# Patient Record
Sex: MALE | Race: WHITE | NOT HISPANIC OR LATINO | Employment: OTHER | URBAN - NONMETROPOLITAN AREA
[De-identification: names, ages, dates, MRNs, and addresses within clinical notes are randomized per-mention and may not be internally consistent; named-entity substitution may affect disease eponyms.]

---

## 2023-03-06 DIAGNOSIS — I26.99 PE (PULMONARY THROMBOEMBOLISM) (MULTI): Primary | ICD-10-CM

## 2023-03-06 PROBLEM — G25.0 TREMOR, ESSENTIAL: Status: ACTIVE | Noted: 2023-03-06

## 2023-03-06 PROBLEM — R35.1 NOCTURIA: Status: ACTIVE | Noted: 2023-03-06

## 2023-03-06 PROBLEM — R97.20 ELEVATED PSA: Status: ACTIVE | Noted: 2023-03-06

## 2023-03-06 PROBLEM — E11.22 DM TYPE 2 CAUSING CKD STAGE 3 (MULTI): Status: ACTIVE | Noted: 2023-03-06

## 2023-03-06 PROBLEM — Z95.5 S/P CORONARY ARTERY STENT PLACEMENT: Status: ACTIVE | Noted: 2023-03-06

## 2023-03-06 PROBLEM — R20.2 PARESTHESIA OF FINGER: Status: ACTIVE | Noted: 2023-03-06

## 2023-03-06 PROBLEM — N18.30 STAGE 3 CHRONIC KIDNEY DISEASE (MULTI): Status: ACTIVE | Noted: 2023-03-06

## 2023-03-06 PROBLEM — Z85.828 HISTORY OF SKIN CANCER: Status: ACTIVE | Noted: 2023-03-06

## 2023-03-06 PROBLEM — E78.2 MIXED HYPERLIPIDEMIA: Status: ACTIVE | Noted: 2023-03-06

## 2023-03-06 PROBLEM — I10 HYPERTENSION: Status: ACTIVE | Noted: 2023-03-06

## 2023-03-06 PROBLEM — Z86.0100 PERSONAL HISTORY OF COLONIC POLYPS: Status: ACTIVE | Noted: 2023-03-06

## 2023-03-06 PROBLEM — I25.10 CAD (CORONARY ARTERY DISEASE): Status: ACTIVE | Noted: 2023-03-06

## 2023-03-06 PROBLEM — R91.1 PULMONARY NODULE: Status: ACTIVE | Noted: 2023-03-06

## 2023-03-06 PROBLEM — K63.5 COLON POLYP: Status: ACTIVE | Noted: 2023-03-06

## 2023-03-06 PROBLEM — D70.9 NEUTROPENIA (CMS-HCC): Status: ACTIVE | Noted: 2023-03-06

## 2023-03-06 PROBLEM — G47.33 OSA ON CPAP: Status: ACTIVE | Noted: 2023-03-06

## 2023-03-06 PROBLEM — N52.9 ED (ERECTILE DYSFUNCTION) OF ORGANIC ORIGIN: Status: ACTIVE | Noted: 2023-03-06

## 2023-03-06 PROBLEM — R09.02 HYPOXIA: Status: ACTIVE | Noted: 2023-03-06

## 2023-03-06 PROBLEM — K21.9 ACID REFLUX: Status: ACTIVE | Noted: 2023-03-06

## 2023-03-06 PROBLEM — J30.2 ALLERGIC RHINITIS, SEASONAL: Status: ACTIVE | Noted: 2023-03-06

## 2023-03-06 PROBLEM — D69.6 THROMBOCYTOPENIA (CMS-HCC): Status: ACTIVE | Noted: 2023-03-06

## 2023-03-06 PROBLEM — I83.90 RETICULAR VENOUS VARICES: Status: ACTIVE | Noted: 2023-03-06

## 2023-03-06 PROBLEM — M19.90 DEGENERATIVE JOINT DISEASE: Status: ACTIVE | Noted: 2023-03-06

## 2023-03-06 PROBLEM — Z86.010 PERSONAL HISTORY OF COLONIC POLYPS: Status: ACTIVE | Noted: 2023-03-06

## 2023-03-06 PROBLEM — N18.30 DM TYPE 2 CAUSING CKD STAGE 3 (MULTI): Status: ACTIVE | Noted: 2023-03-06

## 2023-03-06 PROBLEM — R19.7 POSTCHOLECYSTECTOMY DIARRHEA: Status: ACTIVE | Noted: 2023-03-06

## 2023-03-06 PROBLEM — N13.8 BENIGN PROSTATIC HYPERPLASIA WITH URINARY OBSTRUCTION AND OTHER LOWER URINARY TRACT SYMPTOMS: Status: ACTIVE | Noted: 2023-03-06

## 2023-03-06 PROBLEM — D64.9 ANEMIA: Status: ACTIVE | Noted: 2023-03-06

## 2023-03-06 PROBLEM — M19.90 ARTHRITIS: Status: ACTIVE | Noted: 2023-03-06

## 2023-03-06 PROBLEM — M48.02 DEGENERATIVE CERVICAL SPINAL STENOSIS: Status: ACTIVE | Noted: 2023-03-06

## 2023-03-06 PROBLEM — K91.89 POSTCHOLECYSTECTOMY DIARRHEA: Status: ACTIVE | Noted: 2023-03-06

## 2023-03-06 PROBLEM — L98.9 SKIN LESION: Status: ACTIVE | Noted: 2023-03-06

## 2023-03-06 PROBLEM — N40.1 BENIGN PROSTATIC HYPERPLASIA WITH URINARY OBSTRUCTION AND OTHER LOWER URINARY TRACT SYMPTOMS: Status: ACTIVE | Noted: 2023-03-06

## 2023-03-06 RX ORDER — ALLOPURINOL 100 MG/1
1 TABLET ORAL DAILY
COMMUNITY
Start: 2022-09-01 | End: 2023-08-17 | Stop reason: SDUPTHER

## 2023-03-06 RX ORDER — ATORVASTATIN CALCIUM 20 MG/1
1 TABLET, FILM COATED ORAL DAILY
COMMUNITY
Start: 2018-04-21 | End: 2023-08-17 | Stop reason: SDUPTHER

## 2023-03-06 RX ORDER — ACETAMINOPHEN AND DIPHENHYDRAMINE HYDROCHLORIDE 500; 25 MG/1; MG/1
2 TABLET, FILM COATED ORAL NIGHTLY
COMMUNITY
End: 2023-04-12 | Stop reason: ALTCHOICE

## 2023-03-06 RX ORDER — CHOLECALCIFEROL (VITAMIN D3) 25 MCG
25 TABLET ORAL DAILY
COMMUNITY
End: 2023-04-12 | Stop reason: ALTCHOICE

## 2023-03-06 RX ORDER — LANOLIN ALCOHOL/MO/W.PET/CERES
1 CREAM (GRAM) TOPICAL DAILY
COMMUNITY
Start: 2021-01-12 | End: 2023-04-12 | Stop reason: SDUPTHER

## 2023-03-06 RX ORDER — AMLODIPINE BESYLATE 10 MG/1
1 TABLET ORAL
COMMUNITY
Start: 2021-01-12 | End: 2023-04-20

## 2023-03-06 RX ORDER — CHOLESTYRAMINE 4 G/9G
1 POWDER, FOR SUSPENSION ORAL 2 TIMES DAILY
COMMUNITY
End: 2023-04-12 | Stop reason: ALTCHOICE

## 2023-03-06 RX ORDER — ASPIRIN 81 MG/1
81 TABLET ORAL DAILY
COMMUNITY
End: 2023-06-23 | Stop reason: ALTCHOICE

## 2023-03-06 RX ORDER — AMMONIUM LACTATE 12 G/100G
1 CREAM TOPICAL 2 TIMES DAILY
COMMUNITY
Start: 2021-11-12 | End: 2023-06-23 | Stop reason: ALTCHOICE

## 2023-03-31 LAB
ALANINE AMINOTRANSFERASE (SGPT) (U/L) IN SER/PLAS: 28 U/L (ref 10–52)
ALBUMIN (G/DL) IN SER/PLAS: 3.8 G/DL (ref 3.4–5)
ALKALINE PHOSPHATASE (U/L) IN SER/PLAS: 83 U/L (ref 33–136)
ANION GAP IN SER/PLAS: 13 MMOL/L (ref 10–20)
ASPARTATE AMINOTRANSFERASE (SGOT) (U/L) IN SER/PLAS: 33 U/L (ref 9–39)
BASOPHILS (10*3/UL) IN BLOOD BY AUTOMATED COUNT: 0.01 X10E9/L (ref 0–0.1)
BASOPHILS/100 LEUKOCYTES IN BLOOD BY AUTOMATED COUNT: 0.4 % (ref 0–2)
BILIRUBIN TOTAL (MG/DL) IN SER/PLAS: 0.5 MG/DL (ref 0–1.2)
CALCIDIOL (25 OH VITAMIN D3) (NG/ML) IN SER/PLAS: 102 NG/ML
CALCIUM (MG/DL) IN SER/PLAS: 8.9 MG/DL (ref 8.6–10.3)
CARBON DIOXIDE, TOTAL (MMOL/L) IN SER/PLAS: 27 MMOL/L (ref 21–32)
CHLORIDE (MMOL/L) IN SER/PLAS: 107 MMOL/L (ref 98–107)
CHOLESTEROL (MG/DL) IN SER/PLAS: 113 MG/DL (ref 0–199)
CHOLESTEROL IN HDL (MG/DL) IN SER/PLAS: 31 MG/DL
CHOLESTEROL/HDL RATIO: 3.6
COBALAMIN (VITAMIN B12) (PG/ML) IN SER/PLAS: 494 PG/ML (ref 211–911)
CREATININE (MG/DL) IN SER/PLAS: 2.08 MG/DL (ref 0.5–1.3)
EOSINOPHILS (10*3/UL) IN BLOOD BY AUTOMATED COUNT: 0.06 X10E9/L (ref 0–0.4)
EOSINOPHILS/100 LEUKOCYTES IN BLOOD BY AUTOMATED COUNT: 2.3 % (ref 0–6)
ERYTHROCYTE DISTRIBUTION WIDTH (RATIO) BY AUTOMATED COUNT: 15 % (ref 11.5–14.5)
ERYTHROCYTE MEAN CORPUSCULAR HEMOGLOBIN CONCENTRATION (G/DL) BY AUTOMATED: 32.1 G/DL (ref 32–36)
ERYTHROCYTE MEAN CORPUSCULAR VOLUME (FL) BY AUTOMATED COUNT: 115 FL (ref 80–100)
ERYTHROCYTES (10*6/UL) IN BLOOD BY AUTOMATED COUNT: 2.35 X10E12/L (ref 4.5–5.9)
ESTIMATED AVERAGE GLUCOSE FOR HBA1C: 169 MG/DL
GFR MALE: 30 ML/MIN/1.73M2
GLUCOSE (MG/DL) IN SER/PLAS: 161 MG/DL (ref 74–99)
HEMATOCRIT (%) IN BLOOD BY AUTOMATED COUNT: 27.1 % (ref 41–52)
HEMOGLOBIN (G/DL) IN BLOOD: 8.7 G/DL (ref 13.5–17.5)
HEMOGLOBIN A1C/HEMOGLOBIN TOTAL IN BLOOD: 7.5 %
IMMATURE GRANULOCYTES/100 LEUKOCYTES IN BLOOD BY AUTOMATED COUNT: 0.4 % (ref 0–0.9)
LDL: 36 MG/DL (ref 0–99)
LEUKOCYTES (10*3/UL) IN BLOOD BY AUTOMATED COUNT: 2.6 X10E9/L (ref 4.4–11.3)
LYMPHOCYTES (10*3/UL) IN BLOOD BY AUTOMATED COUNT: 0.96 X10E9/L (ref 0.8–3)
LYMPHOCYTES/100 LEUKOCYTES IN BLOOD BY AUTOMATED COUNT: 37.2 % (ref 13–44)
MAGNESIUM (MG/DL) IN SER/PLAS: 1.87 MG/DL (ref 1.6–2.4)
MONOCYTES (10*3/UL) IN BLOOD BY AUTOMATED COUNT: 0.18 X10E9/L (ref 0.05–0.8)
MONOCYTES/100 LEUKOCYTES IN BLOOD BY AUTOMATED COUNT: 7 % (ref 2–10)
NEUTROPHILS (10*3/UL) IN BLOOD BY AUTOMATED COUNT: 1.36 X10E9/L (ref 1.6–5.5)
NEUTROPHILS/100 LEUKOCYTES IN BLOOD BY AUTOMATED COUNT: 52.7 % (ref 40–80)
NON HDL CHOLESTEROL: 82 MG/DL
PLATELETS (10*3/UL) IN BLOOD AUTOMATED COUNT: 97 X10E9/L (ref 150–450)
POTASSIUM (MMOL/L) IN SER/PLAS: 4.4 MMOL/L (ref 3.5–5.3)
PROTEIN TOTAL: 6.5 G/DL (ref 6.4–8.2)
SODIUM (MMOL/L) IN SER/PLAS: 143 MMOL/L (ref 136–145)
THYROTROPIN (MIU/L) IN SER/PLAS BY DETECTION LIMIT <= 0.05 MIU/L: 2.32 MIU/L (ref 0.44–3.98)
TRIGLYCERIDE (MG/DL) IN SER/PLAS: 231 MG/DL (ref 0–149)
UREA NITROGEN (MG/DL) IN SER/PLAS: 43 MG/DL (ref 6–23)
VLDL: 46 MG/DL (ref 0–40)

## 2023-04-11 RX ORDER — ACETAMINOPHEN, DEXTROMETHORPHAN HBR, DOXYLAMINE SUCCINATE, PHENYLEPHRINE HCL 650; 20; 12.5; 1 MG/30ML; MG/30ML; MG/30ML; MG/30ML
1 SOLUTION ORAL
COMMUNITY
Start: 2019-10-09

## 2023-04-11 RX ORDER — METOPROLOL SUCCINATE 100 MG/1
100 TABLET, EXTENDED RELEASE ORAL DAILY
COMMUNITY
Start: 2019-03-09 | End: 2023-06-23 | Stop reason: ALTCHOICE

## 2023-04-11 RX ORDER — GABAPENTIN 300 MG/1
300 CAPSULE ORAL EVERY 12 HOURS
COMMUNITY
Start: 2021-09-16 | End: 2023-04-12 | Stop reason: ALTCHOICE

## 2023-04-11 RX ORDER — FUROSEMIDE 40 MG/1
40 TABLET ORAL DAILY
COMMUNITY
Start: 2018-11-08 | End: 2023-05-24 | Stop reason: SDUPTHER

## 2023-04-11 RX ORDER — FAMOTIDINE 20 MG/1
20 TABLET, FILM COATED ORAL 2 TIMES DAILY
COMMUNITY
Start: 2021-02-18 | End: 2023-04-12 | Stop reason: SDUPTHER

## 2023-04-11 RX ORDER — GLIMEPIRIDE 2 MG/1
2 TABLET ORAL 2 TIMES DAILY
COMMUNITY
Start: 2022-07-12 | End: 2023-05-24 | Stop reason: SDUPTHER

## 2023-04-11 RX ORDER — NIZATIDINE 150 MG/1
150 CAPSULE ORAL 2 TIMES DAILY
COMMUNITY
End: 2023-04-12

## 2023-04-11 RX ORDER — NITROGLYCERIN 0.4 MG/1
0.4 TABLET SUBLINGUAL EVERY 5 MIN PRN
COMMUNITY
Start: 2020-01-08 | End: 2023-04-12 | Stop reason: SDUPTHER

## 2023-04-11 RX ORDER — PRIMIDONE 50 MG/1
3 TABLET ORAL 2 TIMES DAILY
COMMUNITY
Start: 2018-08-09 | End: 2023-06-23 | Stop reason: ALTCHOICE

## 2023-04-11 RX ORDER — COLESEVELAM 180 1/1
1 TABLET ORAL DAILY
COMMUNITY
Start: 2021-12-03 | End: 2023-04-12 | Stop reason: ALTCHOICE

## 2023-04-12 ENCOUNTER — OFFICE VISIT (OUTPATIENT)
Dept: PRIMARY CARE | Facility: CLINIC | Age: 88
End: 2023-04-12
Payer: MEDICARE

## 2023-04-12 VITALS
WEIGHT: 239 LBS | SYSTOLIC BLOOD PRESSURE: 122 MMHG | DIASTOLIC BLOOD PRESSURE: 62 MMHG | HEART RATE: 70 BPM | BODY MASS INDEX: 36.22 KG/M2 | OXYGEN SATURATION: 96 % | HEIGHT: 68 IN

## 2023-04-12 DIAGNOSIS — Z00.00 ROUTINE GENERAL MEDICAL EXAMINATION AT HEALTH CARE FACILITY: Primary | ICD-10-CM

## 2023-04-12 DIAGNOSIS — G25.0 TREMOR, ESSENTIAL: ICD-10-CM

## 2023-04-12 DIAGNOSIS — N18.32 STAGE 3B CHRONIC KIDNEY DISEASE (MULTI): ICD-10-CM

## 2023-04-12 DIAGNOSIS — D69.6 THROMBOCYTOPENIA (CMS-HCC): ICD-10-CM

## 2023-04-12 DIAGNOSIS — K21.9 GASTROESOPHAGEAL REFLUX DISEASE WITHOUT ESOPHAGITIS: ICD-10-CM

## 2023-04-12 DIAGNOSIS — I10 PRIMARY HYPERTENSION: ICD-10-CM

## 2023-04-12 DIAGNOSIS — I25.10 CORONARY ARTERY DISEASE INVOLVING NATIVE HEART, UNSPECIFIED VESSEL OR LESION TYPE, UNSPECIFIED WHETHER ANGINA PRESENT: ICD-10-CM

## 2023-04-12 DIAGNOSIS — G47.33 OSA ON CPAP: ICD-10-CM

## 2023-04-12 DIAGNOSIS — E11.22 TYPE 2 DIABETES MELLITUS WITH STAGE 3B CHRONIC KIDNEY DISEASE, WITHOUT LONG-TERM CURRENT USE OF INSULIN (MULTI): ICD-10-CM

## 2023-04-12 DIAGNOSIS — E78.2 MIXED HYPERLIPIDEMIA: ICD-10-CM

## 2023-04-12 DIAGNOSIS — D70.9 NEUTROPENIA, UNSPECIFIED TYPE (CMS-HCC): ICD-10-CM

## 2023-04-12 DIAGNOSIS — Z95.5 S/P CORONARY ARTERY STENT PLACEMENT: ICD-10-CM

## 2023-04-12 DIAGNOSIS — N18.32 TYPE 2 DIABETES MELLITUS WITH STAGE 3B CHRONIC KIDNEY DISEASE, WITHOUT LONG-TERM CURRENT USE OF INSULIN (MULTI): ICD-10-CM

## 2023-04-12 DIAGNOSIS — M19.90 ARTHRITIS: ICD-10-CM

## 2023-04-12 PROBLEM — E66.01 CLASS 2 SEVERE OBESITY WITH SERIOUS COMORBIDITY AND BODY MASS INDEX (BMI) OF 36.0 TO 36.9 IN ADULT (MULTI): Status: ACTIVE | Noted: 2023-04-12

## 2023-04-12 PROBLEM — L98.9 SKIN LESION: Status: RESOLVED | Noted: 2023-03-06 | Resolved: 2023-04-12

## 2023-04-12 PROBLEM — E66.812 CLASS 2 SEVERE OBESITY WITH SERIOUS COMORBIDITY AND BODY MASS INDEX (BMI) OF 36.0 TO 36.9 IN ADULT: Status: ACTIVE | Noted: 2023-04-12

## 2023-04-12 PROBLEM — I26.99 PULMONARY EMBOLISM (MULTI): Status: RESOLVED | Noted: 2023-03-06 | Resolved: 2023-04-12

## 2023-04-12 PROCEDURE — 3078F DIAST BP <80 MM HG: CPT | Performed by: FAMILY MEDICINE

## 2023-04-12 PROCEDURE — 1123F ACP DISCUSS/DSCN MKR DOCD: CPT | Performed by: FAMILY MEDICINE

## 2023-04-12 PROCEDURE — 1036F TOBACCO NON-USER: CPT | Performed by: FAMILY MEDICINE

## 2023-04-12 PROCEDURE — 3074F SYST BP LT 130 MM HG: CPT | Performed by: FAMILY MEDICINE

## 2023-04-12 PROCEDURE — 1159F MED LIST DOCD IN RCRD: CPT | Performed by: FAMILY MEDICINE

## 2023-04-12 PROCEDURE — 99214 OFFICE O/P EST MOD 30 MIN: CPT | Performed by: FAMILY MEDICINE

## 2023-04-12 PROCEDURE — 1157F ADVNC CARE PLAN IN RCRD: CPT | Performed by: FAMILY MEDICINE

## 2023-04-12 PROCEDURE — 1160F RVW MEDS BY RX/DR IN RCRD: CPT | Performed by: FAMILY MEDICINE

## 2023-04-12 PROCEDURE — G0439 PPPS, SUBSEQ VISIT: HCPCS | Performed by: FAMILY MEDICINE

## 2023-04-12 PROCEDURE — 1170F FXNL STATUS ASSESSED: CPT | Performed by: FAMILY MEDICINE

## 2023-04-12 RX ORDER — INSULIN GLARGINE 100 [IU]/ML
30 INJECTION, SOLUTION SUBCUTANEOUS NIGHTLY
COMMUNITY
End: 2023-06-23 | Stop reason: DRUGHIGH

## 2023-04-12 RX ORDER — NITROGLYCERIN 0.4 MG/1
0.4 TABLET SUBLINGUAL EVERY 5 MIN PRN
Qty: 90 TABLET | Refills: 1 | Status: SHIPPED | OUTPATIENT
Start: 2023-04-12 | End: 2023-11-16 | Stop reason: SDUPTHER

## 2023-04-12 RX ORDER — FAMOTIDINE 20 MG/1
20 TABLET, FILM COATED ORAL 2 TIMES DAILY
Qty: 180 TABLET | Refills: 1 | Status: SHIPPED | OUTPATIENT
Start: 2023-04-12 | End: 2023-06-23 | Stop reason: ALTCHOICE

## 2023-04-12 ASSESSMENT — ACTIVITIES OF DAILY LIVING (ADL)
DRESSING: INDEPENDENT
MANAGING_FINANCES: INDEPENDENT
BATHING: INDEPENDENT
DOING_HOUSEWORK: INDEPENDENT
TAKING_MEDICATION: INDEPENDENT
GROCERY_SHOPPING: INDEPENDENT

## 2023-04-12 ASSESSMENT — PATIENT HEALTH QUESTIONNAIRE - PHQ9
2. FEELING DOWN, DEPRESSED OR HOPELESS: NOT AT ALL
SUM OF ALL RESPONSES TO PHQ9 QUESTIONS 1 AND 2: 0
1. LITTLE INTEREST OR PLEASURE IN DOING THINGS: NOT AT ALL

## 2023-04-12 NOTE — PATIENT INSTRUCTIONS
Continue current medications.  Follow up with specialists as scheduled.  Follow up in 6 months, sooner if needed.      Ways to Help Prevent Falls at Home    Quick Tips   ? Ask for help if you need it. Most people want to help!   ? Get up slowly after sitting or laying down   ? Wear a medical alert device or keep cell phone in your pocket   ? Use night lights, especially areas near a bathroom   ? Keep the items you use often within reach on a small stool or end table   ? Use an assistive device such as walker or cane, as directed by provider/physical therapy   ? Use a non-slip mat and grab bars in your bathroom. Look for home health sections for best options     Other Areas to Focus On   ? Exercise and nutrition: Regular exercise or taking a falls prevention class are great ways improve strength and balance. Don’t forget to stay hydrated and bring a snack!   ? Medicine side effects: Some medicines can make you sleepy or dizzy, which could cause a fall. Ask your healthcare provider about the side effects your medicines could cause. Be sure to let them know if you take any vitamins or supplements as well.   ? Tripping hazards: Remove items you could trip on, such as loose mats, rugs, cords, and clutter. Wear closed toe shoes with rubber soles.   ? Health and wellness: Get regular checkups with your healthcare provider, plus routine vision and hearing screenings. Talk with your healthcare provider about:   o Your medicines and the possible side effects - bring them in a bag if that is easier!   o Problems with balance or feeling dizzy   o Ways to promote bone health, such as Vitamin D and calcium supplements   o Questions or concerns about falling     *Ask your healthcare team if you have questions     ©[facility], 2022

## 2023-04-12 NOTE — PROGRESS NOTES
"Subjective   Reason for Visit: Zack Medrano is an 87 y.o. male here for a Medicare Wellness visit.     Past Medical, Surgical, and Family History reviewed and updated in chart.    Reviewed all medications by prescribing practitioner or clinical pharmacist (such as prescriptions, OTCs, herbal therapies and supplements) and documented in the medical record.    Here for routine follow up HTN, DM, CKD (Dr Nguyen), CAD ([facility]), high chol, thrombocytopenia/anemia (Dr Dash), tremor (Dr Haque), LINDA, h/o PE. He continues to struggle with his tremor - is seeing Dr Haque and will be contacting their office regarding that.  He is now on lantus insulin through Dr Nguyen and his sugars are better.      Advance directives:. Patient has living will. Patient has healthcare POA.     Last Pneumovax was in 2020 - consider prevnar 20 in 2025.           Patient Care Team:  Fabiola Dietz MD as PCP - General  Mickey Nguyen DO as PCP - [employer] PCP  Patrick Haque MD as Referring Physician (Neurology)         Objective   Vitals:  /62 (BP Location: Left arm, Patient Position: Sitting)   Pulse 70   Ht 1.727 m (5' 8\")   Wt 108 kg (239 lb)   SpO2 96%   BMI 36.34 kg/m²       Physical Exam  Vitals reviewed.   Constitutional:       General: He is not in acute distress.  Cardiovascular:      Rate and Rhythm: Normal rate and regular rhythm.      Heart sounds: No murmur heard.  Pulmonary:      Effort: Pulmonary effort is normal. No respiratory distress.      Breath sounds: Normal breath sounds.   Skin:     General: Skin is warm and dry.   Neurological:      General: No focal deficit present.      Mental Status: He is alert. Mental status is at baseline.        Latest Reference Range & Units 03/31/23 08:30   GLUCOSE 74 - 99 mg/dL 161 (H)   SODIUM 136 - 145 mmol/L 143   POTASSIUM 3.5 - 5.3 mmol/L 4.4   CHLORIDE 98 - 107 mmol/L 107   Bicarbonate 21 - 32 mmol/L 27   Anion Gap 10 - 20 mmol/L 13   Blood Urea Nitrogen 6 - 23 mg/dL 43 " (H)   Creatinine 0.50 - 1.30 mg/dL 2.08 (H)   Calcium 8.6 - 10.3 mg/dL 8.9   Albumin 3.4 - 5.0 g/dL 3.8   Alkaline Phosphatase 33 - 136 U/L 83   ALT 10 - 52 U/L 28   AST 9 - 39 U/L 33   Bilirubin Total 0.0 - 1.2 mg/dL 0.5   HDL CHOLESTEROL mg/dL 31.0 !   Cholesterol/HDL Ratio  3.6   VLDL 0 - 40 mg/dL 46 (H)   TRIGLYCERIDES 0 - 149 mg/dL 231 (H)   Non HDL Cholesterol mg/dL 82   Total Protein 6.4 - 8.2 g/dL 6.5   MAGNESIUM 1.60 - 2.40 mg/dL 1.87   CHOLESTEROL 0 - 199 mg/dL 113   LDL 0 - 99 mg/dL 36   GFR MALE >90 mL/min/1.73m2 30 !   Vitamin B12 211 - 911 pg/mL 494   Hemoglobin A1C % 7.5 !   Thyroid Stimulating Hormone 0.44 - 3.98 mIU/L 2.32   Vitamin D, 25-Hydroxy, Total ng/mL 102 !   Estimated Average Glucose MG/   WBC 4.4 - 11.3 x10E9/L 2.6 (L)   RBC 4.50 - 5.90 x10E12/L 2.35 (L)   HEMOGLOBIN 13.5 - 17.5 g/dL 8.7 (L)   HEMATOCRIT 41.0 - 52.0 % 27.1 (L)   MCV 80 - 100 fL 115 (H)   MCHC 32.0 - 36.0 g/dL 32.1   Platelets 150 - 450 x10E9/L 97 (L)   Neutrophils % 40.0 - 80.0 % 52.7   Immature Granulocytes %, Automated 0.0 - 0.9 % 0.4   Lymphocytes % 13.0 - 44.0 % 37.2   Monocytes % 2.0 - 10.0 % 7.0   Eosinophils % 0.0 - 6.0 % 2.3   Basophils % 0.0 - 2.0 % 0.4   Neutrophils Absolute 1.60 - 5.50 x10E9/L 1.36 (L)   Lymphocytes Absolute 0.80 - 3.00 x10E9/L 0.96   Monocytes Absolute 0.05 - 0.80 x10E9/L 0.18   Eosinophils Absolute 0.00 - 0.40 x10E9/L 0.06   Basophils Absolute 0.00 - 0.10 x10E9/L 0.01   RED CELL DISTRIBUTION WIDTH 11.5 - 14.5 % 15.0 (H)   (H): Data is abnormally high  !: Data is abnormal  (L): Data is abnormally low    Assessment/Plan   Problem List Items Addressed This Visit          Nervous    LINDA on CPAP    Relevant Orders    Follow Up In Primary Care    CBC and Auto Differential    Comprehensive Metabolic Panel    Hemoglobin A1C    Lipid Panel    TSH with reflex to Free T4 if abnormal    Vitamin B12    Tremor, essential    Relevant Orders    Follow Up In Primary Care    CBC and Auto Differential     Comprehensive Metabolic Panel    Hemoglobin A1C    Lipid Panel    TSH with reflex to Free T4 if abnormal    Vitamin B12       Circulatory    CAD (coronary artery disease)    Relevant Medications    nitroglycerin (Nitrostat) 0.4 mg SL tablet    Other Relevant Orders    Follow Up In Primary Care    CBC and Auto Differential    Comprehensive Metabolic Panel    Hemoglobin A1C    Lipid Panel    TSH with reflex to Free T4 if abnormal    Vitamin B12    Hypertension    Relevant Orders    Follow Up In Primary Care    CBC and Auto Differential    Comprehensive Metabolic Panel    Hemoglobin A1C    Lipid Panel    TSH with reflex to Free T4 if abnormal    Vitamin B12    S/P coronary artery stent placement    Relevant Medications    nitroglycerin (Nitrostat) 0.4 mg SL tablet    Other Relevant Orders    Follow Up In Primary Care    CBC and Auto Differential    Comprehensive Metabolic Panel    Hemoglobin A1C    Lipid Panel    TSH with reflex to Free T4 if abnormal    Vitamin B12       Digestive    Acid reflux    Relevant Medications    famotidine (Pepcid) 20 mg tablet    Other Relevant Orders    Follow Up In Primary Care    CBC and Auto Differential    Comprehensive Metabolic Panel    Hemoglobin A1C    Lipid Panel    TSH with reflex to Free T4 if abnormal    Vitamin B12       Genitourinary    Stage 3 chronic kidney disease (CMS/HCC)    Relevant Orders    Follow Up In Primary Care    CBC and Auto Differential    Comprehensive Metabolic Panel    Hemoglobin A1C    Lipid Panel    TSH with reflex to Free T4 if abnormal    Vitamin B12       Musculoskeletal    Arthritis    Relevant Orders    Follow Up In Primary Care    CBC and Auto Differential    Comprehensive Metabolic Panel    Hemoglobin A1C    Lipid Panel    TSH with reflex to Free T4 if abnormal    Vitamin B12       Endocrine/Metabolic    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)    Relevant Orders    Follow Up In Primary Care    CBC and Auto Differential    Comprehensive Metabolic  Panel    Hemoglobin A1C    Lipid Panel    TSH with reflex to Free T4 if abnormal    Vitamin B12       Hematologic    Thrombocytopenia (CMS/HCC)    Relevant Orders    Follow Up In Primary Care    CBC and Auto Differential    Comprehensive Metabolic Panel    Hemoglobin A1C    Lipid Panel    TSH with reflex to Free T4 if abnormal    Vitamin B12       Other    Mixed hyperlipidemia    Relevant Orders    Follow Up In Primary Care    CBC and Auto Differential    Comprehensive Metabolic Panel    Hemoglobin A1C    Lipid Panel    TSH with reflex to Free T4 if abnormal    Vitamin B12    Neutropenia (CMS/HCC)    Relevant Orders    Follow Up In Primary Care    CBC and Auto Differential    Comprehensive Metabolic Panel    Hemoglobin A1C    Lipid Panel    TSH with reflex to Free T4 if abnormal    Vitamin B12     Other Visit Diagnoses       Routine general medical examination at health care facility    -  Primary    Relevant Orders    Follow Up In Primary Care    CBC and Auto Differential    Comprehensive Metabolic Panel    Hemoglobin A1C    Lipid Panel    TSH with reflex to Free T4 if abnormal    Vitamin B12             Patient was identified as a fall risk. Risk prevention instructions provided.

## 2023-04-14 DIAGNOSIS — I10 ESSENTIAL (PRIMARY) HYPERTENSION: ICD-10-CM

## 2023-04-20 RX ORDER — AMLODIPINE BESYLATE 10 MG/1
TABLET ORAL
Qty: 90 TABLET | Refills: 1 | Status: SHIPPED | OUTPATIENT
Start: 2023-04-20 | End: 2023-05-24 | Stop reason: SDUPTHER

## 2023-05-23 ENCOUNTER — PATIENT OUTREACH (OUTPATIENT)
Dept: CARE COORDINATION | Facility: CLINIC | Age: 88
End: 2023-05-23
Payer: MEDICARE

## 2023-05-23 DIAGNOSIS — D64.9 ANEMIA, UNSPECIFIED TYPE: ICD-10-CM

## 2023-05-23 NOTE — PROGRESS NOTES
Discharge Facility:[facility]  Discharge Diagnosis:  Admission Reason: weakness   Final Discharge Diagnoses: anemia, generalized weakness     Admission Date:5.15.23  Discharge Date: 5.19.23    PCP Appointment Date:  Specialist Appointment Date:   Physician/Dept/Service:   follow-up with Dr. Weinberg          Reason for Referral:   hospitalization          Call to Schedule in:   2 weeks          Location:   [address] [place] [address]          Phone Number:   [phone]  Hospital Encounter and Summary: Linked   See discharge assessment below for further details     Engagement  Call Start Time: 1145 (5/23/2023 11:45 AM)    Medications  Medications reviewed with patient/caregiver?: -- (reviewed changes) (5/23/2023 11:45 AM)  Is the patient having any side effects they believe may be caused by any medication additions or changes?: (!) Yes (experiencing diarrhea from carafate prescribed at hospital. Patient called hospital and was advised to discontinue.) (5/23/2023 11:45 AM)  Does the patient have all medications ordered at discharge?: Yes (5/23/2023 11:45 AM)  Care Management Interventions: No intervention needed (5/23/2023 11:45 AM)  Is the patient taking all medications as directed (includes completed medication regime)?: Yes (5/23/2023 11:45 AM)  Care Management Interventions: Provided patient education (5/23/2023 11:45 AM)    Appointments  Does the patient have a primary care provider?: Yes (5/23/2023 11:45 AM)  Care Management Interventions: Verified appointment date/time/provider (5/23/2023 11:45 AM)  Care Management Interventions: Advised patient to keep appointment (5/23/2023 11:45 AM)    Self Management  What is the home health agency?: n/a (5/23/2023 11:45 AM)    Patient Teaching  Does the patient have access to their discharge instructions?: Yes (5/23/2023 11:45 AM)  Care Management Interventions: Reviewed instructions with patient (5/23/2023 11:45 AM)  What is the patient's perception of their health status since  discharge?: Improving (per patient-only slightly improved) (5/23/2023 11:45 AM)    Wrap Up  Wrap Up Additional Comments: patient states still feeling weak. Does feel a little better than prior to hospital stay. Plans to discuss use of Carafate with PCP. (5/23/2023 11:45 AM)

## 2023-05-24 ENCOUNTER — OFFICE VISIT (OUTPATIENT)
Dept: PRIMARY CARE | Facility: CLINIC | Age: 88
End: 2023-05-24
Payer: MEDICARE

## 2023-05-24 ENCOUNTER — LAB (OUTPATIENT)
Dept: LAB | Facility: LAB | Age: 88
End: 2023-05-24
Payer: MEDICARE

## 2023-05-24 VITALS
DIASTOLIC BLOOD PRESSURE: 70 MMHG | OXYGEN SATURATION: 98 % | HEART RATE: 67 BPM | WEIGHT: 233 LBS | HEIGHT: 68 IN | BODY MASS INDEX: 35.31 KG/M2 | SYSTOLIC BLOOD PRESSURE: 124 MMHG

## 2023-05-24 DIAGNOSIS — I25.10 CORONARY ARTERY DISEASE INVOLVING NATIVE HEART, UNSPECIFIED VESSEL OR LESION TYPE, UNSPECIFIED WHETHER ANGINA PRESENT: ICD-10-CM

## 2023-05-24 DIAGNOSIS — N18.30 TYPE 2 DIABETES MELLITUS WITH STAGE 3 CHRONIC KIDNEY DISEASE, WITHOUT LONG-TERM CURRENT USE OF INSULIN, UNSPECIFIED WHETHER STAGE 3A OR 3B CKD (MULTI): ICD-10-CM

## 2023-05-24 DIAGNOSIS — E66.01 CLASS 2 SEVERE OBESITY WITH SERIOUS COMORBIDITY AND BODY MASS INDEX (BMI) OF 35.0 TO 35.9 IN ADULT, UNSPECIFIED OBESITY TYPE (MULTI): ICD-10-CM

## 2023-05-24 DIAGNOSIS — K25.9 GASTRIC ULCER, UNSPECIFIED CHRONICITY, UNSPECIFIED WHETHER GASTRIC ULCER HEMORRHAGE OR PERFORATION PRESENT: ICD-10-CM

## 2023-05-24 DIAGNOSIS — E11.22 TYPE 2 DIABETES MELLITUS WITH STAGE 3 CHRONIC KIDNEY DISEASE, WITHOUT LONG-TERM CURRENT USE OF INSULIN, UNSPECIFIED WHETHER STAGE 3A OR 3B CKD (MULTI): ICD-10-CM

## 2023-05-24 DIAGNOSIS — Z86.711 HISTORY OF PULMONARY EMBOLUS (PE): ICD-10-CM

## 2023-05-24 DIAGNOSIS — D64.9 ANEMIA, UNSPECIFIED TYPE: Primary | ICD-10-CM

## 2023-05-24 DIAGNOSIS — I10 ESSENTIAL (PRIMARY) HYPERTENSION: ICD-10-CM

## 2023-05-24 LAB
ALANINE AMINOTRANSFERASE (SGPT) (U/L) IN SER/PLAS: 34 U/L (ref 10–52)
ALBUMIN (G/DL) IN SER/PLAS: 4.1 G/DL (ref 3.4–5)
ALKALINE PHOSPHATASE (U/L) IN SER/PLAS: 103 U/L (ref 33–136)
ANION GAP IN SER/PLAS: 13 MMOL/L (ref 10–20)
ASPARTATE AMINOTRANSFERASE (SGOT) (U/L) IN SER/PLAS: 37 U/L (ref 9–39)
BASOPHILS (10*3/UL) IN BLOOD BY AUTOMATED COUNT: 0.01 X10E9/L (ref 0–0.1)
BASOPHILS/100 LEUKOCYTES IN BLOOD BY AUTOMATED COUNT: 0.5 % (ref 0–2)
BILIRUBIN TOTAL (MG/DL) IN SER/PLAS: 0.8 MG/DL (ref 0–1.2)
CALCIUM (MG/DL) IN SER/PLAS: 9.1 MG/DL (ref 8.6–10.3)
CARBON DIOXIDE, TOTAL (MMOL/L) IN SER/PLAS: 25 MMOL/L (ref 21–32)
CHLORIDE (MMOL/L) IN SER/PLAS: 107 MMOL/L (ref 98–107)
CREATININE (MG/DL) IN SER/PLAS: 2.2 MG/DL (ref 0.5–1.3)
EOSINOPHILS (10*3/UL) IN BLOOD BY AUTOMATED COUNT: 0.06 X10E9/L (ref 0–0.4)
EOSINOPHILS/100 LEUKOCYTES IN BLOOD BY AUTOMATED COUNT: 3.1 % (ref 0–6)
ERYTHROCYTE DISTRIBUTION WIDTH (RATIO) BY AUTOMATED COUNT: 21.2 % (ref 11.5–14.5)
ERYTHROCYTE MEAN CORPUSCULAR HEMOGLOBIN CONCENTRATION (G/DL) BY AUTOMATED: 30.4 G/DL (ref 32–36)
ERYTHROCYTE MEAN CORPUSCULAR VOLUME (FL) BY AUTOMATED COUNT: 112 FL (ref 80–100)
ERYTHROCYTES (10*6/UL) IN BLOOD BY AUTOMATED COUNT: 2.53 X10E12/L (ref 4.5–5.9)
GFR MALE: 28 ML/MIN/1.73M2
GLUCOSE (MG/DL) IN SER/PLAS: 184 MG/DL (ref 74–99)
HEMATOCRIT (%) IN BLOOD BY AUTOMATED COUNT: 28.3 % (ref 41–52)
HEMOGLOBIN (G/DL) IN BLOOD: 8.6 G/DL (ref 13.5–17.5)
IMMATURE GRANULOCYTES/100 LEUKOCYTES IN BLOOD BY AUTOMATED COUNT: 0.5 % (ref 0–0.9)
LEUKOCYTES (10*3/UL) IN BLOOD BY AUTOMATED COUNT: 1.9 X10E9/L (ref 4.4–11.3)
LYMPHOCYTES (10*3/UL) IN BLOOD BY AUTOMATED COUNT: 0.65 X10E9/L (ref 0.8–3)
LYMPHOCYTES/100 LEUKOCYTES IN BLOOD BY AUTOMATED COUNT: 33.9 % (ref 13–44)
MONOCYTES (10*3/UL) IN BLOOD BY AUTOMATED COUNT: 0.13 X10E9/L (ref 0.05–0.8)
MONOCYTES/100 LEUKOCYTES IN BLOOD BY AUTOMATED COUNT: 6.8 % (ref 2–10)
NEUTROPHILS (10*3/UL) IN BLOOD BY AUTOMATED COUNT: 1.06 X10E9/L (ref 1.6–5.5)
NEUTROPHILS/100 LEUKOCYTES IN BLOOD BY AUTOMATED COUNT: 55.2 % (ref 40–80)
OVALOCYTES PRESENCE IN BLOOD BY LIGHT MICROSCOPY: NORMAL
PLATELETS (10*3/UL) IN BLOOD AUTOMATED COUNT: 104 X10E9/L (ref 150–450)
POTASSIUM (MMOL/L) IN SER/PLAS: 4.4 MMOL/L (ref 3.5–5.3)
PROTEIN TOTAL: 6.6 G/DL (ref 6.4–8.2)
RBC MORPHOLOGY IN BLOOD: NORMAL
SODIUM (MMOL/L) IN SER/PLAS: 141 MMOL/L (ref 136–145)
UREA NITROGEN (MG/DL) IN SER/PLAS: 38 MG/DL (ref 6–23)

## 2023-05-24 PROCEDURE — 1036F TOBACCO NON-USER: CPT | Performed by: FAMILY MEDICINE

## 2023-05-24 PROCEDURE — 1157F ADVNC CARE PLAN IN RCRD: CPT | Performed by: FAMILY MEDICINE

## 2023-05-24 PROCEDURE — 36415 COLL VENOUS BLD VENIPUNCTURE: CPT

## 2023-05-24 PROCEDURE — 80053 COMPREHEN METABOLIC PANEL: CPT

## 2023-05-24 PROCEDURE — 1159F MED LIST DOCD IN RCRD: CPT | Performed by: FAMILY MEDICINE

## 2023-05-24 PROCEDURE — 85025 COMPLETE CBC W/AUTO DIFF WBC: CPT

## 2023-05-24 PROCEDURE — 99496 TRANSJ CARE MGMT HIGH F2F 7D: CPT | Performed by: FAMILY MEDICINE

## 2023-05-24 PROCEDURE — 3074F SYST BP LT 130 MM HG: CPT | Performed by: FAMILY MEDICINE

## 2023-05-24 PROCEDURE — 1160F RVW MEDS BY RX/DR IN RCRD: CPT | Performed by: FAMILY MEDICINE

## 2023-05-24 PROCEDURE — 3078F DIAST BP <80 MM HG: CPT | Performed by: FAMILY MEDICINE

## 2023-05-24 RX ORDER — FUROSEMIDE 40 MG/1
40 TABLET ORAL DAILY
Qty: 90 TABLET | Refills: 1 | Status: SHIPPED | OUTPATIENT
Start: 2023-05-24 | End: 2023-10-31

## 2023-05-24 RX ORDER — SUCRALFATE 1 G/1
1 TABLET ORAL
COMMUNITY
Start: 2023-05-19 | End: 2023-05-24 | Stop reason: SINTOL

## 2023-05-24 RX ORDER — OMEPRAZOLE 40 MG/1
40 CAPSULE, DELAYED RELEASE ORAL
Qty: 90 CAPSULE | Refills: 1 | Status: SHIPPED | OUTPATIENT
Start: 2023-05-24 | End: 2023-06-23 | Stop reason: ALTCHOICE

## 2023-05-24 RX ORDER — GLIMEPIRIDE 2 MG/1
2 TABLET ORAL 2 TIMES DAILY
Qty: 180 TABLET | Refills: 1 | Status: SHIPPED | OUTPATIENT
Start: 2023-05-24 | End: 2023-06-23 | Stop reason: ALTCHOICE

## 2023-05-24 RX ORDER — AMLODIPINE BESYLATE 10 MG/1
10 TABLET ORAL
Qty: 90 TABLET | Refills: 1 | Status: SHIPPED | OUTPATIENT
Start: 2023-05-24 | End: 2023-11-27 | Stop reason: SDUPTHER

## 2023-05-24 ASSESSMENT — PATIENT HEALTH QUESTIONNAIRE - PHQ9
1. LITTLE INTEREST OR PLEASURE IN DOING THINGS: NOT AT ALL
SUM OF ALL RESPONSES TO PHQ9 QUESTIONS 1 AND 2: 0
2. FEELING DOWN, DEPRESSED OR HOPELESS: NOT AT ALL

## 2023-05-24 NOTE — PATIENT INSTRUCTIONS
Start omeprazole 40 mg daily, take iron twice a day, follow up with specialists as scheduled.  We will recheck some labs today.   Follow up here in 2-4 weeks, sooner if needed.

## 2023-05-24 NOTE — PROGRESS NOTES
"Patient: Zack Medrano  : 1935  PCP: Fabiola Dietz MD  MRN: [id number]  Program: No linked episodes     Zack Medrano is a 87 y.o. male presenting today for follow-up after being discharged from the hospital 5 days ago. The main problem requiring admission was anemia, GI bleed. The discharge summary and/or Transitional Care Management documentation was reviewed. Medication reconciliation was performed as indicated via the \"Juilán as Reviewed\" timestamp.     Zack Medrano was contacted by Transitional Care Management services two days after his discharge. This encounter and supporting documentation was reviewed.    The complexity of medical decision making for this patient's transitional care is high.      Here for follow up recent hospitalization for weakness - he was found to have significant anemia, EGD showed three ulcers in his stomach and he did have 2 units of blood transfused.  He was discharged home on carafate.  He did not tolerate that (diarrhea) so he stopped it.  He just got some iron OTC - we will have him take it twice a day. He is not on a PPI, we will start one of those.       Physical Exam  Vitals reviewed.   Constitutional:       General: He is not in acute distress.  Cardiovascular:      Rate and Rhythm: Normal rate and regular rhythm.      Heart sounds: No murmur heard.  Pulmonary:      Effort: Pulmonary effort is normal. No respiratory distress.      Breath sounds: Normal breath sounds.   Skin:     General: Skin is warm and dry.   Neurological:      General: No focal deficit present.      Mental Status: He is alert. Mental status is at baseline.         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)    Relevant Medications    glimepiride (Amaryl) 2 mg " tablet       Hematologic    Anemia - Primary     Other Visit Diagnoses       History of pulmonary embolus (PE)        Relevant Medications    apixaban (Eliquis) 5 mg tablet    Essential (primary) hypertension        Relevant Medications    amLODIPine (Norvasc) 10 mg tablet    Gastric ulcer, unspecified chronicity, unspecified whether gastric ulcer hemorrhage or perforation present        Relevant Medications    omeprazole (PriLOSEC) 40 mg DR capsule                Family History   Problem Relation Name Age of Onset    Colon cancer Other         Engagement  Call Start Time: 1145 (5/23/2023 11:45 AM)    Medications  Medications reviewed with patient/caregiver?: -- (reviewed changes) (5/23/2023 11:45 AM)  Is the patient having any side effects they believe may be caused by any medication additions or changes?: (!) Yes (experiencing diarrhea from carafate prescribed at hospital. Patient called hospital and was advised to discontinue.) (5/23/2023 11:45 AM)  Does the patient have all medications ordered at discharge?: Yes (5/23/2023 11:45 AM)  Care Management Interventions: No intervention needed (5/23/2023 11:45 AM)  Is the patient taking all medications as directed (includes completed medication regime)?: Yes (5/23/2023 11:45 AM)  Care Management Interventions: Provided patient education (5/23/2023 11:45 AM)    Appointments  Does the patient have a primary care provider?: Yes (5/23/2023 11:45 AM)  Care Management Interventions: Verified appointment date/time/provider (5/23/2023 11:45 AM)  Care Management Interventions: Advised patient to keep appointment (5/23/2023 11:45 AM)    Self Management  What is the home health agency?: n/a (5/23/2023 11:45 AM)    Patient Teaching  Does the patient have access to their discharge instructions?: Yes (5/23/2023 11:45 AM)  Care Management Interventions: Reviewed instructions with patient (5/23/2023 11:45 AM)  What is the patient's perception of their health status since discharge?:  Improving (per patient-only slightly improved) (5/23/2023 11:45 AM)    Wrap Up  Wrap Up Additional Comments: patient states still feeling weak. Does feel a little better than prior to hospital stay. Plans to discuss use of Carafate with PCP. (5/23/2023 11:45 AM)        No follow-ups on file.

## 2023-05-25 ENCOUNTER — TELEPHONE (OUTPATIENT)
Dept: PRIMARY CARE | Facility: CLINIC | Age: 88
End: 2023-05-25
Payer: MEDICARE

## 2023-05-25 NOTE — TELEPHONE ENCOUNTER
----- Message from Fabiola Dietz MD sent at 5/25/2023  8:06 AM EDT -----  Please let patient know that his anemia was a little better, kidney function was a little worse though - make sure he is drinking enough water.   Also, given his age and kidney function I would suggest we decrease his eliquis to 2.5 mg bid.  Thanks.

## 2023-06-05 ENCOUNTER — HOSPITAL ENCOUNTER (INPATIENT)
Dept: HOSPITAL 100 - ED | Age: 88
LOS: 3 days | Discharge: HOME | DRG: 378 | End: 2023-06-08
Payer: MEDICARE

## 2023-06-05 VITALS
SYSTOLIC BLOOD PRESSURE: 113 MMHG | DIASTOLIC BLOOD PRESSURE: 58 MMHG | RESPIRATION RATE: 16 BRPM | OXYGEN SATURATION: 98 % | HEART RATE: 77 BPM

## 2023-06-05 VITALS — HEART RATE: 75 BPM | SYSTOLIC BLOOD PRESSURE: 134 MMHG | DIASTOLIC BLOOD PRESSURE: 56 MMHG

## 2023-06-05 VITALS
SYSTOLIC BLOOD PRESSURE: 122 MMHG | HEART RATE: 70 BPM | TEMPERATURE: 98 F | DIASTOLIC BLOOD PRESSURE: 61 MMHG | RESPIRATION RATE: 23 BRPM | OXYGEN SATURATION: 94 %

## 2023-06-05 VITALS — OXYGEN SATURATION: 96 % | RESPIRATION RATE: 16 BRPM

## 2023-06-05 VITALS
TEMPERATURE: 98.42 F | DIASTOLIC BLOOD PRESSURE: 54 MMHG | RESPIRATION RATE: 16 BRPM | OXYGEN SATURATION: 93 % | SYSTOLIC BLOOD PRESSURE: 122 MMHG | HEART RATE: 72 BPM

## 2023-06-05 VITALS
SYSTOLIC BLOOD PRESSURE: 131 MMHG | OXYGEN SATURATION: 97 % | HEART RATE: 72 BPM | DIASTOLIC BLOOD PRESSURE: 52 MMHG | TEMPERATURE: 98.3 F | RESPIRATION RATE: 16 BRPM

## 2023-06-05 VITALS
HEART RATE: 73 BPM | SYSTOLIC BLOOD PRESSURE: 132 MMHG | DIASTOLIC BLOOD PRESSURE: 57 MMHG | RESPIRATION RATE: 20 BRPM | TEMPERATURE: 96.44 F | OXYGEN SATURATION: 98 %

## 2023-06-05 VITALS
RESPIRATION RATE: 18 BRPM | DIASTOLIC BLOOD PRESSURE: 51 MMHG | TEMPERATURE: 98.5 F | HEART RATE: 76 BPM | OXYGEN SATURATION: 96 % | SYSTOLIC BLOOD PRESSURE: 125 MMHG

## 2023-06-05 VITALS
HEART RATE: 71 BPM | DIASTOLIC BLOOD PRESSURE: 56 MMHG | OXYGEN SATURATION: 96 % | RESPIRATION RATE: 22 BRPM | SYSTOLIC BLOOD PRESSURE: 131 MMHG

## 2023-06-05 VITALS
TEMPERATURE: 98.4 F | SYSTOLIC BLOOD PRESSURE: 112 MMHG | OXYGEN SATURATION: 94 % | HEART RATE: 64 BPM | DIASTOLIC BLOOD PRESSURE: 51 MMHG | RESPIRATION RATE: 16 BRPM

## 2023-06-05 VITALS
RESPIRATION RATE: 18 BRPM | OXYGEN SATURATION: 99 % | HEART RATE: 71 BPM | SYSTOLIC BLOOD PRESSURE: 118 MMHG | TEMPERATURE: 97.88 F | DIASTOLIC BLOOD PRESSURE: 59 MMHG

## 2023-06-05 VITALS
RESPIRATION RATE: 20 BRPM | HEART RATE: 72 BPM | DIASTOLIC BLOOD PRESSURE: 45 MMHG | OXYGEN SATURATION: 89 % | SYSTOLIC BLOOD PRESSURE: 140 MMHG

## 2023-06-05 VITALS
HEART RATE: 69 BPM | RESPIRATION RATE: 16 BRPM | OXYGEN SATURATION: 98 % | SYSTOLIC BLOOD PRESSURE: 140 MMHG | DIASTOLIC BLOOD PRESSURE: 62 MMHG

## 2023-06-05 VITALS
RESPIRATION RATE: 18 BRPM | TEMPERATURE: 97.8 F | DIASTOLIC BLOOD PRESSURE: 52 MMHG | OXYGEN SATURATION: 96 % | HEART RATE: 70 BPM | SYSTOLIC BLOOD PRESSURE: 123 MMHG

## 2023-06-05 VITALS — BODY MASS INDEX: 35.4 KG/M2 | BODY MASS INDEX: 35.2 KG/M2 | BODY MASS INDEX: 35 KG/M2

## 2023-06-05 VITALS
OXYGEN SATURATION: 95 % | SYSTOLIC BLOOD PRESSURE: 123 MMHG | HEART RATE: 75 BPM | RESPIRATION RATE: 16 BRPM | DIASTOLIC BLOOD PRESSURE: 52 MMHG

## 2023-06-05 DIAGNOSIS — K74.60: ICD-10-CM

## 2023-06-05 DIAGNOSIS — I25.10: ICD-10-CM

## 2023-06-05 DIAGNOSIS — N18.32: ICD-10-CM

## 2023-06-05 DIAGNOSIS — E78.00: ICD-10-CM

## 2023-06-05 DIAGNOSIS — C91.10: ICD-10-CM

## 2023-06-05 DIAGNOSIS — D61.818: ICD-10-CM

## 2023-06-05 DIAGNOSIS — Z95.5: ICD-10-CM

## 2023-06-05 DIAGNOSIS — Z79.899: ICD-10-CM

## 2023-06-05 DIAGNOSIS — I12.9: ICD-10-CM

## 2023-06-05 DIAGNOSIS — D62: ICD-10-CM

## 2023-06-05 DIAGNOSIS — N17.9: ICD-10-CM

## 2023-06-05 DIAGNOSIS — K31.811: Primary | ICD-10-CM

## 2023-06-05 DIAGNOSIS — I48.91: ICD-10-CM

## 2023-06-05 DIAGNOSIS — K25.4: ICD-10-CM

## 2023-06-05 DIAGNOSIS — E11.22: ICD-10-CM

## 2023-06-05 DIAGNOSIS — K76.0: ICD-10-CM

## 2023-06-05 DIAGNOSIS — Z79.01: ICD-10-CM

## 2023-06-05 DIAGNOSIS — J44.9: ICD-10-CM

## 2023-06-05 LAB
ANION GAP: 6 (ref 5–15)
ANISOCYTOSIS BLD QL SMEAR: (no result)
ANISOCYTOSIS: (no result)
BUN SERPL-MCNC: 43 MG/DL (ref 7–18)
BUN/CREAT RATIO: 19.1 RATIO (ref 10–20)
CALCIUM SERPL-MCNC: 8.9 MG/DL (ref 8.5–10.1)
CARBON DIOXIDE: 25 MMOL/L (ref 21–32)
CHLORIDE: 112 MMOL/L (ref 98–107)
DEPRECATED RDW RBC: 87.9 FL (ref 35.1–43.9)
DIFFERENTIAL INDICATED: (no result)
ERYTHROCYTE [DISTWIDTH] IN BLOOD: 21.4 % (ref 11.6–14.6)
EST GLOM FILT RATE - AFR AMER: 36 ML/MIN (ref 60–?)
ESTIMATED CREATININE CLEARANCE: 22.38 ML/MIN
GLUCOSE: 163 MG/DL (ref 74–106)
HCT VFR BLD AUTO: 23.5 % (ref 40–54)
HEMOGLOBIN: 7.3 G/DL (ref 13–16.5)
HEMOGLOBIN: 7.3 G/DL (ref 13–16.5)
HEMOGLOBIN: 7.5 G/DL (ref 13–16.5)
HGB BLD-MCNC: 7.3 G/DL (ref 13–16.5)
HGB BLD-MCNC: 7.3 G/DL (ref 13–16.5)
HGB BLD-MCNC: 7.5 G/DL (ref 13–16.5)
IMMATURE GRANULOCYTES COUNT: 0.03 X10^3/UL (ref 0–0)
MCV RBC: 114.1 FL (ref 80–94)
MEAN CORP HGB CONC: 31.1 G/DL (ref 32–36)
MEAN PLATELET VOL.: 11.5 FL (ref 6.2–12)
NRBC FLAGGED BY ANALYZER: 0 % (ref 0–5)
PLATELET # BLD: 102 K/MM3 (ref 150–450)
PLATELET COUNT: 102 K/MM3 (ref 150–450)
POSITIVE MORPHOLOGY: YES
POTASSIUM: 4.2 MMOL/L (ref 3.5–5.1)
RBC # BLD AUTO: 2.06 M/MM3 (ref 4.6–6.2)
RBC DISTRIBUTION WIDTH CV: 21.4 % (ref 11.6–14.6)
RBC DISTRIBUTION WIDTH SD: 87.9 FL (ref 35.1–43.9)
SCAN SMEAR PER REVIEW CRITERIA: (no result)
WBC # BLD AUTO: 2.6 K/MM3 (ref 4.4–11)
WHITE BLOOD COUNT: 2.6 K/MM3 (ref 4.4–11)

## 2023-06-05 PROCEDURE — 93005 ELECTROCARDIOGRAM TRACING: CPT

## 2023-06-05 PROCEDURE — 82962 GLUCOSE BLOOD TEST: CPT

## 2023-06-05 PROCEDURE — 80053 COMPREHEN METABOLIC PANEL: CPT

## 2023-06-05 PROCEDURE — 82140 ASSAY OF AMMONIA: CPT

## 2023-06-05 PROCEDURE — 80048 BASIC METABOLIC PNL TOTAL CA: CPT

## 2023-06-05 PROCEDURE — 86922 COMPATIBILITY TEST ANTIGLOB: CPT

## 2023-06-05 PROCEDURE — 85018 HEMOGLOBIN: CPT

## 2023-06-05 PROCEDURE — 86850 RBC ANTIBODY SCREEN: CPT

## 2023-06-05 PROCEDURE — 86900 BLOOD TYPING SEROLOGIC ABO: CPT

## 2023-06-05 PROCEDURE — 97162 PT EVAL MOD COMPLEX 30 MIN: CPT

## 2023-06-05 PROCEDURE — P9016 RBC LEUKOCYTES REDUCED: HCPCS

## 2023-06-05 PROCEDURE — A4216 STERILE WATER/SALINE, 10 ML: HCPCS

## 2023-06-05 PROCEDURE — 99252 IP/OBS CONSLTJ NEW/EST SF 35: CPT

## 2023-06-05 PROCEDURE — 82274 ASSAY TEST FOR BLOOD FECAL: CPT

## 2023-06-05 PROCEDURE — 76705 ECHO EXAM OF ABDOMEN: CPT

## 2023-06-05 PROCEDURE — 86901 BLOOD TYPING SEROLOGIC RH(D): CPT

## 2023-06-05 PROCEDURE — 94668 MNPJ CHEST WALL SBSQ: CPT

## 2023-06-05 PROCEDURE — 97530 THERAPEUTIC ACTIVITIES: CPT

## 2023-06-05 PROCEDURE — 97802 MEDICAL NUTRITION INDIV IN: CPT

## 2023-06-05 PROCEDURE — 85610 PROTHROMBIN TIME: CPT

## 2023-06-05 PROCEDURE — 82248 BILIRUBIN DIRECT: CPT

## 2023-06-05 PROCEDURE — 99285 EMERGENCY DEPT VISIT HI MDM: CPT

## 2023-06-05 PROCEDURE — G0463 HOSPITAL OUTPT CLINIC VISIT: HCPCS

## 2023-06-05 PROCEDURE — 36415 COLL VENOUS BLD VENIPUNCTURE: CPT

## 2023-06-05 PROCEDURE — 82105 ALPHA-FETOPROTEIN SERUM: CPT

## 2023-06-05 PROCEDURE — 86920 COMPATIBILITY TEST SPIN: CPT

## 2023-06-05 PROCEDURE — 0DJ08ZZ INSPECTION OF UPPER INTESTINAL TRACT, VIA NATURAL OR ARTIFICIAL OPENING ENDOSCOPIC: ICD-10-PCS | Performed by: INTERNAL MEDICINE

## 2023-06-05 PROCEDURE — 85025 COMPLETE CBC W/AUTO DIFF WBC: CPT

## 2023-06-05 RX ADMIN — SODIUM CHLORIDE 10 ML: 9 INJECTION, SOLUTION INTRAMUSCULAR; INTRAVENOUS; SUBCUTANEOUS at 18:42

## 2023-06-05 RX ADMIN — SODIUM CHLORIDE, PRESERVATIVE FREE 10 ML: 5 INJECTION INTRAVENOUS at 18:42

## 2023-06-05 RX ADMIN — EPINEPHRINE 1 MG: 1 INJECTION INTRAMUSCULAR; INTRAVENOUS; SUBCUTANEOUS at 18:43

## 2023-06-06 VITALS
HEART RATE: 80 BPM | DIASTOLIC BLOOD PRESSURE: 63 MMHG | RESPIRATION RATE: 16 BRPM | OXYGEN SATURATION: 94 % | TEMPERATURE: 97.88 F | SYSTOLIC BLOOD PRESSURE: 142 MMHG

## 2023-06-06 VITALS
DIASTOLIC BLOOD PRESSURE: 62 MMHG | HEART RATE: 89 BPM | OXYGEN SATURATION: 94 % | RESPIRATION RATE: 15 BRPM | TEMPERATURE: 98.06 F | SYSTOLIC BLOOD PRESSURE: 155 MMHG

## 2023-06-06 VITALS
SYSTOLIC BLOOD PRESSURE: 123 MMHG | TEMPERATURE: 97.7 F | OXYGEN SATURATION: 93 % | HEART RATE: 77 BPM | RESPIRATION RATE: 18 BRPM | DIASTOLIC BLOOD PRESSURE: 61 MMHG

## 2023-06-06 VITALS
TEMPERATURE: 98.24 F | RESPIRATION RATE: 15 BRPM | SYSTOLIC BLOOD PRESSURE: 156 MMHG | DIASTOLIC BLOOD PRESSURE: 73 MMHG | OXYGEN SATURATION: 94 % | HEART RATE: 80 BPM

## 2023-06-06 VITALS
SYSTOLIC BLOOD PRESSURE: 105 MMHG | RESPIRATION RATE: 16 BRPM | DIASTOLIC BLOOD PRESSURE: 59 MMHG | HEART RATE: 79 BPM | OXYGEN SATURATION: 98 % | TEMPERATURE: 98 F

## 2023-06-06 VITALS
HEART RATE: 86 BPM | SYSTOLIC BLOOD PRESSURE: 125 MMHG | OXYGEN SATURATION: 97 % | RESPIRATION RATE: 16 BRPM | TEMPERATURE: 98.78 F | DIASTOLIC BLOOD PRESSURE: 65 MMHG

## 2023-06-06 VITALS
TEMPERATURE: 98.06 F | RESPIRATION RATE: 16 BRPM | SYSTOLIC BLOOD PRESSURE: 137 MMHG | OXYGEN SATURATION: 96 % | HEART RATE: 89 BPM | DIASTOLIC BLOOD PRESSURE: 58 MMHG

## 2023-06-06 VITALS
TEMPERATURE: 98.78 F | HEART RATE: 85 BPM | RESPIRATION RATE: 18 BRPM | DIASTOLIC BLOOD PRESSURE: 71 MMHG | SYSTOLIC BLOOD PRESSURE: 136 MMHG | OXYGEN SATURATION: 98 %

## 2023-06-06 VITALS
DIASTOLIC BLOOD PRESSURE: 62 MMHG | RESPIRATION RATE: 22 BRPM | TEMPERATURE: 98 F | HEART RATE: 85 BPM | OXYGEN SATURATION: 95 % | SYSTOLIC BLOOD PRESSURE: 147 MMHG

## 2023-06-06 VITALS
DIASTOLIC BLOOD PRESSURE: 62 MMHG | TEMPERATURE: 98.7 F | RESPIRATION RATE: 20 BRPM | SYSTOLIC BLOOD PRESSURE: 105 MMHG | OXYGEN SATURATION: 97 % | HEART RATE: 82 BPM

## 2023-06-06 VITALS
RESPIRATION RATE: 18 BRPM | OXYGEN SATURATION: 93 % | HEART RATE: 81 BPM | TEMPERATURE: 97.88 F | SYSTOLIC BLOOD PRESSURE: 136 MMHG | DIASTOLIC BLOOD PRESSURE: 61 MMHG

## 2023-06-06 VITALS
HEART RATE: 84 BPM | RESPIRATION RATE: 22 BRPM | SYSTOLIC BLOOD PRESSURE: 154 MMHG | OXYGEN SATURATION: 94 % | DIASTOLIC BLOOD PRESSURE: 84 MMHG | TEMPERATURE: 98.24 F

## 2023-06-06 VITALS
HEART RATE: 89 BPM | RESPIRATION RATE: 20 BRPM | OXYGEN SATURATION: 94 % | SYSTOLIC BLOOD PRESSURE: 146 MMHG | TEMPERATURE: 97.88 F | DIASTOLIC BLOOD PRESSURE: 61 MMHG

## 2023-06-06 VITALS
OXYGEN SATURATION: 91 % | TEMPERATURE: 98.1 F | RESPIRATION RATE: 20 BRPM | DIASTOLIC BLOOD PRESSURE: 54 MMHG | SYSTOLIC BLOOD PRESSURE: 131 MMHG | HEART RATE: 82 BPM

## 2023-06-06 VITALS — HEART RATE: 72 BPM

## 2023-06-06 VITALS — OXYGEN SATURATION: 98 %

## 2023-06-06 VITALS — OXYGEN SATURATION: 87 %

## 2023-06-06 LAB
ALANINE AMINOTRANSFER ALT/SGPT: 44 U/L (ref 16–61)
ALBUMIN SERPL-MCNC: 3 G/DL (ref 3.2–5)
ALKALINE PHOSPHATASE: 91 U/L (ref 45–117)
ANION GAP: 5 (ref 5–15)
ANISOCYTOSIS BLD QL SMEAR: (no result)
ANISOCYTOSIS: (no result)
AST(SGOT): 56 U/L (ref 15–37)
BASO STIPL BLD QL SMEAR: (no result)
BASOPHILIC STIPPLING: (no result)
BUN SERPL-MCNC: 38 MG/DL (ref 7–18)
BUN/CREAT RATIO: 19.7 RATIO (ref 10–20)
CALCIUM SERPL-MCNC: 8.6 MG/DL (ref 8.5–10.1)
CARBON DIOXIDE: 25 MMOL/L (ref 21–32)
CHLORIDE: 111 MMOL/L (ref 98–107)
DEPRECATED RDW RBC: 95.8 FL (ref 35.1–43.9)
DIFFERENTIAL COMMENT: (no result)
DIFFERENTIAL INDICATED: (no result)
ERYTHROCYTE [DISTWIDTH] IN BLOOD: 24.5 % (ref 11.6–14.6)
EST GLOM FILT RATE - AFR AMER: 43 ML/MIN (ref 60–?)
ESTIMATED CREATININE CLEARANCE: 26.09 ML/MIN
GLOBULIN: 2.9 G/DL (ref 2.2–4.2)
GLUCOSE: 160 MG/DL (ref 74–106)
HCT VFR BLD AUTO: 21.6 % (ref 40–54)
HEMOGLOBIN: 10.2 G/DL (ref 13–16.5)
HEMOGLOBIN: 6.8 G/DL (ref 13–16.5)
HEMOGLOBIN: 8.6 G/DL (ref 13–16.5)
HGB BLD-MCNC: 10.2 G/DL (ref 13–16.5)
HGB BLD-MCNC: 6.8 G/DL (ref 13–16.5)
HGB BLD-MCNC: 8.6 G/DL (ref 13–16.5)
IMMATURE GRANULOCYTES COUNT: 0.01 X10^3/UL (ref 0–0)
MANUAL DIF COMMENT BLD-IMP: (no result)
MCV RBC: 109.6 FL (ref 80–94)
MEAN CORP HGB CONC: 31.5 G/DL (ref 32–36)
MEAN PLATELET VOL.: 11.4 FL (ref 6.2–12)
NRBC FLAGGED BY ANALYZER: 0 % (ref 0–5)
PLATELET # BLD: 82 K/MM3 (ref 150–450)
PLATELET COUNT: 82 K/MM3 (ref 150–450)
POSITIVE COUNT: YES
POSITIVE DIFFERENTIAL: YES
POSITIVE MORPHOLOGY: YES
POTASSIUM: 4.3 MMOL/L (ref 3.5–5.1)
PROTHROMBIN TIME (PROTIME)PT.: 15 SECONDS (ref 11.7–14.9)
RBC # BLD AUTO: 1.97 M/MM3 (ref 4.6–6.2)
RBC DISTRIBUTION WIDTH CV: 24.5 % (ref 11.6–14.6)
RBC DISTRIBUTION WIDTH SD: 95.8 FL (ref 35.1–43.9)
SCAN SMEAR PER REVIEW CRITERIA: (no result)
WBC # BLD AUTO: 1.9 K/MM3 (ref 4.4–11)
WHITE BLOOD COUNT: 1.9 K/MM3 (ref 4.4–11)

## 2023-06-06 RX ADMIN — METOPROLOL SUCCINATE 50 MG: 50 TABLET, EXTENDED RELEASE ORAL at 09:39

## 2023-06-06 RX ADMIN — INSULIN GLARGINE-YFGN 10 UNIT: 100 INJECTION, SOLUTION SUBCUTANEOUS at 22:24

## 2023-06-07 VITALS
TEMPERATURE: 98.3 F | HEART RATE: 64 BPM | SYSTOLIC BLOOD PRESSURE: 123 MMHG | OXYGEN SATURATION: 95 % | DIASTOLIC BLOOD PRESSURE: 50 MMHG | RESPIRATION RATE: 16 BRPM

## 2023-06-07 VITALS
RESPIRATION RATE: 18 BRPM | TEMPERATURE: 98.1 F | SYSTOLIC BLOOD PRESSURE: 116 MMHG | OXYGEN SATURATION: 95 % | DIASTOLIC BLOOD PRESSURE: 59 MMHG | HEART RATE: 70 BPM

## 2023-06-07 VITALS
DIASTOLIC BLOOD PRESSURE: 66 MMHG | SYSTOLIC BLOOD PRESSURE: 121 MMHG | RESPIRATION RATE: 19 BRPM | OXYGEN SATURATION: 98 % | TEMPERATURE: 97.16 F | HEART RATE: 78 BPM

## 2023-06-07 VITALS — DIASTOLIC BLOOD PRESSURE: 56 MMHG | SYSTOLIC BLOOD PRESSURE: 129 MMHG | HEART RATE: 66 BPM

## 2023-06-07 VITALS
TEMPERATURE: 98.5 F | RESPIRATION RATE: 16 BRPM | DIASTOLIC BLOOD PRESSURE: 56 MMHG | OXYGEN SATURATION: 93 % | SYSTOLIC BLOOD PRESSURE: 129 MMHG | HEART RATE: 66 BPM

## 2023-06-07 VITALS — OXYGEN SATURATION: 98 %

## 2023-06-07 LAB
ALANINE AMINOTRANSFER ALT/SGPT: 38 U/L (ref 16–61)
ALBUMIN SERPL-MCNC: 2.9 G/DL (ref 3.2–5)
ALKALINE PHOSPHATASE: 86 U/L (ref 45–117)
ANION GAP: 5 (ref 5–15)
ANISOCYTOSIS BLD QL SMEAR: (no result)
ANISOCYTOSIS: (no result)
AST(SGOT): 44 U/L (ref 15–37)
BUN SERPL-MCNC: 28 MG/DL (ref 7–18)
BUN/CREAT RATIO: 15.4 RATIO (ref 10–20)
CALCIUM SERPL-MCNC: 8.2 MG/DL (ref 8.5–10.1)
CARBON DIOXIDE: 25 MMOL/L (ref 21–32)
CHLORIDE: 110 MMOL/L (ref 98–107)
DEPRECATED RDW RBC: 87.1 FL (ref 35.1–43.9)
DIFFERENTIAL COMMENT: (no result)
DIFFERENTIAL INDICATED: (no result)
ERYTHROCYTE [DISTWIDTH] IN BLOOD: 24.6 % (ref 11.6–14.6)
EST GLOM FILT RATE - AFR AMER: 46 ML/MIN (ref 60–?)
ESTIMATED CREATININE CLEARANCE: 27.66 ML/MIN
GLOBULIN: 3 G/DL (ref 2.2–4.2)
GLUCOSE: 175 MG/DL (ref 74–106)
HCT VFR BLD AUTO: 25.5 % (ref 40–54)
HEMOGLOBIN: 8.2 G/DL (ref 13–16.5)
HEMOGLOBIN: 8.3 G/DL (ref 13–16.5)
HGB BLD-MCNC: 8.2 G/DL (ref 13–16.5)
HGB BLD-MCNC: 8.3 G/DL (ref 13–16.5)
IMMATURE GRANULOCYTES COUNT: 0.06 X10^3/UL (ref 0–0)
MACROCYTES BLD QL: (no result)
MACROCYTOSIS: (no result)
MANUAL DIF COMMENT BLD-IMP: (no result)
MCV RBC: 102.4 FL (ref 80–94)
MEAN CORP HGB CONC: 32.2 G/DL (ref 32–36)
MEAN PLATELET VOL.: 11.5 FL (ref 6.2–12)
MICROCYTOSIS: (no result)
NRBC FLAGGED BY ANALYZER: 1 % (ref 0–5)
PLATELET # BLD: 71 K/MM3 (ref 150–450)
PLATELET COUNT: 71 K/MM3 (ref 150–450)
POSITIVE COUNT: YES
POSITIVE MORPHOLOGY: YES
POTASSIUM: 4.2 MMOL/L (ref 3.5–5.1)
RBC # BLD AUTO: 2.49 M/MM3 (ref 4.6–6.2)
RBC DISTRIBUTION WIDTH CV: 24.6 % (ref 11.6–14.6)
RBC DISTRIBUTION WIDTH SD: 87.1 FL (ref 35.1–43.9)
SCAN SMEAR PER REVIEW CRITERIA: (no result)
WBC # BLD AUTO: 3.2 K/MM3 (ref 4.4–11)
WHITE BLOOD COUNT: 3.2 K/MM3 (ref 4.4–11)

## 2023-06-07 RX ADMIN — INSULIN GLARGINE-YFGN 10 UNIT: 100 INJECTION, SOLUTION SUBCUTANEOUS at 21:04

## 2023-06-07 RX ADMIN — METOPROLOL SUCCINATE 50 MG: 50 TABLET, EXTENDED RELEASE ORAL at 10:01

## 2023-06-08 VITALS
TEMPERATURE: 97.7 F | RESPIRATION RATE: 16 BRPM | SYSTOLIC BLOOD PRESSURE: 132 MMHG | OXYGEN SATURATION: 93 % | HEART RATE: 73 BPM | DIASTOLIC BLOOD PRESSURE: 77 MMHG

## 2023-06-08 VITALS
DIASTOLIC BLOOD PRESSURE: 59 MMHG | RESPIRATION RATE: 16 BRPM | SYSTOLIC BLOOD PRESSURE: 120 MMHG | OXYGEN SATURATION: 98 % | TEMPERATURE: 98.06 F | HEART RATE: 70 BPM

## 2023-06-08 VITALS — DIASTOLIC BLOOD PRESSURE: 62 MMHG | HEART RATE: 78 BPM | SYSTOLIC BLOOD PRESSURE: 138 MMHG

## 2023-06-08 VITALS
SYSTOLIC BLOOD PRESSURE: 105 MMHG | HEART RATE: 68 BPM | DIASTOLIC BLOOD PRESSURE: 43 MMHG | TEMPERATURE: 97.7 F | RESPIRATION RATE: 16 BRPM | OXYGEN SATURATION: 100 %

## 2023-06-08 VITALS — OXYGEN SATURATION: 96 %

## 2023-06-08 LAB
ALANINE AMINOTRANSFER ALT/SGPT: 37 U/L (ref 16–61)
ALBUMIN SERPL-MCNC: 2.9 G/DL (ref 3.2–5)
ALKALINE PHOSPHATASE: 113 U/L (ref 45–117)
AMMONIA: 41 UMOL/L (ref 11–32)
ANION GAP: 6 (ref 5–15)
ANISOCYTOSIS BLD QL SMEAR: (no result)
ANISOCYTOSIS: (no result)
AST(SGOT): 36 U/L (ref 15–37)
BILIRUB DIRECT SERPL-MCNC: 0.69 MG/DL (ref 0–0.3)
BUN SERPL-MCNC: 30 MG/DL (ref 7–18)
BUN/CREAT RATIO: 14.7 RATIO (ref 10–20)
CALCIUM SERPL-MCNC: 8.4 MG/DL (ref 8.5–10.1)
CARBON DIOXIDE: 21 MMOL/L (ref 21–32)
CHLORIDE: 113 MMOL/L (ref 98–107)
DACRYOCYTES BLD QL SMEAR: (no result)
DEPRECATED RDW RBC: 83.7 FL (ref 35.1–43.9)
DIFFERENTIAL COMMENT: (no result)
DIFFERENTIAL INDICATED: (no result)
ERYTHROCYTE [DISTWIDTH] IN BLOOD: 23.5 % (ref 11.6–14.6)
EST GLOM FILT RATE - AFR AMER: 40 ML/MIN (ref 60–?)
ESTIMATED CREATININE CLEARANCE: 24.68 ML/MIN
GLOBULIN: 3.5 G/DL (ref 2.2–4.2)
GLUCOSE: 160 MG/DL (ref 74–106)
HCT VFR BLD AUTO: 25.9 % (ref 40–54)
HEMOGLOBIN: 8.7 G/DL (ref 13–16.5)
HGB BLD-MCNC: 8.7 G/DL (ref 13–16.5)
HYPOCHROMASIA: (no result)
HYPOCHROMIA BLD QL: (no result)
IMMATURE GRANULOCYTES COUNT: 0.03 X10^3/UL (ref 0–0)
MACROCYTES BLD QL: (no result)
MACROCYTOSIS: (no result)
MANUAL DIF COMMENT BLD-IMP: (no result)
MCV RBC: 100.4 FL (ref 80–94)
MEAN CORP HGB CONC: 33.6 G/DL (ref 32–36)
MEAN PLATELET VOL.: 10.5 FL (ref 6.2–12)
NRBC FLAGGED BY ANALYZER: 0 % (ref 0–5)
PLATELET # BLD: 77 K/MM3 (ref 150–450)
PLATELET COUNT: 77 K/MM3 (ref 150–450)
POSITIVE COUNT: YES
POSITIVE MORPHOLOGY: YES
POTASSIUM: 4.2 MMOL/L (ref 3.5–5.1)
RBC # BLD AUTO: 2.58 M/MM3 (ref 4.6–6.2)
RBC DISTRIBUTION WIDTH CV: 23.5 % (ref 11.6–14.6)
RBC DISTRIBUTION WIDTH SD: 83.7 FL (ref 35.1–43.9)
SCAN SMEAR PER REVIEW CRITERIA: (no result)
TEAR DROP CELL: (no result)
WBC # BLD AUTO: 2.6 K/MM3 (ref 4.4–11)
WHITE BLOOD COUNT: 2.6 K/MM3 (ref 4.4–11)

## 2023-06-08 RX ADMIN — METOPROLOL SUCCINATE 50 MG: 50 TABLET, EXTENDED RELEASE ORAL at 09:37

## 2023-06-09 ENCOUNTER — PATIENT OUTREACH (OUTPATIENT)
Dept: CARE COORDINATION | Facility: CLINIC | Age: 88
End: 2023-06-09
Payer: MEDICARE

## 2023-06-09 ENCOUNTER — TELEPHONE (OUTPATIENT)
Dept: PRIMARY CARE | Facility: CLINIC | Age: 88
End: 2023-06-09
Payer: MEDICARE

## 2023-06-09 DIAGNOSIS — D64.9 ANEMIA, UNSPECIFIED TYPE: Primary | ICD-10-CM

## 2023-06-09 DIAGNOSIS — N18.30 STAGE 3 CHRONIC KIDNEY DISEASE, UNSPECIFIED WHETHER STAGE 3A OR 3B CKD (MULTI): ICD-10-CM

## 2023-06-09 NOTE — PROGRESS NOTES
Discharge Facility:[facility]  Discharge Diagnosis:Acute GI bleeding  Admission Date:6.5.23  Discharge Date: 6.8.23    PCP Appointment Date:6.20.23  Specialist Appointment Date: unknown  Hospital Encounter and Summary: Linked   See discharge assessment below for further details     TCM Call completed 6.9.23, unable to reach patient    Readmit in 30 days  Patient is scheduled within 7-14 days of discharge on ( ) for hospital follow up.     If TCM has not been billed within the 30 days and patient meets criteria for moderately complex medical decision-making, and has follow-up within 7, or 8-14 days-can bill [id number] for hospital follow up.   If TCM has not been billed within 30 days and patient meets criteria for highly complex medical    decision-making, and has follow-up visit within 7 days-can bill [id number] for hospital follow up.

## 2023-06-09 NOTE — TELEPHONE ENCOUNTER
Patient called and was updating you that he is out of the hospital. He said that he would need orders to check his HgB, liver and kidney function labs done in 2-3 days. He would like to know if you could order those?

## 2023-06-13 ENCOUNTER — LAB (OUTPATIENT)
Dept: LAB | Facility: LAB | Age: 88
End: 2023-06-13
Payer: MEDICARE

## 2023-06-13 DIAGNOSIS — N18.30 STAGE 3 CHRONIC KIDNEY DISEASE, UNSPECIFIED WHETHER STAGE 3A OR 3B CKD (MULTI): ICD-10-CM

## 2023-06-13 DIAGNOSIS — D64.9 ANEMIA, UNSPECIFIED TYPE: ICD-10-CM

## 2023-06-13 LAB
ALANINE AMINOTRANSFERASE (SGPT) (U/L) IN SER/PLAS: 40 U/L (ref 10–52)
ALBUMIN (G/DL) IN SER/PLAS: 3.8 G/DL (ref 3.4–5)
ALKALINE PHOSPHATASE (U/L) IN SER/PLAS: 127 U/L (ref 33–136)
ANION GAP IN SER/PLAS: 11 MMOL/L (ref 10–20)
ASPARTATE AMINOTRANSFERASE (SGOT) (U/L) IN SER/PLAS: 38 U/L (ref 9–39)
BAND NEUTROPHILS (10*3/UL) BLOOD MANUAL COUNT - WAM: 0.04 X10E9/L (ref 0–0.5)
BASOPHILS (10*3/UL) IN BLOOD BY MANUAL COUNT - WAM: 0 X10E9/L (ref 0–0.1)
BASOPHILS/100 LEUKOCYTES IN BLOOD BY MANUAL COUNT - WAM: 0 % (ref 0–2)
BILIRUBIN TOTAL (MG/DL) IN SER/PLAS: 0.8 MG/DL (ref 0–1.2)
CALCIUM (MG/DL) IN SER/PLAS: 8.4 MG/DL (ref 8.6–10.3)
CARBON DIOXIDE, TOTAL (MMOL/L) IN SER/PLAS: 25 MMOL/L (ref 21–32)
CHLORIDE (MMOL/L) IN SER/PLAS: 109 MMOL/L (ref 98–107)
CREATININE (MG/DL) IN SER/PLAS: 1.79 MG/DL (ref 0.5–1.3)
EOSINOPHILS (10*3/UL) IN BLOOD BY MANUAL COUNT - WAM: 0.04 X10E9/L (ref 0–0.4)
EOSINOPHILS/100 LEUKOCYTES IN BLOOD BY MANUAL COUNT - WAM: 2 % (ref 0–6)
ERYTHROCYTE DISTRIBUTION WIDTH (RATIO) BY AUTOMATED COUNT: 22.7 % (ref 11.5–14.5)
ERYTHROCYTE MEAN CORPUSCULAR HEMOGLOBIN CONCENTRATION (G/DL) BY AUTOMATED: 31 G/DL (ref 32–36)
ERYTHROCYTE MEAN CORPUSCULAR VOLUME (FL) BY AUTOMATED COUNT: 106 FL (ref 80–100)
ERYTHROCYTES (10*6/UL) IN BLOOD BY AUTOMATED COUNT: 2.64 X10E12/L (ref 4.5–5.9)
GFR MALE: 36 ML/MIN/1.73M2
GLUCOSE (MG/DL) IN SER/PLAS: 197 MG/DL (ref 74–99)
HEMATOCRIT (%) IN BLOOD BY AUTOMATED COUNT: 28.1 % (ref 41–52)
HEMOGLOBIN (G/DL) IN BLOOD: 8.7 G/DL (ref 13.5–17.5)
IMMATURE GRANULOCYTES/100 LEUKOCYTES IN BLOOD BY AUTOMATED COUNT: 1 % (ref 0–0.9)
LEUKOCYTES (10*3/UL) IN BLOOD BY AUTOMATED COUNT: 1.9 X10E9/L (ref 4.4–11.3)
LYMPHOCYTES (10*3/UL) IN BLOOD BY MANUAL COUNT - WAM: 0.53 X10E9/L (ref 0.8–3)
LYMPHOCYTES/100 LEUKOCYTES IN BLOOD BY MANUAL COUNT - WAM: 28 % (ref 13–44)
MANUAL DIFFERENTIAL Y/N: ABNORMAL
METAMYELOCYTES (10*3/UL) IN BLOOD BY MANUAL COUNT - WAM: 0.04 X10E9/L (ref 0–0)
METAMYELOCYTES/100 LEUKOCYTES IN BLOOD BY MANUAL COUNT - WAM: 2 % (ref 0–0)
MONOCYTES (10*3/UL) IN BLOOD BY MANUAL COUNT - WAM: 0.06 X10E9/L (ref 0.05–0.8)
MONOCYTES/100 LEUKOCYTES IN BLOOD BY MANUAL COUNT - WAM: 3 % (ref 2–10)
NEUTROPHILS (SEGS+BANDS) (10*3/UL) MANUAL COUNT - WAM: 1.24 X10E9/L (ref 1.6–5.5)
NEUTROPHILS BAND FORM/100 LEUKOCYTES IN BLOOD BY MANUAL COUNT - WAM: 2 % (ref 0–5)
OVALOCYTES PRESENCE IN BLOOD BY LIGHT MICROSCOPY: NORMAL
PLATELETS (10*3/UL) IN BLOOD AUTOMATED COUNT: 89 X10E9/L (ref 150–450)
POTASSIUM (MMOL/L) IN SER/PLAS: 4.2 MMOL/L (ref 3.5–5.3)
PROTEIN TOTAL: 6 G/DL (ref 6.4–8.2)
RBC MORPHOLOGY IN BLOOD: NORMAL
SEGMENTED NEUTROPHILS (10*3/UL) BLOOD MANUAL - WAM: 1.2 X10E9/L (ref 1.6–5)
SEGMENTED NEUTROPHILS/100 LEUKOCYTES BY MANUAL COUNT -: 63 % (ref 40–80)
SODIUM (MMOL/L) IN SER/PLAS: 141 MMOL/L (ref 136–145)
UREA NITROGEN (MG/DL) IN SER/PLAS: 31 MG/DL (ref 6–23)

## 2023-06-13 PROCEDURE — 36415 COLL VENOUS BLD VENIPUNCTURE: CPT

## 2023-06-13 PROCEDURE — 85025 COMPLETE CBC W/AUTO DIFF WBC: CPT

## 2023-06-13 PROCEDURE — 80053 COMPREHEN METABOLIC PANEL: CPT

## 2023-06-19 ENCOUNTER — HOSPITAL ENCOUNTER (EMERGENCY)
Age: 88
Discharge: HOME | End: 2023-06-19
Payer: MEDICARE

## 2023-06-19 VITALS
RESPIRATION RATE: 16 BRPM | SYSTOLIC BLOOD PRESSURE: 136 MMHG | OXYGEN SATURATION: 92 % | DIASTOLIC BLOOD PRESSURE: 76 MMHG | HEART RATE: 82 BPM

## 2023-06-19 VITALS — BODY MASS INDEX: 34.7 KG/M2

## 2023-06-19 VITALS
RESPIRATION RATE: 18 BRPM | OXYGEN SATURATION: 94 % | DIASTOLIC BLOOD PRESSURE: 69 MMHG | SYSTOLIC BLOOD PRESSURE: 151 MMHG | HEART RATE: 94 BPM | TEMPERATURE: 97.7 F

## 2023-06-19 DIAGNOSIS — I25.10: ICD-10-CM

## 2023-06-19 DIAGNOSIS — E78.00: ICD-10-CM

## 2023-06-19 DIAGNOSIS — D61.818: ICD-10-CM

## 2023-06-19 DIAGNOSIS — Z79.01: ICD-10-CM

## 2023-06-19 DIAGNOSIS — N18.30: ICD-10-CM

## 2023-06-19 DIAGNOSIS — E11.22: ICD-10-CM

## 2023-06-19 DIAGNOSIS — Z95.5: ICD-10-CM

## 2023-06-19 DIAGNOSIS — Z79.899: ICD-10-CM

## 2023-06-19 DIAGNOSIS — Z79.4: ICD-10-CM

## 2023-06-19 DIAGNOSIS — K62.5: Primary | ICD-10-CM

## 2023-06-19 DIAGNOSIS — Z90.49: ICD-10-CM

## 2023-06-19 DIAGNOSIS — I12.9: ICD-10-CM

## 2023-06-19 DIAGNOSIS — E11.65: ICD-10-CM

## 2023-06-19 LAB
ANION GAP: 9 (ref 5–15)
ANISOCYTOSIS BLD QL SMEAR: (no result)
ANISOCYTOSIS: (no result)
BUN SERPL-MCNC: 23 MG/DL (ref 7–18)
BUN/CREAT RATIO: 10.6 RATIO (ref 10–20)
CALCIUM SERPL-MCNC: 9.6 MG/DL (ref 8.5–10.1)
CARBON DIOXIDE: 26 MMOL/L (ref 21–32)
CHLORIDE: 105 MMOL/L (ref 98–107)
DEPRECATED RDW RBC: 81.1 FL (ref 35.1–43.9)
DIFFERENTIAL COMMENT: (no result)
DIFFERENTIAL INDICATED: (no result)
ERYTHROCYTE [DISTWIDTH] IN BLOOD: 22 % (ref 11.6–14.6)
EST GLOM FILT RATE - AFR AMER: 37 ML/MIN (ref 60–?)
ESTIMATED CREATININE CLEARANCE: 23.1 ML/MIN
GLUCOSE: 321 MG/DL (ref 74–106)
HCT VFR BLD AUTO: 27.2 % (ref 40–54)
HEMOGLOBIN: 8.9 G/DL (ref 13–16.5)
HGB BLD-MCNC: 8.9 G/DL (ref 13–16.5)
IMMATURE GRANULOCYTES COUNT: 0.02 X10^3/UL (ref 0–0)
MANUAL DIF COMMENT BLD-IMP: (no result)
MCV RBC: 103 FL (ref 80–94)
MEAN CORP HGB CONC: 32.7 G/DL (ref 32–36)
MEAN PLATELET VOL.: 11.3 FL (ref 6.2–12)
NRBC FLAGGED BY ANALYZER: 0 % (ref 0–5)
PLATELET # BLD: 91 K/MM3 (ref 150–450)
PLATELET COUNT: 91 K/MM3 (ref 150–450)
POSITIVE COUNT: YES
POSITIVE DIFFERENTIAL: YES
POSITIVE MORPHOLOGY: YES
POTASSIUM: 3.9 MMOL/L (ref 3.5–5.1)
RBC # BLD AUTO: 2.64 M/MM3 (ref 4.6–6.2)
RBC DISTRIBUTION WIDTH CV: 22 % (ref 11.6–14.6)
RBC DISTRIBUTION WIDTH SD: 81.1 FL (ref 35.1–43.9)
SCAN SMEAR PER REVIEW CRITERIA: (no result)
WBC # BLD AUTO: 2 K/MM3 (ref 4.4–11)
WHITE BLOOD COUNT: 2 K/MM3 (ref 4.4–11)

## 2023-06-19 PROCEDURE — 86901 BLOOD TYPING SEROLOGIC RH(D): CPT

## 2023-06-19 PROCEDURE — 80048 BASIC METABOLIC PNL TOTAL CA: CPT

## 2023-06-19 PROCEDURE — 99283 EMERGENCY DEPT VISIT LOW MDM: CPT

## 2023-06-19 PROCEDURE — A4216 STERILE WATER/SALINE, 10 ML: HCPCS

## 2023-06-19 PROCEDURE — 86900 BLOOD TYPING SEROLOGIC ABO: CPT

## 2023-06-19 PROCEDURE — 86850 RBC ANTIBODY SCREEN: CPT

## 2023-06-19 PROCEDURE — 85025 COMPLETE CBC W/AUTO DIFF WBC: CPT

## 2023-06-20 ENCOUNTER — APPOINTMENT (OUTPATIENT)
Dept: PRIMARY CARE | Facility: CLINIC | Age: 88
End: 2023-06-20
Payer: MEDICARE

## 2023-06-22 ENCOUNTER — HOSPITAL ENCOUNTER (OUTPATIENT)
Dept: HOSPITAL 100 - LAB | Age: 88
Discharge: HOME | End: 2023-06-22
Payer: MEDICARE

## 2023-06-22 DIAGNOSIS — K62.5: Primary | ICD-10-CM

## 2023-06-22 LAB
DEPRECATED RDW RBC: 83.2 FL (ref 35.1–43.9)
ERYTHROCYTE [DISTWIDTH] IN BLOOD: 22.1 % (ref 11.6–14.6)
HCT VFR BLD AUTO: 28.5 % (ref 40–54)
HEMOGLOBIN: 9.2 G/DL (ref 13–16.5)
HGB BLD-MCNC: 9.2 G/DL (ref 13–16.5)
MCV RBC: 104.8 FL (ref 80–94)
MEAN CORP HGB CONC: 32.3 G/DL (ref 32–36)
MEAN PLATELET VOL.: 10.9 FL (ref 6.2–12)
PLATELET # BLD: 90 K/MM3 (ref 150–450)
PLATELET COUNT: 90 K/MM3 (ref 150–450)
POSITIVE COUNT: YES
POSITIVE MORPHOLOGY: YES
RBC # BLD AUTO: 2.72 M/MM3 (ref 4.6–6.2)
RBC DISTRIBUTION WIDTH CV: 22.1 % (ref 11.6–14.6)
RBC DISTRIBUTION WIDTH SD: 83.2 FL (ref 35.1–43.9)
SCAN INDICATED ON CBC? Y/N: (no result)
WBC # BLD AUTO: 2.3 K/MM3 (ref 4.4–11)
WHITE BLOOD COUNT: 2.3 K/MM3 (ref 4.4–11)

## 2023-06-22 PROCEDURE — 85027 COMPLETE CBC AUTOMATED: CPT

## 2023-06-22 PROCEDURE — 36415 COLL VENOUS BLD VENIPUNCTURE: CPT

## 2023-06-23 ENCOUNTER — OFFICE VISIT (OUTPATIENT)
Dept: PRIMARY CARE | Facility: CLINIC | Age: 88
End: 2023-06-23
Payer: MEDICARE

## 2023-06-23 VITALS
HEIGHT: 68 IN | DIASTOLIC BLOOD PRESSURE: 52 MMHG | SYSTOLIC BLOOD PRESSURE: 122 MMHG | HEART RATE: 74 BPM | WEIGHT: 226.8 LBS | BODY MASS INDEX: 34.37 KG/M2

## 2023-06-23 DIAGNOSIS — Z79.4 TYPE 2 DIABETES MELLITUS WITH STAGE 3 CHRONIC KIDNEY DISEASE, WITH LONG-TERM CURRENT USE OF INSULIN, UNSPECIFIED WHETHER STAGE 3A OR 3B CKD (MULTI): ICD-10-CM

## 2023-06-23 DIAGNOSIS — K25.9 GASTRIC ULCER, UNSPECIFIED CHRONICITY, UNSPECIFIED WHETHER GASTRIC ULCER HEMORRHAGE OR PERFORATION PRESENT: ICD-10-CM

## 2023-06-23 DIAGNOSIS — D64.9 ANEMIA, UNSPECIFIED TYPE: ICD-10-CM

## 2023-06-23 DIAGNOSIS — I25.10 CORONARY ARTERY DISEASE INVOLVING NATIVE HEART, UNSPECIFIED VESSEL OR LESION TYPE, UNSPECIFIED WHETHER ANGINA PRESENT: Primary | ICD-10-CM

## 2023-06-23 DIAGNOSIS — N18.30 TYPE 2 DIABETES MELLITUS WITH STAGE 3 CHRONIC KIDNEY DISEASE, WITH LONG-TERM CURRENT USE OF INSULIN, UNSPECIFIED WHETHER STAGE 3A OR 3B CKD (MULTI): ICD-10-CM

## 2023-06-23 DIAGNOSIS — I10 PRIMARY HYPERTENSION: ICD-10-CM

## 2023-06-23 DIAGNOSIS — E11.22 TYPE 2 DIABETES MELLITUS WITH STAGE 3 CHRONIC KIDNEY DISEASE, WITH LONG-TERM CURRENT USE OF INSULIN, UNSPECIFIED WHETHER STAGE 3A OR 3B CKD (MULTI): ICD-10-CM

## 2023-06-23 PROCEDURE — 1157F ADVNC CARE PLAN IN RCRD: CPT | Performed by: FAMILY MEDICINE

## 2023-06-23 PROCEDURE — 3074F SYST BP LT 130 MM HG: CPT | Performed by: FAMILY MEDICINE

## 2023-06-23 PROCEDURE — 99214 OFFICE O/P EST MOD 30 MIN: CPT | Performed by: FAMILY MEDICINE

## 2023-06-23 PROCEDURE — 1159F MED LIST DOCD IN RCRD: CPT | Performed by: FAMILY MEDICINE

## 2023-06-23 PROCEDURE — 1160F RVW MEDS BY RX/DR IN RCRD: CPT | Performed by: FAMILY MEDICINE

## 2023-06-23 PROCEDURE — 3078F DIAST BP <80 MM HG: CPT | Performed by: FAMILY MEDICINE

## 2023-06-23 PROCEDURE — 1036F TOBACCO NON-USER: CPT | Performed by: FAMILY MEDICINE

## 2023-06-23 RX ORDER — PANTOPRAZOLE SODIUM 40 MG/1
40 TABLET, DELAYED RELEASE ORAL 2 TIMES DAILY
COMMUNITY
Start: 2023-06-08 | End: 2023-07-24 | Stop reason: SDUPTHER

## 2023-06-23 RX ORDER — METOPROLOL TARTRATE 50 MG/1
50 TABLET ORAL 2 TIMES DAILY
COMMUNITY
End: 2023-07-19 | Stop reason: SDUPTHER

## 2023-06-23 RX ORDER — SUCRALFATE 1 G/1
1 TABLET ORAL
COMMUNITY
Start: 2023-06-08 | End: 2023-07-24 | Stop reason: SDUPTHER

## 2023-06-23 RX ORDER — INSULIN GLARGINE 100 [IU]/ML
22 INJECTION, SOLUTION SUBCUTANEOUS NIGHTLY
Qty: 10 ML | Refills: 1 | Status: SHIPPED | OUTPATIENT
Start: 2023-06-23 | End: 2023-07-24 | Stop reason: DRUGHIGH

## 2023-06-23 RX ORDER — ASCORBIC ACID 250 MG
250 TABLET,CHEWABLE ORAL
COMMUNITY

## 2023-06-23 NOTE — PATIENT INSTRUCTIONS
Will have him hold the sucralfate for now and see if that helps him feel better - he will continue off eliquis and aspirin, will continue his other medications as updated on his med list today.  Follow up here in 1 month, sooner if needed.    
chest pains

## 2023-06-23 NOTE — PROGRESS NOTES
"Subjective   Zack Medrano is a 87 y.o. male who presents for No chief complaint on file..  Here for follow up anemia/GI bleed.  Since he was here last he was hospitalized again in [place] for GI bleed.  At that time his eliquis was stopped and he was switched to protonix, he is also off baby aspirin.  He states that he is still not feeling great.  He states that he feels ok until he takes the sucrafate.  States that it makes his stomach growl and \"cleans me out\".  He is taking it twice a day.  He had another CBC yesterday and his Hgb is up a little bit to 9.2.  He did not take his sucralfate today.        He was given a Rx for gabapentin for 7 days when he was discharged, he has not noticed any difference with or without it so we will keep him off that.      He is seeing Dr Haque next week for his tremors.              Objective   Visit Vitals  /52 (BP Location: Left arm, Patient Position: Sitting, BP Cuff Size: Adult)   Pulse 74      Physical Exam  Vitals reviewed.   Constitutional:       General: He is not in acute distress.  Cardiovascular:      Rate and Rhythm: Normal rate and regular rhythm.      Heart sounds: No murmur heard.  Pulmonary:      Effort: Pulmonary effort is normal. No respiratory distress.      Breath sounds: Normal breath sounds.   Abdominal:      Palpations: Abdomen is soft.   Skin:     General: Skin is warm and dry.   Neurological:      General: No focal deficit present.      Mental Status: He is alert. Mental status is at baseline.         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

## 2023-06-23 NOTE — PROGRESS NOTES
Subjective   Patient ID: Zack Medrano is a 87 y.o. male who presents for 2/4 week check up lab work complete.  Complains of weakness. Was seen in ED due to GI Bleed 6/19/23... Accompany by Shasta JIMENEZ     Review of Systems    Objective   There were no vitals taken for this visit.    Physical Exam    Assessment/Plan

## 2023-07-19 DIAGNOSIS — I25.10 CORONARY ARTERY DISEASE INVOLVING NATIVE HEART, UNSPECIFIED VESSEL OR LESION TYPE, UNSPECIFIED WHETHER ANGINA PRESENT: Primary | ICD-10-CM

## 2023-07-20 RX ORDER — METOPROLOL TARTRATE 50 MG/1
50 TABLET ORAL 2 TIMES DAILY
Qty: 180 TABLET | Refills: 1 | Status: SHIPPED | OUTPATIENT
Start: 2023-07-20 | End: 2024-01-15

## 2023-07-24 ENCOUNTER — OFFICE VISIT (OUTPATIENT)
Dept: PRIMARY CARE | Facility: CLINIC | Age: 88
End: 2023-07-24
Payer: MEDICARE

## 2023-07-24 VITALS
BODY MASS INDEX: 34.22 KG/M2 | WEIGHT: 225.8 LBS | OXYGEN SATURATION: 95 % | SYSTOLIC BLOOD PRESSURE: 122 MMHG | DIASTOLIC BLOOD PRESSURE: 52 MMHG | HEART RATE: 75 BPM | HEIGHT: 68 IN

## 2023-07-24 DIAGNOSIS — N18.30 TYPE 2 DIABETES MELLITUS WITH STAGE 3 CHRONIC KIDNEY DISEASE, WITH LONG-TERM CURRENT USE OF INSULIN, UNSPECIFIED WHETHER STAGE 3A OR 3B CKD (MULTI): ICD-10-CM

## 2023-07-24 DIAGNOSIS — E78.2 MIXED HYPERLIPIDEMIA: ICD-10-CM

## 2023-07-24 DIAGNOSIS — Z79.4 TYPE 2 DIABETES MELLITUS WITH STAGE 3 CHRONIC KIDNEY DISEASE, WITH LONG-TERM CURRENT USE OF INSULIN, UNSPECIFIED WHETHER STAGE 3A OR 3B CKD (MULTI): ICD-10-CM

## 2023-07-24 DIAGNOSIS — K25.9 GASTRIC ULCER, UNSPECIFIED CHRONICITY, UNSPECIFIED WHETHER GASTRIC ULCER HEMORRHAGE OR PERFORATION PRESENT: ICD-10-CM

## 2023-07-24 DIAGNOSIS — D64.9 ANEMIA, UNSPECIFIED TYPE: ICD-10-CM

## 2023-07-24 DIAGNOSIS — I25.10 CORONARY ARTERY DISEASE INVOLVING NATIVE HEART, UNSPECIFIED VESSEL OR LESION TYPE, UNSPECIFIED WHETHER ANGINA PRESENT: ICD-10-CM

## 2023-07-24 DIAGNOSIS — E11.22 TYPE 2 DIABETES MELLITUS WITH STAGE 3 CHRONIC KIDNEY DISEASE, WITH LONG-TERM CURRENT USE OF INSULIN, UNSPECIFIED WHETHER STAGE 3A OR 3B CKD (MULTI): ICD-10-CM

## 2023-07-24 DIAGNOSIS — I10 PRIMARY HYPERTENSION: Primary | ICD-10-CM

## 2023-07-24 PROCEDURE — 1036F TOBACCO NON-USER: CPT | Performed by: FAMILY MEDICINE

## 2023-07-24 PROCEDURE — 3078F DIAST BP <80 MM HG: CPT | Performed by: FAMILY MEDICINE

## 2023-07-24 PROCEDURE — 1159F MED LIST DOCD IN RCRD: CPT | Performed by: FAMILY MEDICINE

## 2023-07-24 PROCEDURE — 3074F SYST BP LT 130 MM HG: CPT | Performed by: FAMILY MEDICINE

## 2023-07-24 PROCEDURE — 1160F RVW MEDS BY RX/DR IN RCRD: CPT | Performed by: FAMILY MEDICINE

## 2023-07-24 PROCEDURE — 1157F ADVNC CARE PLAN IN RCRD: CPT | Performed by: FAMILY MEDICINE

## 2023-07-24 PROCEDURE — 99214 OFFICE O/P EST MOD 30 MIN: CPT | Performed by: FAMILY MEDICINE

## 2023-07-24 RX ORDER — SUCRALFATE 1 G/1
1 TABLET ORAL
Qty: 90 TABLET | Refills: 1 | Status: SHIPPED | OUTPATIENT
Start: 2023-07-24 | End: 2023-09-26

## 2023-07-24 RX ORDER — INSULIN GLARGINE 100 [IU]/ML
28 INJECTION, SOLUTION SUBCUTANEOUS NIGHTLY
Qty: 10 ML | Refills: 1 | Status: SHIPPED | OUTPATIENT
Start: 2023-07-24 | End: 2023-09-26 | Stop reason: SDUPTHER

## 2023-07-24 RX ORDER — PANTOPRAZOLE SODIUM 40 MG/1
40 TABLET, DELAYED RELEASE ORAL 2 TIMES DAILY
Qty: 180 TABLET | Refills: 1 | Status: SHIPPED | OUTPATIENT
Start: 2023-07-24 | End: 2023-09-26 | Stop reason: SDUPTHER

## 2023-07-24 NOTE — PATIENT INSTRUCTIONS
Continue current medications.  Follow up with specialists as scheduled.  Follow up in 2 months, sooner if needed.

## 2023-07-24 NOTE — PROGRESS NOTES
Subjective   Zack Medrano is a 87 y.o. male who presents for No chief complaint on file..  Here for routine follow up HTN, DM, CKD (Dr Nguyen), CAD ([facility]), high chol, thrombocytopenia/anemia (Dr Dash), tremor (Dr Haque), LINDA, h/o PE, GI bleed.  He continues to have some high sugars, he increased the lanuts to 24 units - we will increase the lantus to 28 units daily.  We will continue his protonix and sucralfate for now - discussed that we will likely decrease those at his next visit.  He has some issues with occasional itching.  Typically only if he is sitting still for a little bit,  it is all over, no rashes.  We discussed dehydration as a possible etiology.  He will continue to monitor.             Objective   Visit Vitals  /52 (BP Location: Left arm, Patient Position: Sitting, BP Cuff Size: Adult)   Pulse 75      Physical Exam  Vitals reviewed.   Constitutional:       General: He is not in acute distress.  Cardiovascular:      Rate and Rhythm: Normal rate and regular rhythm.      Heart sounds: No murmur heard.  Pulmonary:      Effort: Pulmonary effort is normal. No respiratory distress.      Breath sounds: Normal breath sounds.   Skin:     General: Skin is warm and dry.   Neurological:      General: No focal deficit present.      Mental Status: He is alert. Mental status is at baseline.         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)    Relevant Orders    CBC and Auto Differential    Comprehensive Metabolic Panel    Hemoglobin A1C    Lipid Panel    TSH  with reflex to Free T4 if abnormal    Vitamin B12    Follow Up In Primary Care - Established    Hypertension - Primary    Relevant Orders    CBC and Auto Differential    Comprehensive Metabolic Panel    Hemoglobin A1C    Lipid Panel    TSH with reflex to Free T4 if abnormal    Vitamin B12    Follow Up In Primary Care - Established    Mixed hyperlipidemia    Relevant Orders    CBC and Auto Differential    Comprehensive Metabolic Panel    Hemoglobin A1C    Lipid Panel    TSH with reflex to Free T4 if abnormal    Vitamin B12    Follow Up In Primary Care - Established     Other Visit Diagnoses       Gastric ulcer, unspecified chronicity, unspecified whether gastric ulcer hemorrhage or perforation present        Relevant Medications    pantoprazole (ProtoNix) 40 mg EC tablet    sucralfate (Carafate) 1 gram tablet    Other Relevant Orders    Follow Up In Primary Care - Established               Fabiola Dietz MD

## 2023-07-24 NOTE — PROGRESS NOTES
Subjective   Patient ID: Zack Medrano is a 87 y.o. male who presents for 1 month recheck Patient states his sugar has been up. He is on his original dosage and been running 200-300 states same days he feels ok and some days he feels real tired. He also has complaints of itching. Not sure if it's one of his medications.     HPI     Review of Systems    Objective   There were no vitals taken for this visit.    Physical Exam    Assessment/Plan

## 2023-08-09 ENCOUNTER — PATIENT OUTREACH (OUTPATIENT)
Dept: CARE COORDINATION | Facility: CLINIC | Age: 88
End: 2023-08-09
Payer: MEDICARE

## 2023-08-09 NOTE — PROGRESS NOTES
Unable to reach patient for one month post discharge follow up call.               LVM with call back number for patient to call if needed    If no voicemail available call attempts x 2 were made to contact the patient to   assist with  any questions or concerns patient may have.

## 2023-08-10 LAB — PROSTATE SPECIFIC AG (NG/ML) IN SER/PLAS: 7.45 NG/ML (ref 0–4)

## 2023-08-17 DIAGNOSIS — E78.2 MIXED HYPERLIPIDEMIA: ICD-10-CM

## 2023-08-17 DIAGNOSIS — R09.02 HYPOXIA: ICD-10-CM

## 2023-08-17 DIAGNOSIS — I10 PRIMARY HYPERTENSION: ICD-10-CM

## 2023-08-17 RX ORDER — ATORVASTATIN CALCIUM 20 MG/1
20 TABLET, FILM COATED ORAL DAILY
Qty: 90 TABLET | Refills: 1 | Status: SHIPPED | OUTPATIENT
Start: 2023-08-17 | End: 2023-10-17 | Stop reason: DRUGHIGH

## 2023-08-17 RX ORDER — ALLOPURINOL 100 MG/1
100 TABLET ORAL DAILY
Qty: 90 TABLET | Refills: 1 | Status: SHIPPED | OUTPATIENT
Start: 2023-08-17 | End: 2024-02-21 | Stop reason: SDUPTHER

## 2023-08-17 RX ORDER — ATORVASTATIN CALCIUM 20 MG/1
20 TABLET, FILM COATED ORAL DAILY
Qty: 90 TABLET | Refills: 1 | Status: SHIPPED | OUTPATIENT
Start: 2023-08-17 | End: 2023-08-17 | Stop reason: SDUPTHER

## 2023-08-22 RX ORDER — FAMOTIDINE 20 MG/1
1 TABLET, FILM COATED ORAL 2 TIMES DAILY
COMMUNITY
End: 2023-09-26 | Stop reason: ENTERED-IN-ERROR

## 2023-08-22 RX ORDER — NIZATIDINE 150 MG/1
1 CAPSULE ORAL 2 TIMES DAILY
COMMUNITY
End: 2023-09-26 | Stop reason: ALTCHOICE

## 2023-08-22 RX ORDER — IBUPROFEN 200 MG
TABLET ORAL AS NEEDED
COMMUNITY
End: 2023-10-09 | Stop reason: ALTCHOICE

## 2023-08-22 RX ORDER — LANCETS 33 GAUGE
EACH MISCELLANEOUS 2 TIMES DAILY
COMMUNITY

## 2023-08-22 RX ORDER — GABAPENTIN 300 MG/1
1 CAPSULE ORAL EVERY 12 HOURS
COMMUNITY
End: 2023-10-02 | Stop reason: ALTCHOICE

## 2023-08-22 RX ORDER — OMEPRAZOLE 20 MG/1
20 CAPSULE, DELAYED RELEASE ORAL
COMMUNITY
End: 2023-09-26 | Stop reason: ENTERED-IN-ERROR

## 2023-08-22 RX ORDER — GLIMEPIRIDE 2 MG/1
1 TABLET ORAL 2 TIMES DAILY
COMMUNITY
End: 2023-10-02 | Stop reason: ALTCHOICE

## 2023-08-22 RX ORDER — PRIMIDONE 50 MG/1
3 TABLET ORAL 3 TIMES DAILY
COMMUNITY
End: 2023-09-26 | Stop reason: ALTCHOICE

## 2023-08-22 RX ORDER — COLESEVELAM 180 1/1
1 TABLET ORAL DAILY
COMMUNITY
End: 2023-09-26 | Stop reason: ENTERED-IN-ERROR

## 2023-09-16 ENCOUNTER — LAB (OUTPATIENT)
Dept: LAB | Facility: LAB | Age: 88
End: 2023-09-16
Payer: MEDICARE

## 2023-09-16 DIAGNOSIS — E11.22 TYPE 2 DIABETES MELLITUS WITH STAGE 3 CHRONIC KIDNEY DISEASE, WITH LONG-TERM CURRENT USE OF INSULIN, UNSPECIFIED WHETHER STAGE 3A OR 3B CKD (MULTI): ICD-10-CM

## 2023-09-16 DIAGNOSIS — E78.2 MIXED HYPERLIPIDEMIA: ICD-10-CM

## 2023-09-16 DIAGNOSIS — I25.10 CORONARY ARTERY DISEASE INVOLVING NATIVE HEART, UNSPECIFIED VESSEL OR LESION TYPE, UNSPECIFIED WHETHER ANGINA PRESENT: ICD-10-CM

## 2023-09-16 DIAGNOSIS — Z79.4 TYPE 2 DIABETES MELLITUS WITH STAGE 3 CHRONIC KIDNEY DISEASE, WITH LONG-TERM CURRENT USE OF INSULIN, UNSPECIFIED WHETHER STAGE 3A OR 3B CKD (MULTI): ICD-10-CM

## 2023-09-16 DIAGNOSIS — N18.30 TYPE 2 DIABETES MELLITUS WITH STAGE 3 CHRONIC KIDNEY DISEASE, WITH LONG-TERM CURRENT USE OF INSULIN, UNSPECIFIED WHETHER STAGE 3A OR 3B CKD (MULTI): ICD-10-CM

## 2023-09-16 DIAGNOSIS — I10 PRIMARY HYPERTENSION: ICD-10-CM

## 2023-09-16 LAB
ALANINE AMINOTRANSFERASE (SGPT) (U/L) IN SER/PLAS: 32 U/L (ref 10–52)
ALBUMIN (G/DL) IN SER/PLAS: 3.8 G/DL (ref 3.4–5)
ALKALINE PHOSPHATASE (U/L) IN SER/PLAS: 106 U/L (ref 33–136)
ANION GAP IN SER/PLAS: 13 MMOL/L (ref 10–20)
ASPARTATE AMINOTRANSFERASE (SGOT) (U/L) IN SER/PLAS: 39 U/L (ref 9–39)
BASOPHILS (10*3/UL) IN BLOOD BY AUTOMATED COUNT: 0.01 X10E9/L (ref 0–0.1)
BASOPHILS/100 LEUKOCYTES IN BLOOD BY AUTOMATED COUNT: 0.6 % (ref 0–2)
BILIRUBIN TOTAL (MG/DL) IN SER/PLAS: 1.5 MG/DL (ref 0–1.2)
CALCIUM (MG/DL) IN SER/PLAS: 8.9 MG/DL (ref 8.6–10.3)
CARBON DIOXIDE, TOTAL (MMOL/L) IN SER/PLAS: 27 MMOL/L (ref 21–32)
CHLORIDE (MMOL/L) IN SER/PLAS: 103 MMOL/L (ref 98–107)
CHOLESTEROL (MG/DL) IN SER/PLAS: 100 MG/DL (ref 0–199)
CHOLESTEROL IN HDL (MG/DL) IN SER/PLAS: 32 MG/DL
CHOLESTEROL/HDL RATIO: 3.1
COBALAMIN (VITAMIN B12) (PG/ML) IN SER/PLAS: 585 PG/ML (ref 211–911)
CREATININE (MG/DL) IN SER/PLAS: 1.88 MG/DL (ref 0.5–1.3)
EOSINOPHILS (10*3/UL) IN BLOOD BY AUTOMATED COUNT: 0.04 X10E9/L (ref 0–0.4)
EOSINOPHILS/100 LEUKOCYTES IN BLOOD BY AUTOMATED COUNT: 2.5 % (ref 0–6)
ERYTHROCYTE DISTRIBUTION WIDTH (RATIO) BY AUTOMATED COUNT: 17.6 % (ref 11.5–14.5)
ERYTHROCYTE MEAN CORPUSCULAR HEMOGLOBIN CONCENTRATION (G/DL) BY AUTOMATED: 32.9 G/DL (ref 32–36)
ERYTHROCYTE MEAN CORPUSCULAR VOLUME (FL) BY AUTOMATED COUNT: 109 FL (ref 80–100)
ERYTHROCYTES (10*6/UL) IN BLOOD BY AUTOMATED COUNT: 2.59 X10E12/L (ref 4.5–5.9)
ESTIMATED AVERAGE GLUCOSE FOR HBA1C: 295 MG/DL
GFR MALE: 34 ML/MIN/1.73M2
GLUCOSE (MG/DL) IN SER/PLAS: 242 MG/DL (ref 74–99)
HEMATOCRIT (%) IN BLOOD BY AUTOMATED COUNT: 28.3 % (ref 41–52)
HEMOGLOBIN (G/DL) IN BLOOD: 9.3 G/DL (ref 13.5–17.5)
HEMOGLOBIN A1C/HEMOGLOBIN TOTAL IN BLOOD: 11.9 %
IMMATURE GRANULOCYTES/100 LEUKOCYTES IN BLOOD BY AUTOMATED COUNT: 0.6 % (ref 0–0.9)
LDL: 34 MG/DL (ref 0–99)
LEUKOCYTES (10*3/UL) IN BLOOD BY AUTOMATED COUNT: 1.6 X10E9/L (ref 4.4–11.3)
LYMPHOCYTES (10*3/UL) IN BLOOD BY AUTOMATED COUNT: 0.72 X10E9/L (ref 0.8–3)
LYMPHOCYTES/100 LEUKOCYTES IN BLOOD BY AUTOMATED COUNT: 45.6 % (ref 13–44)
MONOCYTES (10*3/UL) IN BLOOD BY AUTOMATED COUNT: 0.07 X10E9/L (ref 0.05–0.8)
MONOCYTES/100 LEUKOCYTES IN BLOOD BY AUTOMATED COUNT: 4.4 % (ref 2–10)
NEUTROPHILS (10*3/UL) IN BLOOD BY AUTOMATED COUNT: 0.73 X10E9/L (ref 1.6–5.5)
NEUTROPHILS/100 LEUKOCYTES IN BLOOD BY AUTOMATED COUNT: 46.3 % (ref 40–80)
PLATELETS (10*3/UL) IN BLOOD AUTOMATED COUNT: 78 X10E9/L (ref 150–450)
POTASSIUM (MMOL/L) IN SER/PLAS: 4 MMOL/L (ref 3.5–5.3)
PROTEIN TOTAL: 6.1 G/DL (ref 6.4–8.2)
SODIUM (MMOL/L) IN SER/PLAS: 139 MMOL/L (ref 136–145)
THYROTROPIN (MIU/L) IN SER/PLAS BY DETECTION LIMIT <= 0.05 MIU/L: 3.82 MIU/L (ref 0.44–3.98)
TRIGLYCERIDE (MG/DL) IN SER/PLAS: 171 MG/DL (ref 0–149)
UREA NITROGEN (MG/DL) IN SER/PLAS: 28 MG/DL (ref 6–23)
VLDL: 34 MG/DL (ref 0–40)

## 2023-09-16 PROCEDURE — 83036 HEMOGLOBIN GLYCOSYLATED A1C: CPT

## 2023-09-16 PROCEDURE — 84443 ASSAY THYROID STIM HORMONE: CPT

## 2023-09-16 PROCEDURE — 36415 COLL VENOUS BLD VENIPUNCTURE: CPT

## 2023-09-16 PROCEDURE — 85025 COMPLETE CBC W/AUTO DIFF WBC: CPT

## 2023-09-16 PROCEDURE — 82607 VITAMIN B-12: CPT

## 2023-09-16 PROCEDURE — 80053 COMPREHEN METABOLIC PANEL: CPT

## 2023-09-16 PROCEDURE — 80061 LIPID PANEL: CPT

## 2023-09-25 DIAGNOSIS — K25.9 GASTRIC ULCER, UNSPECIFIED CHRONICITY, UNSPECIFIED WHETHER GASTRIC ULCER HEMORRHAGE OR PERFORATION PRESENT: ICD-10-CM

## 2023-09-26 ENCOUNTER — PATIENT OUTREACH (OUTPATIENT)
Dept: CARE COORDINATION | Facility: CLINIC | Age: 88
End: 2023-09-26

## 2023-09-26 ENCOUNTER — OFFICE VISIT (OUTPATIENT)
Dept: PRIMARY CARE | Facility: CLINIC | Age: 88
End: 2023-09-26
Payer: MEDICARE

## 2023-09-26 VITALS
HEIGHT: 68 IN | BODY MASS INDEX: 33.65 KG/M2 | SYSTOLIC BLOOD PRESSURE: 108 MMHG | OXYGEN SATURATION: 94 % | HEART RATE: 82 BPM | WEIGHT: 222 LBS | DIASTOLIC BLOOD PRESSURE: 46 MMHG

## 2023-09-26 DIAGNOSIS — E78.2 MIXED HYPERLIPIDEMIA: ICD-10-CM

## 2023-09-26 DIAGNOSIS — Z79.4 TYPE 2 DIABETES MELLITUS WITH STAGE 3 CHRONIC KIDNEY DISEASE, WITH LONG-TERM CURRENT USE OF INSULIN, UNSPECIFIED WHETHER STAGE 3A OR 3B CKD (MULTI): ICD-10-CM

## 2023-09-26 DIAGNOSIS — E11.22 TYPE 2 DIABETES MELLITUS WITH STAGE 3 CHRONIC KIDNEY DISEASE, WITH LONG-TERM CURRENT USE OF INSULIN, UNSPECIFIED WHETHER STAGE 3A OR 3B CKD (MULTI): ICD-10-CM

## 2023-09-26 DIAGNOSIS — K25.9 GASTRIC ULCER, UNSPECIFIED CHRONICITY, UNSPECIFIED WHETHER GASTRIC ULCER HEMORRHAGE OR PERFORATION PRESENT: ICD-10-CM

## 2023-09-26 DIAGNOSIS — D64.9 ANEMIA, UNSPECIFIED TYPE: ICD-10-CM

## 2023-09-26 DIAGNOSIS — I10 PRIMARY HYPERTENSION: ICD-10-CM

## 2023-09-26 DIAGNOSIS — I25.10 CORONARY ARTERY DISEASE INVOLVING NATIVE HEART, UNSPECIFIED VESSEL OR LESION TYPE, UNSPECIFIED WHETHER ANGINA PRESENT: ICD-10-CM

## 2023-09-26 DIAGNOSIS — N18.30 TYPE 2 DIABETES MELLITUS WITH STAGE 3 CHRONIC KIDNEY DISEASE, WITH LONG-TERM CURRENT USE OF INSULIN, UNSPECIFIED WHETHER STAGE 3A OR 3B CKD (MULTI): ICD-10-CM

## 2023-09-26 PROCEDURE — 99214 OFFICE O/P EST MOD 30 MIN: CPT | Performed by: FAMILY MEDICINE

## 2023-09-26 PROCEDURE — 3078F DIAST BP <80 MM HG: CPT | Performed by: FAMILY MEDICINE

## 2023-09-26 PROCEDURE — 1036F TOBACCO NON-USER: CPT | Performed by: FAMILY MEDICINE

## 2023-09-26 PROCEDURE — 3074F SYST BP LT 130 MM HG: CPT | Performed by: FAMILY MEDICINE

## 2023-09-26 PROCEDURE — 1159F MED LIST DOCD IN RCRD: CPT | Performed by: FAMILY MEDICINE

## 2023-09-26 PROCEDURE — 1160F RVW MEDS BY RX/DR IN RCRD: CPT | Performed by: FAMILY MEDICINE

## 2023-09-26 RX ORDER — PANTOPRAZOLE SODIUM 40 MG/1
40 TABLET, DELAYED RELEASE ORAL 2 TIMES DAILY
Qty: 90 TABLET | Refills: 1 | Status: SHIPPED | OUTPATIENT
Start: 2023-09-26

## 2023-09-26 RX ORDER — INSULIN GLARGINE 100 [IU]/ML
40 INJECTION, SOLUTION SUBCUTANEOUS NIGHTLY
Qty: 40 ML | Refills: 1 | Status: SHIPPED | OUTPATIENT
Start: 2023-09-26 | End: 2023-11-16 | Stop reason: SDUPTHER

## 2023-09-26 RX ORDER — SUCRALFATE 1 G/1
1 TABLET ORAL
Qty: 90 TABLET | Refills: 1 | Status: SHIPPED | OUTPATIENT
Start: 2023-09-26 | End: 2023-11-27 | Stop reason: SDUPTHER

## 2023-09-26 NOTE — PROGRESS NOTES
Subjective   Patient ID: Zack Medrano is a 87 y.o. male who presents for 2 month follow up. Patient states that he has been feeling weak. They has been having shots to help increase his RBC. Also has states that once awake they are unable to go back to sleep. Labs were completed. Medication refill on Sucraleate. Arteritis in both wrist.     HPI     Review of Systems    Objective   There were no vitals taken for this visit.    Physical Exam    Assessment/Plan

## 2023-09-26 NOTE — PROGRESS NOTES
Subjective   Zack Medrano is a 87 y.o. male who presents for No chief complaint on file..  Here for routine follow up HTN, DM, CKD (Dr Nguyen), CAD ([facility]), high chol, thrombocytopenia/anemia (Dr Dash), tremor (Dr Haque), LINDA, h/o PE, GI bleed. He states that he is not feeling well.  He developed a sore throat /cough on Sunday and that is getting a little better.  His A1c is much higher and his sugars have been running higher so we will increase his insulin - he had increased to 30  - we will bump it up to 40 units daily.              Objective   Visit Vitals  BP (!) 108/46 (BP Location: Left arm, Patient Position: Sitting, BP Cuff Size: Adult)   Pulse 82      Physical Exam  Vitals reviewed.   Constitutional:       General: He is not in acute distress.  Cardiovascular:      Rate and Rhythm: Normal rate and regular rhythm.      Heart sounds: No murmur heard.  Pulmonary:      Effort: Pulmonary effort is normal. No respiratory distress.      Breath sounds: Normal breath sounds.   Skin:     General: Skin is warm and dry.   Neurological:      General: No focal deficit present.      Mental Status: He is alert. Mental status is at baseline.        Latest Reference Range & Units 09/16/23 08:45   GLUCOSE 74 - 99 mg/dL 242 (H)   SODIUM 136 - 145 mmol/L 139   POTASSIUM 3.5 - 5.3 mmol/L 4.0   CHLORIDE 98 - 107 mmol/L 103   Bicarbonate 21 - 32 mmol/L 27   Anion Gap 10 - 20 mmol/L 13   Blood Urea Nitrogen 6 - 23 mg/dL 28 (H)   Creatinine 0.50 - 1.30 mg/dL 1.88 (H)   Calcium 8.6 - 10.3 mg/dL 8.9   Albumin 3.4 - 5.0 g/dL 3.8   Alkaline Phosphatase 33 - 136 U/L 106   ALT 10 - 52 U/L 32   AST 9 - 39 U/L 39   Bilirubin Total 0.0 - 1.2 mg/dL 1.5 (H)   HDL CHOLESTEROL mg/dL 32.0 !   Cholesterol/HDL Ratio  3.1   VLDL 0 - 40 mg/dL 34   TRIGLYCERIDES 0 - 149 mg/dL 171 (H)   Total Protein 6.4 - 8.2 g/dL 6.1 (L)   CHOLESTEROL 0 - 199 mg/dL 100   LDL 0 - 99 mg/dL 34   GFR MALE >90 mL/min/1.73m2 34 !   Vitamin B12 211 - 911 pg/mL 585    Hemoglobin A1C % 11.9 !   Thyroid Stimulating Hormone 0.44 - 3.98 mIU/L 3.82   Estimated Average Glucose MG/   WBC 4.4 - 11.3 x10E9/L 1.6 (L)   RBC 4.50 - 5.90 x10E12/L 2.59 (L)   HEMOGLOBIN 13.5 - 17.5 g/dL 9.3 (L)   HEMATOCRIT 41.0 - 52.0 % 28.3 (L)   MCV 80 - 100 fL 109 (H)   MCHC 32.0 - 36.0 g/dL 32.9   Platelets 150 - 450 x10E9/L 78 (L)   Neutrophils % 40.0 - 80.0 % 46.3   Immature Granulocytes %, Automated 0.0 - 0.9 % 0.6   Lymphocytes % 13.0 - 44.0 % 45.6   Monocytes % 2.0 - 10.0 % 4.4   Eosinophils % 0.0 - 6.0 % 2.5   Basophils % 0.0 - 2.0 % 0.6   Neutrophils Absolute 1.60 - 5.50 x10E9/L 0.73 (L)   Lymphocytes Absolute 0.80 - 3.00 x10E9/L 0.72 (L)   Monocytes Absolute 0.05 - 0.80 x10E9/L 0.07   Eosinophils Absolute 0.00 - 0.40 x10E9/L 0.04   Basophils Absolute 0.00 - 0.10 x10E9/L 0.01   RED CELL DISTRIBUTION WIDTH 11.5 - 14.5 % 17.6 (H)   (H): Data is abnormally high  !: Data is abnormal  (L): Data is abnormally low    Assessment/Plan   Problem List Items Addressed This Visit       Anemia    CAD (coronary artery disease)    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

## 2023-09-27 ENCOUNTER — TELEPHONE (OUTPATIENT)
Dept: PRIMARY CARE | Facility: CLINIC | Age: 88
End: 2023-09-27
Payer: MEDICARE

## 2023-09-27 NOTE — TELEPHONE ENCOUNTER
Received a voicemail from the Pharmacy and they want to know if your taking over his Rx for Lantus? It was previously ordered from Dr Nguyen. Also if you are he notices that there is a Increase dosage. Please advise. Thank you

## 2023-09-28 LAB
ANION GAP IN SER/PLAS: 14 MMOL/L (ref 10–20)
CALCIUM (MG/DL) IN SER/PLAS: 9.6 MG/DL (ref 8.6–10.3)
CARBON DIOXIDE, TOTAL (MMOL/L) IN SER/PLAS: 28 MMOL/L (ref 21–32)
CHLORIDE (MMOL/L) IN SER/PLAS: 98 MMOL/L (ref 98–107)
CREATININE (MG/DL) IN SER/PLAS: 2.23 MG/DL (ref 0.5–1.3)
GFR MALE: 28 ML/MIN/1.73M2
GLUCOSE (MG/DL) IN SER/PLAS: 402 MG/DL (ref 74–99)
POTASSIUM (MMOL/L) IN SER/PLAS: 4.1 MMOL/L (ref 3.5–5.3)
SODIUM (MMOL/L) IN SER/PLAS: 136 MMOL/L (ref 136–145)
UREA NITROGEN (MG/DL) IN SER/PLAS: 32 MG/DL (ref 6–23)

## 2023-09-29 LAB
ALBUMIN (MG/L) IN URINE: 50.3 MG/L
ALBUMIN/CREATININE (UG/MG) IN URINE: ABNORMAL UG/MG CRT (ref 0–30)
CREATININE (MG/DL) IN URINE: <1 MG/DL (ref 20–370)

## 2023-10-02 ENCOUNTER — OFFICE VISIT (OUTPATIENT)
Dept: NEPHROLOGY | Facility: CLINIC | Age: 88
End: 2023-10-02
Payer: MEDICARE

## 2023-10-02 VITALS
HEART RATE: 71 BPM | BODY MASS INDEX: 33.57 KG/M2 | WEIGHT: 220.8 LBS | SYSTOLIC BLOOD PRESSURE: 126 MMHG | DIASTOLIC BLOOD PRESSURE: 60 MMHG

## 2023-10-02 DIAGNOSIS — E11.22 TYPE 2 DIABETES MELLITUS WITH STAGE 3B CHRONIC KIDNEY DISEASE, WITH LONG-TERM CURRENT USE OF INSULIN (MULTI): ICD-10-CM

## 2023-10-02 DIAGNOSIS — Z79.4 TYPE 2 DIABETES MELLITUS WITH STAGE 3B CHRONIC KIDNEY DISEASE, WITH LONG-TERM CURRENT USE OF INSULIN (MULTI): ICD-10-CM

## 2023-10-02 DIAGNOSIS — D63.1 ANEMIA DUE TO STAGE 4 CHRONIC KIDNEY DISEASE (MULTI): ICD-10-CM

## 2023-10-02 DIAGNOSIS — N18.32 TYPE 2 DIABETES MELLITUS WITH STAGE 3B CHRONIC KIDNEY DISEASE, WITH LONG-TERM CURRENT USE OF INSULIN (MULTI): ICD-10-CM

## 2023-10-02 DIAGNOSIS — E78.2 MIXED HYPERLIPIDEMIA: ICD-10-CM

## 2023-10-02 DIAGNOSIS — N18.4 ANEMIA DUE TO STAGE 4 CHRONIC KIDNEY DISEASE (MULTI): ICD-10-CM

## 2023-10-02 DIAGNOSIS — N18.4 CHRONIC KIDNEY DISEASE, STAGE 4 (SEVERE) (MULTI): Primary | ICD-10-CM

## 2023-10-02 DIAGNOSIS — I10 PRIMARY HYPERTENSION: ICD-10-CM

## 2023-10-02 DIAGNOSIS — N18.32 STAGE 3B CHRONIC KIDNEY DISEASE (MULTI): ICD-10-CM

## 2023-10-02 PROCEDURE — 3074F SYST BP LT 130 MM HG: CPT | Performed by: CLINICAL NURSE SPECIALIST

## 2023-10-02 PROCEDURE — 1160F RVW MEDS BY RX/DR IN RCRD: CPT | Performed by: CLINICAL NURSE SPECIALIST

## 2023-10-02 PROCEDURE — 3078F DIAST BP <80 MM HG: CPT | Performed by: CLINICAL NURSE SPECIALIST

## 2023-10-02 PROCEDURE — 1036F TOBACCO NON-USER: CPT | Performed by: CLINICAL NURSE SPECIALIST

## 2023-10-02 PROCEDURE — 1159F MED LIST DOCD IN RCRD: CPT | Performed by: CLINICAL NURSE SPECIALIST

## 2023-10-02 PROCEDURE — 99213 OFFICE O/P EST LOW 20 MIN: CPT | Performed by: CLINICAL NURSE SPECIALIST

## 2023-10-02 ASSESSMENT — ENCOUNTER SYMPTOMS: WEAKNESS: 1

## 2023-10-02 NOTE — PROGRESS NOTES
Subjective   Patient ID: Zack Medrano is a 87 y.o. male who presents for Follow-up (6 MONTH FUV- LAB REVIEW 9/28/2023).  Patient being seen in follow-up for chronic kidney disease  Main complaint today is that he is having some fatigue  He denies any complaints of shortness of breath  He has a mild swelling of his left lower extremity  Blood pressure has been well controlled  He is receiving injections at the infusion center secondary to his anemia of chronic kidney disease  Blood sugars continue to be elevated  He is increasing his Lantus  He is on Jardiance            Review of Systems   Cardiovascular:  Positive for leg swelling.   Neurological:  Positive for weakness.   All other systems reviewed and are negative.      Objective   Physical Exam  Vitals and nursing note reviewed.   Constitutional:       Appearance: Normal appearance. He is obese.   HENT:      Head: Normocephalic and atraumatic.      Mouth/Throat:      Mouth: Mucous membranes are moist.      Pharynx: Oropharynx is clear.   Eyes:      Extraocular Movements: Extraocular movements intact.   Cardiovascular:      Rate and Rhythm: Normal rate and regular rhythm.      Pulses: Normal pulses.      Heart sounds: Normal heart sounds.   Pulmonary:      Effort: Pulmonary effort is normal.      Breath sounds: Normal breath sounds.   Abdominal:      Palpations: Abdomen is soft.   Musculoskeletal:         General: Swelling present.   Skin:     General: Skin is warm and dry.   Neurological:      General: No focal deficit present.      Mental Status: He is alert and oriented to person, place, and time.   Psychiatric:         Mood and Affect: Mood normal.         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            Genitourinary and Reproductive    Stage 3 chronic kidney disease (CMS/[facility]) N18.30     Creatinine is currently 2.23, he does range from 1.8-2.2 and is on the high end of his range, is working on better blood sugar control and is also getting injections for his anemia of chronic kidney disease            Hematology and Neoplasia    Anemia D64.9     Hemoglobin down to 8.7, he is getting an injection again in a couple of weeks and again next month, will continue to monitor, iron and ferritin levels are good          Other Visit Diagnoses         Codes    Chronic kidney disease, stage 4 (severe) (CMS/[facility])    -  Primary N18.4    Relevant Orders    Basic metabolic panel    Albumin , Urine Random    CBC        Return to the office in 6 months with lab work

## 2023-10-02 NOTE — ASSESSMENT & PLAN NOTE
Blood sugars continue to run high, he was 396 this morning, on his labs he was in the 400s, Lantus has been increased from 30 units daily to 40 units daily, he is also having microalbuminuria, we did discuss the importance of good blood sugar control to try to help with kidney health.

## 2023-10-02 NOTE — ASSESSMENT & PLAN NOTE
Hemoglobin down to 8.7, he is getting an injection again in a couple of weeks and again next month, will continue to monitor, iron and ferritin levels are good

## 2023-10-02 NOTE — ASSESSMENT & PLAN NOTE
Blood pressure is well controlled on amlodipine 10 mg and metoprolol 50 mg daily he did have 1 low blood pressure 106 systolically at his physician's office however he does check it at home and it is usually in the 120s, will make no changes in his medications at this time

## 2023-10-02 NOTE — ASSESSMENT & PLAN NOTE
Creatinine is currently 2.23, he does range from 1.8-2.2 and is on the high end of his range, is working on better blood sugar control and is also getting injections for his anemia of chronic kidney disease

## 2023-10-06 ENCOUNTER — LAB (OUTPATIENT)
Dept: LAB | Facility: LAB | Age: 88
End: 2023-10-06
Payer: MEDICARE

## 2023-10-06 DIAGNOSIS — D50.9 IRON DEFICIENCY ANEMIA, UNSPECIFIED IRON DEFICIENCY ANEMIA TYPE: ICD-10-CM

## 2023-10-06 LAB
ALBUMIN SERPL BCP-MCNC: 3.9 G/DL (ref 3.4–5)
ALP SERPL-CCNC: 106 U/L (ref 33–136)
ALT SERPL W P-5'-P-CCNC: 24 U/L (ref 10–52)
ANION GAP SERPL CALC-SCNC: 15 MMOL/L (ref 10–20)
AST SERPL W P-5'-P-CCNC: 36 U/L (ref 9–39)
BASOPHILS # BLD AUTO: 0.01 X10*3/UL (ref 0–0.1)
BASOPHILS NFR BLD AUTO: 0.5 %
BILIRUB SERPL-MCNC: 1.6 MG/DL (ref 0–1.2)
BUN SERPL-MCNC: 30 MG/DL (ref 6–23)
CALCIUM SERPL-MCNC: 9.1 MG/DL (ref 8.6–10.3)
CHLORIDE SERPL-SCNC: 101 MMOL/L (ref 98–107)
CO2 SERPL-SCNC: 27 MMOL/L (ref 21–32)
CREAT SERPL-MCNC: 2.32 MG/DL (ref 0.5–1.3)
EOSINOPHIL # BLD AUTO: 0.05 X10*3/UL (ref 0–0.4)
EOSINOPHIL NFR BLD AUTO: 2.7 %
ERYTHROCYTE [DISTWIDTH] IN BLOOD BY AUTOMATED COUNT: 17.2 % (ref 11.5–14.5)
FERRITIN SERPL-MCNC: 540 NG/ML (ref 20–300)
GFR SERPL CREATININE-BSD FRML MDRD: 27 ML/MIN/1.73M*2
GLUCOSE SERPL-MCNC: 309 MG/DL (ref 74–99)
HCT VFR BLD AUTO: 28.6 % (ref 41–52)
HGB BLD-MCNC: 9.2 G/DL (ref 13.5–17.5)
IMM GRANULOCYTES # BLD AUTO: 0.01 X10*3/UL (ref 0–0.5)
IMM GRANULOCYTES NFR BLD AUTO: 0.5 % (ref 0–0.9)
IRON SATN MFR SERPL: 42 % (ref 25–45)
IRON SERPL-MCNC: 129 UG/DL (ref 35–150)
LYMPHOCYTES # BLD AUTO: 0.75 X10*3/UL (ref 0.8–3)
LYMPHOCYTES NFR BLD AUTO: 39.9 %
MCH RBC QN AUTO: 36.7 PG (ref 26–34)
MCHC RBC AUTO-ENTMCNC: 32.2 G/DL (ref 32–36)
MCV RBC AUTO: 114 FL (ref 80–100)
MONOCYTES # BLD AUTO: 0.09 X10*3/UL (ref 0.05–0.8)
MONOCYTES NFR BLD AUTO: 4.8 %
NEUTROPHILS # BLD AUTO: 0.97 X10*3/UL (ref 1.6–5.5)
NEUTROPHILS NFR BLD AUTO: 51.6 %
NRBC BLD-RTO: 0 /100 WBCS (ref 0–0)
PLATELET # BLD AUTO: 99 X10*3/UL (ref 150–450)
PMV BLD AUTO: 11.8 FL (ref 7.5–11.5)
POTASSIUM SERPL-SCNC: 4.9 MMOL/L (ref 3.5–5.3)
PROT SERPL-MCNC: 6.3 G/DL (ref 6.4–8.2)
RBC # BLD AUTO: 2.51 X10*6/UL (ref 4.5–5.9)
SODIUM SERPL-SCNC: 138 MMOL/L (ref 136–145)
TIBC SERPL-MCNC: 310 UG/DL (ref 240–445)
UIBC SERPL-MCNC: 181 UG/DL (ref 110–370)
VIT B12 SERPL-MCNC: 803 PG/ML (ref 211–911)
WBC # BLD AUTO: 1.9 X10*3/UL (ref 4.4–11.3)

## 2023-10-06 PROCEDURE — 36415 COLL VENOUS BLD VENIPUNCTURE: CPT

## 2023-10-06 PROCEDURE — 83550 IRON BINDING TEST: CPT

## 2023-10-06 PROCEDURE — 85025 COMPLETE CBC W/AUTO DIFF WBC: CPT

## 2023-10-06 PROCEDURE — 83540 ASSAY OF IRON: CPT

## 2023-10-06 PROCEDURE — 82607 VITAMIN B-12: CPT

## 2023-10-06 PROCEDURE — 82728 ASSAY OF FERRITIN: CPT

## 2023-10-06 PROCEDURE — 80053 COMPREHEN METABOLIC PANEL: CPT

## 2023-10-07 DIAGNOSIS — N18.4 ANEMIA DUE TO STAGE 4 CHRONIC KIDNEY DISEASE (MULTI): Primary | ICD-10-CM

## 2023-10-07 DIAGNOSIS — N18.32 STAGE 3B CHRONIC KIDNEY DISEASE (MULTI): ICD-10-CM

## 2023-10-07 DIAGNOSIS — D63.1 ANEMIA DUE TO STAGE 4 CHRONIC KIDNEY DISEASE (MULTI): Primary | ICD-10-CM

## 2023-10-07 RX ORDER — ALBUTEROL SULFATE 0.83 MG/ML
3 SOLUTION RESPIRATORY (INHALATION) AS NEEDED
Status: CANCELLED | OUTPATIENT
Start: 2023-10-09

## 2023-10-07 RX ORDER — EPINEPHRINE 0.3 MG/.3ML
0.3 INJECTION SUBCUTANEOUS EVERY 5 MIN PRN
Status: CANCELLED | OUTPATIENT
Start: 2023-10-09

## 2023-10-07 RX ORDER — METHYLPREDNISOLONE SODIUM SUCCINATE 40 MG/ML
40 INJECTION INTRAMUSCULAR; INTRAVENOUS AS NEEDED
Status: CANCELLED | OUTPATIENT
Start: 2023-10-09

## 2023-10-07 RX ORDER — FAMOTIDINE 10 MG/ML
20 INJECTION INTRAVENOUS ONCE AS NEEDED
Status: CANCELLED | OUTPATIENT
Start: 2023-10-09

## 2023-10-07 RX ORDER — DIPHENHYDRAMINE HYDROCHLORIDE 50 MG/ML
50 INJECTION INTRAMUSCULAR; INTRAVENOUS AS NEEDED
Status: CANCELLED | OUTPATIENT
Start: 2023-10-09

## 2023-10-09 ENCOUNTER — OFFICE VISIT (OUTPATIENT)
Dept: HEMATOLOGY/ONCOLOGY | Facility: CLINIC | Age: 88
End: 2023-10-09
Payer: MEDICARE

## 2023-10-09 ENCOUNTER — APPOINTMENT (OUTPATIENT)
Dept: HEMATOLOGY/ONCOLOGY | Facility: CLINIC | Age: 88
End: 2023-10-09
Payer: MEDICARE

## 2023-10-09 ENCOUNTER — INFUSION (OUTPATIENT)
Dept: HEMATOLOGY/ONCOLOGY | Facility: CLINIC | Age: 88
End: 2023-10-09
Payer: MEDICARE

## 2023-10-09 VITALS
HEIGHT: 68 IN | RESPIRATION RATE: 16 BRPM | OXYGEN SATURATION: 95 % | SYSTOLIC BLOOD PRESSURE: 143 MMHG | DIASTOLIC BLOOD PRESSURE: 69 MMHG | TEMPERATURE: 96.8 F | HEART RATE: 73 BPM | BODY MASS INDEX: 33.65 KG/M2 | WEIGHT: 222 LBS

## 2023-10-09 DIAGNOSIS — D63.1 ANEMIA DUE TO STAGE 4 CHRONIC KIDNEY DISEASE (MULTI): ICD-10-CM

## 2023-10-09 DIAGNOSIS — N18.4 ANEMIA DUE TO STAGE 4 CHRONIC KIDNEY DISEASE (MULTI): ICD-10-CM

## 2023-10-09 DIAGNOSIS — N18.32 STAGE 3B CHRONIC KIDNEY DISEASE (MULTI): Primary | ICD-10-CM

## 2023-10-09 DIAGNOSIS — N18.32 STAGE 3B CHRONIC KIDNEY DISEASE (MULTI): ICD-10-CM

## 2023-10-09 PROCEDURE — 1159F MED LIST DOCD IN RCRD: CPT

## 2023-10-09 PROCEDURE — 99214 OFFICE O/P EST MOD 30 MIN: CPT

## 2023-10-09 PROCEDURE — 1126F AMNT PAIN NOTED NONE PRSNT: CPT

## 2023-10-09 PROCEDURE — 96372 THER/PROPH/DIAG INJ SC/IM: CPT | Mod: JZ

## 2023-10-09 PROCEDURE — 3077F SYST BP >= 140 MM HG: CPT

## 2023-10-09 PROCEDURE — 2500000004 HC RX 250 GENERAL PHARMACY W/ HCPCS (ALT 636 FOR OP/ED): Mod: EC

## 2023-10-09 PROCEDURE — 96372 THER/PROPH/DIAG INJ SC/IM: CPT | Mod: 59

## 2023-10-09 PROCEDURE — 1160F RVW MEDS BY RX/DR IN RCRD: CPT

## 2023-10-09 PROCEDURE — 1036F TOBACCO NON-USER: CPT

## 2023-10-09 PROCEDURE — 3078F DIAST BP <80 MM HG: CPT

## 2023-10-09 PROCEDURE — 99214 OFFICE O/P EST MOD 30 MIN: CPT | Mod: 25

## 2023-10-09 RX ORDER — EPINEPHRINE 0.3 MG/.3ML
0.3 INJECTION SUBCUTANEOUS EVERY 5 MIN PRN
Status: CANCELLED | OUTPATIENT
Start: 2023-10-30

## 2023-10-09 RX ORDER — FAMOTIDINE 10 MG/ML
20 INJECTION INTRAVENOUS ONCE AS NEEDED
Status: CANCELLED | OUTPATIENT
Start: 2023-10-30

## 2023-10-09 RX ORDER — ALBUTEROL SULFATE 0.83 MG/ML
3 SOLUTION RESPIRATORY (INHALATION) AS NEEDED
Status: DISCONTINUED | OUTPATIENT
Start: 2023-10-09 | End: 2023-10-09 | Stop reason: HOSPADM

## 2023-10-09 RX ORDER — DIPHENHYDRAMINE HYDROCHLORIDE 50 MG/ML
50 INJECTION INTRAMUSCULAR; INTRAVENOUS AS NEEDED
Status: DISCONTINUED | OUTPATIENT
Start: 2023-10-09 | End: 2023-10-09 | Stop reason: HOSPADM

## 2023-10-09 RX ORDER — EPINEPHRINE 0.3 MG/.3ML
0.3 INJECTION SUBCUTANEOUS EVERY 5 MIN PRN
Status: DISCONTINUED | OUTPATIENT
Start: 2023-10-09 | End: 2023-10-09 | Stop reason: HOSPADM

## 2023-10-09 RX ORDER — GABAPENTIN 300 MG/1
300 CAPSULE ORAL 2 TIMES DAILY
COMMUNITY
End: 2023-10-17 | Stop reason: SDUPTHER

## 2023-10-09 RX ORDER — DIPHENHYDRAMINE HYDROCHLORIDE 50 MG/ML
50 INJECTION INTRAMUSCULAR; INTRAVENOUS AS NEEDED
Status: CANCELLED | OUTPATIENT
Start: 2023-10-30

## 2023-10-09 RX ORDER — FAMOTIDINE 10 MG/ML
20 INJECTION INTRAVENOUS ONCE AS NEEDED
Status: DISCONTINUED | OUTPATIENT
Start: 2023-10-09 | End: 2023-10-09 | Stop reason: HOSPADM

## 2023-10-09 RX ORDER — ALBUTEROL SULFATE 0.83 MG/ML
3 SOLUTION RESPIRATORY (INHALATION) AS NEEDED
Status: CANCELLED | OUTPATIENT
Start: 2023-10-30

## 2023-10-09 RX ADMIN — DARBEPOETIN ALFA 300 MCG: 300 INJECTION, SOLUTION INTRAVENOUS; SUBCUTANEOUS at 11:20

## 2023-10-09 ASSESSMENT — ENCOUNTER SYMPTOMS
OCCASIONAL FEELINGS OF UNSTEADINESS: 1
LOSS OF SENSATION IN FEET: 1
DEPRESSION: 0

## 2023-10-09 ASSESSMENT — PAIN SCALES - GENERAL: PAINLEVEL: 0-NO PAIN

## 2023-10-09 NOTE — PROGRESS NOTES
Patient ID: Zack Sainz is a 87 y.o. male.    Subjective    HPI    History of Present Illness:     ID Statement:    ZACK SAINZ is a 87 year old Male      Chief Complaint: Evaluation for pancytopenia   Interval History:    Referred by NANY Gray     Reason for referral: pancytopenia       HPI  87 yo man with hx of reflux gastritis, coronary artery disease/ MI on aspirin, DM, hypertension, obesity, sleep apnea, history of PE on Eliquis, chronic kidney disease stage III, DJD, admission Jan 2021 for COVID19 pneumonia, referred for pancytopenia. He has chronic anemia with hg of around 12 g/dl, MCV of 107, his recent cbc showed decreased wbc of 3.6K , ANC of 2.0, plts of 117 , labs in April 2022 showed a ferritin of 154, Tsat of 42%, folic acid of 22.7, b12 of 601, his Cr is around 2.1     he feels well, he complains of fatigue   he is on Eliquis, he has been on anticoagulation for a long time per patient   he is able to bowen put ADLs  no SOB , no chest   no bleeding, no hematochezia, no hematuria, no melena, no other bleeding   he has LINDA and is on CPAP, he is generally compliant but some nights get frustrated and does not use it   eating well, no weight loss   has easy bruising on bumping or hitting himself   no new fever or infections   he has morning hip pain and also his wrists     interval history- 10/9/23    No recent hospital admissions   No follow-up GI     Energy slightly improving  remains to feel generalized weakness, maybe slightly improved   Eating and drinking well     Recently at the [facility] di decently well     No falls    remains off eliquis, baby aspirin, and Juvano from [facility]    Ambulating is improving over the past couple of days   Breathing has improved, ROMAN occasionally   Productive cough, white/yellow phlegm since fair week   No fever, chills, drenching night sweats  No chest pain or pressure     remains on oxygen and CPAP, is waking up in the middle of night around 3-4  in the am     Bowels are regular, denies any hematochezia or melena   taking 1 iron daily, tolerating well     Blood sugars have been higher recently in 300s, remains on insulin  Taking 40 units insulin     No diarrhea or constipation, no hematochezia or melena   No urinary issues, no hematuria  continues to follow nephrology     No infections, fever, chills or night sweats    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   Constitutional:       General: He is not in acute distress.     Appearance: Normal appearance.   HENT:      Head: Normocephalic and atraumatic.      Nose: Nose normal.      Mouth/Throat:      Mouth: Mucous membranes are moist.      Pharynx: Oropharynx is clear.   Eyes:      Extraocular Movements: Extraocular movements intact.      Pupils: Pupils are equal, round, and reactive to light.   Cardiovascular:      Rate and Rhythm: Normal rate and regular rhythm.      Pulses: Normal pulses.      Heart sounds: Normal heart sounds. No murmur heard.  Pulmonary:      Effort: Pulmonary effort is normal. No respiratory distress.      Breath sounds: Normal breath sounds. No wheezing.   Abdominal:      General: Bowel sounds are normal. There is no distension.      Palpations: Abdomen is soft. There is no mass.      Tenderness: There is no abdominal tenderness.   Musculoskeletal:         General: No swelling or deformity. Normal range of motion.      Cervical back: Normal range of motion and neck supple.   Skin:     General: Skin is warm and dry.      Findings: No bruising or erythema.   Neurological:      General: No focal deficit present.      Mental Status: He is alert and oriented to person, place, and time.      Motor: Weakness present.   Psychiatric:         Mood and Affect: Mood normal.         Behavior: Behavior normal.          Performance Status:  Symptomatic; fully ambulatory      Assessment/Plan      1. 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    chronic anemia with largely a component of anemia of CKD     plan to check remaining work up for anemia:  -hemolysis labs: LDH, retic, haptoglobin    -monoclonal gammopathy: SPEP, UPEP      mild leukopenia and thrombocytopenia:   -check US abd for hepatosplenomegaly   -hepatitis and HIV   -b12 and folic acid were normal   -CHRISTINA     send for smear review and flow for an abnormal cell population detection     we discussed that if work up is negative an evolving myeloid neoplasm is possible nevertheless the counts are mildly low and would observe rather pursuing a bone marrow biopsy     7/27/22- his work up showed an small abnormal B cell population 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 but also there was a phenotypic abnormality of granulocyte and possibility of MDS can not be ruled out   we discussed above however due to the counts being mildly low we discussed deferring marrow exam at this time  US showed fatty liver but no splenomegaly   other work up negative   will plan to check NGS for myeloid mutations with next labs and check an EPO level   at this time will continue surveillance of cbc and check a repeat in 3 months     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   his RIPSS score is low or very low risk depending on the karyotype which is pending   for his anemia we discussed initiating NING with Aranesp but he prefers to defer now   will monitor the hg and if it drops < 10 g/dl will initiate     1/13/23   his RIPSS score is very low  at this time he is clinically stable, and the counts have remained stable   his fatigue is also improved   we will continue count monitor , consider NING if hg drops     4/17/23  patient remains clinically stable, but his Hgb has dropped to 9 g/dl  discussed initiating NING, patient would like to defer at  "this time and reconsider if Hg remains below 10 d/gl  fatigue remains unchanged, denies any abnormal bleeding or bruising, no overt bleeding  will continue to monitor labs and plan to initiate NING if Hgb remains below 10 g/dl      10/9/23  Pantcytopenia has slightly improved. Hg (9.2), WBC (1.9), and Platelets (99k). Patients is not iron deficient at this time. Patient continues report fatigue and generalized weakness. Denies any constitutional \"B\" symptoms. No GI symptoms. No major bleeding or bruising event.  Breathing is stable. Appetite is good. No longer taking oral iron.     Patient is currently receiving Darbepoetin 300mcg every 3 weeks, and tolerating well. Discussed with patient if symptoms continue and counts don't respond will consider increasing frequency to every 2 weeks. Will remain with same dose and frequency at this time.     Patient to return in 3 weeks for labs and Darbepoetin 300mcg     RTC 6 m with labs (CBC w/diff, CMP, Ferritin, Iron Studies B12)      Cancer Staging   No matching staging information was found for the patient.    Oncology History    No history exists.                 Malorie Shaffer, APRN-CNP           "

## 2023-10-09 NOTE — PATIENT INSTRUCTIONS
Mon 10/30 9am lab draw and Aranesp injection at our office  Lab at the hosp Monday 11/20   follow up visit with Malorie Shaffer CNP on Tuesday 11/21 9am and Aranesp at 930

## 2023-10-17 ENCOUNTER — OFFICE VISIT (OUTPATIENT)
Dept: PRIMARY CARE | Facility: CLINIC | Age: 88
End: 2023-10-17
Payer: MEDICARE

## 2023-10-17 VITALS
SYSTOLIC BLOOD PRESSURE: 102 MMHG | DIASTOLIC BLOOD PRESSURE: 52 MMHG | OXYGEN SATURATION: 94 % | BODY MASS INDEX: 33.28 KG/M2 | WEIGHT: 219.6 LBS | HEART RATE: 84 BPM | HEIGHT: 68 IN

## 2023-10-17 DIAGNOSIS — E11.22 TYPE 2 DIABETES MELLITUS WITH STAGE 3B CHRONIC KIDNEY DISEASE, WITHOUT LONG-TERM CURRENT USE OF INSULIN (MULTI): ICD-10-CM

## 2023-10-17 DIAGNOSIS — M19.90 ARTHRITIS: ICD-10-CM

## 2023-10-17 DIAGNOSIS — D70.9 NEUTROPENIA, UNSPECIFIED TYPE (CMS-HCC): ICD-10-CM

## 2023-10-17 DIAGNOSIS — Z95.5 S/P CORONARY ARTERY STENT PLACEMENT: ICD-10-CM

## 2023-10-17 DIAGNOSIS — I25.10 CORONARY ARTERY DISEASE INVOLVING NATIVE HEART, UNSPECIFIED VESSEL OR LESION TYPE, UNSPECIFIED WHETHER ANGINA PRESENT: ICD-10-CM

## 2023-10-17 DIAGNOSIS — E78.2 MIXED HYPERLIPIDEMIA: ICD-10-CM

## 2023-10-17 DIAGNOSIS — N18.32 STAGE 3B CHRONIC KIDNEY DISEASE (MULTI): ICD-10-CM

## 2023-10-17 DIAGNOSIS — I10 PRIMARY HYPERTENSION: ICD-10-CM

## 2023-10-17 DIAGNOSIS — D69.6 THROMBOCYTOPENIA (CMS-HCC): ICD-10-CM

## 2023-10-17 DIAGNOSIS — N18.32 TYPE 2 DIABETES MELLITUS WITH STAGE 3B CHRONIC KIDNEY DISEASE, WITHOUT LONG-TERM CURRENT USE OF INSULIN (MULTI): ICD-10-CM

## 2023-10-17 DIAGNOSIS — G25.0 TREMOR, ESSENTIAL: ICD-10-CM

## 2023-10-17 DIAGNOSIS — K21.9 GASTROESOPHAGEAL REFLUX DISEASE WITHOUT ESOPHAGITIS: ICD-10-CM

## 2023-10-17 DIAGNOSIS — G47.33 OSA ON CPAP: ICD-10-CM

## 2023-10-17 PROBLEM — Z86.0100 PERSONAL HISTORY OF COLONIC POLYPS: Status: RESOLVED | Noted: 2023-03-06 | Resolved: 2023-10-17

## 2023-10-17 PROBLEM — R35.1 NOCTURIA: Status: RESOLVED | Noted: 2023-03-06 | Resolved: 2023-10-17

## 2023-10-17 PROBLEM — E66.01 CLASS 2 SEVERE OBESITY WITH SERIOUS COMORBIDITY AND BODY MASS INDEX (BMI) OF 35.0 TO 35.9 IN ADULT (MULTI): Status: RESOLVED | Noted: 2023-04-12 | Resolved: 2023-10-17

## 2023-10-17 PROBLEM — R20.2 PARESTHESIA OF FINGER: Status: RESOLVED | Noted: 2023-03-06 | Resolved: 2023-10-17

## 2023-10-17 PROBLEM — R09.02 HYPOXIA: Status: RESOLVED | Noted: 2023-03-06 | Resolved: 2023-10-17

## 2023-10-17 PROBLEM — R19.7 POSTCHOLECYSTECTOMY DIARRHEA: Status: RESOLVED | Noted: 2023-03-06 | Resolved: 2023-10-17

## 2023-10-17 PROBLEM — Z86.010 PERSONAL HISTORY OF COLONIC POLYPS: Status: RESOLVED | Noted: 2023-03-06 | Resolved: 2023-10-17

## 2023-10-17 PROBLEM — K63.5 COLON POLYP: Status: RESOLVED | Noted: 2023-03-06 | Resolved: 2023-10-17

## 2023-10-17 PROBLEM — I83.90 RETICULAR VENOUS VARICES: Status: RESOLVED | Noted: 2023-03-06 | Resolved: 2023-10-17

## 2023-10-17 PROBLEM — E66.812 CLASS 2 SEVERE OBESITY WITH SERIOUS COMORBIDITY AND BODY MASS INDEX (BMI) OF 35.0 TO 35.9 IN ADULT: Status: RESOLVED | Noted: 2023-04-12 | Resolved: 2023-10-17

## 2023-10-17 PROBLEM — K91.89 POSTCHOLECYSTECTOMY DIARRHEA: Status: RESOLVED | Noted: 2023-03-06 | Resolved: 2023-10-17

## 2023-10-17 PROCEDURE — 1126F AMNT PAIN NOTED NONE PRSNT: CPT | Performed by: FAMILY MEDICINE

## 2023-10-17 PROCEDURE — 3078F DIAST BP <80 MM HG: CPT | Performed by: FAMILY MEDICINE

## 2023-10-17 PROCEDURE — 1036F TOBACCO NON-USER: CPT | Performed by: FAMILY MEDICINE

## 2023-10-17 PROCEDURE — 3074F SYST BP LT 130 MM HG: CPT | Performed by: FAMILY MEDICINE

## 2023-10-17 PROCEDURE — 1160F RVW MEDS BY RX/DR IN RCRD: CPT | Performed by: FAMILY MEDICINE

## 2023-10-17 PROCEDURE — 1159F MED LIST DOCD IN RCRD: CPT | Performed by: FAMILY MEDICINE

## 2023-10-17 PROCEDURE — 99214 OFFICE O/P EST MOD 30 MIN: CPT | Performed by: FAMILY MEDICINE

## 2023-10-17 RX ORDER — ATORVASTATIN CALCIUM 40 MG/1
40 TABLET, FILM COATED ORAL DAILY
Qty: 90 TABLET | Refills: 1 | Status: SHIPPED | OUTPATIENT
Start: 2023-10-17 | End: 2023-10-30 | Stop reason: SDUPTHER

## 2023-10-17 RX ORDER — PRIMIDONE 50 MG/1
50 TABLET ORAL NIGHTLY
Qty: 30 TABLET | Refills: 2 | Status: SHIPPED | OUTPATIENT
Start: 2023-10-17 | End: 2024-01-15 | Stop reason: SDUPTHER

## 2023-10-17 RX ORDER — BLOOD-GLUCOSE SENSOR
EACH MISCELLANEOUS
Qty: 10 EACH | Refills: 3 | Status: SHIPPED | OUTPATIENT
Start: 2023-10-17

## 2023-10-17 RX ORDER — GABAPENTIN 300 MG/1
300 CAPSULE ORAL 2 TIMES DAILY
Qty: 60 CAPSULE | Refills: 2 | Status: SHIPPED | OUTPATIENT
Start: 2023-10-17 | End: 2024-01-15

## 2023-10-17 NOTE — PROGRESS NOTES
Subjective   Zack Medrano is a 88 y.o. male who presents for No chief complaint on file..  Here for routine follow up HTN, DM, CKD (Dr Nguyen), CAD ([facility]), high chol, thrombocytopenia/anemia/pancytopenia (Dr Dash), tremor (Dr Haque), LINDA, h/o PE, GI bleed.  He states that his tremors are getting worse.  We will restart primidone at 50 mg daily, also encourage him to follow up with Dr Haque.  It also appears that he has been off gabapentin for a while and we will restart that.              Objective   Visit Vitals  /52 (BP Location: Left arm, Patient Position: Sitting, BP Cuff Size: Adult)   Pulse 84      Physical Exam  Vitals reviewed.   Constitutional:       General: He is not in acute distress.  Cardiovascular:      Rate and Rhythm: Normal rate and regular rhythm.      Heart sounds: No murmur heard.  Pulmonary:      Effort: Pulmonary effort is normal. No respiratory distress.      Breath sounds: Normal breath sounds.   Skin:     General: Skin is warm and dry.   Neurological:      General: No focal deficit present.      Mental Status: He is alert. Mental status is at baseline.         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

## 2023-10-17 NOTE — PATIENT INSTRUCTIONS
Will restart primidone and gabapentin.  Continue other medications, continue follow up with other specialists.  Follow up here in 1 month, sooner if needed.

## 2023-10-17 NOTE — PROGRESS NOTES
"Subjective   Patient ID: Zack Medrano is a 88 y.o. male who presents for Patient is here for a 6 month follow up. Patient is complaining that they are having the shakes real bad in their hands. Shaking has been a while but now they are getting worse. Does not complain of no pain. Also stating they are having \"issues\" with their feet and legs at night. Doesn't complain of pain but states that it doesn't feel right. Unable to go back to sleep after they wake him up. Patient states it's like a restless leg Syndrome symptoms. Patient will need refills on his medication. Patient has inquired about a RX for a Sindi system.     HPI     Review of Systems    Objective   There were no vitals taken for this visit.    Physical Exam    Assessment/Plan          "

## 2023-10-30 ENCOUNTER — INFUSION (OUTPATIENT)
Dept: HEMATOLOGY/ONCOLOGY | Facility: CLINIC | Age: 88
End: 2023-10-30
Payer: MEDICARE

## 2023-10-30 ENCOUNTER — APPOINTMENT (OUTPATIENT)
Dept: HEMATOLOGY/ONCOLOGY | Facility: CLINIC | Age: 88
End: 2023-10-30
Payer: MEDICARE

## 2023-10-30 VITALS
WEIGHT: 229.72 LBS | SYSTOLIC BLOOD PRESSURE: 124 MMHG | HEART RATE: 96 BPM | OXYGEN SATURATION: 97 % | RESPIRATION RATE: 16 BRPM | TEMPERATURE: 96.6 F | BODY MASS INDEX: 34.93 KG/M2 | DIASTOLIC BLOOD PRESSURE: 60 MMHG

## 2023-10-30 DIAGNOSIS — I25.10 CORONARY ARTERY DISEASE INVOLVING NATIVE HEART, UNSPECIFIED VESSEL OR LESION TYPE, UNSPECIFIED WHETHER ANGINA PRESENT: ICD-10-CM

## 2023-10-30 DIAGNOSIS — N18.4 ANEMIA DUE TO STAGE 4 CHRONIC KIDNEY DISEASE (MULTI): ICD-10-CM

## 2023-10-30 DIAGNOSIS — D63.1 ANEMIA DUE TO STAGE 4 CHRONIC KIDNEY DISEASE (MULTI): ICD-10-CM

## 2023-10-30 DIAGNOSIS — E78.2 MIXED HYPERLIPIDEMIA: ICD-10-CM

## 2023-10-30 DIAGNOSIS — N18.32 STAGE 3B CHRONIC KIDNEY DISEASE (MULTI): ICD-10-CM

## 2023-10-30 LAB
ALBUMIN SERPL BCP-MCNC: 3.8 G/DL (ref 3.4–5)
ANION GAP SERPL CALC-SCNC: 15 MMOL/L (ref 10–20)
BUN SERPL-MCNC: 39 MG/DL (ref 6–23)
CALCIUM SERPL-MCNC: 8.5 MG/DL (ref 8.6–10.3)
CHLORIDE SERPL-SCNC: 102 MMOL/L (ref 98–107)
CO2 SERPL-SCNC: 24 MMOL/L (ref 21–32)
CREAT SERPL-MCNC: 2.04 MG/DL (ref 0.5–1.3)
ERYTHROCYTE [DISTWIDTH] IN BLOOD BY AUTOMATED COUNT: 17.5 % (ref 11.5–14.5)
FERRITIN SERPL-MCNC: 426 NG/ML (ref 20–300)
GFR SERPL CREATININE-BSD FRML MDRD: 31 ML/MIN/1.73M*2
GLUCOSE SERPL-MCNC: 303 MG/DL (ref 74–99)
HCT VFR BLD AUTO: 25.8 % (ref 41–52)
HGB BLD-MCNC: 8.5 G/DL (ref 13.5–17.5)
IRON SATN MFR SERPL: 57 % (ref 25–45)
IRON SERPL-MCNC: 175 UG/DL (ref 35–150)
MCH RBC QN AUTO: 36.2 PG (ref 26–34)
MCHC RBC AUTO-ENTMCNC: 32.9 G/DL (ref 32–36)
MCV RBC AUTO: 110 FL (ref 80–100)
NRBC BLD-RTO: 0 /100 WBCS (ref 0–0)
PHOSPHATE SERPL-MCNC: 3 MG/DL (ref 2.5–4.9)
PLATELET # BLD AUTO: 83 X10*3/UL (ref 150–450)
PMV BLD AUTO: 11.2 FL (ref 7.5–11.5)
POTASSIUM SERPL-SCNC: 3.9 MMOL/L (ref 3.5–5.3)
RBC # BLD AUTO: 2.35 X10*6/UL (ref 4.5–5.9)
SODIUM SERPL-SCNC: 137 MMOL/L (ref 136–145)
TIBC SERPL-MCNC: 305 UG/DL (ref 240–445)
UIBC SERPL-MCNC: 130 UG/DL (ref 110–370)
WBC # BLD AUTO: 1.5 X10*3/UL (ref 4.4–11.3)

## 2023-10-30 PROCEDURE — 2500000004 HC RX 250 GENERAL PHARMACY W/ HCPCS (ALT 636 FOR OP/ED): Mod: JZ

## 2023-10-30 PROCEDURE — 82668 ASSAY OF ERYTHROPOIETIN: CPT

## 2023-10-30 PROCEDURE — 80069 RENAL FUNCTION PANEL: CPT

## 2023-10-30 PROCEDURE — 83540 ASSAY OF IRON: CPT

## 2023-10-30 PROCEDURE — 82728 ASSAY OF FERRITIN: CPT

## 2023-10-30 PROCEDURE — 85027 COMPLETE CBC AUTOMATED: CPT

## 2023-10-30 PROCEDURE — 36415 COLL VENOUS BLD VENIPUNCTURE: CPT

## 2023-10-30 PROCEDURE — 96372 THER/PROPH/DIAG INJ SC/IM: CPT

## 2023-10-30 RX ORDER — ALBUTEROL SULFATE 0.83 MG/ML
3 SOLUTION RESPIRATORY (INHALATION) AS NEEDED
Status: CANCELLED | OUTPATIENT
Start: 2023-11-20

## 2023-10-30 RX ORDER — DIPHENHYDRAMINE HYDROCHLORIDE 50 MG/ML
50 INJECTION INTRAMUSCULAR; INTRAVENOUS AS NEEDED
Status: DISCONTINUED | OUTPATIENT
Start: 2023-10-30 | End: 2023-10-30 | Stop reason: HOSPADM

## 2023-10-30 RX ORDER — FAMOTIDINE 10 MG/ML
20 INJECTION INTRAVENOUS ONCE AS NEEDED
Status: CANCELLED | OUTPATIENT
Start: 2023-11-20

## 2023-10-30 RX ORDER — ATORVASTATIN CALCIUM 40 MG/1
40 TABLET, FILM COATED ORAL DAILY
Qty: 90 TABLET | Refills: 1 | Status: SHIPPED | OUTPATIENT
Start: 2023-10-30 | End: 2024-02-21 | Stop reason: SDUPTHER

## 2023-10-30 RX ORDER — EPINEPHRINE 0.3 MG/.3ML
0.3 INJECTION SUBCUTANEOUS EVERY 5 MIN PRN
Status: CANCELLED | OUTPATIENT
Start: 2023-11-20

## 2023-10-30 RX ORDER — EPINEPHRINE 0.3 MG/.3ML
0.3 INJECTION SUBCUTANEOUS EVERY 5 MIN PRN
Status: DISCONTINUED | OUTPATIENT
Start: 2023-10-30 | End: 2023-10-30 | Stop reason: HOSPADM

## 2023-10-30 RX ORDER — DIPHENHYDRAMINE HYDROCHLORIDE 50 MG/ML
50 INJECTION INTRAMUSCULAR; INTRAVENOUS AS NEEDED
Status: CANCELLED | OUTPATIENT
Start: 2023-11-20

## 2023-10-30 RX ORDER — FAMOTIDINE 10 MG/ML
20 INJECTION INTRAVENOUS ONCE AS NEEDED
Status: DISCONTINUED | OUTPATIENT
Start: 2023-10-30 | End: 2023-10-30 | Stop reason: HOSPADM

## 2023-10-30 RX ORDER — ALBUTEROL SULFATE 0.83 MG/ML
3 SOLUTION RESPIRATORY (INHALATION) AS NEEDED
Status: DISCONTINUED | OUTPATIENT
Start: 2023-10-30 | End: 2023-10-30 | Stop reason: HOSPADM

## 2023-10-30 RX ADMIN — DARBEPOETIN ALFA 300 MCG: 300 INJECTION, SOLUTION INTRAVENOUS; SUBCUTANEOUS at 11:28

## 2023-10-30 ASSESSMENT — PAIN SCALES - GENERAL: PAINLEVEL: 0-NO PAIN

## 2023-10-31 RX ORDER — FUROSEMIDE 40 MG/1
40 TABLET ORAL DAILY
Qty: 90 TABLET | Refills: 1 | Status: SHIPPED | OUTPATIENT
Start: 2023-10-31 | End: 2024-05-14 | Stop reason: SDUPTHER

## 2023-11-01 LAB — EPO SERPL-ACNC: 56 MU/ML (ref 4–27)

## 2023-11-09 ENCOUNTER — TELEPHONE (OUTPATIENT)
Dept: PRIMARY CARE | Facility: CLINIC | Age: 88
End: 2023-11-09
Payer: MEDICARE

## 2023-11-09 NOTE — TELEPHONE ENCOUNTER
PATIENT CALLED TO SEE IF HE SHOULD HAVE LABS DONE BEFORE HIS APPOINTMENT NEXT WEEK. I DID NOT SEE ANY. PLEASE LET ME KNOW IF HE SHOULD HAVE THEM DONE. THANK YOU.

## 2023-11-10 NOTE — TELEPHONE ENCOUNTER
It looks like he just had labs done with his specialist so I don't think we need to do any this time.  Thanks.

## 2023-11-16 ENCOUNTER — OFFICE VISIT (OUTPATIENT)
Dept: PRIMARY CARE | Facility: CLINIC | Age: 88
End: 2023-11-16
Payer: MEDICARE

## 2023-11-16 VITALS
WEIGHT: 231.5 LBS | SYSTOLIC BLOOD PRESSURE: 104 MMHG | HEIGHT: 68 IN | HEART RATE: 69 BPM | OXYGEN SATURATION: 93 % | DIASTOLIC BLOOD PRESSURE: 50 MMHG | BODY MASS INDEX: 35.09 KG/M2

## 2023-11-16 DIAGNOSIS — E11.22 TYPE 2 DIABETES MELLITUS WITH STAGE 3B CHRONIC KIDNEY DISEASE, WITHOUT LONG-TERM CURRENT USE OF INSULIN (MULTI): ICD-10-CM

## 2023-11-16 DIAGNOSIS — Z95.5 S/P CORONARY ARTERY STENT PLACEMENT: ICD-10-CM

## 2023-11-16 DIAGNOSIS — I10 PRIMARY HYPERTENSION: ICD-10-CM

## 2023-11-16 DIAGNOSIS — D70.9 NEUTROPENIA, UNSPECIFIED TYPE (CMS-HCC): ICD-10-CM

## 2023-11-16 DIAGNOSIS — E78.2 MIXED HYPERLIPIDEMIA: ICD-10-CM

## 2023-11-16 DIAGNOSIS — G47.33 OSA ON CPAP: ICD-10-CM

## 2023-11-16 DIAGNOSIS — K21.9 GASTROESOPHAGEAL REFLUX DISEASE WITHOUT ESOPHAGITIS: ICD-10-CM

## 2023-11-16 DIAGNOSIS — N18.32 STAGE 3B CHRONIC KIDNEY DISEASE (MULTI): ICD-10-CM

## 2023-11-16 DIAGNOSIS — M19.90 ARTHRITIS: ICD-10-CM

## 2023-11-16 DIAGNOSIS — G25.0 TREMOR, ESSENTIAL: ICD-10-CM

## 2023-11-16 DIAGNOSIS — I25.10 CORONARY ARTERY DISEASE INVOLVING NATIVE HEART, UNSPECIFIED VESSEL OR LESION TYPE, UNSPECIFIED WHETHER ANGINA PRESENT: ICD-10-CM

## 2023-11-16 DIAGNOSIS — N18.32 TYPE 2 DIABETES MELLITUS WITH STAGE 3B CHRONIC KIDNEY DISEASE, WITHOUT LONG-TERM CURRENT USE OF INSULIN (MULTI): ICD-10-CM

## 2023-11-16 DIAGNOSIS — D69.6 THROMBOCYTOPENIA (CMS-HCC): ICD-10-CM

## 2023-11-16 PROCEDURE — 3074F SYST BP LT 130 MM HG: CPT | Performed by: FAMILY MEDICINE

## 2023-11-16 PROCEDURE — 1159F MED LIST DOCD IN RCRD: CPT | Performed by: FAMILY MEDICINE

## 2023-11-16 PROCEDURE — 3078F DIAST BP <80 MM HG: CPT | Performed by: FAMILY MEDICINE

## 2023-11-16 PROCEDURE — 1036F TOBACCO NON-USER: CPT | Performed by: FAMILY MEDICINE

## 2023-11-16 PROCEDURE — 99214 OFFICE O/P EST MOD 30 MIN: CPT | Performed by: FAMILY MEDICINE

## 2023-11-16 PROCEDURE — 1160F RVW MEDS BY RX/DR IN RCRD: CPT | Performed by: FAMILY MEDICINE

## 2023-11-16 PROCEDURE — 1126F AMNT PAIN NOTED NONE PRSNT: CPT | Performed by: FAMILY MEDICINE

## 2023-11-16 RX ORDER — NITROGLYCERIN 0.4 MG/1
0.4 TABLET SUBLINGUAL EVERY 5 MIN PRN
Qty: 30 TABLET | Refills: 1 | Status: SHIPPED | OUTPATIENT
Start: 2023-11-16

## 2023-11-16 NOTE — PROGRESS NOTES
Subjective   Patient ID: Zack Medrano is a 88 y.o. male who presents for 1 month follow up for medication recheck. Was advised by the cardiologist to get back on the 81mg of aspirin    HPI     Review of Systems    Objective   There were no vitals taken for this visit.    Physical Exam    Assessment/Plan

## 2023-11-16 NOTE — PATIENT INSTRUCTIONS
Continue current medications - may restart baby aspirin daily.  Follow up with specialists as scheduled.  Follow up in 3 months, sooner if needed.

## 2023-11-16 NOTE — PROGRESS NOTES
Subjective   Zack Medrano is a 88 y.o. male who presents for No chief complaint on file..  Here for routine follow up HTN, DM, CKD (Dr Nguyen), CAD ([facility]), high chol, thrombocytopenia/anemia/pancytopenia (Dr Dash), tremor (Dr Haque), LINDA, h/o PE, GI bleed, LINDA on CPAP ([facility]).  We recently restarted primidone and gabapentin.  He states that he is feeling much better now.  His cardiologist has recommended he restart his aspirin and since he is not having any further GI issues I think that is reasonable - he gets labs done quite frequently with heme/onc and we will monitor him closely.      He is currently doing 40 units nightly of lantus pens and his sugars have been reasonably well controlled.     Patient is using his CPAP and benefiting from it.             Objective   Visit Vitals  /50 (BP Location: Left arm, Patient Position: Sitting, BP Cuff Size: Adult)   Pulse 69      Physical Exam  Vitals reviewed.   Constitutional:       General: He is not in acute distress.  Cardiovascular:      Rate and Rhythm: Normal rate and regular rhythm.      Heart sounds: No murmur heard.  Pulmonary:      Effort: Pulmonary effort is normal. No respiratory distress.      Breath sounds: Normal breath sounds.   Skin:     General: Skin is warm and dry.   Neurological:      General: No focal deficit present.      Mental Status: He is alert. Mental status is at baseline.         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)    Relevant Orders    Comprehensive Metabolic Panel    CBC and Auto Differential    Hemoglobin A1C    Lipid Panel    TSH with reflex to Free T4  if abnormal    Vitamin B12    Hypertension    Relevant Orders    Comprehensive Metabolic Panel    CBC and Auto Differential    Hemoglobin A1C    Lipid Panel    TSH with reflex to Free T4 if abnormal    Vitamin B12    Mixed hyperlipidemia    Relevant Orders    Comprehensive Metabolic Panel    CBC and Auto Differential    Hemoglobin A1C    Lipid Panel    TSH with reflex to Free T4 if abnormal    Vitamin B12    Neutropenia (CMS/HCC)    LINDA on CPAP    S/P coronary artery stent placement    Relevant Medications    nitroglycerin (Nitrostat) 0.4 mg SL tablet    Stage 3 chronic kidney disease (CMS/HCC)    Thrombocytopenia (CMS/HCC)    Relevant Orders    Comprehensive Metabolic Panel    CBC and Auto Differential    Hemoglobin A1C    Lipid Panel    TSH with reflex to Free T4 if abnormal    Vitamin B12    Tremor, essential    Relevant Orders    Comprehensive Metabolic Panel    CBC and Auto Differential    Hemoglobin A1C    Lipid Panel    TSH with reflex to Free T4 if abnormal    Vitamin B12          Fabiola Dietz MD

## 2023-11-21 ENCOUNTER — OFFICE VISIT (OUTPATIENT)
Dept: HEMATOLOGY/ONCOLOGY | Facility: CLINIC | Age: 88
End: 2023-11-21
Payer: MEDICARE

## 2023-11-21 ENCOUNTER — INFUSION (OUTPATIENT)
Dept: HEMATOLOGY/ONCOLOGY | Facility: CLINIC | Age: 88
End: 2023-11-21
Payer: MEDICARE

## 2023-11-21 VITALS
BODY MASS INDEX: 35.31 KG/M2 | OXYGEN SATURATION: 92 % | DIASTOLIC BLOOD PRESSURE: 64 MMHG | SYSTOLIC BLOOD PRESSURE: 127 MMHG | RESPIRATION RATE: 20 BRPM | HEIGHT: 68 IN | HEART RATE: 77 BPM | WEIGHT: 233 LBS

## 2023-11-21 DIAGNOSIS — N18.32 STAGE 3B CHRONIC KIDNEY DISEASE (MULTI): ICD-10-CM

## 2023-11-21 DIAGNOSIS — N18.4 ANEMIA DUE TO STAGE 4 CHRONIC KIDNEY DISEASE (MULTI): ICD-10-CM

## 2023-11-21 DIAGNOSIS — N18.4 ANEMIA DUE TO STAGE 4 CHRONIC KIDNEY DISEASE (MULTI): Primary | ICD-10-CM

## 2023-11-21 DIAGNOSIS — D63.1 ANEMIA DUE TO STAGE 4 CHRONIC KIDNEY DISEASE (MULTI): ICD-10-CM

## 2023-11-21 DIAGNOSIS — D63.1 ANEMIA DUE TO STAGE 4 CHRONIC KIDNEY DISEASE (MULTI): Primary | ICD-10-CM

## 2023-11-21 LAB
ERYTHROCYTE [DISTWIDTH] IN BLOOD BY AUTOMATED COUNT: 17.9 % (ref 11.5–14.5)
HCT VFR BLD AUTO: 26.5 % (ref 41–52)
HGB BLD-MCNC: 8.9 G/DL (ref 13.5–17.5)
MCH RBC QN AUTO: 37.1 PG (ref 26–34)
MCHC RBC AUTO-ENTMCNC: 33.6 G/DL (ref 32–36)
MCV RBC AUTO: 110 FL (ref 80–100)
NRBC BLD-RTO: 0 /100 WBCS (ref 0–0)
PLATELET # BLD AUTO: 93 X10*3/UL (ref 150–450)
RBC # BLD AUTO: 2.4 X10*6/UL (ref 4.5–5.9)
WBC # BLD AUTO: 2.1 X10*3/UL (ref 4.4–11.3)

## 2023-11-21 PROCEDURE — 36415 COLL VENOUS BLD VENIPUNCTURE: CPT

## 2023-11-21 PROCEDURE — 3074F SYST BP LT 130 MM HG: CPT

## 2023-11-21 PROCEDURE — 99213 OFFICE O/P EST LOW 20 MIN: CPT

## 2023-11-21 PROCEDURE — 96372 THER/PROPH/DIAG INJ SC/IM: CPT

## 2023-11-21 PROCEDURE — 3078F DIAST BP <80 MM HG: CPT

## 2023-11-21 PROCEDURE — 1159F MED LIST DOCD IN RCRD: CPT

## 2023-11-21 PROCEDURE — 1126F AMNT PAIN NOTED NONE PRSNT: CPT

## 2023-11-21 PROCEDURE — 1036F TOBACCO NON-USER: CPT

## 2023-11-21 PROCEDURE — 2500000004 HC RX 250 GENERAL PHARMACY W/ HCPCS (ALT 636 FOR OP/ED): Mod: JZ

## 2023-11-21 PROCEDURE — 1160F RVW MEDS BY RX/DR IN RCRD: CPT

## 2023-11-21 PROCEDURE — 85027 COMPLETE CBC AUTOMATED: CPT

## 2023-11-21 RX ORDER — PREDNISONE 50 MG/1
50 TABLET ORAL DAILY
COMMUNITY
End: 2024-02-21 | Stop reason: ENTERED-IN-ERROR

## 2023-11-21 RX ORDER — FAMOTIDINE 10 MG/ML
20 INJECTION INTRAVENOUS ONCE AS NEEDED
Status: CANCELLED | OUTPATIENT
Start: 2023-12-11

## 2023-11-21 RX ORDER — ALBUTEROL SULFATE 0.83 MG/ML
3 SOLUTION RESPIRATORY (INHALATION) AS NEEDED
Status: CANCELLED | OUTPATIENT
Start: 2023-12-11

## 2023-11-21 RX ORDER — DIPHENHYDRAMINE HYDROCHLORIDE 50 MG/ML
50 INJECTION INTRAMUSCULAR; INTRAVENOUS AS NEEDED
Status: CANCELLED | OUTPATIENT
Start: 2023-12-11

## 2023-11-21 RX ORDER — EPINEPHRINE 0.3 MG/.3ML
0.3 INJECTION SUBCUTANEOUS EVERY 5 MIN PRN
Status: CANCELLED | OUTPATIENT
Start: 2023-12-11

## 2023-11-21 RX ORDER — ASPIRIN 81 MG/1
81 TABLET ORAL DAILY
COMMUNITY

## 2023-11-21 RX ADMIN — DARBEPOETIN ALFA 300 MCG: 300 INJECTION, SOLUTION INTRAVENOUS; SUBCUTANEOUS at 15:13

## 2023-11-21 ASSESSMENT — PAIN SCALES - GENERAL: PAINLEVEL: 0-NO PAIN

## 2023-11-21 NOTE — PROGRESS NOTES
Patient ID: Zack Medrano is a 88 y.o. male.    Subjective    HPI    HPI  87 yo man with hx of reflux gastritis, coronary artery disease/ MI on aspirin, DM, hypertension, obesity, sleep apnea, history of PE on Eliquis, chronic kidney disease stage III, DJD, admission Jan 2021 for COVID19 pneumonia, referred for pancytopenia. He has chronic anemia with hg of around 12 g/dl, MCV of 107, his recent cbc showed decreased wbc of 3.6K , ANC of 2.0, plts of 117 , labs in April 2022 showed a ferritin of 154, Tsat of 42%, folic acid of 22.7, b12 of 601, his Cr is around 2.1      he feels well, he complains of fatigue   he is on Eliquis, he has been on anticoagulation for a long time per patient   he is able to bowen put ADLs  no SOB , no chest   no bleeding, no hematochezia, no hematuria, no melena, no other bleeding   he has LINDA and is on CPAP, he is generally compliant but some nights get frustrated and does not use it   eating well, no weight loss   has easy bruising on bumping or hitting himself   no new fever or infections   he has morning hip pain and also his wrists      interval history- 11/21/23     Energy is better, patient feels well today  Change in medications, pred 50mg   Placed back on baby aspirin     Strength is improving   Eating and drinking well     remains off eliquis, Juvano from [place]     Ambulating has improved since change of medication  No SOB  Productive am cough, white/yellow phlegm  No fever, chills, drenching night sweats  No chest pain or pressure      remains on oxygen and CPAP, sleeping well all night long      Bowels are regular, denies any hematochezia or melena   taking 1 iron daily, tolerating well      Blood sugars have been higher recently in 300s, remains on insulin  Taking 40 units insulin      No diarrhea or constipation, no hematochezia or melena   No urinary issues, no dysuria or hematuria   continues to follow nephrology in [place]      No infections, fever, chills or night  sweats     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  There were no vitals taken for this visit.     Physical Exam  Vitals and nursing note reviewed.   Constitutional:       Appearance: Normal appearance.   HENT:      Head: Normocephalic and atraumatic.      Nose: Nose normal.      Mouth/Throat:      Mouth: Mucous membranes are moist.   Eyes:      General: No scleral icterus.     Extraocular Movements: Extraocular movements intact.      Conjunctiva/sclera: Conjunctivae normal.   Cardiovascular:      Rate and Rhythm: Normal rate and regular rhythm.      Heart sounds: Normal heart sounds. No murmur heard.     No gallop.   Pulmonary:      Effort: No respiratory distress.      Breath sounds: Normal breath sounds. No wheezing.   Abdominal:      General: There is no distension.      Palpations: Abdomen is soft. There is no mass.      Tenderness: There is no abdominal tenderness.   Musculoskeletal:         General: No swelling, tenderness or deformity. Normal range of motion.      Cervical back: Normal range of motion and neck supple.   Skin:     General: Skin is warm and dry.   Neurological:      General: No focal deficit present.      Mental Status: He is alert and oriented to person, place, and time.   Psychiatric:         Mood and Affect: Mood normal.         Behavior: Behavior normal.         Thought Content: Thought content normal.         Judgment: Judgment normal.         Performance Status:  Asymptomatic      Assessment/Plan      1. 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     chronic anemia with largely a component of anemia of CKD      plan to check remaining work up for anemia:  -hemolysis labs: LDH, retic, haptoglobin    -monoclonal gammopathy: SPEP, UPEP       mild leukopenia and thrombocytopenia:   -check US abd for hepatosplenomegaly   -hepatitis and HIV    -b12 and folic acid were normal   -CHRISTINA      send for smear review and flow for an abnormal cell population detection      we discussed that if work up is negative an evolving myeloid neoplasm is possible nevertheless the counts are mildly low and would observe rather pursuing a bone marrow biopsy      7/27/22- his work up showed an small abnormal B cell population 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 but also there was a phenotypic abnormality of granulocyte and possibility of MDS can not be ruled out   we discussed above however due to the counts being mildly low we discussed deferring marrow exam at this time  US showed fatty liver but no splenomegaly   other work up negative   will plan to check NGS for myeloid mutations with next labs and check an EPO level   at this time will continue surveillance of cbc and check a repeat in 3 months      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   his RIPSS score is low or very low risk depending on the karyotype which is pending   for his anemia we discussed initiating NING with Aranesp but he prefers to defer now   will monitor the hg and if it drops < 10 g/dl will initiate      1/13/23   his RIPSS score is very low  at this time he is clinically stable, and the counts have remained stable   his fatigue is also improved   we will continue count monitor , consider NING if hg drops      4/17/23  patient remains clinically stable, but his Hgb has dropped to 9 g/dl  discussed initiating NING, patient would like to defer at this time and reconsider if Hg remains below 10 d/gl  fatigue remains unchanged, denies any abnormal bleeding or bruising, no overt bleeding  will continue to monitor labs and plan to initiate NING if Hgb remains below 10 g/dl        11/21/23  Hg has trended down to 8.9, WBC has improved to 2.1 Platelets remains around baseline at 93k. Patient reports feeling the best he has felt in the past several  "months. Energy and strength have improved. Denies any SOB. No abnormal bleeding or bruising. Denies any constitutional \"B\" symptoms. Patient was placed on 40mg of prednisone daily.       Patient is currently receiving Darbepoetin 300mcg every 3 weeks, and tolerating well. Discussed with patient if symptoms continue and counts don't respond will consider increasing frequency to every 2 weeks. Will remain with same dose and frequency at this time.      Patient to return in 3 weeks for labs and Darbepoetin 300mcg      RTC 6 weeks with labs (CBC w/diff, CMP, Ferritin, Iron Studies B12)             Malorie Shaffer, APRPA-CNP           "

## 2023-11-27 DIAGNOSIS — K25.9 GASTRIC ULCER, UNSPECIFIED CHRONICITY, UNSPECIFIED WHETHER GASTRIC ULCER HEMORRHAGE OR PERFORATION PRESENT: ICD-10-CM

## 2023-11-27 DIAGNOSIS — I10 ESSENTIAL (PRIMARY) HYPERTENSION: ICD-10-CM

## 2023-11-27 RX ORDER — SUCRALFATE 1 G/1
1 TABLET ORAL
Qty: 90 TABLET | Refills: 1 | Status: SHIPPED | OUTPATIENT
Start: 2023-11-27 | End: 2024-01-29

## 2023-11-27 RX ORDER — AMLODIPINE BESYLATE 10 MG/1
10 TABLET ORAL
Qty: 90 TABLET | Refills: 1 | Status: SHIPPED | OUTPATIENT
Start: 2023-11-27 | End: 2024-02-21 | Stop reason: SDUPTHER

## 2023-11-27 NOTE — PROGRESS NOTES
Pt had labs at visit and Aranesp to follow.  Pt set up for 12/12 stat lab and aranesp  1/2 stat lab, OV with Malorie Shaffer CNP and Aranesp to follow  Reviewed AVS with patient- patient verbalizes understanding

## 2023-12-11 ENCOUNTER — TELEPHONE (OUTPATIENT)
Dept: PRIMARY CARE | Facility: CLINIC | Age: 88
End: 2023-12-11
Payer: MEDICARE

## 2023-12-11 ENCOUNTER — APPOINTMENT (OUTPATIENT)
Dept: HEMATOLOGY/ONCOLOGY | Facility: CLINIC | Age: 88
End: 2023-12-11
Payer: MEDICARE

## 2023-12-11 NOTE — TELEPHONE ENCOUNTER
Received a call from [employer]. They state in for Medicare to cover a new machine the office notes need to state that he uses the machine and benefits from using the machine. She is requesting a addendum and I can fax it back over.

## 2023-12-12 ENCOUNTER — INFUSION (OUTPATIENT)
Dept: HEMATOLOGY/ONCOLOGY | Facility: CLINIC | Age: 88
End: 2023-12-12
Payer: MEDICARE

## 2023-12-12 VITALS — OXYGEN SATURATION: 92 % | DIASTOLIC BLOOD PRESSURE: 63 MMHG | HEART RATE: 79 BPM | SYSTOLIC BLOOD PRESSURE: 118 MMHG

## 2023-12-12 DIAGNOSIS — D63.1 ANEMIA DUE TO STAGE 4 CHRONIC KIDNEY DISEASE (MULTI): ICD-10-CM

## 2023-12-12 DIAGNOSIS — D63.1 ANEMIA DUE TO CHRONIC KIDNEY DISEASE, UNSPECIFIED CKD STAGE: Primary | ICD-10-CM

## 2023-12-12 DIAGNOSIS — N18.4 ANEMIA DUE TO STAGE 4 CHRONIC KIDNEY DISEASE (MULTI): ICD-10-CM

## 2023-12-12 DIAGNOSIS — N18.9 ANEMIA DUE TO CHRONIC KIDNEY DISEASE, UNSPECIFIED CKD STAGE: Primary | ICD-10-CM

## 2023-12-12 DIAGNOSIS — N18.32 STAGE 3B CHRONIC KIDNEY DISEASE (MULTI): ICD-10-CM

## 2023-12-12 LAB
BASOPHILS # BLD MANUAL: 0.02 X10*3/UL (ref 0–0.1)
BASOPHILS NFR BLD MANUAL: 1 %
BURR CELLS BLD QL SMEAR: ABNORMAL
DACRYOCYTES BLD QL SMEAR: ABNORMAL
EOSINOPHIL # BLD MANUAL: 0.05 X10*3/UL (ref 0–0.4)
EOSINOPHIL NFR BLD MANUAL: 3 %
ERYTHROCYTE [DISTWIDTH] IN BLOOD BY AUTOMATED COUNT: 18 % (ref 11.5–14.5)
HCT VFR BLD AUTO: 25.8 % (ref 41–52)
HGB BLD-MCNC: 8.7 G/DL (ref 13.5–17.5)
IMM GRANULOCYTES # BLD AUTO: 0 X10*3/UL (ref 0–0.5)
IMM GRANULOCYTES NFR BLD AUTO: 0 % (ref 0–0.9)
LYMPHOCYTES # BLD MANUAL: 0.48 X10*3/UL (ref 0.8–3)
LYMPHOCYTES NFR BLD MANUAL: 30 %
MCH RBC QN AUTO: 36.6 PG (ref 26–34)
MCHC RBC AUTO-ENTMCNC: 33.7 G/DL (ref 32–36)
MCV RBC AUTO: 108 FL (ref 80–100)
MONOCYTES # BLD MANUAL: 0.03 X10*3/UL (ref 0.05–0.8)
MONOCYTES NFR BLD MANUAL: 2 %
NEUTROPHILS # BLD MANUAL: 0.72 X10*3/UL (ref 1.6–5.5)
NEUTS BAND # BLD MANUAL: 0.02 X10*3/UL (ref 0–0.5)
NEUTS BAND NFR BLD MANUAL: 1 %
NEUTS SEG # BLD MANUAL: 0.7 X10*3/UL (ref 1.6–5)
NEUTS SEG NFR BLD MANUAL: 44 %
NRBC BLD-RTO: 0 /100 WBCS (ref 0–0)
OVALOCYTES BLD QL SMEAR: ABNORMAL
PLATELET # BLD AUTO: 86 X10*3/UL (ref 150–450)
RBC # BLD AUTO: 2.38 X10*6/UL (ref 4.5–5.9)
RBC MORPH BLD: ABNORMAL
SCHISTOCYTES BLD QL SMEAR: ABNORMAL
TOTAL CELLS COUNTED BLD: 100
VARIANT LYMPHS # BLD MANUAL: 0.3 X10*3/UL (ref 0–0.3)
VARIANT LYMPHS NFR BLD: 19 %
WBC # BLD AUTO: 1.6 X10*3/UL (ref 4.4–11.3)

## 2023-12-12 PROCEDURE — 85007 BL SMEAR W/DIFF WBC COUNT: CPT

## 2023-12-12 PROCEDURE — 96372 THER/PROPH/DIAG INJ SC/IM: CPT

## 2023-12-12 PROCEDURE — 85027 COMPLETE CBC AUTOMATED: CPT

## 2023-12-12 PROCEDURE — 2500000004 HC RX 250 GENERAL PHARMACY W/ HCPCS (ALT 636 FOR OP/ED): Mod: EA

## 2023-12-12 PROCEDURE — 36415 COLL VENOUS BLD VENIPUNCTURE: CPT

## 2023-12-12 RX ORDER — FAMOTIDINE 10 MG/ML
20 INJECTION INTRAVENOUS ONCE AS NEEDED
Status: CANCELLED | OUTPATIENT
Start: 2024-01-02

## 2023-12-12 RX ORDER — DIPHENHYDRAMINE HYDROCHLORIDE 50 MG/ML
50 INJECTION INTRAMUSCULAR; INTRAVENOUS AS NEEDED
Status: DISCONTINUED | OUTPATIENT
Start: 2023-12-12 | End: 2023-12-12 | Stop reason: HOSPADM

## 2023-12-12 RX ORDER — ALBUTEROL SULFATE 0.83 MG/ML
3 SOLUTION RESPIRATORY (INHALATION) AS NEEDED
Status: CANCELLED | OUTPATIENT
Start: 2024-01-02

## 2023-12-12 RX ORDER — FAMOTIDINE 10 MG/ML
20 INJECTION INTRAVENOUS ONCE AS NEEDED
Status: DISCONTINUED | OUTPATIENT
Start: 2023-12-12 | End: 2023-12-12 | Stop reason: HOSPADM

## 2023-12-12 RX ORDER — ALBUTEROL SULFATE 0.83 MG/ML
3 SOLUTION RESPIRATORY (INHALATION) AS NEEDED
Status: DISCONTINUED | OUTPATIENT
Start: 2023-12-12 | End: 2023-12-12 | Stop reason: HOSPADM

## 2023-12-12 RX ORDER — DIPHENHYDRAMINE HYDROCHLORIDE 50 MG/ML
50 INJECTION INTRAMUSCULAR; INTRAVENOUS AS NEEDED
Status: CANCELLED | OUTPATIENT
Start: 2024-01-02

## 2023-12-12 RX ORDER — EPINEPHRINE 0.3 MG/.3ML
0.3 INJECTION SUBCUTANEOUS EVERY 5 MIN PRN
Status: CANCELLED | OUTPATIENT
Start: 2024-01-02

## 2023-12-12 RX ORDER — EPINEPHRINE 0.3 MG/.3ML
0.3 INJECTION SUBCUTANEOUS EVERY 5 MIN PRN
Status: DISCONTINUED | OUTPATIENT
Start: 2023-12-12 | End: 2023-12-12 | Stop reason: HOSPADM

## 2023-12-12 RX ADMIN — DARBEPOETIN ALFA 300 MCG: 300 INJECTION, SOLUTION INTRAVENOUS; SUBCUTANEOUS at 14:27

## 2023-12-15 ENCOUNTER — HOSPITAL ENCOUNTER (OUTPATIENT)
Dept: HOSPITAL 100 - US | Age: 88
Discharge: HOME | End: 2023-12-15
Payer: MEDICARE

## 2023-12-15 DIAGNOSIS — K74.60: Primary | ICD-10-CM

## 2023-12-15 DIAGNOSIS — E11.22: ICD-10-CM

## 2023-12-15 DIAGNOSIS — R18.8: ICD-10-CM

## 2023-12-15 DIAGNOSIS — N18.30: ICD-10-CM

## 2023-12-15 PROCEDURE — 76705 ECHO EXAM OF ABDOMEN: CPT

## 2023-12-15 PROCEDURE — 76981 USE PARENCHYMA: CPT

## 2023-12-20 DIAGNOSIS — E11.22 TYPE 2 DIABETES MELLITUS WITH STAGE 3B CHRONIC KIDNEY DISEASE, WITH LONG-TERM CURRENT USE OF INSULIN (MULTI): Primary | ICD-10-CM

## 2023-12-20 DIAGNOSIS — Z79.4 TYPE 2 DIABETES MELLITUS WITH STAGE 3B CHRONIC KIDNEY DISEASE, WITH LONG-TERM CURRENT USE OF INSULIN (MULTI): Primary | ICD-10-CM

## 2023-12-20 DIAGNOSIS — N18.32 TYPE 2 DIABETES MELLITUS WITH STAGE 3B CHRONIC KIDNEY DISEASE, WITH LONG-TERM CURRENT USE OF INSULIN (MULTI): Primary | ICD-10-CM

## 2023-12-20 RX ORDER — PEN NEEDLE, DIABETIC 30 GX3/16"
1 NEEDLE, DISPOSABLE MISCELLANEOUS DAILY
Qty: 90 EACH | Refills: 3 | Status: SHIPPED | OUTPATIENT
Start: 2023-12-20 | End: 2024-12-19

## 2024-01-02 ENCOUNTER — APPOINTMENT (OUTPATIENT)
Dept: HEMATOLOGY/ONCOLOGY | Facility: CLINIC | Age: 89
End: 2024-01-02
Payer: MEDICARE

## 2024-01-02 ENCOUNTER — INFUSION (OUTPATIENT)
Dept: HEMATOLOGY/ONCOLOGY | Facility: CLINIC | Age: 89
End: 2024-01-02
Payer: MEDICARE

## 2024-01-02 ENCOUNTER — OFFICE VISIT (OUTPATIENT)
Dept: HEMATOLOGY/ONCOLOGY | Facility: CLINIC | Age: 89
End: 2024-01-02
Payer: MEDICARE

## 2024-01-02 VITALS
OXYGEN SATURATION: 95 % | HEART RATE: 75 BPM | WEIGHT: 229.2 LBS | SYSTOLIC BLOOD PRESSURE: 121 MMHG | DIASTOLIC BLOOD PRESSURE: 64 MMHG | HEIGHT: 68 IN | RESPIRATION RATE: 16 BRPM | TEMPERATURE: 96.4 F | BODY MASS INDEX: 34.74 KG/M2

## 2024-01-02 DIAGNOSIS — D63.1 ANEMIA DUE TO CHRONIC KIDNEY DISEASE, UNSPECIFIED CKD STAGE: ICD-10-CM

## 2024-01-02 DIAGNOSIS — N18.9 ANEMIA DUE TO CHRONIC KIDNEY DISEASE, UNSPECIFIED CKD STAGE: ICD-10-CM

## 2024-01-02 DIAGNOSIS — D63.1 ANEMIA DUE TO STAGE 4 CHRONIC KIDNEY DISEASE (MULTI): ICD-10-CM

## 2024-01-02 DIAGNOSIS — N18.4 ANEMIA DUE TO STAGE 4 CHRONIC KIDNEY DISEASE (MULTI): ICD-10-CM

## 2024-01-02 DIAGNOSIS — N18.32 STAGE 3B CHRONIC KIDNEY DISEASE (MULTI): ICD-10-CM

## 2024-01-02 LAB
ALBUMIN SERPL BCP-MCNC: 4.1 G/DL (ref 3.4–5)
ALP SERPL-CCNC: 115 U/L (ref 33–136)
ALT SERPL W P-5'-P-CCNC: 24 U/L (ref 10–52)
ANION GAP SERPL CALC-SCNC: 11 MMOL/L (ref 10–20)
AST SERPL W P-5'-P-CCNC: 30 U/L (ref 9–39)
BASOPHILS # BLD AUTO: 0.01 X10*3/UL (ref 0–0.1)
BASOPHILS NFR BLD AUTO: 0.5 %
BILIRUB SERPL-MCNC: 1.1 MG/DL (ref 0–1.2)
BUN SERPL-MCNC: 53 MG/DL (ref 6–23)
CALCIUM SERPL-MCNC: 9.4 MG/DL (ref 8.6–10.3)
CHLORIDE SERPL-SCNC: 103 MMOL/L (ref 98–107)
CO2 SERPL-SCNC: 26 MMOL/L (ref 21–32)
CREAT SERPL-MCNC: 2.25 MG/DL (ref 0.5–1.3)
EOSINOPHIL # BLD AUTO: 0.05 X10*3/UL (ref 0–0.4)
EOSINOPHIL NFR BLD AUTO: 2.4 %
ERYTHROCYTE [DISTWIDTH] IN BLOOD BY AUTOMATED COUNT: 18 % (ref 11.5–14.5)
FERRITIN SERPL-MCNC: 484 NG/ML (ref 20–300)
GFR SERPL CREATININE-BSD FRML MDRD: 27 ML/MIN/1.73M*2
GLUCOSE SERPL-MCNC: 294 MG/DL (ref 74–99)
HCT VFR BLD AUTO: 26.4 % (ref 41–52)
HGB BLD-MCNC: 8.8 G/DL (ref 13.5–17.5)
HYPOCHROMIA BLD QL SMEAR: NORMAL
IMM GRANULOCYTES # BLD AUTO: 0.04 X10*3/UL (ref 0–0.5)
IMM GRANULOCYTES NFR BLD AUTO: 1.9 % (ref 0–0.9)
IRON SATN MFR SERPL: 34 % (ref 25–45)
IRON SERPL-MCNC: 103 UG/DL (ref 35–150)
LYMPHOCYTES # BLD AUTO: 1 X10*3/UL (ref 0.8–3)
LYMPHOCYTES NFR BLD AUTO: 48.5 %
MCH RBC QN AUTO: 35.6 PG (ref 26–34)
MCHC RBC AUTO-ENTMCNC: 33.3 G/DL (ref 32–36)
MCV RBC AUTO: 107 FL (ref 80–100)
MONOCYTES # BLD AUTO: 0.08 X10*3/UL (ref 0.05–0.8)
MONOCYTES NFR BLD AUTO: 3.9 %
NEUTROPHILS # BLD AUTO: 0.88 X10*3/UL (ref 1.6–5.5)
NEUTROPHILS NFR BLD AUTO: 42.8 %
NRBC BLD-RTO: 0 /100 WBCS (ref 0–0)
OVALOCYTES BLD QL SMEAR: NORMAL
PLATELET # BLD AUTO: 87 X10*3/UL (ref 150–450)
POTASSIUM SERPL-SCNC: 4.3 MMOL/L (ref 3.5–5.3)
PROT SERPL-MCNC: 6.8 G/DL (ref 6.4–8.2)
RBC # BLD AUTO: 2.47 X10*6/UL (ref 4.5–5.9)
RBC MORPH BLD: NORMAL
SODIUM SERPL-SCNC: 136 MMOL/L (ref 136–145)
TIBC SERPL-MCNC: 299 UG/DL (ref 240–445)
UIBC SERPL-MCNC: 196 UG/DL (ref 110–370)
VIT B12 SERPL-MCNC: 816 PG/ML (ref 211–911)
WBC # BLD AUTO: 2.1 X10*3/UL (ref 4.4–11.3)

## 2024-01-02 PROCEDURE — 1036F TOBACCO NON-USER: CPT

## 2024-01-02 PROCEDURE — 83540 ASSAY OF IRON: CPT

## 2024-01-02 PROCEDURE — 36415 COLL VENOUS BLD VENIPUNCTURE: CPT

## 2024-01-02 PROCEDURE — 85025 COMPLETE CBC W/AUTO DIFF WBC: CPT

## 2024-01-02 PROCEDURE — 82607 VITAMIN B-12: CPT

## 2024-01-02 PROCEDURE — 3074F SYST BP LT 130 MM HG: CPT

## 2024-01-02 PROCEDURE — 82728 ASSAY OF FERRITIN: CPT

## 2024-01-02 PROCEDURE — 96372 THER/PROPH/DIAG INJ SC/IM: CPT

## 2024-01-02 PROCEDURE — 99213 OFFICE O/P EST LOW 20 MIN: CPT

## 2024-01-02 PROCEDURE — 1159F MED LIST DOCD IN RCRD: CPT

## 2024-01-02 PROCEDURE — 1125F AMNT PAIN NOTED PAIN PRSNT: CPT

## 2024-01-02 PROCEDURE — 3078F DIAST BP <80 MM HG: CPT

## 2024-01-02 PROCEDURE — 84075 ASSAY ALKALINE PHOSPHATASE: CPT

## 2024-01-02 PROCEDURE — 2500000004 HC RX 250 GENERAL PHARMACY W/ HCPCS (ALT 636 FOR OP/ED): Mod: EC

## 2024-01-02 RX ORDER — DIPHENHYDRAMINE HYDROCHLORIDE 50 MG/ML
50 INJECTION INTRAMUSCULAR; INTRAVENOUS AS NEEDED
Status: CANCELLED | OUTPATIENT
Start: 2024-01-23

## 2024-01-02 RX ORDER — EPINEPHRINE 0.3 MG/.3ML
0.3 INJECTION SUBCUTANEOUS EVERY 5 MIN PRN
Status: CANCELLED | OUTPATIENT
Start: 2024-01-23

## 2024-01-02 RX ORDER — FAMOTIDINE 10 MG/ML
20 INJECTION INTRAVENOUS ONCE AS NEEDED
Status: CANCELLED | OUTPATIENT
Start: 2024-01-23

## 2024-01-02 RX ORDER — ALBUTEROL SULFATE 0.83 MG/ML
3 SOLUTION RESPIRATORY (INHALATION) AS NEEDED
Status: CANCELLED | OUTPATIENT
Start: 2024-01-23

## 2024-01-02 RX ADMIN — DARBEPOETIN ALFA 300 MCG: 300 INJECTION, SOLUTION INTRAVENOUS; SUBCUTANEOUS at 14:51

## 2024-01-02 ASSESSMENT — PAIN SCALES - GENERAL: PAINLEVEL: 3

## 2024-01-02 NOTE — PROGRESS NOTES
Patient ID: Zack Medrano is a 88 y.o. male.    Subjective    HPI    87 yo man with hx of reflux gastritis, coronary artery disease/ MI on aspirin, DM, hypertension, obesity, sleep apnea, history of PE on Eliquis, chronic kidney disease stage III, DJD, admission Jan 2021 for COVID19 pneumonia, referred for pancytopenia. He has chronic anemia with hg of around 12 g/dl, MCV of 107, his recent cbc showed decreased wbc of 3.6K , ANC of 2.0, plts of 117 , labs in April 2022 showed a ferritin of 154, Tsat of 42%, folic acid of 22.7, b12 of 601, his Cr is around 2.1      he feels well, he complains of fatigue   he is on Eliquis, he has been on anticoagulation for a long time per patient   he is able to bowen put ADLs  no SOB , no chest   no bleeding, no hematochezia, no hematuria, no melena, no other bleeding   he has LINDA and is on CPAP, he is generally compliant but some nights get frustrated and does not use it   eating well, no weight loss   has easy bruising on bumping or hitting himself   no new fever or infections   he has morning hip pain and also his wrists      interval history- 1/2/24     Recovering from an illness- symptoms coughing- yellow phelgm, no body aches, no fevers, no fatigue, or sore throat    Energy remains decent   Feels unsteady at times ambulating   Change in medications, pred 50mg   Placed back on baby aspirin      Strength is improving   Eating and drinking well      remains off eliquisTianna from [place]     Ambulating has improved since change of medication  No SOB  Productive am cough, white/yellow phlegm  No fever, chills, drenching night sweats  No chest pain or pressure      remains on oxygen and CPAP, sleeping well all night long      Bowels are regular, denies any hematochezia or melena   taking 1 iron daily, tolerating well      Blood sugars have been higher recently in 300s, remains on insulin  Taking 40 units insulin      No diarrhea or constipation, no hematochezia or melena   No  urinary issues, no dysuria or hematuria   continues to follow nephrology in [place]      No infections, fever, chills or night sweats     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  There were no vitals taken for this visit.     Physical Exam  Vitals and nursing note reviewed.   Constitutional:       Appearance: Normal appearance.   HENT:      Head: Normocephalic and atraumatic.      Mouth/Throat:      Mouth: Mucous membranes are moist.      Pharynx: Oropharynx is clear.   Eyes:      General: No scleral icterus.     Extraocular Movements: Extraocular movements intact.      Conjunctiva/sclera: Conjunctivae normal.   Cardiovascular:      Rate and Rhythm: Normal rate and regular rhythm.      Heart sounds: Normal heart sounds. No murmur heard.  Pulmonary:      Effort: Pulmonary effort is normal. No respiratory distress.      Breath sounds: Normal breath sounds. No wheezing.   Abdominal:      General: Bowel sounds are normal. There is no distension.      Palpations: There is no mass.      Tenderness: There is no abdominal tenderness.   Musculoskeletal:         General: Swelling present.      Cervical back: Normal range of motion.   Skin:     General: Skin is warm and dry.   Neurological:      General: No focal deficit present.      Mental Status: He is alert and oriented to person, place, and time.   Psychiatric:         Mood and Affect: Mood normal.         Behavior: Behavior normal.         Thought Content: Thought content normal.         Judgment: Judgment normal.         Performance Status:  Asymptomatic      Assessment/Plan      1. 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     chronic anemia with largely a component of anemia of CKD      plan to check remaining work up for anemia:  -hemolysis labs: LDH, retic, haptoglobin    -monoclonal gammopathy: SPEP, UPEP       mild  leukopenia and thrombocytopenia:   -check US abd for hepatosplenomegaly   -hepatitis and HIV   -b12 and folic acid were normal   -CHRISTINA      send for smear review and flow for an abnormal cell population detection      we discussed that if work up is negative an evolving myeloid neoplasm is possible nevertheless the counts are mildly low and would observe rather pursuing a bone marrow biopsy      7/27/22- his work up showed an small abnormal B cell population 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 but also there was a phenotypic abnormality of granulocyte and possibility of MDS can not be ruled out   we discussed above however due to the counts being mildly low we discussed deferring marrow exam at this time  US showed fatty liver but no splenomegaly   other work up negative   will plan to check NGS for myeloid mutations with next labs and check an EPO level   at this time will continue surveillance of cbc and check a repeat in 3 months      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   his RIPSS score is low or very low risk depending on the karyotype which is pending   for his anemia we discussed initiating NING with Aranesp but he prefers to defer now   will monitor the hg and if it drops < 10 g/dl will initiate      1/13/23   his RIPSS score is very low  at this time he is clinically stable, and the counts have remained stable   his fatigue is also improved   we will continue count monitor , consider NING if hg drops      4/17/23  patient remains clinically stable, but his Hgb has dropped to 9 g/dl  discussed initiating NING, patient would like to defer at this time and reconsider if Hg remains below 10 d/gl  fatigue remains unchanged, denies any abnormal bleeding or bruising, no overt bleeding  will continue to monitor labs and plan to initiate NING if Hgb remains below 10 g/dl        1/2/24  Hg has trended down to 8.8, WBC has improved to 2.1 Platelets  "remains around baseline at 87k. Patient reports feeling the best he has felt in the past several months. Energy and strength have improved. Denies any SOB. No abnormal bleeding or bruising. Denies any constitutional \"B\" symptoms.      Patient is currently receiving Darbepoetin 300mcg every 3 weeks, and tolerating well. Discussed with patient if symptoms continue and counts don't respond will consider increasing frequency to every 2 weeks. Will remain with same dose and frequency at this time.      Patient to return in 3 weeks for labs and Darbepoetin 300mcg      RTC 6 weeks with labs (CBC w/diff, CMP, Ferritin, Iron Studies B12)          Malorie Shaffer, APRN-CNP           "

## 2024-01-02 NOTE — PROGRESS NOTES
Pt to get labs at the hosp 1/22  Due for Aranesp on 1/23 2/13 100 stat lab           130 CNP            2pm Sven  Reviewed AVS with patient- patient verbalizes understanding

## 2024-01-15 DIAGNOSIS — G25.0 TREMOR, ESSENTIAL: ICD-10-CM

## 2024-01-15 DIAGNOSIS — I25.10 CORONARY ARTERY DISEASE INVOLVING NATIVE HEART, UNSPECIFIED VESSEL OR LESION TYPE, UNSPECIFIED WHETHER ANGINA PRESENT: ICD-10-CM

## 2024-01-15 RX ORDER — METOPROLOL TARTRATE 50 MG/1
50 TABLET ORAL 2 TIMES DAILY
Qty: 180 TABLET | Refills: 1 | Status: SHIPPED | OUTPATIENT
Start: 2024-01-15

## 2024-01-15 RX ORDER — GABAPENTIN 300 MG/1
300 CAPSULE ORAL 2 TIMES DAILY
Qty: 60 CAPSULE | Refills: 1 | Status: SHIPPED | OUTPATIENT
Start: 2024-01-15 | End: 2024-01-15 | Stop reason: SDUPTHER

## 2024-01-15 RX ORDER — PRIMIDONE 50 MG/1
50 TABLET ORAL NIGHTLY
Qty: 30 TABLET | Refills: 2 | Status: SHIPPED | OUTPATIENT
Start: 2024-01-15 | End: 2024-04-11 | Stop reason: SDUPTHER

## 2024-01-15 RX ORDER — METOPROLOL TARTRATE 50 MG/1
50 TABLET ORAL 2 TIMES DAILY
Qty: 180 TABLET | Refills: 1 | Status: SHIPPED | OUTPATIENT
Start: 2024-01-15 | End: 2024-01-15 | Stop reason: SDUPTHER

## 2024-01-15 RX ORDER — GABAPENTIN 300 MG/1
300 CAPSULE ORAL 2 TIMES DAILY
Qty: 60 CAPSULE | Refills: 1 | Status: SHIPPED | OUTPATIENT
Start: 2024-01-15 | End: 2024-04-11 | Stop reason: SDUPTHER

## 2024-01-22 ENCOUNTER — LAB (OUTPATIENT)
Dept: LAB | Facility: LAB | Age: 89
End: 2024-01-22
Payer: MEDICARE

## 2024-01-22 DIAGNOSIS — D63.1 ANEMIA DUE TO STAGE 4 CHRONIC KIDNEY DISEASE (MULTI): ICD-10-CM

## 2024-01-22 DIAGNOSIS — N18.4 ANEMIA DUE TO STAGE 4 CHRONIC KIDNEY DISEASE (MULTI): ICD-10-CM

## 2024-01-22 DIAGNOSIS — N18.32 STAGE 3B CHRONIC KIDNEY DISEASE (MULTI): ICD-10-CM

## 2024-01-22 LAB
ERYTHROCYTE [DISTWIDTH] IN BLOOD BY AUTOMATED COUNT: 17.6 % (ref 11.5–14.5)
HCT VFR BLD AUTO: 24.9 % (ref 41–52)
HGB BLD-MCNC: 8.3 G/DL (ref 13.5–17.5)
MCH RBC QN AUTO: 35.8 PG (ref 26–34)
MCHC RBC AUTO-ENTMCNC: 33.3 G/DL (ref 32–36)
MCV RBC AUTO: 107 FL (ref 80–100)
NRBC BLD-RTO: 0 /100 WBCS (ref 0–0)
PLATELET # BLD AUTO: 92 X10*3/UL (ref 150–450)
RBC # BLD AUTO: 2.32 X10*6/UL (ref 4.5–5.9)
WBC # BLD AUTO: 1.3 X10*3/UL (ref 4.4–11.3)

## 2024-01-22 PROCEDURE — 85027 COMPLETE CBC AUTOMATED: CPT

## 2024-01-22 PROCEDURE — 36415 COLL VENOUS BLD VENIPUNCTURE: CPT

## 2024-01-23 ENCOUNTER — INFUSION (OUTPATIENT)
Dept: HEMATOLOGY/ONCOLOGY | Facility: CLINIC | Age: 89
End: 2024-01-23
Payer: MEDICARE

## 2024-01-23 VITALS
TEMPERATURE: 97 F | BODY MASS INDEX: 34.37 KG/M2 | HEART RATE: 73 BPM | RESPIRATION RATE: 16 BRPM | WEIGHT: 225.97 LBS | SYSTOLIC BLOOD PRESSURE: 138 MMHG | DIASTOLIC BLOOD PRESSURE: 76 MMHG | OXYGEN SATURATION: 92 %

## 2024-01-23 DIAGNOSIS — N18.4 ANEMIA DUE TO STAGE 4 CHRONIC KIDNEY DISEASE (MULTI): ICD-10-CM

## 2024-01-23 DIAGNOSIS — D63.1 ANEMIA DUE TO STAGE 4 CHRONIC KIDNEY DISEASE (MULTI): ICD-10-CM

## 2024-01-23 DIAGNOSIS — N18.32 STAGE 3B CHRONIC KIDNEY DISEASE (MULTI): ICD-10-CM

## 2024-01-23 PROCEDURE — 2500000004 HC RX 250 GENERAL PHARMACY W/ HCPCS (ALT 636 FOR OP/ED): Mod: JZ

## 2024-01-23 PROCEDURE — 96372 THER/PROPH/DIAG INJ SC/IM: CPT

## 2024-01-23 RX ORDER — EPINEPHRINE 0.3 MG/.3ML
0.3 INJECTION SUBCUTANEOUS EVERY 5 MIN PRN
Status: CANCELLED | OUTPATIENT
Start: 2024-02-13

## 2024-01-23 RX ORDER — DIPHENHYDRAMINE HYDROCHLORIDE 50 MG/ML
50 INJECTION INTRAMUSCULAR; INTRAVENOUS AS NEEDED
Status: CANCELLED | OUTPATIENT
Start: 2024-02-13

## 2024-01-23 RX ORDER — ALBUTEROL SULFATE 0.83 MG/ML
3 SOLUTION RESPIRATORY (INHALATION) AS NEEDED
Status: CANCELLED | OUTPATIENT
Start: 2024-02-13

## 2024-01-23 RX ORDER — FAMOTIDINE 10 MG/ML
20 INJECTION INTRAVENOUS ONCE AS NEEDED
Status: CANCELLED | OUTPATIENT
Start: 2024-02-13

## 2024-01-23 RX ADMIN — DARBEPOETIN ALFA 300 MCG: 300 INJECTION, SOLUTION INTRAVENOUS; SUBCUTANEOUS at 14:41

## 2024-01-23 ASSESSMENT — PAIN SCALES - GENERAL: PAINLEVEL: 0-NO PAIN

## 2024-01-29 ENCOUNTER — HOSPITAL ENCOUNTER (OUTPATIENT)
Dept: HOSPITAL 100 - LAB | Age: 89
Discharge: HOME | End: 2024-01-29
Payer: MEDICARE

## 2024-01-29 DIAGNOSIS — K25.9 GASTRIC ULCER, UNSPECIFIED CHRONICITY, UNSPECIFIED WHETHER GASTRIC ULCER HEMORRHAGE OR PERFORATION PRESENT: ICD-10-CM

## 2024-01-29 DIAGNOSIS — K74.60: Primary | ICD-10-CM

## 2024-01-29 DIAGNOSIS — C91.10: ICD-10-CM

## 2024-01-29 DIAGNOSIS — N18.30: ICD-10-CM

## 2024-01-29 DIAGNOSIS — E11.22: ICD-10-CM

## 2024-01-29 LAB
ALANINE AMINOTRANSFER ALT/SGPT: 32 U/L (ref 16–61)
ALBUMIN SERPL-MCNC: 3.2 G/DL (ref 3.2–5)
ALKALINE PHOSPHATASE: 141 U/L (ref 45–117)
AMMONIA: 37 UMOL/L (ref 11–32)
ANION GAP: 4 (ref 5–15)
ANISOCYTOSIS BLD QL SMEAR: (no result)
AST(SGOT): 31 U/L (ref 15–37)
BUN SERPL-MCNC: 46 MG/DL (ref 7–18)
BUN/CREAT RATIO: 17.2 RATIO (ref 10–20)
CALCIUM SERPL-MCNC: 8.9 MG/DL (ref 8.5–10.1)
CARBON DIOXIDE: 27 MMOL/L (ref 21–32)
CHLORIDE: 111 MMOL/L (ref 98–107)
CHOLEST SERPL-MCNC: 85 MG/DL
CRP SERPL-MCNC: < 2.9 MG/L (ref 0–3)
DEPRECATED RDW RBC: 71.1 FL (ref 35.1–43.9)
DIFFERENTIAL INDICATED: (no result)
ERYTHROCYTE [DISTWIDTH] IN BLOOD: 18.6 % (ref 11.6–14.6)
EST GLOM FILT RATE - AFR AMER: 29 ML/MIN (ref 60–?)
FERRITIN SERPL-MCNC: 348 NG/ML (ref 26–388)
GLOBULIN: 3.6 G/DL (ref 2.2–4.2)
GLUCOSE: 248 MG/DL (ref 74–106)
HCT VFR BLD AUTO: 24.1 % (ref 40–54)
HGB BLD-MCNC: 8 G/DL (ref 13–16.5)
IMMATURE GRANULOCYTES COUNT: 0.01 X10^3/UL (ref 0–0)
IRON BINDING CAPACITY,TOTAL: 283 UG/DL (ref 250–450)
LDH: 234 U/L (ref 87–241)
MAGNESIUM: 1.5 MG/DL (ref 1.6–2.6)
MCV RBC: 106.6 FL (ref 80–94)
MEAN CORP HGB CONC: 33.2 G/DL (ref 32–36)
MEAN PLATELET VOL.: 12.3 FL (ref 6.2–12)
NRBC FLAGGED BY ANALYZER: 2.1 % (ref 0–5)
PLATELET # BLD: 79 K/MM3 (ref 150–450)
POSITIVE COUNT: YES
POSITIVE DIFFERENTIAL: YES
POSITIVE MORPHOLOGY: YES
POTASSIUM: 4.1 MMOL/L (ref 3.5–5.1)
PROTHROMBIN TIME (PROTIME)PT.: 14 SECONDS (ref 11.7–14.9)
RBC # BLD AUTO: 2.26 M/MM3 (ref 4.6–6.2)
SCAN SMEAR PER REVIEW CRITERIA: (no result)
TRIGLYCERIDES: 108 MG/DL
VITAMIN D,25 HYDROXY: 52.2 NG/ML
VLDLC SERPL-MCNC: 22 MG/DL (ref 5–40)
WBC # BLD AUTO: 1.4 K/MM3 (ref 4.4–11)

## 2024-01-29 PROCEDURE — 82105 ALPHA-FETOPROTEIN SERUM: CPT

## 2024-01-29 PROCEDURE — 86225 DNA ANTIBODY NATIVE: CPT

## 2024-01-29 PROCEDURE — 83550 IRON BINDING TEST: CPT

## 2024-01-29 PROCEDURE — 82728 ASSAY OF FERRITIN: CPT

## 2024-01-29 PROCEDURE — 86256 FLUORESCENT ANTIBODY TITER: CPT

## 2024-01-29 PROCEDURE — 86140 C-REACTIVE PROTEIN: CPT

## 2024-01-29 PROCEDURE — 83010 ASSAY OF HAPTOGLOBIN QUANT: CPT

## 2024-01-29 PROCEDURE — 85025 COMPLETE CBC W/AUTO DIFF WBC: CPT

## 2024-01-29 PROCEDURE — 83615 LACTATE (LD) (LDH) ENZYME: CPT

## 2024-01-29 PROCEDURE — 83735 ASSAY OF MAGNESIUM: CPT

## 2024-01-29 PROCEDURE — 85610 PROTHROMBIN TIME: CPT

## 2024-01-29 PROCEDURE — 80053 COMPREHEN METABOLIC PANEL: CPT

## 2024-01-29 PROCEDURE — 82164 ANGIOTENSIN I ENZYME TEST: CPT

## 2024-01-29 PROCEDURE — 36415 COLL VENOUS BLD VENIPUNCTURE: CPT

## 2024-01-29 PROCEDURE — 86703 HIV-1/HIV-2 1 RESULT ANTBDY: CPT

## 2024-01-29 PROCEDURE — 86235 NUCLEAR ANTIGEN ANTIBODY: CPT

## 2024-01-29 PROCEDURE — 80074 ACUTE HEPATITIS PANEL: CPT

## 2024-01-29 PROCEDURE — 83516 IMMUNOASSAY NONANTIBODY: CPT

## 2024-01-29 PROCEDURE — 82306 VITAMIN D 25 HYDROXY: CPT

## 2024-01-29 PROCEDURE — 84466 ASSAY OF TRANSFERRIN: CPT

## 2024-01-29 PROCEDURE — 86038 ANTINUCLEAR ANTIBODIES: CPT

## 2024-01-29 PROCEDURE — 82525 ASSAY OF COPPER: CPT

## 2024-01-29 PROCEDURE — 80061 LIPID PANEL: CPT

## 2024-01-29 PROCEDURE — 84100 ASSAY OF PHOSPHORUS: CPT

## 2024-01-29 PROCEDURE — 82140 ASSAY OF AMMONIA: CPT

## 2024-01-29 PROCEDURE — 83036 HEMOGLOBIN GLYCOSYLATED A1C: CPT

## 2024-01-29 PROCEDURE — 82390 ASSAY OF CERULOPLASMIN: CPT

## 2024-01-29 PROCEDURE — 85652 RBC SED RATE AUTOMATED: CPT

## 2024-01-29 RX ORDER — SUCRALFATE 1 G/1
1 TABLET ORAL
Qty: 90 TABLET | Refills: 1 | Status: SHIPPED | OUTPATIENT
Start: 2024-01-29 | End: 2024-03-21 | Stop reason: SDUPTHER

## 2024-02-03 LAB
ACE SERPL-CCNC: 46 U/L (ref 14–82)
ACTIN IGG SERPL-ACNC: 16 UNITS (ref 0–19)
C-ANCA SER-ACNC: (no result) TITER
COPPER SERPL-SCNC: 131 UG/DL (ref 69–132)
P-ANCA TITR SER IF: (no result) TITER
TRANSFERRIN SERPL-MCNC: 241 MG/DL (ref 149–313)

## 2024-02-13 ENCOUNTER — APPOINTMENT (OUTPATIENT)
Dept: HEMATOLOGY/ONCOLOGY | Facility: CLINIC | Age: 89
End: 2024-02-13
Payer: MEDICARE

## 2024-02-13 ENCOUNTER — INFUSION (OUTPATIENT)
Dept: HEMATOLOGY/ONCOLOGY | Facility: CLINIC | Age: 89
End: 2024-02-13
Payer: MEDICARE

## 2024-02-13 ENCOUNTER — OFFICE VISIT (OUTPATIENT)
Dept: HEMATOLOGY/ONCOLOGY | Facility: CLINIC | Age: 89
End: 2024-02-13
Payer: MEDICARE

## 2024-02-13 VITALS
DIASTOLIC BLOOD PRESSURE: 57 MMHG | OXYGEN SATURATION: 94 % | TEMPERATURE: 97.2 F | HEIGHT: 68 IN | HEART RATE: 75 BPM | BODY MASS INDEX: 34.4 KG/M2 | SYSTOLIC BLOOD PRESSURE: 124 MMHG | WEIGHT: 227 LBS | RESPIRATION RATE: 20 BRPM

## 2024-02-13 DIAGNOSIS — N18.32 STAGE 3B CHRONIC KIDNEY DISEASE (MULTI): ICD-10-CM

## 2024-02-13 DIAGNOSIS — D63.1 ANEMIA DUE TO STAGE 4 CHRONIC KIDNEY DISEASE (MULTI): ICD-10-CM

## 2024-02-13 DIAGNOSIS — N18.4 ANEMIA DUE TO STAGE 4 CHRONIC KIDNEY DISEASE (MULTI): ICD-10-CM

## 2024-02-13 DIAGNOSIS — D72.819 LEUKOPENIA, UNSPECIFIED TYPE: Primary | ICD-10-CM

## 2024-02-13 LAB
ALBUMIN SERPL BCP-MCNC: 4.1 G/DL (ref 3.4–5)
ALP SERPL-CCNC: 98 U/L (ref 33–136)
ALT SERPL W P-5'-P-CCNC: 19 U/L (ref 10–52)
ANION GAP SERPL CALC-SCNC: 15 MMOL/L (ref 10–20)
AST SERPL W P-5'-P-CCNC: 27 U/L (ref 9–39)
BASOPHILS # BLD MANUAL: 0 X10*3/UL (ref 0–0.1)
BASOPHILS NFR BLD MANUAL: 0 %
BILIRUB SERPL-MCNC: 1.4 MG/DL (ref 0–1.2)
BUN SERPL-MCNC: 51 MG/DL (ref 6–23)
CALCIUM SERPL-MCNC: 8.9 MG/DL (ref 8.6–10.3)
CHLORIDE SERPL-SCNC: 102 MMOL/L (ref 98–107)
CO2 SERPL-SCNC: 25 MMOL/L (ref 21–32)
CREAT SERPL-MCNC: 2.43 MG/DL (ref 0.5–1.3)
EGFRCR SERPLBLD CKD-EPI 2021: 25 ML/MIN/1.73M*2
EOSINOPHIL # BLD MANUAL: 0.06 X10*3/UL (ref 0–0.4)
EOSINOPHIL NFR BLD MANUAL: 4 %
ERYTHROCYTE [DISTWIDTH] IN BLOOD BY AUTOMATED COUNT: 18.5 % (ref 11.5–14.5)
GLUCOSE SERPL-MCNC: 258 MG/DL (ref 74–99)
HCT VFR BLD AUTO: 25.7 % (ref 41–52)
HGB BLD-MCNC: 8.5 G/DL (ref 13.5–17.5)
HYPOCHROMIA BLD QL SMEAR: ABNORMAL
IMM GRANULOCYTES # BLD AUTO: 0.01 X10*3/UL (ref 0–0.5)
IMM GRANULOCYTES NFR BLD AUTO: 0.7 % (ref 0–0.9)
LYMPHOCYTES # BLD MANUAL: 0.75 X10*3/UL (ref 0.8–3)
LYMPHOCYTES NFR BLD MANUAL: 50 %
MCH RBC QN AUTO: 35.1 PG (ref 26–34)
MCHC RBC AUTO-ENTMCNC: 33.1 G/DL (ref 32–36)
MCV RBC AUTO: 106 FL (ref 80–100)
MONOCYTES # BLD MANUAL: 0.02 X10*3/UL (ref 0.05–0.8)
MONOCYTES NFR BLD MANUAL: 1 %
NEUTROPHILS # BLD MANUAL: 0.68 X10*3/UL (ref 1.6–5.5)
NEUTS BAND # BLD MANUAL: 0.02 X10*3/UL (ref 0–0.5)
NEUTS BAND NFR BLD MANUAL: 1 %
NEUTS SEG # BLD MANUAL: 0.66 X10*3/UL (ref 1.6–5)
NEUTS SEG NFR BLD MANUAL: 44 %
NRBC BLD-RTO: 0 /100 WBCS (ref 0–0)
OVALOCYTES BLD QL SMEAR: ABNORMAL
PLATELET # BLD AUTO: 93 X10*3/UL (ref 150–450)
POTASSIUM SERPL-SCNC: 4.5 MMOL/L (ref 3.5–5.3)
PROT SERPL-MCNC: 6.5 G/DL (ref 6.4–8.2)
RBC # BLD AUTO: 2.42 X10*6/UL (ref 4.5–5.9)
RBC MORPH BLD: ABNORMAL
SCHISTOCYTES BLD QL SMEAR: ABNORMAL
SODIUM SERPL-SCNC: 137 MMOL/L (ref 136–145)
TOTAL CELLS COUNTED BLD: 100
WBC # BLD AUTO: 1.5 X10*3/UL (ref 4.4–11.3)

## 2024-02-13 PROCEDURE — 88185 FLOWCYTOMETRY/TC ADD-ON: CPT | Mod: TC,SAMLAB

## 2024-02-13 PROCEDURE — 85007 BL SMEAR W/DIFF WBC COUNT: CPT

## 2024-02-13 PROCEDURE — 1157F ADVNC CARE PLAN IN RCRD: CPT

## 2024-02-13 PROCEDURE — 96372 THER/PROPH/DIAG INJ SC/IM: CPT

## 2024-02-13 PROCEDURE — 88189 FLOWCYTOMETRY/READ 16 & >: CPT

## 2024-02-13 PROCEDURE — 2500000004 HC RX 250 GENERAL PHARMACY W/ HCPCS (ALT 636 FOR OP/ED): Mod: JZ,EC

## 2024-02-13 PROCEDURE — 3074F SYST BP LT 130 MM HG: CPT

## 2024-02-13 PROCEDURE — 1126F AMNT PAIN NOTED NONE PRSNT: CPT

## 2024-02-13 PROCEDURE — 36415 COLL VENOUS BLD VENIPUNCTURE: CPT | Mod: TC,SAMLAB | Performed by: FAMILY MEDICINE

## 2024-02-13 PROCEDURE — 3078F DIAST BP <80 MM HG: CPT

## 2024-02-13 PROCEDURE — 80053 COMPREHEN METABOLIC PANEL: CPT

## 2024-02-13 PROCEDURE — 99213 OFFICE O/P EST LOW 20 MIN: CPT

## 2024-02-13 PROCEDURE — 1036F TOBACCO NON-USER: CPT

## 2024-02-13 PROCEDURE — 85027 COMPLETE CBC AUTOMATED: CPT

## 2024-02-13 PROCEDURE — 1159F MED LIST DOCD IN RCRD: CPT

## 2024-02-13 RX ORDER — FAMOTIDINE 10 MG/ML
20 INJECTION INTRAVENOUS ONCE AS NEEDED
Status: CANCELLED | OUTPATIENT
Start: 2024-03-05

## 2024-02-13 RX ORDER — EPINEPHRINE 0.3 MG/.3ML
0.3 INJECTION SUBCUTANEOUS EVERY 5 MIN PRN
Status: CANCELLED | OUTPATIENT
Start: 2024-03-05

## 2024-02-13 RX ORDER — ALBUTEROL SULFATE 0.83 MG/ML
3 SOLUTION RESPIRATORY (INHALATION) AS NEEDED
Status: CANCELLED | OUTPATIENT
Start: 2024-03-05

## 2024-02-13 RX ORDER — DIPHENHYDRAMINE HYDROCHLORIDE 50 MG/ML
50 INJECTION INTRAMUSCULAR; INTRAVENOUS AS NEEDED
Status: CANCELLED | OUTPATIENT
Start: 2024-03-05

## 2024-02-13 RX ADMIN — DARBEPOETIN ALFA 300 MCG: 300 INJECTION, SOLUTION INTRAVENOUS; SUBCUTANEOUS at 14:46

## 2024-02-13 ASSESSMENT — PAIN SCALES - GENERAL: PAINLEVEL: 0-NO PAIN

## 2024-02-13 NOTE — PROGRESS NOTES
Flow cytometry added to todays labs  After Aranesp dose today  - CNP will be changing to every 2 weeks  2/27 2pm stat lab and Darbepoetin  3/12 130 stat lab            2pm CNP           230 Darpoetin  Ortho Bps done today- were negative  Reviewed AVS with patient- patient verbalizes understanding

## 2024-02-13 NOTE — PROGRESS NOTES
Patient ID: Zack Medrano is a 88 y.o. male.    Subjective    HPI  85 yo man with hx of reflux gastritis, coronary artery disease/ MI on aspirin, DM, hypertension, obesity, sleep apnea, history of PE on Eliquis, chronic kidney disease stage III, DJD, admission Jan 2021 for COVID19 pneumonia, referred for pancytopenia. He has chronic anemia with hg of around 12 g/dl, MCV of 107, his recent cbc showed decreased wbc of 3.6K , ANC of 2.0, plts of 117 , labs in April 2022 showed a ferritin of 154, Tsat of 42%, folic acid of 22.7, b12 of 601, his Cr is around 2.1      he feels well, he complains of fatigue   he is on Eliquis, he has been on anticoagulation for a long time per patient   he is able to bowen put ADLs  no SOB , no chest   no bleeding, no hematochezia, no hematuria, no melena, no other bleeding   he has LINDA and is on CPAP, he is generally compliant but some nights get frustrated and does not use it   eating well, no weight loss   has easy bruising on bumping or hitting himself   no new fever or infections   he has morning hip pain and also his wrists      interval history- 2/13/24    No recent falls   No recent illness   No fevers, chills, night sweats     Energy remains decent  Occasional dizziness  Feels unsteady at times ambulating   Placed back on baby aspirin      Eating and drinking well      remains off eliquis, Juvano from [place]  Blood sugars remains on insulin 40 units of Lantus in PM   BS ranging from 129-300    No SOB  Productive am cough, white/clear phlegm  No chest pain or pressure      remains on oxygen and CPAP, sleeping well all night long      Bowels are regular, denies any hematochezia or melena   taking 1 iron daily, tolerating well      No urinary issues, no dysuria or hematuria   continues to follow nephrology in [place]       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  There were no vitals taken for this visit.     Physical Exam  Vitals and nursing note reviewed.   Constitutional:       Appearance: Normal appearance.   HENT:      Head: Normocephalic and atraumatic.      Mouth/Throat:      Mouth: Mucous membranes are moist.      Pharynx: Oropharynx is clear.   Eyes:      General: No scleral icterus.     Extraocular Movements: Extraocular movements intact.      Conjunctiva/sclera: Conjunctivae normal.   Cardiovascular:      Rate and Rhythm: Normal rate and regular rhythm.      Pulses: Normal pulses.      Heart sounds: Normal heart sounds. No murmur heard.  Pulmonary:      Effort: Pulmonary effort is normal. No respiratory distress.      Breath sounds: Normal breath sounds.   Abdominal:      General: There is no distension.      Palpations: Abdomen is soft. There is no mass.      Tenderness: There is no abdominal tenderness.   Musculoskeletal:         General: No tenderness. Normal range of motion.      Cervical back: Normal range of motion.      Right lower leg: Edema present.      Left lower leg: Edema present.   Skin:     General: Skin is warm and dry.   Neurological:      General: No focal deficit present.      Mental Status: He is alert and oriented to person, place, and time.      Motor: Weakness present.   Psychiatric:         Mood and Affect: Mood normal.         Behavior: Behavior normal. Behavior is cooperative.         Thought Content: Thought content normal.         Judgment: Judgment normal.         Performance Status:  Asymptomatic      Assessment/Plan      1. 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     chronic anemia with largely a component of anemia of CKD      plan to check remaining work up for anemia:  -hemolysis labs: LDH, retic, haptoglobin    -monoclonal gammopathy: SPEP, UPEP       mild leukopenia and thrombocytopenia:   -check US abd for hepatosplenomegaly   -hepatitis and HIV   -b12 and folic acid were normal   -CHRISTINA      send  for smear review and flow for an abnormal cell population detection      we discussed that if work up is negative an evolving myeloid neoplasm is possible nevertheless the counts are mildly low and would observe rather pursuing a bone marrow biopsy      7/27/22- his work up showed an small abnormal B cell population 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 but also there was a phenotypic abnormality of granulocyte and possibility of MDS can not be ruled out   we discussed above however due to the counts being mildly low we discussed deferring marrow exam at this time  US showed fatty liver but no splenomegaly   other work up negative   will plan to check NGS for myeloid mutations with next labs and check an EPO level   at this time will continue surveillance of cbc and check a repeat in 3 months      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   his RIPSS score is low or very low risk depending on the karyotype which is pending   for his anemia we discussed initiating NING with Aranesp but he prefers to defer now   will monitor the hg and if it drops < 10 g/dl will initiate      1/13/23   his RIPSS score is very low  at this time he is clinically stable, and the counts have remained stable   his fatigue is also improved   we will continue count monitor , consider NING if hg drops      4/17/23  patient remains clinically stable, but his Hgb has dropped to 9 g/dl  discussed initiating NING, patient would like to defer at this time and reconsider if Hg remains below 10 d/gl  fatigue remains unchanged, denies any abnormal bleeding or bruising, no overt bleeding  will continue to monitor labs and plan to initiate NING if Hgb remains below 10 g/dl        2/13/24  Hg has trended down to 8.5, WBC has trended down to 1.5. Platelets remains around baseline at 93k. Patient feeling well.  Energy is decent. Appetite is good. Blood Sugars remain to be uncontrolled ranging from  "129-300, follows with PCP. Denies any SOB. No abnormal bleeding or bruising. Denies any constitutional \"B\" symptoms.      Patient is currently receiving Darbepoetin 300mcg every 3 weeks, and tolerating well. Patients counts remain low, will increase patient's frequency to every 2 weeks in hopes it will increase his counts. Will remain with same dose and frequency at this time.      Patient to return in 2 weeks for labs and Darbepoetin 300mcg     Discussed consideration for bone marrow biopsy if counts remain low. Will hold off at this time and repeat labs in 4 weeks.      RTC 4 weeks with labs (CBC w/diff, CMP, Ferritin, Iron Studies B12)             Malorie Shaffer, APRN-CNP           "

## 2024-02-13 NOTE — PATIENT INSTRUCTIONS
Due for a Darbopoetin today  Hg is 8.5 increasing Darbopoetin to q 2weeks   Waiting for WBC result, discussed possible consideration for repeat bone marrow Bx     RTC in 4 weeks with labs prior

## 2024-02-19 ENCOUNTER — LAB (OUTPATIENT)
Dept: LAB | Facility: LAB | Age: 89
End: 2024-02-19
Payer: MEDICARE

## 2024-02-19 DIAGNOSIS — G47.33 OSA ON CPAP: ICD-10-CM

## 2024-02-19 DIAGNOSIS — N18.32 TYPE 2 DIABETES MELLITUS WITH STAGE 3B CHRONIC KIDNEY DISEASE, WITHOUT LONG-TERM CURRENT USE OF INSULIN (MULTI): ICD-10-CM

## 2024-02-19 DIAGNOSIS — G25.0 TREMOR, ESSENTIAL: ICD-10-CM

## 2024-02-19 DIAGNOSIS — K21.9 GASTROESOPHAGEAL REFLUX DISEASE WITHOUT ESOPHAGITIS: ICD-10-CM

## 2024-02-19 DIAGNOSIS — Z95.5 S/P CORONARY ARTERY STENT PLACEMENT: ICD-10-CM

## 2024-02-19 DIAGNOSIS — Z00.00 ROUTINE GENERAL MEDICAL EXAMINATION AT HEALTH CARE FACILITY: ICD-10-CM

## 2024-02-19 DIAGNOSIS — E78.2 MIXED HYPERLIPIDEMIA: ICD-10-CM

## 2024-02-19 DIAGNOSIS — I10 PRIMARY HYPERTENSION: ICD-10-CM

## 2024-02-19 DIAGNOSIS — M19.90 ARTHRITIS: ICD-10-CM

## 2024-02-19 DIAGNOSIS — D70.9 NEUTROPENIA, UNSPECIFIED TYPE (CMS-HCC): ICD-10-CM

## 2024-02-19 DIAGNOSIS — I25.10 CORONARY ARTERY DISEASE INVOLVING NATIVE HEART, UNSPECIFIED VESSEL OR LESION TYPE, UNSPECIFIED WHETHER ANGINA PRESENT: ICD-10-CM

## 2024-02-19 DIAGNOSIS — E11.22 TYPE 2 DIABETES MELLITUS WITH STAGE 3B CHRONIC KIDNEY DISEASE, WITHOUT LONG-TERM CURRENT USE OF INSULIN (MULTI): ICD-10-CM

## 2024-02-19 DIAGNOSIS — D69.6 THROMBOCYTOPENIA (CMS-HCC): ICD-10-CM

## 2024-02-19 DIAGNOSIS — N18.32 STAGE 3B CHRONIC KIDNEY DISEASE (MULTI): ICD-10-CM

## 2024-02-19 LAB
ALBUMIN SERPL BCP-MCNC: 4 G/DL (ref 3.4–5)
ALP SERPL-CCNC: 94 U/L (ref 33–136)
ALT SERPL W P-5'-P-CCNC: 17 U/L (ref 10–52)
ANION GAP SERPL CALC-SCNC: 14 MMOL/L (ref 10–20)
AST SERPL W P-5'-P-CCNC: 26 U/L (ref 9–39)
BASO STIPL BLD QL SMEAR: PRESENT
BASOPHILS # BLD AUTO: 0 X10*3/UL (ref 0–0.1)
BASOPHILS NFR BLD AUTO: 0 %
BILIRUB SERPL-MCNC: 1 MG/DL (ref 0–1.2)
BUN SERPL-MCNC: 43 MG/DL (ref 6–23)
BURR CELLS BLD QL SMEAR: NORMAL
CALCIUM SERPL-MCNC: 9.6 MG/DL (ref 8.6–10.3)
CHLORIDE SERPL-SCNC: 101 MMOL/L (ref 98–107)
CHOLEST SERPL-MCNC: 90 MG/DL (ref 0–199)
CHOLESTEROL/HDL RATIO: 3.1
CO2 SERPL-SCNC: 26 MMOL/L (ref 21–32)
CREAT SERPL-MCNC: 2.41 MG/DL (ref 0.5–1.3)
DACRYOCYTES BLD QL SMEAR: NORMAL
EGFRCR SERPLBLD CKD-EPI 2021: 25 ML/MIN/1.73M*2
EOSINOPHIL # BLD AUTO: 0.04 X10*3/UL (ref 0–0.4)
EOSINOPHIL NFR BLD AUTO: 2.4 %
ERYTHROCYTE [DISTWIDTH] IN BLOOD BY AUTOMATED COUNT: 19.3 % (ref 11.5–14.5)
EST. AVERAGE GLUCOSE BLD GHB EST-MCNC: 252 MG/DL
GLUCOSE SERPL-MCNC: 219 MG/DL (ref 74–99)
HBA1C MFR BLD: 10.4 %
HCT VFR BLD AUTO: 25.2 % (ref 41–52)
HDLC SERPL-MCNC: 29 MG/DL
HGB BLD-MCNC: 8.4 G/DL (ref 13.5–17.5)
HYPOCHROMIA BLD QL SMEAR: NORMAL
IMM GRANULOCYTES # BLD AUTO: 0.01 X10*3/UL (ref 0–0.5)
IMM GRANULOCYTES NFR BLD AUTO: 0.6 % (ref 0–0.9)
LDLC SERPL CALC-MCNC: 31 MG/DL
LYMPHOCYTES # BLD AUTO: 0.85 X10*3/UL (ref 0.8–3)
LYMPHOCYTES NFR BLD AUTO: 51.2 %
MCH RBC QN AUTO: 35.7 PG (ref 26–34)
MCHC RBC AUTO-ENTMCNC: 33.3 G/DL (ref 32–36)
MCV RBC AUTO: 107 FL (ref 80–100)
MONOCYTES # BLD AUTO: 0.1 X10*3/UL (ref 0.05–0.8)
MONOCYTES NFR BLD AUTO: 6 %
NEUTROPHILS # BLD AUTO: 0.66 X10*3/UL (ref 1.6–5.5)
NEUTROPHILS NFR BLD AUTO: 39.8 %
NON HDL CHOLESTEROL: 61 MG/DL (ref 0–149)
NRBC BLD-RTO: 1.8 /100 WBCS (ref 0–0)
OVALOCYTES BLD QL SMEAR: NORMAL
PLATELET # BLD AUTO: 101 X10*3/UL (ref 150–450)
POLYCHROMASIA BLD QL SMEAR: NORMAL
POTASSIUM SERPL-SCNC: 4.3 MMOL/L (ref 3.5–5.3)
PROT SERPL-MCNC: 6.9 G/DL (ref 6.4–8.2)
RBC # BLD AUTO: 2.35 X10*6/UL (ref 4.5–5.9)
RBC MORPH BLD: NORMAL
SCHISTOCYTES BLD QL SMEAR: NORMAL
SODIUM SERPL-SCNC: 137 MMOL/L (ref 136–145)
TARGETS BLD QL SMEAR: NORMAL
TRIGL SERPL-MCNC: 150 MG/DL (ref 0–149)
TSH SERPL-ACNC: 3.97 MIU/L (ref 0.44–3.98)
VIT B12 SERPL-MCNC: 511 PG/ML (ref 211–911)
VLDL: 30 MG/DL (ref 0–40)
WBC # BLD AUTO: 1.7 X10*3/UL (ref 4.4–11.3)

## 2024-02-19 PROCEDURE — 36415 COLL VENOUS BLD VENIPUNCTURE: CPT

## 2024-02-19 PROCEDURE — 85025 COMPLETE CBC W/AUTO DIFF WBC: CPT

## 2024-02-19 PROCEDURE — 80061 LIPID PANEL: CPT

## 2024-02-19 PROCEDURE — 82607 VITAMIN B-12: CPT

## 2024-02-19 PROCEDURE — 80053 COMPREHEN METABOLIC PANEL: CPT

## 2024-02-19 PROCEDURE — 83036 HEMOGLOBIN GLYCOSYLATED A1C: CPT

## 2024-02-19 PROCEDURE — 84443 ASSAY THYROID STIM HORMONE: CPT

## 2024-02-21 ENCOUNTER — OFFICE VISIT (OUTPATIENT)
Dept: PRIMARY CARE | Facility: CLINIC | Age: 89
End: 2024-02-21
Payer: MEDICARE

## 2024-02-21 VITALS
OXYGEN SATURATION: 95 % | DIASTOLIC BLOOD PRESSURE: 48 MMHG | WEIGHT: 225.1 LBS | HEART RATE: 68 BPM | BODY MASS INDEX: 35.33 KG/M2 | SYSTOLIC BLOOD PRESSURE: 120 MMHG | HEIGHT: 67 IN

## 2024-02-21 DIAGNOSIS — D46.9 MYELODYSPLASTIC SYNDROME, UNSPECIFIED (MULTI): ICD-10-CM

## 2024-02-21 DIAGNOSIS — N18.4 ANEMIA DUE TO STAGE 4 CHRONIC KIDNEY DISEASE (MULTI): ICD-10-CM

## 2024-02-21 DIAGNOSIS — I73.9 PERIPHERAL VASCULAR DISEASE, UNSPECIFIED (CMS-HCC): ICD-10-CM

## 2024-02-21 DIAGNOSIS — R60.9 EDEMA, UNSPECIFIED TYPE: ICD-10-CM

## 2024-02-21 DIAGNOSIS — N18.4 TYPE 2 DIABETES MELLITUS WITH STAGE 4 CHRONIC KIDNEY DISEASE, WITH LONG-TERM CURRENT USE OF INSULIN (MULTI): ICD-10-CM

## 2024-02-21 DIAGNOSIS — I25.10 CORONARY ARTERY DISEASE INVOLVING NATIVE HEART, UNSPECIFIED VESSEL OR LESION TYPE, UNSPECIFIED WHETHER ANGINA PRESENT: Primary | ICD-10-CM

## 2024-02-21 DIAGNOSIS — D69.6 THROMBOCYTOPENIA (CMS-HCC): ICD-10-CM

## 2024-02-21 DIAGNOSIS — I10 PRIMARY HYPERTENSION: ICD-10-CM

## 2024-02-21 DIAGNOSIS — D63.1 ANEMIA DUE TO STAGE 4 CHRONIC KIDNEY DISEASE (MULTI): ICD-10-CM

## 2024-02-21 DIAGNOSIS — E78.2 MIXED HYPERLIPIDEMIA: ICD-10-CM

## 2024-02-21 DIAGNOSIS — E11.22 TYPE 2 DIABETES MELLITUS WITH STAGE 4 CHRONIC KIDNEY DISEASE, WITH LONG-TERM CURRENT USE OF INSULIN (MULTI): ICD-10-CM

## 2024-02-21 DIAGNOSIS — N18.30 CHRONIC KIDNEY DISEASE, STAGE 3 UNSPECIFIED (MULTI): ICD-10-CM

## 2024-02-21 DIAGNOSIS — D61.818 OTHER PANCYTOPENIA (MULTI): ICD-10-CM

## 2024-02-21 DIAGNOSIS — D70.9 NEUTROPENIA, UNSPECIFIED TYPE (CMS-HCC): ICD-10-CM

## 2024-02-21 DIAGNOSIS — G47.33 OSA ON CPAP: ICD-10-CM

## 2024-02-21 DIAGNOSIS — D72.819 LEUKOPENIA, UNSPECIFIED TYPE: ICD-10-CM

## 2024-02-21 DIAGNOSIS — I10 ESSENTIAL (PRIMARY) HYPERTENSION: ICD-10-CM

## 2024-02-21 DIAGNOSIS — G25.0 TREMOR, ESSENTIAL: ICD-10-CM

## 2024-02-21 DIAGNOSIS — E11.22 TYPE 2 DIABETES MELLITUS WITH DIABETIC CHRONIC KIDNEY DISEASE (MULTI): ICD-10-CM

## 2024-02-21 DIAGNOSIS — Z79.4 TYPE 2 DIABETES MELLITUS WITH STAGE 4 CHRONIC KIDNEY DISEASE, WITH LONG-TERM CURRENT USE OF INSULIN (MULTI): ICD-10-CM

## 2024-02-21 PROCEDURE — 1036F TOBACCO NON-USER: CPT | Performed by: FAMILY MEDICINE

## 2024-02-21 PROCEDURE — 3074F SYST BP LT 130 MM HG: CPT | Performed by: FAMILY MEDICINE

## 2024-02-21 PROCEDURE — 99214 OFFICE O/P EST MOD 30 MIN: CPT | Performed by: FAMILY MEDICINE

## 2024-02-21 PROCEDURE — 1160F RVW MEDS BY RX/DR IN RCRD: CPT | Performed by: FAMILY MEDICINE

## 2024-02-21 PROCEDURE — 3078F DIAST BP <80 MM HG: CPT | Performed by: FAMILY MEDICINE

## 2024-02-21 PROCEDURE — 1157F ADVNC CARE PLAN IN RCRD: CPT | Performed by: FAMILY MEDICINE

## 2024-02-21 PROCEDURE — 1126F AMNT PAIN NOTED NONE PRSNT: CPT | Performed by: FAMILY MEDICINE

## 2024-02-21 PROCEDURE — 1159F MED LIST DOCD IN RCRD: CPT | Performed by: FAMILY MEDICINE

## 2024-02-21 RX ORDER — ATORVASTATIN CALCIUM 40 MG/1
40 TABLET, FILM COATED ORAL DAILY
Qty: 90 TABLET | Refills: 1 | Status: SHIPPED | OUTPATIENT
Start: 2024-02-21 | End: 2024-04-11 | Stop reason: SDUPTHER

## 2024-02-21 RX ORDER — AMLODIPINE BESYLATE 5 MG/1
5 TABLET ORAL
Qty: 90 TABLET | Refills: 1 | Status: SHIPPED | OUTPATIENT
Start: 2024-02-21 | End: 2024-08-19

## 2024-02-21 RX ORDER — ALLOPURINOL 100 MG/1
100 TABLET ORAL DAILY
Qty: 90 TABLET | Refills: 1 | Status: SHIPPED | OUTPATIENT
Start: 2024-02-21

## 2024-02-21 RX ORDER — SPIRONOLACTONE 25 MG/1
25 TABLET ORAL DAILY
Qty: 90 TABLET | Refills: 1 | Status: SHIPPED | OUTPATIENT
Start: 2024-02-21 | End: 2024-08-19

## 2024-02-21 RX ORDER — INSULIN GLARGINE 100 [IU]/ML
50 INJECTION, SOLUTION SUBCUTANEOUS NIGHTLY
Qty: 15 ML | Refills: 11 | Status: SHIPPED | OUTPATIENT
Start: 2024-02-21 | End: 2025-02-20

## 2024-02-21 NOTE — PROGRESS NOTES
Subjective   Zack Medrano is a 88 y.o. male who presents for No chief complaint on file..  Here for routine follow up HTN, DM, CKD (Dr Nguyen), CAD ([facility]), high chol, thrombocytopenia/anemia/pancytopenia (Dr Dash), tremor (Dr Haque), LINDA, h/o PE, GI bleed, LINDA on CPAP ([place]).  He does have some issues with dizziness when standing up quickly.  We will decrease his amlodipine as his BP is on the lower side today.      He would like to get a refill of spironolactone - it looks like it came from the doctor in [place] (Dr Garcia) - he has been taking it and is finding it helpful for his swelling.      His sugars are slightly better but still elevated, we will increase his lantus to 50 units daily.              Objective   Visit Vitals  BP (!) 120/48 (BP Location: Left arm, Patient Position: Sitting, BP Cuff Size: Adult)   Pulse 68      Physical Exam  Vitals reviewed.   Constitutional:       General: He is not in acute distress.  Cardiovascular:      Rate and Rhythm: Normal rate and regular rhythm.      Heart sounds: No murmur heard.  Pulmonary:      Effort: Pulmonary effort is normal. No respiratory distress.      Breath sounds: Normal breath sounds.   Skin:     General: Skin is warm and dry.   Neurological:      General: No focal deficit present.      Mental Status: He is alert. Mental status is at baseline.        Latest Reference Range & Units 02/19/24 08:02   GLUCOSE 74 - 99 mg/dL 219 (H)   SODIUM 136 - 145 mmol/L 137   POTASSIUM 3.5 - 5.3 mmol/L 4.3   CHLORIDE 98 - 107 mmol/L 101   Bicarbonate 21 - 32 mmol/L 26   Anion Gap 10 - 20 mmol/L 14   Blood Urea Nitrogen 6 - 23 mg/dL 43 (H)   Creatinine 0.50 - 1.30 mg/dL 2.41 (H)   EGFR >60 mL/min/1.73m*2 25 (L)   Calcium 8.6 - 10.3 mg/dL 9.6   Albumin 3.4 - 5.0 g/dL 4.0   Alkaline Phosphatase 33 - 136 U/L 94   ALT 10 - 52 U/L 17   AST 9 - 39 U/L 26   Bilirubin Total 0.0 - 1.2 mg/dL 1.0   HDL CHOLESTEROL mg/dL 29.0   Cholesterol/HDL Ratio  3.1   LDL Calculated <=99  mg/dL 31   VLDL 0 - 40 mg/dL 30   TRIGLYCERIDES 0 - 149 mg/dL 150 (H)   Non HDL Cholesterol 0 - 149 mg/dL 61   Total Protein 6.4 - 8.2 g/dL 6.9   CHOLESTEROL 0 - 199 mg/dL 90   Vitamin B12 211 - 911 pg/mL 511   Hemoglobin A1C see below % 10.4 (H)   Thyroid Stimulating Hormone 0.44 - 3.98 mIU/L 3.97   Estimated Average Glucose Not Established mg/dL 252   WBC 4.4 - 11.3 x10*3/uL 1.7 (L)   nRBC 0.0 - 0.0 /100 WBCs 1.8 (H)   RBC 4.50 - 5.90 x10*6/uL 2.35 (L)   HEMOGLOBIN 13.5 - 17.5 g/dL 8.4 (L)   HEMATOCRIT 41.0 - 52.0 % 25.2 (L)   MCV 80 - 100 fL 107 (H)   MCH 26.0 - 34.0 pg 35.7 (H)   MCHC 32.0 - 36.0 g/dL 33.3   RED CELL DISTRIBUTION WIDTH 11.5 - 14.5 % 19.3 (H)   Platelets 150 - 450 x10*3/uL 101 (L)   Neutrophils % 40.0 - 80.0 % 39.8   Immature Granulocytes %, Automated 0.0 - 0.9 % 0.6   Lymphocytes % 13.0 - 44.0 % 51.2   Monocytes % 2.0 - 10.0 % 6.0   Eosinophils % 0.0 - 6.0 % 2.4   Basophils % 0.0 - 2.0 % 0.0   Neutrophils Absolute 1.60 - 5.50 x10*3/uL 0.66 (L)   Immature Granulocytes Absolute, Automated 0.00 - 0.50 x10*3/uL 0.01   Lymphocytes Absolute 0.80 - 3.00 x10*3/uL 0.85   Monocytes Absolute 0.05 - 0.80 x10*3/uL 0.10   Eosinophils Absolute 0.00 - 0.40 x10*3/uL 0.04   Basophils Absolute 0.00 - 0.10 x10*3/uL 0.00   RBC Morphology  See Below   Polychromasia  Mild   Hypochromia  Mild   RBC Fragments  Few   Target Cells  Few   Ovalocytes  Few   Teardrop Cells  Few   Abraham Cells  Few   Basophilic Stippling  Present   (H): Data is abnormally high  (L): Data is abnormally low    Assessment/Plan   Problem List Items Addressed This Visit       Other pancytopenia (CMS/[facility])    CAD (coronary artery disease) - Primary    Relevant Medications    amLODIPine (Norvasc) 5 mg tablet    Other Relevant Orders    Follow Up In Primary Care - Medicare Annual    Type 2 diabetes mellitus with stage 4 chronic kidney disease, with long-term current use of insulin (CMS/[facility])    Relevant Orders    Follow Up In Primary Care - Medicare  Annual    Hypertension    Relevant Medications    allopurinol (Zyloprim) 100 mg tablet    Other Relevant Orders    Follow Up In Primary Care - Medicare Annual    Mixed hyperlipidemia    Relevant Medications    atorvastatin (Lipitor) 40 mg tablet    Other Relevant Orders    Follow Up In Primary Care - Medicare Annual    Neutropenia (CMS/[facility])    Relevant Orders    Follow Up In Primary Care - Medicare Annual    LINDA on CPAP    Relevant Orders    Follow Up In Primary Care - Medicare Annual    Thrombocytopenia (CMS/[facility])    Relevant Orders    Follow Up In Primary Care - Medicare Annual    Tremor, essential    Relevant Orders    Follow Up In Primary Care - Medicare Annual    Myelodysplastic syndrome, unspecified (CMS/[facility])    Peripheral vascular disease, unspecified (CMS/[facility])     Other Visit Diagnoses       Leukopenia, unspecified type        Relevant Orders    Follow Up In Primary Care - Medicare Annual    Essential (primary) hypertension        Relevant Medications    amLODIPine (Norvasc) 5 mg tablet    Other Relevant Orders    Follow Up In Primary Care - Medicare Annual    Type 2 diabetes mellitus with diabetic chronic kidney disease (CMS/[facility])        Relevant Medications    insulin glargine (Lantus U-100 Insulin) 100 unit/mL injection    Other Relevant Orders    Comprehensive Metabolic Panel    Hemoglobin A1C    CBC and Auto Differential    Follow Up In Primary Care - Medicare Annual    Follow Up In Primary Care - Medicare Annual    Chronic kidney disease, stage 3 unspecified (CMS/[facility])        Relevant Medications    insulin glargine (Lantus U-100 Insulin) 100 unit/mL injection    Other Relevant Orders    Comprehensive Metabolic Panel    Hemoglobin A1C    CBC and Auto Differential    Follow Up In Primary Care - Medicare Annual    Edema, unspecified type        Relevant Medications    spironolactone (Aldactone) 25 mg tablet               Fabiola Dietz MD

## 2024-02-21 NOTE — PATIENT INSTRUCTIONS
Will decrease the amlodipine to 5 mg daily.  Will increase Lantus to 50 units daily.  Continue other current medications.  Follow up with specialists as scheduled.  Follow up in 3 months.

## 2024-02-23 LAB
CELL COUNT (BLOOD): 1.5 X10*3/UL
CELL POPULATIONS: NORMAL
DIAGNOSIS: NORMAL
FLOW DIFFERENTIAL: NORMAL
FLOW TEST ORDERED: NORMAL
LAB TEST METHOD: NORMAL
NUMBER OF CELLS COLLECTED: NORMAL
PATH REPORT.TOTAL CANCER: NORMAL
SIGNATURE COMMENT: NORMAL
SPECIMEN VIABILITY: NORMAL

## 2024-02-27 ENCOUNTER — INFUSION (OUTPATIENT)
Dept: HEMATOLOGY/ONCOLOGY | Facility: CLINIC | Age: 89
End: 2024-02-27
Payer: MEDICARE

## 2024-02-27 VITALS
BODY MASS INDEX: 34.98 KG/M2 | WEIGHT: 226 LBS | HEART RATE: 72 BPM | DIASTOLIC BLOOD PRESSURE: 60 MMHG | RESPIRATION RATE: 16 BRPM | OXYGEN SATURATION: 94 % | SYSTOLIC BLOOD PRESSURE: 119 MMHG | TEMPERATURE: 97.8 F

## 2024-02-27 DIAGNOSIS — D72.819 LEUKOPENIA, UNSPECIFIED TYPE: ICD-10-CM

## 2024-02-27 DIAGNOSIS — D46.9 MYELODYSPLASTIC SYNDROME, UNSPECIFIED (MULTI): ICD-10-CM

## 2024-02-27 DIAGNOSIS — D61.818 OTHER PANCYTOPENIA (MULTI): Primary | ICD-10-CM

## 2024-02-27 DIAGNOSIS — N18.4 STAGE 4 CHRONIC KIDNEY DISEASE (MULTI): ICD-10-CM

## 2024-02-27 DIAGNOSIS — D63.1 ANEMIA DUE TO STAGE 4 CHRONIC KIDNEY DISEASE (MULTI): ICD-10-CM

## 2024-02-27 DIAGNOSIS — N18.4 ANEMIA DUE TO STAGE 4 CHRONIC KIDNEY DISEASE (MULTI): ICD-10-CM

## 2024-02-27 DIAGNOSIS — D61.818 OTHER PANCYTOPENIA (MULTI): ICD-10-CM

## 2024-02-27 LAB
ALBUMIN SERPL BCP-MCNC: 4 G/DL (ref 3.4–5)
ALP SERPL-CCNC: 90 U/L (ref 33–136)
ALT SERPL W P-5'-P-CCNC: 19 U/L (ref 10–52)
ANION GAP SERPL CALC-SCNC: 15 MMOL/L (ref 10–20)
AST SERPL W P-5'-P-CCNC: 33 U/L (ref 9–39)
BASO STIPL BLD QL SMEAR: PRESENT
BASOPHILS # BLD AUTO: 0 X10*3/UL (ref 0–0.1)
BASOPHILS NFR BLD AUTO: 0 %
BILIRUB SERPL-MCNC: 1.4 MG/DL (ref 0–1.2)
BUN SERPL-MCNC: 45 MG/DL (ref 6–23)
BURR CELLS BLD QL SMEAR: NORMAL
CALCIUM SERPL-MCNC: 9.1 MG/DL (ref 8.6–10.3)
CHLORIDE SERPL-SCNC: 101 MMOL/L (ref 98–107)
CO2 SERPL-SCNC: 23 MMOL/L (ref 21–32)
CREAT SERPL-MCNC: 2.39 MG/DL (ref 0.5–1.3)
EGFRCR SERPLBLD CKD-EPI 2021: 25 ML/MIN/1.73M*2
EOSINOPHIL # BLD AUTO: 0.03 X10*3/UL (ref 0–0.4)
EOSINOPHIL NFR BLD AUTO: 2.2 %
ERYTHROCYTE [DISTWIDTH] IN BLOOD BY AUTOMATED COUNT: 19 % (ref 11.5–14.5)
GLUCOSE SERPL-MCNC: 233 MG/DL (ref 74–99)
HCT VFR BLD AUTO: 25.1 % (ref 41–52)
HGB BLD-MCNC: 8.3 G/DL (ref 13.5–17.5)
HYPOCHROMIA BLD QL SMEAR: NORMAL
IMM GRANULOCYTES # BLD AUTO: 0.01 X10*3/UL (ref 0–0.5)
IMM GRANULOCYTES NFR BLD AUTO: 0.7 % (ref 0–0.9)
LYMPHOCYTES # BLD AUTO: 0.61 X10*3/UL (ref 0.8–3)
LYMPHOCYTES NFR BLD AUTO: 44.2 %
MCH RBC QN AUTO: 35 PG (ref 26–34)
MCHC RBC AUTO-ENTMCNC: 33.1 G/DL (ref 32–36)
MCV RBC AUTO: 106 FL (ref 80–100)
MONOCYTES # BLD AUTO: 0.07 X10*3/UL (ref 0.05–0.8)
MONOCYTES NFR BLD AUTO: 5.1 %
NEUTROPHILS # BLD AUTO: 0.66 X10*3/UL (ref 1.6–5.5)
NEUTROPHILS NFR BLD AUTO: 47.8 %
NRBC BLD-RTO: 0 /100 WBCS (ref 0–0)
OVALOCYTES BLD QL SMEAR: NORMAL
PLATELET # BLD AUTO: 73 X10*3/UL (ref 150–450)
POTASSIUM SERPL-SCNC: 4.6 MMOL/L (ref 3.5–5.3)
PROT SERPL-MCNC: 6.9 G/DL (ref 6.4–8.2)
RBC # BLD AUTO: 2.37 X10*6/UL (ref 4.5–5.9)
RBC MORPH BLD: NORMAL
SCHISTOCYTES BLD QL SMEAR: NORMAL
SODIUM SERPL-SCNC: 134 MMOL/L (ref 136–145)
WBC # BLD AUTO: 1.4 X10*3/UL (ref 4.4–11.3)

## 2024-02-27 PROCEDURE — 2500000004 HC RX 250 GENERAL PHARMACY W/ HCPCS (ALT 636 FOR OP/ED): Mod: JZ

## 2024-02-27 PROCEDURE — 80053 COMPREHEN METABOLIC PANEL: CPT

## 2024-02-27 PROCEDURE — 85025 COMPLETE CBC W/AUTO DIFF WBC: CPT

## 2024-02-27 PROCEDURE — 96372 THER/PROPH/DIAG INJ SC/IM: CPT

## 2024-02-27 RX ORDER — DIPHENHYDRAMINE HYDROCHLORIDE 50 MG/ML
50 INJECTION INTRAMUSCULAR; INTRAVENOUS AS NEEDED
Status: CANCELLED | OUTPATIENT
Start: 2024-03-05

## 2024-02-27 RX ORDER — ALBUTEROL SULFATE 0.83 MG/ML
3 SOLUTION RESPIRATORY (INHALATION) AS NEEDED
Status: CANCELLED | OUTPATIENT
Start: 2024-02-27

## 2024-02-27 RX ORDER — EPINEPHRINE 0.3 MG/.3ML
0.3 INJECTION SUBCUTANEOUS EVERY 5 MIN PRN
Status: CANCELLED | OUTPATIENT
Start: 2024-03-05

## 2024-02-27 RX ORDER — DIPHENHYDRAMINE HYDROCHLORIDE 50 MG/ML
50 INJECTION INTRAMUSCULAR; INTRAVENOUS AS NEEDED
Status: CANCELLED | OUTPATIENT
Start: 2024-03-12

## 2024-02-27 RX ORDER — FAMOTIDINE 10 MG/ML
20 INJECTION INTRAVENOUS ONCE AS NEEDED
Status: CANCELLED | OUTPATIENT
Start: 2024-02-27

## 2024-02-27 RX ORDER — FAMOTIDINE 10 MG/ML
20 INJECTION INTRAVENOUS ONCE AS NEEDED
Status: CANCELLED | OUTPATIENT
Start: 2024-03-05

## 2024-02-27 RX ORDER — DIPHENHYDRAMINE HYDROCHLORIDE 50 MG/ML
50 INJECTION INTRAMUSCULAR; INTRAVENOUS AS NEEDED
Status: CANCELLED | OUTPATIENT
Start: 2024-02-27

## 2024-02-27 RX ORDER — EPINEPHRINE 0.3 MG/.3ML
0.3 INJECTION SUBCUTANEOUS EVERY 5 MIN PRN
Status: CANCELLED | OUTPATIENT
Start: 2024-02-27

## 2024-02-27 RX ORDER — EPINEPHRINE 0.3 MG/.3ML
0.3 INJECTION SUBCUTANEOUS EVERY 5 MIN PRN
Status: CANCELLED | OUTPATIENT
Start: 2024-03-12

## 2024-02-27 RX ORDER — ALBUTEROL SULFATE 0.83 MG/ML
3 SOLUTION RESPIRATORY (INHALATION) AS NEEDED
Status: CANCELLED | OUTPATIENT
Start: 2024-03-05

## 2024-02-27 RX ORDER — FAMOTIDINE 10 MG/ML
20 INJECTION INTRAVENOUS ONCE AS NEEDED
Status: CANCELLED | OUTPATIENT
Start: 2024-03-12

## 2024-02-27 RX ORDER — ALBUTEROL SULFATE 0.83 MG/ML
3 SOLUTION RESPIRATORY (INHALATION) AS NEEDED
Status: CANCELLED | OUTPATIENT
Start: 2024-03-12

## 2024-02-27 RX ADMIN — DARBEPOETIN ALFA 300 MCG: 300 INJECTION, SOLUTION INTRAVENOUS; SUBCUTANEOUS at 14:26

## 2024-02-28 DIAGNOSIS — I10 PRIMARY HYPERTENSION: ICD-10-CM

## 2024-02-29 RX ORDER — ALLOPURINOL 100 MG/1
100 TABLET ORAL DAILY
Qty: 90 TABLET | Refills: 1 | OUTPATIENT
Start: 2024-02-29

## 2024-03-05 ENCOUNTER — APPOINTMENT (OUTPATIENT)
Dept: HEMATOLOGY/ONCOLOGY | Facility: CLINIC | Age: 89
End: 2024-03-05
Payer: MEDICARE

## 2024-03-12 ENCOUNTER — INFUSION (OUTPATIENT)
Dept: HEMATOLOGY/ONCOLOGY | Facility: CLINIC | Age: 89
End: 2024-03-12
Payer: MEDICARE

## 2024-03-12 ENCOUNTER — OFFICE VISIT (OUTPATIENT)
Dept: HEMATOLOGY/ONCOLOGY | Facility: CLINIC | Age: 89
End: 2024-03-12
Payer: MEDICARE

## 2024-03-12 VITALS
OXYGEN SATURATION: 96 % | WEIGHT: 236.55 LBS | TEMPERATURE: 97.5 F | SYSTOLIC BLOOD PRESSURE: 143 MMHG | HEART RATE: 87 BPM | BODY MASS INDEX: 37.13 KG/M2 | DIASTOLIC BLOOD PRESSURE: 56 MMHG | RESPIRATION RATE: 16 BRPM | HEIGHT: 67 IN

## 2024-03-12 VITALS
HEART RATE: 87 BPM | TEMPERATURE: 97.5 F | OXYGEN SATURATION: 96 % | DIASTOLIC BLOOD PRESSURE: 56 MMHG | WEIGHT: 236.55 LBS | SYSTOLIC BLOOD PRESSURE: 143 MMHG | RESPIRATION RATE: 18 BRPM | BODY MASS INDEX: 36.61 KG/M2

## 2024-03-12 DIAGNOSIS — D61.818 OTHER PANCYTOPENIA (MULTI): ICD-10-CM

## 2024-03-12 DIAGNOSIS — D46.9 MYELODYSPLASTIC SYNDROME, UNSPECIFIED (MULTI): ICD-10-CM

## 2024-03-12 DIAGNOSIS — N18.4 ANEMIA DUE TO STAGE 4 CHRONIC KIDNEY DISEASE (MULTI): ICD-10-CM

## 2024-03-12 DIAGNOSIS — D72.819 LEUKOPENIA, UNSPECIFIED TYPE: ICD-10-CM

## 2024-03-12 DIAGNOSIS — D63.1 ANEMIA DUE TO STAGE 4 CHRONIC KIDNEY DISEASE (MULTI): ICD-10-CM

## 2024-03-12 DIAGNOSIS — N18.4 STAGE 4 CHRONIC KIDNEY DISEASE (MULTI): ICD-10-CM

## 2024-03-12 DIAGNOSIS — D46.9 MYELODYSPLASTIC SYNDROME, UNSPECIFIED (MULTI): Primary | ICD-10-CM

## 2024-03-12 LAB
ALBUMIN SERPL BCP-MCNC: 3.8 G/DL (ref 3.4–5)
ALBUMIN SERPL BCP-MCNC: 3.8 G/DL (ref 3.4–5)
ALP SERPL-CCNC: 97 U/L (ref 33–136)
ALT SERPL W P-5'-P-CCNC: 16 U/L (ref 10–52)
ANION GAP SERPL CALC-SCNC: 13 MMOL/L (ref 10–20)
ANION GAP SERPL CALC-SCNC: 13 MMOL/L (ref 10–20)
AST SERPL W P-5'-P-CCNC: 23 U/L (ref 9–39)
BASOPHILS # BLD AUTO: 0.01 X10*3/UL (ref 0–0.1)
BASOPHILS NFR BLD AUTO: 0.5 %
BILIRUB SERPL-MCNC: 1.1 MG/DL (ref 0–1.2)
BUN SERPL-MCNC: 38 MG/DL (ref 6–23)
BUN SERPL-MCNC: 38 MG/DL (ref 6–23)
CALCIUM SERPL-MCNC: 8.9 MG/DL (ref 8.6–10.3)
CALCIUM SERPL-MCNC: 8.9 MG/DL (ref 8.6–10.3)
CHLORIDE SERPL-SCNC: 106 MMOL/L (ref 98–107)
CHLORIDE SERPL-SCNC: 106 MMOL/L (ref 98–107)
CO2 SERPL-SCNC: 22 MMOL/L (ref 21–32)
CO2 SERPL-SCNC: 22 MMOL/L (ref 21–32)
CREAT SERPL-MCNC: 2.17 MG/DL (ref 0.5–1.3)
CREAT SERPL-MCNC: 2.17 MG/DL (ref 0.5–1.3)
DACRYOCYTES BLD QL SMEAR: NORMAL
EGFRCR SERPLBLD CKD-EPI 2021: 29 ML/MIN/1.73M*2
EGFRCR SERPLBLD CKD-EPI 2021: 29 ML/MIN/1.73M*2
EOSINOPHIL # BLD AUTO: 0.03 X10*3/UL (ref 0–0.4)
EOSINOPHIL NFR BLD AUTO: 1.6 %
ERYTHROCYTE [DISTWIDTH] IN BLOOD BY AUTOMATED COUNT: 20.1 % (ref 11.5–14.5)
FERRITIN SERPL-MCNC: 352 NG/ML (ref 20–300)
GLUCOSE SERPL-MCNC: 308 MG/DL (ref 74–99)
GLUCOSE SERPL-MCNC: 308 MG/DL (ref 74–99)
HCT VFR BLD AUTO: 24.4 % (ref 41–52)
HGB BLD-MCNC: 7.9 G/DL (ref 13.5–17.5)
IMM GRANULOCYTES # BLD AUTO: 0.02 X10*3/UL (ref 0–0.5)
IMM GRANULOCYTES NFR BLD AUTO: 1.1 % (ref 0–0.9)
IRON SATN MFR SERPL: 31 % (ref 25–45)
IRON SERPL-MCNC: 91 UG/DL (ref 35–150)
LYMPHOCYTES # BLD AUTO: 0.83 X10*3/UL (ref 0.8–3)
LYMPHOCYTES NFR BLD AUTO: 45.4 %
MCH RBC QN AUTO: 35 PG (ref 26–34)
MCHC RBC AUTO-ENTMCNC: 32.4 G/DL (ref 32–36)
MCV RBC AUTO: 108 FL (ref 80–100)
MONOCYTES # BLD AUTO: 0.07 X10*3/UL (ref 0.05–0.8)
MONOCYTES NFR BLD AUTO: 3.8 %
NEUTROPHILS # BLD AUTO: 0.87 X10*3/UL (ref 1.6–5.5)
NEUTROPHILS NFR BLD AUTO: 47.6 %
NRBC BLD-RTO: 1.1 /100 WBCS (ref 0–0)
OVALOCYTES BLD QL SMEAR: NORMAL
PHOSPHATE SERPL-MCNC: 3.2 MG/DL (ref 2.5–4.9)
PLATELET # BLD AUTO: 67 X10*3/UL (ref 150–450)
POTASSIUM SERPL-SCNC: 4.7 MMOL/L (ref 3.5–5.3)
POTASSIUM SERPL-SCNC: 4.7 MMOL/L (ref 3.5–5.3)
PROT SERPL-MCNC: 6.2 G/DL (ref 6.4–8.2)
RBC # BLD AUTO: 2.26 X10*6/UL (ref 4.5–5.9)
RBC MORPH BLD: NORMAL
SODIUM SERPL-SCNC: 136 MMOL/L (ref 136–145)
SODIUM SERPL-SCNC: 136 MMOL/L (ref 136–145)
TIBC SERPL-MCNC: 296 UG/DL (ref 240–445)
UIBC SERPL-MCNC: 205 UG/DL (ref 110–370)
WBC # BLD AUTO: 1.8 X10*3/UL (ref 4.4–11.3)

## 2024-03-12 PROCEDURE — 82668 ASSAY OF ERYTHROPOIETIN: CPT

## 2024-03-12 PROCEDURE — 3078F DIAST BP <80 MM HG: CPT

## 2024-03-12 PROCEDURE — 85025 COMPLETE CBC W/AUTO DIFF WBC: CPT

## 2024-03-12 PROCEDURE — 83540 ASSAY OF IRON: CPT

## 2024-03-12 PROCEDURE — 1036F TOBACCO NON-USER: CPT

## 2024-03-12 PROCEDURE — 96372 THER/PROPH/DIAG INJ SC/IM: CPT

## 2024-03-12 PROCEDURE — 2500000004 HC RX 250 GENERAL PHARMACY W/ HCPCS (ALT 636 FOR OP/ED): Mod: JZ,EC

## 2024-03-12 PROCEDURE — 82435 ASSAY OF BLOOD CHLORIDE: CPT

## 2024-03-12 PROCEDURE — 1126F AMNT PAIN NOTED NONE PRSNT: CPT

## 2024-03-12 PROCEDURE — 1159F MED LIST DOCD IN RCRD: CPT

## 2024-03-12 PROCEDURE — 99214 OFFICE O/P EST MOD 30 MIN: CPT

## 2024-03-12 PROCEDURE — 1160F RVW MEDS BY RX/DR IN RCRD: CPT

## 2024-03-12 PROCEDURE — 82728 ASSAY OF FERRITIN: CPT

## 2024-03-12 PROCEDURE — 3077F SYST BP >= 140 MM HG: CPT

## 2024-03-12 PROCEDURE — 1157F ADVNC CARE PLAN IN RCRD: CPT

## 2024-03-12 PROCEDURE — 80053 COMPREHEN METABOLIC PANEL: CPT

## 2024-03-12 RX ORDER — LACTULOSE 10 G/15ML
SOLUTION ORAL
COMMUNITY
Start: 2024-03-01

## 2024-03-12 RX ORDER — EPINEPHRINE 0.3 MG/.3ML
0.3 INJECTION SUBCUTANEOUS EVERY 5 MIN PRN
Status: CANCELLED | OUTPATIENT
Start: 2024-03-26

## 2024-03-12 RX ORDER — MAGNESIUM 64 MG (MAGNESIUM CHLORIDE) TABLET,DELAYED RELEASE
COMMUNITY
Start: 2024-03-01 | End: 2024-05-30 | Stop reason: SDUPTHER

## 2024-03-12 RX ORDER — FAMOTIDINE 10 MG/ML
20 INJECTION INTRAVENOUS ONCE AS NEEDED
Status: CANCELLED | OUTPATIENT
Start: 2024-03-26

## 2024-03-12 RX ORDER — ALBUTEROL SULFATE 0.83 MG/ML
3 SOLUTION RESPIRATORY (INHALATION) AS NEEDED
Status: CANCELLED | OUTPATIENT
Start: 2024-03-26

## 2024-03-12 RX ORDER — DIPHENHYDRAMINE HYDROCHLORIDE 50 MG/ML
50 INJECTION INTRAMUSCULAR; INTRAVENOUS AS NEEDED
Status: CANCELLED | OUTPATIENT
Start: 2024-03-26

## 2024-03-12 RX ADMIN — DARBEPOETIN ALFA 300 MCG: 300 INJECTION, SOLUTION INTRAVENOUS; SUBCUTANEOUS at 14:58

## 2024-03-12 ASSESSMENT — PAIN SCALES - GENERAL
PAINLEVEL: 0-NO PAIN
PAINLEVEL: 0-NO PAIN

## 2024-03-12 ASSESSMENT — COLUMBIA-SUICIDE SEVERITY RATING SCALE - C-SSRS
6. HAVE YOU EVER DONE ANYTHING, STARTED TO DO ANYTHING, OR PREPARED TO DO ANYTHING TO END YOUR LIFE?: NO
2. HAVE YOU ACTUALLY HAD ANY THOUGHTS OF KILLING YOURSELF?: NO
1. IN THE PAST MONTH, HAVE YOU WISHED YOU WERE DEAD OR WISHED YOU COULD GO TO SLEEP AND NOT WAKE UP?: NO

## 2024-03-12 NOTE — PROGRESS NOTES
Darbepoetin today  Ct scans 3/22 noon arrival- no contrast  Rtc 3/26 2pm stat labs                 230pm CNP                 3pm Darboetin injection  Referral placed for Bone Marrow BX at [facility]- they will call pt to schedule  Reviewed AVS with patient- patient verbalizes understanding

## 2024-03-12 NOTE — PROGRESS NOTES
Patient ID: Zack Medrano is a 88 y.o. male.    Subjective    HPI    Patient ID: Zack Medrano is a 88 y.o. male.        Subjective   HPI  85 yo man with hx of reflux gastritis, coronary artery disease/ MI on aspirin, DM, hypertension, obesity, sleep apnea, history of PE on Eliquis, chronic kidney disease stage III, DJD, admission Jan 2021 for COVID19 pneumonia, referred for pancytopenia. He has chronic anemia with hg of around 12 g/dl, MCV of 107, his recent cbc showed decreased wbc of 3.6K , ANC of 2.0, plts of 117 , labs in April 2022 showed a ferritin of 154, Tsat of 42%, folic acid of 22.7, b12 of 601, his Cr is around 2.1      he feels well, he complains of fatigue   he is on Eliquis, he has been on anticoagulation for a long time per patient   he is able to bowen put ADLs  no SOB , no chest   no bleeding, no hematochezia, no hematuria, no melena, no other bleeding   he has LINDA and is on CPAP, he is generally compliant but some nights get frustrated and does not use it   eating well, no weight loss   has easy bruising on bumping or hitting himself   no new fever or infections   he has morning hip pain and also his wrists      interval history- 3/12/24     No recent falls   No recent illness   No fevers, chills     Energy remains decent  Eating and drinking well   10lbs weight gain in 3 weeks   No longer having episodes of dizziness  Feels steady on feet   Placed back on baby aspirin      remains off eliquis, [facility] from [place]  Blood sugars remains on insulin 50 units of Lantus in PM   BS ranging from 197-250    Increased SOB,   Occasional productive am cough, white/clear phlegm  No chest pain or pressure      remains on oxygen and CPAP, sleeping well all night long   Occasional sweating around face and legs    Bowels are regular, denies any hematochezia or melena   No longer taking iron supplement      No urinary issues, no dysuria or hematuria   continues to follow nephrology in [place]       Unchanged  neuropathy in bilateral hands   remains to have involuntary tremors in hands      No major bleeding or bruising event         Objective    BSA: There is no height or weight on file to calculate BSA.  There were no vitals taken for this visit.     Physical Exam  Vitals and nursing note reviewed.   Constitutional:       Appearance: Normal appearance.   HENT:      Head: Normocephalic and atraumatic.      Mouth/Throat:      Pharynx: Oropharynx is clear.   Eyes:      General: No scleral icterus.     Extraocular Movements: Extraocular movements intact.      Conjunctiva/sclera: Conjunctivae normal.   Cardiovascular:      Rate and Rhythm: Normal rate and regular rhythm.      Pulses: Normal pulses.      Heart sounds: Normal heart sounds. No murmur heard.  Pulmonary:      Effort: Pulmonary effort is normal. No respiratory distress.      Breath sounds: Normal breath sounds.   Abdominal:      General: Bowel sounds are normal. There is no distension.      Tenderness: There is no abdominal tenderness.      Hernia: A hernia is present. Hernia is present in the umbilical area.      Comments: Abdominal firm, non-tender   Musculoskeletal:         General: Normal range of motion.      Cervical back: Normal range of motion.   Skin:     General: Skin is warm and dry.   Neurological:      General: No focal deficit present.      Mental Status: He is alert and oriented to person, place, and time.      Motor: Weakness present.   Psychiatric:         Mood and Affect: Mood normal.         Behavior: Behavior normal.         Thought Content: Thought content normal.         Judgment: Judgment normal.         Performance Status:  Symptomatic; fully ambulatory      Assessment/Plan      1. 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     chronic anemia with largely a component of anemia of CKD      plan to check remaining work up for anemia:  -hemolysis labs: LDH, retic, haptoglobin    -monoclonal gammopathy: SPEP, UPEP       mild leukopenia and thrombocytopenia:    -check US abd for hepatosplenomegaly   -hepatitis and HIV   -b12 and folic acid were normal   -CHRISTINA      send for smear review and flow for an abnormal cell population detection      we discussed that if work up is negative an evolving myeloid neoplasm is possible nevertheless the counts are mildly low and would observe rather pursuing a bone marrow biopsy      7/27/22- his work up showed an small abnormal B cell population 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 but also there was a phenotypic abnormality of granulocyte and possibility of MDS can not be ruled out   we discussed above however due to the counts being mildly low we discussed deferring marrow exam at this time  US showed fatty liver but no splenomegaly   other work up negative   will plan to check NGS for myeloid mutations with next labs and check an EPO level   at this time will continue surveillance of cbc and check a repeat in 3 months      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   his RIPSS score is low or very low risk depending on the karyotype which is pending   for his anemia we discussed initiating NING with Aranesp but he prefers to defer now   will monitor the hg and if it drops < 10 g/dl will initiate      1/13/23   his RIPSS score is very low  at this time he is clinically stable, and the counts have remained stable   his fatigue is also improved   we will continue count monitor , consider NING if hg drops      4/17/23  patient remains clinically stable, but his Hgb has dropped to 9 g/dl  discussed initiating NING, patient would like to defer at this time and reconsider if Hg remains below 10 d/gl  fatigue remains unchanged, denies any abnormal bleeding or bruising, no overt bleeding  will continue to monitor labs and plan to initiate NING if Hgb remains below 10 g/dl        3/12/24  Hg has trended down to 7.9, WBC remains low at 1.8.  Platelets have trended down to 67k.  Patient  "reports decreased energy. Appetite is good, eating and drinking well, reports 10lb weight gain over 3 weeks. Blood Sugars remain to be uncontrolled ranging from 197-250 follows with PCP. Increased SOB. No abnormal bleeding or bruising. Denies any constitutional \"B\" symptoms.     Patient reports that he feels as though his stomach is tight and distended. Will order CAP CT w/o contrast due to pts kidney function.      Patient is currently receiving Darbepoetin 300mcg every 2 weeks, and tolerating well. Patients counts remain low, despite the change of frequency with the Darbepoetin. Proceed with treatment today.     Discussed bone marrow biopsy with patient based on cytopenias. Patient would like to proceed with testing at this time. Order placed for CT Guided Bone Marrow Aspiration and Biopsy.     Plan:  Scheduled pt for CT Guided Bone Marrow Biopsy  CAP CT w/o contrast   Repeat lab CBC next week   Continue treatment with Darbepoetin q 2 weeks with labs prior   RTC 4 weeks , to discuss pathology results from bone marrow              NATALY Villa-CNP           "

## 2024-03-14 LAB — EPO SERPL-ACNC: 80 MU/ML (ref 4–27)

## 2024-03-18 DIAGNOSIS — D69.6 THROMBOCYTOPENIA (CMS-HCC): ICD-10-CM

## 2024-03-18 NOTE — PATIENT INSTRUCTIONS
Scheduled pt for CT Guided Bone Marrow Biopsy  CAP CT w/o contrast   Repeat lab CBC next week   Continue treatment with Darbepoetin q 2 weeks with labs prior   RTC 4 weeks , to discuss pathology results from bone marrow

## 2024-03-21 DIAGNOSIS — K25.9 GASTRIC ULCER, UNSPECIFIED CHRONICITY, UNSPECIFIED WHETHER GASTRIC ULCER HEMORRHAGE OR PERFORATION PRESENT: ICD-10-CM

## 2024-03-21 RX ORDER — SUCRALFATE 1 G/1
1 TABLET ORAL
Qty: 90 TABLET | Refills: 1 | Status: SHIPPED | OUTPATIENT
Start: 2024-03-21 | End: 2024-05-30 | Stop reason: SDUPTHER

## 2024-03-22 ENCOUNTER — HOSPITAL ENCOUNTER (OUTPATIENT)
Dept: RADIOLOGY | Facility: HOSPITAL | Age: 89
Discharge: HOME | End: 2024-03-22
Payer: MEDICARE

## 2024-03-22 ENCOUNTER — LAB (OUTPATIENT)
Dept: LAB | Facility: LAB | Age: 89
End: 2024-03-22
Payer: MEDICARE

## 2024-03-22 DIAGNOSIS — N18.4 STAGE 4 CHRONIC KIDNEY DISEASE (MULTI): ICD-10-CM

## 2024-03-22 DIAGNOSIS — D72.819 LEUKOPENIA, UNSPECIFIED TYPE: ICD-10-CM

## 2024-03-22 DIAGNOSIS — N18.4 CHRONIC KIDNEY DISEASE, STAGE 4 (SEVERE) (MULTI): ICD-10-CM

## 2024-03-22 DIAGNOSIS — D61.818 OTHER PANCYTOPENIA (MULTI): ICD-10-CM

## 2024-03-22 DIAGNOSIS — D46.9 MYELODYSPLASTIC SYNDROME, UNSPECIFIED (MULTI): ICD-10-CM

## 2024-03-22 DIAGNOSIS — N18.4 ANEMIA DUE TO STAGE 4 CHRONIC KIDNEY DISEASE (MULTI): ICD-10-CM

## 2024-03-22 DIAGNOSIS — D63.1 ANEMIA DUE TO STAGE 4 CHRONIC KIDNEY DISEASE (MULTI): ICD-10-CM

## 2024-03-22 LAB
ANION GAP SERPL CALC-SCNC: 14 MMOL/L (ref 10–20)
BUN SERPL-MCNC: 33 MG/DL (ref 6–23)
CALCIUM SERPL-MCNC: 9.3 MG/DL (ref 8.6–10.3)
CHLORIDE SERPL-SCNC: 101 MMOL/L (ref 98–107)
CO2 SERPL-SCNC: 25 MMOL/L (ref 21–32)
CREAT SERPL-MCNC: 2.07 MG/DL (ref 0.5–1.3)
EGFRCR SERPLBLD CKD-EPI 2021: 30 ML/MIN/1.73M*2
ERYTHROCYTE [DISTWIDTH] IN BLOOD BY AUTOMATED COUNT: 20.4 % (ref 11.5–14.5)
GLUCOSE SERPL-MCNC: 225 MG/DL (ref 74–99)
HCT VFR BLD AUTO: 25.5 % (ref 41–52)
HGB BLD-MCNC: 8.2 G/DL (ref 13.5–17.5)
MCH RBC QN AUTO: 34.6 PG (ref 26–34)
MCHC RBC AUTO-ENTMCNC: 32.2 G/DL (ref 32–36)
MCV RBC AUTO: 108 FL (ref 80–100)
NRBC BLD-RTO: 1.4 /100 WBCS (ref 0–0)
PLATELET # BLD AUTO: 112 X10*3/UL (ref 150–450)
POTASSIUM SERPL-SCNC: 4.6 MMOL/L (ref 3.5–5.3)
RBC # BLD AUTO: 2.37 X10*6/UL (ref 4.5–5.9)
SODIUM SERPL-SCNC: 135 MMOL/L (ref 136–145)
WBC # BLD AUTO: 1.5 X10*3/UL (ref 4.4–11.3)

## 2024-03-22 PROCEDURE — 71250 CT THORAX DX C-: CPT | Performed by: RADIOLOGY

## 2024-03-22 PROCEDURE — 82570 ASSAY OF URINE CREATININE: CPT

## 2024-03-22 PROCEDURE — 80048 BASIC METABOLIC PNL TOTAL CA: CPT

## 2024-03-22 PROCEDURE — 74176 CT ABD & PELVIS W/O CONTRAST: CPT | Performed by: RADIOLOGY

## 2024-03-22 PROCEDURE — 36415 COLL VENOUS BLD VENIPUNCTURE: CPT

## 2024-03-22 PROCEDURE — 71250 CT THORAX DX C-: CPT

## 2024-03-22 PROCEDURE — 85027 COMPLETE CBC AUTOMATED: CPT

## 2024-03-22 PROCEDURE — 82043 UR ALBUMIN QUANTITATIVE: CPT

## 2024-03-23 LAB
CREAT UR-MCNC: 20.3 MG/DL (ref 20–370)
MICROALBUMIN UR-MCNC: 33.5 MG/L
MICROALBUMIN/CREAT UR: 165 UG/MG CREAT

## 2024-03-25 ENCOUNTER — TELEPHONE (OUTPATIENT)
Dept: HEMATOLOGY/ONCOLOGY | Facility: CLINIC | Age: 89
End: 2024-03-25
Payer: MEDICARE

## 2024-03-26 ENCOUNTER — INFUSION (OUTPATIENT)
Dept: HEMATOLOGY/ONCOLOGY | Facility: CLINIC | Age: 89
End: 2024-03-26
Payer: MEDICARE

## 2024-03-26 ENCOUNTER — APPOINTMENT (OUTPATIENT)
Dept: HEMATOLOGY/ONCOLOGY | Facility: CLINIC | Age: 89
End: 2024-03-26
Payer: MEDICARE

## 2024-03-26 VITALS
TEMPERATURE: 95.7 F | BODY MASS INDEX: 35.59 KG/M2 | DIASTOLIC BLOOD PRESSURE: 59 MMHG | WEIGHT: 229.94 LBS | HEART RATE: 67 BPM | OXYGEN SATURATION: 90 % | RESPIRATION RATE: 18 BRPM | SYSTOLIC BLOOD PRESSURE: 117 MMHG

## 2024-03-26 DIAGNOSIS — D46.9 MYELODYSPLASTIC SYNDROME, UNSPECIFIED (MULTI): ICD-10-CM

## 2024-03-26 DIAGNOSIS — N18.4 STAGE 4 CHRONIC KIDNEY DISEASE (MULTI): ICD-10-CM

## 2024-03-26 DIAGNOSIS — D61.818 OTHER PANCYTOPENIA (MULTI): ICD-10-CM

## 2024-03-26 LAB
BASOPHILS # BLD AUTO: 0 X10*3/UL (ref 0–0.1)
BASOPHILS NFR BLD AUTO: 0 %
DACRYOCYTES BLD QL SMEAR: NORMAL
EOSINOPHIL # BLD AUTO: 0.07 X10*3/UL (ref 0–0.4)
EOSINOPHIL NFR BLD AUTO: 4.4 %
ERYTHROCYTE [DISTWIDTH] IN BLOOD BY AUTOMATED COUNT: 19.9 % (ref 11.5–14.5)
HCT VFR BLD AUTO: 25.7 % (ref 41–52)
HGB BLD-MCNC: 8.4 G/DL (ref 13.5–17.5)
IMM GRANULOCYTES # BLD AUTO: 0.01 X10*3/UL (ref 0–0.5)
IMM GRANULOCYTES NFR BLD AUTO: 0.6 % (ref 0–0.9)
LYMPHOCYTES # BLD AUTO: 0.66 X10*3/UL (ref 0.8–3)
LYMPHOCYTES NFR BLD AUTO: 41.8 %
MCH RBC QN AUTO: 35.3 PG (ref 26–34)
MCHC RBC AUTO-ENTMCNC: 32.7 G/DL (ref 32–36)
MCV RBC AUTO: 108 FL (ref 80–100)
MONOCYTES # BLD AUTO: 0.08 X10*3/UL (ref 0.05–0.8)
MONOCYTES NFR BLD AUTO: 5.1 %
NEUTROPHILS # BLD AUTO: 0.76 X10*3/UL (ref 1.6–5.5)
NEUTROPHILS NFR BLD AUTO: 48.1 %
NRBC BLD-RTO: 0 /100 WBCS (ref 0–0)
OVALOCYTES BLD QL SMEAR: NORMAL
PLATELET # BLD AUTO: 90 X10*3/UL (ref 150–450)
RBC # BLD AUTO: 2.38 X10*6/UL (ref 4.5–5.9)
RBC MORPH BLD: NORMAL
SCHISTOCYTES BLD QL SMEAR: NORMAL
WBC # BLD AUTO: 1.6 X10*3/UL (ref 4.4–11.3)

## 2024-03-26 PROCEDURE — 96372 THER/PROPH/DIAG INJ SC/IM: CPT

## 2024-03-26 PROCEDURE — 85060 BLOOD SMEAR INTERPRETATION: CPT

## 2024-03-26 PROCEDURE — 2500000004 HC RX 250 GENERAL PHARMACY W/ HCPCS (ALT 636 FOR OP/ED): Mod: JZ,EC

## 2024-03-26 PROCEDURE — 85025 COMPLETE CBC W/AUTO DIFF WBC: CPT

## 2024-03-26 RX ORDER — EPINEPHRINE 0.3 MG/.3ML
0.3 INJECTION SUBCUTANEOUS EVERY 5 MIN PRN
Status: CANCELLED | OUTPATIENT
Start: 2024-04-09

## 2024-03-26 RX ORDER — FAMOTIDINE 10 MG/ML
20 INJECTION INTRAVENOUS ONCE AS NEEDED
Status: CANCELLED | OUTPATIENT
Start: 2024-04-09

## 2024-03-26 RX ORDER — DIPHENHYDRAMINE HYDROCHLORIDE 50 MG/ML
50 INJECTION INTRAMUSCULAR; INTRAVENOUS AS NEEDED
Status: CANCELLED | OUTPATIENT
Start: 2024-04-09

## 2024-03-26 RX ORDER — ALBUTEROL SULFATE 0.83 MG/ML
3 SOLUTION RESPIRATORY (INHALATION) AS NEEDED
Status: CANCELLED | OUTPATIENT
Start: 2024-04-09

## 2024-03-26 RX ADMIN — DARBEPOETIN ALFA 300 MCG: 300 INJECTION, SOLUTION INTRAVENOUS; SUBCUTANEOUS at 13:52

## 2024-03-26 ASSESSMENT — PAIN SCALES - GENERAL: PAINLEVEL: 0-NO PAIN

## 2024-03-27 LAB — PATH REVIEW-CBC DIFFERENTIAL: NORMAL

## 2024-03-28 ENCOUNTER — OFFICE VISIT (OUTPATIENT)
Dept: PULMONOLOGY | Facility: CLINIC | Age: 89
End: 2024-03-28
Payer: MEDICARE

## 2024-03-28 VITALS
OXYGEN SATURATION: 90 % | WEIGHT: 229 LBS | HEART RATE: 72 BPM | TEMPERATURE: 98.5 F | BODY MASS INDEX: 35.94 KG/M2 | RESPIRATION RATE: 18 BRPM | SYSTOLIC BLOOD PRESSURE: 117 MMHG | DIASTOLIC BLOOD PRESSURE: 64 MMHG | HEIGHT: 67 IN

## 2024-03-28 DIAGNOSIS — J84.9 INTERSTITIAL LUNG DISEASE (MULTI): ICD-10-CM

## 2024-03-28 DIAGNOSIS — R09.02 HYPOXEMIA: ICD-10-CM

## 2024-03-28 DIAGNOSIS — J84.10 PULMONARY FIBROSIS (MULTI): Primary | ICD-10-CM

## 2024-03-28 DIAGNOSIS — R06.09 DYSPNEA ON EXERTION: ICD-10-CM

## 2024-03-28 PROCEDURE — 1036F TOBACCO NON-USER: CPT | Performed by: INTERNAL MEDICINE

## 2024-03-28 PROCEDURE — 3074F SYST BP LT 130 MM HG: CPT | Performed by: INTERNAL MEDICINE

## 2024-03-28 PROCEDURE — 99205 OFFICE O/P NEW HI 60 MIN: CPT | Performed by: INTERNAL MEDICINE

## 2024-03-28 PROCEDURE — 3078F DIAST BP <80 MM HG: CPT | Performed by: INTERNAL MEDICINE

## 2024-03-28 PROCEDURE — 1157F ADVNC CARE PLAN IN RCRD: CPT | Performed by: INTERNAL MEDICINE

## 2024-03-28 PROCEDURE — 1160F RVW MEDS BY RX/DR IN RCRD: CPT | Performed by: INTERNAL MEDICINE

## 2024-03-28 PROCEDURE — 1159F MED LIST DOCD IN RCRD: CPT | Performed by: INTERNAL MEDICINE

## 2024-03-28 NOTE — PROGRESS NOTES
Subjective   Patient ID: Zack Medrano is a 88 y.o. male who presents for No chief complaint on file..  HPI  This is an 88-year-old  male who I was asked to consult time with a history of an abnormal CT scan of the chest done on 3/22/2024.  Showed predominantly groundglass infiltrates in the lung bases and some infiltrates in the left upper lobe.  I reviewed CT scan of his chest from 5/15/2023 which shows that the lung bases were clear.  Previous scan from 2021 showed again bibasilar groundglass infiltrates.  Further back CT angiogram of the chest from 10/31/2018 shows the lung fields to be clear.  Patient reports that 2 weeks ago he had phlegm production with some bloody secretions and this eventually improved without any specific therapy being given.  He denies any past treatment for pneumonia nor history of injury to the chest.  Patient is CPAP therapy with supplemental oxygen and this began about several years ago.  He believes that he is on 4 L/min of oxygen currently at nighttime.  His oxygen saturation today in the office on room air was 92%.  He denies having any fever with the above symptoms.  Patient had cholecystectomy.  Family history is positive for his mother dying from cancer in his father from alcoholism.  He also has a brother that  from cancer and 2 other brothers which are not having any specific medical problems.  Patient denies any smoking history or use of alcohol.  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  He has been retired now for about 12 years.  Review of Systems  Patient reports that he was treated for significant gastrointestinal pain by physicians at [place] and that he currently takes 3 pills/day, which I assume is Carafate based on his list of medications.  This also shows he is on Protonix at 40 mg twice a day.  He is also being treated for insulin-dependent diabetes mellitus.  He  does tend to bruise easily.  He also had 4 cardiac stents placed in the past.  He does complain of dyspnea on exertion.  He denies any treatment for depression or anxiety.  He was only a smoker for short period of time as a child.  He is also been treated by nephrology service at [place].  Objective   Physical Exam  HEENT the patient has complete upper and lower dentures.  Pulmonary, there is inspiratory crackles noted in the bases and right upper lobe otherwise no abnormal breath sounds were noted.  Cardio, heart sounds were regular rate and rhythm.  GI, bowel sounds were heard in all quadrants no tenderness on palpation of the abdomen.  Extremities, the patient does have bilateral lower extremity pitting edema.  Left leg is greater than right and he has had this edema for 10 to 12 years.  Psych, the patient is alert and oriented x 3.  Assessment/Plan        Impressions:  1.  Possible chronic interstitial lung disease versus periodic aspiration causing the radiology changes.  2.  Hypoxemia.  3.  Dyspnea on exertion.  Recommendations:  1.  High-resolution CT scan of the chest.  2.  Complete pulmonary function testing.  3.  Simple pulmonary stress test.  4.  Follow-up with the patient in 3 weeks to review the results.  5.  Will have further recommendations pending the results of the above studies and also further review of the information provided to me by the patient.      This note was transcribed using the Dragon Dictation system.  There may be grammatical, punctuation, or verbiage errors that occur with voice recognition programs.    Rui Graham,  03/28/24 12:50 PM

## 2024-03-29 ENCOUNTER — APPOINTMENT (OUTPATIENT)
Dept: RADIOLOGY | Facility: HOSPITAL | Age: 89
End: 2024-03-29
Payer: MEDICARE

## 2024-04-01 NOTE — PATIENT INSTRUCTIONS
Will increase insulin to 40 units daily - he may bump it up to 35 units daily for a couple of days first.  Continue other current medications, continue follow up with specialists.  Follow up here next month as scheduled.    
acetaminophen 325 mg oral tablet: 3 tab(s) orally every 6 hours as needed for  moderate pain  ibuprofen 600 mg oral tablet: 1 tab(s) orally every 6 hours  NIFEdipine 30 mg oral tablet, extended release: 1 tab(s) orally once a day Hold BP&lt;110/60

## 2024-04-03 ENCOUNTER — APPOINTMENT (OUTPATIENT)
Dept: RESPIRATORY THERAPY | Facility: HOSPITAL | Age: 89
End: 2024-04-03
Payer: MEDICARE

## 2024-04-03 ENCOUNTER — APPOINTMENT (OUTPATIENT)
Dept: RADIOLOGY | Facility: HOSPITAL | Age: 89
End: 2024-04-03
Payer: MEDICARE

## 2024-04-03 ENCOUNTER — APPOINTMENT (OUTPATIENT)
Dept: CARDIOLOGY | Facility: HOSPITAL | Age: 89
End: 2024-04-03
Payer: MEDICARE

## 2024-04-03 ENCOUNTER — OFFICE VISIT (OUTPATIENT)
Dept: NEPHROLOGY | Facility: CLINIC | Age: 89
End: 2024-04-03
Payer: MEDICARE

## 2024-04-03 ENCOUNTER — HOSPITAL ENCOUNTER (OUTPATIENT)
Age: 89
Discharge: HOME | End: 2024-04-03
Payer: MEDICARE

## 2024-04-03 VITALS
SYSTOLIC BLOOD PRESSURE: 128 MMHG | HEART RATE: 68 BPM | BODY MASS INDEX: 36.26 KG/M2 | WEIGHT: 231 LBS | HEIGHT: 67 IN | DIASTOLIC BLOOD PRESSURE: 64 MMHG

## 2024-04-03 DIAGNOSIS — I25.10: Primary | ICD-10-CM

## 2024-04-03 DIAGNOSIS — E11.22 TYPE 2 DIABETES MELLITUS WITH STAGE 4 CHRONIC KIDNEY DISEASE, WITH LONG-TERM CURRENT USE OF INSULIN (MULTI): ICD-10-CM

## 2024-04-03 DIAGNOSIS — N18.4 TYPE 2 DIABETES MELLITUS WITH STAGE 4 CHRONIC KIDNEY DISEASE, WITH LONG-TERM CURRENT USE OF INSULIN (MULTI): ICD-10-CM

## 2024-04-03 DIAGNOSIS — N18.32 ANEMIA DUE TO STAGE 3B CHRONIC KIDNEY DISEASE (MULTI): ICD-10-CM

## 2024-04-03 DIAGNOSIS — D63.1 ANEMIA DUE TO STAGE 3B CHRONIC KIDNEY DISEASE (MULTI): ICD-10-CM

## 2024-04-03 DIAGNOSIS — E78.2 MIXED HYPERLIPIDEMIA: ICD-10-CM

## 2024-04-03 DIAGNOSIS — N18.4 STAGE 4 CHRONIC KIDNEY DISEASE (MULTI): Primary | ICD-10-CM

## 2024-04-03 DIAGNOSIS — Z79.4 TYPE 2 DIABETES MELLITUS WITH STAGE 4 CHRONIC KIDNEY DISEASE, WITH LONG-TERM CURRENT USE OF INSULIN (MULTI): ICD-10-CM

## 2024-04-03 DIAGNOSIS — I10 PRIMARY HYPERTENSION: ICD-10-CM

## 2024-04-03 PROCEDURE — 99214 OFFICE O/P EST MOD 30 MIN: CPT | Performed by: CLINICAL NURSE SPECIALIST

## 2024-04-03 PROCEDURE — 3078F DIAST BP <80 MM HG: CPT | Performed by: CLINICAL NURSE SPECIALIST

## 2024-04-03 PROCEDURE — 1157F ADVNC CARE PLAN IN RCRD: CPT | Performed by: CLINICAL NURSE SPECIALIST

## 2024-04-03 PROCEDURE — 1160F RVW MEDS BY RX/DR IN RCRD: CPT | Performed by: CLINICAL NURSE SPECIALIST

## 2024-04-03 PROCEDURE — 1036F TOBACCO NON-USER: CPT | Performed by: CLINICAL NURSE SPECIALIST

## 2024-04-03 PROCEDURE — 93306 TTE W/DOPPLER COMPLETE: CPT

## 2024-04-03 PROCEDURE — 1159F MED LIST DOCD IN RCRD: CPT | Performed by: CLINICAL NURSE SPECIALIST

## 2024-04-03 PROCEDURE — 3074F SYST BP LT 130 MM HG: CPT | Performed by: CLINICAL NURSE SPECIALIST

## 2024-04-03 ASSESSMENT — ENCOUNTER SYMPTOMS
ENDOCRINE NEGATIVE: 1
MUSCULOSKELETAL NEGATIVE: 1
CONSTITUTIONAL NEGATIVE: 1
CARDIOVASCULAR NEGATIVE: 1
NEUROLOGICAL NEGATIVE: 1
PSYCHIATRIC NEGATIVE: 1
GASTROINTESTINAL NEGATIVE: 1
RESPIRATORY NEGATIVE: 1

## 2024-04-03 NOTE — ASSESSMENT & PLAN NOTE
Receiving darbepoetin every 2 weeks, currently with hemoglobin of 8.4, to have bone marrow biopsy in the next week and a half, being followed by hematology

## 2024-04-03 NOTE — ASSESSMENT & PLAN NOTE
Continues to have some high blood sugars, currently is on insulin only, being followed by endocrinology

## 2024-04-03 NOTE — PROGRESS NOTES
Subjective   Patient ID: Zack Medrano is a 88 y.o. male who presents for Follow-up (6 month ck/Review labs 3/22).  Patient being seen in follow-up for chronic kidney disease stage III with history of hypertension and diabetes mellitus    Labs reviewed  CBC with H&H 8.4 and 25.7, platelets 90, is receiving darbepoetin every 2 weeks  Total albumin creatinine ratio 165.0  Glucose 225  Sodium 135, potassium 4.6, chloride 101, bicarb 25  Renal function with a BUN of 33 and creatinine of 2.07    He is doing fairly well  He continues to take a bus trips  He does have follow-up with a gastroenterologist and has been started on new medication with him  He also has a bone marrow biopsy coming up with hematology  He continues to be anemic  He is voiding without difficulties  He does notice some bubbling in his urine  His blood sugars in the morning are usually around 180, he does get as high as        Review of Systems   Constitutional: Negative.    Respiratory: Negative.     Cardiovascular: Negative.    Gastrointestinal: Negative.    Endocrine: Negative.    Genitourinary: Negative.    Musculoskeletal: Negative.    Skin: Negative.    Neurological: Negative.    Psychiatric/Behavioral: Negative.         Objective   Physical Exam  Vitals reviewed.   Constitutional:       Appearance: He is obese.   HENT:      Head: Normocephalic.   Cardiovascular:      Rate and Rhythm: Normal rate and regular rhythm.   Pulmonary:      Effort: Pulmonary effort is normal.      Breath sounds: Normal breath sounds.   Abdominal:      Palpations: Abdomen is soft.   Musculoskeletal:         General: Normal range of motion.   Skin:     General: Skin is warm and dry.      Coloration: Skin is pale.   Neurological:      Mental Status: He is alert and oriented to person, place, and time.   Psychiatric:         Mood and Affect: Mood normal.         Behavior: Behavior normal.         Assessment/Plan   Problem List Items Addressed This Visit              ICD-10-CM    Type 2 diabetes mellitus with stage 4 chronic kidney disease, with long-term current use of insulin (CMS/[facility]) E11.22, N18.4, Z79.4     Continues to have some high blood sugars, currently is on insulin only, being followed by endocrinology         Hypertension I10     Blood pressure is currently well-controlled on amlodipine, metoprolol, furosemide, spironolactone         Mixed hyperlipidemia E78.2    Stage 4 chronic kidney disease (CMS/[facility]) - Primary N18.4     Creatinine is stable at 2.07 and is actually slightly improved from his last check,         Relevant Orders    Basic metabolic panel    Albumin , Urine Random    Urinalysis with Reflex Microscopic    Follow Up In Nephrology    Anemia due to stage 3b chronic kidney disease (CMS/[facility]) N18.32, D63.1     Receiving darbepoetin every 2 weeks, currently with hemoglobin of 8.4, to have bone marrow biopsy in the next week and a half, being followed by hematology          - CKD III/IV with baseline creatinine 2-2.2  - Hypertension on amlodipine, Metorplolol, Furosemide  - Diabetic Nephropathy: UPC is 0.15  - Diabetes mellitus type 2 on insulin  - Bilateral lower extremity edema: Stable.   - Pulmonary embolism on Eliquis  - Obesity  - Obstructive sleep apnea on CPAP machine and compliant  - Hyperlipidemia on a statin  - History of coronary artery disease on Plavix and aspirin.   - B/L Renal Cyst  - Hyperuricemia        NATALY Estrada-CNP, DNP 04/03/24 9:38 AM

## 2024-04-05 ENCOUNTER — HOSPITAL ENCOUNTER (OUTPATIENT)
Dept: RESPIRATORY THERAPY | Facility: HOSPITAL | Age: 89
Discharge: HOME | End: 2024-04-05
Payer: MEDICARE

## 2024-04-05 ENCOUNTER — HOSPITAL ENCOUNTER (OUTPATIENT)
Dept: CARDIOLOGY | Facility: HOSPITAL | Age: 89
Discharge: HOME | End: 2024-04-05
Payer: MEDICARE

## 2024-04-05 ENCOUNTER — HOSPITAL ENCOUNTER (OUTPATIENT)
Dept: RADIOLOGY | Facility: HOSPITAL | Age: 89
Discharge: HOME | End: 2024-04-05
Payer: MEDICARE

## 2024-04-05 DIAGNOSIS — J84.10 PULMONARY FIBROSIS (MULTI): ICD-10-CM

## 2024-04-05 PROCEDURE — 71250 CT THORAX DX C-: CPT | Performed by: RADIOLOGY

## 2024-04-05 PROCEDURE — 71250 CT THORAX DX C-: CPT

## 2024-04-05 PROCEDURE — 94726 PLETHYSMOGRAPHY LUNG VOLUMES: CPT

## 2024-04-08 DIAGNOSIS — D69.6 THROMBOCYTOPENIA (CMS-HCC): Primary | ICD-10-CM

## 2024-04-08 DIAGNOSIS — D46.9 MYELODYSPLASTIC SYNDROME, UNSPECIFIED (MULTI): ICD-10-CM

## 2024-04-09 ENCOUNTER — INFUSION (OUTPATIENT)
Dept: HEMATOLOGY/ONCOLOGY | Facility: CLINIC | Age: 89
End: 2024-04-09
Payer: MEDICARE

## 2024-04-09 ENCOUNTER — OFFICE VISIT (OUTPATIENT)
Dept: HEMATOLOGY/ONCOLOGY | Facility: CLINIC | Age: 89
End: 2024-04-09
Payer: MEDICARE

## 2024-04-09 VITALS
OXYGEN SATURATION: 90 % | BODY MASS INDEX: 36.57 KG/M2 | TEMPERATURE: 97.3 F | SYSTOLIC BLOOD PRESSURE: 133 MMHG | DIASTOLIC BLOOD PRESSURE: 62 MMHG | WEIGHT: 233.47 LBS | HEART RATE: 72 BPM | RESPIRATION RATE: 18 BRPM

## 2024-04-09 DIAGNOSIS — D63.1 ANEMIA DUE TO STAGE 4 CHRONIC KIDNEY DISEASE (MULTI): ICD-10-CM

## 2024-04-09 DIAGNOSIS — D46.9 MYELODYSPLASTIC SYNDROME, UNSPECIFIED (MULTI): ICD-10-CM

## 2024-04-09 DIAGNOSIS — N18.4 STAGE 4 CHRONIC KIDNEY DISEASE (MULTI): ICD-10-CM

## 2024-04-09 DIAGNOSIS — D61.818 OTHER PANCYTOPENIA (MULTI): ICD-10-CM

## 2024-04-09 DIAGNOSIS — D69.6 THROMBOCYTOPENIA (CMS-HCC): ICD-10-CM

## 2024-04-09 DIAGNOSIS — N18.4 ANEMIA DUE TO STAGE 4 CHRONIC KIDNEY DISEASE (MULTI): ICD-10-CM

## 2024-04-09 DIAGNOSIS — D72.819 LEUKOPENIA, UNSPECIFIED TYPE: ICD-10-CM

## 2024-04-09 LAB
ALBUMIN SERPL BCP-MCNC: 3.9 G/DL (ref 3.4–5)
ALP SERPL-CCNC: 84 U/L (ref 33–136)
ALT SERPL W P-5'-P-CCNC: 17 U/L (ref 10–52)
ANION GAP SERPL CALC-SCNC: 13 MMOL/L (ref 10–20)
AST SERPL W P-5'-P-CCNC: 30 U/L (ref 9–39)
BASOPHILS # BLD MANUAL: 0 X10*3/UL (ref 0–0.1)
BASOPHILS NFR BLD MANUAL: 0 %
BILIRUB SERPL-MCNC: 1.1 MG/DL (ref 0–1.2)
BUN SERPL-MCNC: 47 MG/DL (ref 6–23)
CALCIUM SERPL-MCNC: 9 MG/DL (ref 8.6–10.3)
CHLORIDE SERPL-SCNC: 104 MMOL/L (ref 98–107)
CO2 SERPL-SCNC: 24 MMOL/L (ref 21–32)
CREAT SERPL-MCNC: 2.07 MG/DL (ref 0.5–1.3)
EGFRCR SERPLBLD CKD-EPI 2021: 30 ML/MIN/1.73M*2
EOSINOPHIL # BLD MANUAL: 0 X10*3/UL (ref 0–0.4)
EOSINOPHIL NFR BLD MANUAL: 0 %
ERYTHROCYTE [DISTWIDTH] IN BLOOD BY AUTOMATED COUNT: 19.9 % (ref 11.5–14.5)
GLUCOSE SERPL-MCNC: 212 MG/DL (ref 74–99)
HCT VFR BLD AUTO: 24.8 % (ref 41–52)
HGB BLD-MCNC: 8 G/DL (ref 13.5–17.5)
IMM GRANULOCYTES # BLD AUTO: 0 X10*3/UL (ref 0–0.5)
IMM GRANULOCYTES NFR BLD AUTO: 0 % (ref 0–0.9)
LYMPHOCYTES # BLD MANUAL: 0.52 X10*3/UL (ref 0.8–3)
LYMPHOCYTES NFR BLD MANUAL: 40 %
MCH RBC QN AUTO: 34.6 PG (ref 26–34)
MCHC RBC AUTO-ENTMCNC: 32.3 G/DL (ref 32–36)
MCV RBC AUTO: 107 FL (ref 80–100)
MONOCYTES # BLD MANUAL: 0 X10*3/UL (ref 0.05–0.8)
MONOCYTES NFR BLD MANUAL: 0 %
NEUTROPHILS # BLD MANUAL: 0.78 X10*3/UL (ref 1.6–5.5)
NEUTS BAND # BLD MANUAL: 0.03 X10*3/UL (ref 0–0.5)
NEUTS BAND NFR BLD MANUAL: 2 %
NEUTS SEG # BLD MANUAL: 0.75 X10*3/UL (ref 1.6–5)
NEUTS SEG NFR BLD MANUAL: 58 %
NRBC BLD-RTO: 0 /100 WBCS (ref 0–0)
OVALOCYTES BLD QL SMEAR: ABNORMAL
PLATELET # BLD AUTO: 69 X10*3/UL (ref 150–450)
POTASSIUM SERPL-SCNC: 4.5 MMOL/L (ref 3.5–5.3)
PROT SERPL-MCNC: 6.6 G/DL (ref 6.4–8.2)
RBC # BLD AUTO: 2.31 X10*6/UL (ref 4.5–5.9)
RBC MORPH BLD: ABNORMAL
SCHISTOCYTES BLD QL SMEAR: ABNORMAL
SODIUM SERPL-SCNC: 136 MMOL/L (ref 136–145)
TOTAL CELLS COUNTED BLD: 100
WBC # BLD AUTO: 1.3 X10*3/UL (ref 4.4–11.3)

## 2024-04-09 PROCEDURE — 2500000004 HC RX 250 GENERAL PHARMACY W/ HCPCS (ALT 636 FOR OP/ED): Mod: JZ,EC

## 2024-04-09 PROCEDURE — 85027 COMPLETE CBC AUTOMATED: CPT | Mod: 59

## 2024-04-09 PROCEDURE — 99213 OFFICE O/P EST LOW 20 MIN: CPT

## 2024-04-09 PROCEDURE — 1157F ADVNC CARE PLAN IN RCRD: CPT

## 2024-04-09 PROCEDURE — 85007 BL SMEAR W/DIFF WBC COUNT: CPT | Mod: 59

## 2024-04-09 PROCEDURE — 1159F MED LIST DOCD IN RCRD: CPT

## 2024-04-09 PROCEDURE — 96372 THER/PROPH/DIAG INJ SC/IM: CPT

## 2024-04-09 PROCEDURE — 36415 COLL VENOUS BLD VENIPUNCTURE: CPT

## 2024-04-09 PROCEDURE — 80053 COMPREHEN METABOLIC PANEL: CPT

## 2024-04-09 PROCEDURE — 1160F RVW MEDS BY RX/DR IN RCRD: CPT

## 2024-04-09 RX ORDER — FAMOTIDINE 10 MG/ML
20 INJECTION INTRAVENOUS ONCE AS NEEDED
Status: CANCELLED | OUTPATIENT
Start: 2024-04-23

## 2024-04-09 RX ORDER — DIPHENHYDRAMINE HYDROCHLORIDE 50 MG/ML
50 INJECTION INTRAMUSCULAR; INTRAVENOUS AS NEEDED
Status: CANCELLED | OUTPATIENT
Start: 2024-04-23

## 2024-04-09 RX ORDER — EPINEPHRINE 0.3 MG/.3ML
0.3 INJECTION SUBCUTANEOUS EVERY 5 MIN PRN
Status: CANCELLED | OUTPATIENT
Start: 2024-04-23

## 2024-04-09 RX ORDER — ALBUTEROL SULFATE 0.83 MG/ML
3 SOLUTION RESPIRATORY (INHALATION) AS NEEDED
Status: CANCELLED | OUTPATIENT
Start: 2024-04-23

## 2024-04-09 RX ADMIN — DARBEPOETIN ALFA 300 MCG: 300 INJECTION, SOLUTION INTRAVENOUS; SUBCUTANEOUS at 14:29

## 2024-04-09 ASSESSMENT — PAIN SCALES - GENERAL: PAINLEVEL: 0-NO PAIN

## 2024-04-09 NOTE — PROGRESS NOTES
CBC redrawn- due to platelets clumping on slide  Pt had injection today- hgb 8  Lab and Darbepoetin 4/23 5/7- 1230 stat lab          1pm CNP visit- go over Bone marrow results          130 pm darbepoetin  Reviewed AVS with patient- patient verbalizes understanding

## 2024-04-09 NOTE — PROGRESS NOTES
Patient ID: Zack Medrano is a 88 y.o. male.    Subjective    HPI    HPI     Patient ID: Zack Medrano is a 88 y.o. male.        Subjective   HPI  87 yo man with hx of reflux gastritis, coronary artery disease/ MI on aspirin, DM, hypertension, obesity, sleep apnea, history of PE on Eliquis, chronic kidney disease stage III, DJD, admission Jan 2021 for COVID19 pneumonia, referred for pancytopenia. He has chronic anemia with hg of around 12 g/dl, MCV of 107, his recent cbc showed decreased wbc of 3.6K , ANC of 2.0, plts of 117 , labs in April 2022 showed a ferritin of 154, Tsat of 42%, folic acid of 22.7, b12 of 601, his Cr is around 2.1      he feels well, he complains of fatigue   he is on Eliquis, he has been on anticoagulation for a long time per patient   he is able to bowen put ADLs  no SOB , no chest   no bleeding, no hematochezia, no hematuria, no melena, no other bleeding   he has LINDA and is on CPAP, he is generally compliant but some nights get frustrated and does not use it   eating well, no weight loss   has easy bruising on bumping or hitting himself   no new fever or infections   he has morning hip pain and also his wrists      interval history- 4/9/24     No recent falls   No recent infections   No fevers, chills     Energy slightly improved   Right hand papule on the his knuckles  Eating and drinking well   Weight is stable  No episodes of dizziness  Ambulating with no difficulty, Feels steady on feet   Remains taking baby aspirin      remains off eliquis, [place] from [place]  Blood sugars remains on insulin 50 units of Lantus in PM   BS ranging from 200-251     Breathing is stable   No chest pain or pressure      remains on oxygen and CPAP, sleeping well all night long   Occasional night sweating legs and around face, 4 times weekly      Bowels are regular, denies any hematochezia or melena   No longer taking iron supplement      No urinary issues, no dysuria or hematuria       Unchanged neuropathy  in bilateral hands   remains to have involuntary tremors in hands      No major bleeding or bruising event      Objective    BSA: There is no height or weight on file to calculate BSA.  There were no vitals taken for this visit.     Physical Exam  Vitals and nursing note reviewed.   Constitutional:       Appearance: Normal appearance.   HENT:      Head: Normocephalic and atraumatic.      Mouth/Throat:      Pharynx: Oropharynx is clear.   Eyes:      General: No scleral icterus.     Extraocular Movements: Extraocular movements intact.      Conjunctiva/sclera: Conjunctivae normal.   Cardiovascular:      Rate and Rhythm: Normal rate and regular rhythm.      Pulses: Normal pulses.      Heart sounds: Normal heart sounds.   Pulmonary:      Effort: Pulmonary effort is normal.      Breath sounds: Normal breath sounds.   Abdominal:      General: There is distension.      Palpations: Abdomen is soft.      Tenderness: There is no abdominal tenderness.   Musculoskeletal:         General: Normal range of motion.      Cervical back: Normal range of motion.      Right lower leg: Edema present.      Left lower leg: Edema present.   Skin:     General: Skin is warm and dry.      Coloration: Skin is pale.      Findings: Bruising present.   Neurological:      General: No focal deficit present.      Mental Status: He is alert and oriented to person, place, and time.      Motor: Weakness present.   Psychiatric:         Mood and Affect: Mood normal.         Behavior: Behavior normal.         Thought Content: Thought content normal.         Judgment: Judgment normal.         Performance Status:  Symptomatic; fully ambulatory      Assessment/Plan      1. 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     chronic anemia with largely a component of anemia of CKD      plan to check remaining work up for anemia:  -hemolysis labs: LDH, retic, haptoglobin    -monoclonal gammopathy: SPEP, UPEP       mild leukopenia and thrombocytopenia:   -check US abd for  hepatosplenomegaly   -hepatitis and HIV   -b12 and folic acid were normal   -CHRISTINA      send for smear review and flow for an abnormal cell population detection      we discussed that if work up is negative an evolving myeloid neoplasm is possible nevertheless the counts are mildly low and would observe rather pursuing a bone marrow biopsy      7/27/22- his work up showed an small abnormal B cell population 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 but also there was a phenotypic abnormality of granulocyte and possibility of MDS can not be ruled out   we discussed above however due to the counts being mildly low we discussed deferring marrow exam at this time  US showed fatty liver but no splenomegaly   other work up negative   will plan to check NGS for myeloid mutations with next labs and check an EPO level   at this time will continue surveillance of cbc and check a repeat in 3 months      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   his RIPSS score is low or very low risk depending on the karyotype which is pending   for his anemia we discussed initiating NING with Aranesp but he prefers to defer now   will monitor the hg and if it drops < 10 g/dl will initiate      1/13/23   his RIPSS score is very low  at this time he is clinically stable, and the counts have remained stable   his fatigue is also improved   we will continue count monitor , consider NING if hg drops      4/17/23  patient remains clinically stable, but his Hgb has dropped to 9 g/dl  discussed initiating NING, patient would like to defer at this time and reconsider if Hg remains below 10 d/gl  fatigue remains unchanged, denies any abnormal bleeding or bruising, no overt bleeding  will continue to monitor labs and plan to initiate NING if Hgb remains below 10 g/dl      4/9/24    Patient reports his energy has slightly improved. Appetite is decent, weight is stable. Breathing is stable, ROMAN with  "stairs and activity. Denies any abnormal bleeding, bruises extremely easy.  Papule on right knuckle, scheduled to see dermatology in a week. Denies any constitutional \"B\" symptoms.     Patient remains anemic with a Hg of 8.0, , MCHC 33.2. WBC are slightly lower at 1.2, ANC 0.75, Lymphocytes 0.65. Plts have trended down to 63k.      Patient is currently receiving Darbepoetin 300mcg every 2 weeks, and tolerating well. Patients counts remain low, despite the change of frequency with the Darbepoetin. Proceed with treatment today.      Discussed bone marrow biopsy with patient based on cytopenias. Patient would like to proceed with testing at this time. Order placed for CT Guided Bone Marrow Aspiration and Biopsy.  Patient was unable to keep his original appt due to transportation, he is scheduled for next week.      Plan:  Scheduled pt for CT Guided Bone Marrow Biopsy  Repeat lab CBC next week   Continue treatment with Darbepoetin q 2 weeks with labs prior   RTC 4 weeks , to discuss pathology results from bone marrow         NATALY Villa-MULUGETA           "

## 2024-04-11 DIAGNOSIS — E78.2 MIXED HYPERLIPIDEMIA: ICD-10-CM

## 2024-04-11 DIAGNOSIS — G25.0 TREMOR, ESSENTIAL: ICD-10-CM

## 2024-04-11 RX ORDER — PRIMIDONE 50 MG/1
50 TABLET ORAL NIGHTLY
Qty: 30 TABLET | Refills: 2 | Status: SHIPPED | OUTPATIENT
Start: 2024-04-11 | End: 2024-07-10

## 2024-04-11 RX ORDER — GABAPENTIN 300 MG/1
300 CAPSULE ORAL 2 TIMES DAILY
Qty: 60 CAPSULE | Refills: 1 | Status: SHIPPED | OUTPATIENT
Start: 2024-04-11

## 2024-04-11 RX ORDER — ATORVASTATIN CALCIUM 40 MG/1
40 TABLET, FILM COATED ORAL DAILY
Qty: 90 TABLET | Refills: 1 | Status: SHIPPED | OUTPATIENT
Start: 2024-04-11

## 2024-04-12 LAB
BASOPHILS # BLD AUTO: 0 X10*3/UL (ref 0–0.1)
BASOPHILS NFR BLD AUTO: 0 %
EOSINOPHIL # BLD AUTO: 0.04 X10*3/UL (ref 0–0.4)
EOSINOPHIL NFR BLD AUTO: 3.5 %
ERYTHROCYTE [DISTWIDTH] IN BLOOD BY AUTOMATED COUNT: 19.9 % (ref 11.5–14.5)
HCT VFR BLD AUTO: 24.2 % (ref 41–52)
HGB BLD-MCNC: 8 G/DL (ref 13.5–17.5)
IMM GRANULOCYTES # BLD AUTO: 0 X10*3/UL (ref 0–0.5)
IMM GRANULOCYTES NFR BLD AUTO: 0 % (ref 0–0.9)
LYMPHOCYTES # BLD AUTO: 0.65 X10*3/UL (ref 0.8–3)
LYMPHOCYTES NFR BLD AUTO: 52.2 %
MCH RBC QN AUTO: 34.9 PG (ref 26–34)
MCHC RBC AUTO-ENTMCNC: 33.2 G/DL (ref 32–36)
MCV RBC AUTO: 105 FL (ref 80–100)
MONOCYTES # BLD AUTO: 0.06 X10*3/UL (ref 0.05–0.8)
MONOCYTES NFR BLD AUTO: 4.4 %
NEUTROPHILS # BLD AUTO: 0.49 X10*3/UL (ref 1.6–5.5)
NEUTROPHILS NFR BLD AUTO: 39.9 %
PLATELET # BLD AUTO: 63 X10*3/UL (ref 150–450)
RBC # BLD AUTO: 2.3 X10*6/UL (ref 4.5–5.9)
WBC # BLD AUTO: 1.2 X10*3/UL (ref 4.4–11.3)

## 2024-04-16 ENCOUNTER — HOSPITAL ENCOUNTER (OUTPATIENT)
Dept: RADIOLOGY | Facility: HOSPITAL | Age: 89
Discharge: HOME | End: 2024-04-16
Payer: MEDICARE

## 2024-04-16 VITALS
TEMPERATURE: 97.5 F | SYSTOLIC BLOOD PRESSURE: 163 MMHG | RESPIRATION RATE: 16 BRPM | DIASTOLIC BLOOD PRESSURE: 77 MMHG | OXYGEN SATURATION: 96 % | HEART RATE: 87 BPM

## 2024-04-16 DIAGNOSIS — D46.9 MYELODYSPLASTIC SYNDROME, UNSPECIFIED (MULTI): ICD-10-CM

## 2024-04-16 DIAGNOSIS — D63.1 ANEMIA DUE TO STAGE 4 CHRONIC KIDNEY DISEASE (MULTI): ICD-10-CM

## 2024-04-16 DIAGNOSIS — N18.4 ANEMIA DUE TO STAGE 4 CHRONIC KIDNEY DISEASE (MULTI): ICD-10-CM

## 2024-04-16 DIAGNOSIS — D72.819 LEUKOPENIA, UNSPECIFIED TYPE: ICD-10-CM

## 2024-04-16 LAB
ERYTHROCYTE [DISTWIDTH] IN BLOOD BY AUTOMATED COUNT: 20.4 % (ref 11.5–14.5)
HCT VFR BLD AUTO: 24.7 % (ref 41–52)
HGB BLD-MCNC: 7.9 G/DL (ref 13.5–17.5)
MCH RBC QN AUTO: 34.8 PG (ref 26–34)
MCHC RBC AUTO-ENTMCNC: 32 G/DL (ref 32–36)
MCV RBC AUTO: 109 FL (ref 80–100)
NRBC BLD-RTO: 1.6 /100 WBCS (ref 0–0)
PLATELET # BLD AUTO: 89 X10*3/UL (ref 150–450)
RBC # BLD AUTO: 2.27 X10*6/UL (ref 4.5–5.9)
WBC # BLD AUTO: 1.3 X10*3/UL (ref 4.4–11.3)

## 2024-04-16 PROCEDURE — G0452 MOLECULAR PATHOLOGY INTERPR: HCPCS

## 2024-04-16 PROCEDURE — 2500000004 HC RX 250 GENERAL PHARMACY W/ HCPCS (ALT 636 FOR OP/ED): Performed by: RADIOLOGY

## 2024-04-16 PROCEDURE — 85097 BONE MARROW INTERPRETATION: CPT | Mod: TC,PARLAB

## 2024-04-16 PROCEDURE — 81450 HL NEO GSAP 5-50DNA/DNA&RNA: CPT

## 2024-04-16 PROCEDURE — 2500000005 HC RX 250 GENERAL PHARMACY W/O HCPCS: Performed by: RADIOLOGY

## 2024-04-16 PROCEDURE — 85027 COMPLETE CBC AUTOMATED: CPT | Performed by: RADIOLOGY

## 2024-04-16 PROCEDURE — 36415 COLL VENOUS BLD VENIPUNCTURE: CPT | Performed by: RADIOLOGY

## 2024-04-16 PROCEDURE — 2720000007 HC OR 272 NO HCPCS

## 2024-04-16 PROCEDURE — 81229 CYTOG ALYS CHRML ABNR SNPCGH: CPT

## 2024-04-16 PROCEDURE — 88237 TISSUE CULTURE BONE MARROW: CPT | Mod: 91

## 2024-04-16 RX ORDER — MIDAZOLAM HYDROCHLORIDE 1 MG/ML
INJECTION INTRAMUSCULAR; INTRAVENOUS
Status: COMPLETED | OUTPATIENT
Start: 2024-04-16 | End: 2024-04-16

## 2024-04-16 RX ORDER — FENTANYL CITRATE 50 UG/ML
INJECTION, SOLUTION INTRAMUSCULAR; INTRAVENOUS
Status: COMPLETED | OUTPATIENT
Start: 2024-04-16 | End: 2024-04-16

## 2024-04-16 RX ORDER — SODIUM CHLORIDE 9 MG/ML
INJECTION, SOLUTION INTRAVENOUS CONTINUOUS PRN
Status: DISCONTINUED | OUTPATIENT
Start: 2024-04-16 | End: 2024-04-17 | Stop reason: HOSPADM

## 2024-04-16 RX ORDER — LIDOCAINE HYDROCHLORIDE 10 MG/ML
INJECTION, SOLUTION EPIDURAL; INFILTRATION; INTRACAUDAL; PERINEURAL
Status: COMPLETED | OUTPATIENT
Start: 2024-04-16 | End: 2024-04-16

## 2024-04-16 RX ADMIN — MIDAZOLAM HYDROCHLORIDE 1 MG: 1 INJECTION, SOLUTION INTRAMUSCULAR; INTRAVENOUS at 12:00

## 2024-04-16 RX ADMIN — FENTANYL CITRATE 50 MCG: 50 INJECTION, SOLUTION INTRAMUSCULAR; INTRAVENOUS at 12:00

## 2024-04-16 RX ADMIN — LIDOCAINE HYDROCHLORIDE 5 ML: 10 INJECTION, SOLUTION EPIDURAL; INFILTRATION; INTRACAUDAL; PERINEURAL at 12:00

## 2024-04-16 RX ADMIN — Medication 3 L/MIN: at 12:15

## 2024-04-16 RX ADMIN — SODIUM CHLORIDE 50 ML/HR: 9 INJECTION, SOLUTION INTRAVENOUS at 11:50

## 2024-04-16 ASSESSMENT — PAIN - FUNCTIONAL ASSESSMENT
PAIN_FUNCTIONAL_ASSESSMENT: 0-10

## 2024-04-16 ASSESSMENT — PAIN SCALES - GENERAL
PAINLEVEL_OUTOF10: 0 - NO PAIN

## 2024-04-16 NOTE — PATIENT INSTRUCTIONS
Proceed with Darbepoetin 300 mcg q 2 weeks  Proceed with Bone Marrow Biopsy  Repeat CBC in 2 weeks for consideration of Darbepoetin  Scheduled with MD to discuss Bone Marrow results

## 2024-04-16 NOTE — PROCEDURES
Interventional Radiology Brief Postprocedure Note    Attending: Antonio Roblero MD    Assistant:   Staff Role   Lesly De La Rosa MT Radiology Technologist   Nimo Roth, MT Radiology Technologist   Meghan Giang, RN Radiology Nurse   Colton Colon, RN Radiology Nurse   Antonio Roblero MD Radiologist       Diagnosis:   1. Anemia due to stage 4 chronic kidney disease (Multi)  IR biopsy bone marrow    IR biopsy bone marrow      2. Leukopenia, unspecified type  IR biopsy bone marrow    IR biopsy bone marrow      3. Myelodysplastic syndrome, unspecified (Multi)  IR biopsy bone marrow    IR biopsy bone marrow          Description of procedure: Bone Marrow Evaluation  IR biopsy bone marrow left ilium    Timeout:  Yes    Procedure Area: Procedure Area     Anesthesia:   Conscious Sedation    Complications: None    Estimated Blood Loss: minimal    Medications (Filter: Administrations occurring from 1129 to 1209 on 04/16/24) As of 04/16/24 1209      oxygen (O2) therapy Total dose:  0 *   *Administration not included in total     Date/Time Rate/Dose/Volume Action       04/16/24  Canceled Entry               sodium chloride 0.9% infusion (mL/hr) Total volume:  Not documented*   *Total volume has not been documented. View each administration to see the amount administered.     Date/Time Rate/Dose/Volume Action       04/16/24  1150 50 mL/hr New Bag               fentaNYL PF (Sublimaze) injection (mcg) Total dose:  50 mcg      Date/Time Rate/Dose/Volume Action       04/16/24  1200 50 mcg Given               lidocaine PF (Xylocaine) 10 mg/mL (1 %) injection (mL) Total volume:  5 mL      Date/Time Rate/Dose/Volume Action       04/16/24  1200 5 mL Given               midazolam (Versed) injection (mg) Total dose:  1 mg      Date/Time Rate/Dose/Volume Action       04/16/24  1200 1 mg Given                       See detailed result report with images in PACS.    The patient tolerated the procedure well without incident  or complication and is in stable condition.

## 2024-04-16 NOTE — PRE-PROCEDURE NOTE
Interventional Radiology Preprocedure Note    Indication for procedure: Diagnoses of Anemia due to stage 4 chronic kidney disease (Multi), Leukopenia, unspecified type, and Myelodysplastic syndrome, unspecified (Multi) were pertinent to this visit.    Relevant review of systems: NA    Relevant Labs:   Lab Results   Component Value Date    CREATININE 2.07 (H) 04/09/2024    EGFR 30 (L) 04/09/2024    INR 1.3 (H) 05/15/2023    PROTIME 14.7 (H) 05/15/2023       Planned Sedation/Anesthesia: Minimal    Airway assessment: normal    Directed physical examination:    Aox3  No increased work of breathing.   No acute distress      Mallampati: II (hard and soft palate, upper portion of tonsils anduvula visible)    ASA Score: ASA 2 - Patient with mild systemic disease with no functional limitations    Benefits, risks and alternatives of procedure and planned sedation have been discussed with the patient and/or their representative. All questions answered and they agree to proceed.

## 2024-04-19 LAB
CELL COUNT (BLOOD): 3.52 X10*3/UL
CELL POPULATIONS: NORMAL
DIAGNOSIS: NORMAL
FLOW DIFFERENTIAL: NORMAL
FLOW TEST ORDERED: NORMAL
LAB TEST METHOD: NORMAL
NUMBER OF CELLS COLLECTED: NORMAL PER TUBE
PATH REPORT.COMMENTS IMP SPEC: NORMAL
PATH REPORT.FINAL DX SPEC: NORMAL
PATH REPORT.GROSS SPEC: NORMAL
PATH REPORT.MICROSCOPIC SPEC OTHER STN: NORMAL
PATH REPORT.RELEVANT HX SPEC: NORMAL
PATH REPORT.RELEVANT HX SPEC: NORMAL
PATH REPORT.TOTAL CANCER: NORMAL
PATH REPORT.TOTAL CANCER: NORMAL
SIGNATURE COMMENT: NORMAL
SPECIMEN VIABILITY: NORMAL

## 2024-04-19 PROCEDURE — 88313 SPECIAL STAINS GROUP 2: CPT | Performed by: PATHOLOGY

## 2024-04-19 PROCEDURE — 88185 FLOWCYTOMETRY/TC ADD-ON: CPT | Mod: TC,PARLAB,59

## 2024-04-19 PROCEDURE — 88311 DECALCIFY TISSUE: CPT | Performed by: PATHOLOGY

## 2024-04-19 PROCEDURE — 88189 FLOWCYTOMETRY/READ 16 & >: CPT | Performed by: PATHOLOGY

## 2024-04-19 PROCEDURE — 88342 IMHCHEM/IMCYTCHM 1ST ANTB: CPT | Performed by: PATHOLOGY

## 2024-04-19 PROCEDURE — 88305 TISSUE EXAM BY PATHOLOGIST: CPT | Mod: TC,59,SUR,PARLAB

## 2024-04-19 PROCEDURE — 88341 IMHCHEM/IMCYTCHM EA ADD ANTB: CPT | Performed by: PATHOLOGY

## 2024-04-19 PROCEDURE — 88305 TISSUE EXAM BY PATHOLOGIST: CPT | Performed by: PATHOLOGY

## 2024-04-23 ENCOUNTER — TELEPHONE (OUTPATIENT)
Dept: HEMATOLOGY/ONCOLOGY | Facility: CLINIC | Age: 89
End: 2024-04-23
Payer: MEDICARE

## 2024-04-23 ENCOUNTER — INFUSION (OUTPATIENT)
Dept: HEMATOLOGY/ONCOLOGY | Facility: CLINIC | Age: 89
End: 2024-04-23
Payer: MEDICARE

## 2024-04-23 VITALS
SYSTOLIC BLOOD PRESSURE: 145 MMHG | RESPIRATION RATE: 20 BRPM | BODY MASS INDEX: 35.88 KG/M2 | TEMPERATURE: 97.2 F | HEART RATE: 70 BPM | DIASTOLIC BLOOD PRESSURE: 72 MMHG | WEIGHT: 229.06 LBS | OXYGEN SATURATION: 92 %

## 2024-04-23 DIAGNOSIS — D46.9 MYELODYSPLASTIC SYNDROME, UNSPECIFIED (MULTI): ICD-10-CM

## 2024-04-23 DIAGNOSIS — N18.4 ANEMIA DUE TO STAGE 4 CHRONIC KIDNEY DISEASE (MULTI): ICD-10-CM

## 2024-04-23 DIAGNOSIS — D69.6 THROMBOCYTOPENIA (CMS-HCC): Primary | ICD-10-CM

## 2024-04-23 DIAGNOSIS — D61.818 OTHER PANCYTOPENIA (MULTI): ICD-10-CM

## 2024-04-23 DIAGNOSIS — N18.4 STAGE 4 CHRONIC KIDNEY DISEASE (MULTI): ICD-10-CM

## 2024-04-23 DIAGNOSIS — D63.1 ANEMIA DUE TO STAGE 4 CHRONIC KIDNEY DISEASE (MULTI): ICD-10-CM

## 2024-04-23 DIAGNOSIS — D69.6 THROMBOCYTOPENIA (CMS-HCC): ICD-10-CM

## 2024-04-23 LAB
BASOPHILS # BLD AUTO: 0 X10*3/UL (ref 0–0.1)
BASOPHILS NFR BLD AUTO: 0 %
EOSINOPHIL # BLD AUTO: 0.06 X10*3/UL (ref 0–0.4)
EOSINOPHIL NFR BLD AUTO: 4 %
ERYTHROCYTE [DISTWIDTH] IN BLOOD BY AUTOMATED COUNT: 19.8 % (ref 11.5–14.5)
HCT VFR BLD AUTO: 25.9 % (ref 41–52)
HGB BLD-MCNC: 8.3 G/DL (ref 13.5–17.5)
IMM GRANULOCYTES # BLD AUTO: 0.01 X10*3/UL (ref 0–0.5)
IMM GRANULOCYTES NFR BLD AUTO: 0.7 % (ref 0–0.9)
LYMPHOCYTES # BLD AUTO: 0.8 X10*3/UL (ref 0.8–3)
LYMPHOCYTES NFR BLD AUTO: 53 %
MCH RBC QN AUTO: 34.9 PG (ref 26–34)
MCHC RBC AUTO-ENTMCNC: 32 G/DL (ref 32–36)
MCV RBC AUTO: 109 FL (ref 80–100)
MONOCYTES # BLD AUTO: 0.07 X10*3/UL (ref 0.05–0.8)
MONOCYTES NFR BLD AUTO: 4.6 %
NEUTROPHILS # BLD AUTO: 0.57 X10*3/UL (ref 1.6–5.5)
NEUTROPHILS NFR BLD AUTO: 37.7 %
NRBC BLD-RTO: 0 /100 WBCS (ref 0–0)
PLATELET # BLD AUTO: 71 X10*3/UL (ref 150–450)
RBC # BLD AUTO: 2.38 X10*6/UL (ref 4.5–5.9)
WBC # BLD AUTO: 1.5 X10*3/UL (ref 4.4–11.3)

## 2024-04-23 PROCEDURE — 96372 THER/PROPH/DIAG INJ SC/IM: CPT

## 2024-04-23 PROCEDURE — 85025 COMPLETE CBC W/AUTO DIFF WBC: CPT

## 2024-04-23 PROCEDURE — 2500000004 HC RX 250 GENERAL PHARMACY W/ HCPCS (ALT 636 FOR OP/ED): Mod: JZ,EC

## 2024-04-23 RX ORDER — ALBUTEROL SULFATE 0.83 MG/ML
3 SOLUTION RESPIRATORY (INHALATION) AS NEEDED
Status: CANCELLED | OUTPATIENT
Start: 2024-05-07

## 2024-04-23 RX ORDER — DIPHENHYDRAMINE HYDROCHLORIDE 50 MG/ML
50 INJECTION INTRAMUSCULAR; INTRAVENOUS AS NEEDED
Status: CANCELLED | OUTPATIENT
Start: 2024-05-07

## 2024-04-23 RX ORDER — FAMOTIDINE 10 MG/ML
20 INJECTION INTRAVENOUS ONCE AS NEEDED
Status: CANCELLED | OUTPATIENT
Start: 2024-05-07

## 2024-04-23 RX ORDER — EPINEPHRINE 0.3 MG/.3ML
0.3 INJECTION SUBCUTANEOUS EVERY 5 MIN PRN
Status: CANCELLED | OUTPATIENT
Start: 2024-05-07

## 2024-04-23 RX ADMIN — DARBEPOETIN ALFA 300 MCG: 300 INJECTION, SOLUTION INTRAVENOUS; SUBCUTANEOUS at 13:37

## 2024-04-23 ASSESSMENT — PAIN SCALES - GENERAL: PAINLEVEL: 0-NO PAIN

## 2024-04-24 ENCOUNTER — OFFICE VISIT (OUTPATIENT)
Dept: PULMONOLOGY | Facility: CLINIC | Age: 89
End: 2024-04-24
Payer: MEDICARE

## 2024-04-24 VITALS
DIASTOLIC BLOOD PRESSURE: 62 MMHG | OXYGEN SATURATION: 91 % | WEIGHT: 229 LBS | TEMPERATURE: 98 F | BODY MASS INDEX: 35.94 KG/M2 | HEIGHT: 67 IN | HEART RATE: 68 BPM | SYSTOLIC BLOOD PRESSURE: 111 MMHG

## 2024-04-24 DIAGNOSIS — R09.02 HYPOXEMIA: ICD-10-CM

## 2024-04-24 DIAGNOSIS — J84.9 INTERSTITIAL LUNG DISEASE (MULTI): Primary | ICD-10-CM

## 2024-04-24 LAB
ELECTRONICALLY SIGNED BY: NORMAL
MYELOID NGS RESULTS: NORMAL

## 2024-04-24 PROCEDURE — 3074F SYST BP LT 130 MM HG: CPT | Performed by: INTERNAL MEDICINE

## 2024-04-24 PROCEDURE — 1160F RVW MEDS BY RX/DR IN RCRD: CPT | Performed by: INTERNAL MEDICINE

## 2024-04-24 PROCEDURE — 99214 OFFICE O/P EST MOD 30 MIN: CPT | Performed by: INTERNAL MEDICINE

## 2024-04-24 PROCEDURE — 1157F ADVNC CARE PLAN IN RCRD: CPT | Performed by: INTERNAL MEDICINE

## 2024-04-24 PROCEDURE — 1036F TOBACCO NON-USER: CPT | Performed by: INTERNAL MEDICINE

## 2024-04-24 PROCEDURE — 1159F MED LIST DOCD IN RCRD: CPT | Performed by: INTERNAL MEDICINE

## 2024-04-24 PROCEDURE — 3078F DIAST BP <80 MM HG: CPT | Performed by: INTERNAL MEDICINE

## 2024-04-24 NOTE — PROGRESS NOTES
Subjective   Patient ID: Zack Medrano is a 88 y.o. male who presents for No chief complaint on file..  HPI  Patient was seen today in patient had a high-resolution CT scan of his chest done on 4/5/2024 and the report indicates from radiology that there has been no significant change in the interstitial lung disease versus 3/22/2024.  The study show primarily bibasilar interstitial lung disease with some superimposed groundglass opacities and no evidence of honeycombing, most consistent with NSIP.  Patient reports that his breathing has remained good with his usual activities during the day.  His pulmonary function studies showed spirometry with no significant response to bronchodilator and there was some mild restrictive disease based on lung volume.  There is also suggestion of some airway inflammation with a FeNO of 45.  Patient had some difficulty doing the study because of persistent coughing.  Patient currently uses oxygen on his CPAP device at 3 L/min but he does not have any portable oxygen such as a POC.  He has only been given some small tanks that will last him for approximately an hour to 2.  Patient had a 6-minute walk study done and showed that he was able to maintain saturations at 93% with 2 L/min of continuous oxygen at 450 feet before the test was terminated because of dyspnea.  Review of Systems  No changes other than what was noted above.  Objective   Physical Exam  Pulmonary, inspiratory crackles in the lung bases in the upper lung fields were generally clear.  Cardio, heart sounds were regular rate and rhythm.  Extremities, lower extremity pretibial edema left greater than right.  Psych, the patient was alert and oriented x 3.  The patient did not have dyspnea in the office today with walking on a level surface.  Assessment/Plan        Impressions:  1.  Interstitial lung disease that would be consistent with NSIP.  2.  Hypoxemia with activity on room air.  Recommendations:  1.  Will contact  his DME provider Kiara in [place] about arranging for a POC device that would maintain his oxygen saturations greater than or equal to 92%..  2.  Continue with his oxygen on his CPAP device at 3 L at nighttime.  3.  The patient at this time is not interested in any additional therapy for his breathing.  4.  Follow-up with the patient in 1 month.      This note was transcribed using the Dragon Dictation system.  There may be grammatical, punctuation, or verbiage errors that occur with voice recognition programs.    Rui Graham DO 04/24/24 10:38 AM

## 2024-05-03 ENCOUNTER — APPOINTMENT (OUTPATIENT)
Dept: PULMONOLOGY | Facility: CLINIC | Age: 89
End: 2024-05-03
Payer: MEDICARE

## 2024-05-06 DIAGNOSIS — D46.9 MYELODYSPLASTIC SYNDROME, UNSPECIFIED (MULTI): ICD-10-CM

## 2024-05-06 DIAGNOSIS — D70.9 NEUTROPENIA, UNSPECIFIED TYPE (CMS-HCC): ICD-10-CM

## 2024-05-06 DIAGNOSIS — N18.32 ANEMIA DUE TO STAGE 3B CHRONIC KIDNEY DISEASE (MULTI): ICD-10-CM

## 2024-05-06 DIAGNOSIS — D63.1 ANEMIA DUE TO STAGE 3B CHRONIC KIDNEY DISEASE (MULTI): ICD-10-CM

## 2024-05-06 DIAGNOSIS — D69.6 THROMBOCYTOPENIA (CMS-HCC): Primary | ICD-10-CM

## 2024-05-07 ENCOUNTER — APPOINTMENT (OUTPATIENT)
Dept: HEMATOLOGY/ONCOLOGY | Facility: CLINIC | Age: 89
End: 2024-05-07
Payer: MEDICARE

## 2024-05-10 ENCOUNTER — INFUSION (OUTPATIENT)
Dept: HEMATOLOGY/ONCOLOGY | Facility: CLINIC | Age: 89
End: 2024-05-10
Payer: MEDICARE

## 2024-05-10 ENCOUNTER — OFFICE VISIT (OUTPATIENT)
Dept: HEMATOLOGY/ONCOLOGY | Facility: CLINIC | Age: 89
End: 2024-05-10
Payer: MEDICARE

## 2024-05-10 VITALS
DIASTOLIC BLOOD PRESSURE: 70 MMHG | OXYGEN SATURATION: 98 % | HEART RATE: 68 BPM | TEMPERATURE: 97.7 F | SYSTOLIC BLOOD PRESSURE: 122 MMHG | WEIGHT: 232 LBS | RESPIRATION RATE: 20 BRPM | BODY MASS INDEX: 36.34 KG/M2

## 2024-05-10 DIAGNOSIS — N18.4 ANEMIA DUE TO STAGE 4 CHRONIC KIDNEY DISEASE (MULTI): ICD-10-CM

## 2024-05-10 DIAGNOSIS — N18.4 STAGE 4 CHRONIC KIDNEY DISEASE (MULTI): ICD-10-CM

## 2024-05-10 DIAGNOSIS — D69.6 THROMBOCYTOPENIA (CMS-HCC): ICD-10-CM

## 2024-05-10 DIAGNOSIS — D63.1 ANEMIA DUE TO STAGE 4 CHRONIC KIDNEY DISEASE (MULTI): ICD-10-CM

## 2024-05-10 DIAGNOSIS — D72.819 LEUKOPENIA, UNSPECIFIED TYPE: ICD-10-CM

## 2024-05-10 DIAGNOSIS — D63.1 ANEMIA DUE TO STAGE 3B CHRONIC KIDNEY DISEASE (MULTI): ICD-10-CM

## 2024-05-10 DIAGNOSIS — N18.32 ANEMIA DUE TO STAGE 3B CHRONIC KIDNEY DISEASE (MULTI): ICD-10-CM

## 2024-05-10 DIAGNOSIS — D46.9 MYELODYSPLASTIC SYNDROME, UNSPECIFIED (MULTI): ICD-10-CM

## 2024-05-10 DIAGNOSIS — D70.9 NEUTROPENIA, UNSPECIFIED TYPE (CMS-HCC): ICD-10-CM

## 2024-05-10 DIAGNOSIS — D61.818 OTHER PANCYTOPENIA (MULTI): ICD-10-CM

## 2024-05-10 LAB
ALBUMIN SERPL BCP-MCNC: 4 G/DL (ref 3.4–5)
ALP SERPL-CCNC: 88 U/L (ref 33–136)
ALT SERPL W P-5'-P-CCNC: 20 U/L (ref 10–52)
ANION GAP SERPL CALC-SCNC: 13 MMOL/L (ref 10–20)
AST SERPL W P-5'-P-CCNC: 28 U/L (ref 9–39)
BASOPHILS # BLD AUTO: 0 X10*3/UL (ref 0–0.1)
BASOPHILS NFR BLD AUTO: 0 %
BILIRUB SERPL-MCNC: 1.1 MG/DL (ref 0–1.2)
BUN SERPL-MCNC: 47 MG/DL (ref 6–23)
BURR CELLS BLD QL SMEAR: NORMAL
CALCIUM SERPL-MCNC: 8.8 MG/DL (ref 8.6–10.3)
CHLORIDE SERPL-SCNC: 102 MMOL/L (ref 98–107)
CO2 SERPL-SCNC: 25 MMOL/L (ref 21–32)
CREAT SERPL-MCNC: 2.28 MG/DL (ref 0.5–1.3)
DACRYOCYTES BLD QL SMEAR: NORMAL
EGFRCR SERPLBLD CKD-EPI 2021: 27 ML/MIN/1.73M*2
EOSINOPHIL # BLD AUTO: 0.06 X10*3/UL (ref 0–0.4)
EOSINOPHIL NFR BLD AUTO: 4.5 %
ERYTHROCYTE [DISTWIDTH] IN BLOOD BY AUTOMATED COUNT: 19.5 % (ref 11.5–14.5)
GLUCOSE SERPL-MCNC: 294 MG/DL (ref 74–99)
HCT VFR BLD AUTO: 25.2 % (ref 41–52)
HGB BLD-MCNC: 8.1 G/DL (ref 13.5–17.5)
HYPOCHROMIA BLD QL SMEAR: NORMAL
IMM GRANULOCYTES # BLD AUTO: 0.01 X10*3/UL (ref 0–0.5)
IMM GRANULOCYTES NFR BLD AUTO: 0.7 % (ref 0–0.9)
LYMPHOCYTES # BLD AUTO: 0.64 X10*3/UL (ref 0.8–3)
LYMPHOCYTES NFR BLD AUTO: 47.8 %
MCH RBC QN AUTO: 34.6 PG (ref 26–34)
MCHC RBC AUTO-ENTMCNC: 32.1 G/DL (ref 32–36)
MCV RBC AUTO: 108 FL (ref 80–100)
MONOCYTES # BLD AUTO: 0.06 X10*3/UL (ref 0.05–0.8)
MONOCYTES NFR BLD AUTO: 4.5 %
NEUTROPHILS # BLD AUTO: 0.57 X10*3/UL (ref 1.6–5.5)
NEUTROPHILS NFR BLD AUTO: 42.5 %
NRBC BLD-RTO: 0 /100 WBCS (ref 0–0)
OVALOCYTES BLD QL SMEAR: NORMAL
PLATELET # BLD AUTO: 69 X10*3/UL (ref 150–450)
POTASSIUM SERPL-SCNC: 4.8 MMOL/L (ref 3.5–5.3)
PROT SERPL-MCNC: 6.2 G/DL (ref 6.4–8.2)
RBC # BLD AUTO: 2.34 X10*6/UL (ref 4.5–5.9)
RBC MORPH BLD: NORMAL
SCHISTOCYTES BLD QL SMEAR: NORMAL
SODIUM SERPL-SCNC: 135 MMOL/L (ref 136–145)
WBC # BLD AUTO: 1.3 X10*3/UL (ref 4.4–11.3)

## 2024-05-10 PROCEDURE — 1159F MED LIST DOCD IN RCRD: CPT | Performed by: INTERNAL MEDICINE

## 2024-05-10 PROCEDURE — 99215 OFFICE O/P EST HI 40 MIN: CPT | Performed by: INTERNAL MEDICINE

## 2024-05-10 PROCEDURE — 3078F DIAST BP <80 MM HG: CPT | Performed by: INTERNAL MEDICINE

## 2024-05-10 PROCEDURE — 1126F AMNT PAIN NOTED NONE PRSNT: CPT | Performed by: INTERNAL MEDICINE

## 2024-05-10 PROCEDURE — 1157F ADVNC CARE PLAN IN RCRD: CPT | Performed by: INTERNAL MEDICINE

## 2024-05-10 PROCEDURE — 80053 COMPREHEN METABOLIC PANEL: CPT

## 2024-05-10 PROCEDURE — 36415 COLL VENOUS BLD VENIPUNCTURE: CPT

## 2024-05-10 PROCEDURE — 1160F RVW MEDS BY RX/DR IN RCRD: CPT | Performed by: INTERNAL MEDICINE

## 2024-05-10 PROCEDURE — 3074F SYST BP LT 130 MM HG: CPT | Performed by: INTERNAL MEDICINE

## 2024-05-10 PROCEDURE — 85025 COMPLETE CBC W/AUTO DIFF WBC: CPT

## 2024-05-10 PROCEDURE — 96372 THER/PROPH/DIAG INJ SC/IM: CPT

## 2024-05-10 PROCEDURE — 2500000004 HC RX 250 GENERAL PHARMACY W/ HCPCS (ALT 636 FOR OP/ED): Mod: JZ,EC

## 2024-05-10 RX ORDER — ALBUTEROL SULFATE 0.83 MG/ML
3 SOLUTION RESPIRATORY (INHALATION) AS NEEDED
Status: CANCELLED | OUTPATIENT
Start: 2024-05-21

## 2024-05-10 RX ORDER — DIPHENHYDRAMINE HYDROCHLORIDE 50 MG/ML
50 INJECTION INTRAMUSCULAR; INTRAVENOUS AS NEEDED
Status: CANCELLED | OUTPATIENT
Start: 2024-05-21

## 2024-05-10 RX ORDER — FAMOTIDINE 10 MG/ML
20 INJECTION INTRAVENOUS ONCE AS NEEDED
Status: CANCELLED | OUTPATIENT
Start: 2024-05-21

## 2024-05-10 RX ORDER — EPINEPHRINE 0.3 MG/.3ML
0.3 INJECTION SUBCUTANEOUS EVERY 5 MIN PRN
Status: CANCELLED | OUTPATIENT
Start: 2024-05-21

## 2024-05-10 RX ADMIN — DARBEPOETIN ALFA 300 MCG: 300 INJECTION, SOLUTION INTRAVENOUS; SUBCUTANEOUS at 10:06

## 2024-05-10 ASSESSMENT — PAIN SCALES - GENERAL: PAINLEVEL: 0-NO PAIN

## 2024-05-10 NOTE — PATIENT INSTRUCTIONS
Reviewed labs and recent medical history.  Discussed the results of his recent bone marrow biopsy. Noted that we are still waiting for cytogenetics results.  Reviewed options for treatment including erythropoietin. He will consider this option.  Encouraged him to stay away from folks who have colds or infection.   Return to see MD in 2 weeks with labs. Orders added.

## 2024-05-10 NOTE — PROGRESS NOTES
Labs drawn at appt  Will get injection after appt today  Rt 5/24 930 MD- will draw labs at clinic on arrival                1000 injection  Reviewed AVS with patient- patient verbalizes understanding

## 2024-05-10 NOTE — PROGRESS NOTES
Patient ID:Zack Medrano is a 88 y.o. year old male patient with MDS        Referring Physician: NATALY Villa-CNP  [address]  [place] [id number]  Primary Care Provider: Fabiola Dietz MD    Chief Complaint  Chief Complaint   Patient presents with    Anemia   He is here today with his wife and stepdaughter to discuss recent bone marrow biopsy results.      History of the Present Illness    10/9/2019.  Visit with Dr. Mickey Nguyen for chronic kidney disease and hypertension.  Chief complaint was fatigue and need to lose weight    11/12/2019.  The patient has had long-term history of microcytic anemia going back to at least 2019 at which time his white count was 5.2 hemoglobin was 13.8 hematocrit 40.5 and a platelet count of 157,000.  His MCV was 105 MCHC was 34.  White blood cell differential count showed 66% polys, 17 lymphs, 11 monos and 4 eosinophils.    12/11/2019.  Seen by primary care for probable shingles treated with Valtrex with chief complaint of waxing and waning left flank pain radiating up under his ribs, somewhat responsive to exercise..  He had a history of pulmonary embolus and was on Eliquis long-term.    2/24/2020.  Seen by Dr. Howell for elevated PSA, BPH and erectile dysfunction.  PSA in 2017 was 2.94, in 2018 it was 2.74 and in 2020 it was 4.03.  We discussed biopsy and the patient wanted to have this followed and not biopsy.    3/9/2020.  Follow-up with NATALY Pulido primary care.  Referred to Dr. Bahena for colonoscopy and CT scan of the chest was ordered for follow-up of questionable lingular nodule.    3/11/2020.  CT scan of the chest showed a stable 6 mm nodular lesion near the fissure in the lingula.    3/13/2020.  Seen by Dr. Bahena who noted that the patient had had diarrhea off-and-on since cholecystectomy and had intermittent left-sided pain.  He had a history of colon polyps.    4/22/2015.  Colonoscopy showed that he had tubular  adenoma    5/13/2020.  Seen by Dr. Migdalia New in follow-up.  She noted that he had an abnormal CBC with macrocytic indices with a normal B12 level.  The patient denies alcohol use.  Hemoglobin A1c was 6.2.  BUN was 33 creatinine was 1.74.    8/31/2020.  Follow-up with Dr. Howell.  PSA had come down from 4.03-3.77    1/4/2021 through 1/12/2021.  Admitted to the hospital having been brought to the ED by squad after he fell forward on his toilet and was unable to get up.  He denied hitting his head or losing consciousness.  He blamed it on having back pain and shoulder pain for several weeks.  He was febrile with a temperature of 100.9 and hypoxic with pulse ox of 86% on room air.  Respirations were 24.  He was positive for COVID and was treated with intravenous antibiotics and remdesivir, increased supplemental oxygen.  He was able to be discharged to rehabilitation.    Repeat CBC showed white count 4.3 with a hemoglobin 12.9 hematocrit 38.8 with an MCV of 102 and a platelet count of 173,000.  White blood cell differential count showed minimal absolute lymphocytopenia at 0.6 with the lower limits of normal being 0.8.    3/1/2021.  Follow-up with Dr. Howell.  PSA was 9.9 in February.  Decided to have prostate biopsy done on 3/22/2021.  Results showed BPH.    10/6/2021.  Follow-up with Dr. Howell.  PSA in September was 2.77    4/6/2022.  CBC showed white count 4.3 with hemoglobin 11.7 hematocrit 34.4 with an MCV of 107.  Platelet count was 153,000.  White blood cell differential count was essentially normal.    5/31/2022.  CBC showed white count 3.6 with hemoglobin 11.6 hematocrit 34.4 and platelet count 117,000 with a normal white blood cell differential.    6/27/2022.  Referred to Dr. Dash because of pancytopenia by NATALY Gray.  CBC showed a white count of 3.9 with hemoglobin 12.3 hematocrit 36.1 and platelet count 129,000 with a normal differential.  PSA was 3.26.    The patient said that he felt well except  for having increased fatigue.  He continued on long-term anticoagulation.  He was able to carry out his ADLs.  He did complain of easy bruising.  He said that he had worked at [employer] for 12 years and also worked in making balloons.  Dr. Dash felt that there was a large component secondary to his chronic kidney disease.  There was no evidence of nutritional deficiency.  He checked him for hemolysis and monoclonal gammopathy and ordered an ultrasound for hepatosplenomegaly and testing for hepatitis, HIV and CHRISTINA.  He said he would rather observe the patient rather than pursuing a bone marrow biopsy.    7/27/2022.  Repeat lab data showed white count 3.9 with hemoglobin 12.3 hematocrit 36.1 and platelet count 129,000.  BUN was 36 creatinine was 1.78.  He had mild elevation of AST.  Retake count was 2.3%.  Flow cytometry showed a small clonal B-cell population.  Hepatitis B was nonreactive.  Hep C testing was negative.  Serum protein electrophoresis was normal.  Haptoglobin was normal.  LDH was normal.  He said that the possibility of MDS could not be ruled out.  Ultrasound showed fatty liver but no splenomegaly.  He plan for EPO level and NGS for myeloid mutation.    9/1/2022.  Started on allopurinol for hyperuricemia.    10/12/2022.  Seen by Dr. Dr. Dietz.  Patient was referred to dermatology for several skin lesions.  He was referred to neurology because of his tremor that was not improving.  She increased his glimepiride.    10/17/2022.  CBC showed white count 3.0 with hemoglobin 10.5 hematocrit 31.3 and a platelet count of 106,000 MCV was 111.    10/26/2022.  Follow-up with Dr. 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  He was suspicious for MDS.  He referred him for a bone marrow biopsy.    12/1/2022.  Follow-up with Dr. Dash.  Bone marrow biopsy showed low-grade MDS.  R IPSS score was low or very low depending on a karyotype which was still pending.  We  discussed initiating erythropoietin with Aranesp but the patient deferred and the decision was made to monitor him.    1/11/2023.  CBC showed white count 3.4 with hemoglobin 10.3 hematocrit 32.2 and a platelet count of 137,000.  MCV was 115.  White blood cell differential count was normal.    1/13/2023.  Follow-up with Dr. Dash.  The patient did say that he had following getting out of the shower but did not sustain any injuries.  He had been started by Dr. Nguyen on insulin for his diabetes which was helpful.  Patient continued to be active inside his house.  He remains on Eliquis.  BR-IPSS score is very low.  Counts remain stable.    3/31/2023.  CBC showed a white count of 2.6 with hemoglobin 8.7 hematocrit 27.1 and a platelet count 97,000.  MCV was 115.  White blood cell differential count showed that he had an absolute neutrophil count of 1.36.    4/10/2023.  CBC showed a white count of 2.5 with a hemoglobin 9.0 hematocrit of 27.7 with a platelet count of 92,000.  MCV was 114.    4/17/2023.  Follow-up with NATALY Gray.  Even though his hemoglobin had dropped to 9 the patient did not want to start his erythropoietin injections.    5/11/2023.  CBC showed white count of 2.5 with a hemoglobin of 7.6 hematocrit 23.8 and a platelet count of 101,000.  Absolute neutrophil count was 1.17.    5/15/2023 through 5/19/2023.  Admitted to the hospital with anemia and generalized weakness black stools dizziness with a hemoglobin of 6.7.  His BUN was 51 creatinine was 2.08.  CT scan of the abdomen showed no acute process.  There were findings consistent with atypical healing of the previous rib fracture with question Paget's disease although it was nonspecific.  He was transfused 2 units packed red blood cells.  EGD found 3 erosions in the cardia.  He was prescribed Carafate.    5/24/2023.  Follow-up with Dr. Dr. Dietz after discharge.  CBC showed white count 1.9 with hemoglobin 8.6 hematocrit 28.3 and a platelet count  "of 140,000.  Absolute neutrophil count was 1.06.  Patient did not tolerate Carafate and stopped it.  He was prescribed oral iron twice a day.  He had not been prescribed a PPI which was then started.    6/1/2023.  CBC showed white count of 1.9 with hemoglobin 7.7 hematocrit 24.8 and a platelet count of 87,000.    6/13/2023.  CBC showed a white count of 1.9 with hemoglobin of 8.7 hematocrit 28.1 and platelet count of 89,000.    6/23/2023.  Followed up with Dr. Dr. Dietz who reported that he was hospitalized again in [place] for GI bleed.  At that time his Eliquis was discontinued and he was changed to Protonix and taken off his baby aspirin.  Once again he was started on Carafate but did not tolerate it.    7/15/2023.  CBC showed white count of 2.4 with hemoglobin 9.2 hematocrit 27.3 and a platelet count of 110,000.  Absolute neutrophil count was 1.36.    7/17/2023.  Follow-up with Malorie Shaffer.  The patient had recently had a colonoscopy and had \"cauterizations\" he noted that he had dyspnea with exertion but no resting shortness of breath.  He was on prednisone at that time.  His sugars were in the 300s.  He was on insulin.  His neuropathy has not changed and he continues to have tremors.  Current level was found to be saturated.  Once again the patient was reluctant to start NING.    8/14/2023.  Follow-up with Malorie Shaffer.  CBC showed white count 1.8 with a hemoglobin 8.9 hematocrit 26.9 and a platelet count of 100,000.  Absolute neutrophil count was 0.96.    9/6/2023.  CBC showed a white count of 1.8 with hemoglobin 9.5 hematocrit 28.8 and a platelet count of 85,000 with an absolute neutrophil count of 0.99.  9/13/2023.  Follow-up with Dr. Howell.  PSA has gone down to 1.87.    10/9/2023.  Follow-up with Malorie Shaffer who noted the patient remained on oxygen and CPAP.  He says that he is awakening in the middle of the night around 3 to 4:00 in the morning.  Sugars have been in the 300s.  Neuropathy was unchanged.  " He had been started on erythropoietin every 3 weeks    11/21/2023.  Follow-up with Malorie Shaffer.  No improvement in his hemoglobin.  White count had improved to 2.1 platelets were baseline.  He had been placed on 40 mg of prednisone and continued on erythropoietin 300 mcg every 3 weeks.    1/2/2024.  Follow-up with Malorie Shaffer.  No improvement in hemoglobin.  Symptomatically he was doing well.  Plan was to increase frequency of his darbepoetin to every 2 weeks.    2/13/2024.  Follow-up with Malorie Shaffer.  Now on erythropoietin injections every 2 weeks.  There was a discussion concerning repeat bone marrow biopsy with counts stayed low.    3/12/2024.  Follow-up with Malorie Shaffer.  No improvement in his hemoglobin.  Patient was agreeable to repeat bone marrow biopsy.  She also ordered a CAT scan of the chest abdomen and pelvis without contrast.    3/22/2024.  CAT scan showed severe coronary artery calcifications.  There is evidence of bronchiectasis in the lower lobes increased compared to previous CT scan from 2020.    Predominantly groundglass opacities with bibasilar prominence.  There is some subpleural sparing of the left upper lobe.  There is a 3 mm nodule in the right upper lobe which was unchanged for several years.  Multilevel anterior bridging osteophytes were noted consistent with DISH.  There is a small fat-containing periumbilical hernia and bilateral inguinal hernias.  Spleen size is at the upper limits of normal being 12.7 cm in length slightly increased when compared to study from 2019.  Cysts were seen in the kidneys with diffuse cortical atrophy and a 4 mm hyperdense focus in the interpolar region of the right kidney possibly a stone.  Prostatism was noted for gland measuring 6.2 cm.    3/28/2024.  Seen by Dr. Graham for shortness of breath.  He felt the patient had possible chronic interstitial lung disease versus periodic aspiration, hypoxia dyspnea on exertion.  Further tests were ordered including  high-resolution CT scan, complete pulmonary function testing, simple pulmonary stress test and follow-up.    4/9/2024.  Follow-up with Malorie Shaffer.  Erythropoietin injections continued every 2 weeks which the patient was tolerating well.    4/16/2024.  Bone marrow biopsy was performed showing a hypercellular bone marrow at 50% with dyserythropoiesis.  There are 1% blasts consistent with persistent myeloid neoplasm.  Flow cytometry showed a small clonal CD5 negative, CD10 negative B-cell population and a polytypic background.  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    There was a very small abnormal T-cell population that could be incidental.  Next generation sequencing showed 2 U2AF 1 variants, 1 of which was negative.  The other had been previously seen.    The marrow also showed dyserythropoiesis with bilobate forms and forms with irregular nuclear contours as well as blebbing.    4/23/2024.  CBC showed white count 1.5 with a hemoglobin of 8.3 hematocrit 25.9 and platelet count of 71,000 with 37% neutrophils, 53 lymphocytes, 4 monos, 4 eosinophils with an absolute neutrophil count of 0.57.    4/24/2024.  Return visit with Dr. Graham.  He noted that the high-resolution CT scan showed no significant change from the previous CT scan the month before.  Pulmonary function testing showed no response to bronchodilator and there was mild restrictive change with some suggestion of airway inflammation.  The patient had difficulty doing the test because of persistent coughing.  6-minute walk test showed that the patient was able to maintain saturations at 93% with 2 L of oxygen per minute continuously.  The test was terminated because of dyspnea.  With his hypoxia on room air it was recommended that he be on oxygen continuously to keep his saturation greater than or equal to  92%.  He continued on oxygen with his CPAP at 3 L at nighttime.    Interval Note    5/10/2024.  This is my first visit with Mr. Medrano and his family.  We went over some of the results of the bone marrow biopsy.  I told him that I would have to wait until the chromosomal analysis returned.  I also note the possibility of low-grade lymphoma.  He may need to have an opinion with malignant hematology at [facility].  Stepdaughter who is here with him and his wife today is his advocate for maintaining his quality of life.  She is an employee at a local nursing facility and is concerned about many clients she has who seem to be getting treated aggressively.  We talked about myelodysplasia and the fact that he is not responding to erythropoietin.  He will continue on this treatment for now.  He is low counts were discussed and he knows that he is at higher risk for infection and bleeding.  He continues on oxygen supplementation continuously.  They will return in 2 weeks at which time laboratory tests will be performed.    He just had a lesion removed from the dorsum of his right hand at the base of his first and second fingers.  Stitches are still in place.  It seems to be healing well without any significant bleeding or infection.  He was told that it looked benign.    Comorbidities  GERD  Arthritis, DJD  BPH  Coronary artery disease with history of myocardial, status post stent infarction  History of dumping syndrome  Hyperlipidemia  History of skin cancer  Obesity  Obstructive sleep apnea on CPAP  Peripheral arterial disease  CKD 3  Diabetes with nephropathy  History of cholecystectomy and hernia repair  Essential tremor  Hypertension   Numbness in his fingers felt to be related to cervical disc disease.    Monitoring Parameters  Histories, physical examinations and CBCs with occasional bone marrow biopsies.    Review of Systems - Oncology   Review of Systems   Constitutional:  Positive for fatigue. Negative for appetite  change and unexpected weight change.   HENT:  Negative for dental problem, mouth sores and trouble swallowing.    Eyes:  Negative for visual disturbance.   Respiratory:  Positive for shortness of breath. Negative for cough.    Cardiovascular:  Positive for leg swelling.   Gastrointestinal:  Negative for abdominal pain, constipation, diarrhea, nausea and vomiting.   Endocrine: Negative for polyuria.   Genitourinary:  Negative for dysuria, frequency and urgency.   Musculoskeletal:  Positive for arthralgias (generalized) and myalgias.   Skin:  Positive for wound (healing stitches on his right hand). Negative for pallor and rash.   Neurological:  Positive for weakness. Negative for light-headedness, numbness and headaches.   Hematological:  Bruises/bleeds easily.   Psychiatric/Behavioral:  Negative for self-injury, sleep disturbance and suicidal ideas. The patient is not nervous/anxious.         Past Medical History  Past Medical History:   Diagnosis Date    Abnormal levels of other serum enzymes     Abnormal liver enzymes    Dependence on other enabling machines and devices     CPAP (continuous positive airway pressure) dependence    Encounter for examination of eyes and vision without abnormal findings     Diabetic eye exam    Localized edema     Bilateral leg edema    Old myocardial infarction     History of myocardial infarction    Other conditions influencing health status 01/08/2020    Uncontrolled diabetes mellitus type 2 without complications    Pain in unspecified ankle and joints of unspecified foot     Arthralgia of ankle    Personal history of other diseases of the digestive system     History of gastroesophageal reflux (GERD)    Personal history of other diseases of the digestive system     History of dumping syndrome    Personal history of other endocrine, nutritional and metabolic disease 11/12/2020    History of type 2 diabetes mellitus    Presence of coronary angioplasty implant and graft     History of  coronary artery stent placement    Rash and other nonspecific skin eruption 12/11/2019    Rash    Unspecified abdominal hernia without obstruction or gangrene     Hernia    Unspecified abdominal pain 05/19/2021    Abdominal pain, left lateral        Surgical History  Past Surgical History:   Procedure Laterality Date    OTHER SURGICAL HISTORY  10/09/2019    Arterial stent placement    OTHER SURGICAL HISTORY  10/09/2019    Cholecystectomy    OTHER SURGICAL HISTORY  10/09/2019    Hernia repair       Social History  Social History     Tobacco Use    Smoking status: Never    Smokeless tobacco: Never   Vaping Use    Vaping status: Never Used   Substance Use Topics    Alcohol use: Never    Drug use: Never        Family History  family history includes Colon cancer in his mother and another family member.       Current Medications  Current Outpatient Medications   Medication Instructions    allopurinol (ZYLOPRIM) 100 mg, oral, Daily    amLODIPine (NORVASC) 5 mg, oral, Daily before breakfast    ascorbic acid (VITAMIN C) 250 mg, oral    aspirin 81 mg, oral, Daily    atorvastatin (LIPITOR) 40 mg, oral, Daily    blood-glucose sensor (FreeStyle Sindi 3 Sensor) device Use as directed    Constulose 10 gram/15 mL oral solution TAKE 45mL (30g) BY MOUTH TWICE DAILY FOR 30 DAYS    cyanocobalamin, vitamin B-12, (Vitamin B-12) 1,000 mcg tablet extended release 1 tablet, oral    ferrous sulfate 27 mg, oral, Daily    furosemide (LASIX) 40 mg, oral, Daily    gabapentin (NEURONTIN) 300 mg, oral, 2 times daily    lancets 33 gauge misc miscellaneous, 2 times daily    Lantus U-100 Insulin 50 Units, subcutaneous, Nightly    Mag 64 64 mg EC tablet TAKE 1 TABLET BY MOUTH TWICE DAILY for 1 month    metoprolol tartrate (LOPRESSOR) 50 mg, oral, 2 times daily    multivit-minerals/folic acid (CENTRUM ADULTS ORAL) oral    nitroglycerin (NITROSTAT) 0.4 mg, sublingual, Every 5 min PRN    NON FORMULARY Truetrack test In vitro strip; Use as directed to  "check blood sugar  2 times daily     pantoprazole (PROTONIX) 40 mg, oral, 2 times daily    pen needle, diabetic (Pen Needle) 32 gauge x 5/32\" needle 1 each, miscellaneous, Daily    primidone (MYSOLINE) 50 mg, oral, Nightly    spironolactone (ALDACTONE) 25 mg, oral, Daily    sucralfate (CARAFATE) 1 g, oral, 3 times daily with meals           OARRS Review  I have personally reviewed the OARRS report for Zack Medrano. I have considered the risks of abuse, dependence, addiction and diversion.  Evaluation of this document shows that he is on gabapentin under the care of his primary care physician.    Vital Signs  /70   Pulse 68   Temp 36.5 °C (97.7 °F)   Resp 20   Wt 105 kg (232 lb)   SpO2 98%   BMI 36.34 kg/m²      Physical Exam  Vitals and nursing note reviewed. Exam conducted with a chaperone present.   Constitutional:       General: He is not in acute distress.     Appearance: He is ill-appearing. He is not toxic-appearing.      Comments: He is a well-developed well-nourished overweight elderly male who is accompanied by his wife and his stepdaughter.  He is on oxygen continuously.  He appears somewhat chronically ill and pale.   HENT:      Head: Normocephalic and atraumatic.      Nose: Nose normal.      Mouth/Throat:      Mouth: Mucous membranes are moist.      Pharynx: Oropharynx is clear. No oropharyngeal exudate or posterior oropharyngeal erythema.   Eyes:      General: No scleral icterus.     Extraocular Movements: Extraocular movements intact.      Conjunctiva/sclera: Conjunctivae normal.      Pupils: Pupils are equal, round, and reactive to light.   Neck:      Comments: There is no gross adenopathy about the head neck region, supraclavicular or axillary areas bilaterally.  Cardiovascular:      Rate and Rhythm: Normal rate.      Comments: Fairly regular rhythm.  Pulmonary:      Breath sounds: Rhonchi present. No wheezing or rales.      Comments: Diminished in bases with occasional rhonchi.  He is " on chronic oxygen supplementation via nasal cannula.  Abdominal:      Palpations: There is no mass.      Tenderness: There is no abdominal tenderness. There is no guarding.      Comments: Large soft nontender abdomen with no gross organomegaly   Musculoskeletal:         General: Normal range of motion.      Cervical back: Normal range of motion. No tenderness.      Right lower leg: Edema present.      Left lower leg: Edema present.      Comments: Healing wound on the dorsum of his right hand with stitches in place.  Recent dermatologic surgery.   Lymphadenopathy:      Cervical: No cervical adenopathy.   Skin:     General: Skin is warm and dry.      Coloration: Skin is pale. Skin is not jaundiced.      Findings: Bruising present.   Neurological:      General: No focal deficit present.      Mental Status: He is alert and oriented to person, place, and time. Mental status is at baseline.      Motor: No weakness.      Gait: Gait normal.      Comments: Hard of hearing.  Slow stable gait with assistance.   Psychiatric:         Mood and Affect: Mood normal.         Behavior: Behavior normal.         Thought Content: Thought content normal.         Judgment: Judgment normal.      Comments: He is a man of few words.          Current Laboratory Data  Recent Results (from the past 168 hour(s))   Comprehensive Metabolic Panel    Collection Time: 05/10/24  8:52 AM   Result Value Ref Range    Glucose 294 (H) 74 - 99 mg/dL    Sodium 135 (L) 136 - 145 mmol/L    Potassium 4.8 3.5 - 5.3 mmol/L    Chloride 102 98 - 107 mmol/L    Bicarbonate 25 21 - 32 mmol/L    Anion Gap 13 10 - 20 mmol/L    Urea Nitrogen 47 (H) 6 - 23 mg/dL    Creatinine 2.28 (H) 0.50 - 1.30 mg/dL    eGFR 27 (L) >60 mL/min/1.73m*2    Calcium 8.8 8.6 - 10.3 mg/dL    Albumin 4.0 3.4 - 5.0 g/dL    Alkaline Phosphatase 88 33 - 136 U/L    Total Protein 6.2 (L) 6.4 - 8.2 g/dL    AST 28 9 - 39 U/L    Bilirubin, Total 1.1 0.0 - 1.2 mg/dL    ALT 20 10 - 52 U/L          Recent  "Imaging  IR biopsy bone marrow    Result Date: 4/17/2024  Interpreted By:  Salvador Roblero, STUDY: IR BIOPSY BONE MARROW;  4/16/2024 12:13 pm   INDICATION: Signs/Symptoms:Evaluation of MDS.   COMPARISON: None.   ACCESSION NUMBER(S): [id number]   ORDERING CLINICIAN: DEREK PARK   TECHNIQUE: INTERVENTIONALIST(S): Salvador Roblero MD   The history and physical exam pertinent to the procedure were reviewed and no updates were made.\"   CONSENT: The patient/patient's POA/next of kin was informed of the nature of the proposed procedure. The purposes, alternatives, risks, and benefits were explained and discussed. All questions were answered and consent was obtained.   RADIATION EXPOSURE: Fluoroscopy time: 0.2 min. Dose: 4.9 mGy.     SEDATION: Moderate conscious IV sedation services (supervision of administration, induction, and maintenance) were provided by the physician performing the procedure with intravenous fentanyl 50 mcg and versed 1 mg for 30 minutes. The physician was assisted by an independent trained observer, an interventional radiology nurse, in the continuous monitoring of patient level of consciousness and physiologic status.   MEDICATION/CONTRAST: No additional.   TIME OUT: A time out was performed immediately prior to procedure start with the interventional team, correctly identifying the patient name, date of birth, MRN, procedure, anatomy (including marking of site and side), patient position, procedure consent form, relevant laboratory and imaging test results, antibiotic administration, safety precautions, and procedure-specific equipment needs.   COMPLICATIONS: No immediate adverse events identified.   FINDINGS: Patient was placed prone on the fluoroscopy procedure table. The left posterior iliac crest was marked. Site prepped and draped in usual sterile fashion. 1% lidocaine for anesthesia to the level of the periosteum. Small skin nick made. Then, a 11 gauge bone needle cannula was advanced into the " posterior iliac crest using intermittent fluoroscopic guidance. Stylet removed. A 1 cc aspiration using a 30 cc syringe was performed. Then, 2 marrow aspirations performed, 4 cc each, into syringes containing 500 units heparin and 0.5 cc volume. Then, an additional aspiration, 4 cc, was performed using a empty syringe. These were handed directly to the marrow technologist. Then, the cannula was driven 2 cm forward. Position was confirmed under fluoroscopic visualization. The pincer retriever stylet was advanced into the cannula. Then, the cannula was removed in its entirety. The sample was retrieved. This was handed directly to the marrow technologist on a Telfa gauze. The access site was then covered with sterile dressing. Patient tolerated procedure well without complication.       1. Technically successful left iliac marrow biopsy and aspiration.   I was present for and/or performed the critical portions of the procedure and immediately available throughout the entire procedure.   I personally reviewed the image(s) / study and resident interpretation. I agree with the findings as stated.   Performed and dictated at [facility].   MACRO: None   Signed by: Salvador Roblero 4/17/2024 2:43 PM Dictation workstation:   [id number]        Pathology    FINAL DIAGNOSIS   A-C. 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  -- FEW SMALL INTERSTITIAL LYMPHOID AGGREGATES, SEE NOTE.     NOTE: The core biopsy is limited due to marked hemorrhagic and fragmentation artifacts. There is no increase in blasts. By flow cytometry, a small clonal CD5-negative, [id number] B-cell population in a polytypic background  was identified. 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 Clinical correlation and correlation with pending molecular studies is recommended.   Electronically signed by Dheeraj Scott MD on 4/19/2024 at 1902   Comment    Bone Marrow Differential    Cell Type Value Reference Range   Promyelocytes 0.5 1-5 %   Myelocytes 5.0 5-10 %   Metamyelocytes 9.5 10-25 %   Bands 3.0 10-20 %   Segmented Neutrophils 8.0 5-30 %   Eosinophils 2.5 2-4 %   Basophils 0.5 0-1 %           Lymphocytes 4.0 5-25 %   Monocytes 0.5 0-2 %   Plasma Cells 3.0 0-2 %           Blasts 2.0 0-1 %           Total Erythroid 61.5 17-35 %           Total Cells Counted 200     Myeloid/Erythroid Ratio 0.5 1.5-4.1       Microscopic Description    PERIPHERAL SMEAR: Submitted              Red cells: Macrocytic anemia with anisopoikilocytosis, polychromasia and few red blood cells with coarse basophilic stippling.        White cells: Leukopenia with neutropenia.              Platelets: Decreased.     ASPIRATE SMEAR: Submitted                      Specimen: Spicular.       Erythropoiesis: Dyserythropoiesis with bilobate forms, forms with irregular nuclear contours, as well as blebbing.       Granulopoiesis: Decreased.       Megakaryocytes: Present. Morphology: Predominantly normal morphology.     TOUCH PREP: Submitted       Specimen: Cellular.        Comments: Similar to aspirate smear.     ASPIRATE CLOT: Submitted                                     Specimen: Spicular.       Cellularity: 50%                 Estimated M:E ratio: Consistent with aspirate smear.       Megakaryocytes: Adequate in number. Morphology: Some megakaryocytes are small and hypolobate.       Granulomas: Absent.       Lymphoid aggregates: Absent.        Comments: Similar to core biopsy.     CORE BIOPSY: Submitted                            Specimen: Limited due to marked hemorrhagic and fragmentation artifacts.       Cellularity: Not evaluable.       Estimated M:E ratio: Not " "evaluable.       Bony trabeculae: Normal.       Megakaryocytes: Present. Morphology: Appears normal.         Granulomas: Absent.       Lymphoid aggregates: Minute aggregate of small lymphoid cells.       Comments:      SPECIAL STAINS:         Iron: Storage iron adequate; ring sideroblasts present.     IMMUNOHISTOCHEMISTRY:   -- Block A1:               CD34: highlights scattered blasts, approximately 1%               : highlights scattered mast cells and early erythroid progenitors.               E-cadherin: highlights and increased number of early erythroid in erythroid islands.               CD61:Highlights megakaryocytes, some megakaryocytes are small and hypolobate               CD3: Highlights subset of lymphocytes in few small interstitial lymphoid aggregates.               CD20: Highlights subset of lymphocytes in few small interstitial lymphoid aggregates.     -- Block B1:               CD3: Highlights subset of lymphocytes in few small interstitial lymphoid aggregates.               CD20: Highlights minor subset of lymphocytes in few small interstitial lymphoid aggregates.               Cyclin D1: Negative     FLOW CYTOMETRY: Performed, see separate report.     Immunostains were performed in addition to flow cytometry to fully characterize the phenotype, architecture, and extent of the marrow population(s).  This was medically necessary for the best possible diagnosis.       BONE MARROW, FLOW CYTOMETRY:  -- Small clonal B cell population in a polytypic background, see note 1.  -- Small clonal T cell population in a polytypic background, see note 2.  -- Mild immunophenotypic atypia of red cells.  -- No increased or abnormal blast population.      Notes:   1) The clonal B cells are negative for CD5 and CD10 with a nonspecific phenotype. The population detected is small and may be incidental representing a monoclonal B cell lymphocytosis (\"MBL\") of non-CLL/SLL type. Low level blood involvement by a " systemic lymphoma cannot be excluded.     2) The abnormal T cell population identified is very small and may be incidental. Small T-cell clonal expansions have been reported in a number of nonmalignant conditions including autoimmune disorders, viral infections, non-T lymphoid malignancies, immunodeficiency, immune reconstitution following hematopoietic stem cell transplantation, and immunosenescence.     Correlate with separate bone marrow pathology report.   Electronically signed by Dheeraj Scott MD on 4/19/2024 at 1857        By the signature on this report, the individual or group listed as making the Final Interpretation/Diagnosis certifies that they have reviewed this case and the staining reactivity of the antibodies and reagents in the analysis were determined to be acceptable. Diagnostic interpretation performed at [facility]   Cell Populations    Abnormal Cell Population: Lymphocytes     Percentage:  1.0 %         Phenotype   Marker Interpretation      CD1c Negative   CD2 Negative   CD3 Negative   CD4 Negative   CD5 Negative   CD7 Negative   CD8 Negative   CD9 Negative   CD10 Negative   CD11c Negative   CD13 Negative   CD14 Negative   CD15 Negative   CD16 Negative   CD19 Positive moderate   CD20 Positive moderate   CD22 Positive moderate-bright   CD23 Negative   CD25 Negative   CD33 Negative   CD34 Negative   CD38 Negative   CD43 Negative   CD45 Positive moderate   CD56 Negative   CD71 Negative   CD79b Negative    Negative    Negative    Negative    Positive moderate    Negative   HLA-DR Positive moderate      Kappa Negative   Lambda Positive dim               Second Abnormal Cell Population: Lymphocytes     Percentage: 1.6 %        Phenotype   Marker Interpretation      CD3 Positive moderate   CD4 Negative   CD7 Positive moderate   CD8 Positive dim-moderate   TCR Gamma/Delta Negative   TRBC1 Negative                Flow Differential    Lymphocyte: 19 %                   CD3+CD4+: 34 % ;     Polyclonal (TRBC1)                               CD3+CD8+: 19 % ;     Monoclonal population in a polytypic background; see description above.                 Natural Killer Cells: 28 %                               CD19+: 17 %                                          B Cell Light Chain Expression: Monoclonal population in a polytypic background; see description above.     Monocyte: 2 %     No definitive immunophenotypic abnormality was detected.     Granulocyte: 20 %     No definitive immunophenotypic abnormality was detected.     Blast: 1.2 %     No definitive immunophenotypic abnormality was detected.     Red Blood Cells: 32.4 %     There is heterogeneous expression of CD36.   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 U2AF1 mutations are associated with poor prognosis in MDS (NCCN 1.2021).     Of note, the U2AF1 p.S34F mutation was seen previously in this patient (see [id number]; 10/17/22). The U2AF1 p.Q157R was not identified previously. Correlation with treatment history and morphologic evaluation is recommended.     Performance Status:  Symptomatic; fully ambulatory    Assessment/Plan    1.  Pancytopenia.  The patient has unusual bone marrow findings.  His cytogenetics is still pending.  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  He also has components of myelodysplasia.  He is not responding to erythropoietin.  He is certainly at high risk for bleeding and infection with a low ANC and low platelet count.  He has had previous GI bleeds.  He is currently back on his Eliquis and aspirin as well.  I think he  did benefit from another opinion.  We will discuss this with patient and family.    2.  Multiple comorbidities.  These include but are not limited to:  GERD  Arthritis, DJD  BPH  Coronary artery disease with history of myocardial, status post stent infarction  History of dumping syndrome  Hyperlipidemia  History of skin cancer  Obesity  Obstructive sleep apnea on CPAP  Peripheral arterial disease  CKD 3  Diabetes with nephropathy  History of cholecystectomy and hernia repair  Essential tremor  Hypertension   Numbness in his fingers felt to be related to cervical disc disease.      We will have him return to the office in 2 weeks at which time we hope that his cytogenetics will be performed.  We will discuss these issues at that time.  He knows that he should call in the interim should he have any change in his status, questions or concerns.    Ayah Carmichael MD

## 2024-05-11 ASSESSMENT — ENCOUNTER SYMPTOMS
LIGHT-HEADEDNESS: 0
SLEEP DISTURBANCE: 0
SHORTNESS OF BREATH: 1
DYSURIA: 0
VOMITING: 0
BRUISES/BLEEDS EASILY: 1
COUGH: 0
FATIGUE: 1
DIARRHEA: 0
ABDOMINAL PAIN: 0
NAUSEA: 0
WOUND: 1
TROUBLE SWALLOWING: 0
NERVOUS/ANXIOUS: 0
NUMBNESS: 0
MYALGIAS: 1
FREQUENCY: 0
UNEXPECTED WEIGHT CHANGE: 0
WEAKNESS: 1
ARTHRALGIAS: 1
HEADACHES: 0
CONSTIPATION: 0
APPETITE CHANGE: 0

## 2024-05-14 DIAGNOSIS — I25.10 CORONARY ARTERY DISEASE INVOLVING NATIVE HEART, UNSPECIFIED VESSEL OR LESION TYPE, UNSPECIFIED WHETHER ANGINA PRESENT: ICD-10-CM

## 2024-05-14 RX ORDER — FUROSEMIDE 40 MG/1
40 TABLET ORAL DAILY
Qty: 90 TABLET | Refills: 1 | Status: SHIPPED | OUTPATIENT
Start: 2024-05-14 | End: 2024-11-10

## 2024-05-16 LAB
BAND RESOLUTION: 0 BANDS
CHROM ANALY OVERALL INTERP-IMP: NORMAL
CHROMOSOME ANALYSIS CELLS ANALYZED: 0 CELLS
CHROMOSOME ANALYSIS CELLS IMAGED: 0 CELLS
CHROMOSOME ANALYSIS HYPERMODAL CELL COUNT: 0 CELLS
CHROMOSOME ANALYSIS HYPOMODAL CELL COUNT: 0 CELLS
CHROMOSOME ANALYSIS MODAL CHROMOSOME NO: 0 CHROMOSOMES
CHROMOSOME ANALYSIS STAINING METHOD: NORMAL
ELECTRONICALLY SIGNED BY CYTOGENETICS: NORMAL
KARYOTYP MAR: 0 CELLS
TOTAL CELLS COUNTED MAR: 0 CELLS

## 2024-05-20 ENCOUNTER — LAB (OUTPATIENT)
Dept: LAB | Facility: LAB | Age: 89
End: 2024-05-20
Payer: MEDICARE

## 2024-05-20 DIAGNOSIS — E11.22 TYPE 2 DIABETES MELLITUS WITH DIABETIC CHRONIC KIDNEY DISEASE (MULTI): ICD-10-CM

## 2024-05-20 DIAGNOSIS — N18.32 ANEMIA DUE TO STAGE 3B CHRONIC KIDNEY DISEASE (MULTI): ICD-10-CM

## 2024-05-20 DIAGNOSIS — D70.9 NEUTROPENIA, UNSPECIFIED TYPE (CMS-HCC): ICD-10-CM

## 2024-05-20 DIAGNOSIS — D69.6 THROMBOCYTOPENIA (CMS-HCC): ICD-10-CM

## 2024-05-20 DIAGNOSIS — N18.30 CHRONIC KIDNEY DISEASE, STAGE 3 UNSPECIFIED (MULTI): ICD-10-CM

## 2024-05-20 DIAGNOSIS — D63.1 ANEMIA DUE TO STAGE 3B CHRONIC KIDNEY DISEASE (MULTI): ICD-10-CM

## 2024-05-20 DIAGNOSIS — D46.9 MYELODYSPLASTIC SYNDROME, UNSPECIFIED (MULTI): ICD-10-CM

## 2024-05-20 LAB
ALBUMIN SERPL BCP-MCNC: 4 G/DL (ref 3.4–5)
ALP SERPL-CCNC: 85 U/L (ref 33–136)
ALT SERPL W P-5'-P-CCNC: 17 U/L (ref 10–52)
ANION GAP SERPL CALC-SCNC: 13 MMOL/L (ref 10–20)
AST SERPL W P-5'-P-CCNC: 25 U/L (ref 9–39)
BASO STIPL BLD QL SMEAR: PRESENT
BASOPHILS # BLD AUTO: 0.01 X10*3/UL (ref 0–0.1)
BASOPHILS NFR BLD AUTO: 0.7 %
BILIRUB SERPL-MCNC: 1.1 MG/DL (ref 0–1.2)
BUN SERPL-MCNC: 51 MG/DL (ref 6–23)
BURR CELLS BLD QL SMEAR: NORMAL
CALCIUM SERPL-MCNC: 9.1 MG/DL (ref 8.6–10.3)
CHLORIDE SERPL-SCNC: 104 MMOL/L (ref 98–107)
CO2 SERPL-SCNC: 27 MMOL/L (ref 21–32)
CREAT SERPL-MCNC: 2.76 MG/DL (ref 0.5–1.3)
EGFRCR SERPLBLD CKD-EPI 2021: 21 ML/MIN/1.73M*2
EOSINOPHIL # BLD AUTO: 0.05 X10*3/UL (ref 0–0.4)
EOSINOPHIL NFR BLD AUTO: 3.5 %
ERYTHROCYTE [DISTWIDTH] IN BLOOD BY AUTOMATED COUNT: 20.3 % (ref 11.5–14.5)
EST. AVERAGE GLUCOSE BLD GHB EST-MCNC: 192 MG/DL
GLUCOSE SERPL-MCNC: 178 MG/DL (ref 74–99)
HBA1C MFR BLD: 8.3 %
HCT VFR BLD AUTO: 25.4 % (ref 41–52)
HGB BLD-MCNC: 8 G/DL (ref 13.5–17.5)
HYPOCHROMIA BLD QL SMEAR: NORMAL
IMM GRANULOCYTES # BLD AUTO: 0.01 X10*3/UL (ref 0–0.5)
IMM GRANULOCYTES NFR BLD AUTO: 0.7 % (ref 0–0.9)
LYMPHOCYTES # BLD AUTO: 0.78 X10*3/UL (ref 0.8–3)
LYMPHOCYTES NFR BLD AUTO: 54.2 %
MCH RBC QN AUTO: 34 PG (ref 26–34)
MCHC RBC AUTO-ENTMCNC: 31.5 G/DL (ref 32–36)
MCV RBC AUTO: 108 FL (ref 80–100)
MONOCYTES # BLD AUTO: 0.08 X10*3/UL (ref 0.05–0.8)
MONOCYTES NFR BLD AUTO: 5.6 %
NEUTROPHILS # BLD AUTO: 0.51 X10*3/UL (ref 1.6–5.5)
NEUTROPHILS NFR BLD AUTO: 35.3 %
NRBC BLD-RTO: 2.1 /100 WBCS (ref 0–0)
OVALOCYTES BLD QL SMEAR: NORMAL
PLATELET # BLD AUTO: 78 X10*3/UL (ref 150–450)
POLYCHROMASIA BLD QL SMEAR: NORMAL
POTASSIUM SERPL-SCNC: 4.5 MMOL/L (ref 3.5–5.3)
PROT SERPL-MCNC: 6.3 G/DL (ref 6.4–8.2)
RBC # BLD AUTO: 2.35 X10*6/UL (ref 4.5–5.9)
RBC MORPH BLD: NORMAL
SCHISTOCYTES BLD QL SMEAR: NORMAL
SODIUM SERPL-SCNC: 139 MMOL/L (ref 136–145)
WBC # BLD AUTO: 1.4 X10*3/UL (ref 4.4–11.3)

## 2024-05-20 PROCEDURE — 80053 COMPREHEN METABOLIC PANEL: CPT

## 2024-05-20 PROCEDURE — 36415 COLL VENOUS BLD VENIPUNCTURE: CPT

## 2024-05-20 PROCEDURE — 83036 HEMOGLOBIN GLYCOSYLATED A1C: CPT

## 2024-05-20 PROCEDURE — 85025 COMPLETE CBC W/AUTO DIFF WBC: CPT

## 2024-05-21 ENCOUNTER — APPOINTMENT (OUTPATIENT)
Dept: HEMATOLOGY/ONCOLOGY | Facility: CLINIC | Age: 89
End: 2024-05-21
Payer: MEDICARE

## 2024-05-22 ENCOUNTER — APPOINTMENT (OUTPATIENT)
Dept: PRIMARY CARE | Facility: CLINIC | Age: 89
End: 2024-05-22
Payer: MEDICARE

## 2024-05-24 ENCOUNTER — OFFICE VISIT (OUTPATIENT)
Dept: HEMATOLOGY/ONCOLOGY | Facility: CLINIC | Age: 89
End: 2024-05-24
Payer: MEDICARE

## 2024-05-24 ENCOUNTER — INFUSION (OUTPATIENT)
Dept: HEMATOLOGY/ONCOLOGY | Facility: CLINIC | Age: 89
End: 2024-05-24
Payer: MEDICARE

## 2024-05-24 ENCOUNTER — APPOINTMENT (OUTPATIENT)
Dept: PULMONOLOGY | Facility: CLINIC | Age: 89
End: 2024-05-24
Payer: MEDICARE

## 2024-05-24 ENCOUNTER — OFFICE VISIT (OUTPATIENT)
Dept: PULMONOLOGY | Facility: CLINIC | Age: 89
End: 2024-05-24
Payer: MEDICARE

## 2024-05-24 VITALS
HEART RATE: 69 BPM | WEIGHT: 229.06 LBS | BODY MASS INDEX: 35.88 KG/M2 | RESPIRATION RATE: 18 BRPM | SYSTOLIC BLOOD PRESSURE: 129 MMHG | TEMPERATURE: 97.5 F | OXYGEN SATURATION: 93 % | DIASTOLIC BLOOD PRESSURE: 59 MMHG

## 2024-05-24 VITALS
HEIGHT: 67 IN | TEMPERATURE: 97.7 F | WEIGHT: 229 LBS | DIASTOLIC BLOOD PRESSURE: 67 MMHG | BODY MASS INDEX: 35.94 KG/M2 | HEART RATE: 71 BPM | OXYGEN SATURATION: 94 % | SYSTOLIC BLOOD PRESSURE: 136 MMHG

## 2024-05-24 DIAGNOSIS — D72.819 LEUKOPENIA, UNSPECIFIED TYPE: ICD-10-CM

## 2024-05-24 DIAGNOSIS — D63.1 ANEMIA DUE TO STAGE 4 CHRONIC KIDNEY DISEASE (MULTI): ICD-10-CM

## 2024-05-24 DIAGNOSIS — N18.4 STAGE 4 CHRONIC KIDNEY DISEASE (MULTI): ICD-10-CM

## 2024-05-24 DIAGNOSIS — D61.818 OTHER PANCYTOPENIA (MULTI): ICD-10-CM

## 2024-05-24 DIAGNOSIS — D46.9 MYELODYSPLASTIC SYNDROME, UNSPECIFIED (MULTI): ICD-10-CM

## 2024-05-24 DIAGNOSIS — D63.1 ANEMIA DUE TO STAGE 3B CHRONIC KIDNEY DISEASE (MULTI): ICD-10-CM

## 2024-05-24 DIAGNOSIS — D69.6 THROMBOCYTOPENIA (CMS-HCC): ICD-10-CM

## 2024-05-24 DIAGNOSIS — D70.9 NEUTROPENIA, UNSPECIFIED TYPE (CMS-HCC): ICD-10-CM

## 2024-05-24 DIAGNOSIS — R09.02 HYPOXEMIA: ICD-10-CM

## 2024-05-24 DIAGNOSIS — N18.4 ANEMIA DUE TO STAGE 4 CHRONIC KIDNEY DISEASE (MULTI): ICD-10-CM

## 2024-05-24 DIAGNOSIS — J84.9 INTERSTITIAL LUNG DISEASE (MULTI): Primary | ICD-10-CM

## 2024-05-24 DIAGNOSIS — N18.32 ANEMIA DUE TO STAGE 3B CHRONIC KIDNEY DISEASE (MULTI): ICD-10-CM

## 2024-05-24 PROCEDURE — 2500000004 HC RX 250 GENERAL PHARMACY W/ HCPCS (ALT 636 FOR OP/ED): Mod: JZ,EC

## 2024-05-24 PROCEDURE — 1157F ADVNC CARE PLAN IN RCRD: CPT | Performed by: INTERNAL MEDICINE

## 2024-05-24 PROCEDURE — 99214 OFFICE O/P EST MOD 30 MIN: CPT | Performed by: INTERNAL MEDICINE

## 2024-05-24 PROCEDURE — 1160F RVW MEDS BY RX/DR IN RCRD: CPT | Performed by: INTERNAL MEDICINE

## 2024-05-24 PROCEDURE — 1159F MED LIST DOCD IN RCRD: CPT | Performed by: INTERNAL MEDICINE

## 2024-05-24 PROCEDURE — 1036F TOBACCO NON-USER: CPT | Performed by: INTERNAL MEDICINE

## 2024-05-24 PROCEDURE — 96372 THER/PROPH/DIAG INJ SC/IM: CPT

## 2024-05-24 PROCEDURE — 3078F DIAST BP <80 MM HG: CPT | Performed by: INTERNAL MEDICINE

## 2024-05-24 PROCEDURE — 3075F SYST BP GE 130 - 139MM HG: CPT | Performed by: INTERNAL MEDICINE

## 2024-05-24 RX ORDER — FAMOTIDINE 10 MG/ML
20 INJECTION INTRAVENOUS ONCE AS NEEDED
Status: CANCELLED | OUTPATIENT
Start: 2024-06-07

## 2024-05-24 RX ORDER — DIPHENHYDRAMINE HYDROCHLORIDE 50 MG/ML
50 INJECTION INTRAMUSCULAR; INTRAVENOUS AS NEEDED
Status: CANCELLED | OUTPATIENT
Start: 2024-06-07

## 2024-05-24 RX ORDER — EPINEPHRINE 0.3 MG/.3ML
0.3 INJECTION SUBCUTANEOUS EVERY 5 MIN PRN
Status: CANCELLED | OUTPATIENT
Start: 2024-06-07

## 2024-05-24 RX ORDER — ALBUTEROL SULFATE 0.83 MG/ML
3 SOLUTION RESPIRATORY (INHALATION) AS NEEDED
Status: CANCELLED | OUTPATIENT
Start: 2024-06-07

## 2024-05-24 RX ADMIN — DARBEPOETIN ALFA 300 MCG: 300 INJECTION, SOLUTION INTRAVENOUS; SUBCUTANEOUS at 11:28

## 2024-05-24 ASSESSMENT — PAIN SCALES - GENERAL: PAINLEVEL: 0-NO PAIN

## 2024-05-24 NOTE — PROGRESS NOTES
Subjective   Patient ID: Zack Medrano is a 88 y.o. male who presents for No chief complaint on file..  HPI  Patient was seen today in the office on a follow-up visit versus 1 month ago.  Reviewing the patient's x-ray reports shows no change in scans of the chest from 3/22/2024 versus 4/5/2024 which was ordered by radiology.  Patient reports that he now has a POC device and that seems to work well for him.  It is on the heavy side but I explained to him that it is because of the battery pack.  He however relates to me that he went to an event the other day that required him to walk distance and he did well with that.  Review of Systems  There is been no change in his review of systems.  Objective   Physical Exam  On physical exam of the lungs there is some decreased breath sounds noted in the bases but no inspiratory crackles that were appreciated with his last visit.  Cardio, heart sounds are of regular rate and rhythm.  Extremities, no pretibial edema cyanosis or clubbing.  Patient is able to ambulate without assistance in the office and did not appear dyspneic with that activity.  Extremities, no pretibial edema, cyanosis or clubbing.  Assessment/Plan        Impressions:  1.  Interstitial lung disease that appears stable by x-ray.  2.  Symptoms are improved on physical exam and by patient history  3.  Hypoxemia corrected with his POC device.  Recommendations:  1.  Will plan on doing a CT scan of the chest without contrast in early September and follow-up with the patient after that has been done and interpreted.  2.  Instructed the patient to contact our office if there is a change in his breathing between now and his visit.      This note was transcribed using the Dragon Dictation system.  There may be grammatical, punctuation, or verbiage errors that occur with voice recognition programs.    Rui Graham DO 05/24/24 10:54 AM

## 2024-05-24 NOTE — PROGRESS NOTES
Patient will return in 2 weeks for labs/OV/injection.  Dr Carmichael is going to possibly increase his Aranesp dose for next injection.  Pt will return 6/7 @ 1230 for stat labs, OV, injection.  RN discussed plan of care with patient and he verbalized understanding.  He will call with any needs.

## 2024-05-24 NOTE — PROGRESS NOTES
Patient ID:Zack Medrano is a 88 y.o. year old male patient with myelodysplasia       Referring Physician: Ayah Carmichael MD  [address] [place] [address] [address]  [place] [address]  Primary Care Provider: Fabiola Dietz MD    Chief Complaint  Chief Complaint   Patient presents with    Anemia          History of the Present Illness  10/9/2019.  Visit with Dr. Mickey Nguyen for chronic kidney disease and hypertension.  Chief complaint was fatigue and need to lose weight     11/12/2019.  The patient has had long-term history of microcytic anemia going back to at least 2019 at which time his white count was 5.2 hemoglobin was 13.8 hematocrit 40.5 and a platelet count of 157,000.  His MCV was 105 MCHC was 34.  White blood cell differential count showed 66% polys, 17 lymphs, 11 monos and 4 eosinophils.     12/11/2019.  Seen by primary care for probable shingles treated with Valtrex with chief complaint of waxing and waning left flank pain radiating up under his ribs, somewhat responsive to exercise..  He had a history of pulmonary embolus and was on Eliquis long-term.     2/24/2020.  Seen by Dr. Howell for elevated PSA, BPH and erectile dysfunction.  PSA in 2017 was 2.94, in 2018 it was 2.74 and in 2020 it was 4.03.  We discussed biopsy and the patient wanted to have this followed and not biopsy.     3/9/2020.  Follow-up with NATALY Pulido primary care.  Referred to Dr. Bahena for colonoscopy and CT scan of the chest was ordered for follow-up of questionable lingular nodule.     3/11/2020.  CT scan of the chest showed a stable 6 mm nodular lesion near the fissure in the lingula.     3/13/2020.  Seen by Dr. Bahena who noted that the patient had had diarrhea off-and-on since cholecystectomy and had intermittent left-sided pain.  He had a history of colon polyps.     4/22/2015.  Colonoscopy showed that he had tubular adenoma     5/13/2020.  Seen by Dr. Migdalia New in follow-up.  She noted that he had an abnormal  CBC with macrocytic indices with a normal B12 level.  The patient denies alcohol use.  Hemoglobin A1c was 6.2.  BUN was 33 creatinine was 1.74.     8/31/2020.  Follow-up with Dr. Howell.  PSA had come down from 4.03-3.77     1/4/2021 through 1/12/2021.  Admitted to the hospital having been brought to the ED by squad after he fell forward on his toilet and was unable to get up.  He denied hitting his head or losing consciousness.  He blamed it on having back pain and shoulder pain for several weeks.  He was febrile with a temperature of 100.9 and hypoxic with pulse ox of 86% on room air.  Respirations were 24.  He was positive for COVID and was treated with intravenous antibiotics and remdesivir, increased supplemental oxygen.  He was able to be discharged to rehabilitation.     Repeat CBC showed white count 4.3 with a hemoglobin 12.9 hematocrit 38.8 with an MCV of 102 and a platelet count of 173,000.  White blood cell differential count showed minimal absolute lymphocytopenia at 0.6 with the lower limits of normal being 0.8.     3/1/2021.  Follow-up with Dr. Howell.  PSA was 9.9 in February.  Decided to have prostate biopsy done on 3/22/2021.  Results showed BPH.     10/6/2021.  Follow-up with Dr. Howell.  PSA in September was 2.77     4/6/2022.  CBC showed white count 4.3 with hemoglobin 11.7 hematocrit 34.4 with an MCV of 107.  Platelet count was 153,000.  White blood cell differential count was essentially normal.     5/31/2022.  CBC showed white count 3.6 with hemoglobin 11.6 hematocrit 34.4 and platelet count 117,000 with a normal white blood cell differential.     6/27/2022.  Referred to Dr. Dash because of pancytopenia by NATALY Gray.  CBC showed a white count of 3.9 with hemoglobin 12.3 hematocrit 36.1 and platelet count 129,000 with a normal differential.  PSA was 3.26.     The patient said that he felt well except for having increased fatigue.  He continued on long-term anticoagulation.  He was able to  carry out his ADLs.  He did complain of easy bruising.  He said that he had worked at [employer] for 12 years and also worked in making [employer].  Dr. Dash felt that there was a large component secondary to his chronic kidney disease.  There was no evidence of nutritional deficiency.  He checked him for hemolysis and monoclonal gammopathy and ordered an ultrasound for hepatosplenomegaly and testing for hepatitis, HIV and CHRISTINA.  He said he would rather observe the patient rather than pursuing a bone marrow biopsy.     7/27/2022.  Repeat lab data showed white count 3.9 with hemoglobin 12.3 hematocrit 36.1 and platelet count 129,000.  BUN was 36 creatinine was 1.78.  He had mild elevation of AST.  Retake count was 2.3%.  Flow cytometry showed a small clonal B-cell population.  Hepatitis B was nonreactive.  Hep C testing was negative.  Serum protein electrophoresis was normal.  Haptoglobin was normal.  LDH was normal.  He said that the possibility of MDS could not be ruled out.  Ultrasound showed fatty liver but no splenomegaly.  He plan for EPO level and NGS for myeloid mutation.     9/1/2022.  Started on allopurinol for hyperuricemia.     10/12/2022.  Seen by Dr. Dr. Dietz.  Patient was referred to dermatology for several skin lesions.  He was referred to neurology because of his tremor that was not improving.  She increased his glimepiride.     10/17/2022.  CBC showed white count 3.0 with hemoglobin 10.5 hematocrit 31.3 and a platelet count of 106,000 MCV was 111.     10/26/2022.  Follow-up with Dr. 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  He was suspicious for MDS.  He referred him for a bone marrow biopsy.     12/1/2022.  Follow-up with Dr. Dash.  Bone marrow biopsy showed low-grade MDS.  R IPSS score was low or very low depending on a karyotype which was still pending.  We discussed initiating erythropoietin with Aranesp but the patient deferred and the decision  was made to monitor him.     1/11/2023.  CBC showed white count 3.4 with hemoglobin 10.3 hematocrit 32.2 and a platelet count of 137,000.  MCV was 115.  White blood cell differential count was normal.     1/13/2023.  Follow-up with Dr. Dash.  The patient did say that he had following getting out of the shower but did not sustain any injuries.  He had been started by Dr. Nguyen on insulin for his diabetes which was helpful.  Patient continued to be active inside his house.  He remains on Eliquis.  BR-IPSS score is very low.  Counts remain stable.     3/31/2023.  CBC showed a white count of 2.6 with hemoglobin 8.7 hematocrit 27.1 and a platelet count 97,000.  MCV was 115.  White blood cell differential count showed that he had an absolute neutrophil count of 1.36.     4/10/2023.  CBC showed a white count of 2.5 with a hemoglobin 9.0 hematocrit of 27.7 with a platelet count of 92,000.  MCV was 114.     4/17/2023.  Follow-up with NATALY Gray.  Even though his hemoglobin had dropped to 9 the patient did not want to start his erythropoietin injections.     5/11/2023.  CBC showed white count of 2.5 with a hemoglobin of 7.6 hematocrit 23.8 and a platelet count of 101,000.  Absolute neutrophil count was 1.17.     5/15/2023 through 5/19/2023.  Admitted to the hospital with anemia and generalized weakness black stools dizziness with a hemoglobin of 6.7.  His BUN was 51 creatinine was 2.08.  CT scan of the abdomen showed no acute process.  There were findings consistent with atypical healing of the previous rib fracture with question Paget's disease although it was nonspecific.  He was transfused 2 units packed red blood cells.  EGD found 3 erosions in the cardia.  He was prescribed Carafate.     5/24/2023.  Follow-up with Dr. Dr. Dietz after discharge.  CBC showed white count 1.9 with hemoglobin 8.6 hematocrit 28.3 and a platelet count of 140,000.  Absolute neutrophil count was 1.06.  Patient did not tolerate  "Carafate and stopped it.  He was prescribed oral iron twice a day.  He had not been prescribed a PPI which was then started.     6/1/2023.  CBC showed white count of 1.9 with hemoglobin 7.7 hematocrit 24.8 and a platelet count of 87,000.     6/13/2023.  CBC showed a white count of 1.9 with hemoglobin of 8.7 hematocrit 28.1 and platelet count of 89,000.     6/23/2023.  Followed up with Dr. Dr. Dietz who reported that he was hospitalized again in [place] for GI bleed.  At that time his Eliquis was discontinued and he was changed to Protonix and taken off his baby aspirin.  Once again he was started on Carafate but did not tolerate it.     7/15/2023.  CBC showed white count of 2.4 with hemoglobin 9.2 hematocrit 27.3 and a platelet count of 110,000.  Absolute neutrophil count was 1.36.     7/17/2023.  Follow-up with Malorie Shaffer.  The patient had recently had a colonoscopy and had \"cauterizations\" he noted that he had dyspnea with exertion but no resting shortness of breath.  He was on prednisone at that time.  His sugars were in the 300s.  He was on insulin.  His neuropathy has not changed and he continues to have tremors.  Current level was found to be saturated.  Once again the patient was reluctant to start NING.     8/14/2023.  Follow-up with Malorie Shaffer.  CBC showed white count 1.8 with a hemoglobin 8.9 hematocrit 26.9 and a platelet count of 100,000.  Absolute neutrophil count was 0.96.     9/6/2023.  CBC showed a white count of 1.8 with hemoglobin 9.5 hematocrit 28.8 and a platelet count of 85,000 with an absolute neutrophil count of 0.99.  9/13/2023.  Follow-up with Dr. Howell.  PSA has gone down to 1.87.     10/9/2023.  Follow-up with Malorie Shaffer who noted the patient remained on oxygen and CPAP.  He says that he is awakening in the middle of the night around 3 to 4:00 in the morning.  Sugars have been in the 300s.  Neuropathy was unchanged.  He had been started on erythropoietin every 3 weeks     11/21/2023.  " Follow-up with Malorie Shaffer.  No improvement in his hemoglobin.  White count had improved to 2.1 platelets were baseline.  He had been placed on 40 mg of prednisone and continued on erythropoietin 300 mcg every 3 weeks.     1/2/2024.  Follow-up with Malorie Shaffer.  No improvement in hemoglobin.  Symptomatically he was doing well.  Plan was to increase frequency of his darbepoetin to every 2 weeks.     2/13/2024.  Follow-up with Malorie Shaffer.  Now on erythropoietin injections every 2 weeks.  There was a discussion concerning repeat bone marrow biopsy with counts stayed low.     3/12/2024.  Follow-up with Malorie Shaffer.  No improvement in his hemoglobin.  Patient was agreeable to repeat bone marrow biopsy.  She also ordered a CAT scan of the chest abdomen and pelvis without contrast.     3/22/2024.  CAT scan showed severe coronary artery calcifications.  There is evidence of bronchiectasis in the lower lobes increased compared to previous CT scan from 2020.     Predominantly groundglass opacities with bibasilar prominence.  There is some subpleural sparing of the left upper lobe.  There is a 3 mm nodule in the right upper lobe which was unchanged for several years.  Multilevel anterior bridging osteophytes were noted consistent with DISH.  There is a small fat-containing periumbilical hernia and bilateral inguinal hernias.  Spleen size is at the upper limits of normal being 12.7 cm in length slightly increased when compared to study from 2019.  Cysts were seen in the kidneys with diffuse cortical atrophy and a 4 mm hyperdense focus in the interpolar region of the right kidney possibly a stone.  Prostatism was noted for gland measuring 6.2 cm.     3/28/2024.  Seen by Dr. Graham for shortness of breath.  He felt the patient had possible chronic interstitial lung disease versus periodic aspiration, hypoxia dyspnea on exertion.  Further tests were ordered including high-resolution CT scan, complete pulmonary function testing,  simple pulmonary stress test and follow-up.     4/9/2024.  Follow-up with Malorie Shaffer.  Erythropoietin injections continued every 2 weeks which the patient was tolerating well.     4/16/2024.  Bone marrow biopsy was performed showing a hypercellular bone marrow at 50% with dyserythropoiesis.  There are 1% blasts consistent with persistent myeloid neoplasm.  Flow cytometry showed a small clonal CD5 negative, CD10 negative B-cell population and a polytypic background.  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     There was a very small abnormal T-cell population that could be incidental.  Next generation sequencing showed 2 U2AF 1 variants, 1 of which was negative.  The other had been previously seen.     The marrow also showed dyserythropoiesis with bilobate forms and forms with irregular nuclear contours as well as blebbing.     4/23/2024.  CBC showed white count 1.5 with a hemoglobin of 8.3 hematocrit 25.9 and platelet count of 71,000 with 37% neutrophils, 53 lymphocytes, 4 monos, 4 eosinophils with an absolute neutrophil count of 0.57.     4/24/2024.  Return visit with Dr. Graham.  He noted that the high-resolution CT scan showed no significant change from the previous CT scan the month before.  Pulmonary function testing showed no response to bronchodilator and there was mild restrictive change with some suggestion of airway inflammation.  The patient had difficulty doing the test because of persistent coughing.  6-minute walk test showed that the patient was able to maintain saturations at 93% with 2 L of oxygen per minute continuously.  The test was terminated because of dyspnea.  With his hypoxia on room air it was recommended that he be on oxygen continuously to keep his saturation greater than or equal to 92%.  He continued on oxygen with his CPAP at 3 L at  nighttime.     5/10/2024.  This is my first visit with Mr. Medrano and his family.  We went over some of the results of the bone marrow biopsy.  I told him that I would have to wait until the chromosomal analysis returned.  I also note the possibility of low-grade lymphoma.  He may need to have an opinion with malignant hematology at [facility].  Stepdaughter who is here with him and his wife today is his advocate for maintaining his quality of life.  She is an employee at a local nursing facility and is concerned about many clients she has who seem to be getting treated aggressively.  We talked about myelodysplasia and the fact that he is not responding to erythropoietin.  He will continue on this treatment for now.  He is low counts were discussed and he knows that he is at higher risk for infection and bleeding.  He continues on oxygen supplementation continuously.  They will return in 2 weeks at which time laboratory tests will be performed.     He just had a lesion removed from the dorsum of his right hand at the base of his first and second fingers.  Stitches are still in place.  It seems to be healing well without any significant bleeding or infection.  He was told that it looked benign.    Interval Note  5/13/2024.  Follow-up with Dr. Aggie Danielle of cardiology who saw him in follow-up of his coronary artery disease with multiple stents having been placed, hypertension, dyslipidemia and previous pulmonary embolus related to COVID-19.  She had stopped his Eliquis.  She noted his myelodysplasia.  She noted that at some point his aspirin had been stopped but given his history of stents this was restarted and he tolerated it well.  She said that his blood pressure was well-controlled.  She noted blood sugars were between 150-2 50.  She noted the patient was supposed to be using oxygen with exertion but the patient was not sure what rate it was supposed to be.  She says that she would not stop his aspirin unless his  platelets were less than 50,000 with complications of bleeding.  She said it was very important to continue aspirin given a 15% chance of in-stent thrombosis with aspirin cessation.  She increased her furosemide to 80 mg twice a day because of bilateral lower extremity edema and spironolactone to 25 mg twice daily until symptoms improve and encouraged him to cut back on his salt intake.  She was see him again in about 9 months.    5/14/2024.  Follow-up with Dr. Santamaria of ophthalmology who started him on Naphcon-A drops 1 drop 3 times a day in both eyes for excessive tearing    5/15/2024.  Followed up with Dr. Dimas Renteria of podiatry who noted decreased pedal pulses 2 out of 4.  He had footcare.    5/24/2024.  Follow-up with Dr. Graham.  Review of his x-rays showed no change.  Physical examination showed decreased breath sounds but no inspiratory crackles that were previously heard.  He felt that the interstitial lung disease was stable and that his hypoxemia was improved on oxygen.  He plan to repeat CT scan in September.    5/24/2024.  He is here to follow-up on his bone marrow results.  I told him that the chromosomal analysis would not be forthcoming before because the specimen failed to grow in culture.  He continues to display a low-grade MDS with blast count of only 2%.  We reviewed the fact that he is recommended to have at least 6 months of his erythropoietin before we decide whether or not it is a failure.  He is agreeable to this.  We will increase his dose of darbepoetin.  He knows to be on the look out for any signs of infection or bleeding and to report this if it happens.  We reviewed his most recent CBC.  We will see him again in 2 weeks with his next injection.    Comorbidities  GERD  Arthritis, DJD  BPH  Coronary artery disease with history of myocardial, status post stent infarction  History of dumping syndrome  Hyperlipidemia  History of skin cancer  Obesity  Obstructive sleep apnea on  CPAP  Peripheral arterial disease  CKD 3  Diabetes with nephropathy  History of cholecystectomy and hernia repair  Essential tremor  Hypertension   Numbness in his fingers felt to be related to cervical disc disease.    Monitoring Parameters  Histories, physical examinations and CBCs with occasional bone marrow biopsies.     Review of Systems - Oncology   Review of Systems   Constitutional:  Positive for fatigue. Negative for appetite change and unexpected weight change.   HENT:  Negative for dental problem and trouble swallowing.    Eyes:  Negative for visual disturbance.   Respiratory:  Positive for cough (occasionally) and shortness of breath (breathing is better. Not wearing O2 today).    Cardiovascular:  Negative for palpitations.   Gastrointestinal:  Negative for abdominal pain, diarrhea, nausea and vomiting.   Genitourinary:  Negative for dysuria, frequency, hematuria and urgency.   Musculoskeletal:  Positive for arthralgias (generalized) and myalgias.   Skin:  Positive for pallor. Negative for rash.   Neurological:  Negative for weakness, light-headedness, numbness and headaches.   Hematological:  Negative for adenopathy. Bruises/bleeds easily.   Psychiatric/Behavioral:  Negative for self-injury, sleep disturbance and suicidal ideas. The patient is not nervous/anxious.         Past Medical History  Past Medical History:   Diagnosis Date    Abnormal levels of other serum enzymes     Abnormal liver enzymes    Dependence on other enabling machines and devices     CPAP (continuous positive airway pressure) dependence    Encounter for examination of eyes and vision without abnormal findings     Diabetic eye exam    Localized edema     Bilateral leg edema    Old myocardial infarction     History of myocardial infarction    Other conditions influencing health status 01/08/2020    Uncontrolled diabetes mellitus type 2 without complications    Pain in unspecified ankle and joints of unspecified foot     Arthralgia of  ankle    Personal history of other diseases of the digestive system     History of gastroesophageal reflux (GERD)    Personal history of other diseases of the digestive system     History of dumping syndrome    Personal history of other endocrine, nutritional and metabolic disease 11/12/2020    History of type 2 diabetes mellitus    Presence of coronary angioplasty implant and graft     History of coronary artery stent placement    Rash and other nonspecific skin eruption 12/11/2019    Rash    Unspecified abdominal hernia without obstruction or gangrene     Hernia    Unspecified abdominal pain 05/19/2021    Abdominal pain, left lateral        Surgical History  Past Surgical History:   Procedure Laterality Date    OTHER SURGICAL HISTORY  10/09/2019    Arterial stent placement    OTHER SURGICAL HISTORY  10/09/2019    Cholecystectomy    OTHER SURGICAL HISTORY  10/09/2019    Hernia repair       Social History  Social History     Tobacco Use    Smoking status: Never    Smokeless tobacco: Never   Vaping Use    Vaping status: Never Used   Substance Use Topics    Alcohol use: Never    Drug use: Never        Family History  family history includes Colon cancer in his mother and another family member.     Current Medications  Current Outpatient Medications   Medication Instructions    allopurinol (ZYLOPRIM) 100 mg, oral, Daily    amLODIPine (NORVASC) 5 mg, oral, Daily before breakfast    ascorbic acid (VITAMIN C) 250 mg, oral    aspirin 81 mg, oral, Daily    atorvastatin (LIPITOR) 40 mg, oral, Daily    blood-glucose sensor (FreeStyle Sindi 3 Sensor) device Use as directed    Constulose 10 gram/15 mL oral solution TAKE 45mL (30g) BY MOUTH TWICE DAILY FOR 30 DAYS    cyanocobalamin, vitamin B-12, (Vitamin B-12) 1,000 mcg tablet extended release 1 tablet, oral    ferrous sulfate 27 mg, oral, Daily    furosemide (LASIX) 40 mg, oral, Daily    gabapentin (NEURONTIN) 300 mg, oral, 2 times daily    lancets 33 gauge misc miscellaneous,  "2 times daily    Lantus U-100 Insulin 50 Units, subcutaneous, Nightly    Mag 64 64 mg EC tablet TAKE 1 TABLET BY MOUTH TWICE DAILY for 1 month    metoprolol tartrate (LOPRESSOR) 50 mg, oral, 2 times daily    multivit-minerals/folic acid (CENTRUM ADULTS ORAL) oral    nitroglycerin (NITROSTAT) 0.4 mg, sublingual, Every 5 min PRN    NON FORMULARY Truetrack test In vitro strip; Use as directed to check blood sugar  2 times daily     pantoprazole (PROTONIX) 40 mg, oral, 2 times daily    pen needle, diabetic (Pen Needle) 32 gauge x 5/32\" needle 1 each, miscellaneous, Daily    primidone (MYSOLINE) 50 mg, oral, Nightly    spironolactone (ALDACTONE) 25 mg, oral, Daily    sucralfate (CARAFATE) 1 g, oral, 3 times daily (morning, midday, late afternoon)           OARRS Review  I have personally reviewed the OARRS report for Zack Medrano. I have considered the risks of abuse, dependence, addiction and diversion.  Evaluation of this document shows that he continues on gabapentin from his primary care provider.    Vital Signs    Blood pressure is 129/59.  Pulse is 69.  Temperature is 97.5.  Respirations are 18 and unlabored.  Weight is 229 pounds.  Pulse ox on room air is 93%.    Physical Exam  Constitutional:       General: He is not in acute distress.     Appearance: He is obese. He is ill-appearing (mildly chronically). He is not toxic-appearing.      Comments: He is a well-developed well-nourished overweight elderly white male who is unaccompanied today he does not have his oxygen on.  He appears to be somewhat chronically ill and pale.   HENT:      Head: Normocephalic and atraumatic.      Mouth/Throat:      Mouth: Mucous membranes are moist.      Pharynx: Posterior oropharyngeal erythema present. No oropharyngeal exudate.      Comments: Small area in right buccal cavity that looks bruised.  Eyes:      General: No scleral icterus.     Extraocular Movements: Extraocular movements intact.      Conjunctiva/sclera: Conjunctivae " normal.      Pupils: Pupils are equal, round, and reactive to light.   Neck:      Comments: No significant lymphadenopathy is palpated in the head neck region, supraclavicular or axillary areas bilaterally.  Cardiovascular:      Rate and Rhythm: Normal rate and regular rhythm.      Heart sounds: No murmur heard.  Pulmonary:      Effort: No respiratory distress.      Breath sounds: No wheezing or rales.      Comments: Diminished sounds in the bases.  Occasional rhonchi.  Abdominal:      General: There is no distension.      Palpations: Abdomen is soft. There is no mass.      Tenderness: There is no abdominal tenderness. There is no guarding or rebound.   Musculoskeletal:      Right lower leg: No edema.      Left lower leg: No edema.   Skin:     Coloration: Skin is pale. Skin is not jaundiced.      Findings: Bruising present.      Comments: Recent excision on his right dorsum of hand has healed well.  He tells me it was not malignant.   Neurological:      Mental Status: He is alert and oriented to person, place, and time.      Comments: He is hard of hearing   Psychiatric:         Mood and Affect: Mood normal.         Behavior: Behavior normal.         Thought Content: Thought content normal.         Judgment: Judgment normal.      Comments: He seems to be in good spirits.  He said that he was able to walk into a recent event from the parking lot when it was not too difficult for him.  He was more talkative today.          Current Laboratory Data  Recent Results (from the past 168 hour(s))   Comprehensive Metabolic Panel    Collection Time: 05/20/24  7:33 AM   Result Value Ref Range    Glucose 178 (H) 74 - 99 mg/dL    Sodium 139 136 - 145 mmol/L    Potassium 4.5 3.5 - 5.3 mmol/L    Chloride 104 98 - 107 mmol/L    Bicarbonate 27 21 - 32 mmol/L    Anion Gap 13 10 - 20 mmol/L    Urea Nitrogen 51 (H) 6 - 23 mg/dL    Creatinine 2.76 (H) 0.50 - 1.30 mg/dL    eGFR 21 (L) >60 mL/min/1.73m*2    Calcium 9.1 8.6 - 10.3 mg/dL     Albumin 4.0 3.4 - 5.0 g/dL    Alkaline Phosphatase 85 33 - 136 U/L    Total Protein 6.3 (L) 6.4 - 8.2 g/dL    AST 25 9 - 39 U/L    Bilirubin, Total 1.1 0.0 - 1.2 mg/dL    ALT 17 10 - 52 U/L   Hemoglobin A1C    Collection Time: 05/20/24  7:33 AM   Result Value Ref Range    Hemoglobin A1C 8.3 (H) see below %    Estimated Average Glucose 192 Not Established mg/dL   CBC and Auto Differential    Collection Time: 05/20/24  7:33 AM   Result Value Ref Range    WBC 1.4 (L) 4.4 - 11.3 x10*3/uL    nRBC 2.1 (H) 0.0 - 0.0 /100 WBCs    RBC 2.35 (L) 4.50 - 5.90 x10*6/uL    Hemoglobin 8.0 (L) 13.5 - 17.5 g/dL    Hematocrit 25.4 (L) 41.0 - 52.0 %     (H) 80 - 100 fL    MCH 34.0 26.0 - 34.0 pg    MCHC 31.5 (L) 32.0 - 36.0 g/dL    RDW 20.3 (H) 11.5 - 14.5 %    Platelets 78 (L) 150 - 450 x10*3/uL    Neutrophils % 35.3 40.0 - 80.0 %    Immature Granulocytes %, Automated 0.7 0.0 - 0.9 %    Lymphocytes % 54.2 13.0 - 44.0 %    Monocytes % 5.6 2.0 - 10.0 %    Eosinophils % 3.5 0.0 - 6.0 %    Basophils % 0.7 0.0 - 2.0 %    Neutrophils Absolute 0.51 (L) 1.60 - 5.50 x10*3/uL    Immature Granulocytes Absolute, Automated 0.01 0.00 - 0.50 x10*3/uL    Lymphocytes Absolute 0.78 (L) 0.80 - 3.00 x10*3/uL    Monocytes Absolute 0.08 0.05 - 0.80 x10*3/uL    Eosinophils Absolute 0.05 0.00 - 0.40 x10*3/uL    Basophils Absolute 0.01 0.00 - 0.10 x10*3/uL   Morphology    Collection Time: 05/20/24  7:33 AM   Result Value Ref Range    RBC Morphology See Below     Polychromasia Mild     Hypochromia Mild     RBC Fragments Few     Ovalocytes Many     [place] Cells Few     Basophilic Stippling Present           Recent Imaging  OCT MACULA CIRRUS OU (BOTH EYES)    Result Date: 5/14/2024  Date of Procedure 5/14/2024. Technician Information Imaging Technician: [email or web address]. Interpretation Right Eye Normal without fluid. Left Eye Abnormal foveal contour. Findings include Lamellar hole. Interval Change Right Eye Stable. Left Eye Stable.         Pathology  Low-grade  myelodysplasia      Performance Status:  Symptomatic; fully ambulatory    Assessment/Plan    1.  Pancytopenia.  The patient has unusual bone marrow findings.  Chromosomal analysis will not be done due to failure of the specimen to grow.  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  He also has components of myelodysplasia.  He is not responding to erythropoietin.  We will increase his dose to 500 mcg every other week.  He continues to be at high risk for bleeding and infection with a low ANC and low platelet count.  He has had previous GI bleeds.  He is currently back on his  aspirin per follow-up with Dr. Danielle for fear of clotting off his stents.  He is off Eliquis.  .     2.  Multiple comorbidities.  These include but are not limited to:  GERD  Arthritis, DJD  BPH  Coronary artery disease with history of myocardial, status post stent infarction  History of dumping syndrome  Hyperlipidemia  History of skin cancer  Obesity  Obstructive sleep apnea on CPAP  Peripheral arterial disease  CKD 3  Diabetes with nephropathy  History of cholecystectomy and hernia repair  Essential tremor  Hypertension   Numbness in his fingers felt to be related to cervical disc disease.        He knows that he should call in the interim should he have any change in his status, questions or concerns.     Ayah Carmichael MD

## 2024-05-24 NOTE — PATIENT INSTRUCTIONS
Reviewed labs and recent medical history.  Discussed his low red blood cell counts but reviewed with him that they have been stable. He will let us know if he has symptoms or changes.  Discussed the results of his cytogenetics from his biopsy and noted that it did not give any more information related to his Myelodysplastic Syndrome.  Reviewed the plan to continue on his Aranesp every 2 weeks until end of August.  Return to see MD in 2 weeks with his next injection.

## 2024-05-27 ASSESSMENT — ENCOUNTER SYMPTOMS
MYALGIAS: 1
FATIGUE: 1
PALPITATIONS: 0
HEMATURIA: 0
BRUISES/BLEEDS EASILY: 1
NERVOUS/ANXIOUS: 0
DIARRHEA: 0
VOMITING: 0
NAUSEA: 0
COUGH: 1
ADENOPATHY: 0
HEADACHES: 0
ABDOMINAL PAIN: 0
SLEEP DISTURBANCE: 0
DYSURIA: 0
TROUBLE SWALLOWING: 0
ARTHRALGIAS: 1
UNEXPECTED WEIGHT CHANGE: 0
APPETITE CHANGE: 0
SHORTNESS OF BREATH: 1
LIGHT-HEADEDNESS: 0
FREQUENCY: 0
NUMBNESS: 0
WEAKNESS: 0

## 2024-05-30 ENCOUNTER — OFFICE VISIT (OUTPATIENT)
Dept: PRIMARY CARE | Facility: CLINIC | Age: 89
End: 2024-05-30
Payer: MEDICARE

## 2024-05-30 VITALS
OXYGEN SATURATION: 91 % | HEIGHT: 67 IN | WEIGHT: 233.2 LBS | SYSTOLIC BLOOD PRESSURE: 116 MMHG | HEART RATE: 84 BPM | DIASTOLIC BLOOD PRESSURE: 54 MMHG | BODY MASS INDEX: 36.6 KG/M2

## 2024-05-30 DIAGNOSIS — E11.22 TYPE 2 DIABETES MELLITUS WITH STAGE 4 CHRONIC KIDNEY DISEASE, WITH LONG-TERM CURRENT USE OF INSULIN (MULTI): ICD-10-CM

## 2024-05-30 DIAGNOSIS — I10 PRIMARY HYPERTENSION: ICD-10-CM

## 2024-05-30 DIAGNOSIS — N18.30 CHRONIC KIDNEY DISEASE, STAGE 3 UNSPECIFIED (MULTI): ICD-10-CM

## 2024-05-30 DIAGNOSIS — I25.10 CORONARY ARTERY DISEASE INVOLVING NATIVE HEART, UNSPECIFIED VESSEL OR LESION TYPE, UNSPECIFIED WHETHER ANGINA PRESENT: ICD-10-CM

## 2024-05-30 DIAGNOSIS — I10 ESSENTIAL (PRIMARY) HYPERTENSION: ICD-10-CM

## 2024-05-30 DIAGNOSIS — E78.2 MIXED HYPERLIPIDEMIA: ICD-10-CM

## 2024-05-30 DIAGNOSIS — D69.6 THROMBOCYTOPENIA (CMS-HCC): ICD-10-CM

## 2024-05-30 DIAGNOSIS — G25.0 TREMOR, ESSENTIAL: ICD-10-CM

## 2024-05-30 DIAGNOSIS — R79.0 LOW MAGNESIUM LEVEL: ICD-10-CM

## 2024-05-30 DIAGNOSIS — D72.819 LEUKOPENIA, UNSPECIFIED TYPE: ICD-10-CM

## 2024-05-30 DIAGNOSIS — G47.33 OSA ON CPAP: ICD-10-CM

## 2024-05-30 DIAGNOSIS — Z79.4 TYPE 2 DIABETES MELLITUS WITH STAGE 4 CHRONIC KIDNEY DISEASE, WITH LONG-TERM CURRENT USE OF INSULIN (MULTI): ICD-10-CM

## 2024-05-30 DIAGNOSIS — D63.1 ANEMIA DUE TO STAGE 4 CHRONIC KIDNEY DISEASE (MULTI): ICD-10-CM

## 2024-05-30 DIAGNOSIS — N18.4 TYPE 2 DIABETES MELLITUS WITH STAGE 4 CHRONIC KIDNEY DISEASE, WITH LONG-TERM CURRENT USE OF INSULIN (MULTI): ICD-10-CM

## 2024-05-30 DIAGNOSIS — D70.9 NEUTROPENIA, UNSPECIFIED TYPE (CMS-HCC): ICD-10-CM

## 2024-05-30 DIAGNOSIS — K25.9 GASTRIC ULCER, UNSPECIFIED CHRONICITY, UNSPECIFIED WHETHER GASTRIC ULCER HEMORRHAGE OR PERFORATION PRESENT: ICD-10-CM

## 2024-05-30 DIAGNOSIS — N18.4 ANEMIA DUE TO STAGE 4 CHRONIC KIDNEY DISEASE (MULTI): ICD-10-CM

## 2024-05-30 DIAGNOSIS — Z00.00 ROUTINE GENERAL MEDICAL EXAMINATION AT HEALTH CARE FACILITY: Primary | ICD-10-CM

## 2024-05-30 DIAGNOSIS — E11.22 TYPE 2 DIABETES MELLITUS WITH DIABETIC CHRONIC KIDNEY DISEASE (MULTI): ICD-10-CM

## 2024-05-30 PROCEDURE — 1157F ADVNC CARE PLAN IN RCRD: CPT | Performed by: FAMILY MEDICINE

## 2024-05-30 PROCEDURE — 1170F FXNL STATUS ASSESSED: CPT | Performed by: FAMILY MEDICINE

## 2024-05-30 PROCEDURE — 3078F DIAST BP <80 MM HG: CPT | Performed by: FAMILY MEDICINE

## 2024-05-30 PROCEDURE — G0439 PPPS, SUBSEQ VISIT: HCPCS | Performed by: FAMILY MEDICINE

## 2024-05-30 PROCEDURE — 1123F ACP DISCUSS/DSCN MKR DOCD: CPT | Performed by: FAMILY MEDICINE

## 2024-05-30 PROCEDURE — 3074F SYST BP LT 130 MM HG: CPT | Performed by: FAMILY MEDICINE

## 2024-05-30 PROCEDURE — 99214 OFFICE O/P EST MOD 30 MIN: CPT | Performed by: FAMILY MEDICINE

## 2024-05-30 PROCEDURE — 1160F RVW MEDS BY RX/DR IN RCRD: CPT | Performed by: FAMILY MEDICINE

## 2024-05-30 PROCEDURE — 1036F TOBACCO NON-USER: CPT | Performed by: FAMILY MEDICINE

## 2024-05-30 PROCEDURE — 1159F MED LIST DOCD IN RCRD: CPT | Performed by: FAMILY MEDICINE

## 2024-05-30 RX ORDER — SUCRALFATE 1 G/1
1 TABLET ORAL
Qty: 270 TABLET | Refills: 1 | Status: SHIPPED | OUTPATIENT
Start: 2024-05-30

## 2024-05-30 RX ORDER — MAGNESIUM CHLORIDE 64 MG
64 TABLET, DELAYED RELEASE (ENTERIC COATED) ORAL 2 TIMES DAILY
Qty: 180 TABLET | Refills: 1 | Status: SHIPPED | OUTPATIENT
Start: 2024-05-30

## 2024-05-30 ASSESSMENT — PATIENT HEALTH QUESTIONNAIRE - PHQ9
SUM OF ALL RESPONSES TO PHQ9 QUESTIONS 1 AND 2: 0
1. LITTLE INTEREST OR PLEASURE IN DOING THINGS: NOT AT ALL
2. FEELING DOWN, DEPRESSED OR HOPELESS: NOT AT ALL

## 2024-05-30 ASSESSMENT — ACTIVITIES OF DAILY LIVING (ADL)
MANAGING_FINANCES: INDEPENDENT
DOING_HOUSEWORK: INDEPENDENT
DRESSING: INDEPENDENT
BATHING: INDEPENDENT
GROCERY_SHOPPING: INDEPENDENT
TAKING_MEDICATION: INDEPENDENT

## 2024-05-30 NOTE — PROGRESS NOTES
"Subjective   Reason for Visit: Zack Medrano is an 88 y.o. male here for a Medicare Wellness visit.     Past Medical, Surgical, and Family History reviewed and updated in chart.    Reviewed all medications by prescribing practitioner or clinical pharmacist (such as prescriptions, OTCs, herbal therapies and supplements) and documented in the medical record.    Here for routine follow up HTN, DM, CKD (Dr Nguyen), CAD ([facility]), high chol, thrombocytopenia/anemia/pancytopenia (Dr Carmichael), tremor (Dr Haque), LINDA, h/o PE, GI bleed, LINDA on CPAP - tolerating well ([facility]).  He states that he is doing pretty well.  He does have oxygen to wear now during the day.   He is on injections for his anemia.       Advance directives:. Advanced Care Planning discussed and documented advance care plan or surrogate decision maker documented in the medical record. Patient has living will. Patient has healthcare POA.  He would like to get a DNR form - states that if his heart stops he does not want CPR/etc.          Patient Care Team:  Fabiola Dietz MD as PCP - General  [facility] Dietz [facility]  Patrick Haque MD as Referring Physician (Neurology)  Ayah Carmichael MD as Consulting Physician (Hematology and Oncology)         Objective   Vitals:  /54 (BP Location: Left arm, Patient Position: Sitting, BP Cuff Size: Adult)   Pulse 84   Ht 1.702 m (5' 7\")   Wt 106 kg (233 lb 3.2 oz)   SpO2 91%   BMI 36.52 kg/m²       Physical Exam  Vitals reviewed.   Constitutional:       General: He is not in acute distress.  Cardiovascular:      Rate and Rhythm: Normal rate and regular rhythm.      Heart sounds: No murmur heard.  Pulmonary:      Effort: Pulmonary effort is normal. No respiratory distress.      Breath sounds: Normal breath sounds.   Skin:     General: Skin is warm and dry.   Neurological:      General: No focal deficit present.      Mental Status: He is alert. Mental status is at " baseline.         Assessment/Plan   Problem List Items Addressed This Visit       CAD (coronary artery disease)    Type 2 diabetes mellitus with stage 4 chronic kidney disease, with long-term current use of insulin (Multi)    Hypertension    Mixed hyperlipidemia    Neutropenia (CMS-HCC)    LINDA on CPAP    Thrombocytopenia (CMS-[facility])    Tremor, essential     Other Visit Diagnoses       Routine general medical examination at health care facility    -  Primary    Anemia due to stage 4 chronic kidney disease (Multi)        Leukopenia, unspecified type        Essential (primary) hypertension        Type 2 diabetes mellitus with diabetic chronic kidney disease (Multi)        Chronic kidney disease, stage 3 unspecified (Multi)        Gastric ulcer, unspecified chronicity, unspecified whether gastric ulcer hemorrhage or perforation present        Relevant Medications    sucralfate (Carafate) 1 gram tablet    Low magnesium level        Relevant Medications    magnesium chloride (Mag 64) 64 mg EC tablet

## 2024-05-30 NOTE — PROGRESS NOTES
Subjective   Patient ID: Zack Medrano is a 88 y.o. male who presents for 3 month follow up. Labs has been completed.  Needs refill on the Mag 64 & Sucralfate.     HPI     Review of Systems    Objective   There were no vitals taken for this visit.    Physical Exam    Assessment/Plan

## 2024-06-04 ENCOUNTER — APPOINTMENT (OUTPATIENT)
Dept: HEMATOLOGY/ONCOLOGY | Facility: CLINIC | Age: 89
End: 2024-06-04
Payer: MEDICARE

## 2024-06-05 ENCOUNTER — HOSPITAL ENCOUNTER (OUTPATIENT)
Age: 89
Discharge: HOME | End: 2024-06-05
Payer: MEDICARE

## 2024-06-05 DIAGNOSIS — N18.4: ICD-10-CM

## 2024-06-05 DIAGNOSIS — C91.10: Primary | ICD-10-CM

## 2024-06-05 DIAGNOSIS — K74.60: ICD-10-CM

## 2024-06-05 DIAGNOSIS — E11.22: ICD-10-CM

## 2024-06-05 LAB
ALANINE AMINOTRANSFER ALT/SGPT: 29 U/L (ref 16–61)
ALBUMIN SERPL-MCNC: 3.5 G/DL (ref 3.2–5)
ALKALINE PHOSPHATASE: 111 U/L (ref 45–117)
ANION GAP: 9 (ref 5–15)
ANISOCYTOSIS BLD QL SMEAR: (no result)
AST(SGOT): 29 U/L (ref 15–37)
BUN SERPL-MCNC: 49 MG/DL (ref 7–18)
BUN/CREAT RATIO: 18.9 RATIO (ref 10–20)
BURR CELLS: (no result)
CALCIUM SERPL-MCNC: 9.3 MG/DL (ref 8.5–10.1)
CARBON DIOXIDE: 23 MMOL/L (ref 21–32)
CHLORIDE: 106 MMOL/L (ref 98–107)
CRENATED RBC: (no result)
CRP SERPL-MCNC: < 2.9 MG/L (ref 0–3)
DEPRECATED RDW RBC: 78.3 FL (ref 35.1–43.9)
DIFFERENTIAL INDICATED: (no result)
ERYTHROCYTE [DISTWIDTH] IN BLOOD: 20.1 % (ref 11.6–14.6)
EST GLOM FILT RATE - AFR AMER: 30 ML/MIN (ref 60–?)
GLOBULIN: 3.5 G/DL (ref 2.2–4.2)
GLUCOSE: 152 MG/DL (ref 74–106)
HCT VFR BLD AUTO: 24.7 % (ref 40–54)
HGB BLD-MCNC: 7.8 G/DL (ref 13–16.5)
HYPOCHROMASIA: (no result)
IMMATURE GRANULOCYTES COUNT: 0.03 X10^3/UL (ref 0–0)
MANUAL DIF COMMENT BLD-IMP: (no result)
MCV RBC: 106.9 FL (ref 80–94)
MEAN CORP HGB CONC: 31.6 G/DL (ref 32–36)
MEAN PLATELET VOL.: 11.4 FL (ref 6.2–12)
NRBC FLAGGED BY ANALYZER: 1.3 % (ref 0–5)
PLATELET # BLD: 75 K/MM3 (ref 150–450)
POLYCHROMASIA BLD QL SMEAR: (no result)
POSITIVE COUNT: YES
POSITIVE DIFFERENTIAL: YES
POSITIVE MORPHOLOGY: YES
POTASSIUM: 4.7 MMOL/L (ref 3.5–5.1)
PROTHROMBIN TIME (PROTIME)PT.: 14 SECONDS (ref 11.7–14.9)
RBC # BLD AUTO: 2.31 M/MM3 (ref 4.6–6.2)
SCAN SMEAR PER REVIEW CRITERIA: (no result)
WBC # BLD AUTO: 1.6 K/MM3 (ref 4.4–11)

## 2024-06-05 PROCEDURE — 85025 COMPLETE CBC W/AUTO DIFF WBC: CPT

## 2024-06-05 PROCEDURE — 80053 COMPREHEN METABOLIC PANEL: CPT

## 2024-06-05 PROCEDURE — 85610 PROTHROMBIN TIME: CPT

## 2024-06-05 PROCEDURE — 36415 COLL VENOUS BLD VENIPUNCTURE: CPT

## 2024-06-05 PROCEDURE — 86140 C-REACTIVE PROTEIN: CPT

## 2024-06-06 LAB
ELECTRONICALLY SIGNED BY CYTOGENETICS: NORMAL
MICROARRAY PLATFORM: NORMAL

## 2024-06-07 ENCOUNTER — INFUSION (OUTPATIENT)
Dept: HEMATOLOGY/ONCOLOGY | Facility: CLINIC | Age: 89
End: 2024-06-07
Payer: MEDICARE

## 2024-06-07 ENCOUNTER — OFFICE VISIT (OUTPATIENT)
Dept: HEMATOLOGY/ONCOLOGY | Facility: CLINIC | Age: 89
End: 2024-06-07
Payer: MEDICARE

## 2024-06-07 VITALS
SYSTOLIC BLOOD PRESSURE: 137 MMHG | OXYGEN SATURATION: 95 % | DIASTOLIC BLOOD PRESSURE: 69 MMHG | RESPIRATION RATE: 16 BRPM | WEIGHT: 232.2 LBS | BODY MASS INDEX: 36.37 KG/M2 | HEART RATE: 61 BPM | TEMPERATURE: 97.2 F

## 2024-06-07 DIAGNOSIS — D61.818 OTHER PANCYTOPENIA (MULTI): ICD-10-CM

## 2024-06-07 DIAGNOSIS — D72.819 LEUKOPENIA, UNSPECIFIED TYPE: ICD-10-CM

## 2024-06-07 DIAGNOSIS — D63.1 ANEMIA DUE TO STAGE 3B CHRONIC KIDNEY DISEASE (MULTI): ICD-10-CM

## 2024-06-07 DIAGNOSIS — D69.6 THROMBOCYTOPENIA (CMS-HCC): ICD-10-CM

## 2024-06-07 DIAGNOSIS — D63.1 ANEMIA DUE TO STAGE 4 CHRONIC KIDNEY DISEASE (MULTI): ICD-10-CM

## 2024-06-07 DIAGNOSIS — N18.32 ANEMIA DUE TO STAGE 3B CHRONIC KIDNEY DISEASE (MULTI): ICD-10-CM

## 2024-06-07 DIAGNOSIS — N18.4 STAGE 4 CHRONIC KIDNEY DISEASE (MULTI): ICD-10-CM

## 2024-06-07 DIAGNOSIS — N18.4 ANEMIA DUE TO STAGE 4 CHRONIC KIDNEY DISEASE (MULTI): ICD-10-CM

## 2024-06-07 DIAGNOSIS — D70.9 NEUTROPENIA, UNSPECIFIED TYPE (CMS-HCC): ICD-10-CM

## 2024-06-07 DIAGNOSIS — D46.9 MYELODYSPLASTIC SYNDROME, UNSPECIFIED (MULTI): ICD-10-CM

## 2024-06-07 LAB
ALBUMIN SERPL BCP-MCNC: 4.1 G/DL (ref 3.4–5)
ALP SERPL-CCNC: 93 U/L (ref 33–136)
ALT SERPL W P-5'-P-CCNC: 18 U/L (ref 10–52)
ANION GAP SERPL CALC-SCNC: 12 MMOL/L (ref 10–20)
AST SERPL W P-5'-P-CCNC: 24 U/L (ref 9–39)
BASO STIPL BLD QL SMEAR: PRESENT
BASOPHILS # BLD AUTO: 0.01 X10*3/UL (ref 0–0.1)
BASOPHILS NFR BLD AUTO: 0.7 %
BILIRUB SERPL-MCNC: 1 MG/DL (ref 0–1.2)
BUN SERPL-MCNC: 46 MG/DL (ref 6–23)
CALCIUM SERPL-MCNC: 9 MG/DL (ref 8.6–10.3)
CHLORIDE SERPL-SCNC: 102 MMOL/L (ref 98–107)
CO2 SERPL-SCNC: 25 MMOL/L (ref 21–32)
CREAT SERPL-MCNC: 2.31 MG/DL (ref 0.5–1.3)
DACRYOCYTES BLD QL SMEAR: NORMAL
EGFRCR SERPLBLD CKD-EPI 2021: 27 ML/MIN/1.73M*2
EOSINOPHIL # BLD AUTO: 0.05 X10*3/UL (ref 0–0.4)
EOSINOPHIL NFR BLD AUTO: 3.3 %
ERYTHROCYTE [DISTWIDTH] IN BLOOD BY AUTOMATED COUNT: 19.9 % (ref 11.5–14.5)
GLUCOSE SERPL-MCNC: 290 MG/DL (ref 74–99)
HCT VFR BLD AUTO: 24.7 % (ref 41–52)
HGB BLD-MCNC: 8.1 G/DL (ref 13.5–17.5)
IMM GRANULOCYTES # BLD AUTO: 0.02 X10*3/UL (ref 0–0.5)
IMM GRANULOCYTES NFR BLD AUTO: 1.3 % (ref 0–0.9)
LYMPHOCYTES # BLD AUTO: 0.79 X10*3/UL (ref 0.8–3)
LYMPHOCYTES NFR BLD AUTO: 52.7 %
MCH RBC QN AUTO: 34.9 PG (ref 26–34)
MCHC RBC AUTO-ENTMCNC: 32.8 G/DL (ref 32–36)
MCV RBC AUTO: 107 FL (ref 80–100)
MONOCYTES # BLD AUTO: 0.06 X10*3/UL (ref 0.05–0.8)
MONOCYTES NFR BLD AUTO: 4 %
NEUTROPHILS # BLD AUTO: 0.57 X10*3/UL (ref 1.6–5.5)
NEUTROPHILS NFR BLD AUTO: 38 %
NRBC BLD-RTO: 0 /100 WBCS (ref 0–0)
OVALOCYTES BLD QL SMEAR: NORMAL
PLATELET # BLD AUTO: 71 X10*3/UL (ref 150–450)
POLYCHROMASIA BLD QL SMEAR: NORMAL
POTASSIUM SERPL-SCNC: 5 MMOL/L (ref 3.5–5.3)
PROT SERPL-MCNC: 6.2 G/DL (ref 6.4–8.2)
RBC # BLD AUTO: 2.32 X10*6/UL (ref 4.5–5.9)
RBC MORPH BLD: NORMAL
SCHISTOCYTES BLD QL SMEAR: NORMAL
SODIUM SERPL-SCNC: 134 MMOL/L (ref 136–145)
WBC # BLD AUTO: 1.5 X10*3/UL (ref 4.4–11.3)

## 2024-06-07 PROCEDURE — 99213 OFFICE O/P EST LOW 20 MIN: CPT | Mod: 25 | Performed by: INTERNAL MEDICINE

## 2024-06-07 PROCEDURE — 1126F AMNT PAIN NOTED NONE PRSNT: CPT | Performed by: INTERNAL MEDICINE

## 2024-06-07 PROCEDURE — 2500000004 HC RX 250 GENERAL PHARMACY W/ HCPCS (ALT 636 FOR OP/ED): Mod: JZ,EC | Performed by: INTERNAL MEDICINE

## 2024-06-07 PROCEDURE — 96372 THER/PROPH/DIAG INJ SC/IM: CPT

## 2024-06-07 PROCEDURE — 1157F ADVNC CARE PLAN IN RCRD: CPT | Performed by: INTERNAL MEDICINE

## 2024-06-07 PROCEDURE — 99213 OFFICE O/P EST LOW 20 MIN: CPT | Performed by: INTERNAL MEDICINE

## 2024-06-07 PROCEDURE — 80053 COMPREHEN METABOLIC PANEL: CPT

## 2024-06-07 PROCEDURE — 85025 COMPLETE CBC W/AUTO DIFF WBC: CPT

## 2024-06-07 PROCEDURE — 3078F DIAST BP <80 MM HG: CPT | Performed by: INTERNAL MEDICINE

## 2024-06-07 PROCEDURE — 36415 COLL VENOUS BLD VENIPUNCTURE: CPT

## 2024-06-07 PROCEDURE — 1159F MED LIST DOCD IN RCRD: CPT | Performed by: INTERNAL MEDICINE

## 2024-06-07 PROCEDURE — 3075F SYST BP GE 130 - 139MM HG: CPT | Performed by: INTERNAL MEDICINE

## 2024-06-07 RX ORDER — EPINEPHRINE 0.3 MG/.3ML
0.3 INJECTION SUBCUTANEOUS EVERY 5 MIN PRN
OUTPATIENT
Start: 2024-06-21

## 2024-06-07 RX ORDER — DIPHENHYDRAMINE HYDROCHLORIDE 50 MG/ML
50 INJECTION INTRAMUSCULAR; INTRAVENOUS AS NEEDED
OUTPATIENT
Start: 2024-06-21

## 2024-06-07 RX ORDER — FAMOTIDINE 10 MG/ML
20 INJECTION INTRAVENOUS ONCE AS NEEDED
OUTPATIENT
Start: 2024-06-21

## 2024-06-07 RX ORDER — ALBUTEROL SULFATE 0.83 MG/ML
3 SOLUTION RESPIRATORY (INHALATION) AS NEEDED
OUTPATIENT
Start: 2024-06-21

## 2024-06-07 RX ADMIN — DARBEPOETIN ALFA 500 MCG: 500 INJECTION, SOLUTION INTRAVENOUS; SUBCUTANEOUS at 13:27

## 2024-06-07 ASSESSMENT — ENCOUNTER SYMPTOMS
NUMBNESS: 0
HEADACHES: 0
ARTHRALGIAS: 1
DYSURIA: 0
VOMITING: 0
LIGHT-HEADEDNESS: 0
APPETITE CHANGE: 0
COUGH: 0
SHORTNESS OF BREATH: 1
TROUBLE SWALLOWING: 0
WEAKNESS: 0
MYALGIAS: 1
DIARRHEA: 0
BRUISES/BLEEDS EASILY: 1
NAUSEA: 0
UNEXPECTED WEIGHT CHANGE: 0
RECTAL PAIN: 0
FATIGUE: 1
SLEEP DISTURBANCE: 0
BLOOD IN STOOL: 0
ABDOMINAL PAIN: 0
NERVOUS/ANXIOUS: 0

## 2024-06-07 ASSESSMENT — COLUMBIA-SUICIDE SEVERITY RATING SCALE - C-SSRS
6. HAVE YOU EVER DONE ANYTHING, STARTED TO DO ANYTHING, OR PREPARED TO DO ANYTHING TO END YOUR LIFE?: NO
1. IN THE PAST MONTH, HAVE YOU WISHED YOU WERE DEAD OR WISHED YOU COULD GO TO SLEEP AND NOT WAKE UP?: NO
2. HAVE YOU ACTUALLY HAD ANY THOUGHTS OF KILLING YOURSELF?: NO

## 2024-06-07 ASSESSMENT — PAIN SCALES - GENERAL: PAINLEVEL: 0-NO PAIN

## 2024-06-07 NOTE — PATIENT INSTRUCTIONS
Reviewed labs and recent medical history.  Discussed with patient that we have increased his Aranesp to 500mcg every 2 weeks.  Okay to get his injection today. 6/7/24  He will return in 2 weeks to get his next injection.  He will return to see MD in 1 month with that injection. Encouraged him to call with questions or concerns.

## 2024-06-07 NOTE — PROGRESS NOTES
Labs drawn on arrival  Aranesp increased to 500mg today  6/21 lab and aranesp  7/5 lab and aranesp  7/19 lab on arrival to clinic        1pm MD and aranesp to follow  Reviewed AVS with patient- patient verbalizes understanding

## 2024-06-07 NOTE — PROGRESS NOTES
Patient ID:Zack Medrano is a 88 y.o. year old male patient with myelodysplasia         Referring Physician: Ayah Carmichael MD  [address] [place] [address] [address]  [place] [address]  Primary Care Provider: Fabiola Dietz MD    Chief Complaint  Chief Complaint   Patient presents with    Myelodyplasia          History of the Present Illness  10/9/2019.  Visit with Dr. Mickey Nguyen for chronic kidney disease and hypertension.  Chief complaint was fatigue and need to lose weight     11/12/2019.  The patient has had long-term history of microcytic anemia going back to at least 2019 at which time his white count was 5.2 hemoglobin was 13.8 hematocrit 40.5 and a platelet count of 157,000.  His MCV was 105 MCHC was 34.  White blood cell differential count showed 66% polys, 17 lymphs, 11 monos and 4 eosinophils.     12/11/2019.  Seen by primary care for probable shingles treated with Valtrex with chief complaint of waxing and waning left flank pain radiating up under his ribs, somewhat responsive to exercise..  He had a history of pulmonary embolus and was on Eliquis long-term.     2/24/2020.  Seen by Dr. Howell for elevated PSA, BPH and erectile dysfunction.  PSA in 2017 was 2.94, in 2018 it was 2.74 and in 2020 it was 4.03.  We discussed biopsy and the patient wanted to have this followed and not biopsy.     3/9/2020.  Follow-up with NATALY Pulido primary care.  Referred to Dr. Bahena for colonoscopy and CT scan of the chest was ordered for follow-up of questionable lingular nodule.     3/11/2020.  CT scan of the chest showed a stable 6 mm nodular lesion near the fissure in the lingula.     3/13/2020.  Seen by Dr. Bahena who noted that the patient had had diarrhea off-and-on since cholecystectomy and had intermittent left-sided pain.  He had a history of colon polyps.     4/22/2015.  Colonoscopy showed that he had tubular adenoma     5/13/2020.  Seen by Dr. Migdalia New in follow-up.  She noted that he had an  abnormal CBC with macrocytic indices with a normal B12 level.  The patient denies alcohol use.  Hemoglobin A1c was 6.2.  BUN was 33 creatinine was 1.74.     8/31/2020.  Follow-up with Dr. Howell.  PSA had come down from 4.03-3.77     1/4/2021 through 1/12/2021.  Admitted to the hospital having been brought to the ED by squad after he fell forward on his toilet and was unable to get up.  He denied hitting his head or losing consciousness.  He blamed it on having back pain and shoulder pain for several weeks.  He was febrile with a temperature of 100.9 and hypoxic with pulse ox of 86% on room air.  Respirations were 24.  He was positive for COVID and was treated with intravenous antibiotics and remdesivir, increased supplemental oxygen.  He was able to be discharged to rehabilitation.     Repeat CBC showed white count 4.3 with a hemoglobin 12.9 hematocrit 38.8 with an MCV of 102 and a platelet count of 173,000.  White blood cell differential count showed minimal absolute lymphocytopenia at 0.6 with the lower limits of normal being 0.8.     3/1/2021.  Follow-up with Dr. Howell.  PSA was 9.9 in February.  Decided to have prostate biopsy done on 3/22/2021.  Results showed BPH.     10/6/2021.  Follow-up with Dr. Howell.  PSA in September was 2.77     4/6/2022.  CBC showed white count 4.3 with hemoglobin 11.7 hematocrit 34.4 with an MCV of 107.  Platelet count was 153,000.  White blood cell differential count was essentially normal.     5/31/2022.  CBC showed white count 3.6 with hemoglobin 11.6 hematocrit 34.4 and platelet count 117,000 with a normal white blood cell differential.     6/27/2022.  Referred to Dr. Dash because of pancytopenia by NATALY Gray.  CBC showed a white count of 3.9 with hemoglobin 12.3 hematocrit 36.1 and platelet count 129,000 with a normal differential.  PSA was 3.26.     The patient said that he felt well except for having increased fatigue.  He continued on long-term anticoagulation.  He was  able to carry out his ADLs.  He did complain of easy bruising.  He said that he had worked at [employer] for 12 years and also worked in making [employer].  Dr. Dash felt that there was a large component secondary to his chronic kidney disease.  There was no evidence of nutritional deficiency.  He checked him for hemolysis and monoclonal gammopathy and ordered an ultrasound for hepatosplenomegaly and testing for hepatitis, HIV and CHRISTINA.  He said he would rather observe the patient rather than pursuing a bone marrow biopsy.     7/27/2022.  Repeat lab data showed white count 3.9 with hemoglobin 12.3 hematocrit 36.1 and platelet count 129,000.  BUN was 36 creatinine was 1.78.  He had mild elevation of AST.  Retake count was 2.3%.  Flow cytometry showed a small clonal B-cell population.  Hepatitis B was nonreactive.  Hep C testing was negative.  Serum protein electrophoresis was normal.  Haptoglobin was normal.  LDH was normal.  He said that the possibility of MDS could not be ruled out.  Ultrasound showed fatty liver but no splenomegaly.  He plan for EPO level and NGS for myeloid mutation.     9/1/2022.  Started on allopurinol for hyperuricemia.     10/12/2022.  Seen by Dr. Dr. Dietz.  Patient was referred to dermatology for several skin lesions.  He was referred to neurology because of his tremor that was not improving.  She increased his glimepiride.     10/17/2022.  CBC showed white count 3.0 with hemoglobin 10.5 hematocrit 31.3 and a platelet count of 106,000 MCV was 111.     10/26/2022.  Follow-up with Dr. 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  He was suspicious for MDS.  He referred him for a bone marrow biopsy.     12/1/2022.  Follow-up with Dr. Dash.  Bone marrow biopsy showed low-grade MDS.  R IPSS score was low or very low depending on a karyotype which was still pending.  We discussed initiating erythropoietin with Aranesp but the patient deferred and the  decision was made to monitor him.     1/11/2023.  CBC showed white count 3.4 with hemoglobin 10.3 hematocrit 32.2 and a platelet count of 137,000.  MCV was 115.  White blood cell differential count was normal.     1/13/2023.  Follow-up with Dr. Dash.  The patient did say that he had following getting out of the shower but did not sustain any injuries.  He had been started by Dr. Nguyen on insulin for his diabetes which was helpful.  Patient continued to be active inside his house.  He remains on Eliquis.  BR-IPSS score is very low.  Counts remain stable.     3/31/2023.  CBC showed a white count of 2.6 with hemoglobin 8.7 hematocrit 27.1 and a platelet count 97,000.  MCV was 115.  White blood cell differential count showed that he had an absolute neutrophil count of 1.36.     4/10/2023.  CBC showed a white count of 2.5 with a hemoglobin 9.0 hematocrit of 27.7 with a platelet count of 92,000.  MCV was 114.     4/17/2023.  Follow-up with NATALY Gray.  Even though his hemoglobin had dropped to 9 the patient did not want to start his erythropoietin injections.     5/11/2023.  CBC showed white count of 2.5 with a hemoglobin of 7.6 hematocrit 23.8 and a platelet count of 101,000.  Absolute neutrophil count was 1.17.     5/15/2023 through 5/19/2023.  Admitted to the hospital with anemia and generalized weakness black stools dizziness with a hemoglobin of 6.7.  His BUN was 51 creatinine was 2.08.  CT scan of the abdomen showed no acute process.  There were findings consistent with atypical healing of the previous rib fracture with question Paget's disease although it was nonspecific.  He was transfused 2 units packed red blood cells.  EGD found 3 erosions in the cardia.  He was prescribed Carafate.     5/24/2023.  Follow-up with Dr. Dr. Dietz after discharge.  CBC showed white count 1.9 with hemoglobin 8.6 hematocrit 28.3 and a platelet count of 140,000.  Absolute neutrophil count was 1.06.  Patient did not  "tolerate Carafate and stopped it.  He was prescribed oral iron twice a day.  He had not been prescribed a PPI which was then started.     6/1/2023.  CBC showed white count of 1.9 with hemoglobin 7.7 hematocrit 24.8 and a platelet count of 87,000.     6/13/2023.  CBC showed a white count of 1.9 with hemoglobin of 8.7 hematocrit 28.1 and platelet count of 89,000.     6/23/2023.  Followed up with Dr. Dr. Dietz who reported that he was hospitalized again in [place] for GI bleed.  At that time his Eliquis was discontinued and he was changed to Protonix and taken off his baby aspirin.  Once again he was started on Carafate but did not tolerate it.     7/15/2023.  CBC showed white count of 2.4 with hemoglobin 9.2 hematocrit 27.3 and a platelet count of 110,000.  Absolute neutrophil count was 1.36.     7/17/2023.  Follow-up with Malorie Shaffer.  The patient had recently had a colonoscopy and had \"cauterizations\" he noted that he had dyspnea with exertion but no resting shortness of breath.  He was on prednisone at that time.  His sugars were in the 300s.  He was on insulin.  His neuropathy has not changed and he continues to have tremors.  Current level was found to be saturated.  Once again the patient was reluctant to start NING.     8/14/2023.  Follow-up with Malorie Shaffer.  CBC showed white count 1.8 with a hemoglobin 8.9 hematocrit 26.9 and a platelet count of 100,000.  Absolute neutrophil count was 0.96.     9/6/2023.  CBC showed a white count of 1.8 with hemoglobin 9.5 hematocrit 28.8 and a platelet count of 85,000 with an absolute neutrophil count of 0.99.  9/13/2023.  Follow-up with Dr. Howell.  PSA has gone down to 1.87.     10/9/2023.  Follow-up with Malorie Shaffer who noted the patient remained on oxygen and CPAP.  He says that he is awakening in the middle of the night around 3 to 4:00 in the morning.  Sugars have been in the 300s.  Neuropathy was unchanged.  He had been started on erythropoietin every 3 weeks   "   11/21/2023.  Follow-up with Malorie Shaffer.  No improvement in his hemoglobin.  White count had improved to 2.1 platelets were baseline.  He had been placed on 40 mg of prednisone and continued on erythropoietin 300 mcg every 3 weeks.     1/2/2024.  Follow-up with Malorie Shaffer.  No improvement in hemoglobin.  Symptomatically he was doing well.  Plan was to increase frequency of his darbepoetin to every 2 weeks.     2/13/2024.  Follow-up with Malorie Shaffer.  Now on erythropoietin injections every 2 weeks.  There was a discussion concerning repeat bone marrow biopsy with counts stayed low.     3/12/2024.  Follow-up with Malorie Shaffer.  No improvement in his hemoglobin.  Patient was agreeable to repeat bone marrow biopsy.  She also ordered a CAT scan of the chest abdomen and pelvis without contrast.     3/22/2024.  CAT scan showed severe coronary artery calcifications.  There is evidence of bronchiectasis in the lower lobes increased compared to previous CT scan from 2020.     Predominantly groundglass opacities with bibasilar prominence.  There is some subpleural sparing of the left upper lobe.  There is a 3 mm nodule in the right upper lobe which was unchanged for several years.  Multilevel anterior bridging osteophytes were noted consistent with DISH.  There is a small fat-containing periumbilical hernia and bilateral inguinal hernias.  Spleen size is at the upper limits of normal being 12.7 cm in length slightly increased when compared to study from 2019.  Cysts were seen in the kidneys with diffuse cortical atrophy and a 4 mm hyperdense focus in the interpolar region of the right kidney possibly a stone.  Prostatism was noted for gland measuring 6.2 cm.     3/28/2024.  Seen by Dr. Graham for shortness of breath.  He felt the patient had possible chronic interstitial lung disease versus periodic aspiration, hypoxia dyspnea on exertion.  Further tests were ordered including high-resolution CT scan, complete pulmonary  function testing, simple pulmonary stress test and follow-up.     4/9/2024.  Follow-up with Malorie Shaffer.  Erythropoietin injections continued every 2 weeks which the patient was tolerating well.     4/16/2024.  Bone marrow biopsy was performed showing a hypercellular bone marrow at 50% with dyserythropoiesis.  There are 1% blasts consistent with persistent myeloid neoplasm.  Flow cytometry showed a small clonal CD5 negative, CD10 negative B-cell population and a polytypic background.  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     There was a very small abnormal T-cell population that could be incidental.  Next generation sequencing showed 2 U2AF 1 variants, 1 of which was negative.  The other had been previously seen.     The marrow also showed dyserythropoiesis with bilobate forms and forms with irregular nuclear contours as well as blebbing.     4/23/2024.  CBC showed white count 1.5 with a hemoglobin of 8.3 hematocrit 25.9 and platelet count of 71,000 with 37% neutrophils, 53 lymphocytes, 4 monos, 4 eosinophils with an absolute neutrophil count of 0.57.     4/24/2024.  Return visit with Dr. Graham.  He noted that the high-resolution CT scan showed no significant change from the previous CT scan the month before.  Pulmonary function testing showed no response to bronchodilator and there was mild restrictive change with some suggestion of airway inflammation.  The patient had difficulty doing the test because of persistent coughing.  6-minute walk test showed that the patient was able to maintain saturations at 93% with 2 L of oxygen per minute continuously.  The test was terminated because of dyspnea.  With his hypoxia on room air it was recommended that he be on oxygen continuously to keep his saturation greater than or equal to 92%.  He continued on oxygen with his  CPAP at 3 L at nighttime.     5/10/2024.  This is my first visit with Mr. Medrano and his family.  We went over some of the results of the bone marrow biopsy.  I told him that I would have to wait until the chromosomal analysis returned.  I also note the possibility of low-grade lymphoma.  He may need to have an opinion with malignant hematology at [facility].  Stepdaughter who is here with him and his wife today is his advocate for maintaining his quality of life.  She is an employee at a local nursing facility and is concerned about many clients she has who seem to be getting treated aggressively.  We talked about myelodysplasia and the fact that he is not responding to erythropoietin.  He will continue on this treatment for now.  He is low counts were discussed and he knows that he is at higher risk for infection and bleeding.  He continues on oxygen supplementation continuously.  They will return in 2 weeks at which time laboratory tests will be performed.     He just had a lesion removed from the dorsum of his right hand at the base of his first and second fingers.  Stitches are still in place.  It seems to be healing well without any significant bleeding or infection.  He was told that it looked benign.    5/13/2024.  Follow-up with Dr. Aggie Danielle of cardiology who saw him in follow-up of his coronary artery disease with multiple stents having been placed, hypertension, dyslipidemia and previous pulmonary embolus related to COVID-19.  She had stopped his Eliquis.  She noted his myelodysplasia.  She noted that at some point his aspirin had been stopped but given his history of stents this was restarted and he tolerated it well.  She said that his blood pressure was well-controlled.  She noted blood sugars were between 150-2 50.  She noted the patient was supposed to be using oxygen with exertion but the patient was not sure what rate it was supposed to be.  She says that she would not stop his aspirin unless his  platelets were less than 50,000 with complications of bleeding.  She said it was very important to continue aspirin given a 15% chance of in-stent thrombosis with aspirin cessation.  She increased her furosemide to 80 mg twice a day because of bilateral lower extremity edema and spironolactone to 25 mg twice daily until symptoms improve and encouraged him to cut back on his salt intake.  She was see him again in about 9 months.     5/14/2024.  Follow-up with Dr. Santamaria of ophthalmology who started him on Naphcon-A drops 1 drop 3 times a day in both eyes for excessive tearing     5/15/2024.  Followed up with Dr. Dimas Renteria of podiatry who noted decreased pedal pulses 2 out of 4.  He had footcare.     5/24/2024.  Follow-up with Dr. Graham.  Review of his x-rays showed no change.  Physical examination showed decreased breath sounds but no inspiratory crackles that were previously heard.  He felt that the interstitial lung disease was stable and that his hypoxemia was improved on oxygen.  He plan to repeat CT scan in September.     5/24/2024.  He is here to follow-up on his bone marrow results.  I told him that the chromosomal analysis would not be forthcoming before because the specimen failed to grow in culture.  He continues to display a low-grade MDS with blast count of only 2%.  We reviewed the fact that he is recommended to have at least 6 months of his erythropoietin before we decide whether or not it is a failure.  He is agreeable to this.  We will increase his dose of darbepoetin.  He knows to be on the look out for any signs of infection or bleeding and to report this if it happens.  We reviewed his most recent CBC.  We will see him again in 2 weeks with his next injection.    Interval Note  5/30/2024.  Follow-up with Dr. Dr. Dietz for his Medicare wellness visit.  His magnesium dose was changed to twice daily indefinitely.    6/7/2024.  He is here today for his darbepoetin injection.  We have  increased his dose to 500 mcg every 2 weeks.  Will see if this has any effect.  Laboratory dated today shows that his sugar is 290 and his sodium is 134.  BUN is 46 and creatinine is 2.31.  CBC shows white count 1.5 with a hemoglobin 8.1 hematocrit 24.7 and a platelet count of 71,000 which is very much like his most recent CBC.  His absolute neutrophil count is 0.57.    I talked to him about his current status which he feels is fairly stable.  He is off oxygen.  He has had no bleeding and no signs of infection.  He understands that we have increased his dose of darbepoetin and we will watch his counts.  He will report any side effects.    Comorbidities  GERD  Arthritis, DJD  BPH  Coronary artery disease with history of myocardial, status post stent infarction  History of dumping syndrome  Hyperlipidemia  History of skin cancer  Obesity  Obstructive sleep apnea on CPAP  Peripheral arterial disease  CKD 3  Diabetes with nephropathy  History of cholecystectomy and hernia repair  Essential tremor  Hypertension   Numbness in his fingers felt to be related to cervical disc disease.    Monitoring Parameters  Histories, physical examinations and CBCs with occasional bone marrow biopsies.     Review of Systems - Oncology   Review of Systems   Constitutional:  Positive for fatigue. Negative for appetite change and unexpected weight change.   HENT:  Negative for dental problem, mouth sores and trouble swallowing.    Eyes:  Negative for visual disturbance.   Respiratory:  Positive for shortness of breath. Negative for cough.    Cardiovascular:  Positive for leg swelling.   Gastrointestinal:  Negative for abdominal pain, blood in stool, diarrhea, nausea, rectal pain and vomiting.   Endocrine: Negative for polyuria.   Genitourinary:  Negative for dysuria and urgency.   Musculoskeletal:  Positive for arthralgias and myalgias.   Skin:  Negative for pallor.   Neurological:  Negative for weakness, light-headedness, numbness and  headaches.   Hematological:  Bruises/bleeds easily.   Psychiatric/Behavioral:  Negative for self-injury, sleep disturbance and suicidal ideas. The patient is not nervous/anxious.         Past Medical History  Past Medical History:   Diagnosis Date    Abnormal levels of other serum enzymes     Abnormal liver enzymes    Dependence on other enabling machines and devices     CPAP (continuous positive airway pressure) dependence    Encounter for examination of eyes and vision without abnormal findings     Diabetic eye exam    Localized edema     Bilateral leg edema    Old myocardial infarction     History of myocardial infarction    Other conditions influencing health status 01/08/2020    Uncontrolled diabetes mellitus type 2 without complications    Pain in unspecified ankle and joints of unspecified foot     Arthralgia of ankle    Personal history of other diseases of the digestive system     History of gastroesophageal reflux (GERD)    Personal history of other diseases of the digestive system     History of dumping syndrome    Personal history of other endocrine, nutritional and metabolic disease 11/12/2020    History of type 2 diabetes mellitus    Presence of coronary angioplasty implant and graft     History of coronary artery stent placement    Rash and other nonspecific skin eruption 12/11/2019    Rash    Unspecified abdominal hernia without obstruction or gangrene     Hernia    Unspecified abdominal pain 05/19/2021    Abdominal pain, left lateral        Surgical History  Past Surgical History:   Procedure Laterality Date    OTHER SURGICAL HISTORY  10/09/2019    Arterial stent placement    OTHER SURGICAL HISTORY  10/09/2019    Cholecystectomy    OTHER SURGICAL HISTORY  10/09/2019    Hernia repair       Social History  Social History     Tobacco Use    Smoking status: Never    Smokeless tobacco: Never   Vaping Use    Vaping status: Never Used   Substance Use Topics    Alcohol use: Never    Drug use: Never     "    Family History  family history includes Colon cancer in his mother and another family member.       Current Medications  Current Outpatient Medications   Medication Instructions    allopurinol (ZYLOPRIM) 100 mg, oral, Daily    amLODIPine (NORVASC) 5 mg, oral, Daily before breakfast    ascorbic acid (VITAMIN C) 250 mg, oral    aspirin 81 mg, oral, Daily    atorvastatin (LIPITOR) 40 mg, oral, Daily    blood-glucose sensor (FreeStyle Sindi 3 Sensor) device Use as directed    Constulose 10 gram/15 mL oral solution TAKE 45mL (30g) BY MOUTH TWICE DAILY FOR 30 DAYS    cyanocobalamin, vitamin B-12, (Vitamin B-12) 1,000 mcg tablet extended release 1 tablet, oral    ferrous sulfate 27 mg, oral, Daily    furosemide (LASIX) 40 mg, oral, Daily    gabapentin (NEURONTIN) 300 mg, oral, 2 times daily    lancets 33 gauge misc miscellaneous, 2 times daily    Lantus U-100 Insulin 50 Units, subcutaneous, Nightly    magnesium chloride (MAG 64) 64 mg, oral, 2 times daily    metoprolol tartrate (LOPRESSOR) 50 mg, oral, 2 times daily    multivit-minerals/folic acid (CENTRUM ADULTS ORAL) oral    nitroglycerin (NITROSTAT) 0.4 mg, sublingual, Every 5 min PRN    NON FORMULARY Truetrack test In vitro strip; Use as directed to check blood sugar  2 times daily     pantoprazole (PROTONIX) 40 mg, oral, 2 times daily    pen needle, diabetic (Pen Needle) 32 gauge x 5/32\" needle 1 each, miscellaneous, Daily    primidone (MYSOLINE) 50 mg, oral, Nightly    spironolactone (ALDACTONE) 25 mg, oral, Daily    sucralfate (CARAFATE) 1 g, oral, 3 times daily (morning, midday, late afternoon)    vitamins A,C,E-zinc-copper 2,148 mcg-113 mg-45 mg-17.4mg tablet oral, SUPER VIEW            OARRS Review  I have personally reviewed the OARRS report for Zack Medrano. I have considered the risks of abuse, dependence, addiction and diversion.  Evaluation as document shows that he is on gabapentin.    Vital Signs  /69 (BP Location: Right arm, Patient Position: " Sitting, BP Cuff Size: Adult)   Pulse 61   Temp 36.2 °C (97.2 °F) (Skin)   Resp 16   Wt 105 kg (232 lb 3.2 oz)   SpO2 95%   BMI 36.37 kg/m²      Physical Exam  Vitals and nursing note reviewed. Exam conducted with a chaperone present.   Constitutional:       General: He is not in acute distress.     Appearance: He is ill-appearing. He is not toxic-appearing.      Comments: He is a well-developed well-nourished overweight elderly white male who is unaccompanied again today. He does not have his oxygen on.  He appears to be somewhat chronically ill and pale.  He is in good spirits.   HENT:      Head: Normocephalic and atraumatic.      Nose: Nose normal.      Mouth/Throat:      Mouth: Mucous membranes are moist.      Pharynx: Oropharynx is clear. No oropharyngeal exudate or posterior oropharyngeal erythema.   Eyes:      General: No scleral icterus.     Extraocular Movements: Extraocular movements intact.      Conjunctiva/sclera: Conjunctivae normal.      Pupils: Pupils are equal, round, and reactive to light.   Neck:      Comments: No significant lymphadenopathy is palpated in the head neck region, supraclavicular or axillary areas bilaterally.  Cardiovascular:      Heart sounds: Murmur heard.   Pulmonary:      Effort: Pulmonary effort is normal. No respiratory distress.      Breath sounds: No rales.      Comments: He has occasional rhonchi in the bases.  Abdominal:      General: There is no distension.   Musculoskeletal:         General: Swelling present. Normal range of motion.      Cervical back: Normal range of motion.      Right lower leg: Edema present.      Left lower leg: Edema present.   Skin:     General: Skin is warm and dry.      Coloration: Skin is pale. Skin is not jaundiced.      Findings: Bruising present.   Neurological:      General: No focal deficit present.      Mental Status: He is alert and oriented to person, place, and time. Mental status is at baseline.      Motor: No weakness.      Gait:  Gait normal.      Comments: He is hard of hearing   Psychiatric:         Mood and Affect: Mood normal.         Behavior: Behavior normal.         Thought Content: Thought content normal.         Judgment: Judgment normal.      Comments: He seems to be in good spirits.          Current Laboratory Data  Recent Results (from the past 168 hour(s))   CBC and Auto Differential    Collection Time: 06/07/24 11:47 AM   Result Value Ref Range    WBC 1.5 (L) 4.4 - 11.3 x10*3/uL    nRBC 0.0 0.0 - 0.0 /100 WBCs    RBC 2.32 (L) 4.50 - 5.90 x10*6/uL    Hemoglobin 8.1 (L) 13.5 - 17.5 g/dL    Hematocrit 24.7 (L) 41.0 - 52.0 %     (H) 80 - 100 fL    MCH 34.9 (H) 26.0 - 34.0 pg    MCHC 32.8 32.0 - 36.0 g/dL    RDW 19.9 (H) 11.5 - 14.5 %    Platelets 71 (L) 150 - 450 x10*3/uL    Neutrophils % 38.0 40.0 - 80.0 %    Immature Granulocytes %, Automated 1.3 (H) 0.0 - 0.9 %    Lymphocytes % 52.7 13.0 - 44.0 %    Monocytes % 4.0 2.0 - 10.0 %    Eosinophils % 3.3 0.0 - 6.0 %    Basophils % 0.7 0.0 - 2.0 %    Neutrophils Absolute 0.57 (L) 1.60 - 5.50 x10*3/uL    Immature Granulocytes Absolute, Automated 0.02 0.00 - 0.50 x10*3/uL    Lymphocytes Absolute 0.79 (L) 0.80 - 3.00 x10*3/uL    Monocytes Absolute 0.06 0.05 - 0.80 x10*3/uL    Eosinophils Absolute 0.05 0.00 - 0.40 x10*3/uL    Basophils Absolute 0.01 0.00 - 0.10 x10*3/uL   Comprehensive Metabolic Panel    Collection Time: 06/07/24 11:47 AM   Result Value Ref Range    Glucose 290 (H) 74 - 99 mg/dL    Sodium 134 (L) 136 - 145 mmol/L    Potassium 5.0 3.5 - 5.3 mmol/L    Chloride 102 98 - 107 mmol/L    Bicarbonate 25 21 - 32 mmol/L    Anion Gap 12 10 - 20 mmol/L    Urea Nitrogen 46 (H) 6 - 23 mg/dL    Creatinine 2.31 (H) 0.50 - 1.30 mg/dL    eGFR 27 (L) >60 mL/min/1.73m*2    Calcium 9.0 8.6 - 10.3 mg/dL    Albumin 4.1 3.4 - 5.0 g/dL    Alkaline Phosphatase 93 33 - 136 U/L    Total Protein 6.2 (L) 6.4 - 8.2 g/dL    AST 24 9 - 39 U/L    Bilirubin, Total 1.0 0.0 - 1.2 mg/dL    ALT 18 10 -  52 U/L   Morphology    Collection Time: 06/07/24 11:47 AM   Result Value Ref Range    RBC Morphology See Below     Polychromasia Mild     RBC Fragments Few     Ovalocytes Few     Teardrop Cells Few     Basophilic Stippling Present           Recent Imaging  OCT MACULA CIRRUS OU (BOTH EYES)    Result Date: 5/14/2024  Date of Procedure 5/14/2024. Technician Information Imaging Technician: [email or web address]. Interpretation Right Eye Normal without fluid. Left Eye Abnormal foveal contour. Findings include Lamellar hole. Interval Change Right Eye Stable. Left Eye Stable.         Pathology  Low-grade myelodysplasia        Performance Status:  Symptomatic; fully ambulatory    Assessment/Plan    1.  Pancytopenia.  The patient has unusual bone marrow findings.  Chromosomal analysis will not be done due to failure of the specimen to grow.  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  He also has components of myelodysplasia.  He is not responding to erythropoietin.  We will increase his dose to 500 mcg every other week.  He continues to be at high risk for bleeding and infection with a low ANC and low platelet count.  He has had previous GI bleeds.  He is currently back on his  aspirin per follow-up with Dr. Danielle for fear of clotting off his stents.  He is off Eliquis.  .     2.  Multiple comorbidities.  These include but are not limited to:  GERD  Arthritis, DJD  BPH  Coronary artery disease with history of myocardial, status post stent infarction  History of dumping syndrome  Hyperlipidemia  History of skin cancer  Obesity  Obstructive sleep apnea on CPAP  Peripheral arterial disease  CKD 3  Diabetes with nephropathy  History of cholecystectomy and hernia repair  Essential tremor  Hypertension   Numbness in his fingers felt to be related to cervical disc disease.        He knows that he should call in the interim should he have any change in his status, questions or concerns.       (This note was generated with  voice recognition software and may contain errors including spelling, grammar, syntax and misrecognition of what was dictated, that are not fully corrected)       Ayah Carmichael MD

## 2024-06-11 DIAGNOSIS — D61.818 OTHER PANCYTOPENIA (MULTI): ICD-10-CM

## 2024-06-11 DIAGNOSIS — D46.9 MYELODYSPLASTIC SYNDROME, UNSPECIFIED (MULTI): ICD-10-CM

## 2024-06-11 DIAGNOSIS — N18.4 STAGE 4 CHRONIC KIDNEY DISEASE (MULTI): ICD-10-CM

## 2024-06-18 ENCOUNTER — APPOINTMENT (OUTPATIENT)
Dept: HEMATOLOGY/ONCOLOGY | Facility: CLINIC | Age: 89
End: 2024-06-18
Payer: MEDICARE

## 2024-06-21 ENCOUNTER — INFUSION (OUTPATIENT)
Dept: HEMATOLOGY/ONCOLOGY | Facility: CLINIC | Age: 89
End: 2024-06-21
Payer: MEDICARE

## 2024-06-21 ENCOUNTER — TELEPHONE (OUTPATIENT)
Dept: PRIMARY CARE | Facility: CLINIC | Age: 89
End: 2024-06-21

## 2024-06-21 VITALS
BODY MASS INDEX: 35.53 KG/M2 | HEART RATE: 76 BPM | RESPIRATION RATE: 18 BRPM | WEIGHT: 226.85 LBS | OXYGEN SATURATION: 92 % | SYSTOLIC BLOOD PRESSURE: 137 MMHG | TEMPERATURE: 98.1 F | DIASTOLIC BLOOD PRESSURE: 63 MMHG

## 2024-06-21 DIAGNOSIS — G25.0 TREMOR, ESSENTIAL: ICD-10-CM

## 2024-06-21 DIAGNOSIS — D61.818 OTHER PANCYTOPENIA (MULTI): ICD-10-CM

## 2024-06-21 DIAGNOSIS — D69.6 THROMBOCYTOPENIA (CMS-HCC): ICD-10-CM

## 2024-06-21 DIAGNOSIS — N18.32 ANEMIA DUE TO STAGE 3B CHRONIC KIDNEY DISEASE (MULTI): ICD-10-CM

## 2024-06-21 DIAGNOSIS — N18.4 STAGE 4 CHRONIC KIDNEY DISEASE (MULTI): ICD-10-CM

## 2024-06-21 DIAGNOSIS — D46.9 MYELODYSPLASTIC SYNDROME, UNSPECIFIED (MULTI): ICD-10-CM

## 2024-06-21 DIAGNOSIS — D70.9 NEUTROPENIA, UNSPECIFIED TYPE (CMS-HCC): ICD-10-CM

## 2024-06-21 DIAGNOSIS — D63.1 ANEMIA DUE TO STAGE 3B CHRONIC KIDNEY DISEASE (MULTI): ICD-10-CM

## 2024-06-21 LAB
ALBUMIN SERPL BCP-MCNC: 4.2 G/DL (ref 3.4–5)
ALP SERPL-CCNC: 88 U/L (ref 33–136)
ALT SERPL W P-5'-P-CCNC: 19 U/L (ref 10–52)
ANION GAP SERPL CALC-SCNC: 14 MMOL/L (ref 10–20)
AST SERPL W P-5'-P-CCNC: 29 U/L (ref 9–39)
BASO STIPL BLD QL SMEAR: PRESENT
BASOPHILS # BLD AUTO: 0 X10*3/UL (ref 0–0.1)
BASOPHILS NFR BLD AUTO: 0 %
BILIRUB SERPL-MCNC: 1.3 MG/DL (ref 0–1.2)
BUN SERPL-MCNC: 45 MG/DL (ref 6–23)
CALCIUM SERPL-MCNC: 9 MG/DL (ref 8.6–10.3)
CHLORIDE SERPL-SCNC: 100 MMOL/L (ref 98–107)
CO2 SERPL-SCNC: 24 MMOL/L (ref 21–32)
CREAT SERPL-MCNC: 2.29 MG/DL (ref 0.5–1.3)
DACRYOCYTES BLD QL SMEAR: NORMAL
EGFRCR SERPLBLD CKD-EPI 2021: 27 ML/MIN/1.73M*2
EOSINOPHIL # BLD AUTO: 0.05 X10*3/UL (ref 0–0.4)
EOSINOPHIL NFR BLD AUTO: 3.6 %
ERYTHROCYTE [DISTWIDTH] IN BLOOD BY AUTOMATED COUNT: 20.3 % (ref 11.5–14.5)
GLUCOSE SERPL-MCNC: 303 MG/DL (ref 74–99)
HCT VFR BLD AUTO: 26.6 % (ref 41–52)
HGB BLD-MCNC: 8.7 G/DL (ref 13.5–17.5)
IMM GRANULOCYTES # BLD AUTO: 0.01 X10*3/UL (ref 0–0.5)
IMM GRANULOCYTES NFR BLD AUTO: 0.7 % (ref 0–0.9)
LYMPHOCYTES # BLD AUTO: 0.65 X10*3/UL (ref 0.8–3)
LYMPHOCYTES NFR BLD AUTO: 47.1 %
MCH RBC QN AUTO: 34.4 PG (ref 26–34)
MCHC RBC AUTO-ENTMCNC: 32.7 G/DL (ref 32–36)
MCV RBC AUTO: 105 FL (ref 80–100)
MONOCYTES # BLD AUTO: 0.08 X10*3/UL (ref 0.05–0.8)
MONOCYTES NFR BLD AUTO: 5.8 %
NEUTROPHILS # BLD AUTO: 0.59 X10*3/UL (ref 1.6–5.5)
NEUTROPHILS NFR BLD AUTO: 42.8 %
NRBC BLD-RTO: 0 /100 WBCS (ref 0–0)
OVALOCYTES BLD QL SMEAR: NORMAL
PLATELET # BLD AUTO: 57 X10*3/UL (ref 150–450)
POLYCHROMASIA BLD QL SMEAR: NORMAL
POTASSIUM SERPL-SCNC: 4.8 MMOL/L (ref 3.5–5.3)
PROT SERPL-MCNC: 6.7 G/DL (ref 6.4–8.2)
RBC # BLD AUTO: 2.53 X10*6/UL (ref 4.5–5.9)
RBC MORPH BLD: NORMAL
SCHISTOCYTES BLD QL SMEAR: NORMAL
SODIUM SERPL-SCNC: 133 MMOL/L (ref 136–145)
WBC # BLD AUTO: 1.4 X10*3/UL (ref 4.4–11.3)

## 2024-06-21 PROCEDURE — 2500000004 HC RX 250 GENERAL PHARMACY W/ HCPCS (ALT 636 FOR OP/ED): Mod: JZ,EC

## 2024-06-21 PROCEDURE — 85025 COMPLETE CBC W/AUTO DIFF WBC: CPT

## 2024-06-21 PROCEDURE — 96372 THER/PROPH/DIAG INJ SC/IM: CPT

## 2024-06-21 PROCEDURE — 80053 COMPREHEN METABOLIC PANEL: CPT

## 2024-06-21 RX ORDER — GABAPENTIN 300 MG/1
300 CAPSULE ORAL 2 TIMES DAILY
Qty: 60 CAPSULE | Refills: 5 | Status: SHIPPED | OUTPATIENT
Start: 2024-06-21

## 2024-06-21 RX ORDER — FAMOTIDINE 10 MG/ML
20 INJECTION INTRAVENOUS ONCE AS NEEDED
OUTPATIENT
Start: 2024-07-05

## 2024-06-21 RX ORDER — EPINEPHRINE 0.3 MG/.3ML
0.3 INJECTION SUBCUTANEOUS EVERY 5 MIN PRN
OUTPATIENT
Start: 2024-07-05

## 2024-06-21 RX ORDER — DIPHENHYDRAMINE HYDROCHLORIDE 50 MG/ML
50 INJECTION INTRAMUSCULAR; INTRAVENOUS AS NEEDED
OUTPATIENT
Start: 2024-07-05

## 2024-06-21 RX ORDER — ALBUTEROL SULFATE 0.83 MG/ML
3 SOLUTION RESPIRATORY (INHALATION) AS NEEDED
OUTPATIENT
Start: 2024-07-05

## 2024-06-21 ASSESSMENT — PAIN SCALES - GENERAL: PAINLEVEL: 0-NO PAIN

## 2024-06-21 NOTE — TELEPHONE ENCOUNTER
Received a voicemail from [facility]. They seen the patient today and he was complaining of pain and tingling in his hand. He states he was taking gabapentin before for this issues and would like to restart this again

## 2024-07-02 ENCOUNTER — APPOINTMENT (OUTPATIENT)
Dept: HEMATOLOGY/ONCOLOGY | Facility: CLINIC | Age: 89
End: 2024-07-02
Payer: MEDICARE

## 2024-07-02 ENCOUNTER — TELEPHONE (OUTPATIENT)
Dept: PRIMARY CARE | Facility: CLINIC | Age: 89
End: 2024-07-02
Payer: MEDICARE

## 2024-07-03 ENCOUNTER — TELEPHONE (OUTPATIENT)
Dept: PULMONOLOGY | Facility: CLINIC | Age: 89
End: 2024-07-03
Payer: MEDICARE

## 2024-07-03 NOTE — TELEPHONE ENCOUNTER
Pt left a message he needs to have his oxygen cylinders and carrier he is not using picked up.  Returned call and left a message for pt to call the DME company who provided them to come pick them up.  Return call if has any questions.

## 2024-07-05 ENCOUNTER — APPOINTMENT (OUTPATIENT)
Dept: HEMATOLOGY/ONCOLOGY | Facility: CLINIC | Age: 89
End: 2024-07-05
Payer: MEDICARE

## 2024-07-08 ENCOUNTER — INFUSION (OUTPATIENT)
Dept: HEMATOLOGY/ONCOLOGY | Facility: CLINIC | Age: 89
End: 2024-07-08
Payer: MEDICARE

## 2024-07-08 VITALS
HEART RATE: 76 BPM | RESPIRATION RATE: 18 BRPM | OXYGEN SATURATION: 93 % | SYSTOLIC BLOOD PRESSURE: 150 MMHG | WEIGHT: 229.28 LBS | TEMPERATURE: 97.3 F | BODY MASS INDEX: 35.91 KG/M2 | DIASTOLIC BLOOD PRESSURE: 69 MMHG

## 2024-07-08 DIAGNOSIS — N18.4 STAGE 4 CHRONIC KIDNEY DISEASE (MULTI): ICD-10-CM

## 2024-07-08 DIAGNOSIS — D63.1 ANEMIA DUE TO STAGE 3B CHRONIC KIDNEY DISEASE (MULTI): ICD-10-CM

## 2024-07-08 DIAGNOSIS — N18.32 ANEMIA DUE TO STAGE 3B CHRONIC KIDNEY DISEASE (MULTI): ICD-10-CM

## 2024-07-08 DIAGNOSIS — D69.6 THROMBOCYTOPENIA (CMS-HCC): ICD-10-CM

## 2024-07-08 DIAGNOSIS — D70.9 NEUTROPENIA, UNSPECIFIED TYPE (CMS-HCC): ICD-10-CM

## 2024-07-08 DIAGNOSIS — D61.818 OTHER PANCYTOPENIA (MULTI): ICD-10-CM

## 2024-07-08 DIAGNOSIS — D46.9 MYELODYSPLASTIC SYNDROME, UNSPECIFIED (MULTI): ICD-10-CM

## 2024-07-08 LAB
ALBUMIN SERPL BCP-MCNC: 4.2 G/DL (ref 3.4–5)
ALP SERPL-CCNC: 99 U/L (ref 33–136)
ALT SERPL W P-5'-P-CCNC: 20 U/L (ref 10–52)
ANION GAP SERPL CALC-SCNC: 16 MMOL/L (ref 10–20)
AST SERPL W P-5'-P-CCNC: 26 U/L (ref 9–39)
BASOPHILS # BLD MANUAL: 0 X10*3/UL (ref 0–0.1)
BASOPHILS NFR BLD MANUAL: 0 %
BILIRUB SERPL-MCNC: 1 MG/DL (ref 0–1.2)
BUN SERPL-MCNC: 50 MG/DL (ref 6–23)
CALCIUM SERPL-MCNC: 9 MG/DL (ref 8.6–10.3)
CHLORIDE SERPL-SCNC: 102 MMOL/L (ref 98–107)
CO2 SERPL-SCNC: 22 MMOL/L (ref 21–32)
CREAT SERPL-MCNC: 2.6 MG/DL (ref 0.5–1.3)
DACRYOCYTES BLD QL SMEAR: ABNORMAL
EGFRCR SERPLBLD CKD-EPI 2021: 23 ML/MIN/1.73M*2
EOSINOPHIL # BLD MANUAL: 0.12 X10*3/UL (ref 0–0.4)
EOSINOPHIL NFR BLD MANUAL: 8 %
ERYTHROCYTE [DISTWIDTH] IN BLOOD BY AUTOMATED COUNT: 20.2 % (ref 11.5–14.5)
GLUCOSE SERPL-MCNC: 304 MG/DL (ref 74–99)
HCT VFR BLD AUTO: 27.3 % (ref 41–52)
HGB BLD-MCNC: 8.7 G/DL (ref 13.5–17.5)
HYPOCHROMIA BLD QL SMEAR: ABNORMAL
IMM GRANULOCYTES # BLD AUTO: 0.01 X10*3/UL (ref 0–0.5)
IMM GRANULOCYTES NFR BLD AUTO: 0.7 % (ref 0–0.9)
LYMPHOCYTES # BLD MANUAL: 0.48 X10*3/UL (ref 0.8–3)
LYMPHOCYTES NFR BLD MANUAL: 32 %
MCH RBC QN AUTO: 34.3 PG (ref 26–34)
MCHC RBC AUTO-ENTMCNC: 31.9 G/DL (ref 32–36)
MCV RBC AUTO: 108 FL (ref 80–100)
MONOCYTES # BLD MANUAL: 0.14 X10*3/UL (ref 0.05–0.8)
MONOCYTES NFR BLD MANUAL: 9 %
NEUTROPHILS # BLD MANUAL: 0.56 X10*3/UL (ref 1.6–5.5)
NEUTS BAND # BLD MANUAL: 0.02 X10*3/UL (ref 0–0.5)
NEUTS BAND NFR BLD MANUAL: 1 %
NEUTS SEG # BLD MANUAL: 0.54 X10*3/UL (ref 1.6–5)
NEUTS SEG NFR BLD MANUAL: 36 %
NRBC BLD MANUAL-RTO: 1 % (ref 0–0)
NRBC BLD-RTO: 0 /100 WBCS (ref 0–0)
OVALOCYTES BLD QL SMEAR: ABNORMAL
PLATELET # BLD AUTO: 72 X10*3/UL (ref 150–450)
POTASSIUM SERPL-SCNC: 4.6 MMOL/L (ref 3.5–5.3)
PROT SERPL-MCNC: 6.4 G/DL (ref 6.4–8.2)
RBC # BLD AUTO: 2.54 X10*6/UL (ref 4.5–5.9)
RBC MORPH BLD: ABNORMAL
SCHISTOCYTES BLD QL SMEAR: ABNORMAL
SODIUM SERPL-SCNC: 135 MMOL/L (ref 136–145)
TOTAL CELLS COUNTED BLD: 100
VARIANT LYMPHS # BLD MANUAL: 0.2 X10*3/UL (ref 0–0.3)
VARIANT LYMPHS NFR BLD: 13 %
WBC # BLD AUTO: 1.5 X10*3/UL (ref 4.4–11.3)

## 2024-07-08 PROCEDURE — 85027 COMPLETE CBC AUTOMATED: CPT

## 2024-07-08 PROCEDURE — 96372 THER/PROPH/DIAG INJ SC/IM: CPT

## 2024-07-08 PROCEDURE — 85007 BL SMEAR W/DIFF WBC COUNT: CPT

## 2024-07-08 PROCEDURE — 84075 ASSAY ALKALINE PHOSPHATASE: CPT

## 2024-07-08 PROCEDURE — 2500000004 HC RX 250 GENERAL PHARMACY W/ HCPCS (ALT 636 FOR OP/ED): Mod: JZ,EC

## 2024-07-08 RX ORDER — FAMOTIDINE 10 MG/ML
20 INJECTION INTRAVENOUS ONCE AS NEEDED
OUTPATIENT
Start: 2024-07-19

## 2024-07-08 RX ORDER — ALBUTEROL SULFATE 0.83 MG/ML
3 SOLUTION RESPIRATORY (INHALATION) AS NEEDED
OUTPATIENT
Start: 2024-07-19

## 2024-07-08 RX ORDER — DIPHENHYDRAMINE HYDROCHLORIDE 50 MG/ML
50 INJECTION INTRAMUSCULAR; INTRAVENOUS AS NEEDED
OUTPATIENT
Start: 2024-07-19

## 2024-07-08 RX ORDER — EPINEPHRINE 0.3 MG/.3ML
0.3 INJECTION SUBCUTANEOUS EVERY 5 MIN PRN
OUTPATIENT
Start: 2024-07-19

## 2024-07-08 ASSESSMENT — PAIN SCALES - GENERAL: PAINLEVEL: 0-NO PAIN

## 2024-07-15 DIAGNOSIS — G25.0 TREMOR, ESSENTIAL: ICD-10-CM

## 2024-07-15 DIAGNOSIS — I25.10 CORONARY ARTERY DISEASE INVOLVING NATIVE HEART, UNSPECIFIED VESSEL OR LESION TYPE, UNSPECIFIED WHETHER ANGINA PRESENT: ICD-10-CM

## 2024-07-15 RX ORDER — PRIMIDONE 50 MG/1
50 TABLET ORAL NIGHTLY
Qty: 30 TABLET | Refills: 2 | OUTPATIENT
Start: 2024-07-15 | End: 2024-10-13

## 2024-07-15 RX ORDER — PRIMIDONE 50 MG/1
50 TABLET ORAL NIGHTLY
Qty: 90 TABLET | Refills: 1 | Status: SHIPPED | OUTPATIENT
Start: 2024-07-15

## 2024-07-15 RX ORDER — METOPROLOL TARTRATE 50 MG/1
50 TABLET ORAL 2 TIMES DAILY
Qty: 180 TABLET | Refills: 2 | Status: SHIPPED | OUTPATIENT
Start: 2024-07-15

## 2024-07-15 RX ORDER — FUROSEMIDE 40 MG/1
60 TABLET ORAL DAILY
Qty: 135 TABLET | Refills: 1 | Status: SHIPPED | OUTPATIENT
Start: 2024-07-15

## 2024-07-19 ENCOUNTER — INFUSION (OUTPATIENT)
Dept: HEMATOLOGY/ONCOLOGY | Facility: CLINIC | Age: 89
End: 2024-07-19
Payer: MEDICARE

## 2024-07-19 ENCOUNTER — OFFICE VISIT (OUTPATIENT)
Dept: HEMATOLOGY/ONCOLOGY | Facility: CLINIC | Age: 89
End: 2024-07-19
Payer: MEDICARE

## 2024-07-19 ENCOUNTER — OFFICE VISIT (OUTPATIENT)
Dept: PRIMARY CARE | Facility: CLINIC | Age: 89
End: 2024-07-19
Payer: MEDICARE

## 2024-07-19 VITALS
OXYGEN SATURATION: 93 % | DIASTOLIC BLOOD PRESSURE: 52 MMHG | HEIGHT: 67 IN | WEIGHT: 230.6 LBS | HEART RATE: 69 BPM | SYSTOLIC BLOOD PRESSURE: 116 MMHG | BODY MASS INDEX: 36.19 KG/M2

## 2024-07-19 VITALS
HEART RATE: 73 BPM | TEMPERATURE: 96.8 F | BODY MASS INDEX: 36.43 KG/M2 | SYSTOLIC BLOOD PRESSURE: 153 MMHG | RESPIRATION RATE: 16 BRPM | WEIGHT: 232.6 LBS | DIASTOLIC BLOOD PRESSURE: 64 MMHG | OXYGEN SATURATION: 92 %

## 2024-07-19 DIAGNOSIS — D63.1 ANEMIA DUE TO STAGE 3B CHRONIC KIDNEY DISEASE (MULTI): ICD-10-CM

## 2024-07-19 DIAGNOSIS — N18.4 STAGE 4 CHRONIC KIDNEY DISEASE (MULTI): ICD-10-CM

## 2024-07-19 DIAGNOSIS — D72.819 LEUKOPENIA, UNSPECIFIED TYPE: ICD-10-CM

## 2024-07-19 DIAGNOSIS — B02.7 DISSEMINATED HERPES ZOSTER: Primary | ICD-10-CM

## 2024-07-19 DIAGNOSIS — D46.9 MYELODYSPLASTIC SYNDROME, UNSPECIFIED (MULTI): ICD-10-CM

## 2024-07-19 DIAGNOSIS — D69.6 THROMBOCYTOPENIA (CMS-HCC): ICD-10-CM

## 2024-07-19 DIAGNOSIS — D63.1 ANEMIA DUE TO STAGE 4 CHRONIC KIDNEY DISEASE (MULTI): ICD-10-CM

## 2024-07-19 DIAGNOSIS — N18.32 ANEMIA DUE TO STAGE 3B CHRONIC KIDNEY DISEASE (MULTI): ICD-10-CM

## 2024-07-19 DIAGNOSIS — E66.01 CLASS 2 SEVERE OBESITY WITH SERIOUS COMORBIDITY AND BODY MASS INDEX (BMI) OF 36.0 TO 36.9 IN ADULT, UNSPECIFIED OBESITY TYPE (MULTI): ICD-10-CM

## 2024-07-19 DIAGNOSIS — D70.9 NEUTROPENIA, UNSPECIFIED TYPE (CMS-HCC): ICD-10-CM

## 2024-07-19 DIAGNOSIS — D61.818 OTHER PANCYTOPENIA (MULTI): ICD-10-CM

## 2024-07-19 DIAGNOSIS — Z95.5 S/P CORONARY ARTERY STENT PLACEMENT: ICD-10-CM

## 2024-07-19 DIAGNOSIS — N18.4 ANEMIA DUE TO STAGE 4 CHRONIC KIDNEY DISEASE (MULTI): ICD-10-CM

## 2024-07-19 LAB
ALBUMIN SERPL BCP-MCNC: 4.1 G/DL (ref 3.4–5)
ALP SERPL-CCNC: 100 U/L (ref 33–136)
ALT SERPL W P-5'-P-CCNC: 18 U/L (ref 10–52)
ANION GAP SERPL CALC-SCNC: 15 MMOL/L (ref 10–20)
AST SERPL W P-5'-P-CCNC: 27 U/L (ref 9–39)
BASOPHILS # BLD AUTO: 0 X10*3/UL (ref 0–0.1)
BASOPHILS NFR BLD AUTO: 0 %
BILIRUB SERPL-MCNC: 1.1 MG/DL (ref 0–1.2)
BUN SERPL-MCNC: 49 MG/DL (ref 6–23)
CALCIUM SERPL-MCNC: 9 MG/DL (ref 8.6–10.3)
CHLORIDE SERPL-SCNC: 106 MMOL/L (ref 98–107)
CO2 SERPL-SCNC: 22 MMOL/L (ref 21–32)
CREAT SERPL-MCNC: 2.67 MG/DL (ref 0.5–1.3)
EGFRCR SERPLBLD CKD-EPI 2021: 22 ML/MIN/1.73M*2
EOSINOPHIL # BLD AUTO: 0.04 X10*3/UL (ref 0–0.4)
EOSINOPHIL NFR BLD AUTO: 2.8 %
ERYTHROCYTE [DISTWIDTH] IN BLOOD BY AUTOMATED COUNT: 20.9 % (ref 11.5–14.5)
GLUCOSE SERPL-MCNC: 285 MG/DL (ref 74–99)
HCT VFR BLD AUTO: 25.4 % (ref 41–52)
HGB BLD-MCNC: 8 G/DL (ref 13.5–17.5)
IMM GRANULOCYTES # BLD AUTO: 0.02 X10*3/UL (ref 0–0.5)
IMM GRANULOCYTES NFR BLD AUTO: 1.4 % (ref 0–0.9)
LYMPHOCYTES # BLD AUTO: 0.75 X10*3/UL (ref 0.8–3)
LYMPHOCYTES NFR BLD AUTO: 53.2 %
MCH RBC QN AUTO: 34.5 PG (ref 26–34)
MCHC RBC AUTO-ENTMCNC: 31.5 G/DL (ref 32–36)
MCV RBC AUTO: 110 FL (ref 80–100)
MONOCYTES # BLD AUTO: 0.07 X10*3/UL (ref 0.05–0.8)
MONOCYTES NFR BLD AUTO: 5 %
NEUTROPHILS # BLD AUTO: 0.53 X10*3/UL (ref 1.6–5.5)
NEUTROPHILS NFR BLD AUTO: 37.6 %
NRBC BLD-RTO: 1.4 /100 WBCS (ref 0–0)
PLATELET # BLD AUTO: 67 X10*3/UL (ref 150–450)
POTASSIUM SERPL-SCNC: 4.6 MMOL/L (ref 3.5–5.3)
PROT SERPL-MCNC: 6.2 G/DL (ref 6.4–8.2)
RBC # BLD AUTO: 2.32 X10*6/UL (ref 4.5–5.9)
SODIUM SERPL-SCNC: 138 MMOL/L (ref 136–145)
WBC # BLD AUTO: 1.4 X10*3/UL (ref 4.4–11.3)

## 2024-07-19 PROCEDURE — 1159F MED LIST DOCD IN RCRD: CPT | Performed by: FAMILY MEDICINE

## 2024-07-19 PROCEDURE — 96372 THER/PROPH/DIAG INJ SC/IM: CPT

## 2024-07-19 PROCEDURE — 84075 ASSAY ALKALINE PHOSPHATASE: CPT

## 2024-07-19 PROCEDURE — 99213 OFFICE O/P EST LOW 20 MIN: CPT | Mod: 25 | Performed by: INTERNAL MEDICINE

## 2024-07-19 PROCEDURE — 99213 OFFICE O/P EST LOW 20 MIN: CPT | Performed by: FAMILY MEDICINE

## 2024-07-19 PROCEDURE — 3078F DIAST BP <80 MM HG: CPT | Performed by: FAMILY MEDICINE

## 2024-07-19 PROCEDURE — 1036F TOBACCO NON-USER: CPT | Performed by: FAMILY MEDICINE

## 2024-07-19 PROCEDURE — 3077F SYST BP >= 140 MM HG: CPT | Performed by: INTERNAL MEDICINE

## 2024-07-19 PROCEDURE — 1157F ADVNC CARE PLAN IN RCRD: CPT | Performed by: INTERNAL MEDICINE

## 2024-07-19 PROCEDURE — 1159F MED LIST DOCD IN RCRD: CPT | Performed by: INTERNAL MEDICINE

## 2024-07-19 PROCEDURE — 99213 OFFICE O/P EST LOW 20 MIN: CPT | Performed by: INTERNAL MEDICINE

## 2024-07-19 PROCEDURE — 85025 COMPLETE CBC W/AUTO DIFF WBC: CPT

## 2024-07-19 PROCEDURE — 1160F RVW MEDS BY RX/DR IN RCRD: CPT | Performed by: FAMILY MEDICINE

## 2024-07-19 PROCEDURE — 1157F ADVNC CARE PLAN IN RCRD: CPT | Performed by: FAMILY MEDICINE

## 2024-07-19 PROCEDURE — 2500000004 HC RX 250 GENERAL PHARMACY W/ HCPCS (ALT 636 FOR OP/ED): Mod: JZ,EC

## 2024-07-19 PROCEDURE — 3078F DIAST BP <80 MM HG: CPT | Performed by: INTERNAL MEDICINE

## 2024-07-19 PROCEDURE — 1126F AMNT PAIN NOTED NONE PRSNT: CPT | Performed by: INTERNAL MEDICINE

## 2024-07-19 PROCEDURE — 3074F SYST BP LT 130 MM HG: CPT | Performed by: FAMILY MEDICINE

## 2024-07-19 RX ORDER — ALBUTEROL SULFATE 0.83 MG/ML
3 SOLUTION RESPIRATORY (INHALATION) AS NEEDED
OUTPATIENT
Start: 2024-07-22

## 2024-07-19 RX ORDER — DIPHENHYDRAMINE HYDROCHLORIDE 50 MG/ML
50 INJECTION INTRAMUSCULAR; INTRAVENOUS AS NEEDED
OUTPATIENT
Start: 2024-07-22

## 2024-07-19 RX ORDER — EPINEPHRINE 0.3 MG/.3ML
0.3 INJECTION SUBCUTANEOUS EVERY 5 MIN PRN
OUTPATIENT
Start: 2024-07-22

## 2024-07-19 RX ORDER — VALACYCLOVIR HYDROCHLORIDE 1 G/1
1000 TABLET, FILM COATED ORAL 2 TIMES DAILY
COMMUNITY

## 2024-07-19 RX ORDER — NITROGLYCERIN 0.4 MG/1
0.4 TABLET SUBLINGUAL EVERY 5 MIN PRN
Qty: 30 TABLET | Refills: 1 | Status: SHIPPED | OUTPATIENT
Start: 2024-07-19

## 2024-07-19 RX ORDER — FAMOTIDINE 10 MG/ML
20 INJECTION INTRAVENOUS ONCE AS NEEDED
OUTPATIENT
Start: 2024-07-22

## 2024-07-19 ASSESSMENT — PATIENT HEALTH QUESTIONNAIRE - PHQ9
SUM OF ALL RESPONSES TO PHQ9 QUESTIONS 1 AND 2: 0
2. FEELING DOWN, DEPRESSED OR HOPELESS: NOT AT ALL
1. LITTLE INTEREST OR PLEASURE IN DOING THINGS: NOT AT ALL

## 2024-07-19 ASSESSMENT — PAIN SCALES - GENERAL: PAINLEVEL: 0-NO PAIN

## 2024-07-19 NOTE — PROGRESS NOTES
Subjective   Patient ID: Zack Medrano is a 88 y.o. male who presents for follow up to ED  visit for shingles on 7/15/2024. Patient was prescribed medication. Patient states it is not itchy at all. Needs refill on the Nitrostat.     HPI     Review of Systems    Objective   There were no vitals taken for this visit.    Physical Exam    Assessment/Plan

## 2024-07-19 NOTE — PROGRESS NOTES
Subjective   Zack Medrano is a 88 y.o. male who presents for No chief complaint on file..  Here for follow up recent ER visit for rash - diagnosed with shingles on 7/15 and treated with valtrex. The rash has not gotten any worse, may be getting better.  He denies any pain, itching, etc.              Objective   Visit Vitals  /52 (BP Location: Left arm, Patient Position: Sitting, BP Cuff Size: Adult)   Pulse 69      Physical Exam  Vitals reviewed.   Constitutional:       General: He is not in acute distress.  Cardiovascular:      Rate and Rhythm: Normal rate.   Pulmonary:      Effort: Pulmonary effort is normal. No respiratory distress.   Skin:     General: Skin is warm and dry.      Comments: Scattered patches of erythema over both lower extremities.  No vesicles, no open areas/evidence of secondary infection.     Neurological:      General: No focal deficit present.      Mental Status: He is alert. Mental status is at baseline.         Assessment/Plan   Problem List Items Addressed This Visit       S/P coronary artery stent placement    Relevant Medications    nitroglycerin (Nitrostat) 0.4 mg SL tablet    Class 2 severe obesity with serious comorbidity and body mass index (BMI) of 36.0 to 36.9 in adult (Multi)     Other Visit Diagnoses       Disseminated herpes zoster    -  Primary               Fabiola Deitz MD

## 2024-07-19 NOTE — PROGRESS NOTES
Patient ID:Zack Medrano is a 88 y.o. year old male patient with  myelodysplasia         Referring Physician: Ayah Carmichael MD  [address] [place] [address] [address]  [place] [address]  Primary Care Provider: Fabiola Dietz MD    Chief Complaint  Chief Complaint   Patient presents with    Anemia          History of the Present Illness  10/9/2019.  Visit with Dr. Mickey Nguyen for chronic kidney disease and hypertension.  Chief complaint was fatigue and need to lose weight     11/12/2019.  The patient has had long-term history of microcytic anemia going back to at least 2019 at which time his white count was 5.2 hemoglobin was 13.8 hematocrit 40.5 and a platelet count of 157,000.  His MCV was 105 MCHC was 34.  White blood cell differential count showed 66% polys, 17 lymphs, 11 monos and 4 eosinophils.     12/11/2019.  Seen by primary care for probable shingles treated with Valtrex with chief complaint of waxing and waning left flank pain radiating up under his ribs, somewhat responsive to exercise..  He had a history of pulmonary embolus and was on Eliquis long-term.     2/24/2020.  Seen by Dr. Howell for elevated PSA, BPH and erectile dysfunction.  PSA in 2017 was 2.94, in 2018 it was 2.74 and in 2020 it was 4.03.  We discussed biopsy and the patient wanted to have this followed and not biopsy.     3/9/2020.  Follow-up with NATALY Pulido primary care.  Referred to Dr. Bahena for colonoscopy and CT scan of the chest was ordered for follow-up of questionable lingular nodule.     3/11/2020.  CT scan of the chest showed a stable 6 mm nodular lesion near the fissure in the lingula.     3/13/2020.  Seen by Dr. Bahena who noted that the patient had had diarrhea off-and-on since cholecystectomy and had intermittent left-sided pain.  He had a history of colon polyps.     4/22/2015.  Colonoscopy showed that he had tubular adenoma     5/13/2020.  Seen by Dr. Migdalia New in follow-up.  She noted that he had an  abnormal CBC with macrocytic indices with a normal B12 level.  The patient denies alcohol use.  Hemoglobin A1c was 6.2.  BUN was 33 creatinine was 1.74.     8/31/2020.  Follow-up with Dr. Howell.  PSA had come down from 4.03-3.77     1/4/2021 through 1/12/2021.  Admitted to the hospital having been brought to the ED by squad after he fell forward on his toilet and was unable to get up.  He denied hitting his head or losing consciousness.  He blamed it on having back pain and shoulder pain for several weeks.  He was febrile with a temperature of 100.9 and hypoxic with pulse ox of 86% on room air.  Respirations were 24.  He was positive for COVID and was treated with intravenous antibiotics and remdesivir, increased supplemental oxygen.  He was able to be discharged to rehabilitation.     Repeat CBC showed white count 4.3 with a hemoglobin 12.9 hematocrit 38.8 with an MCV of 102 and a platelet count of 173,000.  White blood cell differential count showed minimal absolute lymphocytopenia at 0.6 with the lower limits of normal being 0.8.     3/1/2021.  Follow-up with Dr. Howell.  PSA was 9.9 in February.  Decided to have prostate biopsy done on 3/22/2021.  Results showed BPH.     10/6/2021.  Follow-up with Dr. Howell.  PSA in September was 2.77     4/6/2022.  CBC showed white count 4.3 with hemoglobin 11.7 hematocrit 34.4 with an MCV of 107.  Platelet count was 153,000.  White blood cell differential count was essentially normal.     5/31/2022.  CBC showed white count 3.6 with hemoglobin 11.6 hematocrit 34.4 and platelet count 117,000 with a normal white blood cell differential.     6/27/2022.  Referred to Dr. Dash because of pancytopenia by NATALY Gray.  CBC showed a white count of 3.9 with hemoglobin 12.3 hematocrit 36.1 and platelet count 129,000 with a normal differential.  PSA was 3.26.     The patient said that he felt well except for having increased fatigue.  He continued on long-term anticoagulation.  He was  able to carry out his ADLs.  He did complain of easy bruising.  He said that he had worked at [employer] for 12 years and also worked in making [employer].  Dr. Dash felt that there was a large component secondary to his chronic kidney disease.  There was no evidence of nutritional deficiency.  He checked him for hemolysis and monoclonal gammopathy and ordered an ultrasound for hepatosplenomegaly and testing for hepatitis, HIV and CHRISTINA.  He said he would rather observe the patient rather than pursuing a bone marrow biopsy.     7/27/2022.  Repeat lab data showed white count 3.9 with hemoglobin 12.3 hematocrit 36.1 and platelet count 129,000.  BUN was 36 creatinine was 1.78.  He had mild elevation of AST.  Retake count was 2.3%.  Flow cytometry showed a small clonal B-cell population.  Hepatitis B was nonreactive.  Hep C testing was negative.  Serum protein electrophoresis was normal.  Haptoglobin was normal.  LDH was normal.  He said that the possibility of MDS could not be ruled out.  Ultrasound showed fatty liver but no splenomegaly.  He plan for EPO level and NGS for myeloid mutation.     9/1/2022.  Started on allopurinol for hyperuricemia.     10/12/2022.  Seen by Dr. Dr. Dietz.  Patient was referred to dermatology for several skin lesions.  He was referred to neurology because of his tremor that was not improving.  She increased his glimepiride.     10/17/2022.  CBC showed white count 3.0 with hemoglobin 10.5 hematocrit 31.3 and a platelet count of 106,000 MCV was 111.     10/26/2022.  Follow-up with Dr. 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  He was suspicious for MDS.  He referred him for a bone marrow biopsy.     12/1/2022.  Follow-up with Dr. Dash.  Bone marrow biopsy showed low-grade MDS.  R IPSS score was low or very low depending on a karyotype which was still pending.  We discussed initiating erythropoietin with Aranesp but the patient deferred and the  decision was made to monitor him.     1/11/2023.  CBC showed white count 3.4 with hemoglobin 10.3 hematocrit 32.2 and a platelet count of 137,000.  MCV was 115.  White blood cell differential count was normal.     1/13/2023.  Follow-up with Dr. Dash.  The patient did say that he had following getting out of the shower but did not sustain any injuries.  He had been started by Dr. Nguyen on insulin for his diabetes which was helpful.  Patient continued to be active inside his house.  He remains on Eliquis.  BR-IPSS score is very low.  Counts remain stable.     3/31/2023.  CBC showed a white count of 2.6 with hemoglobin 8.7 hematocrit 27.1 and a platelet count 97,000.  MCV was 115.  White blood cell differential count showed that he had an absolute neutrophil count of 1.36.     4/10/2023.  CBC showed a white count of 2.5 with a hemoglobin 9.0 hematocrit of 27.7 with a platelet count of 92,000.  MCV was 114.     4/17/2023.  Follow-up with NATALY Gray.  Even though his hemoglobin had dropped to 9 the patient did not want to start his erythropoietin injections.     5/11/2023.  CBC showed white count of 2.5 with a hemoglobin of 7.6 hematocrit 23.8 and a platelet count of 101,000.  Absolute neutrophil count was 1.17.     5/15/2023 through 5/19/2023.  Admitted to the hospital with anemia and generalized weakness black stools dizziness with a hemoglobin of 6.7.  His BUN was 51 creatinine was 2.08.  CT scan of the abdomen showed no acute process.  There were findings consistent with atypical healing of the previous rib fracture with question Paget's disease although it was nonspecific.  He was transfused 2 units packed red blood cells.  EGD found 3 erosions in the cardia.  He was prescribed Carafate.     5/24/2023.  Follow-up with Dr. Dr. Dietz after discharge.  CBC showed white count 1.9 with hemoglobin 8.6 hematocrit 28.3 and a platelet count of 140,000.  Absolute neutrophil count was 1.06.  Patient did not  "tolerate Carafate and stopped it.  He was prescribed oral iron twice a day.  He had not been prescribed a PPI which was then started.     6/1/2023.  CBC showed white count of 1.9 with hemoglobin 7.7 hematocrit 24.8 and a platelet count of 87,000.     6/13/2023.  CBC showed a white count of 1.9 with hemoglobin of 8.7 hematocrit 28.1 and platelet count of 89,000.     6/23/2023.  Followed up with Dr. Dr. Dietz who reported that he was hospitalized again in [place] for GI bleed.  At that time his Eliquis was discontinued and he was changed to Protonix and taken off his baby aspirin.  Once again he was started on Carafate but did not tolerate it.     7/15/2023.  CBC showed white count of 2.4 with hemoglobin 9.2 hematocrit 27.3 and a platelet count of 110,000.  Absolute neutrophil count was 1.36.     7/17/2023.  Follow-up with Malorie Shaffer.  The patient had recently had a colonoscopy and had \"cauterizations\" he noted that he had dyspnea with exertion but no resting shortness of breath.  He was on prednisone at that time.  His sugars were in the 300s.  He was on insulin.  His neuropathy has not changed and he continues to have tremors.  Current level was found to be saturated.  Once again the patient was reluctant to start NING.     8/14/2023.  Follow-up with Malorie Shaffer.  CBC showed white count 1.8 with a hemoglobin 8.9 hematocrit 26.9 and a platelet count of 100,000.  Absolute neutrophil count was 0.96.     9/6/2023.  CBC showed a white count of 1.8 with hemoglobin 9.5 hematocrit 28.8 and a platelet count of 85,000 with an absolute neutrophil count of 0.99.  9/13/2023.  Follow-up with Dr. Howell.  PSA has gone down to 1.87.     10/9/2023.  Follow-up with Malorie Shaffer who noted the patient remained on oxygen and CPAP.  He says that he is awakening in the middle of the night around 3 to 4:00 in the morning.  Sugars have been in the 300s.  Neuropathy was unchanged.  He had been started on erythropoietin every 3 weeks   "   11/21/2023.  Follow-up with Malorie Shaffer.  No improvement in his hemoglobin.  White count had improved to 2.1 platelets were baseline.  He had been placed on 40 mg of prednisone and continued on erythropoietin 300 mcg every 3 weeks.     1/2/2024.  Follow-up with Malorie Shaffer.  No improvement in hemoglobin.  Symptomatically he was doing well.  Plan was to increase frequency of his darbepoetin to every 2 weeks.     2/13/2024.  Follow-up with Malorie Shaffer.  Now on erythropoietin injections every 2 weeks.  There was a discussion concerning repeat bone marrow biopsy with counts stayed low.     3/12/2024.  Follow-up with Malorie Shaffer.  No improvement in his hemoglobin.  Patient was agreeable to repeat bone marrow biopsy.  She also ordered a CAT scan of the chest abdomen and pelvis without contrast.     3/22/2024.  CAT scan showed severe coronary artery calcifications.  There is evidence of bronchiectasis in the lower lobes increased compared to previous CT scan from 2020.     Predominantly groundglass opacities with bibasilar prominence.  There is some subpleural sparing of the left upper lobe.  There is a 3 mm nodule in the right upper lobe which was unchanged for several years.  Multilevel anterior bridging osteophytes were noted consistent with DISH.  There is a small fat-containing periumbilical hernia and bilateral inguinal hernias.  Spleen size is at the upper limits of normal being 12.7 cm in length slightly increased when compared to study from 2019.  Cysts were seen in the kidneys with diffuse cortical atrophy and a 4 mm hyperdense focus in the interpolar region of the right kidney possibly a stone.  Prostatism was noted for gland measuring 6.2 cm.     3/28/2024.  Seen by Dr. Graham for shortness of breath.  He felt the patient had possible chronic interstitial lung disease versus periodic aspiration, hypoxia dyspnea on exertion.  Further tests were ordered including high-resolution CT scan, complete pulmonary  function testing, simple pulmonary stress test and follow-up.     4/9/2024.  Follow-up with Malorie Shaffer.  Erythropoietin injections continued every 2 weeks which the patient was tolerating well.     4/16/2024.  Bone marrow biopsy was performed showing a hypercellular bone marrow at 50% with dyserythropoiesis.  There are 1% blasts consistent with persistent myeloid neoplasm.  Flow cytometry showed a small clonal CD5 negative, CD10 negative B-cell population and a polytypic background.  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     There was a very small abnormal T-cell population that could be incidental.  Next generation sequencing showed 2 U2AF 1 variants, 1 of which was negative.  The other had been previously seen.     The marrow also showed dyserythropoiesis with bilobate forms and forms with irregular nuclear contours as well as blebbing.     4/23/2024.  CBC showed white count 1.5 with a hemoglobin of 8.3 hematocrit 25.9 and platelet count of 71,000 with 37% neutrophils, 53 lymphocytes, 4 monos, 4 eosinophils with an absolute neutrophil count of 0.57.     4/24/2024.  Return visit with Dr. Graham.  He noted that the high-resolution CT scan showed no significant change from the previous CT scan the month before.  Pulmonary function testing showed no response to bronchodilator and there was mild restrictive change with some suggestion of airway inflammation.  The patient had difficulty doing the test because of persistent coughing.  6-minute walk test showed that the patient was able to maintain saturations at 93% with 2 L of oxygen per minute continuously.  The test was terminated because of dyspnea.  With his hypoxia on room air it was recommended that he be on oxygen continuously to keep his saturation greater than or equal to 92%.  He continued on oxygen with his  CPAP at 3 L at nighttime.     5/10/2024.  This is my first visit with Mr. Medrano and his family.  We went over some of the results of the bone marrow biopsy.  I told him that I would have to wait until the chromosomal analysis returned.  I also note the possibility of low-grade lymphoma.  He may need to have an opinion with malignant hematology at [facility].  Stepdaughter who is here with him and his wife today is his advocate for maintaining his quality of life.  She is an employee at a local nursing facility and is concerned about many clients she has who seem to be getting treated aggressively.  We talked about myelodysplasia and the fact that he is not responding to erythropoietin.  He will continue on this treatment for now.  He is low counts were discussed and he knows that he is at higher risk for infection and bleeding.  He continues on oxygen supplementation continuously.  They will return in 2 weeks at which time laboratory tests will be performed.     He just had a lesion removed from the dorsum of his right hand at the base of his first and second fingers.  Stitches are still in place.  It seems to be healing well without any significant bleeding or infection.  He was told that it looked benign.     5/13/2024.  Follow-up with Dr. Aggie Danielle of cardiology who saw him in follow-up of his coronary artery disease with multiple stents having been placed, hypertension, dyslipidemia and previous pulmonary embolus related to COVID-19.  She had stopped his Eliquis.  She noted his myelodysplasia.  She noted that at some point his aspirin had been stopped but given his history of stents this was restarted and he tolerated it well.  She said that his blood pressure was well-controlled.  She noted blood sugars were between 150-2 50.  She noted the patient was supposed to be using oxygen with exertion but the patient was not sure what rate it was supposed to be.  She says that she would not stop his aspirin unless his  platelets were less than 50,000 with complications of bleeding.  She said it was very important to continue aspirin given a 15% chance of in-stent thrombosis with aspirin cessation.  She increased her furosemide to 80 mg twice a day because of bilateral lower extremity edema and spironolactone to 25 mg twice daily until symptoms improve and encouraged him to cut back on his salt intake.  She was see him again in about 9 months.     5/14/2024.  Follow-up with Dr. Santamaria of ophthalmology who started him on Naphcon-A drops 1 drop 3 times a day in both eyes for excessive tearing     5/15/2024.  Followed up with Dr. Dimas Renteria of podiatry who noted decreased pedal pulses 2 out of 4.  He had footcare.     5/24/2024.  Follow-up with Dr. Graham.  Review of his x-rays showed no change.  Physical examination showed decreased breath sounds but no inspiratory crackles that were previously heard.  He felt that the interstitial lung disease was stable and that his hypoxemia was improved on oxygen.  He plan to repeat CT scan in September.     5/24/2024.  He is here to follow-up on his bone marrow results.  I told him that the chromosomal analysis would not be forthcoming before because the specimen failed to grow in culture.  He continues to display a low-grade MDS with blast count of only 2%.  We reviewed the fact that he is recommended to have at least 6 months of his erythropoietin before we decide whether or not it is a failure.  He is agreeable to this.  We will increase his dose of darbepoetin.  He knows to be on the look out for any signs of infection or bleeding and to report this if it happens.  We reviewed his most recent CBC.  We will see him again in 2 weeks with his next injection.     5/30/2024.  Follow-up with Dr. Dr. Dietz for his Medicare wellness visit.  His magnesium dose was changed to twice daily indefinitely.     6/7/2024.  He is here today for his darbepoetin injection.  We have increased his dose  to 500 mcg every 2 weeks.  Will see if this has any effect.  Laboratory dated today shows that his sugar is 290 and his sodium is 134.  BUN is 46 and creatinine is 2.31.  CBC shows white count 1.5 with a hemoglobin 8.1 hematocrit 24.7 and a platelet count of 71,000 which is very much like his most recent CBC.  His absolute neutrophil count is 0.57.     I talked to him about his current status which he feels is fairly stable.  He is off oxygen.  He has had no bleeding and no signs of infection.  He understands that we have increased his dose of darbepoetin and we will watch his counts.  He will report any side effects.    Interval Note    He has continued to receive his darbepoetin injections.    7/15/2024.  Emergency department visit [place].  He developed a rash on both lower extremities which started a week ago which is an erythematous papular rash.  He denied any pain or itching.  He did not have any vesicle formation.  It is bilateral.  He has had a shingles injection.  He was told that he had disseminated zoster and was started on valacyclovir a gram twice daily for 7 days.    7/19/2024.  Seen by Dr. Dr. Dietz who said that the rash has not gotten any worse and might be a little bit better.  She noted that there were no vesicles no open areas no areas of secondary infection.    7/19/2024.  He is here to receive his erythropoietin injection and to have a blood count.  I told him it is not likely that this is disseminated zoster because it is nowhere else on his body but only on his lower extremities.  This could be stasis dermatitis.  I do not see any signs of vesicle formation and there never were any.  It is mostly on the anterior shins.  It is all below the knee.    CBC today shows white count 1.4 with hemoglobin 8 hematocrit 25.4 platelet count 67,000 with 37 neutrophils 53 lymphocytes 5 monos 2 eosinophils.  He is serum chemistry showed a sugar of 285 BUN of 49 creatinine of 2.67.  We told  him he needed to keep his hydration status high.  He will continue with his injections today.  Blood pressure today is 153/64.    He has had no bleeding and no infection.  He has had no chest pain or pressure.  He is able to participate in his usual activities including mowing the yard.    Comorbidities  GERD  Arthritis, DJD  BPH  Coronary artery disease with history of myocardial, status post stent infarction  History of dumping syndrome  Hyperlipidemia  History of skin cancer  Obesity  Obstructive sleep apnea on CPAP  Peripheral arterial disease  CKD 3  Diabetes with nephropathy  History of cholecystectomy and hernia repair  Essential tremor  Hypertension   Numbness in his fingers felt to be related to cervical disc disease.    Monitoring Parameters  Histories, physical examinations and CBCs with occasional bone marrow biopsies.     Review of Systems - Oncology   Review of Systems   Constitutional:  Positive for fatigue. Negative for appetite change and unexpected weight change.   HENT:  Negative for dental problem, rhinorrhea and trouble swallowing.    Eyes:  Negative for visual disturbance.   Respiratory:  Positive for shortness of breath. Negative for cough.    Cardiovascular:  Positive for leg swelling.   Gastrointestinal:  Negative for abdominal pain, constipation, diarrhea, nausea and vomiting.   Endocrine: Negative for polyuria.   Genitourinary:  Negative for dysuria, frequency and urgency.   Musculoskeletal:  Positive for arthralgias and myalgias.   Skin:  Positive for rash (lower part of both legs).   Neurological:  Negative for weakness, light-headedness, numbness and headaches.   Hematological:  Bruises/bleeds easily.   Psychiatric/Behavioral:  Negative for self-injury, sleep disturbance and suicidal ideas. The patient is not nervous/anxious.         Past Medical History  Past Medical History:   Diagnosis Date    Abnormal levels of other serum enzymes     Abnormal liver enzymes    Dependence on other  enabling machines and devices     CPAP (continuous positive airway pressure) dependence    Encounter for examination of eyes and vision without abnormal findings     Diabetic eye exam    Localized edema     Bilateral leg edema    Old myocardial infarction     History of myocardial infarction    Other conditions influencing health status 01/08/2020    Uncontrolled diabetes mellitus type 2 without complications    Pain in unspecified ankle and joints of unspecified foot     Arthralgia of ankle    Personal history of other diseases of the digestive system     History of gastroesophageal reflux (GERD)    Personal history of other diseases of the digestive system     History of dumping syndrome    Personal history of other endocrine, nutritional and metabolic disease 11/12/2020    History of type 2 diabetes mellitus    Presence of coronary angioplasty implant and graft     History of coronary artery stent placement    Rash and other nonspecific skin eruption 12/11/2019    Rash    Unspecified abdominal hernia without obstruction or gangrene     Hernia    Unspecified abdominal pain 05/19/2021    Abdominal pain, left lateral        Surgical History  Past Surgical History:   Procedure Laterality Date    OTHER SURGICAL HISTORY  10/09/2019    Arterial stent placement    OTHER SURGICAL HISTORY  10/09/2019    Cholecystectomy    OTHER SURGICAL HISTORY  10/09/2019    Hernia repair       Social History  Social History     Tobacco Use    Smoking status: Never    Smokeless tobacco: Never   Vaping Use    Vaping status: Never Used   Substance Use Topics    Alcohol use: Never    Drug use: Never        Family History  family history includes Colon cancer in his mother and another family member.         Current Medications  Current Outpatient Medications   Medication Instructions    allopurinol (ZYLOPRIM) 100 mg, oral, Daily    amLODIPine (NORVASC) 5 mg, oral, Daily before breakfast    ascorbic acid (VITAMIN C) 250 mg, oral    aspirin 81  "mg, oral, Daily    atorvastatin (LIPITOR) 40 mg, oral, Daily    blood-glucose sensor (FreeStyle Sindi 3 Sensor) device Use as directed    Constulose 10 gram/15 mL oral solution TAKE 45mL (30g) BY MOUTH TWICE DAILY FOR 30 DAYS    cyanocobalamin, vitamin B-12, (Vitamin B-12) 1,000 mcg tablet extended release 1 tablet, oral    furosemide (LASIX) 60 mg, oral, Daily    gabapentin (NEURONTIN) 300 mg, oral, 2 times daily    lancets 33 gauge misc miscellaneous, 2 times daily    Lantus U-100 Insulin 50 Units, subcutaneous, Nightly    metoprolol tartrate (LOPRESSOR) 50 mg, oral, 2 times daily    multivit-minerals/folic acid (CENTRUM ADULTS ORAL) oral    nitroglycerin (NITROSTAT) 0.4 mg, sublingual, Every 5 min PRN    NON FORMULARY [employer] test In vitro strip; Use as directed to check blood sugar  2 times daily     pantoprazole (PROTONIX) 40 mg, oral, 2 times daily    pen needle, diabetic (Pen Needle) 32 gauge x 5/32\" needle 1 each, miscellaneous, Daily    primidone (MYSOLINE) 50 mg, oral, Nightly    spironolactone (ALDACTONE) 25 mg, oral, Daily    sucralfate (CARAFATE) 1 g, oral, 3 times daily (morning, midday, late afternoon)    valACYclovir (VALTREX) 1,000 mg, oral, 2 times daily, Take for 7 days, starting 7/15/24    vitamins A,C,E-zinc-copper 2,148 mcg-113 mg-45 mg-17.4mg tablet oral, SUPER VIEW            OARRS Review  I have personally reviewed the OARRS report for Zack Medrano. I have considered the risks of abuse, dependence, addiction and diversion.  He continues on gabapentin.    Vital Signs  /64 (BP Location: Left arm, Patient Position: Sitting, BP Cuff Size: Adult)   Pulse 73   Temp 36 °C (96.8 °F) (Skin)   Resp 16   Wt 106 kg (232 lb 9.6 oz)   SpO2 92%   BMI 36.43 kg/m²      Physical Exam  Vitals and nursing note reviewed. Exam conducted with a chaperone present.   Constitutional:       General: He is not in acute distress.     Appearance: He is ill-appearing. He is not toxic-appearing.      " Comments: He is a well-developed well-nourished overweight elderly white male who is unaccompanied again today. He does not have his oxygen on.  He appears to be somewhat chronically ill and pale.  He is in good spirits.    HENT:      Head: Normocephalic and atraumatic.      Nose: Nose normal.      Mouth/Throat:      Mouth: Mucous membranes are moist.      Pharynx: Oropharynx is clear. No oropharyngeal exudate or posterior oropharyngeal erythema.   Eyes:      General: No scleral icterus.     Extraocular Movements: Extraocular movements intact.      Conjunctiva/sclera: Conjunctivae normal.      Pupils: Pupils are equal, round, and reactive to light.   Neck:      Comments: No significant lymphadenopathy is palpated in the head neck region, supraclavicular or axillary areas bilaterally.  Cardiovascular:      Rate and Rhythm: Normal rate and regular rhythm.      Heart sounds: Murmur (flow murmur) heard.   Pulmonary:      Effort: Pulmonary effort is normal. No respiratory distress.      Breath sounds: No wheezing, rhonchi or rales.   Abdominal:      General: There is no distension.      Palpations: Abdomen is soft. There is no mass.      Tenderness: There is no abdominal tenderness. There is no guarding or rebound.   Musculoskeletal:         General: Normal range of motion.      Cervical back: Normal range of motion. No rigidity or tenderness.      Right lower leg: Edema (2+ to his mid calf.) present.      Left lower leg: Edema (2+ to mid calf A little worse on the left.) present.   Lymphadenopathy:      Cervical: No cervical adenopathy.   Skin:     General: Skin is warm and dry.      Coloration: Skin is not jaundiced or pale.      Comments: He has lesions mostly on the anterior aspects of both shins.  They are not erythematous they are hypopigmented.  There are no vesicles.  He has no involvement by this rash anywhere else on his body.   Neurological:      General: No focal deficit present.      Mental Status: He is  alert and oriented to person, place, and time. Mental status is at baseline.      Motor: No weakness.      Gait: Gait normal.      Comments: He is hard of hearing    Psychiatric:         Mood and Affect: Mood normal.         Behavior: Behavior normal.         Thought Content: Thought content normal.         Judgment: Judgment normal.      Comments: He seems to be in good spirits.          Current Laboratory Data  Recent Results (from the past 168 hour(s))   Comprehensive Metabolic Panel    Collection Time: 07/19/24 12:59 PM   Result Value Ref Range    Glucose 285 (H) 74 - 99 mg/dL    Sodium 138 136 - 145 mmol/L    Potassium 4.6 3.5 - 5.3 mmol/L    Chloride 106 98 - 107 mmol/L    Bicarbonate 22 21 - 32 mmol/L    Anion Gap 15 10 - 20 mmol/L    Urea Nitrogen 49 (H) 6 - 23 mg/dL    Creatinine 2.67 (H) 0.50 - 1.30 mg/dL    eGFR 22 (L) >60 mL/min/1.73m*2    Calcium 9.0 8.6 - 10.3 mg/dL    Albumin 4.1 3.4 - 5.0 g/dL    Alkaline Phosphatase 100 33 - 136 U/L    Total Protein 6.2 (L) 6.4 - 8.2 g/dL    AST      Bilirubin, Total 1.1 0.0 - 1.2 mg/dL    ALT 18 10 - 52 U/L   CBC and Auto Differential    Collection Time: 07/19/24 12:59 PM   Result Value Ref Range    WBC 1.4 (L) 4.4 - 11.3 x10*3/uL    nRBC 1.4 (H) 0.0 - 0.0 /100 WBCs    RBC 2.32 (L) 4.50 - 5.90 x10*6/uL    Hemoglobin 8.0 (L) 13.5 - 17.5 g/dL    Hematocrit 25.4 (L) 41.0 - 52.0 %     (H) 80 - 100 fL    MCH 34.5 (H) 26.0 - 34.0 pg    MCHC 31.5 (L) 32.0 - 36.0 g/dL    RDW 20.9 (H) 11.5 - 14.5 %    Platelets 67 (L) 150 - 450 x10*3/uL    Neutrophils % 37.6 40.0 - 80.0 %    Immature Granulocytes %, Automated 1.4 (H) 0.0 - 0.9 %    Lymphocytes % 53.2 13.0 - 44.0 %    Monocytes % 5.0 2.0 - 10.0 %    Eosinophils % 2.8 0.0 - 6.0 %    Basophils % 0.0 0.0 - 2.0 %    Neutrophils Absolute 0.53 (L) 1.60 - 5.50 x10*3/uL    Immature Granulocytes Absolute, Automated 0.02 0.00 - 0.50 x10*3/uL    Lymphocytes Absolute 0.75 (L) 0.80 - 3.00 x10*3/uL    Monocytes Absolute 0.07 0.05 -  0.80 x10*3/uL    Eosinophils Absolute 0.04 0.00 - 0.40 x10*3/uL    Basophils Absolute 0.00 0.00 - 0.10 x10*3/uL          Recent Imaging  No results found.      Pathology  Low-grade myelodysplasia        Performance Status:  Symptomatic; fully ambulatory    Assessment/Plan    1.  Pancytopenia.  The patient has unusual bone marrow findings.  Chromosomal analysis will not be done due to failure of the specimen to grow.  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  He also has components of myelodysplasia.  He is not responding to erythropoietin.  We will increase his dose to 500 mcg every other week.  He continues to be at high risk for bleeding and infection with a low ANC and low platelet count.  He has had previous GI bleeds.  He is currently back on his  aspirin per follow-up with Dr. Danielle for fear of clotting off his stents.  He is off Eliquis.  He will get his treatment today with darbepoetin and continue every 14 days.  .     2.  Multiple comorbidities.  These include but are not limited to:  GERD  Arthritis, DJD  BPH  Coronary artery disease with history of myocardial, status post stent infarction  History of dumping syndrome  Hyperlipidemia  History of skin cancer  Obesity  Obstructive sleep apnea on CPAP  Peripheral arterial disease  CKD 3  Diabetes with nephropathy  History of cholecystectomy and hernia repair  Essential tremor  Hypertension   Numbness in his fingers felt to be related to cervical disc disease.    3.  Rash on his legs.  I think this looks more like stasis dermatitis than disseminated zoster.  If it were disseminated zoster he would have it on other areas of his body.        He knows that he should call in the interim should he have any change in his status, questions or concerns.         (This note was generated with voice recognition software and may contain errors including spelling, grammar, syntax and misrecognition of what was dictated, that are not fully  corrected)          Ayah Carmichael MD

## 2024-07-19 NOTE — PROGRESS NOTES
Labs drawn on arrival and sent to lab- had injection today after his visit  8/2 1pm stat lab and injection  8/16 1pm stat lab and injection  8/30 1230 stat lab at clinic            1pm MD anderson and 130 injection to follow at visit  No changes made  Reviewed AVS with patient- patient verbalizes understanding

## 2024-07-19 NOTE — PATIENT INSTRUCTIONS
Reviewed labs and recent medical history.  Discussed his rash on his legs. Not sure that it is shingles. It does not hurt nor does it itch. Will continue to monitor for changes.  Encouraged him to use his support stockings and keep feet elevated.  Okay for his Injection today, 7/19/24  Return to see MD in 6 weeks. Continue on his 2 week injection.

## 2024-07-21 ASSESSMENT — ENCOUNTER SYMPTOMS
COUGH: 0
CONSTIPATION: 0
HEADACHES: 0
MYALGIAS: 1
RHINORRHEA: 0
ARTHRALGIAS: 1
BRUISES/BLEEDS EASILY: 1
SLEEP DISTURBANCE: 0
NUMBNESS: 0
TROUBLE SWALLOWING: 0
WEAKNESS: 0
NERVOUS/ANXIOUS: 0
SHORTNESS OF BREATH: 1
DYSURIA: 0
UNEXPECTED WEIGHT CHANGE: 0
ABDOMINAL PAIN: 0
FREQUENCY: 0
DIARRHEA: 0
FATIGUE: 1
VOMITING: 0
APPETITE CHANGE: 0
LIGHT-HEADEDNESS: 0
NAUSEA: 0

## 2024-08-02 ENCOUNTER — APPOINTMENT (OUTPATIENT)
Dept: HEMATOLOGY/ONCOLOGY | Facility: CLINIC | Age: 89
End: 2024-08-02
Payer: MEDICARE

## 2024-08-05 ENCOUNTER — INFUSION (OUTPATIENT)
Dept: HEMATOLOGY/ONCOLOGY | Facility: CLINIC | Age: 89
End: 2024-08-05
Payer: MEDICARE

## 2024-08-05 VITALS
WEIGHT: 229.72 LBS | HEART RATE: 61 BPM | DIASTOLIC BLOOD PRESSURE: 58 MMHG | TEMPERATURE: 97.9 F | RESPIRATION RATE: 18 BRPM | BODY MASS INDEX: 35.98 KG/M2 | OXYGEN SATURATION: 92 % | SYSTOLIC BLOOD PRESSURE: 131 MMHG

## 2024-08-05 DIAGNOSIS — D46.9 MYELODYSPLASTIC SYNDROME, UNSPECIFIED (MULTI): ICD-10-CM

## 2024-08-05 DIAGNOSIS — D61.818 OTHER PANCYTOPENIA (MULTI): ICD-10-CM

## 2024-08-05 DIAGNOSIS — N18.32 ANEMIA DUE TO STAGE 3B CHRONIC KIDNEY DISEASE (MULTI): ICD-10-CM

## 2024-08-05 DIAGNOSIS — D70.9 NEUTROPENIA, UNSPECIFIED TYPE (CMS-HCC): ICD-10-CM

## 2024-08-05 DIAGNOSIS — D72.819 LEUKOPENIA, UNSPECIFIED TYPE: ICD-10-CM

## 2024-08-05 DIAGNOSIS — D69.6 THROMBOCYTOPENIA (CMS-HCC): ICD-10-CM

## 2024-08-05 DIAGNOSIS — D63.1 ANEMIA DUE TO STAGE 4 CHRONIC KIDNEY DISEASE (MULTI): ICD-10-CM

## 2024-08-05 DIAGNOSIS — D63.1 ANEMIA DUE TO STAGE 3B CHRONIC KIDNEY DISEASE (MULTI): ICD-10-CM

## 2024-08-05 DIAGNOSIS — N18.4 STAGE 4 CHRONIC KIDNEY DISEASE (MULTI): ICD-10-CM

## 2024-08-05 DIAGNOSIS — N18.4 ANEMIA DUE TO STAGE 4 CHRONIC KIDNEY DISEASE (MULTI): ICD-10-CM

## 2024-08-05 LAB
ALBUMIN SERPL BCP-MCNC: 4 G/DL (ref 3.4–5)
ALP SERPL-CCNC: 92 U/L (ref 33–136)
ALT SERPL W P-5'-P-CCNC: 20 U/L (ref 10–52)
ANION GAP SERPL CALC-SCNC: 13 MMOL/L (ref 10–20)
AST SERPL W P-5'-P-CCNC: 23 U/L (ref 9–39)
BASOPHILS # BLD AUTO: 0.01 X10*3/UL (ref 0–0.1)
BASOPHILS NFR BLD AUTO: 0.9 %
BILIRUB SERPL-MCNC: 1 MG/DL (ref 0–1.2)
BUN SERPL-MCNC: 43 MG/DL (ref 6–23)
CALCIUM SERPL-MCNC: 8.5 MG/DL (ref 8.6–10.3)
CHLORIDE SERPL-SCNC: 107 MMOL/L (ref 98–107)
CO2 SERPL-SCNC: 23 MMOL/L (ref 21–32)
CREAT SERPL-MCNC: 2.33 MG/DL (ref 0.5–1.3)
DACRYOCYTES BLD QL SMEAR: NORMAL
EGFRCR SERPLBLD CKD-EPI 2021: 26 ML/MIN/1.73M*2
EOSINOPHIL # BLD AUTO: 0.04 X10*3/UL (ref 0–0.4)
EOSINOPHIL NFR BLD AUTO: 3.4 %
ERYTHROCYTE [DISTWIDTH] IN BLOOD BY AUTOMATED COUNT: 20.1 % (ref 11.5–14.5)
GLUCOSE SERPL-MCNC: 331 MG/DL (ref 74–99)
HCT VFR BLD AUTO: 25.9 % (ref 41–52)
HGB BLD-MCNC: 8.3 G/DL (ref 13.5–17.5)
HYPOCHROMIA BLD QL SMEAR: NORMAL
IMM GRANULOCYTES # BLD AUTO: 0 X10*3/UL (ref 0–0.5)
IMM GRANULOCYTES NFR BLD AUTO: 0 % (ref 0–0.9)
LYMPHOCYTES # BLD AUTO: 0.62 X10*3/UL (ref 0.8–3)
LYMPHOCYTES NFR BLD AUTO: 53.4 %
MCH RBC QN AUTO: 34.9 PG (ref 26–34)
MCHC RBC AUTO-ENTMCNC: 32 G/DL (ref 32–36)
MCV RBC AUTO: 109 FL (ref 80–100)
MONOCYTES # BLD AUTO: 0.07 X10*3/UL (ref 0.05–0.8)
MONOCYTES NFR BLD AUTO: 6 %
NEUTROPHILS # BLD AUTO: 0.42 X10*3/UL (ref 1.6–5.5)
NEUTROPHILS NFR BLD AUTO: 36.3 %
NRBC BLD-RTO: 0 /100 WBCS (ref 0–0)
OVALOCYTES BLD QL SMEAR: NORMAL
PLATELET # BLD AUTO: 51 X10*3/UL (ref 150–450)
POTASSIUM SERPL-SCNC: 4.6 MMOL/L (ref 3.5–5.3)
PROT SERPL-MCNC: 6.1 G/DL (ref 6.4–8.2)
RBC # BLD AUTO: 2.38 X10*6/UL (ref 4.5–5.9)
RBC MORPH BLD: NORMAL
SCHISTOCYTES BLD QL SMEAR: NORMAL
SODIUM SERPL-SCNC: 138 MMOL/L (ref 136–145)
WBC # BLD AUTO: 1.2 X10*3/UL (ref 4.4–11.3)

## 2024-08-05 PROCEDURE — 85025 COMPLETE CBC W/AUTO DIFF WBC: CPT

## 2024-08-05 PROCEDURE — 80053 COMPREHEN METABOLIC PANEL: CPT

## 2024-08-05 PROCEDURE — 2500000004 HC RX 250 GENERAL PHARMACY W/ HCPCS (ALT 636 FOR OP/ED): Mod: JZ,EC

## 2024-08-05 PROCEDURE — 96372 THER/PROPH/DIAG INJ SC/IM: CPT

## 2024-08-05 RX ORDER — EPINEPHRINE 0.3 MG/.3ML
0.3 INJECTION SUBCUTANEOUS EVERY 5 MIN PRN
OUTPATIENT
Start: 2024-08-16

## 2024-08-05 RX ORDER — DIPHENHYDRAMINE HYDROCHLORIDE 50 MG/ML
50 INJECTION INTRAMUSCULAR; INTRAVENOUS AS NEEDED
OUTPATIENT
Start: 2024-08-16

## 2024-08-05 RX ORDER — ALBUTEROL SULFATE 0.83 MG/ML
3 SOLUTION RESPIRATORY (INHALATION) AS NEEDED
OUTPATIENT
Start: 2024-08-16

## 2024-08-05 RX ORDER — FAMOTIDINE 10 MG/ML
20 INJECTION INTRAVENOUS ONCE AS NEEDED
OUTPATIENT
Start: 2024-08-16

## 2024-08-05 ASSESSMENT — PAIN SCALES - GENERAL: PAINLEVEL: 0-NO PAIN

## 2024-08-06 DIAGNOSIS — D64.9 ANEMIA, UNSPECIFIED TYPE: ICD-10-CM

## 2024-08-06 DIAGNOSIS — K25.9 GASTRIC ULCER, UNSPECIFIED CHRONICITY, UNSPECIFIED WHETHER GASTRIC ULCER HEMORRHAGE OR PERFORATION PRESENT: ICD-10-CM

## 2024-08-06 RX ORDER — PANTOPRAZOLE SODIUM 40 MG/1
40 TABLET, DELAYED RELEASE ORAL 2 TIMES DAILY
Qty: 90 TABLET | Refills: 1 | Status: SHIPPED | OUTPATIENT
Start: 2024-08-06 | End: 2025-08-06

## 2024-08-16 ENCOUNTER — INFUSION (OUTPATIENT)
Dept: HEMATOLOGY/ONCOLOGY | Facility: CLINIC | Age: 89
End: 2024-08-16
Payer: MEDICARE

## 2024-08-16 VITALS
HEART RATE: 63 BPM | RESPIRATION RATE: 18 BRPM | TEMPERATURE: 97.5 F | BODY MASS INDEX: 36.19 KG/M2 | WEIGHT: 231.04 LBS | DIASTOLIC BLOOD PRESSURE: 67 MMHG | SYSTOLIC BLOOD PRESSURE: 137 MMHG | OXYGEN SATURATION: 90 %

## 2024-08-16 DIAGNOSIS — D72.819 LEUKOPENIA, UNSPECIFIED TYPE: ICD-10-CM

## 2024-08-16 DIAGNOSIS — D63.1 ANEMIA DUE TO STAGE 3B CHRONIC KIDNEY DISEASE (MULTI): ICD-10-CM

## 2024-08-16 DIAGNOSIS — N18.32 ANEMIA DUE TO STAGE 3B CHRONIC KIDNEY DISEASE (MULTI): ICD-10-CM

## 2024-08-16 DIAGNOSIS — D61.818 OTHER PANCYTOPENIA (MULTI): ICD-10-CM

## 2024-08-16 DIAGNOSIS — D63.1 ANEMIA DUE TO STAGE 4 CHRONIC KIDNEY DISEASE (MULTI): ICD-10-CM

## 2024-08-16 DIAGNOSIS — N18.4 STAGE 4 CHRONIC KIDNEY DISEASE (MULTI): ICD-10-CM

## 2024-08-16 DIAGNOSIS — D69.6 THROMBOCYTOPENIA (CMS-HCC): ICD-10-CM

## 2024-08-16 DIAGNOSIS — I10 PRIMARY HYPERTENSION: ICD-10-CM

## 2024-08-16 DIAGNOSIS — D46.9 MYELODYSPLASTIC SYNDROME, UNSPECIFIED (MULTI): ICD-10-CM

## 2024-08-16 DIAGNOSIS — D70.9 NEUTROPENIA, UNSPECIFIED TYPE (CMS-HCC): ICD-10-CM

## 2024-08-16 DIAGNOSIS — N18.4 ANEMIA DUE TO STAGE 4 CHRONIC KIDNEY DISEASE (MULTI): ICD-10-CM

## 2024-08-16 LAB
ALBUMIN SERPL BCP-MCNC: 4.2 G/DL (ref 3.4–5)
ALP SERPL-CCNC: 97 U/L (ref 33–136)
ALT SERPL W P-5'-P-CCNC: 20 U/L (ref 10–52)
ANION GAP SERPL CALC-SCNC: 14 MMOL/L (ref 10–20)
AST SERPL W P-5'-P-CCNC: 29 U/L (ref 9–39)
BASO STIPL BLD QL SMEAR: PRESENT
BASOPHILS # BLD MANUAL: 0 X10*3/UL (ref 0–0.1)
BASOPHILS NFR BLD MANUAL: 0 %
BILIRUB SERPL-MCNC: 1.1 MG/DL (ref 0–1.2)
BUN SERPL-MCNC: 43 MG/DL (ref 6–23)
CALCIUM SERPL-MCNC: 8.6 MG/DL (ref 8.6–10.3)
CHLORIDE SERPL-SCNC: 102 MMOL/L (ref 98–107)
CO2 SERPL-SCNC: 25 MMOL/L (ref 21–32)
CREAT SERPL-MCNC: 2.72 MG/DL (ref 0.5–1.3)
EGFRCR SERPLBLD CKD-EPI 2021: 22 ML/MIN/1.73M*2
EOSINOPHIL # BLD MANUAL: 0.05 X10*3/UL (ref 0–0.4)
EOSINOPHIL NFR BLD MANUAL: 3 %
ERYTHROCYTE [DISTWIDTH] IN BLOOD BY AUTOMATED COUNT: 20.4 % (ref 11.5–14.5)
GLUCOSE SERPL-MCNC: 367 MG/DL (ref 74–99)
HCT VFR BLD AUTO: 27.5 % (ref 41–52)
HGB BLD-MCNC: 8.8 G/DL (ref 13.5–17.5)
IMM GRANULOCYTES # BLD AUTO: 0.02 X10*3/UL (ref 0–0.5)
IMM GRANULOCYTES NFR BLD AUTO: 1.2 % (ref 0–0.9)
LYMPHOCYTES # BLD MANUAL: 0.78 X10*3/UL (ref 0.8–3)
LYMPHOCYTES NFR BLD MANUAL: 49 %
MCH RBC QN AUTO: 34.8 PG (ref 26–34)
MCHC RBC AUTO-ENTMCNC: 32 G/DL (ref 32–36)
MCV RBC AUTO: 109 FL (ref 80–100)
MONOCYTES # BLD MANUAL: 0.05 X10*3/UL (ref 0.05–0.8)
MONOCYTES NFR BLD MANUAL: 3 %
NEUTS SEG # BLD MANUAL: 0.72 X10*3/UL (ref 1.6–5)
NEUTS SEG NFR BLD MANUAL: 45 %
NRBC BLD-RTO: 0 /100 WBCS (ref 0–0)
OVALOCYTES BLD QL SMEAR: ABNORMAL
PLATELET # BLD AUTO: 77 X10*3/UL (ref 150–450)
POLYCHROMASIA BLD QL SMEAR: ABNORMAL
POTASSIUM SERPL-SCNC: 4.8 MMOL/L (ref 3.5–5.3)
PROT SERPL-MCNC: 6.7 G/DL (ref 6.4–8.2)
RBC # BLD AUTO: 2.53 X10*6/UL (ref 4.5–5.9)
RBC MORPH BLD: ABNORMAL
SCHISTOCYTES BLD QL SMEAR: ABNORMAL
SODIUM SERPL-SCNC: 136 MMOL/L (ref 136–145)
TOTAL CELLS COUNTED BLD: 100
WBC # BLD AUTO: 1.6 X10*3/UL (ref 4.4–11.3)

## 2024-08-16 PROCEDURE — 96372 THER/PROPH/DIAG INJ SC/IM: CPT

## 2024-08-16 PROCEDURE — 85027 COMPLETE CBC AUTOMATED: CPT

## 2024-08-16 PROCEDURE — 85007 BL SMEAR W/DIFF WBC COUNT: CPT

## 2024-08-16 PROCEDURE — 80053 COMPREHEN METABOLIC PANEL: CPT

## 2024-08-16 PROCEDURE — 2500000004 HC RX 250 GENERAL PHARMACY W/ HCPCS (ALT 636 FOR OP/ED): Mod: JZ,EC

## 2024-08-16 RX ORDER — DIPHENHYDRAMINE HYDROCHLORIDE 50 MG/ML
50 INJECTION INTRAMUSCULAR; INTRAVENOUS AS NEEDED
Status: DISCONTINUED | OUTPATIENT
Start: 2024-08-16 | End: 2024-08-16 | Stop reason: HOSPADM

## 2024-08-16 RX ORDER — ALBUTEROL SULFATE 0.83 MG/ML
3 SOLUTION RESPIRATORY (INHALATION) AS NEEDED
OUTPATIENT
Start: 2024-08-19

## 2024-08-16 RX ORDER — FAMOTIDINE 10 MG/ML
20 INJECTION INTRAVENOUS ONCE AS NEEDED
Status: DISCONTINUED | OUTPATIENT
Start: 2024-08-16 | End: 2024-08-16 | Stop reason: HOSPADM

## 2024-08-16 RX ORDER — EPINEPHRINE 0.3 MG/.3ML
0.3 INJECTION SUBCUTANEOUS EVERY 5 MIN PRN
OUTPATIENT
Start: 2024-08-19

## 2024-08-16 RX ORDER — ALBUTEROL SULFATE 0.83 MG/ML
3 SOLUTION RESPIRATORY (INHALATION) AS NEEDED
Status: DISCONTINUED | OUTPATIENT
Start: 2024-08-16 | End: 2024-08-16 | Stop reason: HOSPADM

## 2024-08-16 RX ORDER — FAMOTIDINE 10 MG/ML
20 INJECTION INTRAVENOUS ONCE AS NEEDED
OUTPATIENT
Start: 2024-08-19

## 2024-08-16 RX ORDER — DIPHENHYDRAMINE HYDROCHLORIDE 50 MG/ML
50 INJECTION INTRAMUSCULAR; INTRAVENOUS AS NEEDED
OUTPATIENT
Start: 2024-08-19

## 2024-08-16 RX ORDER — ALLOPURINOL 100 MG/1
100 TABLET ORAL DAILY
Qty: 90 TABLET | Refills: 1 | Status: SHIPPED | OUTPATIENT
Start: 2024-08-16

## 2024-08-16 RX ORDER — EPINEPHRINE 0.3 MG/.3ML
0.3 INJECTION SUBCUTANEOUS EVERY 5 MIN PRN
Status: DISCONTINUED | OUTPATIENT
Start: 2024-08-16 | End: 2024-08-16 | Stop reason: HOSPADM

## 2024-08-16 ASSESSMENT — PAIN SCALES - GENERAL: PAINLEVEL: 0-NO PAIN

## 2024-08-19 ENCOUNTER — APPOINTMENT (OUTPATIENT)
Dept: HEMATOLOGY/ONCOLOGY | Facility: CLINIC | Age: 89
End: 2024-08-19
Payer: MEDICARE

## 2024-08-30 ENCOUNTER — INFUSION (OUTPATIENT)
Dept: HEMATOLOGY/ONCOLOGY | Facility: CLINIC | Age: 89
End: 2024-08-30
Payer: MEDICARE

## 2024-08-30 ENCOUNTER — OFFICE VISIT (OUTPATIENT)
Dept: HEMATOLOGY/ONCOLOGY | Facility: CLINIC | Age: 89
End: 2024-08-30
Payer: MEDICARE

## 2024-08-30 VITALS
RESPIRATION RATE: 16 BRPM | WEIGHT: 230.8 LBS | OXYGEN SATURATION: 93 % | SYSTOLIC BLOOD PRESSURE: 149 MMHG | DIASTOLIC BLOOD PRESSURE: 63 MMHG | TEMPERATURE: 97 F | BODY MASS INDEX: 36.15 KG/M2 | HEART RATE: 72 BPM

## 2024-08-30 DIAGNOSIS — N18.4 ANEMIA DUE TO STAGE 4 CHRONIC KIDNEY DISEASE (MULTI): ICD-10-CM

## 2024-08-30 DIAGNOSIS — N18.32 ANEMIA DUE TO STAGE 3B CHRONIC KIDNEY DISEASE (MULTI): ICD-10-CM

## 2024-08-30 DIAGNOSIS — D63.1 ANEMIA DUE TO STAGE 4 CHRONIC KIDNEY DISEASE (MULTI): ICD-10-CM

## 2024-08-30 DIAGNOSIS — D61.818 OTHER PANCYTOPENIA (MULTI): ICD-10-CM

## 2024-08-30 DIAGNOSIS — D63.1 ANEMIA DUE TO STAGE 3B CHRONIC KIDNEY DISEASE (MULTI): ICD-10-CM

## 2024-08-30 DIAGNOSIS — D46.9 MYELODYSPLASTIC SYNDROME, UNSPECIFIED (MULTI): ICD-10-CM

## 2024-08-30 DIAGNOSIS — N18.4 STAGE 4 CHRONIC KIDNEY DISEASE (MULTI): ICD-10-CM

## 2024-08-30 DIAGNOSIS — D70.9 NEUTROPENIA, UNSPECIFIED TYPE (CMS-HCC): ICD-10-CM

## 2024-08-30 DIAGNOSIS — D69.6 THROMBOCYTOPENIA (CMS-HCC): ICD-10-CM

## 2024-08-30 DIAGNOSIS — D72.819 LEUKOPENIA, UNSPECIFIED TYPE: ICD-10-CM

## 2024-08-30 LAB
ALBUMIN SERPL BCP-MCNC: 3.9 G/DL (ref 3.4–5)
ALP SERPL-CCNC: 87 U/L (ref 33–136)
ALT SERPL W P-5'-P-CCNC: 19 U/L (ref 10–52)
ANION GAP SERPL CALC-SCNC: 13 MMOL/L (ref 10–20)
AST SERPL W P-5'-P-CCNC: 27 U/L (ref 9–39)
BASOPHILS # BLD AUTO: 0 X10*3/UL (ref 0–0.1)
BASOPHILS NFR BLD AUTO: 0 %
BILIRUB SERPL-MCNC: 1 MG/DL (ref 0–1.2)
BUN SERPL-MCNC: 57 MG/DL (ref 6–23)
CALCIUM SERPL-MCNC: 8.7 MG/DL (ref 8.6–10.3)
CHLORIDE SERPL-SCNC: 100 MMOL/L (ref 98–107)
CO2 SERPL-SCNC: 23 MMOL/L (ref 21–32)
CREAT SERPL-MCNC: 2.66 MG/DL (ref 0.5–1.3)
DACRYOCYTES BLD QL SMEAR: NORMAL
EGFRCR SERPLBLD CKD-EPI 2021: 22 ML/MIN/1.73M*2
EOSINOPHIL # BLD AUTO: 0.03 X10*3/UL (ref 0–0.4)
EOSINOPHIL NFR BLD AUTO: 2.4 %
ERYTHROCYTE [DISTWIDTH] IN BLOOD BY AUTOMATED COUNT: 20 % (ref 11.5–14.5)
GLUCOSE SERPL-MCNC: 396 MG/DL (ref 74–99)
HCT VFR BLD AUTO: 27.2 % (ref 41–52)
HGB BLD-MCNC: 8.6 G/DL (ref 13.5–17.5)
HYPOCHROMIA BLD QL SMEAR: NORMAL
IMM GRANULOCYTES # BLD AUTO: 0.04 X10*3/UL (ref 0–0.5)
IMM GRANULOCYTES NFR BLD AUTO: 3.2 % (ref 0–0.9)
LYMPHOCYTES # BLD AUTO: 0.63 X10*3/UL (ref 0.8–3)
LYMPHOCYTES NFR BLD AUTO: 50.4 %
MCH RBC QN AUTO: 33.7 PG (ref 26–34)
MCHC RBC AUTO-ENTMCNC: 31.6 G/DL (ref 32–36)
MCV RBC AUTO: 107 FL (ref 80–100)
MONOCYTES # BLD AUTO: 0.07 X10*3/UL (ref 0.05–0.8)
MONOCYTES NFR BLD AUTO: 5.6 %
NEUTROPHILS # BLD AUTO: 0.48 X10*3/UL (ref 1.6–5.5)
NEUTROPHILS NFR BLD AUTO: 38.4 %
NRBC BLD-RTO: 0 /100 WBCS (ref 0–0)
OVALOCYTES BLD QL SMEAR: NORMAL
PLATELET # BLD AUTO: 69 X10*3/UL (ref 150–450)
POTASSIUM SERPL-SCNC: 5.2 MMOL/L (ref 3.5–5.3)
PROT SERPL-MCNC: 6.3 G/DL (ref 6.4–8.2)
RBC # BLD AUTO: 2.55 X10*6/UL (ref 4.5–5.9)
RBC MORPH BLD: NORMAL
SCHISTOCYTES BLD QL SMEAR: NORMAL
SODIUM SERPL-SCNC: 131 MMOL/L (ref 136–145)
WBC # BLD AUTO: 1.3 X10*3/UL (ref 4.4–11.3)

## 2024-08-30 PROCEDURE — 99213 OFFICE O/P EST LOW 20 MIN: CPT | Mod: 25 | Performed by: INTERNAL MEDICINE

## 2024-08-30 PROCEDURE — 84075 ASSAY ALKALINE PHOSPHATASE: CPT

## 2024-08-30 PROCEDURE — 1126F AMNT PAIN NOTED NONE PRSNT: CPT | Performed by: INTERNAL MEDICINE

## 2024-08-30 PROCEDURE — 1157F ADVNC CARE PLAN IN RCRD: CPT | Performed by: INTERNAL MEDICINE

## 2024-08-30 PROCEDURE — 1159F MED LIST DOCD IN RCRD: CPT | Performed by: INTERNAL MEDICINE

## 2024-08-30 PROCEDURE — 99213 OFFICE O/P EST LOW 20 MIN: CPT | Performed by: INTERNAL MEDICINE

## 2024-08-30 PROCEDURE — 3078F DIAST BP <80 MM HG: CPT | Performed by: INTERNAL MEDICINE

## 2024-08-30 PROCEDURE — 96372 THER/PROPH/DIAG INJ SC/IM: CPT

## 2024-08-30 PROCEDURE — 3077F SYST BP >= 140 MM HG: CPT | Performed by: INTERNAL MEDICINE

## 2024-08-30 PROCEDURE — 2500000004 HC RX 250 GENERAL PHARMACY W/ HCPCS (ALT 636 FOR OP/ED): Mod: JZ,EC

## 2024-08-30 PROCEDURE — 85025 COMPLETE CBC W/AUTO DIFF WBC: CPT

## 2024-08-30 RX ORDER — ALBUTEROL SULFATE 0.83 MG/ML
3 SOLUTION RESPIRATORY (INHALATION) AS NEEDED
OUTPATIENT
Start: 2024-09-13

## 2024-08-30 RX ORDER — DIPHENHYDRAMINE HYDROCHLORIDE 50 MG/ML
50 INJECTION INTRAMUSCULAR; INTRAVENOUS AS NEEDED
OUTPATIENT
Start: 2024-09-13

## 2024-08-30 RX ORDER — FAMOTIDINE 10 MG/ML
20 INJECTION INTRAVENOUS ONCE AS NEEDED
OUTPATIENT
Start: 2024-09-13

## 2024-08-30 RX ORDER — EPINEPHRINE 0.3 MG/.3ML
0.3 INJECTION SUBCUTANEOUS EVERY 5 MIN PRN
OUTPATIENT
Start: 2024-09-13

## 2024-08-30 ASSESSMENT — PAIN SCALES - GENERAL: PAINLEVEL: 0-NO PAIN

## 2024-08-30 ASSESSMENT — ENCOUNTER SYMPTOMS
NUMBNESS: 0
APPETITE CHANGE: 0
ARTHRALGIAS: 1
FATIGUE: 1
CONSTIPATION: 0
SHORTNESS OF BREATH: 1
HEADACHES: 0
DYSURIA: 0
SLEEP DISTURBANCE: 0
MYALGIAS: 0
UNEXPECTED WEIGHT CHANGE: 0
BRUISES/BLEEDS EASILY: 1
WEAKNESS: 0
NERVOUS/ANXIOUS: 0
LIGHT-HEADEDNESS: 0
NAUSEA: 0
VOMITING: 0
ABDOMINAL PAIN: 0
COUGH: 0
TROUBLE SWALLOWING: 0
RHINORRHEA: 0
DIARRHEA: 0

## 2024-08-30 ASSESSMENT — PATIENT HEALTH QUESTIONNAIRE - PHQ9
1. LITTLE INTEREST OR PLEASURE IN DOING THINGS: NOT AT ALL
2. FEELING DOWN, DEPRESSED OR HOPELESS: NOT AT ALL
SUM OF ALL RESPONSES TO PHQ9 QUESTIONS 1 AND 2: 0

## 2024-08-30 ASSESSMENT — COLUMBIA-SUICIDE SEVERITY RATING SCALE - C-SSRS
2. HAVE YOU ACTUALLY HAD ANY THOUGHTS OF KILLING YOURSELF?: NO
6. HAVE YOU EVER DONE ANYTHING, STARTED TO DO ANYTHING, OR PREPARED TO DO ANYTHING TO END YOUR LIFE?: NO
1. IN THE PAST MONTH, HAVE YOU WISHED YOU WERE DEAD OR WISHED YOU COULD GO TO SLEEP AND NOT WAKE UP?: NO

## 2024-08-30 NOTE — PROGRESS NOTES
Patient ID:Zack Medrano is a 88 y.o. year old male patient with myelodysplasia         Referring Physician: Ayah Carmichael MD  [address] [place] [address] [address]  [place] [address]  Primary Care Provider: Fabiola Dietz MD    Chief Complaint  Chief Complaint   Patient presents with    Anemia          History of the Present Illness  10/9/2019.  Visit with Dr. Mickey Nguyen for chronic kidney disease and hypertension.  Chief complaint was fatigue and need to lose weight     11/12/2019.  The patient has had long-term history of microcytic anemia going back to at least 2019 at which time his white count was 5.2 hemoglobin was 13.8 hematocrit 40.5 and a platelet count of 157,000.  His MCV was 105 MCHC was 34.  White blood cell differential count showed 66% polys, 17 lymphs, 11 monos and 4 eosinophils.     12/11/2019.  Seen by primary care for probable shingles treated with Valtrex with chief complaint of waxing and waning left flank pain radiating up under his ribs, somewhat responsive to exercise..  He had a history of pulmonary embolus and was on Eliquis long-term.     2/24/2020.  Seen by Dr. Howell for elevated PSA, BPH and erectile dysfunction.  PSA in 2017 was 2.94, in 2018 it was 2.74 and in 2020 it was 4.03.  We discussed biopsy and the patient wanted to have this followed and not biopsy.     3/9/2020.  Follow-up with NATALY Pulido primary care.  Referred to Dr. Bahena for colonoscopy and CT scan of the chest was ordered for follow-up of questionable lingular nodule.     3/11/2020.  CT scan of the chest showed a stable 6 mm nodular lesion near the fissure in the lingula.     3/13/2020.  Seen by Dr. Bahena who noted that the patient had had diarrhea off-and-on since cholecystectomy and had intermittent left-sided pain.  He had a history of colon polyps.     4/22/2015.  Colonoscopy showed that he had tubular adenoma     5/13/2020.  Seen by Dr. Migdalia New in follow-up.  She noted that he had an abnormal  CBC with macrocytic indices with a normal B12 level.  The patient denies alcohol use.  Hemoglobin A1c was 6.2.  BUN was 33 creatinine was 1.74.     8/31/2020.  Follow-up with Dr. Howell.  PSA had come down from 4.03-3.77     1/4/2021 through 1/12/2021.  Admitted to the hospital having been brought to the ED by squad after he fell forward on his toilet and was unable to get up.  He denied hitting his head or losing consciousness.  He blamed it on having back pain and shoulder pain for several weeks.  He was febrile with a temperature of 100.9 and hypoxic with pulse ox of 86% on room air.  Respirations were 24.  He was positive for COVID and was treated with intravenous antibiotics and remdesivir, increased supplemental oxygen.  He was able to be discharged to rehabilitation.     Repeat CBC showed white count 4.3 with a hemoglobin 12.9 hematocrit 38.8 with an MCV of 102 and a platelet count of 173,000.  White blood cell differential count showed minimal absolute lymphocytopenia at 0.6 with the lower limits of normal being 0.8.     3/1/2021.  Follow-up with Dr. Howell.  PSA was 9.9 in February.  Decided to have prostate biopsy done on 3/22/2021.  Results showed BPH.     10/6/2021.  Follow-up with Dr. Howell.  PSA in September was 2.77     4/6/2022.  CBC showed white count 4.3 with hemoglobin 11.7 hematocrit 34.4 with an MCV of 107.  Platelet count was 153,000.  White blood cell differential count was essentially normal.     5/31/2022.  CBC showed white count 3.6 with hemoglobin 11.6 hematocrit 34.4 and platelet count 117,000 with a normal white blood cell differential.     6/27/2022.  Referred to Dr. Dash because of pancytopenia by NATALY Gray.  CBC showed a white count of 3.9 with hemoglobin 12.3 hematocrit 36.1 and platelet count 129,000 with a normal differential.  PSA was 3.26.     The patient said that he felt well except for having increased fatigue.  He continued on long-term anticoagulation.  He was able to  carry out his ADLs.  He did complain of easy bruising.  He said that he had worked at [employer] for 12 years and also worked in making [employer].  Dr. Dash felt that there was a large component secondary to his chronic kidney disease.  There was no evidence of nutritional deficiency.  He checked him for hemolysis and monoclonal gammopathy and ordered an ultrasound for hepatosplenomegaly and testing for hepatitis, HIV and CHRISTINA.  He said he would rather observe the patient rather than pursuing a bone marrow biopsy.     7/27/2022.  Repeat lab data showed white count 3.9 with hemoglobin 12.3 hematocrit 36.1 and platelet count 129,000.  BUN was 36 creatinine was 1.78.  He had mild elevation of AST.  Retake count was 2.3%.  Flow cytometry showed a small clonal B-cell population.  Hepatitis B was nonreactive.  Hep C testing was negative.  Serum protein electrophoresis was normal.  Haptoglobin was normal.  LDH was normal.  He said that the possibility of MDS could not be ruled out.  Ultrasound showed fatty liver but no splenomegaly.  He plan for EPO level and NGS for myeloid mutation.     9/1/2022.  Started on allopurinol for hyperuricemia.     10/12/2022.  Seen by Dr. Dr. Dietz.  Patient was referred to dermatology for several skin lesions.  He was referred to neurology because of his tremor that was not improving.  She increased his glimepiride.     10/17/2022.  CBC showed white count 3.0 with hemoglobin 10.5 hematocrit 31.3 and a platelet count of 106,000 MCV was 111.     10/26/2022.  Follow-up with Dr. 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  He was suspicious for MDS.  He referred him for a bone marrow biopsy.     12/1/2022.  Follow-up with Dr. Dash.  Bone marrow biopsy showed low-grade MDS.  R IPSS score was low or very low depending on a karyotype which was still pending.  We discussed initiating erythropoietin with Aranesp but the patient deferred and the decision  was made to monitor him.     1/11/2023.  CBC showed white count 3.4 with hemoglobin 10.3 hematocrit 32.2 and a platelet count of 137,000.  MCV was 115.  White blood cell differential count was normal.     1/13/2023.  Follow-up with Dr. Dash.  The patient did say that he had following getting out of the shower but did not sustain any injuries.  He had been started by Dr. Nguyen on insulin for his diabetes which was helpful.  Patient continued to be active inside his house.  He remains on Eliquis.  BR-IPSS score is very low.  Counts remain stable.     3/31/2023.  CBC showed a white count of 2.6 with hemoglobin 8.7 hematocrit 27.1 and a platelet count 97,000.  MCV was 115.  White blood cell differential count showed that he had an absolute neutrophil count of 1.36.     4/10/2023.  CBC showed a white count of 2.5 with a hemoglobin 9.0 hematocrit of 27.7 with a platelet count of 92,000.  MCV was 114.     4/17/2023.  Follow-up with NATALY Gray.  Even though his hemoglobin had dropped to 9 the patient did not want to start his erythropoietin injections.     5/11/2023.  CBC showed white count of 2.5 with a hemoglobin of 7.6 hematocrit 23.8 and a platelet count of 101,000.  Absolute neutrophil count was 1.17.     5/15/2023 through 5/19/2023.  Admitted to the hospital with anemia and generalized weakness black stools dizziness with a hemoglobin of 6.7.  His BUN was 51 creatinine was 2.08.  CT scan of the abdomen showed no acute process.  There were findings consistent with atypical healing of the previous rib fracture with question Paget's disease although it was nonspecific.  He was transfused 2 units packed red blood cells.  EGD found 3 erosions in the cardia.  He was prescribed Carafate.     5/24/2023.  Follow-up with Dr. Dr. Dietz after discharge.  CBC showed white count 1.9 with hemoglobin 8.6 hematocrit 28.3 and a platelet count of 140,000.  Absolute neutrophil count was 1.06.  Patient did not tolerate  "Carafate and stopped it.  He was prescribed oral iron twice a day.  He had not been prescribed a PPI which was then started.     6/1/2023.  CBC showed white count of 1.9 with hemoglobin 7.7 hematocrit 24.8 and a platelet count of 87,000.     6/13/2023.  CBC showed a white count of 1.9 with hemoglobin of 8.7 hematocrit 28.1 and platelet count of 89,000.     6/23/2023.  Followed up with Dr. Dr. Dietz who reported that he was hospitalized again in [place] for GI bleed.  At that time his Eliquis was discontinued and he was changed to Protonix and taken off his baby aspirin.  Once again he was started on Carafate but did not tolerate it.     7/15/2023.  CBC showed white count of 2.4 with hemoglobin 9.2 hematocrit 27.3 and a platelet count of 110,000.  Absolute neutrophil count was 1.36.     7/17/2023.  Follow-up with Malorie Shaffer.  The patient had recently had a colonoscopy and had \"cauterizations\" he noted that he had dyspnea with exertion but no resting shortness of breath.  He was on prednisone at that time.  His sugars were in the 300s.  He was on insulin.  His neuropathy has not changed and he continues to have tremors.  Current level was found to be saturated.  Once again the patient was reluctant to start NING.     8/14/2023.  Follow-up with Malorie Shaffer.  CBC showed white count 1.8 with a hemoglobin 8.9 hematocrit 26.9 and a platelet count of 100,000.  Absolute neutrophil count was 0.96.     9/6/2023.  CBC showed a white count of 1.8 with hemoglobin 9.5 hematocrit 28.8 and a platelet count of 85,000 with an absolute neutrophil count of 0.99.  9/13/2023.  Follow-up with Dr. Howell.  PSA has gone down to 1.87.     10/9/2023.  Follow-up with Malorie Shaffer who noted the patient remained on oxygen and CPAP.  He says that he is awakening in the middle of the night around 3 to 4:00 in the morning.  Sugars have been in the 300s.  Neuropathy was unchanged.  He had been started on erythropoietin every 3 weeks     11/21/2023.  " Follow-up with Malorie Shaffer.  No improvement in his hemoglobin.  White count had improved to 2.1 platelets were baseline.  He had been placed on 40 mg of prednisone and continued on erythropoietin 300 mcg every 3 weeks.     1/2/2024.  Follow-up with Malorie Shaffer.  No improvement in hemoglobin.  Symptomatically he was doing well.  Plan was to increase frequency of his darbepoetin to every 2 weeks.     2/13/2024.  Follow-up with Malorie Shaffer.  Now on erythropoietin injections every 2 weeks.  There was a discussion concerning repeat bone marrow biopsy with counts stayed low.     3/12/2024.  Follow-up with Malorie Shaffer.  No improvement in his hemoglobin.  Patient was agreeable to repeat bone marrow biopsy.  She also ordered a CAT scan of the chest abdomen and pelvis without contrast.     3/22/2024.  CAT scan showed severe coronary artery calcifications.  There is evidence of bronchiectasis in the lower lobes increased compared to previous CT scan from 2020.     Predominantly groundglass opacities with bibasilar prominence.  There is some subpleural sparing of the left upper lobe.  There is a 3 mm nodule in the right upper lobe which was unchanged for several years.  Multilevel anterior bridging osteophytes were noted consistent with DISH.  There is a small fat-containing periumbilical hernia and bilateral inguinal hernias.  Spleen size is at the upper limits of normal being 12.7 cm in length slightly increased when compared to study from 2019.  Cysts were seen in the kidneys with diffuse cortical atrophy and a 4 mm hyperdense focus in the interpolar region of the right kidney possibly a stone.  Prostatism was noted for gland measuring 6.2 cm.     3/28/2024.  Seen by Dr. Graham for shortness of breath.  He felt the patient had possible chronic interstitial lung disease versus periodic aspiration, hypoxia dyspnea on exertion.  Further tests were ordered including high-resolution CT scan, complete pulmonary function testing,  simple pulmonary stress test and follow-up.     4/9/2024.  Follow-up with Malorie Shaffer.  Erythropoietin injections continued every 2 weeks which the patient was tolerating well.     4/16/2024.  Bone marrow biopsy was performed showing a hypercellular bone marrow at 50% with dyserythropoiesis.  There are 1% blasts consistent with persistent myeloid neoplasm.  Flow cytometry showed a small clonal CD5 negative, CD10 negative B-cell population and a polytypic background.  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     There was a very small abnormal T-cell population that could be incidental.  Next generation sequencing showed 2 U2AF 1 variants, 1 of which was negative.  The other had been previously seen.     The marrow also showed dyserythropoiesis with bilobate forms and forms with irregular nuclear contours as well as blebbing.     4/23/2024.  CBC showed white count 1.5 with a hemoglobin of 8.3 hematocrit 25.9 and platelet count of 71,000 with 37% neutrophils, 53 lymphocytes, 4 monos, 4 eosinophils with an absolute neutrophil count of 0.57.     4/24/2024.  Return visit with Dr. Graham.  He noted that the high-resolution CT scan showed no significant change from the previous CT scan the month before.  Pulmonary function testing showed no response to bronchodilator and there was mild restrictive change with some suggestion of airway inflammation.  The patient had difficulty doing the test because of persistent coughing.  6-minute walk test showed that the patient was able to maintain saturations at 93% with 2 L of oxygen per minute continuously.  The test was terminated because of dyspnea.  With his hypoxia on room air it was recommended that he be on oxygen continuously to keep his saturation greater than or equal to 92%.  He continued on oxygen with his CPAP at 3 L at  nighttime.     5/10/2024.  This is my first visit with Mr. Medrano and his family.  We went over some of the results of the bone marrow biopsy.  I told him that I would have to wait until the chromosomal analysis returned.  I also note the possibility of low-grade lymphoma.  He may need to have an opinion with malignant hematology at [facility].  Stepdaughter who is here with him and his wife today is his advocate for maintaining his quality of life.  She is an employee at a local nursing facility and is concerned about many clients she has who seem to be getting treated aggressively.  We talked about myelodysplasia and the fact that he is not responding to erythropoietin.  He will continue on this treatment for now.  He is low counts were discussed and he knows that he is at higher risk for infection and bleeding.  He continues on oxygen supplementation continuously.  They will return in 2 weeks at which time laboratory tests will be performed.     He just had a lesion removed from the dorsum of his right hand at the base of his first and second fingers.  Stitches are still in place.  It seems to be healing well without any significant bleeding or infection.  He was told that it looked benign.     5/13/2024.  Follow-up with Dr. Aggie Danielle of cardiology who saw him in follow-up of his coronary artery disease with multiple stents having been placed, hypertension, dyslipidemia and previous pulmonary embolus related to COVID-19.  She had stopped his Eliquis.  She noted his myelodysplasia.  She noted that at some point his aspirin had been stopped but given his history of stents this was restarted and he tolerated it well.  She said that his blood pressure was well-controlled.  She noted blood sugars were between 150-2 50.  She noted the patient was supposed to be using oxygen with exertion but the patient was not sure what rate it was supposed to be.  She says that she would not stop his aspirin unless his platelets were  less than 50,000 with complications of bleeding.  She said it was very important to continue aspirin given a 15% chance of in-stent thrombosis with aspirin cessation.  She increased her furosemide to 80 mg twice a day because of bilateral lower extremity edema and spironolactone to 25 mg twice daily until symptoms improve and encouraged him to cut back on his salt intake.  She was see him again in about 9 months.     5/14/2024.  Follow-up with Dr. Santamaria of ophthalmology who started him on Naphcon-A drops 1 drop 3 times a day in both eyes for excessive tearing     5/15/2024.  Followed up with Dr. Dimas Renteria of podiatry who noted decreased pedal pulses 2 out of 4.  He had footcare.     5/24/2024.  Follow-up with Dr. Graham.  Review of his x-rays showed no change.  Physical examination showed decreased breath sounds but no inspiratory crackles that were previously heard.  He felt that the interstitial lung disease was stable and that his hypoxemia was improved on oxygen.  He plan to repeat CT scan in September.     5/24/2024.  He is here to follow-up on his bone marrow results.  I told him that the chromosomal analysis would not be forthcoming before because the specimen failed to grow in culture.  He continues to display a low-grade MDS with blast count of only 2%.  We reviewed the fact that he is recommended to have at least 6 months of his erythropoietin before we decide whether or not it is a failure.  He is agreeable to this.  We will increase his dose of darbepoetin.  He knows to be on the look out for any signs of infection or bleeding and to report this if it happens.  We reviewed his most recent CBC.  We will see him again in 2 weeks with his next injection.     5/30/2024.  Follow-up with Dr. Dr. Dietz for his Medicare wellness visit.  His magnesium dose was changed to twice daily indefinitely.     6/7/2024.  He is here today for his darbepoetin injection.  We have increased his dose to 500 mcg  every 2 weeks.  Will see if this has any effect.  Laboratory dated today shows that his sugar is 290 and his sodium is 134.  BUN is 46 and creatinine is 2.31.  CBC shows white count 1.5 with a hemoglobin 8.1 hematocrit 24.7 and a platelet count of 71,000 which is very much like his most recent CBC.  His absolute neutrophil count is 0.57.     I talked to him about his current status which he feels is fairly stable.  He is off oxygen.  He has had no bleeding and no signs of infection.  He understands that we have increased his dose of darbepoetin and we will watch his counts.  He will report any side effects.     He has continued to receive his darbepoetin injections.     7/15/2024.  Emergency department visit [facility].  He developed a rash on both lower extremities which started a week ago which is an erythematous papular rash.  He denied any pain or itching.  He did not have any vesicle formation.  It is bilateral.  He has had a shingles injection.  He was told that he had disseminated zoster and was started on valacyclovir a gram twice daily for 7 days.     7/19/2024.  Seen by Dr. Dr. Dietz who said that the rash has not gotten any worse and might be a little bit better.  She noted that there were no vesicles no open areas no areas of secondary infection.     7/19/2024.  He is here to receive his erythropoietin injection and to have a blood count.  I told him it is not likely that this is disseminated zoster because it is nowhere else on his body but only on his lower extremities.  This could be stasis dermatitis.  I do not see any signs of vesicle formation and there never were any.  It is mostly on the anterior shins.  It is all below the knee.     CBC today shows white count 1.4 with hemoglobin 8 hematocrit 25.4 platelet count 67,000 with 37 neutrophils 53 lymphocytes 5 monos 2 eosinophils.  He is serum chemistry showed a sugar of 285 BUN of 49 creatinine of 2.67.  We told him he needed to keep  his hydration status high.  He will continue with his injections today.  Blood pressure today is 153/64.     He has had no bleeding and no infection.  He has had no chest pain or pressure.  He is able to participate in his usual activities including mowing the yard.    Interval Note  He has continued to receive his darbepoetin and has followed up with podiatry for nail and callus care.    8/30/2024.  He is here in routine follow-up.  He says that he is doing well.  He has had no infections or bleeding.  He has had no mouth sores.  He has had continued swelling in his legs a little bit worse on the left than on the right.  Blood sugar was greater than 300 but he admits to dietary indiscretion.  Has had no chest pain.  His blood pressure was 149/63.  He says that his breathing is stable.  He is using his oxygen at home at night and when he travels that he does not have it on today.    His lab data has shown no sign of improvement so far with white count 1.3, hemoglobin 8.6 hematocrit 27.2 and a platelet count of 69,000.  White blood cell differential count shows 38% polys, 50 lymphs, 5 monos, 2 eosinophils.  We plan a 6-month trial of this dose of darbepoetin which will extend until November.  We will see him again in about a month.    Comorbidities  GERD  Arthritis, DJD  BPH  Coronary artery disease with history of myocardial, status post stent infarction  History of dumping syndrome  Hyperlipidemia  History of skin cancer  Obesity  Obstructive sleep apnea on CPAP  Peripheral arterial disease  CKD 3  Diabetes with nephropathy  History of cholecystectomy and hernia repair  Essential tremor  Hypertension   Numbness in his fingers felt to be related to cervical disc disease.    Monitoring Parameters  Histories, physical examinations and CBCs with occasional bone marrow biopsies.     Review of Systems - Oncology   Review of Systems   Constitutional:  Positive for fatigue. Negative for appetite change and unexpected  weight change.   HENT:  Negative for dental problem, mouth sores, rhinorrhea and trouble swallowing.    Eyes:  Negative for visual disturbance.   Respiratory:  Positive for shortness of breath. Negative for cough.    Cardiovascular:  Positive for leg swelling.   Gastrointestinal:  Negative for abdominal pain, constipation, diarrhea, nausea and vomiting.   Endocrine: Negative for polyuria.   Genitourinary:  Negative for dysuria.   Musculoskeletal:  Positive for arthralgias (generalized). Negative for myalgias.   Skin:  Positive for pallor.   Neurological:  Negative for weakness, light-headedness, numbness and headaches.   Hematological:  Bruises/bleeds easily.   Psychiatric/Behavioral:  Negative for self-injury, sleep disturbance and suicidal ideas. The patient is not nervous/anxious.         Past Medical History  Past Medical History:   Diagnosis Date    Abnormal levels of other serum enzymes     Abnormal liver enzymes    Dependence on other enabling machines and devices     CPAP (continuous positive airway pressure) dependence    Encounter for examination of eyes and vision without abnormal findings     Diabetic eye exam    Localized edema     Bilateral leg edema    Old myocardial infarction     History of myocardial infarction    Other conditions influencing health status 01/08/2020    Uncontrolled diabetes mellitus type 2 without complications    Pain in unspecified ankle and joints of unspecified foot     Arthralgia of ankle    Personal history of other diseases of the digestive system     History of gastroesophageal reflux (GERD)    Personal history of other diseases of the digestive system     History of dumping syndrome    Personal history of other endocrine, nutritional and metabolic disease 11/12/2020    History of type 2 diabetes mellitus    Presence of coronary angioplasty implant and graft     History of coronary artery stent placement    Rash and other nonspecific skin eruption 12/11/2019    Rash     "Unspecified abdominal hernia without obstruction or gangrene     Hernia    Unspecified abdominal pain 05/19/2021    Abdominal pain, left lateral        Surgical History  Past Surgical History:   Procedure Laterality Date    OTHER SURGICAL HISTORY  10/09/2019    Arterial stent placement    OTHER SURGICAL HISTORY  10/09/2019    Cholecystectomy    OTHER SURGICAL HISTORY  10/09/2019    Hernia repair       Social History  Social History     Tobacco Use    Smoking status: Never    Smokeless tobacco: Never   Vaping Use    Vaping status: Never Used   Substance Use Topics    Alcohol use: Never    Drug use: Never        Family History  family history includes Colon cancer in his mother and another family member.       Current Medications  Current Outpatient Medications   Medication Instructions    allopurinol (ZYLOPRIM) 100 mg, oral, Daily    amLODIPine (NORVASC) 5 mg, oral, Daily before breakfast    ascorbic acid (VITAMIN C) 250 mg, oral    aspirin 81 mg, oral, Daily    atorvastatin (LIPITOR) 40 mg, oral, Daily    blood-glucose sensor (FreeStyle Sindi 3 Sensor) device Use as directed    Constulose 10 gram/15 mL oral solution TAKE 45mL (30g) BY MOUTH TWICE DAILY FOR 30 DAYS    cyanocobalamin, vitamin B-12, (Vitamin B-12) 1,000 mcg tablet extended release 1 tablet, oral    furosemide (LASIX) 60 mg, oral, Daily    gabapentin (NEURONTIN) 300 mg, oral, 2 times daily    lancets 33 gauge misc miscellaneous, 2 times daily    Lantus U-100 Insulin 50 Units, subcutaneous, Nightly    metoprolol tartrate (LOPRESSOR) 50 mg, oral, 2 times daily    multivit-minerals/folic acid (CENTRUM ADULTS ORAL) oral    nitroglycerin (NITROSTAT) 0.4 mg, sublingual, Every 5 min PRN    NON FORMULARY [employer] test In vitro strip; Use as directed to check blood sugar  2 times daily     pantoprazole (PROTONIX) 40 mg, oral, 2 times daily    pen needle, diabetic (Pen Needle) 32 gauge x 5/32\" needle 1 each, miscellaneous, Daily    primidone (MYSOLINE) 50 mg, " oral, Nightly    spironolactone (ALDACTONE) 25 mg, oral, Daily    sucralfate (CARAFATE) 1 g, oral, 3 times daily (morning, midday, late afternoon)    valACYclovir (VALTREX) 1,000 mg, oral, 2 times daily, Take for 7 days, starting 7/15/24    vitamins A,C,E-zinc-copper 2,148 mcg-113 mg-45 mg-17.4mg tablet oral, SUPER VIEW            OARRS Review  I have personally reviewed the OARRS report for Zack Medrano. I have considered the risks of abuse, dependence, addiction and diversion    Vital Signs  /63 (BP Location: Right arm, Patient Position: Sitting, BP Cuff Size: Adult)   Pulse 72   Temp 36.1 °C (97 °F) (Skin)   Resp 16   Wt 105 kg (230 lb 12.8 oz)   SpO2 93%   BMI 36.15 kg/m²      Physical Exam  Vitals and nursing note reviewed. Exam conducted with a chaperone present.   Constitutional:       General: He is not in acute distress.     Appearance: He is ill-appearing. He is not toxic-appearing.      Comments: He is a well-developed well-nourished overweight elderly white male who is unaccompanied again today. He does not have his oxygen on.  He appears to be minimally chronically ill and pale.  He is in good spirits.     HENT:      Head: Normocephalic and atraumatic.      Nose: Nose normal.      Mouth/Throat:      Mouth: Mucous membranes are moist.      Pharynx: Oropharynx is clear. No oropharyngeal exudate or posterior oropharyngeal erythema.   Eyes:      General: No scleral icterus.     Extraocular Movements: Extraocular movements intact.      Conjunctiva/sclera: Conjunctivae normal.      Pupils: Pupils are equal, round, and reactive to light.   Neck:      Comments: No significant lymphadenopathy is palpated in the head neck region, supraclavicular or axillary areas bilaterally.  Cardiovascular:      Rate and Rhythm: Normal rate and regular rhythm.      Heart sounds: Murmur (flow murmur) heard.   Pulmonary:      Effort: Pulmonary effort is normal.      Breath sounds: Wheezing present. No rhonchi or  rales.   Abdominal:      Palpations: Abdomen is soft. There is no mass.      Tenderness: There is no abdominal tenderness. There is no guarding.      Comments: Large nontender abdomen.   Musculoskeletal:         General: Normal range of motion.      Cervical back: Normal range of motion. No rigidity or tenderness.      Right lower leg: Edema (up 1/2 way to the knee) present.      Left lower leg: Edema present.   Lymphadenopathy:      Cervical: No cervical adenopathy.   Skin:     General: Skin is warm and dry.      Coloration: Skin is pale. Skin is not jaundiced.      Findings: Bruising present.      Comments: Scattered bruises mostly on his arms   Neurological:      General: No focal deficit present.      Mental Status: He is alert and oriented to person, place, and time. Mental status is at baseline.      Cranial Nerves: Cranial nerve deficit present.      Motor: No weakness.      Gait: Gait normal.      Comments: Hard of hearing   Psychiatric:         Mood and Affect: Mood normal.         Behavior: Behavior normal.         Thought Content: Thought content normal.         Judgment: Judgment normal.      Comments: He is in good spirits          Current Laboratory Data  No results found for this or any previous visit (from the past 168 hour(s)).       Recent Imaging  No results found.      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  Pancytopenia.  The patient has unusual bone marrow findings.  Chromosomal analysis will not be done due to failure of the specimen to grow.  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  He also has components of myelodysplasia.  He is not responding to erythropoietin.  We will increase his dose to 500 mcg every other week.  He continues to be at high risk for bleeding and infection with a low ANC and low platelet count.  He has had previous GI bleeds.  He is  currently back on his  aspirin per follow-up with Dr. Danielle for fear of clotting off his stents.  He is off Eliquis.  He will get his treatment today with darbepoetin and continue every 14 days.  We plan for 6-month trial which will take us to November.  .     2.  Multiple comorbidities.  These include but are not limited to:  GERD  Arthritis, DJD  BPH  Coronary artery disease with history of myocardial, status post stent infarction  History of dumping syndrome  Hyperlipidemia  History of skin cancer  Obesity  Obstructive sleep apnea on CPAP  Peripheral arterial disease  CKD 3  Diabetes with nephropathy  History of cholecystectomy and hernia repair  Essential tremor  Hypertension   Numbness in his fingers felt to be related to cervical disc disease.     3.  Rash on his legs.  I think this looks more like stasis dermatitis than disseminated zoster.  If it were disseminated zoster he would have it on other areas of his body.     We will see him again in about a month.  He will continue his shots every 2 weeks.  He knows that he should call in the interim should he have any change in his status, questions or concerns.         Ayah Carmichael MD

## 2024-08-30 NOTE — PROGRESS NOTES
Labs drawn on clinic arrival- injection today  Pt already set for next lab and injection 9/13  Rtc 9/27 1230 stat lab work on arrival                 1pm MD visit                 130 pm Darbepoetin  Reviewed AVS with patient- patient verbalizes understanding

## 2024-08-30 NOTE — PATIENT INSTRUCTIONS
Reviewed labs and recent medical history.  Discussed the swelling in his legs. He will continue to use his support stockings.  Discussed his breathing. He continue to use O2 at home and when he travels.  He will return in 2 weeks for his next injection.  Return to see MD in 4 weeks with his injection.

## 2024-09-02 ENCOUNTER — APPOINTMENT (OUTPATIENT)
Dept: RADIOLOGY | Facility: HOSPITAL | Age: 89
End: 2024-09-02
Payer: MEDICARE

## 2024-09-03 ENCOUNTER — HOSPITAL ENCOUNTER (OUTPATIENT)
Dept: HOSPITAL 100 - LAB | Age: 89
Discharge: HOME | End: 2024-09-03
Payer: MEDICARE

## 2024-09-03 DIAGNOSIS — K74.60: Primary | ICD-10-CM

## 2024-09-03 DIAGNOSIS — N18.4: ICD-10-CM

## 2024-09-03 DIAGNOSIS — D63.8: ICD-10-CM

## 2024-09-03 DIAGNOSIS — E11.22: ICD-10-CM

## 2024-09-03 DIAGNOSIS — R18.8: ICD-10-CM

## 2024-09-03 LAB
ALANINE AMINOTRANSFER ALT/SGPT: 26 U/L (ref 16–61)
ALBUMIN SERPL-MCNC: 3.5 G/DL (ref 3.2–5)
ALKALINE PHOSPHATASE: 111 U/L (ref 45–117)
AMMONIA: 39 UMOL/L (ref 11–32)
ANION GAP: 7 (ref 5–15)
ANISOCYTOSIS BLD QL SMEAR: (no result)
ANISOCYTOSIS: (no result)
AST(SGOT): 31 U/L (ref 15–37)
BUN SERPL-MCNC: 59 MG/DL (ref 7–18)
BUN/CREAT RATIO: 20.1 RATIO (ref 10–20)
CALCIUM SERPL-MCNC: 9.3 MG/DL (ref 8.5–10.1)
CARBON DIOXIDE: 25 MMOL/L (ref 21–32)
CHLORIDE: 106 MMOL/L (ref 98–107)
CRP SERPL-MCNC: < 2.9 MG/L (ref 0–3)
DEPRECATED RDW RBC: 78 FL (ref 35.1–43.9)
DIFFERENTIAL INDICATED: (no result)
ERYTHROCYTE [DISTWIDTH] IN BLOOD: 20.2 % (ref 11.6–14.6)
EST GLOM FILT RATE - AFR AMER: 26 ML/MIN (ref 60–?)
GLOBULIN: 3.5 G/DL (ref 2.2–4.2)
GLUCOSE: 254 MG/DL (ref 74–106)
HCT VFR BLD AUTO: 25.9 % (ref 40–54)
HEMOGLOBIN: 8.4 G/DL (ref 13–16.5)
HGB BLD-MCNC: 8.4 G/DL (ref 13–16.5)
IMMATURE GRANULOCYTES COUNT: 0.01 X10^3/UL (ref 0–0)
MCV RBC: 105.7 FL (ref 80–94)
MEAN CORP HGB CONC: 32.4 G/DL (ref 32–36)
MEAN PLATELET VOL.: 11.1 FL (ref 6.2–12)
NRBC FLAGGED BY ANALYZER: 0 % (ref 0–5)
PLATELET # BLD: 70 K/MM3 (ref 150–450)
PLATELET COUNT: 70 K/MM3 (ref 150–450)
POSITIVE COUNT: YES
POSITIVE DIFFERENTIAL: YES
POSITIVE MORPHOLOGY: YES
POTASSIUM: 4.9 MMOL/L (ref 3.5–5.1)
PROTHROMBIN TIME (PROTIME)PT.: 13.5 SECONDS (ref 11.7–14.9)
RBC # BLD AUTO: 2.45 M/MM3 (ref 4.6–6.2)
RBC DISTRIBUTION WIDTH CV: 20.2 % (ref 11.6–14.6)
RBC DISTRIBUTION WIDTH SD: 78 FL (ref 35.1–43.9)
SCAN SMEAR PER REVIEW CRITERIA: (no result)
WBC # BLD AUTO: 1.6 K/MM3 (ref 4.4–11)
WHITE BLOOD COUNT: 1.6 K/MM3 (ref 4.4–11)

## 2024-09-03 PROCEDURE — 82140 ASSAY OF AMMONIA: CPT

## 2024-09-03 PROCEDURE — 85025 COMPLETE CBC W/AUTO DIFF WBC: CPT

## 2024-09-03 PROCEDURE — 36415 COLL VENOUS BLD VENIPUNCTURE: CPT

## 2024-09-03 PROCEDURE — 86140 C-REACTIVE PROTEIN: CPT

## 2024-09-03 PROCEDURE — 83036 HEMOGLOBIN GLYCOSYLATED A1C: CPT

## 2024-09-03 PROCEDURE — 85610 PROTHROMBIN TIME: CPT

## 2024-09-03 PROCEDURE — 82105 ALPHA-FETOPROTEIN SERUM: CPT

## 2024-09-03 PROCEDURE — 80053 COMPREHEN METABOLIC PANEL: CPT

## 2024-09-04 ENCOUNTER — HOSPITAL ENCOUNTER (OUTPATIENT)
Dept: RADIOLOGY | Facility: HOSPITAL | Age: 89
Discharge: HOME | End: 2024-09-04
Payer: MEDICARE

## 2024-09-04 DIAGNOSIS — J84.9 INTERSTITIAL LUNG DISEASE (MULTI): ICD-10-CM

## 2024-09-04 PROCEDURE — 71250 CT THORAX DX C-: CPT | Performed by: RADIOLOGY

## 2024-09-04 PROCEDURE — 71250 CT THORAX DX C-: CPT

## 2024-09-05 DIAGNOSIS — Z79.4 TYPE 2 DIABETES MELLITUS WITH STAGE 4 CHRONIC KIDNEY DISEASE, WITH LONG-TERM CURRENT USE OF INSULIN (MULTI): Primary | ICD-10-CM

## 2024-09-05 DIAGNOSIS — E11.22 TYPE 2 DIABETES MELLITUS WITH STAGE 4 CHRONIC KIDNEY DISEASE, WITH LONG-TERM CURRENT USE OF INSULIN (MULTI): Primary | ICD-10-CM

## 2024-09-05 DIAGNOSIS — E11.22 TYPE 2 DIABETES MELLITUS WITH STAGE 3B CHRONIC KIDNEY DISEASE, WITHOUT LONG-TERM CURRENT USE OF INSULIN (MULTI): ICD-10-CM

## 2024-09-05 DIAGNOSIS — N18.4 TYPE 2 DIABETES MELLITUS WITH STAGE 4 CHRONIC KIDNEY DISEASE, WITH LONG-TERM CURRENT USE OF INSULIN (MULTI): Primary | ICD-10-CM

## 2024-09-05 DIAGNOSIS — N18.32 TYPE 2 DIABETES MELLITUS WITH STAGE 3B CHRONIC KIDNEY DISEASE, WITHOUT LONG-TERM CURRENT USE OF INSULIN (MULTI): ICD-10-CM

## 2024-09-05 RX ORDER — FLASH GLUCOSE SENSOR
1 KIT MISCELLANEOUS AS NEEDED
COMMUNITY
End: 2024-09-05 | Stop reason: SDUPTHER

## 2024-09-05 RX ORDER — BLOOD-GLUCOSE SENSOR
EACH MISCELLANEOUS
Qty: 10 EACH | Refills: 3 | Status: SHIPPED | OUTPATIENT
Start: 2024-09-05

## 2024-09-05 RX ORDER — FLASH GLUCOSE SENSOR
1 KIT MISCELLANEOUS AS NEEDED
Qty: 1 EACH | Refills: 2 | Status: SHIPPED | OUTPATIENT
Start: 2024-09-05

## 2024-09-05 RX ORDER — FLASH GLUCOSE SCANNING READER
1 EACH MISCELLANEOUS AS NEEDED
COMMUNITY
End: 2024-09-05 | Stop reason: SDUPTHER

## 2024-09-05 RX ORDER — FLASH GLUCOSE SCANNING READER
EACH MISCELLANEOUS
Qty: 1 EACH | Refills: 1 | Status: SHIPPED | OUTPATIENT
Start: 2024-09-05

## 2024-09-10 ENCOUNTER — APPOINTMENT (OUTPATIENT)
Dept: PULMONOLOGY | Facility: CLINIC | Age: 89
End: 2024-09-10
Payer: MEDICARE

## 2024-09-10 VITALS
WEIGHT: 230 LBS | DIASTOLIC BLOOD PRESSURE: 66 MMHG | HEART RATE: 77 BPM | OXYGEN SATURATION: 95 % | BODY MASS INDEX: 36.1 KG/M2 | SYSTOLIC BLOOD PRESSURE: 131 MMHG | HEIGHT: 67 IN | TEMPERATURE: 97.5 F

## 2024-09-10 DIAGNOSIS — J84.9 INTERSTITIAL LUNG DISEASE (MULTI): Primary | ICD-10-CM

## 2024-09-10 PROCEDURE — 1159F MED LIST DOCD IN RCRD: CPT | Performed by: INTERNAL MEDICINE

## 2024-09-10 PROCEDURE — 3078F DIAST BP <80 MM HG: CPT | Performed by: INTERNAL MEDICINE

## 2024-09-10 PROCEDURE — 99214 OFFICE O/P EST MOD 30 MIN: CPT | Performed by: INTERNAL MEDICINE

## 2024-09-10 PROCEDURE — 1157F ADVNC CARE PLAN IN RCRD: CPT | Performed by: INTERNAL MEDICINE

## 2024-09-10 PROCEDURE — 3075F SYST BP GE 130 - 139MM HG: CPT | Performed by: INTERNAL MEDICINE

## 2024-09-10 PROCEDURE — 1036F TOBACCO NON-USER: CPT | Performed by: INTERNAL MEDICINE

## 2024-09-10 NOTE — PROGRESS NOTES
Subjective   Patient ID: Zack Medrano is a 88 y.o. male who presents for No chief complaint on file..  HPI  Patient was seen today in the office to review his x-ray findings and his breathing.  He is accompanied by his wife, Shasta.  Patient's oxygen saturation on OCD at #3 was 96% and a #2 was 95%.  He reports having a mild cough with some clear sputum production no hemoptysis or wheezing.  The patient does have some dyspnea with walking from his car to the office.  Review of Systems  Patient denies having any fever, chills, rhinitis or sore throat.  He is not a smoker.  Objective   Physical Exam  Pulmonary, decreased breath sounds with no adventitious sounds appreciated.  Cardio, heart sounds were regular rate and rhythm.  Extremities, no cyanosis, clubbing or pretibial edema.  Psych, the patient is alert and oriented x 3.  The patient does not appear dyspneic with activity in the office today.  Assessment/Plan        Impressions:  1.  Review of the patient's most recent CT of the chest without contrast on 9/4/2024 shows significant improvement in the groundglass infiltrates noted on his earlier study from 4/5/2024.  There is no evidence of lung masses or nodules.  The patient himself feels that his breathing is also improved.  Recommendations:  1.  Told the patient he can decrease his oxygen to #2 or 2 L/min.  2.  Repeat CT scan of the chest in 6 months and we will follow-up with him afterwards.      This note was transcribed using the Dragon Dictation system.  There may be grammatical, punctuation, or verbiage errors that occur with voice recognition programs.    Rui Graham DO 09/10/24 4:49 PM

## 2024-09-13 ENCOUNTER — INFUSION (OUTPATIENT)
Dept: HEMATOLOGY/ONCOLOGY | Facility: CLINIC | Age: 89
End: 2024-09-13
Payer: MEDICARE

## 2024-09-13 ENCOUNTER — HOSPITAL ENCOUNTER (OUTPATIENT)
Dept: HOSPITAL 100 - US | Age: 89
Discharge: HOME | End: 2024-09-13
Payer: MEDICARE

## 2024-09-13 VITALS
SYSTOLIC BLOOD PRESSURE: 125 MMHG | OXYGEN SATURATION: 92 % | RESPIRATION RATE: 18 BRPM | BODY MASS INDEX: 36.43 KG/M2 | WEIGHT: 232.59 LBS | DIASTOLIC BLOOD PRESSURE: 63 MMHG | HEART RATE: 76 BPM | TEMPERATURE: 97.2 F

## 2024-09-13 DIAGNOSIS — R18.8: ICD-10-CM

## 2024-09-13 DIAGNOSIS — D61.818 OTHER PANCYTOPENIA (MULTI): ICD-10-CM

## 2024-09-13 DIAGNOSIS — N18.4 STAGE 4 CHRONIC KIDNEY DISEASE (MULTI): ICD-10-CM

## 2024-09-13 DIAGNOSIS — K74.60: Primary | ICD-10-CM

## 2024-09-13 DIAGNOSIS — D46.9 MYELODYSPLASTIC SYNDROME, UNSPECIFIED (MULTI): ICD-10-CM

## 2024-09-13 DIAGNOSIS — N18.4: ICD-10-CM

## 2024-09-13 DIAGNOSIS — D69.6 THROMBOCYTOPENIA (CMS-HCC): ICD-10-CM

## 2024-09-13 DIAGNOSIS — D63.1 ANEMIA DUE TO STAGE 3B CHRONIC KIDNEY DISEASE (MULTI): ICD-10-CM

## 2024-09-13 DIAGNOSIS — D70.9 NEUTROPENIA, UNSPECIFIED TYPE (CMS-HCC): ICD-10-CM

## 2024-09-13 DIAGNOSIS — N18.32 ANEMIA DUE TO STAGE 3B CHRONIC KIDNEY DISEASE (MULTI): ICD-10-CM

## 2024-09-13 LAB
ALBUMIN SERPL BCP-MCNC: 4 G/DL (ref 3.4–5)
ALP SERPL-CCNC: 107 U/L (ref 33–136)
ALT SERPL W P-5'-P-CCNC: 19 U/L (ref 10–52)
ANION GAP SERPL CALC-SCNC: 13 MMOL/L (ref 10–20)
AST SERPL W P-5'-P-CCNC: 27 U/L (ref 9–39)
BASOPHILS # BLD MANUAL: 0 X10*3/UL (ref 0–0.1)
BASOPHILS NFR BLD MANUAL: 0 %
BILIRUB SERPL-MCNC: 1 MG/DL (ref 0–1.2)
BUN SERPL-MCNC: 53 MG/DL (ref 6–23)
CALCIUM SERPL-MCNC: 8.4 MG/DL (ref 8.6–10.3)
CHLORIDE SERPL-SCNC: 103 MMOL/L (ref 98–107)
CO2 SERPL-SCNC: 24 MMOL/L (ref 21–32)
CREAT SERPL-MCNC: 2.89 MG/DL (ref 0.5–1.3)
DACRYOCYTES BLD QL SMEAR: ABNORMAL
EGFRCR SERPLBLD CKD-EPI 2021: 20 ML/MIN/1.73M*2
EOSINOPHIL # BLD MANUAL: 0.08 X10*3/UL (ref 0–0.4)
EOSINOPHIL NFR BLD MANUAL: 5 %
ERYTHROCYTE [DISTWIDTH] IN BLOOD BY AUTOMATED COUNT: 20.1 % (ref 11.5–14.5)
GLUCOSE SERPL-MCNC: 335 MG/DL (ref 74–99)
HCT VFR BLD AUTO: 26.7 % (ref 41–52)
HGB BLD-MCNC: 8.6 G/DL (ref 13.5–17.5)
IMM GRANULOCYTES # BLD AUTO: 0.01 X10*3/UL (ref 0–0.5)
IMM GRANULOCYTES NFR BLD AUTO: 0.7 % (ref 0–0.9)
LYMPHOCYTES # BLD MANUAL: 0.89 X10*3/UL (ref 0.8–3)
LYMPHOCYTES NFR BLD MANUAL: 59 %
MCH RBC QN AUTO: 34.7 PG (ref 26–34)
MCHC RBC AUTO-ENTMCNC: 32.2 G/DL (ref 32–36)
MCV RBC AUTO: 108 FL (ref 80–100)
MONOCYTES # BLD MANUAL: 0.02 X10*3/UL (ref 0.05–0.8)
MONOCYTES NFR BLD MANUAL: 1 %
NEUTROPHILS # BLD MANUAL: 0.53 X10*3/UL (ref 1.6–5.5)
NEUTS BAND # BLD MANUAL: 0.02 X10*3/UL (ref 0–0.5)
NEUTS BAND NFR BLD MANUAL: 1 %
NEUTS SEG # BLD MANUAL: 0.51 X10*3/UL (ref 1.6–5)
NEUTS SEG NFR BLD MANUAL: 34 %
NRBC BLD-RTO: 0 /100 WBCS (ref 0–0)
OVALOCYTES BLD QL SMEAR: ABNORMAL
PLATELET # BLD AUTO: 58 X10*3/UL (ref 150–450)
POTASSIUM SERPL-SCNC: 4.6 MMOL/L (ref 3.5–5.3)
PROT SERPL-MCNC: 6 G/DL (ref 6.4–8.2)
RBC # BLD AUTO: 2.48 X10*6/UL (ref 4.5–5.9)
RBC MORPH BLD: ABNORMAL
SODIUM SERPL-SCNC: 135 MMOL/L (ref 136–145)
TOTAL CELLS COUNTED BLD: 100
WBC # BLD AUTO: 1.5 X10*3/UL (ref 4.4–11.3)

## 2024-09-13 PROCEDURE — 85027 COMPLETE CBC AUTOMATED: CPT

## 2024-09-13 PROCEDURE — 76705 ECHO EXAM OF ABDOMEN: CPT

## 2024-09-13 PROCEDURE — 2500000004 HC RX 250 GENERAL PHARMACY W/ HCPCS (ALT 636 FOR OP/ED): Mod: JZ,EC

## 2024-09-13 PROCEDURE — 84075 ASSAY ALKALINE PHOSPHATASE: CPT

## 2024-09-13 PROCEDURE — 85007 BL SMEAR W/DIFF WBC COUNT: CPT

## 2024-09-13 PROCEDURE — 96372 THER/PROPH/DIAG INJ SC/IM: CPT

## 2024-09-13 PROCEDURE — 76981 USE PARENCHYMA: CPT

## 2024-09-13 PROCEDURE — 36415 COLL VENOUS BLD VENIPUNCTURE: CPT

## 2024-09-13 RX ORDER — ALBUTEROL SULFATE 0.83 MG/ML
3 SOLUTION RESPIRATORY (INHALATION) AS NEEDED
OUTPATIENT
Start: 2024-09-27

## 2024-09-13 RX ORDER — EPINEPHRINE 0.3 MG/.3ML
0.3 INJECTION SUBCUTANEOUS EVERY 5 MIN PRN
OUTPATIENT
Start: 2024-09-27

## 2024-09-13 RX ORDER — DIPHENHYDRAMINE HYDROCHLORIDE 50 MG/ML
50 INJECTION INTRAMUSCULAR; INTRAVENOUS AS NEEDED
OUTPATIENT
Start: 2024-09-27

## 2024-09-13 RX ORDER — FAMOTIDINE 10 MG/ML
20 INJECTION INTRAVENOUS ONCE AS NEEDED
OUTPATIENT
Start: 2024-09-27

## 2024-09-13 ASSESSMENT — PAIN SCALES - GENERAL: PAINLEVEL: 0-NO PAIN

## 2024-09-16 ENCOUNTER — APPOINTMENT (OUTPATIENT)
Dept: HEMATOLOGY/ONCOLOGY | Facility: CLINIC | Age: 89
End: 2024-09-16
Payer: MEDICARE

## 2024-09-24 DIAGNOSIS — I25.10 CORONARY ARTERY DISEASE INVOLVING NATIVE HEART, UNSPECIFIED VESSEL OR LESION TYPE, UNSPECIFIED WHETHER ANGINA PRESENT: ICD-10-CM

## 2024-09-24 DIAGNOSIS — R60.9 EDEMA, UNSPECIFIED TYPE: ICD-10-CM

## 2024-09-24 RX ORDER — SPIRONOLACTONE 25 MG/1
25 TABLET ORAL DAILY
Qty: 90 TABLET | Refills: 1 | Status: SHIPPED | OUTPATIENT
Start: 2024-09-24 | End: 2025-03-23

## 2024-09-27 ENCOUNTER — OFFICE VISIT (OUTPATIENT)
Dept: HEMATOLOGY/ONCOLOGY | Facility: CLINIC | Age: 89
End: 2024-09-27
Payer: MEDICARE

## 2024-09-27 ENCOUNTER — INFUSION (OUTPATIENT)
Dept: HEMATOLOGY/ONCOLOGY | Facility: CLINIC | Age: 89
End: 2024-09-27
Payer: MEDICARE

## 2024-09-27 VITALS
RESPIRATION RATE: 20 BRPM | TEMPERATURE: 97.2 F | WEIGHT: 231 LBS | SYSTOLIC BLOOD PRESSURE: 125 MMHG | OXYGEN SATURATION: 90 % | DIASTOLIC BLOOD PRESSURE: 69 MMHG | HEART RATE: 71 BPM | BODY MASS INDEX: 36.18 KG/M2

## 2024-09-27 DIAGNOSIS — D72.819 LEUKOPENIA, UNSPECIFIED TYPE: ICD-10-CM

## 2024-09-27 DIAGNOSIS — N18.32 ANEMIA DUE TO STAGE 3B CHRONIC KIDNEY DISEASE (MULTI): ICD-10-CM

## 2024-09-27 DIAGNOSIS — D63.1 ANEMIA DUE TO STAGE 4 CHRONIC KIDNEY DISEASE (MULTI): ICD-10-CM

## 2024-09-27 DIAGNOSIS — D69.6 THROMBOCYTOPENIA (CMS-HCC): ICD-10-CM

## 2024-09-27 DIAGNOSIS — D70.9 NEUTROPENIA, UNSPECIFIED TYPE (CMS-HCC): ICD-10-CM

## 2024-09-27 DIAGNOSIS — D46.9 MYELODYSPLASTIC SYNDROME, UNSPECIFIED (MULTI): ICD-10-CM

## 2024-09-27 DIAGNOSIS — N18.4 ANEMIA DUE TO STAGE 4 CHRONIC KIDNEY DISEASE (MULTI): ICD-10-CM

## 2024-09-27 DIAGNOSIS — N18.4 STAGE 4 CHRONIC KIDNEY DISEASE (MULTI): ICD-10-CM

## 2024-09-27 DIAGNOSIS — D63.1 ANEMIA DUE TO STAGE 3B CHRONIC KIDNEY DISEASE (MULTI): ICD-10-CM

## 2024-09-27 DIAGNOSIS — D61.818 OTHER PANCYTOPENIA (MULTI): ICD-10-CM

## 2024-09-27 LAB
ALBUMIN SERPL BCP-MCNC: 3.9 G/DL (ref 3.4–5)
ALP SERPL-CCNC: 91 U/L (ref 33–136)
ALT SERPL W P-5'-P-CCNC: 19 U/L (ref 10–52)
ANION GAP SERPL CALC-SCNC: 12 MMOL/L (ref 10–20)
AST SERPL W P-5'-P-CCNC: 31 U/L (ref 9–39)
BASO STIPL BLD QL SMEAR: PRESENT
BASOPHILS # BLD MANUAL: 0 X10*3/UL (ref 0–0.1)
BASOPHILS NFR BLD MANUAL: 0 %
BILIRUB SERPL-MCNC: 1.2 MG/DL (ref 0–1.2)
BUN SERPL-MCNC: 47 MG/DL (ref 6–23)
CALCIUM SERPL-MCNC: 9 MG/DL (ref 8.6–10.3)
CHLORIDE SERPL-SCNC: 102 MMOL/L (ref 98–107)
CO2 SERPL-SCNC: 25 MMOL/L (ref 21–32)
CREAT SERPL-MCNC: 2.33 MG/DL (ref 0.5–1.3)
DACRYOCYTES BLD QL SMEAR: ABNORMAL
EGFRCR SERPLBLD CKD-EPI 2021: 26 ML/MIN/1.73M*2
EOSINOPHIL # BLD MANUAL: 0.07 X10*3/UL (ref 0–0.4)
EOSINOPHIL NFR BLD MANUAL: 5 %
ERYTHROCYTE [DISTWIDTH] IN BLOOD BY AUTOMATED COUNT: 20 % (ref 11.5–14.5)
GLUCOSE SERPL-MCNC: 315 MG/DL (ref 74–99)
HCT VFR BLD AUTO: 25.4 % (ref 41–52)
HGB BLD-MCNC: 8.4 G/DL (ref 13.5–17.5)
IMM GRANULOCYTES # BLD AUTO: 0.01 X10*3/UL (ref 0–0.5)
IMM GRANULOCYTES NFR BLD AUTO: 0.8 % (ref 0–0.9)
LYMPHOCYTES # BLD MANUAL: 0.49 X10*3/UL (ref 0.8–3)
LYMPHOCYTES NFR BLD MANUAL: 38 %
MCH RBC QN AUTO: 35 PG (ref 26–34)
MCHC RBC AUTO-ENTMCNC: 33.1 G/DL (ref 32–36)
MCV RBC AUTO: 106 FL (ref 80–100)
MONOCYTES # BLD MANUAL: 0.04 X10*3/UL (ref 0.05–0.8)
MONOCYTES NFR BLD MANUAL: 3 %
NEUTS SEG # BLD MANUAL: 0.56 X10*3/UL (ref 1.6–5)
NEUTS SEG NFR BLD MANUAL: 43 %
NRBC BLD-RTO: 0 /100 WBCS (ref 0–0)
OVALOCYTES BLD QL SMEAR: ABNORMAL
PLATELET # BLD AUTO: 51 X10*3/UL (ref 150–450)
POLYCHROMASIA BLD QL SMEAR: ABNORMAL
POTASSIUM SERPL-SCNC: 4.3 MMOL/L (ref 3.5–5.3)
PROT SERPL-MCNC: 6.1 G/DL (ref 6.4–8.2)
RBC # BLD AUTO: 2.4 X10*6/UL (ref 4.5–5.9)
RBC MORPH BLD: ABNORMAL
SCHISTOCYTES BLD QL SMEAR: ABNORMAL
SODIUM SERPL-SCNC: 135 MMOL/L (ref 136–145)
TOTAL CELLS COUNTED BLD: 100
VARIANT LYMPHS # BLD MANUAL: 0.14 X10*3/UL (ref 0–0.3)
VARIANT LYMPHS NFR BLD: 11 %
WBC # BLD AUTO: 1.3 X10*3/UL (ref 4.4–11.3)

## 2024-09-27 PROCEDURE — 3074F SYST BP LT 130 MM HG: CPT | Performed by: INTERNAL MEDICINE

## 2024-09-27 PROCEDURE — 36415 COLL VENOUS BLD VENIPUNCTURE: CPT

## 2024-09-27 PROCEDURE — 99213 OFFICE O/P EST LOW 20 MIN: CPT | Performed by: INTERNAL MEDICINE

## 2024-09-27 PROCEDURE — 85007 BL SMEAR W/DIFF WBC COUNT: CPT | Performed by: INTERNAL MEDICINE

## 2024-09-27 PROCEDURE — 96372 THER/PROPH/DIAG INJ SC/IM: CPT

## 2024-09-27 PROCEDURE — 1157F ADVNC CARE PLAN IN RCRD: CPT | Performed by: INTERNAL MEDICINE

## 2024-09-27 PROCEDURE — 1159F MED LIST DOCD IN RCRD: CPT | Performed by: INTERNAL MEDICINE

## 2024-09-27 PROCEDURE — 1126F AMNT PAIN NOTED NONE PRSNT: CPT | Performed by: INTERNAL MEDICINE

## 2024-09-27 PROCEDURE — 3078F DIAST BP <80 MM HG: CPT | Performed by: INTERNAL MEDICINE

## 2024-09-27 PROCEDURE — 80053 COMPREHEN METABOLIC PANEL: CPT | Performed by: INTERNAL MEDICINE

## 2024-09-27 PROCEDURE — 85027 COMPLETE CBC AUTOMATED: CPT | Performed by: INTERNAL MEDICINE

## 2024-09-27 PROCEDURE — 2500000004 HC RX 250 GENERAL PHARMACY W/ HCPCS (ALT 636 FOR OP/ED): Mod: JZ,EC

## 2024-09-27 RX ORDER — DIPHENHYDRAMINE HYDROCHLORIDE 50 MG/ML
50 INJECTION INTRAMUSCULAR; INTRAVENOUS AS NEEDED
OUTPATIENT
Start: 2024-10-11

## 2024-09-27 RX ORDER — ALBUTEROL SULFATE 0.83 MG/ML
3 SOLUTION RESPIRATORY (INHALATION) AS NEEDED
OUTPATIENT
Start: 2024-10-11

## 2024-09-27 RX ORDER — FAMOTIDINE 10 MG/ML
20 INJECTION INTRAVENOUS ONCE AS NEEDED
OUTPATIENT
Start: 2024-10-11

## 2024-09-27 RX ORDER — EPINEPHRINE 0.3 MG/.3ML
0.3 INJECTION SUBCUTANEOUS EVERY 5 MIN PRN
OUTPATIENT
Start: 2024-10-11

## 2024-09-27 ASSESSMENT — PAIN SCALES - GENERAL: PAINLEVEL: 0-NO PAIN

## 2024-09-27 NOTE — PROGRESS NOTES
Labs drawn on arrival  Pt going over for his Darbepoetin after his visit today    10/11 1030 lab and Darbepoetin at Infusion    10/25 1200 arrival- david will draw labs            1230 MD visit             1330 darbepoetin injection  No changes made  Reviewed AVS with patient- patient verbalizes understanding

## 2024-09-27 NOTE — PATIENT INSTRUCTIONS
reviewed labs and recent medical history.  He will continue his Darbopoetin alpha every 2 weeks .  He will call us if he has any questions or concerns.  See MD in 1 month when he is getting his injection.

## 2024-09-27 NOTE — PROGRESS NOTES
Patient ID:Zack Medrano is a 88 y.o. year old male patient with myelodysplasia         Referring Physician: Ayah Carmichael MD  [address] [place] [address] [address]  [place] [address]  Primary Care Provider: Fabiola Dietz MD    Chief Complaint  Chief Complaint   Patient presents with    Anemia          History of the Present Illness  10/9/2019.  Visit with Dr. Mickey Nguyen for chronic kidney disease and hypertension.  Chief complaint was fatigue and need to lose weight     11/12/2019.  The patient has had long-term history of microcytic anemia going back to at least 2019 at which time his white count was 5.2 hemoglobin was 13.8 hematocrit 40.5 and a platelet count of 157,000.  His MCV was 105 MCHC was 34.  White blood cell differential count showed 66% polys, 17 lymphs, 11 monos and 4 eosinophils.     12/11/2019.  Seen by primary care for probable shingles treated with Valtrex with chief complaint of waxing and waning left flank pain radiating up under his ribs, somewhat responsive to exercise..  He had a history of pulmonary embolus and was on Eliquis long-term.     2/24/2020.  Seen by Dr. Howell for elevated PSA, BPH and erectile dysfunction.  PSA in 2017 was 2.94, in 2018 it was 2.74 and in 2020 it was 4.03.  We discussed biopsy and the patient wanted to have this followed and not biopsy.     3/9/2020.  Follow-up with NATALY Pulido primary care.  Referred to Dr. Bahena for colonoscopy and CT scan of the chest was ordered for follow-up of questionable lingular nodule.     3/11/2020.  CT scan of the chest showed a stable 6 mm nodular lesion near the fissure in the lingula.     3/13/2020.  Seen by Dr. Bahena who noted that the patient had had diarrhea off-and-on since cholecystectomy and had intermittent left-sided pain.  He had a history of colon polyps.     4/22/2015.  Colonoscopy showed that he had tubular adenoma     5/13/2020.  Seen by Dr. Migdalia New in follow-up.  She noted that he had an abnormal  CBC with macrocytic indices with a normal B12 level.  The patient denies alcohol use.  Hemoglobin A1c was 6.2.  BUN was 33 creatinine was 1.74.     8/31/2020.  Follow-up with Dr. Howell.  PSA had come down from 4.03-3.77     1/4/2021 through 1/12/2021.  Admitted to the hospital having been brought to the ED by squad after he fell forward on his toilet and was unable to get up.  He denied hitting his head or losing consciousness.  He blamed it on having back pain and shoulder pain for several weeks.  He was febrile with a temperature of 100.9 and hypoxic with pulse ox of 86% on room air.  Respirations were 24.  He was positive for COVID and was treated with intravenous antibiotics and remdesivir, increased supplemental oxygen.  He was able to be discharged to rehabilitation.     Repeat CBC showed white count 4.3 with a hemoglobin 12.9 hematocrit 38.8 with an MCV of 102 and a platelet count of 173,000.  White blood cell differential count showed minimal absolute lymphocytopenia at 0.6 with the lower limits of normal being 0.8.     3/1/2021.  Follow-up with Dr. Howell.  PSA was 9.9 in February.  Decided to have prostate biopsy done on 3/22/2021.  Results showed BPH.     10/6/2021.  Follow-up with Dr. Howell.  PSA in September was 2.77     4/6/2022.  CBC showed white count 4.3 with hemoglobin 11.7 hematocrit 34.4 with an MCV of 107.  Platelet count was 153,000.  White blood cell differential count was essentially normal.     5/31/2022.  CBC showed white count 3.6 with hemoglobin 11.6 hematocrit 34.4 and platelet count 117,000 with a normal white blood cell differential.     6/27/2022.  Referred to Dr. Dash because of pancytopenia by NATALY Gray.  CBC showed a white count of 3.9 with hemoglobin 12.3 hematocrit 36.1 and platelet count 129,000 with a normal differential.  PSA was 3.26.     The patient said that he felt well except for having increased fatigue.  He continued on long-term anticoagulation.  He was able to  carry out his ADLs.  He did complain of easy bruising.  He said that he had worked at [employer] for 12 years and also worked in making [employer].  Dr. Dash felt that there was a large component secondary to his chronic kidney disease.  There was no evidence of nutritional deficiency.  He checked him for hemolysis and monoclonal gammopathy and ordered an ultrasound for hepatosplenomegaly and testing for hepatitis, HIV and CHRISTINA.  He said he would rather observe the patient rather than pursuing a bone marrow biopsy.     7/27/2022.  Repeat lab data showed white count 3.9 with hemoglobin 12.3 hematocrit 36.1 and platelet count 129,000.  BUN was 36 creatinine was 1.78.  He had mild elevation of AST.  Retake count was 2.3%.  Flow cytometry showed a small clonal B-cell population.  Hepatitis B was nonreactive.  Hep C testing was negative.  Serum protein electrophoresis was normal.  Haptoglobin was normal.  LDH was normal.  He said that the possibility of MDS could not be ruled out.  Ultrasound showed fatty liver but no splenomegaly.  He plan for EPO level and NGS for myeloid mutation.     9/1/2022.  Started on allopurinol for hyperuricemia.     10/12/2022.  Seen by Dr. Dr. Dietz.  Patient was referred to dermatology for several skin lesions.  He was referred to neurology because of his tremor that was not improving.  She increased his glimepiride.     10/17/2022.  CBC showed white count 3.0 with hemoglobin 10.5 hematocrit 31.3 and a platelet count of 106,000 MCV was 111.     10/26/2022.  Follow-up with Dr. 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  He was suspicious for MDS.  He referred him for a bone marrow biopsy.     12/1/2022.  Follow-up with Dr. Dash.  Bone marrow biopsy showed low-grade MDS.  R IPSS score was low or very low depending on a karyotype which was still pending.  We discussed initiating erythropoietin with Aranesp but the patient deferred and the decision  was made to monitor him.     1/11/2023.  CBC showed white count 3.4 with hemoglobin 10.3 hematocrit 32.2 and a platelet count of 137,000.  MCV was 115.  White blood cell differential count was normal.     1/13/2023.  Follow-up with Dr. Dash.  The patient did say that he had following getting out of the shower but did not sustain any injuries.  He had been started by Dr. Nguyen on insulin for his diabetes which was helpful.  Patient continued to be active inside his house.  He remains on Eliquis.  BR-IPSS score is very low.  Counts remain stable.     3/31/2023.  CBC showed a white count of 2.6 with hemoglobin 8.7 hematocrit 27.1 and a platelet count 97,000.  MCV was 115.  White blood cell differential count showed that he had an absolute neutrophil count of 1.36.     4/10/2023.  CBC showed a white count of 2.5 with a hemoglobin 9.0 hematocrit of 27.7 with a platelet count of 92,000.  MCV was 114.     4/17/2023.  Follow-up with NATALY Gray.  Even though his hemoglobin had dropped to 9 the patient did not want to start his erythropoietin injections.     5/11/2023.  CBC showed white count of 2.5 with a hemoglobin of 7.6 hematocrit 23.8 and a platelet count of 101,000.  Absolute neutrophil count was 1.17.     5/15/2023 through 5/19/2023.  Admitted to the hospital with anemia and generalized weakness black stools dizziness with a hemoglobin of 6.7.  His BUN was 51 creatinine was 2.08.  CT scan of the abdomen showed no acute process.  There were findings consistent with atypical healing of the previous rib fracture with question Paget's disease although it was nonspecific.  He was transfused 2 units packed red blood cells.  EGD found 3 erosions in the cardia.  He was prescribed Carafate.     5/24/2023.  Follow-up with Dr. Dr. Dietz after discharge.  CBC showed white count 1.9 with hemoglobin 8.6 hematocrit 28.3 and a platelet count of 140,000.  Absolute neutrophil count was 1.06.  Patient did not tolerate  "Carafate and stopped it.  He was prescribed oral iron twice a day.  He had not been prescribed a PPI which was then started.     6/1/2023.  CBC showed white count of 1.9 with hemoglobin 7.7 hematocrit 24.8 and a platelet count of 87,000.     6/13/2023.  CBC showed a white count of 1.9 with hemoglobin of 8.7 hematocrit 28.1 and platelet count of 89,000.     6/23/2023.  Followed up with Dr. Dr. Dietz who reported that he was hospitalized again in [place] for GI bleed.  At that time his Eliquis was discontinued and he was changed to Protonix and taken off his baby aspirin.  Once again he was started on Carafate but did not tolerate it.     7/15/2023.  CBC showed white count of 2.4 with hemoglobin 9.2 hematocrit 27.3 and a platelet count of 110,000.  Absolute neutrophil count was 1.36.     7/17/2023.  Follow-up with Malorie Shaffer.  The patient had recently had a colonoscopy and had \"cauterizations\" he noted that he had dyspnea with exertion but no resting shortness of breath.  He was on prednisone at that time.  His sugars were in the 300s.  He was on insulin.  His neuropathy has not changed and he continues to have tremors.  Current level was found to be saturated.  Once again the patient was reluctant to start NING.     8/14/2023.  Follow-up with Malorie Shaffer.  CBC showed white count 1.8 with a hemoglobin 8.9 hematocrit 26.9 and a platelet count of 100,000.  Absolute neutrophil count was 0.96.     9/6/2023.  CBC showed a white count of 1.8 with hemoglobin 9.5 hematocrit 28.8 and a platelet count of 85,000 with an absolute neutrophil count of 0.99.  9/13/2023.  Follow-up with Dr. Howell.  PSA has gone down to 1.87.     10/9/2023.  Follow-up with Malorie Shaffer who noted the patient remained on oxygen and CPAP.  He says that he is awakening in the middle of the night around 3 to 4:00 in the morning.  Sugars have been in the 300s.  Neuropathy was unchanged.  He had been started on erythropoietin every 3 weeks     11/21/2023.  " Follow-up with Malorie Shaffer.  No improvement in his hemoglobin.  White count had improved to 2.1 platelets were baseline.  He had been placed on 40 mg of prednisone and continued on erythropoietin 300 mcg every 3 weeks.     1/2/2024.  Follow-up with Malorie Shaffer.  No improvement in hemoglobin.  Symptomatically he was doing well.  Plan was to increase frequency of his darbepoetin to every 2 weeks.     2/13/2024.  Follow-up with Malorie Shaffer.  Now on erythropoietin injections every 2 weeks.  There was a discussion concerning repeat bone marrow biopsy with counts stayed low.     3/12/2024.  Follow-up with Malorie Shaffer.  No improvement in his hemoglobin.  Patient was agreeable to repeat bone marrow biopsy.  She also ordered a CAT scan of the chest abdomen and pelvis without contrast.     3/22/2024.  CAT scan showed severe coronary artery calcifications.  There is evidence of bronchiectasis in the lower lobes increased compared to previous CT scan from 2020.     Predominantly groundglass opacities with bibasilar prominence.  There is some subpleural sparing of the left upper lobe.  There is a 3 mm nodule in the right upper lobe which was unchanged for several years.  Multilevel anterior bridging osteophytes were noted consistent with DISH.  There is a small fat-containing periumbilical hernia and bilateral inguinal hernias.  Spleen size is at the upper limits of normal being 12.7 cm in length slightly increased when compared to study from 2019.  Cysts were seen in the kidneys with diffuse cortical atrophy and a 4 mm hyperdense focus in the interpolar region of the right kidney possibly a stone.  Prostatism was noted for gland measuring 6.2 cm.     3/28/2024.  Seen by Dr. Graham for shortness of breath.  He felt the patient had possible chronic interstitial lung disease versus periodic aspiration, hypoxia dyspnea on exertion.  Further tests were ordered including high-resolution CT scan, complete pulmonary function testing,  simple pulmonary stress test and follow-up.     4/9/2024.  Follow-up with Malorie Shaffer.  Erythropoietin injections continued every 2 weeks which the patient was tolerating well.     4/16/2024.  Bone marrow biopsy was performed showing a hypercellular bone marrow at 50% with dyserythropoiesis.  There are 1% blasts consistent with persistent myeloid neoplasm.  Flow cytometry showed a small clonal CD5 negative, CD10 negative B-cell population and a polytypic background.  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     There was a very small abnormal T-cell population that could be incidental.  Next generation sequencing showed 2 U2AF 1 variants, 1 of which was negative.  The other had been previously seen.     The marrow also showed dyserythropoiesis with bilobate forms and forms with irregular nuclear contours as well as blebbing.     4/23/2024.  CBC showed white count 1.5 with a hemoglobin of 8.3 hematocrit 25.9 and platelet count of 71,000 with 37% neutrophils, 53 lymphocytes, 4 monos, 4 eosinophils with an absolute neutrophil count of 0.57.     4/24/2024.  Return visit with Dr. Graham.  He noted that the high-resolution CT scan showed no significant change from the previous CT scan the month before.  Pulmonary function testing showed no response to bronchodilator and there was mild restrictive change with some suggestion of airway inflammation.  The patient had difficulty doing the test because of persistent coughing.  6-minute walk test showed that the patient was able to maintain saturations at 93% with 2 L of oxygen per minute continuously.  The test was terminated because of dyspnea.  With his hypoxia on room air it was recommended that he be on oxygen continuously to keep his saturation greater than or equal to 92%.  He continued on oxygen with his CPAP at 3 L at  nighttime.     5/10/2024.  This is my first visit with Mr. Medrano and his family.  We went over some of the results of the bone marrow biopsy.  I told him that I would have to wait until the chromosomal analysis returned.  I also note the possibility of low-grade lymphoma.  He may need to have an opinion with malignant hematology at [facility].  Stepdaughter who is here with him and his wife today is his advocate for maintaining his quality of life.  She is an employee at a local nursing facility and is concerned about many clients she has who seem to be getting treated aggressively.  We talked about myelodysplasia and the fact that he is not responding to erythropoietin.  He will continue on this treatment for now.  He is low counts were discussed and he knows that he is at higher risk for infection and bleeding.  He continues on oxygen supplementation continuously.  They will return in 2 weeks at which time laboratory tests will be performed.     He just had a lesion removed from the dorsum of his right hand at the base of his first and second fingers.  Stitches are still in place.  It seems to be healing well without any significant bleeding or infection.  He was told that it looked benign.     5/13/2024.  Follow-up with Dr. Aggie Danielle of cardiology who saw him in follow-up of his coronary artery disease with multiple stents having been placed, hypertension, dyslipidemia and previous pulmonary embolus related to COVID-19.  She had stopped his Eliquis.  She noted his myelodysplasia.  She noted that at some point his aspirin had been stopped but given his history of stents this was restarted and he tolerated it well.  She said that his blood pressure was well-controlled.  She noted blood sugars were between 150-2 50.  She noted the patient was supposed to be using oxygen with exertion but the patient was not sure what rate it was supposed to be.  She says that she would not stop his aspirin unless his platelets were  less than 50,000 with complications of bleeding.  She said it was very important to continue aspirin given a 15% chance of in-stent thrombosis with aspirin cessation.  She increased her furosemide to 80 mg twice a day because of bilateral lower extremity edema and spironolactone to 25 mg twice daily until symptoms improve and encouraged him to cut back on his salt intake.  She was see him again in about 9 months.     5/14/2024.  Follow-up with Dr. Santamaria of ophthalmology who started him on Naphcon-A drops 1 drop 3 times a day in both eyes for excessive tearing     5/15/2024.  Followed up with Dr. Dimas Renteria of podiatry who noted decreased pedal pulses 2 out of 4.  He had footcare.     5/24/2024.  Follow-up with Dr. Graham.  Review of his x-rays showed no change.  Physical examination showed decreased breath sounds but no inspiratory crackles that were previously heard.  He felt that the interstitial lung disease was stable and that his hypoxemia was improved on oxygen.  He plan to repeat CT scan in September.     5/24/2024.  He is here to follow-up on his bone marrow results.  I told him that the chromosomal analysis would not be forthcoming before because the specimen failed to grow in culture.  He continues to display a low-grade MDS with blast count of only 2%.  We reviewed the fact that he is recommended to have at least 6 months of his erythropoietin before we decide whether or not it is a failure.  He is agreeable to this.  We will increase his dose of darbepoetin.  He knows to be on the look out for any signs of infection or bleeding and to report this if it happens.  We reviewed his most recent CBC.  We will see him again in 2 weeks with his next injection.     5/30/2024.  Follow-up with Dr. Dr. Dietz for his Medicare wellness visit.  His magnesium dose was changed to twice daily indefinitely.     6/7/2024.  He is here today for his darbepoetin injection.  We have increased his dose to 500 mcg  every 2 weeks.  Will see if this has any effect.  Laboratory dated today shows that his sugar is 290 and his sodium is 134.  BUN is 46 and creatinine is 2.31.  CBC shows white count 1.5 with a hemoglobin 8.1 hematocrit 24.7 and a platelet count of 71,000 which is very much like his most recent CBC.  His absolute neutrophil count is 0.57.     I talked to him about his current status which he feels is fairly stable.  He is off oxygen.  He has had no bleeding and no signs of infection.  He understands that we have increased his dose of darbepoetin and we will watch his counts.  He will report any side effects.     He has continued to receive his darbepoetin injections.     7/15/2024.  Emergency department visit [facility].  He developed a rash on both lower extremities which started a week ago which is an erythematous papular rash.  He denied any pain or itching.  He did not have any vesicle formation.  It is bilateral.  He has had a shingles injection.  He was told that he had disseminated zoster and was started on valacyclovir a gram twice daily for 7 days.     7/19/2024.  Seen by Dr. Dr. Dietz who said that the rash has not gotten any worse and might be a little bit better.  She noted that there were no vesicles no open areas no areas of secondary infection.     7/19/2024.  He is here to receive his erythropoietin injection and to have a blood count.  I told him it is not likely that this is disseminated zoster because it is nowhere else on his body but only on his lower extremities.  This could be stasis dermatitis.  I do not see any signs of vesicle formation and there never were any.  It is mostly on the anterior shins.  It is all below the knee.     CBC today shows white count 1.4 with hemoglobin 8 hematocrit 25.4 platelet count 67,000 with 37 neutrophils 53 lymphocytes 5 monos 2 eosinophils.  He is serum chemistry showed a sugar of 285 BUN of 49 creatinine of 2.67.  We told him he needed to keep  his hydration status high.  He will continue with his injections today.  Blood pressure today is 153/64.     He has had no bleeding and no infection.  He has had no chest pain or pressure.  He is able to participate in his usual activities including mowing the yard.     He has continued to receive his darbepoetin and has followed up with podiatry for nail and callus care.     8/30/2024.  He is here in routine follow-up.  He says that he is doing well.  He has had no infections or bleeding.  He has had no mouth sores.  He has had continued swelling in his legs a little bit worse on the left than on the right.  Blood sugar was greater than 300 but he admits to dietary indiscretion.  Has had no chest pain.  His blood pressure was 149/63.  He says that his breathing is stable.  He is using his oxygen at home at night and when he travels that he does not have it on today.     His lab data has shown no sign of improvement so far with white count 1.3, hemoglobin 8.6 hematocrit 27.2 and a platelet count of 69,000.  White blood cell differential count shows 38% polys, 50 lymphs, 5 monos, 2 eosinophils.  We plan a 6-month trial of this dose of darbepoetin which will extend until November.  We will see him again in about a month.    Interval Note  9/4/2024.  CT scan of the chest showed mild streaky scarring in the lung bases bilaterally.  The previously seen groundglass opacities probably be minimally improved.  Calcified pleural plaques are seen without consolidation effusion or pneumothorax.  There is no adenopathy seen by CT size criteria.  Minimal hepatic steatosis was seen as well as calcified granuloma in the spleen and a somewhat atrophic pancreas.  DJD was seen in the spine.    9/5/2024.  Seen by Dr. Zambrano his nephrologist who ordered lab tests and felt that his fluctuating chemistries were related to diuretic therapy.  He noted myelodysplasia and that he was being followed for his anemia receiving NING.  He did not  change any of his medications  And said that he would see him again in about 6 months    9/10/2024.  Follow-up by Dr. Graham at which time he told the patient he could decrease his oxygen to 2 and he would repeat a CT scan in 6 months and see him after that.    9/27/2024.  He is here today in routine follow-up.  He will get his shot today.  Laboratory data shows no improvement.  We reviewed that we will follow him for 6 months on this treatment to see if there is any improvement.    Mr. Roth says that he has had no significant bleeding.  I see some red spots on the soft palate.  His appetite has been good and he has gained weight of 1 pound since last time I saw him about a month ago.  He has had no other signs of infection or deterioration in his status.  He knows he is at risk for serious infection or bleeding.  We will continue on this treatment pathway.  He will see him again in a month.    Comorbidities  GERD  Arthritis, DJD  BPH  Coronary artery disease with history of myocardial, status post stent infarction  History of dumping syndrome  Hyperlipidemia  History of skin cancer  Obesity  Obstructive sleep apnea on CPAP  Peripheral arterial disease  CKD 3  Diabetes with nephropathy  History of cholecystectomy and hernia repair  Essential tremor  Hypertension   Numbness in his fingers felt to be related to cervical disc disease.    Monitoring Parameters  Histories, physical examinations and CBCs with occasional bone marrow biopsies.     Review of Systems - Oncology   Review of Systems   Constitutional:  Positive for fatigue. Negative for appetite change and unexpected weight change.   HENT:  Negative for dental problem, mouth sores, nosebleeds and trouble swallowing.    Eyes:  Negative for visual disturbance.   Respiratory:  Positive for shortness of breath (with activity). Negative for cough.    Cardiovascular:  Positive for leg swelling.   Gastrointestinal:  Negative for abdominal pain, constipation,  diarrhea, nausea and vomiting.   Endocrine: Negative for polyuria.   Genitourinary:  Negative for dysuria, frequency and urgency.   Musculoskeletal:  Positive for arthralgias and myalgias.   Skin:  Positive for pallor. Negative for rash.   Neurological:  Negative for weakness, light-headedness, numbness and headaches.   Hematological:  Bruises/bleeds easily.   Psychiatric/Behavioral:  Negative for self-injury, sleep disturbance and suicidal ideas. The patient is not nervous/anxious.         Past Medical History  Past Medical History:   Diagnosis Date    Abnormal levels of other serum enzymes     Abnormal liver enzymes    Dependence on other enabling machines and devices     CPAP (continuous positive airway pressure) dependence    Encounter for examination of eyes and vision without abnormal findings     Diabetic eye exam    Localized edema     Bilateral leg edema    Old myocardial infarction     History of myocardial infarction    Other conditions influencing health status 01/08/2020    Uncontrolled diabetes mellitus type 2 without complications    Pain in unspecified ankle and joints of unspecified foot     Arthralgia of ankle    Personal history of other diseases of the digestive system     History of gastroesophageal reflux (GERD)    Personal history of other diseases of the digestive system     History of dumping syndrome    Personal history of other endocrine, nutritional and metabolic disease 11/12/2020    History of type 2 diabetes mellitus    Presence of coronary angioplasty implant and graft     History of coronary artery stent placement    Rash and other nonspecific skin eruption 12/11/2019    Rash    Unspecified abdominal hernia without obstruction or gangrene     Hernia    Unspecified abdominal pain 05/19/2021    Abdominal pain, left lateral        Surgical History  Past Surgical History:   Procedure Laterality Date    OTHER SURGICAL HISTORY  10/09/2019    Arterial stent placement    OTHER SURGICAL  "HISTORY  10/09/2019    Cholecystectomy    OTHER SURGICAL HISTORY  10/09/2019    Hernia repair       Social History  Social History     Tobacco Use    Smoking status: Never    Smokeless tobacco: Never   Vaping Use    Vaping status: Never Used   Substance Use Topics    Alcohol use: Never    Drug use: Never        Family History  family history includes Colon cancer in his mother and another family member.       Current Medications  Current Outpatient Medications   Medication Instructions    allopurinol (ZYLOPRIM) 100 mg, oral, Daily    amLODIPine (NORVASC) 5 mg, oral, Daily before breakfast    ascorbic acid (VITAMIN C) 250 mg, oral    aspirin 81 mg, oral, Daily    atorvastatin (LIPITOR) 40 mg, oral, Daily    blood-glucose sensor (FreeStyle Sindi 3 Sensor) device Use as directed    cyanocobalamin, vitamin B-12, (Vitamin B-12) 1,000 mcg tablet extended release 1 tablet, oral    flash glucose scanning reader (FreeStyle Sindi 2 [place]) misc Use as instructed    FreeStyle Sindi 2 Sensor kit 1 each, subcutaneous, As needed, Use as instructed    furosemide (LASIX) 60 mg, oral, Daily    gabapentin (NEURONTIN) 300 mg, oral, 2 times daily    lancets 33 gauge misc miscellaneous, 2 times daily    Lantus U-100 Insulin 50 Units, subcutaneous, Nightly    metoprolol tartrate (LOPRESSOR) 50 mg, oral, 2 times daily    multivit-minerals/folic acid (CENTRUM ADULTS ORAL) oral    nitroglycerin (NITROSTAT) 0.4 mg, sublingual, Every 5 min PRN    NON FORMULARY [employer]etrack test In vitro strip; Use as directed to check blood sugar  2 times daily     pantoprazole (PROTONIX) 40 mg, oral, 2 times daily    pen needle, diabetic (Pen Needle) 32 gauge x 5/32\" needle 1 each, miscellaneous, Daily    primidone (MYSOLINE) 50 mg, oral, Nightly    spironolactone (ALDACTONE) 25 mg, oral, Daily    sucralfate (CARAFATE) 1 g, oral, 3 times daily (morning, midday, late afternoon)    vitamins A,C,E-zinc-copper 2,148 mcg-113 mg-45 mg-17.4mg tablet oral, SUPER VIEW  "           OARRS Review  I have personally reviewed the OARRS report for Zack Medrano. I have considered the risks of abuse, dependence, addiction and diversion.  He continues on gabapentin through primary care.    Vital Signs  /69   Pulse 71   Temp 36.2 °C (97.2 °F)   Resp 20   Wt 105 kg (231 lb)   SpO2 90%   BMI 36.18 kg/m²      Physical Exam  Vitals and nursing note reviewed. Exam conducted with a chaperone present.   Constitutional:       General: He is not in acute distress.     Appearance: He is obese. He is ill-appearing. He is not toxic-appearing.      Comments: He is a well-developed well-nourished overweight elderly white male who is unaccompanied again today. He does not have his oxygen on.  He appears to be minimally chronically ill and pale.  He is in good spirits.     HENT:      Head: Normocephalic and atraumatic.      Nose: Nose normal.      Mouth/Throat:      Mouth: Mucous membranes are moist.      Pharynx: Oropharynx is clear. Posterior oropharyngeal erythema present. No oropharyngeal exudate.      Comments: He does have several areas of erythema on the soft palate on the right side.  He has no evidence of thrush.  Eyes:      General: No scleral icterus.     Extraocular Movements: Extraocular movements intact.      Conjunctiva/sclera: Conjunctivae normal.      Pupils: Pupils are equal, round, and reactive to light.   Neck:      Comments: No significant lymphadenopathy is palpated in the head neck region, supraclavicular or axillary areas bilaterally.  Cardiovascular:      Rate and Rhythm: Normal rate and regular rhythm.      Heart sounds: Murmur (flow murmur) heard.   Pulmonary:      Effort: Pulmonary effort is normal.      Breath sounds: Wheezing present. No rhonchi or rales.      Comments: He is not on oxygen today.  Abdominal:      General: There is no distension.      Palpations: Abdomen is soft. There is no mass.      Tenderness: There is no abdominal tenderness. There is no  guarding or rebound.   Musculoskeletal:         General: Normal range of motion.      Cervical back: Normal range of motion. No rigidity or tenderness.      Right lower leg: Edema present.      Left lower leg: Edema present.   Lymphadenopathy:      Cervical: No cervical adenopathy.   Skin:     General: Skin is warm and dry.      Coloration: Skin is pale. Skin is not jaundiced.      Comments: Scattered bruises mostly on his arms    Neurological:      General: No focal deficit present.      Mental Status: He is alert and oriented to person, place, and time. Mental status is at baseline.      Cranial Nerves: Cranial nerve deficit present.      Motor: No weakness.      Gait: Gait normal.      Comments: He is hard of hearing   Psychiatric:         Mood and Affect: Mood normal.         Behavior: Behavior normal.         Thought Content: Thought content normal.         Judgment: Judgment normal.      Comments: He is pleasant and interactive.          Current Laboratory Data  Recent Results (from the past 168 hour(s))   Comprehensive Metabolic Panel    Collection Time: 09/27/24  1:05 PM   Result Value Ref Range    Glucose 315 (H) 74 - 99 mg/dL    Sodium 135 (L) 136 - 145 mmol/L    Potassium 4.3 3.5 - 5.3 mmol/L    Chloride 102 98 - 107 mmol/L    Bicarbonate 25 21 - 32 mmol/L    Anion Gap 12 10 - 20 mmol/L    Urea Nitrogen 47 (H) 6 - 23 mg/dL    Creatinine 2.33 (H) 0.50 - 1.30 mg/dL    eGFR 26 (L) >60 mL/min/1.73m*2    Calcium 9.0 8.6 - 10.3 mg/dL    Albumin 3.9 3.4 - 5.0 g/dL    Alkaline Phosphatase 91 33 - 136 U/L    Total Protein 6.1 (L) 6.4 - 8.2 g/dL    AST 31 9 - 39 U/L    Bilirubin, Total 1.2 0.0 - 1.2 mg/dL    ALT 19 10 - 52 U/L   CBC and Auto Differential    Collection Time: 09/27/24  1:05 PM   Result Value Ref Range    WBC 1.3 (L) 4.4 - 11.3 x10*3/uL    nRBC 0.0 0.0 - 0.0 /100 WBCs    RBC 2.40 (L) 4.50 - 5.90 x10*6/uL    Hemoglobin 8.4 (L) 13.5 - 17.5 g/dL    Hematocrit 25.4 (L) 41.0 - 52.0 %     (H) 80 -  100 fL    MCH 35.0 (H) 26.0 - 34.0 pg    MCHC 33.1 32.0 - 36.0 g/dL    RDW 20.0 (H) 11.5 - 14.5 %    Platelets 51 (L) 150 - 450 x10*3/uL    Neutrophils %      Immature Granulocytes %, Automated      Lymphocytes %      Monocytes %      Eosinophils %      Basophils %      Neutrophils Absolute      Lymphocytes Absolute      Monocytes Absolute      Eosinophils Absolute      Basophils Absolute            Recent Imaging  CT chest wo IV contrast    Result Date: 9/6/2024  Interpreted By:  Mick Figueroa, STUDY: CT CHEST WO IV CONTRAST;  9/4/2024 1:47 pm   INDICATION: Signs/Symptoms:interstitual lung disease.   COMPARISON: 04/05/2024   ACCESSION NUMBER(S): [id number]   ORDERING CLINICIAN: DI CHA   TECHNIQUE: Helical data acquisition of the chest was obtained  without IV contrast material.  Images were reformatted in axial, coronal, and sagittal planes.   FINDINGS: Lungs and Pleura: Mild streaky scarring in the lung bases bilaterally. The previously seen ground-glass opacities predominantly lower for low are only minimally improved. Calcified pleural plaques. No pulmonary consolidation. No pleural effusion. No pneumothorax.   Mediastinum and axilla: No adenopathy by CT size criteria. Cardiomegaly. No pericardial effusion. Coronary artery calcifications. Mild descending thoracic aortic aneurysm measuring 3 cm.   Visualized Upper Abdomen: Status post cholecystectomy. Minimal hepatic steatosis. Calcified granulomas in the spleen. Somewhat atrophic pancreas.   MSK/Chest Wall: Multilevel degenerative change in the spine.         Stable streaky scarring in the lung bases. Only minimal improvement of previously seen ground-glass opacities.   Signed by: Mick Figueroa 9/6/2024 3:25 PM Dictation workstation:   [id number]        Pathology  Low-grade myelodysplasia, poorly responsive to darbepoetin        Performance Status:  Symptomatic; fully ambulatory    Assessment/Plan    1.  Pancytopenia.  The patient has unusual  bone marrow findings.  Chromosomal analysis will not be done due to failure of the specimen to grow.  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  He also has components of myelodysplasia.  He is not responding to erythropoietin.  We will increase his dose to 500 mcg every other week.  He continues to be at high risk for bleeding and infection with a low ANC and low platelet count.  He has had previous GI bleeds.  He is currently back on his  aspirin per follow-up with Dr. Danielle for fear of clotting off his stents.  He is off Eliquis.  He will get his treatment today with darbepoetin and continue every 14 days.  We plan for 6-month trial which will take us to November.  .     2.  Multiple comorbidities.  These include but are not limited to:  GERD  Arthritis, DJD  BPH  Coronary artery disease with history of myocardial, status post stent infarction  History of dumping syndrome  Hyperlipidemia  History of skin cancer  Obesity  Obstructive sleep apnea on CPAP  Peripheral arterial disease  CKD 3  Diabetes with nephropathy  History of cholecystectomy and hernia repair  Essential tremor  Hypertension   Numbness in his fingers felt to be related to cervical disc disease.     3.  Rash on his legs.  I think this looks more like stasis dermatitis than disseminated zoster.  If it were disseminated zoster he would have it on other areas of his body.     We will see him again in about a month.  He will continue his shots every 2 weeks.  He knows that he should call in the interim should he have any change in his status, questions or concerns.         Ayah Carmichael MD

## 2024-09-29 ASSESSMENT — ENCOUNTER SYMPTOMS
NERVOUS/ANXIOUS: 0
TROUBLE SWALLOWING: 0
APPETITE CHANGE: 0
DIARRHEA: 0
NUMBNESS: 0
HEADACHES: 0
BRUISES/BLEEDS EASILY: 1
NAUSEA: 0
FREQUENCY: 0
CONSTIPATION: 0
ABDOMINAL PAIN: 0
WEAKNESS: 0
SLEEP DISTURBANCE: 0
LIGHT-HEADEDNESS: 0
VOMITING: 0
FATIGUE: 1
SHORTNESS OF BREATH: 1
DYSURIA: 0
UNEXPECTED WEIGHT CHANGE: 0
MYALGIAS: 1
ARTHRALGIAS: 1
COUGH: 0

## 2024-10-03 ENCOUNTER — APPOINTMENT (OUTPATIENT)
Dept: NEPHROLOGY | Facility: CLINIC | Age: 89
End: 2024-10-03
Payer: MEDICARE

## 2024-10-03 VITALS
BODY MASS INDEX: 36.54 KG/M2 | DIASTOLIC BLOOD PRESSURE: 56 MMHG | HEIGHT: 67 IN | WEIGHT: 232.8 LBS | HEART RATE: 74 BPM | SYSTOLIC BLOOD PRESSURE: 130 MMHG

## 2024-10-03 DIAGNOSIS — Z79.4 TYPE 2 DIABETES MELLITUS WITH STAGE 4 CHRONIC KIDNEY DISEASE, WITH LONG-TERM CURRENT USE OF INSULIN (MULTI): ICD-10-CM

## 2024-10-03 DIAGNOSIS — I10 PRIMARY HYPERTENSION: ICD-10-CM

## 2024-10-03 DIAGNOSIS — E78.2 MIXED HYPERLIPIDEMIA: ICD-10-CM

## 2024-10-03 DIAGNOSIS — N18.4 TYPE 2 DIABETES MELLITUS WITH STAGE 4 CHRONIC KIDNEY DISEASE, WITH LONG-TERM CURRENT USE OF INSULIN (MULTI): ICD-10-CM

## 2024-10-03 DIAGNOSIS — D63.1 ANEMIA DUE TO STAGE 3B CHRONIC KIDNEY DISEASE (MULTI): ICD-10-CM

## 2024-10-03 DIAGNOSIS — E11.22 TYPE 2 DIABETES MELLITUS WITH STAGE 4 CHRONIC KIDNEY DISEASE, WITH LONG-TERM CURRENT USE OF INSULIN (MULTI): ICD-10-CM

## 2024-10-03 DIAGNOSIS — N18.4 STAGE 4 CHRONIC KIDNEY DISEASE (MULTI): Primary | ICD-10-CM

## 2024-10-03 DIAGNOSIS — N18.32 ANEMIA DUE TO STAGE 3B CHRONIC KIDNEY DISEASE (MULTI): ICD-10-CM

## 2024-10-03 PROCEDURE — 3078F DIAST BP <80 MM HG: CPT | Performed by: CLINICAL NURSE SPECIALIST

## 2024-10-03 PROCEDURE — 99214 OFFICE O/P EST MOD 30 MIN: CPT | Performed by: CLINICAL NURSE SPECIALIST

## 2024-10-03 PROCEDURE — 3075F SYST BP GE 130 - 139MM HG: CPT | Performed by: CLINICAL NURSE SPECIALIST

## 2024-10-03 PROCEDURE — 1036F TOBACCO NON-USER: CPT | Performed by: CLINICAL NURSE SPECIALIST

## 2024-10-03 PROCEDURE — 1157F ADVNC CARE PLAN IN RCRD: CPT | Performed by: CLINICAL NURSE SPECIALIST

## 2024-10-03 PROCEDURE — 1160F RVW MEDS BY RX/DR IN RCRD: CPT | Performed by: CLINICAL NURSE SPECIALIST

## 2024-10-03 PROCEDURE — 1159F MED LIST DOCD IN RCRD: CPT | Performed by: CLINICAL NURSE SPECIALIST

## 2024-10-03 ASSESSMENT — ENCOUNTER SYMPTOMS
GASTROINTESTINAL NEGATIVE: 1
NEUROLOGICAL NEGATIVE: 1
MUSCULOSKELETAL NEGATIVE: 1
PSYCHIATRIC NEGATIVE: 1
ENDOCRINE NEGATIVE: 1
CONSTITUTIONAL NEGATIVE: 1
RESPIRATORY NEGATIVE: 1

## 2024-10-03 NOTE — ASSESSMENT & PLAN NOTE
Has been having difficulty controlling his blood sugars, currently in the 300s, does have a continuous glucose monitor, uses insulin

## 2024-10-03 NOTE — PROGRESS NOTES
Subjective   Patient ID: Zack Medrano is a 88 y.o. male who presents for No chief complaint on file..  Patient being seen in follow-up for chronic kidney disease stage IIIb/IV with baseline creatinine 2-2.2, has history of hypertension and diabetic nephropathy    Labs reviewed  H&H 8.4 and 25.4 CMP with a glucose of 315  Sodium 135, potassium 4.3, chloride 102, bicarb 25  Renal function with a BUN of 47 and creatinine of 2.33, GFR is 26, of note his creatinine did increase in August and September and went as high as 2.89    He is doing well  He does have some mild edema of the left lower leg, he states that this has been there for quite some time, he does wear compression stockings  He denies any increase in shortness of breath and states that he has been able to decrease his oxygen usage some  His blood sugars have been difficult to control  His blood pressure has been stable  He weighs himself every day          Review of Systems   Constitutional: Negative.    Respiratory: Negative.     Cardiovascular:  Positive for leg swelling.   Gastrointestinal: Negative.    Endocrine: Negative.    Genitourinary: Negative.    Musculoskeletal: Negative.    Skin: Negative.    Neurological: Negative.    Psychiatric/Behavioral: Negative.         Objective   Physical Exam  Vitals reviewed.   Constitutional:       Appearance: Normal appearance. He is obese.   HENT:      Head: Normocephalic.   Cardiovascular:      Rate and Rhythm: Normal rate and regular rhythm.   Pulmonary:      Effort: Pulmonary effort is normal.      Breath sounds: Normal breath sounds.   Abdominal:      Palpations: Abdomen is soft.   Musculoskeletal:         General: Normal range of motion.      Left lower leg: Edema (Mild, has compression stockings) present.   Skin:     General: Skin is warm and dry.   Neurological:      Mental Status: He is alert and oriented to person, place, and time.   Psychiatric:         Mood and Affect: Mood normal.         Behavior:  Behavior normal.         Assessment/Plan   Problem List Items Addressed This Visit             ICD-10-CM    Type 2 diabetes mellitus with stage 4 chronic kidney disease, with long-term current use of insulin (Multi) E11.22, N18.4, Z79.4     Has been having difficulty controlling his blood sugars, currently in the 300s, does have a continuous glucose monitor, uses insulin         Hypertension I10     Blood pressure is currently well-controlled on amlodipine, metoprolol, spironolactone         Mixed hyperlipidemia E78.2    Stage 4 chronic kidney disease (Multi) - Primary N18.4     Has had recent worsening of his creatinine, has been as high as 2.89, previous range was 2-2.2, will try to stop spironolactone, only at 25 mg appears that it was started for edema which is chronic, he will check his blood pressure and his weights for the next 2 weeks, will repeat lab work in 2 weeks, blood pressure appears to be well-controlled, diabetes not well-controlled, not using nephrotoxic medications, will plan on follow-up in 3 months         Relevant Orders    Basic metabolic panel    Comprehensive metabolic panel    Follow Up In Nephrology    Anemia due to stage 3b chronic kidney disease (Multi) N18.32, D63.1     - CKD III/IV with baseline creatinine 2-2.2  - Hypertension on amlodipine, Metorplolol, Furosemide  - Diabetic Nephropathy: UPC is 0.15  - Diabetes mellitus type 2 on insulin  - Bilateral lower extremity edema: Stable.   - Pulmonary embolism on Eliquis  - Obesity  - Obstructive sleep apnea on CPAP machine and compliant  - Hyperlipidemia on a statin  - History of coronary artery disease on Plavix and aspirin.   - B/L Renal Cyst  - Hyperuricemia        NATALY Estrada-CNP, DNP 10/03/24 10:52 AM

## 2024-10-03 NOTE — ASSESSMENT & PLAN NOTE
Has had recent worsening of his creatinine, has been as high as 2.89, previous range was 2-2.2, will try to stop spironolactone, only at 25 mg appears that it was started for edema which is chronic, he will check his blood pressure and his weights for the next 2 weeks, will repeat lab work in 2 weeks, blood pressure appears to be well-controlled, diabetes not well-controlled, not using nephrotoxic medications, will plan on follow-up in 3 months

## 2024-10-03 NOTE — PATIENT INSTRUCTIONS
Stop spironolactone  Weigh yourself daily and record  Monitor blood pressure  Labs in 2 weeks  Call in 2 weeks after lab work

## 2024-10-11 ENCOUNTER — INFUSION (OUTPATIENT)
Dept: HEMATOLOGY/ONCOLOGY | Facility: CLINIC | Age: 89
End: 2024-10-11
Payer: MEDICARE

## 2024-10-11 VITALS
BODY MASS INDEX: 35.98 KG/M2 | OXYGEN SATURATION: 95 % | RESPIRATION RATE: 18 BRPM | HEART RATE: 76 BPM | WEIGHT: 229.72 LBS | TEMPERATURE: 97.3 F | DIASTOLIC BLOOD PRESSURE: 62 MMHG | SYSTOLIC BLOOD PRESSURE: 115 MMHG

## 2024-10-11 DIAGNOSIS — D69.6 THROMBOCYTOPENIA (CMS-HCC): ICD-10-CM

## 2024-10-11 DIAGNOSIS — D46.9 MYELODYSPLASTIC SYNDROME, UNSPECIFIED (MULTI): ICD-10-CM

## 2024-10-11 DIAGNOSIS — N18.4 STAGE 4 CHRONIC KIDNEY DISEASE (MULTI): ICD-10-CM

## 2024-10-11 DIAGNOSIS — D63.1 ANEMIA DUE TO STAGE 3B CHRONIC KIDNEY DISEASE (MULTI): ICD-10-CM

## 2024-10-11 DIAGNOSIS — D61.818 OTHER PANCYTOPENIA (MULTI): ICD-10-CM

## 2024-10-11 DIAGNOSIS — D70.9 NEUTROPENIA, UNSPECIFIED TYPE (CMS-HCC): ICD-10-CM

## 2024-10-11 DIAGNOSIS — N18.32 ANEMIA DUE TO STAGE 3B CHRONIC KIDNEY DISEASE (MULTI): ICD-10-CM

## 2024-10-11 LAB
ALBUMIN SERPL BCP-MCNC: 4 G/DL (ref 3.4–5)
ALP SERPL-CCNC: 98 U/L (ref 33–136)
ALT SERPL W P-5'-P-CCNC: 20 U/L (ref 10–52)
ANION GAP SERPL CALC-SCNC: 15 MMOL/L (ref 10–20)
AST SERPL W P-5'-P-CCNC: 28 U/L (ref 9–39)
BASOPHILS # BLD AUTO: 0.01 X10*3/UL (ref 0–0.1)
BASOPHILS NFR BLD AUTO: 0.7 %
BILIRUB SERPL-MCNC: 1.2 MG/DL (ref 0–1.2)
BITE CELLS BLD QL SMEAR: PRESENT
BUN SERPL-MCNC: 58 MG/DL (ref 6–23)
CALCIUM SERPL-MCNC: 8.6 MG/DL (ref 8.6–10.3)
CHLORIDE SERPL-SCNC: 102 MMOL/L (ref 98–107)
CO2 SERPL-SCNC: 26 MMOL/L (ref 21–32)
CREAT SERPL-MCNC: 2.66 MG/DL (ref 0.5–1.3)
EGFRCR SERPLBLD CKD-EPI 2021: 22 ML/MIN/1.73M*2
EOSINOPHIL # BLD AUTO: 0.05 X10*3/UL (ref 0–0.4)
EOSINOPHIL NFR BLD AUTO: 3.7 %
ERYTHROCYTE [DISTWIDTH] IN BLOOD BY AUTOMATED COUNT: 19.7 % (ref 11.5–14.5)
GLUCOSE SERPL-MCNC: 389 MG/DL (ref 74–99)
HCT VFR BLD AUTO: 26.6 % (ref 41–52)
HGB BLD-MCNC: 8.6 G/DL (ref 13.5–17.5)
IMM GRANULOCYTES # BLD AUTO: 0.01 X10*3/UL (ref 0–0.5)
IMM GRANULOCYTES NFR BLD AUTO: 0.7 % (ref 0–0.9)
LYMPHOCYTES # BLD AUTO: 0.57 X10*3/UL (ref 0.8–3)
LYMPHOCYTES NFR BLD AUTO: 42.5 %
MCH RBC QN AUTO: 34.4 PG (ref 26–34)
MCHC RBC AUTO-ENTMCNC: 32.3 G/DL (ref 32–36)
MCV RBC AUTO: 106 FL (ref 80–100)
MONOCYTES # BLD AUTO: 0.07 X10*3/UL (ref 0.05–0.8)
MONOCYTES NFR BLD AUTO: 5.2 %
NEUTROPHILS # BLD AUTO: 0.63 X10*3/UL (ref 1.6–5.5)
NEUTROPHILS NFR BLD AUTO: 47.2 %
NRBC BLD-RTO: 0 /100 WBCS (ref 0–0)
OVALOCYTES BLD QL SMEAR: NORMAL
PLATELET # BLD AUTO: 64 X10*3/UL (ref 150–450)
POTASSIUM SERPL-SCNC: 4.5 MMOL/L (ref 3.5–5.3)
PROT SERPL-MCNC: 6.3 G/DL (ref 6.4–8.2)
RBC # BLD AUTO: 2.5 X10*6/UL (ref 4.5–5.9)
RBC MORPH BLD: NORMAL
SCHISTOCYTES BLD QL SMEAR: NORMAL
SODIUM SERPL-SCNC: 138 MMOL/L (ref 136–145)
WBC # BLD AUTO: 1.3 X10*3/UL (ref 4.4–11.3)

## 2024-10-11 PROCEDURE — 85025 COMPLETE CBC W/AUTO DIFF WBC: CPT

## 2024-10-11 PROCEDURE — 80053 COMPREHEN METABOLIC PANEL: CPT

## 2024-10-11 PROCEDURE — 96372 THER/PROPH/DIAG INJ SC/IM: CPT

## 2024-10-11 PROCEDURE — 36415 COLL VENOUS BLD VENIPUNCTURE: CPT

## 2024-10-11 PROCEDURE — 2500000004 HC RX 250 GENERAL PHARMACY W/ HCPCS (ALT 636 FOR OP/ED): Mod: JZ,EC

## 2024-10-11 RX ORDER — DIPHENHYDRAMINE HYDROCHLORIDE 50 MG/ML
50 INJECTION INTRAMUSCULAR; INTRAVENOUS AS NEEDED
OUTPATIENT
Start: 2024-10-25

## 2024-10-11 RX ORDER — EPINEPHRINE 0.3 MG/.3ML
0.3 INJECTION SUBCUTANEOUS EVERY 5 MIN PRN
OUTPATIENT
Start: 2024-10-25

## 2024-10-11 RX ORDER — ALBUTEROL SULFATE 0.83 MG/ML
3 SOLUTION RESPIRATORY (INHALATION) AS NEEDED
OUTPATIENT
Start: 2024-10-25

## 2024-10-11 RX ORDER — FAMOTIDINE 10 MG/ML
20 INJECTION INTRAVENOUS ONCE AS NEEDED
OUTPATIENT
Start: 2024-10-25

## 2024-10-11 ASSESSMENT — PAIN SCALES - GENERAL: PAINLEVEL: 0-NO PAIN

## 2024-10-16 DIAGNOSIS — E11.22 TYPE 2 DIABETES MELLITUS WITH STAGE 3B CHRONIC KIDNEY DISEASE, WITH LONG-TERM CURRENT USE OF INSULIN (MULTI): ICD-10-CM

## 2024-10-16 DIAGNOSIS — N18.32 TYPE 2 DIABETES MELLITUS WITH STAGE 3B CHRONIC KIDNEY DISEASE, WITH LONG-TERM CURRENT USE OF INSULIN (MULTI): ICD-10-CM

## 2024-10-16 DIAGNOSIS — E78.2 MIXED HYPERLIPIDEMIA: ICD-10-CM

## 2024-10-16 DIAGNOSIS — Z79.4 TYPE 2 DIABETES MELLITUS WITH STAGE 3B CHRONIC KIDNEY DISEASE, WITH LONG-TERM CURRENT USE OF INSULIN (MULTI): ICD-10-CM

## 2024-10-16 RX ORDER — PEN NEEDLE, DIABETIC 30 GX3/16"
1 NEEDLE, DISPOSABLE MISCELLANEOUS DAILY
Qty: 90 EACH | Refills: 3 | Status: SHIPPED | OUTPATIENT
Start: 2024-10-16 | End: 2025-10-16

## 2024-10-17 RX ORDER — ATORVASTATIN CALCIUM 40 MG/1
40 TABLET, FILM COATED ORAL DAILY
Qty: 90 TABLET | Refills: 1 | Status: SHIPPED | OUTPATIENT
Start: 2024-10-17

## 2024-10-21 ENCOUNTER — TELEPHONE (OUTPATIENT)
Dept: NEPHROLOGY | Facility: CLINIC | Age: 89
End: 2024-10-21
Payer: MEDICARE

## 2024-10-21 NOTE — PROGRESS NOTES
Pt called in with BP readings since 10/4.  He reported he had a bloody nose this morning when he woke up and this was the first one he's had in years. His weight is also staying about the same- 227 lbs.     122/57  136/60  119/62  126/51  128/60  133/60  130/60  128/55  141/59  133/54  119/51  132/61  134/62  120/59  132/63  Today- 132/63

## 2024-10-21 NOTE — TELEPHONE ENCOUNTER
Continue to stay off of spironolactone, he is to call me if he notices increase in edema or shortness of breath, he will continue to weigh himself

## 2024-10-23 DIAGNOSIS — E11.22 TYPE 2 DIABETES MELLITUS WITH STAGE 4 CHRONIC KIDNEY DISEASE, WITH LONG-TERM CURRENT USE OF INSULIN (MULTI): Primary | ICD-10-CM

## 2024-10-23 DIAGNOSIS — N18.4 TYPE 2 DIABETES MELLITUS WITH STAGE 4 CHRONIC KIDNEY DISEASE, WITH LONG-TERM CURRENT USE OF INSULIN (MULTI): Primary | ICD-10-CM

## 2024-10-23 DIAGNOSIS — Z79.4 TYPE 2 DIABETES MELLITUS WITH STAGE 4 CHRONIC KIDNEY DISEASE, WITH LONG-TERM CURRENT USE OF INSULIN (MULTI): Primary | ICD-10-CM

## 2024-10-23 DIAGNOSIS — D64.9 ANEMIA, UNSPECIFIED TYPE: ICD-10-CM

## 2024-10-23 DIAGNOSIS — K25.9 GASTRIC ULCER, UNSPECIFIED CHRONICITY, UNSPECIFIED WHETHER GASTRIC ULCER HEMORRHAGE OR PERFORATION PRESENT: ICD-10-CM

## 2024-10-25 ENCOUNTER — OFFICE VISIT (OUTPATIENT)
Dept: HEMATOLOGY/ONCOLOGY | Facility: CLINIC | Age: 89
End: 2024-10-25
Payer: MEDICARE

## 2024-10-25 ENCOUNTER — APPOINTMENT (OUTPATIENT)
Dept: HEMATOLOGY/ONCOLOGY | Facility: CLINIC | Age: 89
End: 2024-10-25
Payer: MEDICARE

## 2024-10-25 ENCOUNTER — INFUSION (OUTPATIENT)
Dept: HEMATOLOGY/ONCOLOGY | Facility: CLINIC | Age: 89
End: 2024-10-25
Payer: MEDICARE

## 2024-10-25 VITALS
DIASTOLIC BLOOD PRESSURE: 68 MMHG | RESPIRATION RATE: 18 BRPM | BODY MASS INDEX: 36.29 KG/M2 | TEMPERATURE: 95.5 F | SYSTOLIC BLOOD PRESSURE: 132 MMHG | HEART RATE: 72 BPM | OXYGEN SATURATION: 91 % | WEIGHT: 231.7 LBS

## 2024-10-25 DIAGNOSIS — D69.6 THROMBOCYTOPENIA (CMS-HCC): ICD-10-CM

## 2024-10-25 DIAGNOSIS — D63.1 ANEMIA DUE TO STAGE 3B CHRONIC KIDNEY DISEASE (MULTI): ICD-10-CM

## 2024-10-25 DIAGNOSIS — N18.4 ANEMIA DUE TO STAGE 4 CHRONIC KIDNEY DISEASE (MULTI): ICD-10-CM

## 2024-10-25 DIAGNOSIS — N18.32 ANEMIA DUE TO STAGE 3B CHRONIC KIDNEY DISEASE (MULTI): ICD-10-CM

## 2024-10-25 DIAGNOSIS — D72.819 LEUKOPENIA, UNSPECIFIED TYPE: ICD-10-CM

## 2024-10-25 DIAGNOSIS — D70.9 NEUTROPENIA, UNSPECIFIED TYPE (CMS-HCC): ICD-10-CM

## 2024-10-25 DIAGNOSIS — N18.4 STAGE 4 CHRONIC KIDNEY DISEASE (MULTI): ICD-10-CM

## 2024-10-25 DIAGNOSIS — D46.9 MYELODYSPLASTIC SYNDROME, UNSPECIFIED (MULTI): ICD-10-CM

## 2024-10-25 DIAGNOSIS — D63.1 ANEMIA DUE TO STAGE 4 CHRONIC KIDNEY DISEASE (MULTI): ICD-10-CM

## 2024-10-25 DIAGNOSIS — D61.818 OTHER PANCYTOPENIA (MULTI): ICD-10-CM

## 2024-10-25 DIAGNOSIS — E78.2 MIXED HYPERLIPIDEMIA: ICD-10-CM

## 2024-10-25 LAB
ALBUMIN SERPL BCP-MCNC: 4 G/DL (ref 3.4–5)
ALP SERPL-CCNC: 89 U/L (ref 33–136)
ALT SERPL W P-5'-P-CCNC: 18 U/L (ref 10–52)
ANION GAP SERPL CALC-SCNC: 14 MMOL/L (ref 10–20)
AST SERPL W P-5'-P-CCNC: 28 U/L (ref 9–39)
BASOPHILS # BLD AUTO: 0.01 X10*3/UL (ref 0–0.1)
BASOPHILS NFR BLD AUTO: 0.7 %
BILIRUB SERPL-MCNC: 1.4 MG/DL (ref 0–1.2)
BITE CELLS BLD QL SMEAR: PRESENT
BUN SERPL-MCNC: 45 MG/DL (ref 6–23)
CALCIUM SERPL-MCNC: 9 MG/DL (ref 8.6–10.3)
CHLORIDE SERPL-SCNC: 99 MMOL/L (ref 98–107)
CO2 SERPL-SCNC: 28 MMOL/L (ref 21–32)
CREAT SERPL-MCNC: 2.41 MG/DL (ref 0.5–1.3)
EGFRCR SERPLBLD CKD-EPI 2021: 25 ML/MIN/1.73M*2
EOSINOPHIL # BLD AUTO: 0.06 X10*3/UL (ref 0–0.4)
EOSINOPHIL NFR BLD AUTO: 4.3 %
ERYTHROCYTE [DISTWIDTH] IN BLOOD BY AUTOMATED COUNT: 19.6 % (ref 11.5–14.5)
GLUCOSE SERPL-MCNC: 374 MG/DL (ref 74–99)
HCT VFR BLD AUTO: 27.7 % (ref 41–52)
HGB BLD-MCNC: 9 G/DL (ref 13.5–17.5)
IMM GRANULOCYTES # BLD AUTO: 0.01 X10*3/UL (ref 0–0.5)
IMM GRANULOCYTES NFR BLD AUTO: 0.7 % (ref 0–0.9)
LYMPHOCYTES # BLD AUTO: 0.65 X10*3/UL (ref 0.8–3)
LYMPHOCYTES NFR BLD AUTO: 46.4 %
MCH RBC QN AUTO: 34.2 PG (ref 26–34)
MCHC RBC AUTO-ENTMCNC: 32.5 G/DL (ref 32–36)
MCV RBC AUTO: 105 FL (ref 80–100)
MONOCYTES # BLD AUTO: 0.08 X10*3/UL (ref 0.05–0.8)
MONOCYTES NFR BLD AUTO: 5.7 %
NEUTROPHILS # BLD AUTO: 0.59 X10*3/UL (ref 1.6–5.5)
NEUTROPHILS NFR BLD AUTO: 42.2 %
NRBC BLD-RTO: 1.4 /100 WBCS (ref 0–0)
OVALOCYTES BLD QL SMEAR: NORMAL
PLATELET # BLD AUTO: 52 X10*3/UL (ref 150–450)
POTASSIUM SERPL-SCNC: 4.5 MMOL/L (ref 3.5–5.3)
PROT SERPL-MCNC: 6.3 G/DL (ref 6.4–8.2)
RBC # BLD AUTO: 2.63 X10*6/UL (ref 4.5–5.9)
RBC MORPH BLD: NORMAL
SCHISTOCYTES BLD QL SMEAR: NORMAL
SODIUM SERPL-SCNC: 136 MMOL/L (ref 136–145)
WBC # BLD AUTO: 1.4 X10*3/UL (ref 4.4–11.3)

## 2024-10-25 PROCEDURE — 1157F ADVNC CARE PLAN IN RCRD: CPT

## 2024-10-25 PROCEDURE — 36415 COLL VENOUS BLD VENIPUNCTURE: CPT

## 2024-10-25 PROCEDURE — 99214 OFFICE O/P EST MOD 30 MIN: CPT

## 2024-10-25 PROCEDURE — 1126F AMNT PAIN NOTED NONE PRSNT: CPT

## 2024-10-25 PROCEDURE — 85025 COMPLETE CBC W/AUTO DIFF WBC: CPT

## 2024-10-25 PROCEDURE — 96372 THER/PROPH/DIAG INJ SC/IM: CPT

## 2024-10-25 PROCEDURE — 1159F MED LIST DOCD IN RCRD: CPT

## 2024-10-25 PROCEDURE — 2500000004 HC RX 250 GENERAL PHARMACY W/ HCPCS (ALT 636 FOR OP/ED): Mod: JZ,EC

## 2024-10-25 PROCEDURE — 3075F SYST BP GE 130 - 139MM HG: CPT

## 2024-10-25 PROCEDURE — 3078F DIAST BP <80 MM HG: CPT

## 2024-10-25 PROCEDURE — 80053 COMPREHEN METABOLIC PANEL: CPT

## 2024-10-25 RX ORDER — EPINEPHRINE 0.3 MG/.3ML
0.3 INJECTION SUBCUTANEOUS EVERY 5 MIN PRN
OUTPATIENT
Start: 2024-11-08

## 2024-10-25 RX ORDER — ATORVASTATIN CALCIUM 40 MG/1
40 TABLET, FILM COATED ORAL DAILY
Qty: 90 TABLET | Refills: 1 | Status: SHIPPED | OUTPATIENT
Start: 2024-10-25

## 2024-10-25 RX ORDER — DIPHENHYDRAMINE HYDROCHLORIDE 50 MG/ML
50 INJECTION INTRAMUSCULAR; INTRAVENOUS AS NEEDED
OUTPATIENT
Start: 2024-11-08

## 2024-10-25 RX ORDER — ALBUTEROL SULFATE 0.83 MG/ML
3 SOLUTION RESPIRATORY (INHALATION) AS NEEDED
OUTPATIENT
Start: 2024-11-08

## 2024-10-25 RX ORDER — FAMOTIDINE 10 MG/ML
20 INJECTION INTRAVENOUS ONCE AS NEEDED
OUTPATIENT
Start: 2024-11-08

## 2024-10-25 ASSESSMENT — PAIN SCALES - GENERAL: PAINLEVEL_OUTOF10: 0-NO PAIN

## 2024-10-25 NOTE — PROGRESS NOTES
Patient ID: Zack Medrano is a 89 y.o. male.    Subjective    HPI  Patient ID:Zack Medrano is a 88 y.o. year old male patient with myelodysplasia         Referring Physician: Ayah Carmichael MD  [address] [place] [address] [address]  [place] [address]  Primary Care Provider: Fabiola Dietz MD     Chief Complaint      Chief Complaint   Patient presents with    Anemia          History of the Present Illness  10/9/2019.  Visit with Dr. Mickey Nguyen for chronic kidney disease and hypertension.  Chief complaint was fatigue and need to lose weight     11/12/2019.  The patient has had long-term history of microcytic anemia going back to at least 2019 at which time his white count was 5.2 hemoglobin was 13.8 hematocrit 40.5 and a platelet count of 157,000.  His MCV was 105 MCHC was 34.  White blood cell differential count showed 66% polys, 17 lymphs, 11 monos and 4 eosinophils.     12/11/2019.  Seen by primary care for probable shingles treated with Valtrex with chief complaint of waxing and waning left flank pain radiating up under his ribs, somewhat responsive to exercise..  He had a history of pulmonary embolus and was on Eliquis long-term.     2/24/2020.  Seen by Dr. Howell for elevated PSA, BPH and erectile dysfunction.  PSA in 2017 was 2.94, in 2018 it was 2.74 and in 2020 it was 4.03.  We discussed biopsy and the patient wanted to have this followed and not biopsy.     3/9/2020.  Follow-up with NATALY Pulido primary care.  Referred to Dr. Bahena for colonoscopy and CT scan of the chest was ordered for follow-up of questionable lingular nodule.     3/11/2020.  CT scan of the chest showed a stable 6 mm nodular lesion near the fissure in the lingula.     3/13/2020.  Seen by Dr. Bahena who noted that the patient had had diarrhea off-and-on since cholecystectomy and had intermittent left-sided pain.  He had a history of colon polyps.     4/22/2015.  Colonoscopy showed that he had tubular adenoma     5/13/2020.   Seen by Dr. Migdalia New in follow-up.  She noted that he had an abnormal CBC with macrocytic indices with a normal B12 level.  The patient denies alcohol use.  Hemoglobin A1c was 6.2.  BUN was 33 creatinine was 1.74.     8/31/2020.  Follow-up with Dr. Howell.  PSA had come down from 4.03-3.77     1/4/2021 through 1/12/2021.  Admitted to the hospital having been brought to the ED by squad after he fell forward on his toilet and was unable to get up.  He denied hitting his head or losing consciousness.  He blamed it on having back pain and shoulder pain for several weeks.  He was febrile with a temperature of 100.9 and hypoxic with pulse ox of 86% on room air.  Respirations were 24.  He was positive for COVID and was treated with intravenous antibiotics and remdesivir, increased supplemental oxygen.  He was able to be discharged to rehabilitation.     Repeat CBC showed white count 4.3 with a hemoglobin 12.9 hematocrit 38.8 with an MCV of 102 and a platelet count of 173,000.  White blood cell differential count showed minimal absolute lymphocytopenia at 0.6 with the lower limits of normal being 0.8.     3/1/2021.  Follow-up with Dr. Howell.  PSA was 9.9 in February.  Decided to have prostate biopsy done on 3/22/2021.  Results showed BPH.     10/6/2021.  Follow-up with Dr. Howell.  PSA in September was 2.77     4/6/2022.  CBC showed white count 4.3 with hemoglobin 11.7 hematocrit 34.4 with an MCV of 107.  Platelet count was 153,000.  White blood cell differential count was essentially normal.     5/31/2022.  CBC showed white count 3.6 with hemoglobin 11.6 hematocrit 34.4 and platelet count 117,000 with a normal white blood cell differential.     6/27/2022.  Referred to Dr. Dash because of pancytopenia by NATALY Gray.  CBC showed a white count of 3.9 with hemoglobin 12.3 hematocrit 36.1 and platelet count 129,000 with a normal differential.  PSA was 3.26.     The patient said that he felt well except for having  increased fatigue.  He continued on long-term anticoagulation.  He was able to carry out his ADLs.  He did complain of easy bruising.  He said that he had worked at [employer] for 12 years and also worked in making balloons.  Dr. Dash felt that there was a large component secondary to his chronic kidney disease.  There was no evidence of nutritional deficiency.  He checked him for hemolysis and monoclonal gammopathy and ordered an ultrasound for hepatosplenomegaly and testing for hepatitis, HIV and CHRISTINA.  He said he would rather observe the patient rather than pursuing a bone marrow biopsy.     7/27/2022.  Repeat lab data showed white count 3.9 with hemoglobin 12.3 hematocrit 36.1 and platelet count 129,000.  BUN was 36 creatinine was 1.78.  He had mild elevation of AST.  Retake count was 2.3%.  Flow cytometry showed a small clonal B-cell population.  Hepatitis B was nonreactive.  Hep C testing was negative.  Serum protein electrophoresis was normal.  Haptoglobin was normal.  LDH was normal.  He said that the possibility of MDS could not be ruled out.  Ultrasound showed fatty liver but no splenomegaly.  He plan for EPO level and NGS for myeloid mutation.     9/1/2022.  Started on allopurinol for hyperuricemia.     10/12/2022.  Seen by Dr. Dr. Dietz.  Patient was referred to dermatology for several skin lesions.  He was referred to neurology because of his tremor that was not improving.  She increased his glimepiride.     10/17/2022.  CBC showed white count 3.0 with hemoglobin 10.5 hematocrit 31.3 and a platelet count of 106,000 MCV was 111.     10/26/2022.  Follow-up with Dr. 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  He was suspicious for MDS.  He referred him for a bone marrow biopsy.     12/1/2022.  Follow-up with Dr. Dash.  Bone marrow biopsy showed low-grade MDS.  R IPSS score was low or very low depending on a karyotype which was still pending.  We discussed  initiating erythropoietin with Aranesp but the patient deferred and the decision was made to monitor him.     1/11/2023.  CBC showed white count 3.4 with hemoglobin 10.3 hematocrit 32.2 and a platelet count of 137,000.  MCV was 115.  White blood cell differential count was normal.     1/13/2023.  Follow-up with Dr. Dash.  The patient did say that he had following getting out of the shower but did not sustain any injuries.  He had been started by Dr. Nguyen on insulin for his diabetes which was helpful.  Patient continued to be active inside his house.  He remains on Eliquis.  BR-IPSS score is very low.  Counts remain stable.     3/31/2023.  CBC showed a white count of 2.6 with hemoglobin 8.7 hematocrit 27.1 and a platelet count 97,000.  MCV was 115.  White blood cell differential count showed that he had an absolute neutrophil count of 1.36.     4/10/2023.  CBC showed a white count of 2.5 with a hemoglobin 9.0 hematocrit of 27.7 with a platelet count of 92,000.  MCV was 114.     4/17/2023.  Follow-up with NATALY Gray.  Even though his hemoglobin had dropped to 9 the patient did not want to start his erythropoietin injections.     5/11/2023.  CBC showed white count of 2.5 with a hemoglobin of 7.6 hematocrit 23.8 and a platelet count of 101,000.  Absolute neutrophil count was 1.17.     5/15/2023 through 5/19/2023.  Admitted to the hospital with anemia and generalized weakness black stools dizziness with a hemoglobin of 6.7.  His BUN was 51 creatinine was 2.08.  CT scan of the abdomen showed no acute process.  There were findings consistent with atypical healing of the previous rib fracture with question Paget's disease although it was nonspecific.  He was transfused 2 units packed red blood cells.  EGD found 3 erosions in the cardia.  He was prescribed Carafate.     5/24/2023.  Follow-up with Dr. Dr. Dietz after discharge.  CBC showed white count 1.9 with hemoglobin 8.6 hematocrit 28.3 and a platelet count  "of 140,000.  Absolute neutrophil count was 1.06.  Patient did not tolerate Carafate and stopped it.  He was prescribed oral iron twice a day.  He had not been prescribed a PPI which was then started.     6/1/2023.  CBC showed white count of 1.9 with hemoglobin 7.7 hematocrit 24.8 and a platelet count of 87,000.     6/13/2023.  CBC showed a white count of 1.9 with hemoglobin of 8.7 hematocrit 28.1 and platelet count of 89,000.     6/23/2023.  Followed up with Dr. Dr. Dietz who reported that he was hospitalized again in [place] for GI bleed.  At that time his Eliquis was discontinued and he was changed to Protonix and taken off his baby aspirin.  Once again he was started on Carafate but did not tolerate it.     7/15/2023.  CBC showed white count of 2.4 with hemoglobin 9.2 hematocrit 27.3 and a platelet count of 110,000.  Absolute neutrophil count was 1.36.     7/17/2023.  Follow-up with Malorie Shaffer.  The patient had recently had a colonoscopy and had \"cauterizations\" he noted that he had dyspnea with exertion but no resting shortness of breath.  He was on prednisone at that time.  His sugars were in the 300s.  He was on insulin.  His neuropathy has not changed and he continues to have tremors.  Current level was found to be saturated.  Once again the patient was reluctant to start NING.     8/14/2023.  Follow-up with Malorie Shaffer.  CBC showed white count 1.8 with a hemoglobin 8.9 hematocrit 26.9 and a platelet count of 100,000.  Absolute neutrophil count was 0.96.     9/6/2023.  CBC showed a white count of 1.8 with hemoglobin 9.5 hematocrit 28.8 and a platelet count of 85,000 with an absolute neutrophil count of 0.99.  9/13/2023.  Follow-up with Dr. Howell.  PSA has gone down to 1.87.     10/9/2023.  Follow-up with Malorie Shaffer who noted the patient remained on oxygen and CPAP.  He says that he is awakening in the middle of the night around 3 to 4:00 in the morning.  Sugars have been in the 300s.  Neuropathy was " unchanged.  He had been started on erythropoietin every 3 weeks     11/21/2023.  Follow-up with Malorie Shaffer.  No improvement in his hemoglobin.  White count had improved to 2.1 platelets were baseline.  He had been placed on 40 mg of prednisone and continued on erythropoietin 300 mcg every 3 weeks.     1/2/2024.  Follow-up with Malorie Shaffer.  No improvement in hemoglobin.  Symptomatically he was doing well.  Plan was to increase frequency of his darbepoetin to every 2 weeks.     2/13/2024.  Follow-up with Malorie Shaffer.  Now on erythropoietin injections every 2 weeks.  There was a discussion concerning repeat bone marrow biopsy with counts stayed low.     3/12/2024.  Follow-up with Malorie Shaffer.  No improvement in his hemoglobin.  Patient was agreeable to repeat bone marrow biopsy.  She also ordered a CAT scan of the chest abdomen and pelvis without contrast.     3/22/2024.  CAT scan showed severe coronary artery calcifications.  There is evidence of bronchiectasis in the lower lobes increased compared to previous CT scan from 2020.     Predominantly groundglass opacities with bibasilar prominence.  There is some subpleural sparing of the left upper lobe.  There is a 3 mm nodule in the right upper lobe which was unchanged for several years.  Multilevel anterior bridging osteophytes were noted consistent with DISH.  There is a small fat-containing periumbilical hernia and bilateral inguinal hernias.  Spleen size is at the upper limits of normal being 12.7 cm in length slightly increased when compared to study from 2019.  Cysts were seen in the kidneys with diffuse cortical atrophy and a 4 mm hyperdense focus in the interpolar region of the right kidney possibly a stone.  Prostatism was noted for gland measuring 6.2 cm.     3/28/2024.  Seen by Dr. Graham for shortness of breath.  He felt the patient had possible chronic interstitial lung disease versus periodic aspiration, hypoxia dyspnea on exertion.  Further tests  were ordered including high-resolution CT scan, complete pulmonary function testing, simple pulmonary stress test and follow-up.     4/9/2024.  Follow-up with Malorie Shaffer.  Erythropoietin injections continued every 2 weeks which the patient was tolerating well.     4/16/2024.  Bone marrow biopsy was performed showing a hypercellular bone marrow at 50% with dyserythropoiesis.  There are 1% blasts consistent with persistent myeloid neoplasm.  Flow cytometry showed a small clonal CD5 negative, CD10 negative B-cell population and a polytypic background.  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     There was a very small abnormal T-cell population that could be incidental.  Next generation sequencing showed 2 U2AF 1 variants, 1 of which was negative.  The other had been previously seen.     The marrow also showed dyserythropoiesis with bilobate forms and forms with irregular nuclear contours as well as blebbing.     4/23/2024.  CBC showed white count 1.5 with a hemoglobin of 8.3 hematocrit 25.9 and platelet count of 71,000 with 37% neutrophils, 53 lymphocytes, 4 monos, 4 eosinophils with an absolute neutrophil count of 0.57.     4/24/2024.  Return visit with Dr. Graham.  He noted that the high-resolution CT scan showed no significant change from the previous CT scan the month before.  Pulmonary function testing showed no response to bronchodilator and there was mild restrictive change with some suggestion of airway inflammation.  The patient had difficulty doing the test because of persistent coughing.  6-minute walk test showed that the patient was able to maintain saturations at 93% with 2 L of oxygen per minute continuously.  The test was terminated because of dyspnea.  With his hypoxia on room air it was recommended that he be on oxygen continuously to keep his  saturation greater than or equal to 92%.  He continued on oxygen with his CPAP at 3 L at nighttime.     5/10/2024.  This is my first visit with Mr. Medrano and his family.  We went over some of the results of the bone marrow biopsy.  I told him that I would have to wait until the chromosomal analysis returned.  I also note the possibility of low-grade lymphoma.  He may need to have an opinion with malignant hematology at [facility].  Stepdaughter who is here with him and his wife today is his advocate for maintaining his quality of life.  She is an employee at a local nursing facility and is concerned about many clients she has who seem to be getting treated aggressively.  We talked about myelodysplasia and the fact that he is not responding to erythropoietin.  He will continue on this treatment for now.  He is low counts were discussed and he knows that he is at higher risk for infection and bleeding.  He continues on oxygen supplementation continuously.  They will return in 2 weeks at which time laboratory tests will be performed.     He just had a lesion removed from the dorsum of his right hand at the base of his first and second fingers.  Stitches are still in place.  It seems to be healing well without any significant bleeding or infection.  He was told that it looked benign.     5/13/2024.  Follow-up with Dr. Aggie Danielle of cardiology who saw him in follow-up of his coronary artery disease with multiple stents having been placed, hypertension, dyslipidemia and previous pulmonary embolus related to COVID-19.  She had stopped his Eliquis.  She noted his myelodysplasia.  She noted that at some point his aspirin had been stopped but given his history of stents this was restarted and he tolerated it well.  She said that his blood pressure was well-controlled.  She noted blood sugars were between 150-2 50.  She noted the patient was supposed to be using oxygen with exertion but the patient was not sure what rate it was  supposed to be.  She says that she would not stop his aspirin unless his platelets were less than 50,000 with complications of bleeding.  She said it was very important to continue aspirin given a 15% chance of in-stent thrombosis with aspirin cessation.  She increased her furosemide to 80 mg twice a day because of bilateral lower extremity edema and spironolactone to 25 mg twice daily until symptoms improve and encouraged him to cut back on his salt intake.  She was see him again in about 9 months.     5/14/2024.  Follow-up with Dr. Santamaria of ophthalmology who started him on Naphcon-A drops 1 drop 3 times a day in both eyes for excessive tearing     5/15/2024.  Followed up with Dr. Dimas Renteria of podiatry who noted decreased pedal pulses 2 out of 4.  He had footcare.     5/24/2024.  Follow-up with Dr. Graham.  Review of his x-rays showed no change.  Physical examination showed decreased breath sounds but no inspiratory crackles that were previously heard.  He felt that the interstitial lung disease was stable and that his hypoxemia was improved on oxygen.  He plan to repeat CT scan in September.     5/24/2024.  He is here to follow-up on his bone marrow results.  I told him that the chromosomal analysis would not be forthcoming before because the specimen failed to grow in culture.  He continues to display a low-grade MDS with blast count of only 2%.  We reviewed the fact that he is recommended to have at least 6 months of his erythropoietin before we decide whether or not it is a failure.  He is agreeable to this.  We will increase his dose of darbepoetin.  He knows to be on the look out for any signs of infection or bleeding and to report this if it happens.  We reviewed his most recent CBC.  We will see him again in 2 weeks with his next injection.     5/30/2024.  Follow-up with Dr. Dr. Dietz for his Medicare wellness visit.  His magnesium dose was changed to twice daily indefinitely.     6/7/2024.  He  is here today for his darbepoetin injection.  We have increased his dose to 500 mcg every 2 weeks.  Will see if this has any effect.  Laboratory dated today shows that his sugar is 290 and his sodium is 134.  BUN is 46 and creatinine is 2.31.  CBC shows white count 1.5 with a hemoglobin 8.1 hematocrit 24.7 and a platelet count of 71,000 which is very much like his most recent CBC.  His absolute neutrophil count is 0.57.     I talked to him about his current status which he feels is fairly stable.  He is off oxygen.  He has had no bleeding and no signs of infection.  He understands that we have increased his dose of darbepoetin and we will watch his counts.  He will report any side effects.     He has continued to receive his darbepoetin injections.     7/15/2024.  Emergency department visit [facility].  He developed a rash on both lower extremities which started a week ago which is an erythematous papular rash.  He denied any pain or itching.  He did not have any vesicle formation.  It is bilateral.  He has had a shingles injection.  He was told that he had disseminated zoster and was started on valacyclovir a gram twice daily for 7 days.     7/19/2024.  Seen by Dr. Dr. Dietz who said that the rash has not gotten any worse and might be a little bit better.  She noted that there were no vesicles no open areas no areas of secondary infection.     7/19/2024.  He is here to receive his erythropoietin injection and to have a blood count.  I told him it is not likely that this is disseminated zoster because it is nowhere else on his body but only on his lower extremities.  This could be stasis dermatitis.  I do not see any signs of vesicle formation and there never were any.  It is mostly on the anterior shins.  It is all below the knee.     CBC today shows white count 1.4 with hemoglobin 8 hematocrit 25.4 platelet count 67,000 with 37 neutrophils 53 lymphocytes 5 monos 2 eosinophils.  He is serum chemistry  showed a sugar of 285 BUN of 49 creatinine of 2.67.  We told him he needed to keep his hydration status high.  He will continue with his injections today.  Blood pressure today is 153/64.     He has had no bleeding and no infection.  He has had no chest pain or pressure.  He is able to participate in his usual activities including mowing the yard.     He has continued to receive his darbepoetin and has followed up with podiatry for nail and callus care.     8/30/2024.  He is here in routine follow-up.  He says that he is doing well.  He has had no infections or bleeding.  He has had no mouth sores.  He has had continued swelling in his legs a little bit worse on the left than on the right.  Blood sugar was greater than 300 but he admits to dietary indiscretion.  Has had no chest pain.  His blood pressure was 149/63.  He says that his breathing is stable.  He is using his oxygen at home at night and when he travels that he does not have it on today.     His lab data has shown no sign of improvement so far with white count 1.3, hemoglobin 8.6 hematocrit 27.2 and a platelet count of 69,000.  White blood cell differential count shows 38% polys, 50 lymphs, 5 monos, 2 eosinophils.  We plan a 6-month trial of this dose of darbepoetin which will extend until November.  We will see him again in about a month.     Interval Note  9/4/2024.  CT scan of the chest showed mild streaky scarring in the lung bases bilaterally.  The previously seen groundglass opacities probably be minimally improved.  Calcified pleural plaques are seen without consolidation effusion or pneumothorax.  There is no adenopathy seen by CT size criteria.  Minimal hepatic steatosis was seen as well as calcified granuloma in the spleen and a somewhat atrophic pancreas.  DJD was seen in the spine.     9/5/2024.  Seen by Dr. Zambrano his nephrologist who ordered lab tests and felt that his fluctuating chemistries were related to diuretic therapy.  He noted  myelodysplasia and that he was being followed for his anemia receiving NING.  He did not change any of his medications  And said that he would see him again in about 6 months     9/10/2024.  Follow-up by Dr. Graham at which time he told the patient he could decrease his oxygen to 2 and he would repeat a CT scan in 6 months and see him after that.     9/27/2024.  He is here today in routine follow-up.  He will get his shot today.  Laboratory data shows no improvement.  We reviewed that we will follow him for 6 months on this treatment to see if there is any improvement.     Mr. Roth says that he has had no significant bleeding.  I see some red spots on the soft palate.  His appetite has been good and he has gained weight of 1 pound since last time I saw him about a month ago.  He has had no other signs of infection or deterioration in his status.  He knows he is at risk for serious infection or bleeding.  We will continue on this treatment pathway.  He will see him again in a month.    10/25/24: Seen in office today by [facility]. Appetite is decent denies any early satiety weight is stable. Energy varies day to day. 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 Isolated episode of a nose bleed. He reported waking up last night sweaty and hot, unaware if he was running a fever, encouraged him to check temperature if this should re-occur. Denies any further B symptoms. No serious abnormal bleeding, remains bruising easily.     Reviewed labs very minimal improvement in his labs, will continue with administering the shot today as ordered. Will continue to monitor patient closely, with a follow-up in a month.       Comorbidities  GERD  Arthritis, DJD  BPH  Coronary artery disease with history of myocardial, status post stent infarction  History of dumping syndrome  Hyperlipidemia  History of skin cancer  Obesity  Obstructive sleep apnea on CPAP  Peripheral arterial disease  CKD 3  Diabetes with  nephropathy  History of cholecystectomy and hernia repair  Essential tremor  Hypertension   Numbness in his fingers felt to be related to cervical disc disease.     Monitoring Parameters  Histories, physical examinations and CBCs with occasional bone marrow biopsies.      Review of Systems - Oncology   Review of Systems   Constitutional:  Positive for fatigue. Negative for appetite change and unexpected weight change.   HENT:  Negative for dental problem, mouth sores, nosebleeds and trouble swallowing.    Eyes:  Negative for visual disturbance.   Respiratory:  Positive for shortness of breath (with activity). Negative for cough.    Cardiovascular:  Positive for leg swelling.   Gastrointestinal:  Negative for abdominal pain, constipation, diarrhea, nausea and vomiting.   Endocrine: Negative for polyuria.   Genitourinary:  Negative for dysuria, frequency and urgency.   Musculoskeletal:  Positive for arthralgias and myalgias.   Skin:  Positive for pallor. Negative for rash.   Neurological:  Negative for weakness, light-headedness, numbness and headaches.   Hematological:  Bruises/bleeds easily.   Psychiatric/Behavioral:  Negative for self-injury, sleep disturbance and suicidal ideas. The patient is not nervous/anxious.          Past Medical History  Medical History        Past Medical History:   Diagnosis Date    Abnormal levels of other serum enzymes       Abnormal liver enzymes    Dependence on other enabling machines and devices       CPAP (continuous positive airway pressure) dependence    Encounter for examination of eyes and vision without abnormal findings       Diabetic eye exam    Localized edema       Bilateral leg edema    Old myocardial infarction       History of myocardial infarction    Other conditions influencing health status 01/08/2020     Uncontrolled diabetes mellitus type 2 without complications    Pain in unspecified ankle and joints of unspecified foot       Arthralgia of ankle    Personal  history of other diseases of the digestive system       History of gastroesophageal reflux (GERD)    Personal history of other diseases of the digestive system       History of dumping syndrome    Personal history of other endocrine, nutritional and metabolic disease 11/12/2020     History of type 2 diabetes mellitus    Presence of coronary angioplasty implant and graft       History of coronary artery stent placement    Rash and other nonspecific skin eruption 12/11/2019     Rash    Unspecified abdominal hernia without obstruction or gangrene       Hernia    Unspecified abdominal pain 05/19/2021     Abdominal pain, left lateral            Surgical History  Surgical History         Past Surgical History:   Procedure Laterality Date    OTHER SURGICAL HISTORY   10/09/2019     Arterial stent placement    OTHER SURGICAL HISTORY   10/09/2019     Cholecystectomy    OTHER SURGICAL HISTORY   10/09/2019     Hernia repair            Social History  Social History   Social History           Tobacco Use    Smoking status: Never    Smokeless tobacco: Never   Vaping Use    Vaping status: Never Used   Substance Use Topics    Alcohol use: Never    Drug use: Never            Family History  family history includes Colon cancer in his mother and another family member.         Current Medications       Current Outpatient Medications   Medication Instructions    allopurinol (ZYLOPRIM) 100 mg, oral, Daily    amLODIPine (NORVASC) 5 mg, oral, Daily before breakfast    ascorbic acid (VITAMIN C) 250 mg, oral    aspirin 81 mg, oral, Daily    atorvastatin (LIPITOR) 40 mg, oral, Daily    blood-glucose sensor (FreeStyle Sindi 3 Sensor) device Use as directed    cyanocobalamin, vitamin B-12, (Vitamin B-12) 1,000 mcg tablet extended release 1 tablet, oral    flash glucose scanning reader (FreeStyle Sindi 2 [place]) misc Use as instructed    FreeStyle Sindi 2 Sensor kit 1 each, subcutaneous, As needed, Use as instructed    furosemide (LASIX) 60 mg,  "oral, Daily    gabapentin (NEURONTIN) 300 mg, oral, 2 times daily    lancets 33 gauge misc miscellaneous, 2 times daily    Lantus U-100 Insulin 50 Units, subcutaneous, Nightly    metoprolol tartrate (LOPRESSOR) 50 mg, oral, 2 times daily    multivit-minerals/folic acid (CENTRUM ADULTS ORAL) oral    nitroglycerin (NITROSTAT) 0.4 mg, sublingual, Every 5 min PRN    NON FORMULARY [employer] test In vitro strip; Use as directed to check blood sugar  2 times daily     pantoprazole (PROTONIX) 40 mg, oral, 2 times daily    pen needle, diabetic (Pen Needle) 32 gauge x 5/32\" needle 1 each, miscellaneous, Daily    primidone (MYSOLINE) 50 mg, oral, Nightly    spironolactone (ALDACTONE) 25 mg, oral, Daily    sucralfate (CARAFATE) 1 g, oral, 3 times daily (morning, midday, late afternoon)    vitamins A,C,E-zinc-copper 2,148 mcg-113 mg-45 mg-17.4mg tablet oral, SUPER VIEW       Scheduled Medications            OARRS Review  I have personally reviewed the OARRS report for Zack Medrano. I have considered the risks of abuse, dependence, addiction and diversion.  He continues on gabapentin through primary care.    Objective    BSA: There is no height or weight on file to calculate BSA.  There were no vitals taken for this visit.     Physical Exam  Vitals and nursing note reviewed.   Constitutional:       Appearance: Normal appearance. He is obese.   HENT:      Head: Normocephalic and atraumatic.      Nose: Nose normal.      Mouth/Throat:      Mouth: Mucous membranes are moist.      Pharynx: Oropharynx is clear.   Eyes:      General: No scleral icterus.     Extraocular Movements: Extraocular movements intact.      Conjunctiva/sclera: Conjunctivae normal.   Cardiovascular:      Rate and Rhythm: Normal rate and regular rhythm.      Pulses: Normal pulses.      Heart sounds: Murmur heard.   Pulmonary:      Effort: Pulmonary effort is normal.      Breath sounds: Wheezing present.   Abdominal:      Palpations: Abdomen is soft.      " Tenderness: There is no abdominal tenderness.      Comments: Large abdomen   Musculoskeletal:         General: Normal range of motion.      Cervical back: Normal range of motion.      Right lower leg: Edema present.      Left lower leg: Edema present.   Skin:     General: Skin is warm and dry.      Coloration: Skin is pale.      Findings: Bruising present.   Neurological:      General: No focal deficit present.      Mental Status: He is alert and oriented to person, place, and time.      Motor: Weakness present.   Psychiatric:         Mood and Affect: Mood normal.         Behavior: Behavior normal.         Thought Content: Thought content normal.         Judgment: Judgment normal.         Performance Status:  Symptomatic; in bed <50% of the day      Assessment/Plan        Recent Imaging  CT chest wo IV contrast     Result Date: 9/6/2024  Interpreted By:  Mick Figueroa, STUDY: CT CHEST WO IV CONTRAST;  9/4/2024 1:47 pm   INDICATION: Signs/Symptoms:interstitual lung disease.   COMPARISON: 04/05/2024   ACCESSION NUMBER(S): [id number]   ORDERING CLINICIAN: DI CHA   TECHNIQUE: Helical data acquisition of the chest was obtained  without IV contrast material.  Images were reformatted in axial, coronal, and sagittal planes.   FINDINGS: Lungs and Pleura: Mild streaky scarring in the lung bases bilaterally. The previously seen ground-glass opacities predominantly lower for low are only minimally improved. Calcified pleural plaques. No pulmonary consolidation. No pleural effusion. No pneumothorax.   Mediastinum and axilla: No adenopathy by CT size criteria. Cardiomegaly. No pericardial effusion. Coronary artery calcifications. Mild descending thoracic aortic aneurysm measuring 3 cm.   Visualized Upper Abdomen: Status post cholecystectomy. Minimal hepatic steatosis. Calcified granulomas in the spleen. Somewhat atrophic pancreas.   MSK/Chest Wall: Multilevel degenerative change in the spine.          Stable  streaky scarring in the lung bases. Only minimal improvement of previously seen ground-glass opacities.   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  Pancytopenia.  The patient has unusual bone marrow findings.  Chromosomal analysis will not be done due to failure of the specimen to grow.  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  He also has components of myelodysplasia.  He is not responding to erythropoietin.  We will increase his dose to 500 mcg every other week.  He continues to be at high risk for bleeding and infection with a low ANC and low platelet count.  He has had previous GI bleeds.  He is currently back on his  aspirin per follow-up with Dr. Danielle for fear of clotting off his stents.  He is off Eliquis.  He will get his treatment today with darbepoetin and continue every 14 days.  We plan for 6-month trial which will take us to November.    10/25/24: Patient tolerated last treatment well. No serious abnormal bleeding or signs of infections. Will continue with treatment as scheduled today.     2.  Multiple comorbidities.  These include but are not limited to:  GERD  Arthritis, DJD  BPH  Coronary artery disease with history of myocardial, status post stent infarction  History of dumping syndrome  Hyperlipidemia  History of skin cancer  Obesity  Obstructive sleep apnea on CPAP  Peripheral arterial disease  CKD 3  Diabetes with nephropathy  History of cholecystectomy and hernia repair  Essential tremor  Hypertension   Numbness in his fingers felt to be related to cervical disc disease.     3.  Rash on his legs.  I think this looks more like stasis dermatitis than disseminated zoster.  If it were disseminated zoster he would have it on other areas of his body.     Plan:  Discussed  and reviewed recent medical history.  He will continue his Darbopoetin alpha every 2 weeks .  He will call us if he has any questions or concerns.     He knows that he should call in the interim should he have any change in his status, questions or concerns.         NATALY Villa-CNP

## 2024-10-25 NOTE — PATIENT INSTRUCTIONS
Discussed and reviewed recent medical history.  He will continue his Darbopoetin alpha every 2 weeks .  He will call us if he has any questions or concerns.  Return to office in 1 month when he is getting his injection.

## 2024-10-25 NOTE — PROGRESS NOTES
Lab and injection today  11/8 10am stat lab and injection  11/22 1015 lab at infusion             1030 MD visit              1100 back over for injection after his OV  Reviewed AVS with patient- patient verbalizes understanding

## 2024-10-28 DIAGNOSIS — N18.4 TYPE 2 DIABETES MELLITUS WITH STAGE 4 CHRONIC KIDNEY DISEASE, WITH LONG-TERM CURRENT USE OF INSULIN (MULTI): ICD-10-CM

## 2024-10-28 DIAGNOSIS — E78.2 MIXED HYPERLIPIDEMIA: ICD-10-CM

## 2024-10-28 DIAGNOSIS — I25.10 CORONARY ARTERY DISEASE INVOLVING NATIVE HEART, UNSPECIFIED VESSEL OR LESION TYPE, UNSPECIFIED WHETHER ANGINA PRESENT: Primary | ICD-10-CM

## 2024-10-28 DIAGNOSIS — Z79.4 TYPE 2 DIABETES MELLITUS WITH STAGE 4 CHRONIC KIDNEY DISEASE, WITH LONG-TERM CURRENT USE OF INSULIN (MULTI): ICD-10-CM

## 2024-10-28 DIAGNOSIS — I10 PRIMARY HYPERTENSION: ICD-10-CM

## 2024-10-28 DIAGNOSIS — E11.22 TYPE 2 DIABETES MELLITUS WITH STAGE 4 CHRONIC KIDNEY DISEASE, WITH LONG-TERM CURRENT USE OF INSULIN (MULTI): ICD-10-CM

## 2024-10-28 RX ORDER — PANTOPRAZOLE SODIUM 40 MG/1
40 TABLET, DELAYED RELEASE ORAL 2 TIMES DAILY
Qty: 90 TABLET | Refills: 1 | Status: SHIPPED | OUTPATIENT
Start: 2024-10-28

## 2024-10-28 RX ORDER — FLASH GLUCOSE SENSOR
KIT MISCELLANEOUS
Qty: 2 EACH | Refills: 2 | Status: SHIPPED | OUTPATIENT
Start: 2024-10-28

## 2024-11-08 ENCOUNTER — APPOINTMENT (OUTPATIENT)
Dept: HEMATOLOGY/ONCOLOGY | Facility: CLINIC | Age: 89
End: 2024-11-08
Payer: MEDICARE

## 2024-11-08 ENCOUNTER — INFUSION (OUTPATIENT)
Dept: HEMATOLOGY/ONCOLOGY | Facility: CLINIC | Age: 89
End: 2024-11-08
Payer: MEDICARE

## 2024-11-08 VITALS
OXYGEN SATURATION: 92 % | RESPIRATION RATE: 18 BRPM | DIASTOLIC BLOOD PRESSURE: 58 MMHG | SYSTOLIC BLOOD PRESSURE: 135 MMHG | WEIGHT: 230.6 LBS | TEMPERATURE: 97.5 F | HEART RATE: 79 BPM | BODY MASS INDEX: 36.12 KG/M2

## 2024-11-08 DIAGNOSIS — D69.6 THROMBOCYTOPENIA (CMS-HCC): ICD-10-CM

## 2024-11-08 DIAGNOSIS — N18.4 STAGE 4 CHRONIC KIDNEY DISEASE (MULTI): ICD-10-CM

## 2024-11-08 DIAGNOSIS — D46.9 MYELODYSPLASTIC SYNDROME, UNSPECIFIED (MULTI): ICD-10-CM

## 2024-11-08 DIAGNOSIS — D63.1 ANEMIA DUE TO STAGE 3B CHRONIC KIDNEY DISEASE (MULTI): ICD-10-CM

## 2024-11-08 DIAGNOSIS — D70.9 NEUTROPENIA, UNSPECIFIED TYPE (CMS-HCC): ICD-10-CM

## 2024-11-08 DIAGNOSIS — D61.818 OTHER PANCYTOPENIA (MULTI): ICD-10-CM

## 2024-11-08 DIAGNOSIS — N18.32 ANEMIA DUE TO STAGE 3B CHRONIC KIDNEY DISEASE (MULTI): ICD-10-CM

## 2024-11-08 LAB
ALBUMIN SERPL BCP-MCNC: 3.9 G/DL (ref 3.4–5)
ALP SERPL-CCNC: 94 U/L (ref 33–136)
ALT SERPL W P-5'-P-CCNC: 18 U/L (ref 10–52)
ANION GAP SERPL CALC-SCNC: 14 MMOL/L (ref 10–20)
AST SERPL W P-5'-P-CCNC: 26 U/L (ref 9–39)
BASOPHILS # BLD AUTO: 0.01 X10*3/UL (ref 0–0.1)
BASOPHILS NFR BLD AUTO: 0.9 %
BILIRUB SERPL-MCNC: 1.5 MG/DL (ref 0–1.2)
BUN SERPL-MCNC: 44 MG/DL (ref 6–23)
CALCIUM SERPL-MCNC: 8.6 MG/DL (ref 8.6–10.3)
CHLORIDE SERPL-SCNC: 101 MMOL/L (ref 98–107)
CO2 SERPL-SCNC: 28 MMOL/L (ref 21–32)
CREAT SERPL-MCNC: 2.33 MG/DL (ref 0.5–1.3)
DACRYOCYTES BLD QL SMEAR: NORMAL
EGFRCR SERPLBLD CKD-EPI 2021: 26 ML/MIN/1.73M*2
EOSINOPHIL # BLD AUTO: 0.05 X10*3/UL (ref 0–0.4)
EOSINOPHIL NFR BLD AUTO: 4.7 %
ERYTHROCYTE [DISTWIDTH] IN BLOOD BY AUTOMATED COUNT: 20 % (ref 11.5–14.5)
GLUCOSE SERPL-MCNC: 430 MG/DL (ref 74–99)
HCT VFR BLD AUTO: 27.2 % (ref 41–52)
HGB BLD-MCNC: 8.6 G/DL (ref 13.5–17.5)
IMM GRANULOCYTES # BLD AUTO: 0.01 X10*3/UL (ref 0–0.5)
IMM GRANULOCYTES NFR BLD AUTO: 0.9 % (ref 0–0.9)
LYMPHOCYTES # BLD AUTO: 0.43 X10*3/UL (ref 0.8–3)
LYMPHOCYTES NFR BLD AUTO: 40.2 %
MCH RBC QN AUTO: 34 PG (ref 26–34)
MCHC RBC AUTO-ENTMCNC: 31.6 G/DL (ref 32–36)
MCV RBC AUTO: 108 FL (ref 80–100)
MONOCYTES # BLD AUTO: 0.08 X10*3/UL (ref 0.05–0.8)
MONOCYTES NFR BLD AUTO: 7.5 %
NEUTROPHILS # BLD AUTO: 0.49 X10*3/UL (ref 1.6–5.5)
NEUTROPHILS NFR BLD AUTO: 45.8 %
NRBC BLD-RTO: 0 /100 WBCS (ref 0–0)
OVALOCYTES BLD QL SMEAR: NORMAL
PLATELET # BLD AUTO: 65 X10*3/UL (ref 150–450)
POTASSIUM SERPL-SCNC: 4 MMOL/L (ref 3.5–5.3)
PROT SERPL-MCNC: 6.2 G/DL (ref 6.4–8.2)
RBC # BLD AUTO: 2.53 X10*6/UL (ref 4.5–5.9)
RBC MORPH BLD: NORMAL
SCHISTOCYTES BLD QL SMEAR: NORMAL
SODIUM SERPL-SCNC: 139 MMOL/L (ref 136–145)
WBC # BLD AUTO: 1.1 X10*3/UL (ref 4.4–11.3)

## 2024-11-08 PROCEDURE — 96372 THER/PROPH/DIAG INJ SC/IM: CPT

## 2024-11-08 PROCEDURE — 36415 COLL VENOUS BLD VENIPUNCTURE: CPT

## 2024-11-08 PROCEDURE — 84075 ASSAY ALKALINE PHOSPHATASE: CPT

## 2024-11-08 PROCEDURE — 2500000004 HC RX 250 GENERAL PHARMACY W/ HCPCS (ALT 636 FOR OP/ED): Mod: JZ,EC

## 2024-11-08 PROCEDURE — 85025 COMPLETE CBC W/AUTO DIFF WBC: CPT

## 2024-11-08 RX ORDER — FAMOTIDINE 10 MG/ML
20 INJECTION INTRAVENOUS ONCE AS NEEDED
OUTPATIENT
Start: 2024-11-22

## 2024-11-08 RX ORDER — FERROUS SULFATE TAB 325 MG (65 MG ELEMENTAL FE) 325 (65 FE) MG
1 TAB ORAL EVERY OTHER DAY
COMMUNITY
Start: 2024-10-11

## 2024-11-08 RX ORDER — ALBUTEROL SULFATE 0.83 MG/ML
3 SOLUTION RESPIRATORY (INHALATION) AS NEEDED
OUTPATIENT
Start: 2024-11-22

## 2024-11-08 RX ORDER — EPINEPHRINE 0.3 MG/.3ML
0.3 INJECTION SUBCUTANEOUS EVERY 5 MIN PRN
OUTPATIENT
Start: 2024-11-22

## 2024-11-08 RX ORDER — DIPHENHYDRAMINE HYDROCHLORIDE 50 MG/ML
50 INJECTION INTRAMUSCULAR; INTRAVENOUS AS NEEDED
OUTPATIENT
Start: 2024-11-22

## 2024-11-08 ASSESSMENT — PAIN SCALES - GENERAL: PAINLEVEL_OUTOF10: 0-NO PAIN

## 2024-11-22 ENCOUNTER — OFFICE VISIT (OUTPATIENT)
Dept: HEMATOLOGY/ONCOLOGY | Facility: CLINIC | Age: 89
End: 2024-11-22
Payer: MEDICARE

## 2024-11-22 ENCOUNTER — INFUSION (OUTPATIENT)
Dept: HEMATOLOGY/ONCOLOGY | Facility: CLINIC | Age: 89
End: 2024-11-22
Payer: MEDICARE

## 2024-11-22 ENCOUNTER — APPOINTMENT (OUTPATIENT)
Dept: HEMATOLOGY/ONCOLOGY | Facility: CLINIC | Age: 89
End: 2024-11-22
Payer: MEDICARE

## 2024-11-22 VITALS
RESPIRATION RATE: 18 BRPM | SYSTOLIC BLOOD PRESSURE: 138 MMHG | OXYGEN SATURATION: 97 % | DIASTOLIC BLOOD PRESSURE: 74 MMHG | WEIGHT: 230.16 LBS | HEART RATE: 66 BPM | BODY MASS INDEX: 36.05 KG/M2 | TEMPERATURE: 97.3 F

## 2024-11-22 VITALS — TEMPERATURE: 97.5 F | WEIGHT: 229.4 LBS | BODY MASS INDEX: 35.93 KG/M2

## 2024-11-22 DIAGNOSIS — D61.818 OTHER PANCYTOPENIA (MULTI): ICD-10-CM

## 2024-11-22 DIAGNOSIS — D63.1 ANEMIA DUE TO STAGE 4 CHRONIC KIDNEY DISEASE (MULTI): ICD-10-CM

## 2024-11-22 DIAGNOSIS — D72.819 LEUKOPENIA, UNSPECIFIED TYPE: ICD-10-CM

## 2024-11-22 DIAGNOSIS — N18.32 ANEMIA DUE TO STAGE 3B CHRONIC KIDNEY DISEASE (MULTI): ICD-10-CM

## 2024-11-22 DIAGNOSIS — D63.1 ANEMIA DUE TO STAGE 3B CHRONIC KIDNEY DISEASE (MULTI): ICD-10-CM

## 2024-11-22 DIAGNOSIS — D69.6 THROMBOCYTOPENIA (CMS-HCC): ICD-10-CM

## 2024-11-22 DIAGNOSIS — D46.9 MYELODYSPLASTIC SYNDROME, UNSPECIFIED (MULTI): ICD-10-CM

## 2024-11-22 DIAGNOSIS — D70.9 NEUTROPENIA, UNSPECIFIED TYPE (CMS-HCC): ICD-10-CM

## 2024-11-22 DIAGNOSIS — N18.4 STAGE 4 CHRONIC KIDNEY DISEASE (MULTI): ICD-10-CM

## 2024-11-22 DIAGNOSIS — N18.4 ANEMIA DUE TO STAGE 4 CHRONIC KIDNEY DISEASE (MULTI): ICD-10-CM

## 2024-11-22 LAB
ALBUMIN SERPL BCP-MCNC: 4.1 G/DL (ref 3.4–5)
ALP SERPL-CCNC: 96 U/L (ref 33–136)
ALT SERPL W P-5'-P-CCNC: 21 U/L (ref 10–52)
ANION GAP SERPL CALC-SCNC: 13 MMOL/L (ref 10–20)
AST SERPL W P-5'-P-CCNC: 28 U/L (ref 9–39)
BASOPHILS # BLD AUTO: 0 X10*3/UL (ref 0–0.1)
BASOPHILS NFR BLD AUTO: 0 %
BILIRUB SERPL-MCNC: 1.3 MG/DL (ref 0–1.2)
BUN SERPL-MCNC: 50 MG/DL (ref 6–23)
CALCIUM SERPL-MCNC: 8.4 MG/DL (ref 8.6–10.3)
CHLORIDE SERPL-SCNC: 100 MMOL/L (ref 98–107)
CO2 SERPL-SCNC: 26 MMOL/L (ref 21–32)
CREAT SERPL-MCNC: 2.6 MG/DL (ref 0.5–1.3)
EGFRCR SERPLBLD CKD-EPI 2021: 23 ML/MIN/1.73M*2
EOSINOPHIL # BLD AUTO: 0.05 X10*3/UL (ref 0–0.4)
EOSINOPHIL NFR BLD AUTO: 4 %
ERYTHROCYTE [DISTWIDTH] IN BLOOD BY AUTOMATED COUNT: 19.9 % (ref 11.5–14.5)
GLUCOSE SERPL-MCNC: 353 MG/DL (ref 74–99)
HCT VFR BLD AUTO: 27.4 % (ref 41–52)
HGB BLD-MCNC: 8.8 G/DL (ref 13.5–17.5)
IMM GRANULOCYTES # BLD AUTO: 0.01 X10*3/UL (ref 0–0.5)
IMM GRANULOCYTES NFR BLD AUTO: 0.8 % (ref 0–0.9)
LYMPHOCYTES # BLD AUTO: 0.62 X10*3/UL (ref 0.8–3)
LYMPHOCYTES NFR BLD AUTO: 49.2 %
MCH RBC QN AUTO: 34 PG (ref 26–34)
MCHC RBC AUTO-ENTMCNC: 32.1 G/DL (ref 32–36)
MCV RBC AUTO: 106 FL (ref 80–100)
MONOCYTES # BLD AUTO: 0.06 X10*3/UL (ref 0.05–0.8)
MONOCYTES NFR BLD AUTO: 4.8 %
NEUTROPHILS # BLD AUTO: 0.52 X10*3/UL (ref 1.6–5.5)
NEUTROPHILS NFR BLD AUTO: 41.2 %
NRBC BLD-RTO: 0 /100 WBCS (ref 0–0)
PLATELET # BLD AUTO: 61 X10*3/UL (ref 150–450)
POTASSIUM SERPL-SCNC: 4.3 MMOL/L (ref 3.5–5.3)
PROT SERPL-MCNC: 6.4 G/DL (ref 6.4–8.2)
RBC # BLD AUTO: 2.59 X10*6/UL (ref 4.5–5.9)
SODIUM SERPL-SCNC: 135 MMOL/L (ref 136–145)
WBC # BLD AUTO: 1.3 X10*3/UL (ref 4.4–11.3)

## 2024-11-22 PROCEDURE — 1157F ADVNC CARE PLAN IN RCRD: CPT

## 2024-11-22 PROCEDURE — 80053 COMPREHEN METABOLIC PANEL: CPT

## 2024-11-22 PROCEDURE — 99213 OFFICE O/P EST LOW 20 MIN: CPT

## 2024-11-22 PROCEDURE — 96372 THER/PROPH/DIAG INJ SC/IM: CPT

## 2024-11-22 PROCEDURE — 2500000004 HC RX 250 GENERAL PHARMACY W/ HCPCS (ALT 636 FOR OP/ED): Mod: JZ,EC

## 2024-11-22 PROCEDURE — 1126F AMNT PAIN NOTED NONE PRSNT: CPT

## 2024-11-22 PROCEDURE — 85025 COMPLETE CBC W/AUTO DIFF WBC: CPT

## 2024-11-22 RX ORDER — DIPHENHYDRAMINE HYDROCHLORIDE 50 MG/ML
50 INJECTION INTRAMUSCULAR; INTRAVENOUS AS NEEDED
OUTPATIENT
Start: 2024-12-06

## 2024-11-22 RX ORDER — ALBUTEROL SULFATE 0.83 MG/ML
3 SOLUTION RESPIRATORY (INHALATION) AS NEEDED
OUTPATIENT
Start: 2024-12-06

## 2024-11-22 RX ORDER — FAMOTIDINE 10 MG/ML
20 INJECTION INTRAVENOUS ONCE AS NEEDED
OUTPATIENT
Start: 2024-12-06

## 2024-11-22 RX ORDER — EPINEPHRINE 0.3 MG/.3ML
0.3 INJECTION SUBCUTANEOUS EVERY 5 MIN PRN
OUTPATIENT
Start: 2024-12-06

## 2024-11-22 ASSESSMENT — PAIN SCALES - GENERAL
PAINLEVEL_OUTOF10: 0-NO PAIN
PAINLEVEL_OUTOF10: 0-NO PAIN

## 2024-11-22 NOTE — PROGRESS NOTES
Pt got injection today  Set up for next injection- on 12/9- he will be OOT on 12/6  Rtc 12/23 830 CNP visit- david orourke stat labs- injection to follow after his  OV  Reviewed AVS with patient- patient verbalizes understanding

## 2024-11-22 NOTE — PATIENT INSTRUCTIONS
Discussed and reviewed medical history  Encouraged to schedule appt with Neurology as refferred by emergency room  Encouraged to follow-up with primary care- Dr. Dietz  Reviewed labs- hemoglobin is 8.8, WBC 1.3, ANC 0.52, Absolute Lymphocytes 0.62, Platelets 61k  Will proceed with Darbepoetin Injection today  Repeat labs in 2 weeks with consideratio of Darbeopoetin injection   Return for follow-up with provider in 4 weeks

## 2024-11-22 NOTE — PROGRESS NOTES
Patient ID: Zack Medrano is a 89 y.o. male.    Subjective    HPI  HPI  Patient ID:Zack Medrano is a 88 y.o. year old male patient with myelodysplasia         Referring Physician: Ayah Carmichael MD  [address] [place] [address] [address]  [place] [address]  Primary Care Provider: Fabiola Dietz MD     Chief Complaint        Chief Complaint   Patient presents with    Anemia          History of the Present Illness  10/9/2019.  Visit with Dr. Mickey Nguyen for chronic kidney disease and hypertension.  Chief complaint was fatigue and need to lose weight     11/12/2019.  The patient has had long-term history of microcytic anemia going back to at least 2019 at which time his white count was 5.2 hemoglobin was 13.8 hematocrit 40.5 and a platelet count of 157,000.  His MCV was 105 MCHC was 34.  White blood cell differential count showed 66% polys, 17 lymphs, 11 monos and 4 eosinophils.     12/11/2019.  Seen by primary care for probable shingles treated with Valtrex with chief complaint of waxing and waning left flank pain radiating up under his ribs, somewhat responsive to exercise..  He had a history of pulmonary embolus and was on Eliquis long-term.     2/24/2020.  Seen by Dr. Howell for elevated PSA, BPH and erectile dysfunction.  PSA in 2017 was 2.94, in 2018 it was 2.74 and in 2020 it was 4.03.  We discussed biopsy and the patient wanted to have this followed and not biopsy.     3/9/2020.  Follow-up with NATALY Pulido primary care.  Referred to Dr. Bahena for colonoscopy and CT scan of the chest was ordered for follow-up of questionable lingular nodule.     3/11/2020.  CT scan of the chest showed a stable 6 mm nodular lesion near the fissure in the lingula.     3/13/2020.  Seen by Dr. Bahena who noted that the patient had had diarrhea off-and-on since cholecystectomy and had intermittent left-sided pain.  He had a history of colon polyps.     4/22/2015.  Colonoscopy showed that he had tubular adenoma      5/13/2020.  Seen by Dr. Migdalia New in follow-up.  She noted that he had an abnormal CBC with macrocytic indices with a normal B12 level.  The patient denies alcohol use.  Hemoglobin A1c was 6.2.  BUN was 33 creatinine was 1.74.     8/31/2020.  Follow-up with Dr. Howell.  PSA had come down from 4.03-3.77     1/4/2021 through 1/12/2021.  Admitted to the hospital having been brought to the ED by squad after he fell forward on his toilet and was unable to get up.  He denied hitting his head or losing consciousness.  He blamed it on having back pain and shoulder pain for several weeks.  He was febrile with a temperature of 100.9 and hypoxic with pulse ox of 86% on room air.  Respirations were 24.  He was positive for COVID and was treated with intravenous antibiotics and remdesivir, increased supplemental oxygen.  He was able to be discharged to rehabilitation.     Repeat CBC showed white count 4.3 with a hemoglobin 12.9 hematocrit 38.8 with an MCV of 102 and a platelet count of 173,000.  White blood cell differential count showed minimal absolute lymphocytopenia at 0.6 with the lower limits of normal being 0.8.     3/1/2021.  Follow-up with Dr. Howell.  PSA was 9.9 in February.  Decided to have prostate biopsy done on 3/22/2021.  Results showed BPH.     10/6/2021.  Follow-up with Dr. Howell.  PSA in September was 2.77     4/6/2022.  CBC showed white count 4.3 with hemoglobin 11.7 hematocrit 34.4 with an MCV of 107.  Platelet count was 153,000.  White blood cell differential count was essentially normal.     5/31/2022.  CBC showed white count 3.6 with hemoglobin 11.6 hematocrit 34.4 and platelet count 117,000 with a normal white blood cell differential.     6/27/2022.  Referred to Dr. Dash because of pancytopenia by NATALY Gray.  CBC showed a white count of 3.9 with hemoglobin 12.3 hematocrit 36.1 and platelet count 129,000 with a normal differential.  PSA was 3.26.     The patient said that he felt well except  for having increased fatigue.  He continued on long-term anticoagulation.  He was able to carry out his ADLs.  He did complain of easy bruising.  He said that he had worked at [employer] for 12 years and also worked in making balloons.  Dr. Dash felt that there was a large component secondary to his chronic kidney disease.  There was no evidence of nutritional deficiency.  He checked him for hemolysis and monoclonal gammopathy and ordered an ultrasound for hepatosplenomegaly and testing for hepatitis, HIV and CHRISTINA.  He said he would rather observe the patient rather than pursuing a bone marrow biopsy.     7/27/2022.  Repeat lab data showed white count 3.9 with hemoglobin 12.3 hematocrit 36.1 and platelet count 129,000.  BUN was 36 creatinine was 1.78.  He had mild elevation of AST.  Retake count was 2.3%.  Flow cytometry showed a small clonal B-cell population.  Hepatitis B was nonreactive.  Hep C testing was negative.  Serum protein electrophoresis was normal.  Haptoglobin was normal.  LDH was normal.  He said that the possibility of MDS could not be ruled out.  Ultrasound showed fatty liver but no splenomegaly.  He plan for EPO level and NGS for myeloid mutation.     9/1/2022.  Started on allopurinol for hyperuricemia.     10/12/2022.  Seen by Dr. Dr. Dietz.  Patient was referred to dermatology for several skin lesions.  He was referred to neurology because of his tremor that was not improving.  She increased his glimepiride.     10/17/2022.  CBC showed white count 3.0 with hemoglobin 10.5 hematocrit 31.3 and a platelet count of 106,000 MCV was 111.     10/26/2022.  Follow-up with Dr. 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  He was suspicious for MDS.  He referred him for a bone marrow biopsy.     12/1/2022.  Follow-up with Dr. Dash.  Bone marrow biopsy showed low-grade MDS.  R IPSS score was low or very low depending on a karyotype which was still pending.  We  discussed initiating erythropoietin with Aranesp but the patient deferred and the decision was made to monitor him.     1/11/2023.  CBC showed white count 3.4 with hemoglobin 10.3 hematocrit 32.2 and a platelet count of 137,000.  MCV was 115.  White blood cell differential count was normal.     1/13/2023.  Follow-up with Dr. Dash.  The patient did say that he had following getting out of the shower but did not sustain any injuries.  He had been started by Dr. Nguyen on insulin for his diabetes which was helpful.  Patient continued to be active inside his house.  He remains on Eliquis.  BR-IPSS score is very low.  Counts remain stable.     3/31/2023.  CBC showed a white count of 2.6 with hemoglobin 8.7 hematocrit 27.1 and a platelet count 97,000.  MCV was 115.  White blood cell differential count showed that he had an absolute neutrophil count of 1.36.     4/10/2023.  CBC showed a white count of 2.5 with a hemoglobin 9.0 hematocrit of 27.7 with a platelet count of 92,000.  MCV was 114.     4/17/2023.  Follow-up with NATALY Gray.  Even though his hemoglobin had dropped to 9 the patient did not want to start his erythropoietin injections.     5/11/2023.  CBC showed white count of 2.5 with a hemoglobin of 7.6 hematocrit 23.8 and a platelet count of 101,000.  Absolute neutrophil count was 1.17.     5/15/2023 through 5/19/2023.  Admitted to the hospital with anemia and generalized weakness black stools dizziness with a hemoglobin of 6.7.  His BUN was 51 creatinine was 2.08.  CT scan of the abdomen showed no acute process.  There were findings consistent with atypical healing of the previous rib fracture with question Paget's disease although it was nonspecific.  He was transfused 2 units packed red blood cells.  EGD found 3 erosions in the cardia.  He was prescribed Carafate.     5/24/2023.  Follow-up with Dr. Dr. Dietz after discharge.  CBC showed white count 1.9 with hemoglobin 8.6 hematocrit 28.3 and a  "platelet count of 140,000.  Absolute neutrophil count was 1.06.  Patient did not tolerate Carafate and stopped it.  He was prescribed oral iron twice a day.  He had not been prescribed a PPI which was then started.     6/1/2023.  CBC showed white count of 1.9 with hemoglobin 7.7 hematocrit 24.8 and a platelet count of 87,000.     6/13/2023.  CBC showed a white count of 1.9 with hemoglobin of 8.7 hematocrit 28.1 and platelet count of 89,000.     6/23/2023.  Followed up with Dr. Dr. Dietz who reported that he was hospitalized again in [place] for GI bleed.  At that time his Eliquis was discontinued and he was changed to Protonix and taken off his baby aspirin.  Once again he was started on Carafate but did not tolerate it.     7/15/2023.  CBC showed white count of 2.4 with hemoglobin 9.2 hematocrit 27.3 and a platelet count of 110,000.  Absolute neutrophil count was 1.36.     7/17/2023.  Follow-up with Malorie Shaffer.  The patient had recently had a colonoscopy and had \"cauterizations\" he noted that he had dyspnea with exertion but no resting shortness of breath.  He was on prednisone at that time.  His sugars were in the 300s.  He was on insulin.  His neuropathy has not changed and he continues to have tremors.  Current level was found to be saturated.  Once again the patient was reluctant to start NING.     8/14/2023.  Follow-up with Malorie Shaffer.  CBC showed white count 1.8 with a hemoglobin 8.9 hematocrit 26.9 and a platelet count of 100,000.  Absolute neutrophil count was 0.96.     9/6/2023.  CBC showed a white count of 1.8 with hemoglobin 9.5 hematocrit 28.8 and a platelet count of 85,000 with an absolute neutrophil count of 0.99.  9/13/2023.  Follow-up with Dr. Howell.  PSA has gone down to 1.87.     10/9/2023.  Follow-up with Malorie Shaffer who noted the patient remained on oxygen and CPAP.  He says that he is awakening in the middle of the night around 3 to 4:00 in the morning.  Sugars have been in the 300s.  " Neuropathy was unchanged.  He had been started on erythropoietin every 3 weeks     11/21/2023.  Follow-up with Malorie Shaffer.  No improvement in his hemoglobin.  White count had improved to 2.1 platelets were baseline.  He had been placed on 40 mg of prednisone and continued on erythropoietin 300 mcg every 3 weeks.     1/2/2024.  Follow-up with Malorie Shaffer.  No improvement in hemoglobin.  Symptomatically he was doing well.  Plan was to increase frequency of his darbepoetin to every 2 weeks.     2/13/2024.  Follow-up with Malorie Shaffer.  Now on erythropoietin injections every 2 weeks.  There was a discussion concerning repeat bone marrow biopsy with counts stayed low.     3/12/2024.  Follow-up with Malorie Shaffer.  No improvement in his hemoglobin.  Patient was agreeable to repeat bone marrow biopsy.  She also ordered a CAT scan of the chest abdomen and pelvis without contrast.     3/22/2024.  CAT scan showed severe coronary artery calcifications.  There is evidence of bronchiectasis in the lower lobes increased compared to previous CT scan from 2020.     Predominantly groundglass opacities with bibasilar prominence.  There is some subpleural sparing of the left upper lobe.  There is a 3 mm nodule in the right upper lobe which was unchanged for several years.  Multilevel anterior bridging osteophytes were noted consistent with DISH.  There is a small fat-containing periumbilical hernia and bilateral inguinal hernias.  Spleen size is at the upper limits of normal being 12.7 cm in length slightly increased when compared to study from 2019.  Cysts were seen in the kidneys with diffuse cortical atrophy and a 4 mm hyperdense focus in the interpolar region of the right kidney possibly a stone.  Prostatism was noted for gland measuring 6.2 cm.     3/28/2024.  Seen by Dr. Graham for shortness of breath.  He felt the patient had possible chronic interstitial lung disease versus periodic aspiration, hypoxia dyspnea on exertion.   Further tests were ordered including high-resolution CT scan, complete pulmonary function testing, simple pulmonary stress test and follow-up.     4/9/2024.  Follow-up with Malorie Shaffer.  Erythropoietin injections continued every 2 weeks which the patient was tolerating well.     4/16/2024.  Bone marrow biopsy was performed showing a hypercellular bone marrow at 50% with dyserythropoiesis.  There are 1% blasts consistent with persistent myeloid neoplasm.  Flow cytometry showed a small clonal CD5 negative, CD10 negative B-cell population and a polytypic background.  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     There was a very small abnormal T-cell population that could be incidental.  Next generation sequencing showed 2 U2AF 1 variants, 1 of which was negative.  The other had been previously seen.     The marrow also showed dyserythropoiesis with bilobate forms and forms with irregular nuclear contours as well as blebbing.     4/23/2024.  CBC showed white count 1.5 with a hemoglobin of 8.3 hematocrit 25.9 and platelet count of 71,000 with 37% neutrophils, 53 lymphocytes, 4 monos, 4 eosinophils with an absolute neutrophil count of 0.57.     4/24/2024.  Return visit with Dr. Graham.  He noted that the high-resolution CT scan showed no significant change from the previous CT scan the month before.  Pulmonary function testing showed no response to bronchodilator and there was mild restrictive change with some suggestion of airway inflammation.  The patient had difficulty doing the test because of persistent coughing.  6-minute walk test showed that the patient was able to maintain saturations at 93% with 2 L of oxygen per minute continuously.  The test was terminated because of dyspnea.  With his hypoxia on room air it was recommended that he be on oxygen continuously to  keep his saturation greater than or equal to 92%.  He continued on oxygen with his CPAP at 3 L at nighttime.     5/10/2024.  This is my first visit with Mr. Medrano and his family.  We went over some of the results of the bone marrow biopsy.  I told him that I would have to wait until the chromosomal analysis returned.  I also note the possibility of low-grade lymphoma.  He may need to have an opinion with malignant hematology at [facility].  Stepdaughter who is here with him and his wife today is his advocate for maintaining his quality of life.  She is an employee at a local nursing facility and is concerned about many clients she has who seem to be getting treated aggressively.  We talked about myelodysplasia and the fact that he is not responding to erythropoietin.  He will continue on this treatment for now.  He is low counts were discussed and he knows that he is at higher risk for infection and bleeding.  He continues on oxygen supplementation continuously.  They will return in 2 weeks at which time laboratory tests will be performed.     He just had a lesion removed from the dorsum of his right hand at the base of his first and second fingers.  Stitches are still in place.  It seems to be healing well without any significant bleeding or infection.  He was told that it looked benign.     5/13/2024.  Follow-up with Dr. Aggie Danielle of cardiology who saw him in follow-up of his coronary artery disease with multiple stents having been placed, hypertension, dyslipidemia and previous pulmonary embolus related to COVID-19.  She had stopped his Eliquis.  She noted his myelodysplasia.  She noted that at some point his aspirin had been stopped but given his history of stents this was restarted and he tolerated it well.  She said that his blood pressure was well-controlled.  She noted blood sugars were between 150-2 50.  She noted the patient was supposed to be using oxygen with exertion but the patient was not sure what rate  it was supposed to be.  She says that she would not stop his aspirin unless his platelets were less than 50,000 with complications of bleeding.  She said it was very important to continue aspirin given a 15% chance of in-stent thrombosis with aspirin cessation.  She increased her furosemide to 80 mg twice a day because of bilateral lower extremity edema and spironolactone to 25 mg twice daily until symptoms improve and encouraged him to cut back on his salt intake.  She was see him again in about 9 months.     5/14/2024.  Follow-up with Dr. Santamaria of ophthalmology who started him on Naphcon-A drops 1 drop 3 times a day in both eyes for excessive tearing     5/15/2024.  Followed up with Dr. Dimas Renteria of podiatry who noted decreased pedal pulses 2 out of 4.  He had footcare.     5/24/2024.  Follow-up with Dr. Graham.  Review of his x-rays showed no change.  Physical examination showed decreased breath sounds but no inspiratory crackles that were previously heard.  He felt that the interstitial lung disease was stable and that his hypoxemia was improved on oxygen.  He plan to repeat CT scan in September.     5/24/2024.  He is here to follow-up on his bone marrow results.  I told him that the chromosomal analysis would not be forthcoming before because the specimen failed to grow in culture.  He continues to display a low-grade MDS with blast count of only 2%.  We reviewed the fact that he is recommended to have at least 6 months of his erythropoietin before we decide whether or not it is a failure.  He is agreeable to this.  We will increase his dose of darbepoetin.  He knows to be on the look out for any signs of infection or bleeding and to report this if it happens.  We reviewed his most recent CBC.  We will see him again in 2 weeks with his next injection.     5/30/2024.  Follow-up with Dr. Dr. Dietz for his Medicare wellness visit.  His magnesium dose was changed to twice daily indefinitely.      6/7/2024.  He is here today for his darbepoetin injection.  We have increased his dose to 500 mcg every 2 weeks.  Will see if this has any effect.  Laboratory dated today shows that his sugar is 290 and his sodium is 134.  BUN is 46 and creatinine is 2.31.  CBC shows white count 1.5 with a hemoglobin 8.1 hematocrit 24.7 and a platelet count of 71,000 which is very much like his most recent CBC.  His absolute neutrophil count is 0.57.     I talked to him about his current status which he feels is fairly stable.  He is off oxygen.  He has had no bleeding and no signs of infection.  He understands that we have increased his dose of darbepoetin and we will watch his counts.  He will report any side effects.     He has continued to receive his darbepoetin injections.     7/15/2024.  Emergency department visit [facility].  He developed a rash on both lower extremities which started a week ago which is an erythematous papular rash.  He denied any pain or itching.  He did not have any vesicle formation.  It is bilateral.  He has had a shingles injection.  He was told that he had disseminated zoster and was started on valacyclovir a gram twice daily for 7 days.     7/19/2024.  Seen by Dr. Dr. Dietz who said that the rash has not gotten any worse and might be a little bit better.  She noted that there were no vesicles no open areas no areas of secondary infection.     7/19/2024.  He is here to receive his erythropoietin injection and to have a blood count.  I told him it is not likely that this is disseminated zoster because it is nowhere else on his body but only on his lower extremities.  This could be stasis dermatitis.  I do not see any signs of vesicle formation and there never were any.  It is mostly on the anterior shins.  It is all below the knee.     CBC today shows white count 1.4 with hemoglobin 8 hematocrit 25.4 platelet count 67,000 with 37 neutrophils 53 lymphocytes 5 monos 2 eosinophils.  He is  serum chemistry showed a sugar of 285 BUN of 49 creatinine of 2.67.  We told him he needed to keep his hydration status high.  He will continue with his injections today.  Blood pressure today is 153/64.     He has had no bleeding and no infection.  He has had no chest pain or pressure.  He is able to participate in his usual activities including mowing the yard.     He has continued to receive his darbepoetin and has followed up with podiatry for nail and callus care.     8/30/2024.  He is here in routine follow-up.  He says that he is doing well.  He has had no infections or bleeding.  He has had no mouth sores.  He has had continued swelling in his legs a little bit worse on the left than on the right.  Blood sugar was greater than 300 but he admits to dietary indiscretion.  Has had no chest pain.  His blood pressure was 149/63.  He says that his breathing is stable.  He is using his oxygen at home at night and when he travels that he does not have it on today.     His lab data has shown no sign of improvement so far with white count 1.3, hemoglobin 8.6 hematocrit 27.2 and a platelet count of 69,000.  White blood cell differential count shows 38% polys, 50 lymphs, 5 monos, 2 eosinophils.  We plan a 6-month trial of this dose of darbepoetin which will extend until November.  We will see him again in about a month.     Interval Note  9/4/2024.  CT scan of the chest showed mild streaky scarring in the lung bases bilaterally.  The previously seen groundglass opacities probably be minimally improved.  Calcified pleural plaques are seen without consolidation effusion or pneumothorax.  There is no adenopathy seen by CT size criteria.  Minimal hepatic steatosis was seen as well as calcified granuloma in the spleen and a somewhat atrophic pancreas.  DJD was seen in the spine.     9/5/2024.  Seen by Dr. Zambrano his nephrologist who ordered lab tests and felt that his fluctuating chemistries were related to diuretic therapy.   He noted myelodysplasia and that he was being followed for his anemia receiving NING.  He did not change any of his medications  And said that he would see him again in about 6 months     9/10/2024.  Follow-up by Dr. Graham at which time he told the patient he could decrease his oxygen to 2 and he would repeat a CT scan in 6 months and see him after that.     9/27/2024.  He is here today in routine follow-up.  He will get his shot today.  Laboratory data shows no improvement.  We reviewed that we will follow him for 6 months on this treatment to see if there is any improvement.     Mr. Roth says that he has had no significant bleeding.  I see some red spots on the soft palate.  His appetite has been good and he has gained weight of 1 pound since last time I saw him about a month ago.  He has had no other signs of infection or deterioration in his status.  He knows he is at risk for serious infection or bleeding.  We will continue on this treatment pathway.  He will see him again in a month.     10/25/24: Seen in office today by OSCAR. Appetite is decent denies any early satiety weight is stable. Energy varies day to day. 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 Isolated episode of a nose bleed. He reported waking up last night sweaty and hot, unaware if he was running a fever, encouraged him to check temperature if this should re-occur. Denies any further B symptoms. No serious abnormal bleeding, remains bruising easily.      Reviewed labs very minimal improvement in his labs, will continue with administering the shot today as ordered. Will continue to monitor patient closely, with a follow-up in a month.    11/22/24: Patient was recently in the ED for tremors. He reports he didn't feel well/didn't feel right. Head/Brain CT, EKG,- were normal in findings. Patient is here today and feels better he reports increasing numbness/tingling in bilateral hands, chronic back pain. Denies  headaches, vision changes. Denies any fever, nausea, vomiting. Reports sweating at night on his bilateral legs, denies any restless leg symptoms. Denies any abnormal bruising or bleeding. Appetite is good, unchanged. Energy remains poor. 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 Wears continuous oxygen at home, CPAP w/oxygen at night. Chronic productive cough in the am.           Objective    BSA: 2.22 meters squared  Temp 36.4 °C (97.5 °F)   Wt 104 kg (229 lb 6.4 oz)   BMI 35.93 kg/m²      Physical Exam  Vitals reviewed.   Constitutional:       Appearance: Normal appearance. He is obese.   HENT:      Head: Normocephalic and atraumatic.      Nose: Nose normal.      Mouth/Throat:      Mouth: Mucous membranes are moist.      Pharynx: Oropharynx is clear.   Eyes:      General: No scleral icterus.     Extraocular Movements: Extraocular movements intact.      Conjunctiva/sclera: Conjunctivae normal.   Cardiovascular:      Rate and Rhythm: Normal rate and regular rhythm.      Pulses: Normal pulses.      Heart sounds: Normal heart sounds.   Pulmonary:      Effort: Pulmonary effort is normal.      Breath sounds: Decreased breath sounds present.   Abdominal:      General: There is no distension.      Palpations: Abdomen is soft.      Tenderness: There is no abdominal tenderness.   Musculoskeletal:         General: Swelling present. Normal range of motion.      Cervical back: Normal range of motion.      Right lower leg: Edema present.      Left lower leg: Edema present.   Skin:     General: Skin is warm and dry.      Coloration: Skin is pale.      Findings: Bruising present.   Neurological:      General: No focal deficit present.      Mental Status: He is alert and oriented to person, place, and time.      Motor: Weakness present.   Psychiatric:         Mood and Affect: Mood normal.         Behavior: Behavior normal.         Thought  Content: Thought content normal.         Judgment: Judgment normal.         Performance Status:  Symptomatic; fully ambulatory      Assessment/Plan      Comorbidities  GERD  Arthritis, DJD  BPH  Coronary artery disease with history of myocardial, status post stent infarction  History of dumping syndrome  Hyperlipidemia  History of skin cancer  Obesity  Obstructive sleep apnea on CPAP  Peripheral arterial disease  CKD 3  Diabetes with nephropathy  History of cholecystectomy and hernia repair  Essential tremor  Hypertension   Numbness in his fingers felt to be related to cervical disc disease.     Monitoring Parameters  Histories, physical examinations and CBCs with occasional bone marrow biopsies.      Review of Systems - Oncology   Review of Systems   Constitutional:  Positive for fatigue. Negative for appetite change and unexpected weight change.   HENT:  Negative for dental problem, mouth sores, nosebleeds and trouble swallowing.    Eyes:  Negative for visual disturbance.   Respiratory:  Positive for shortness of breath (with activity). Negative for cough.    Cardiovascular:  Positive for leg swelling.   Gastrointestinal:  Negative for abdominal pain, constipation, diarrhea, nausea and vomiting.   Endocrine: Negative for polyuria.   Genitourinary:  Negative for dysuria, frequency and urgency.   Musculoskeletal:  Positive for arthralgias and myalgias.   Skin:  Positive for pallor. Negative for rash.   Neurological:  Negative for weakness, light-headedness, numbness and headaches.   Hematological:  Bruises/bleeds easily.   Psychiatric/Behavioral:  Negative for self-injury, sleep disturbance and suicidal ideas. The patient is not nervous/anxious.          Past Medical History  Medical History           Past Medical History:   Diagnosis Date    Abnormal levels of other serum enzymes       Abnormal liver enzymes    Dependence on other enabling machines and devices       CPAP (continuous positive airway pressure)  dependence    Encounter for examination of eyes and vision without abnormal findings       Diabetic eye exam    Localized edema       Bilateral leg edema    Old myocardial infarction       History of myocardial infarction    Other conditions influencing health status 01/08/2020     Uncontrolled diabetes mellitus type 2 without complications    Pain in unspecified ankle and joints of unspecified foot       Arthralgia of ankle    Personal history of other diseases of the digestive system       History of gastroesophageal reflux (GERD)    Personal history of other diseases of the digestive system       History of dumping syndrome    Personal history of other endocrine, nutritional and metabolic disease 11/12/2020     History of type 2 diabetes mellitus    Presence of coronary angioplasty implant and graft       History of coronary artery stent placement    Rash and other nonspecific skin eruption 12/11/2019     Rash    Unspecified abdominal hernia without obstruction or gangrene       Hernia    Unspecified abdominal pain 05/19/2021     Abdominal pain, left lateral            Surgical History  Surgical History             Past Surgical History:   Procedure Laterality Date    OTHER SURGICAL HISTORY   10/09/2019     Arterial stent placement    OTHER SURGICAL HISTORY   10/09/2019     Cholecystectomy    OTHER SURGICAL HISTORY   10/09/2019     Hernia repair            Social History  Social History   Social History              Tobacco Use    Smoking status: Never    Smokeless tobacco: Never   Vaping Use    Vaping status: Never Used   Substance Use Topics    Alcohol use: Never    Drug use: Never            Family History  family history includes Colon cancer in his mother and another family member.         Current Medications          Current Outpatient Medications   Medication Instructions    allopurinol (ZYLOPRIM) 100 mg, oral, Daily    amLODIPine (NORVASC) 5 mg, oral, Daily before breakfast    ascorbic acid (VITAMIN C)  "250 mg, oral    aspirin 81 mg, oral, Daily    atorvastatin (LIPITOR) 40 mg, oral, Daily    blood-glucose sensor (FreeStyle Sindi 3 Sensor) device Use as directed    cyanocobalamin, vitamin B-12, (Vitamin B-12) 1,000 mcg tablet extended release 1 tablet, oral    flash glucose scanning reader (FreeStyle Sindi 2 [place]) misc Use as instructed    FreeStyle Sindi 2 Sensor kit 1 each, subcutaneous, As needed, Use as instructed    furosemide (LASIX) 60 mg, oral, Daily    gabapentin (NEURONTIN) 300 mg, oral, 2 times daily    lancets 33 gauge misc miscellaneous, 2 times daily    Lantus U-100 Insulin 50 Units, subcutaneous, Nightly    metoprolol tartrate (LOPRESSOR) 50 mg, oral, 2 times daily    multivit-minerals/folic acid (CENTRUM ADULTS ORAL) oral    nitroglycerin (NITROSTAT) 0.4 mg, sublingual, Every 5 min PRN    NON FORMULARY [employer]etrack test In vitro strip; Use as directed to check blood sugar  2 times daily     pantoprazole (PROTONIX) 40 mg, oral, 2 times daily    pen needle, diabetic (Pen Needle) 32 gauge x 5/32\" needle 1 each, miscellaneous, Daily    primidone (MYSOLINE) 50 mg, oral, Nightly    spironolactone (ALDACTONE) 25 mg, oral, Daily    sucralfate (CARAFATE) 1 g, oral, 3 times daily (morning, midday, late afternoon)    vitamins A,C,E-zinc-copper 2,148 mcg-113 mg-45 mg-17.4mg tablet oral, SUPER VIEW       Scheduled Medications             OARRS Review  I have personally reviewed the OARRS report for Zack Medrano. I have considered the risks of abuse, dependence, addiction and diversion.  He continues on gabapentin through primary care.        Objective  BSA: There is no height or weight on file to calculate BSA.  There were no vitals taken for this visit.     Physical Exam  Vitals and nursing note reviewed.   Constitutional:       Appearance: Normal appearance. He is obese.   HENT:      Head: Normocephalic and atraumatic.      Nose: Nose normal.      Mouth/Throat:      Mouth: Mucous membranes are moist.      " Pharynx: Oropharynx is clear.   Eyes:      General: No scleral icterus.     Extraocular Movements: Extraocular movements intact.      Conjunctiva/sclera: Conjunctivae normal.   Cardiovascular:      Rate and Rhythm: Normal rate and regular rhythm.      Pulses: Normal pulses.      Heart sounds: Murmur heard.   Pulmonary:      Effort: Pulmonary effort is normal.      Breath sounds: Wheezing present.   Abdominal:      Palpations: Abdomen is soft.      Tenderness: There is no abdominal tenderness.      Comments: Large abdomen   Musculoskeletal:         General: Normal range of motion.      Cervical back: Normal range of motion.      Right lower leg: Edema present.      Left lower leg: Edema present.   Skin:     General: Skin is warm and dry.      Coloration: Skin is pale.      Findings: Bruising present.   Neurological:      General: No focal deficit present.      Mental Status: He is alert and oriented to person, place, and time.      Motor: Weakness present.   Psychiatric:         Mood and Affect: Mood normal.         Behavior: Behavior normal.         Thought Content: Thought content normal.         Judgment: Judgment normal.            Performance Status:  Symptomatic; in bed <50% of the day           Assessment/Plan     Recent Imaging  CT chest wo IV contrast     Result Date: 9/6/2024  Interpreted By:  Mick Figueroa, STUDY: CT CHEST WO IV CONTRAST;  9/4/2024 1:47 pm   INDICATION: Signs/Symptoms:interstitual lung disease.   COMPARISON: 04/05/2024   ACCESSION NUMBER(S): [id number]   ORDERING CLINICIAN: DI CHA   TECHNIQUE: Helical data acquisition of the chest was obtained  without IV contrast material.  Images were reformatted in axial, coronal, and sagittal planes.   FINDINGS: Lungs and Pleura: Mild streaky scarring in the lung bases bilaterally. The previously seen ground-glass opacities predominantly lower for low are only minimally improved. Calcified pleural plaques. No pulmonary consolidation. No  pleural effusion. No pneumothorax.   Mediastinum and axilla: No adenopathy by CT size criteria. Cardiomegaly. No pericardial effusion. Coronary artery calcifications. Mild descending thoracic aortic aneurysm measuring 3 cm.   Visualized Upper Abdomen: Status post cholecystectomy. Minimal hepatic steatosis. Calcified granulomas in the spleen. Somewhat atrophic pancreas.   MSK/Chest Wall: Multilevel degenerative change in the spine.          Stable streaky scarring in the lung bases. Only minimal improvement of previously seen ground-glass opacities.   Signed by: Mick Figueroa 9/6/2024 3:25 PM Dictation workstation:   [id number]         Pathology  Low-grade myelodysplasia, poorly responsive to darbepoetin          Performance Status:  Symptomatic; fully ambulatory     Assessment/Plan    1.  Pancytopenia.  The patient has unusual bone marrow findings.  Chromosomal analysis will not be done due to failure of the specimen to grow.  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  He also has components of myelodysplasia.  He is not responding to erythropoietin.  We will increase his dose to 500 mcg every other week.  He continues to be at high risk for bleeding and infection with a low ANC and low platelet count.  He has had previous GI bleeds.  He is currently back on his  aspirin per follow-up with Dr. Danielle for fear of clotting off his stents.  He is off Eliquis.  He will get his treatment today with darbepoetin and continue every 14 days.  We plan for 6-month trial which will take us to November.     11/22/24: Patient tolerated last treatment well. Patient remains to have poor energy. He denies any serious abnormal bleeding or signs of infections. Will continue with treatment as scheduled today.      2.  Multiple comorbidities.  These include but are not limited to:  GERD  Arthritis, DJD  BPH  Coronary artery disease with history of myocardial, status post stent infarction  History of dumping  syndrome  Hyperlipidemia  History of skin cancer  Obesity  Obstructive sleep apnea on CPAP  Peripheral arterial disease  CKD 3  Diabetes with nephropathy  History of cholecystectomy and hernia repair  Essential tremor  Hypertension   Numbness in his fingers felt to be related to cervical disc disease.     3.  Rash on his legs.  I think this looks more like stasis dermatitis than disseminated zoster.  If it were disseminated zoster he would have it on other areas of his body.     Plan:  Discussed and reviewed recent medical history.  He will continue his Darbopoetin alpha every 2 weeks .  He will call us if he has any questions or concerns.      He knows that he should call in the interim should he have any change in his status, questions or concerns.         Malorie Shaffer, APRN-CNP

## 2024-11-23 DIAGNOSIS — K25.9 GASTRIC ULCER, UNSPECIFIED CHRONICITY, UNSPECIFIED WHETHER GASTRIC ULCER HEMORRHAGE OR PERFORATION PRESENT: ICD-10-CM

## 2024-11-25 ENCOUNTER — LAB (OUTPATIENT)
Dept: LAB | Facility: LAB | Age: 89
End: 2024-11-25
Payer: MEDICARE

## 2024-11-25 DIAGNOSIS — E11.22 TYPE 2 DIABETES MELLITUS WITH STAGE 4 CHRONIC KIDNEY DISEASE, WITH LONG-TERM CURRENT USE OF INSULIN (MULTI): ICD-10-CM

## 2024-11-25 DIAGNOSIS — E78.2 MIXED HYPERLIPIDEMIA: ICD-10-CM

## 2024-11-25 DIAGNOSIS — I10 PRIMARY HYPERTENSION: ICD-10-CM

## 2024-11-25 DIAGNOSIS — N18.4 TYPE 2 DIABETES MELLITUS WITH STAGE 4 CHRONIC KIDNEY DISEASE, WITH LONG-TERM CURRENT USE OF INSULIN (MULTI): ICD-10-CM

## 2024-11-25 DIAGNOSIS — I25.10 CORONARY ARTERY DISEASE INVOLVING NATIVE HEART, UNSPECIFIED VESSEL OR LESION TYPE, UNSPECIFIED WHETHER ANGINA PRESENT: ICD-10-CM

## 2024-11-25 DIAGNOSIS — Z79.4 TYPE 2 DIABETES MELLITUS WITH STAGE 4 CHRONIC KIDNEY DISEASE, WITH LONG-TERM CURRENT USE OF INSULIN (MULTI): ICD-10-CM

## 2024-11-25 LAB
ALBUMIN SERPL BCP-MCNC: 3.9 G/DL (ref 3.4–5)
ALP SERPL-CCNC: 94 U/L (ref 33–136)
ALT SERPL W P-5'-P-CCNC: 19 U/L (ref 10–52)
ANION GAP SERPL CALC-SCNC: 14 MMOL/L (ref 10–20)
AST SERPL W P-5'-P-CCNC: 23 U/L (ref 9–39)
BASOPHILS # BLD AUTO: 0.01 X10*3/UL (ref 0–0.1)
BASOPHILS NFR BLD AUTO: 0.8 %
BILIRUB SERPL-MCNC: 0.9 MG/DL (ref 0–1.2)
BUN SERPL-MCNC: 51 MG/DL (ref 6–23)
CALCIUM SERPL-MCNC: 8.8 MG/DL (ref 8.6–10.3)
CHLORIDE SERPL-SCNC: 103 MMOL/L (ref 98–107)
CHOLEST SERPL-MCNC: 92 MG/DL (ref 0–199)
CHOLESTEROL/HDL RATIO: 3.1
CO2 SERPL-SCNC: 27 MMOL/L (ref 21–32)
CREAT SERPL-MCNC: 2.64 MG/DL (ref 0.5–1.3)
EGFRCR SERPLBLD CKD-EPI 2021: 22 ML/MIN/1.73M*2
EOSINOPHIL # BLD AUTO: 0.06 X10*3/UL (ref 0–0.4)
EOSINOPHIL NFR BLD AUTO: 5 %
ERYTHROCYTE [DISTWIDTH] IN BLOOD BY AUTOMATED COUNT: 19.9 % (ref 11.5–14.5)
EST. AVERAGE GLUCOSE BLD GHB EST-MCNC: 240 MG/DL
GLUCOSE SERPL-MCNC: 285 MG/DL (ref 74–99)
HBA1C MFR BLD: 10 %
HCT VFR BLD AUTO: 26.1 % (ref 41–52)
HDLC SERPL-MCNC: 30 MG/DL
HGB BLD-MCNC: 8.3 G/DL (ref 13.5–17.5)
IMM GRANULOCYTES # BLD AUTO: 0.01 X10*3/UL (ref 0–0.5)
IMM GRANULOCYTES NFR BLD AUTO: 0.8 % (ref 0–0.9)
LDLC SERPL CALC-MCNC: 22 MG/DL
LYMPHOCYTES # BLD AUTO: 0.69 X10*3/UL (ref 0.8–3)
LYMPHOCYTES NFR BLD AUTO: 57 %
MCH RBC QN AUTO: 33.9 PG (ref 26–34)
MCHC RBC AUTO-ENTMCNC: 31.8 G/DL (ref 32–36)
MCV RBC AUTO: 107 FL (ref 80–100)
MONOCYTES # BLD AUTO: 0.07 X10*3/UL (ref 0.05–0.8)
MONOCYTES NFR BLD AUTO: 5.8 %
NEUTROPHILS # BLD AUTO: 0.37 X10*3/UL (ref 1.6–5.5)
NEUTROPHILS NFR BLD AUTO: 30.6 %
NON HDL CHOLESTEROL: 62 MG/DL (ref 0–149)
NRBC BLD-RTO: 0 /100 WBCS (ref 0–0)
OVALOCYTES BLD QL SMEAR: NORMAL
PLATELET # BLD AUTO: 60 X10*3/UL (ref 150–450)
POTASSIUM SERPL-SCNC: 4.2 MMOL/L (ref 3.5–5.3)
PROT SERPL-MCNC: 6 G/DL (ref 6.4–8.2)
RBC # BLD AUTO: 2.45 X10*6/UL (ref 4.5–5.9)
RBC MORPH BLD: NORMAL
SCHISTOCYTES BLD QL SMEAR: NORMAL
SODIUM SERPL-SCNC: 140 MMOL/L (ref 136–145)
TRIGL SERPL-MCNC: 201 MG/DL (ref 0–149)
TSH SERPL-ACNC: 3.69 MIU/L (ref 0.44–3.98)
VIT B12 SERPL-MCNC: 451 PG/ML (ref 211–911)
VLDL: 40 MG/DL (ref 0–40)
WBC # BLD AUTO: 1.2 X10*3/UL (ref 4.4–11.3)

## 2024-11-25 PROCEDURE — 85025 COMPLETE CBC W/AUTO DIFF WBC: CPT

## 2024-11-25 PROCEDURE — 84443 ASSAY THYROID STIM HORMONE: CPT

## 2024-11-25 PROCEDURE — 82607 VITAMIN B-12: CPT

## 2024-11-25 PROCEDURE — 83036 HEMOGLOBIN GLYCOSYLATED A1C: CPT

## 2024-11-25 PROCEDURE — 36415 COLL VENOUS BLD VENIPUNCTURE: CPT

## 2024-11-25 PROCEDURE — 80061 LIPID PANEL: CPT

## 2024-11-25 PROCEDURE — 80053 COMPREHEN METABOLIC PANEL: CPT

## 2024-11-25 RX ORDER — SUCRALFATE 1 G/1
1 TABLET ORAL
Qty: 270 TABLET | Refills: 1 | Status: SHIPPED | OUTPATIENT
Start: 2024-11-25

## 2024-12-02 ENCOUNTER — APPOINTMENT (OUTPATIENT)
Dept: PRIMARY CARE | Facility: CLINIC | Age: 89
End: 2024-12-02
Payer: MEDICARE

## 2024-12-02 VITALS
HEART RATE: 81 BPM | DIASTOLIC BLOOD PRESSURE: 64 MMHG | SYSTOLIC BLOOD PRESSURE: 128 MMHG | BODY MASS INDEX: 36.05 KG/M2 | OXYGEN SATURATION: 90 % | HEIGHT: 67 IN | WEIGHT: 229.7 LBS

## 2024-12-02 DIAGNOSIS — G25.0 TREMOR, ESSENTIAL: ICD-10-CM

## 2024-12-02 DIAGNOSIS — E11.22 TYPE 2 DIABETES MELLITUS WITH STAGE 4 CHRONIC KIDNEY DISEASE, WITH LONG-TERM CURRENT USE OF INSULIN (MULTI): ICD-10-CM

## 2024-12-02 DIAGNOSIS — I10 PRIMARY HYPERTENSION: ICD-10-CM

## 2024-12-02 DIAGNOSIS — I25.10 CORONARY ARTERY DISEASE INVOLVING NATIVE HEART, UNSPECIFIED VESSEL OR LESION TYPE, UNSPECIFIED WHETHER ANGINA PRESENT: Primary | ICD-10-CM

## 2024-12-02 DIAGNOSIS — E78.2 MIXED HYPERLIPIDEMIA: ICD-10-CM

## 2024-12-02 DIAGNOSIS — E11.22 TYPE 2 DIABETES MELLITUS WITH DIABETIC CHRONIC KIDNEY DISEASE: ICD-10-CM

## 2024-12-02 DIAGNOSIS — N18.4 TYPE 2 DIABETES MELLITUS WITH STAGE 4 CHRONIC KIDNEY DISEASE, WITH LONG-TERM CURRENT USE OF INSULIN (MULTI): ICD-10-CM

## 2024-12-02 DIAGNOSIS — Z79.4 TYPE 2 DIABETES MELLITUS WITH STAGE 4 CHRONIC KIDNEY DISEASE, WITH LONG-TERM CURRENT USE OF INSULIN (MULTI): ICD-10-CM

## 2024-12-02 PROCEDURE — 1159F MED LIST DOCD IN RCRD: CPT | Performed by: FAMILY MEDICINE

## 2024-12-02 PROCEDURE — 3074F SYST BP LT 130 MM HG: CPT | Performed by: FAMILY MEDICINE

## 2024-12-02 PROCEDURE — 1036F TOBACCO NON-USER: CPT | Performed by: FAMILY MEDICINE

## 2024-12-02 PROCEDURE — G2211 COMPLEX E/M VISIT ADD ON: HCPCS | Performed by: FAMILY MEDICINE

## 2024-12-02 PROCEDURE — 1160F RVW MEDS BY RX/DR IN RCRD: CPT | Performed by: FAMILY MEDICINE

## 2024-12-02 PROCEDURE — 3078F DIAST BP <80 MM HG: CPT | Performed by: FAMILY MEDICINE

## 2024-12-02 PROCEDURE — 1157F ADVNC CARE PLAN IN RCRD: CPT | Performed by: FAMILY MEDICINE

## 2024-12-02 PROCEDURE — 99214 OFFICE O/P EST MOD 30 MIN: CPT | Performed by: FAMILY MEDICINE

## 2024-12-02 RX ORDER — INSULIN GLARGINE 100 [IU]/ML
60 INJECTION, SOLUTION SUBCUTANEOUS NIGHTLY
Qty: 18 ML | Refills: 11 | Status: SHIPPED | OUTPATIENT
Start: 2024-12-02 | End: 2025-12-02

## 2024-12-02 NOTE — PATIENT INSTRUCTIONS
Will increase insulin to 60 units daily, continue other current medications, follow up with specialists as scheduled.  Follow up here in 3 months, sooner if needed.

## 2024-12-02 NOTE — PROGRESS NOTES
Subjective   Zack Medrano is a 89 y.o. male who presents for Follow-up (6 month follow up).  Here for routine follow up HTN, DM, CKD (Dr Nguyen), CAD ([facility]), high chol, thrombocytopenia/anemia/pancytopenia (Dr Carmichael), tremor (Dr Haque), LINDA, h/o PE, GI bleed, LINDA on CPAP - tolerating well ([facility], [place]).  He states that he is doing pretty well.  His main complaint is lack of energy - we discussed that his A1C is higher and so that could be contributing some and we will increase his lantus.  He is a little more anemic as well and he is working with heme/onc regarding that.  He does have some issues with low back ache.  Discussed PT but he would prefer to do some exercises at home.              Objective   Visit Vitals  /64   Pulse 81      Physical Exam  Vitals reviewed.   Constitutional:       General: He is not in acute distress.  Cardiovascular:      Rate and Rhythm: Normal rate and regular rhythm.      Heart sounds: No murmur heard.  Pulmonary:      Effort: Pulmonary effort is normal. No respiratory distress.      Breath sounds: Normal breath sounds.   Skin:     General: Skin is warm and dry.   Neurological:      General: No focal deficit present.      Mental Status: He is alert. Mental status is at baseline.        Latest Reference Range & Units 11/25/24 08:21   GLUCOSE 74 - 99 mg/dL 285 (H)   SODIUM 136 - 145 mmol/L 140   POTASSIUM 3.5 - 5.3 mmol/L 4.2   CHLORIDE 98 - 107 mmol/L 103   Bicarbonate 21 - 32 mmol/L 27   Anion Gap 10 - 20 mmol/L 14   Blood Urea Nitrogen 6 - 23 mg/dL 51 (H)   Creatinine 0.50 - 1.30 mg/dL 2.64 (H)   EGFR >60 mL/min/1.73m*2 22 (L)   Calcium 8.6 - 10.3 mg/dL 8.8   Albumin 3.4 - 5.0 g/dL 3.9   Alkaline Phosphatase 33 - 136 U/L 94   ALT 10 - 52 U/L 19   AST 9 - 39 U/L 23   Bilirubin Total 0.0 - 1.2 mg/dL 0.9   HDL CHOLESTEROL mg/dL 30.0   Cholesterol/HDL Ratio  3.1   LDL Calculated <=99 mg/dL 22   VLDL 0 - 40 mg/dL 40   TRIGLYCERIDES 0 - 149 mg/dL 201 (H)   Non HDL Cholesterol 0 -  149 mg/dL 62   Total Protein 6.4 - 8.2 g/dL 6.0 (L)   CHOLESTEROL 0 - 199 mg/dL 92   Vitamin B12 211 - 911 pg/mL 451   Hemoglobin A1C See comment % 10.0 (H)   Thyroid Stimulating Hormone 0.44 - 3.98 mIU/L 3.69   Estimated Average Glucose Not Established mg/dL 240   WBC 4.4 - 11.3 x10*3/uL 1.2 (L)   nRBC 0.0 - 0.0 /100 WBCs 0.0   RBC 4.50 - 5.90 x10*6/uL 2.45 (L)   HEMOGLOBIN 13.5 - 17.5 g/dL 8.3 (L)   HEMATOCRIT 41.0 - 52.0 % 26.1 (L)   MCV 80 - 100 fL 107 (H)   MCH 26.0 - 34.0 pg 33.9   MCHC 32.0 - 36.0 g/dL 31.8 (L)   RED CELL DISTRIBUTION WIDTH 11.5 - 14.5 % 19.9 (H)   Platelets 150 - 450 x10*3/uL 60 (L)   Neutrophils % 40.0 - 80.0 % 30.6   Immature Granulocytes %, Automated 0.0 - 0.9 % 0.8   Lymphocytes % 13.0 - 44.0 % 57.0   Monocytes % 2.0 - 10.0 % 5.8   Eosinophils % 0.0 - 6.0 % 5.0   Basophils % 0.0 - 2.0 % 0.8   Neutrophils Absolute 1.60 - 5.50 x10*3/uL 0.37 (L)   Immature Granulocytes Absolute, Automated 0.00 - 0.50 x10*3/uL 0.01   Lymphocytes Absolute 0.80 - 3.00 x10*3/uL 0.69 (L)   Monocytes Absolute 0.05 - 0.80 x10*3/uL 0.07   Eosinophils Absolute 0.00 - 0.40 x10*3/uL 0.06   Basophils Absolute 0.00 - 0.10 x10*3/uL 0.01   RBC Morphology  See Below   RBC Fragments  Few   Ovalocytes  Few   (H): Data is abnormally high  (L): Data is abnormally low  Assessment/Plan   Problem List Items Addressed This Visit       CAD (coronary artery disease) - Primary    Relevant Orders    CBC and Auto Differential    Comprehensive Metabolic Panel    Hemoglobin A1C    Lipid Panel    TSH with reflex to Free T4 if abnormal    Vitamin B12    Type 2 diabetes mellitus with stage 4 chronic kidney disease, with long-term current use of insulin (Multi)    Relevant Orders    CBC and Auto Differential    Comprehensive Metabolic Panel    Hemoglobin A1C    Lipid Panel    TSH with reflex to Free T4 if abnormal    Vitamin B12    Hypertension    Relevant Orders    CBC and Auto Differential    Comprehensive Metabolic Panel    Hemoglobin A1C     Lipid Panel    TSH with reflex to Free T4 if abnormal    Vitamin B12    Mixed hyperlipidemia    Relevant Orders    CBC and Auto Differential    Comprehensive Metabolic Panel    Hemoglobin A1C    Lipid Panel    TSH with reflex to Free T4 if abnormal    Vitamin B12    Tremor, essential    Relevant Orders    CBC and Auto Differential    Comprehensive Metabolic Panel    Hemoglobin A1C    Lipid Panel    TSH with reflex to Free T4 if abnormal    Vitamin B12     Other Visit Diagnoses       Type 2 diabetes mellitus with diabetic chronic kidney disease        Relevant Medications    insulin glargine (Lantus U-100 Insulin) 100 unit/mL injection    Other Relevant Orders    CBC and Auto Differential    Comprehensive Metabolic Panel    Hemoglobin A1C    Lipid Panel    TSH with reflex to Free T4 if abnormal    Vitamin B12    CBC and Auto Differential    Comprehensive Metabolic Panel    Hemoglobin A1C    Lipid Panel    TSH with reflex to Free T4 if abnormal    Vitamin B12               Fabiola Dietz MD

## 2024-12-03 ENCOUNTER — TELEPHONE (OUTPATIENT)
Age: 89
End: 2024-12-03
Payer: MEDICARE

## 2024-12-03 DIAGNOSIS — K25.9 GASTRIC ULCER, UNSPECIFIED CHRONICITY, UNSPECIFIED WHETHER GASTRIC ULCER HEMORRHAGE OR PERFORATION PRESENT: ICD-10-CM

## 2024-12-03 RX ORDER — SUCRALFATE 1 G/1
1 TABLET ORAL
Qty: 270 TABLET | Refills: 1 | Status: SHIPPED | OUTPATIENT
Start: 2024-12-03

## 2024-12-04 NOTE — TELEPHONE ENCOUNTER
The lantus prescription I sent does not specify vial or pen and is the same (except for dose change ) as he had been getting.

## 2024-12-09 ENCOUNTER — INFUSION (OUTPATIENT)
Dept: HEMATOLOGY/ONCOLOGY | Facility: CLINIC | Age: 89
End: 2024-12-09
Payer: MEDICARE

## 2024-12-09 VITALS
HEART RATE: 75 BPM | WEIGHT: 232.59 LBS | TEMPERATURE: 97.7 F | DIASTOLIC BLOOD PRESSURE: 57 MMHG | SYSTOLIC BLOOD PRESSURE: 123 MMHG | OXYGEN SATURATION: 96 % | RESPIRATION RATE: 18 BRPM | BODY MASS INDEX: 36.43 KG/M2

## 2024-12-09 DIAGNOSIS — D69.6 THROMBOCYTOPENIA (CMS-HCC): ICD-10-CM

## 2024-12-09 DIAGNOSIS — D70.9 NEUTROPENIA, UNSPECIFIED TYPE (CMS-HCC): ICD-10-CM

## 2024-12-09 DIAGNOSIS — D63.1 ANEMIA DUE TO STAGE 3B CHRONIC KIDNEY DISEASE (MULTI): ICD-10-CM

## 2024-12-09 DIAGNOSIS — N18.4 STAGE 4 CHRONIC KIDNEY DISEASE (MULTI): ICD-10-CM

## 2024-12-09 DIAGNOSIS — D61.818 OTHER PANCYTOPENIA (MULTI): ICD-10-CM

## 2024-12-09 DIAGNOSIS — D46.9 MYELODYSPLASTIC SYNDROME, UNSPECIFIED (MULTI): ICD-10-CM

## 2024-12-09 DIAGNOSIS — N18.32 ANEMIA DUE TO STAGE 3B CHRONIC KIDNEY DISEASE (MULTI): ICD-10-CM

## 2024-12-09 LAB
ALBUMIN SERPL BCP-MCNC: 3.8 G/DL (ref 3.4–5)
ALP SERPL-CCNC: 100 U/L (ref 33–136)
ALT SERPL W P-5'-P-CCNC: 20 U/L (ref 10–52)
ANION GAP SERPL CALC-SCNC: 12 MMOL/L (ref 10–20)
AST SERPL W P-5'-P-CCNC: 27 U/L (ref 9–39)
BASOPHILS # BLD AUTO: 0.01 X10*3/UL (ref 0–0.1)
BASOPHILS NFR BLD AUTO: 0.7 %
BILIRUB SERPL-MCNC: 1 MG/DL (ref 0–1.2)
BUN SERPL-MCNC: 44 MG/DL (ref 6–23)
CALCIUM SERPL-MCNC: 8.5 MG/DL (ref 8.6–10.3)
CHLORIDE SERPL-SCNC: 100 MMOL/L (ref 98–107)
CO2 SERPL-SCNC: 29 MMOL/L (ref 21–32)
CREAT SERPL-MCNC: 2.47 MG/DL (ref 0.5–1.3)
EGFRCR SERPLBLD CKD-EPI 2021: 24 ML/MIN/1.73M*2
EOSINOPHIL # BLD AUTO: 0.06 X10*3/UL (ref 0–0.4)
EOSINOPHIL NFR BLD AUTO: 3.9 %
ERYTHROCYTE [DISTWIDTH] IN BLOOD BY AUTOMATED COUNT: 19.8 % (ref 11.5–14.5)
GLUCOSE SERPL-MCNC: 313 MG/DL (ref 74–99)
HCT VFR BLD AUTO: 25.6 % (ref 41–52)
HGB BLD-MCNC: 8.2 G/DL (ref 13.5–17.5)
IMM GRANULOCYTES # BLD AUTO: 0.01 X10*3/UL (ref 0–0.5)
IMM GRANULOCYTES NFR BLD AUTO: 0.7 % (ref 0–0.9)
LYMPHOCYTES # BLD AUTO: 0.78 X10*3/UL (ref 0.8–3)
LYMPHOCYTES NFR BLD AUTO: 51 %
MCH RBC QN AUTO: 33.9 PG (ref 26–34)
MCHC RBC AUTO-ENTMCNC: 32 G/DL (ref 32–36)
MCV RBC AUTO: 106 FL (ref 80–100)
MONOCYTES # BLD AUTO: 0.07 X10*3/UL (ref 0.05–0.8)
MONOCYTES NFR BLD AUTO: 4.6 %
NEUTROPHILS # BLD AUTO: 0.6 X10*3/UL (ref 1.6–5.5)
NEUTROPHILS NFR BLD AUTO: 39.1 %
NRBC BLD-RTO: 0 /100 WBCS (ref 0–0)
OVALOCYTES BLD QL SMEAR: NORMAL
PLATELET # BLD AUTO: 54 X10*3/UL (ref 150–450)
POLYCHROMASIA BLD QL SMEAR: NORMAL
POTASSIUM SERPL-SCNC: 4.6 MMOL/L (ref 3.5–5.3)
PROT SERPL-MCNC: 5.9 G/DL (ref 6.4–8.2)
RBC # BLD AUTO: 2.42 X10*6/UL (ref 4.5–5.9)
RBC MORPH BLD: NORMAL
SCHISTOCYTES BLD QL SMEAR: NORMAL
SODIUM SERPL-SCNC: 136 MMOL/L (ref 136–145)
WBC # BLD AUTO: 1.5 X10*3/UL (ref 4.4–11.3)

## 2024-12-09 PROCEDURE — 84075 ASSAY ALKALINE PHOSPHATASE: CPT

## 2024-12-09 PROCEDURE — 96372 THER/PROPH/DIAG INJ SC/IM: CPT

## 2024-12-09 PROCEDURE — 36415 COLL VENOUS BLD VENIPUNCTURE: CPT

## 2024-12-09 PROCEDURE — 85025 COMPLETE CBC W/AUTO DIFF WBC: CPT

## 2024-12-09 PROCEDURE — 2500000004 HC RX 250 GENERAL PHARMACY W/ HCPCS (ALT 636 FOR OP/ED): Mod: JZ,EC

## 2024-12-09 RX ORDER — EPINEPHRINE 0.3 MG/.3ML
0.3 INJECTION SUBCUTANEOUS EVERY 5 MIN PRN
OUTPATIENT
Start: 2024-12-20

## 2024-12-09 RX ORDER — ALBUTEROL SULFATE 0.83 MG/ML
3 SOLUTION RESPIRATORY (INHALATION) AS NEEDED
OUTPATIENT
Start: 2024-12-20

## 2024-12-09 RX ORDER — DIPHENHYDRAMINE HYDROCHLORIDE 50 MG/ML
50 INJECTION INTRAMUSCULAR; INTRAVENOUS AS NEEDED
OUTPATIENT
Start: 2024-12-20

## 2024-12-09 RX ORDER — FAMOTIDINE 10 MG/ML
20 INJECTION INTRAVENOUS ONCE AS NEEDED
OUTPATIENT
Start: 2024-12-20

## 2024-12-09 ASSESSMENT — PAIN SCALES - GENERAL: PAINLEVEL_OUTOF10: 0-NO PAIN

## 2024-12-11 DIAGNOSIS — G25.0 TREMOR, ESSENTIAL: ICD-10-CM

## 2024-12-11 RX ORDER — GABAPENTIN 300 MG/1
CAPSULE ORAL
Qty: 60 CAPSULE | Refills: 5 | Status: SHIPPED | OUTPATIENT
Start: 2024-12-11

## 2024-12-16 ENCOUNTER — TELEPHONE (OUTPATIENT)
Age: 89
End: 2024-12-16
Payer: MEDICARE

## 2024-12-16 NOTE — TELEPHONE ENCOUNTER
OK, I can't see the actual report from the ER, just that he was there.  We could try a different antibiotic such as doxycycline, but I would also suggest a follow up appointment here to check him out.  Thanks.

## 2024-12-16 NOTE — TELEPHONE ENCOUNTER
Pt left message, stating he was in the ER 12/10/2024, he was given antibiotics (    CEPHALEXIN 500MG CAP New  Add as: cephalexin (Keflex) 500 mg capsule       )for his left foot and the issue is still there.     States he now having right arm tremors to his fingers.     And he also says his oxygen concentrator is too heavy. He would like to know if there is something lighter.     Please advise.     12/10 [facility].

## 2024-12-17 ENCOUNTER — OFFICE VISIT (OUTPATIENT)
Age: 89
End: 2024-12-17
Payer: MEDICARE

## 2024-12-17 VITALS
DIASTOLIC BLOOD PRESSURE: 80 MMHG | HEIGHT: 67 IN | SYSTOLIC BLOOD PRESSURE: 124 MMHG | HEART RATE: 80 BPM | WEIGHT: 232.44 LBS | BODY MASS INDEX: 36.48 KG/M2

## 2024-12-17 DIAGNOSIS — G25.0 TREMOR, ESSENTIAL: ICD-10-CM

## 2024-12-17 DIAGNOSIS — L03.116 CELLULITIS OF LEFT LOWER EXTREMITY: Primary | ICD-10-CM

## 2024-12-17 PROCEDURE — G2211 COMPLEX E/M VISIT ADD ON: HCPCS | Performed by: FAMILY MEDICINE

## 2024-12-17 PROCEDURE — 99213 OFFICE O/P EST LOW 20 MIN: CPT | Performed by: FAMILY MEDICINE

## 2024-12-17 PROCEDURE — 1159F MED LIST DOCD IN RCRD: CPT | Performed by: FAMILY MEDICINE

## 2024-12-17 PROCEDURE — 1036F TOBACCO NON-USER: CPT | Performed by: FAMILY MEDICINE

## 2024-12-17 PROCEDURE — 1160F RVW MEDS BY RX/DR IN RCRD: CPT | Performed by: FAMILY MEDICINE

## 2024-12-17 PROCEDURE — 3074F SYST BP LT 130 MM HG: CPT | Performed by: FAMILY MEDICINE

## 2024-12-17 PROCEDURE — 1157F ADVNC CARE PLAN IN RCRD: CPT | Performed by: FAMILY MEDICINE

## 2024-12-17 PROCEDURE — 3079F DIAST BP 80-89 MM HG: CPT | Performed by: FAMILY MEDICINE

## 2024-12-17 RX ORDER — GABAPENTIN 300 MG/1
300 CAPSULE ORAL 2 TIMES DAILY
Qty: 180 CAPSULE | Refills: 1 | Status: SHIPPED | OUTPATIENT
Start: 2024-12-17

## 2024-12-17 RX ORDER — DOXYCYCLINE 100 MG/1
100 CAPSULE ORAL 2 TIMES DAILY
Qty: 20 CAPSULE | Refills: 0 | Status: SHIPPED | OUTPATIENT
Start: 2024-12-17 | End: 2024-12-27

## 2024-12-17 NOTE — PATIENT INSTRUCTIONS
Will switch antibiotics to doxycycline.  Continue other medications.  Follow up if no improvement or if he worsens.  
(2) good, crying

## 2024-12-17 NOTE — PROGRESS NOTES
Subjective   Zack Medrano is a 89 y.o. male who presents for Rash.  Here for follow up recent ER visit for cellulitis of his foot/ankle.  He was treated with  keflex. He notes that the redness is spreading.  No pain with it.  No drainage.              Objective   Visit Vitals  /80   Pulse 80      Physical Exam  Vitals reviewed.   Constitutional:       General: He is not in acute distress.  Cardiovascular:      Rate and Rhythm: Normal rate.   Pulmonary:      Effort: Pulmonary effort is normal. No respiratory distress.   Skin:     General: Skin is warm and dry.      Comments: There is mild-mod erythema of the inner ankle/lower leg   Neurological:      General: No focal deficit present.      Mental Status: He is alert. Mental status is at baseline.         Assessment/Plan   Problem List Items Addressed This Visit       Tremor, essential    Relevant Medications    gabapentin (Neurontin) 300 mg capsule     Other Visit Diagnoses       Cellulitis of left lower extremity    -  Primary    Relevant Medications    doxycycline (Vibramycin) 100 mg capsule               Fabiola iDetz MD

## 2024-12-23 ENCOUNTER — INFUSION (OUTPATIENT)
Dept: HEMATOLOGY/ONCOLOGY | Facility: CLINIC | Age: 89
End: 2024-12-23
Payer: MEDICARE

## 2024-12-23 ENCOUNTER — OFFICE VISIT (OUTPATIENT)
Dept: HEMATOLOGY/ONCOLOGY | Facility: CLINIC | Age: 89
End: 2024-12-23
Payer: MEDICARE

## 2024-12-23 VITALS
RESPIRATION RATE: 16 BRPM | BODY MASS INDEX: 36.15 KG/M2 | OXYGEN SATURATION: 93 % | TEMPERATURE: 97 F | WEIGHT: 230.8 LBS | HEART RATE: 78 BPM | DIASTOLIC BLOOD PRESSURE: 69 MMHG | SYSTOLIC BLOOD PRESSURE: 166 MMHG

## 2024-12-23 VITALS — SYSTOLIC BLOOD PRESSURE: 135 MMHG | DIASTOLIC BLOOD PRESSURE: 62 MMHG

## 2024-12-23 DIAGNOSIS — D46.9 MYELODYSPLASTIC SYNDROME, UNSPECIFIED (MULTI): ICD-10-CM

## 2024-12-23 DIAGNOSIS — D72.819 LEUKOPENIA, UNSPECIFIED TYPE: ICD-10-CM

## 2024-12-23 DIAGNOSIS — D69.6 THROMBOCYTOPENIA (CMS-HCC): ICD-10-CM

## 2024-12-23 DIAGNOSIS — N18.4 ANEMIA DUE TO STAGE 4 CHRONIC KIDNEY DISEASE (MULTI): ICD-10-CM

## 2024-12-23 DIAGNOSIS — D63.1 ANEMIA DUE TO STAGE 4 CHRONIC KIDNEY DISEASE (MULTI): ICD-10-CM

## 2024-12-23 DIAGNOSIS — D61.818 OTHER PANCYTOPENIA (MULTI): ICD-10-CM

## 2024-12-23 DIAGNOSIS — D70.9 NEUTROPENIA, UNSPECIFIED TYPE (CMS-HCC): ICD-10-CM

## 2024-12-23 DIAGNOSIS — N18.4 STAGE 4 CHRONIC KIDNEY DISEASE (MULTI): ICD-10-CM

## 2024-12-23 DIAGNOSIS — D63.1 ANEMIA DUE TO STAGE 3B CHRONIC KIDNEY DISEASE (MULTI): ICD-10-CM

## 2024-12-23 DIAGNOSIS — N18.32 ANEMIA DUE TO STAGE 3B CHRONIC KIDNEY DISEASE (MULTI): ICD-10-CM

## 2024-12-23 LAB
ALBUMIN SERPL BCP-MCNC: 3.9 G/DL (ref 3.4–5)
ALP SERPL-CCNC: 90 U/L (ref 33–136)
ALT SERPL W P-5'-P-CCNC: 18 U/L (ref 10–52)
ANION GAP SERPL CALC-SCNC: 15 MMOL/L (ref 10–20)
AST SERPL W P-5'-P-CCNC: 27 U/L (ref 9–39)
BASO STIPL BLD QL SMEAR: PRESENT
BASOPHILS # BLD AUTO: 0.01 X10*3/UL (ref 0–0.1)
BASOPHILS NFR BLD AUTO: 0.9 %
BILIRUB SERPL-MCNC: 1 MG/DL (ref 0–1.2)
BUN SERPL-MCNC: 43 MG/DL (ref 6–23)
CALCIUM SERPL-MCNC: 8.5 MG/DL (ref 8.6–10.3)
CHLORIDE SERPL-SCNC: 100 MMOL/L (ref 98–107)
CO2 SERPL-SCNC: 24 MMOL/L (ref 21–32)
CREAT SERPL-MCNC: 2.46 MG/DL (ref 0.5–1.3)
DACRYOCYTES BLD QL SMEAR: NORMAL
EGFRCR SERPLBLD CKD-EPI 2021: 24 ML/MIN/1.73M*2
EOSINOPHIL # BLD AUTO: 0.04 X10*3/UL (ref 0–0.4)
EOSINOPHIL NFR BLD AUTO: 3.6 %
ERYTHROCYTE [DISTWIDTH] IN BLOOD BY AUTOMATED COUNT: 20.1 % (ref 11.5–14.5)
GLUCOSE SERPL-MCNC: 313 MG/DL (ref 74–99)
HCT VFR BLD AUTO: 25.7 % (ref 41–52)
HGB BLD-MCNC: 8.2 G/DL (ref 13.5–17.5)
IMM GRANULOCYTES # BLD AUTO: 0.01 X10*3/UL (ref 0–0.5)
IMM GRANULOCYTES NFR BLD AUTO: 0.9 % (ref 0–0.9)
LYMPHOCYTES # BLD AUTO: 0.49 X10*3/UL (ref 0.8–3)
LYMPHOCYTES NFR BLD AUTO: 44.5 %
MCH RBC QN AUTO: 33.7 PG (ref 26–34)
MCHC RBC AUTO-ENTMCNC: 31.9 G/DL (ref 32–36)
MCV RBC AUTO: 106 FL (ref 80–100)
MONOCYTES # BLD AUTO: 0.06 X10*3/UL (ref 0.05–0.8)
MONOCYTES NFR BLD AUTO: 5.5 %
NEUTROPHILS # BLD AUTO: 0.49 X10*3/UL (ref 1.6–5.5)
NEUTROPHILS NFR BLD AUTO: 44.6 %
NRBC BLD-RTO: 0 /100 WBCS (ref 0–0)
PLATELET # BLD AUTO: 59 X10*3/UL (ref 150–450)
POTASSIUM SERPL-SCNC: 4.5 MMOL/L (ref 3.5–5.3)
PROT SERPL-MCNC: 6.1 G/DL (ref 6.4–8.2)
RBC # BLD AUTO: 2.43 X10*6/UL (ref 4.5–5.9)
RBC MORPH BLD: NORMAL
SCHISTOCYTES BLD QL SMEAR: NORMAL
SODIUM SERPL-SCNC: 134 MMOL/L (ref 136–145)
WBC # BLD AUTO: 1.1 X10*3/UL (ref 4.4–11.3)

## 2024-12-23 PROCEDURE — 2500000004 HC RX 250 GENERAL PHARMACY W/ HCPCS (ALT 636 FOR OP/ED): Mod: JZ,EC

## 2024-12-23 PROCEDURE — 80053 COMPREHEN METABOLIC PANEL: CPT

## 2024-12-23 PROCEDURE — 96372 THER/PROPH/DIAG INJ SC/IM: CPT

## 2024-12-23 PROCEDURE — 36415 COLL VENOUS BLD VENIPUNCTURE: CPT

## 2024-12-23 PROCEDURE — 85025 COMPLETE CBC W/AUTO DIFF WBC: CPT

## 2024-12-23 RX ORDER — FAMOTIDINE 10 MG/ML
20 INJECTION INTRAVENOUS ONCE AS NEEDED
OUTPATIENT
Start: 2025-01-06

## 2024-12-23 RX ORDER — DIPHENHYDRAMINE HYDROCHLORIDE 50 MG/ML
50 INJECTION INTRAMUSCULAR; INTRAVENOUS AS NEEDED
OUTPATIENT
Start: 2025-01-06

## 2024-12-23 RX ORDER — ALBUTEROL SULFATE 0.83 MG/ML
3 SOLUTION RESPIRATORY (INHALATION) AS NEEDED
OUTPATIENT
Start: 2025-01-06

## 2024-12-23 RX ORDER — EPINEPHRINE 0.3 MG/.3ML
0.3 INJECTION SUBCUTANEOUS EVERY 5 MIN PRN
OUTPATIENT
Start: 2025-01-06

## 2024-12-23 ASSESSMENT — PAIN SCALES - GENERAL: PAINLEVEL_OUTOF10: 0-NO PAIN

## 2024-12-23 NOTE — PATIENT INSTRUCTIONS
Discussed and reviewed recent medical history.  He will continue his Darbopoetin alpha every 2 weeks .  He will call us if he has any questions or concerns.  Return to see provider in 6 weeks

## 2024-12-23 NOTE — PROGRESS NOTES
Patient ID: Zack Medrano is a 89 y.o. male.    Subjective    HPI    Patient ID: Zack Medrano is a 89 y.o. male.        Subjective    Patient ID:Zack Medrano is a 88 y.o. year old male patient with myelodysplasia         Referring Physician: Ayah Carmichael MD  [address] [place] [address] [address]  [place] [address]  Primary Care Provider: Fabiola Dietz MD     Chief Complaint        Chief Complaint   Patient presents with    Anemia          History of the Present Illness  10/9/2019.  Visit with Dr. Mickey Nguyen for chronic kidney disease and hypertension.  Chief complaint was fatigue and need to lose weight     11/12/2019.  The patient has had long-term history of microcytic anemia going back to at least 2019 at which time his white count was 5.2 hemoglobin was 13.8 hematocrit 40.5 and a platelet count of 157,000.  His MCV was 105 MCHC was 34.  White blood cell differential count showed 66% polys, 17 lymphs, 11 monos and 4 eosinophils.     12/11/2019.  Seen by primary care for probable shingles treated with Valtrex with chief complaint of waxing and waning left flank pain radiating up under his ribs, somewhat responsive to exercise..  He had a history of pulmonary embolus and was on Eliquis long-term.     2/24/2020.  Seen by Dr. Howell for elevated PSA, BPH and erectile dysfunction.  PSA in 2017 was 2.94, in 2018 it was 2.74 and in 2020 it was 4.03.  We discussed biopsy and the patient wanted to have this followed and not biopsy.     3/9/2020.  Follow-up with NATALY Pulido primary care.  Referred to Dr. Bahena for colonoscopy and CT scan of the chest was ordered for follow-up of questionable lingular nodule.     3/11/2020.  CT scan of the chest showed a stable 6 mm nodular lesion near the fissure in the lingula.     3/13/2020.  Seen by Dr. Bahena who noted that the patient had had diarrhea off-and-on since cholecystectomy and had intermittent left-sided pain.  He had a history of colon polyps.      4/22/2015.  Colonoscopy showed that he had tubular adenoma     5/13/2020.  Seen by Dr. Migdalia New in follow-up.  She noted that he had an abnormal CBC with macrocytic indices with a normal B12 level.  The patient denies alcohol use.  Hemoglobin A1c was 6.2.  BUN was 33 creatinine was 1.74.     8/31/2020.  Follow-up with Dr. Howell.  PSA had come down from 4.03-3.77     1/4/2021 through 1/12/2021.  Admitted to the hospital having been brought to the ED by squad after he fell forward on his toilet and was unable to get up.  He denied hitting his head or losing consciousness.  He blamed it on having back pain and shoulder pain for several weeks.  He was febrile with a temperature of 100.9 and hypoxic with pulse ox of 86% on room air.  Respirations were 24.  He was positive for COVID and was treated with intravenous antibiotics and remdesivir, increased supplemental oxygen.  He was able to be discharged to rehabilitation.     Repeat CBC showed white count 4.3 with a hemoglobin 12.9 hematocrit 38.8 with an MCV of 102 and a platelet count of 173,000.  White blood cell differential count showed minimal absolute lymphocytopenia at 0.6 with the lower limits of normal being 0.8.     3/1/2021.  Follow-up with Dr. Howell.  PSA was 9.9 in February.  Decided to have prostate biopsy done on 3/22/2021.  Results showed BPH.     10/6/2021.  Follow-up with Dr. Howell.  PSA in September was 2.77     4/6/2022.  CBC showed white count 4.3 with hemoglobin 11.7 hematocrit 34.4 with an MCV of 107.  Platelet count was 153,000.  White blood cell differential count was essentially normal.     5/31/2022.  CBC showed white count 3.6 with hemoglobin 11.6 hematocrit 34.4 and platelet count 117,000 with a normal white blood cell differential.     6/27/2022.  Referred to Dr. Dash because of pancytopenia by NATALY Gray.  CBC showed a white count of 3.9 with hemoglobin 12.3 hematocrit 36.1 and platelet count 129,000 with a normal differential.   PSA was 3.26.     The patient said that he felt well except for having increased fatigue.  He continued on long-term anticoagulation.  He was able to carry out his ADLs.  He did complain of easy bruising.  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  Dr. Dash felt that there was a large component secondary to his chronic kidney disease.  There was no evidence of nutritional deficiency.  He checked him for hemolysis and monoclonal gammopathy and ordered an ultrasound for hepatosplenomegaly and testing for hepatitis, HIV and CHRISTINA.  He said he would rather observe the patient rather than pursuing a bone marrow biopsy.     7/27/2022.  Repeat lab data showed white count 3.9 with hemoglobin 12.3 hematocrit 36.1 and platelet count 129,000.  BUN was 36 creatinine was 1.78.  He had mild elevation of AST.  Retake count was 2.3%.  Flow cytometry showed a small clonal B-cell population.  Hepatitis B was nonreactive.  Hep C testing was negative.  Serum protein electrophoresis was normal.  Haptoglobin was normal.  LDH was normal.  He said that the possibility of MDS could not be ruled out.  Ultrasound showed fatty liver but no splenomegaly.  He plan for EPO level and NGS for myeloid mutation.     9/1/2022.  Started on allopurinol for hyperuricemia.     10/12/2022.  Seen by Dr. Dr. Dietz.  Patient was referred to dermatology for several skin lesions.  He was referred to neurology because of his tremor that was not improving.  She increased his glimepiride.     10/17/2022.  CBC showed white count 3.0 with hemoglobin 10.5 hematocrit 31.3 and a platelet count of 106,000 MCV was 111.     10/26/2022.  Follow-up with Dr. 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  He was suspicious for MDS.  He referred him for a bone marrow biopsy.     12/1/2022.  Follow-up with Dr. Dash.  Bone marrow biopsy showed low-grade MDS.  R IPSS score was low or very  low depending on a karyotype which was still pending.  We discussed initiating erythropoietin with Aranesp but the patient deferred and the decision was made to monitor him.     1/11/2023.  CBC showed white count 3.4 with hemoglobin 10.3 hematocrit 32.2 and a platelet count of 137,000.  MCV was 115.  White blood cell differential count was normal.     1/13/2023.  Follow-up with Dr. Dash.  The patient did say that he had following getting out of the shower but did not sustain any injuries.  He had been started by Dr. Nguyen on insulin for his diabetes which was helpful.  Patient continued to be active inside his house.  He remains on Eliquis.  BR-IPSS score is very low.  Counts remain stable.     3/31/2023.  CBC showed a white count of 2.6 with hemoglobin 8.7 hematocrit 27.1 and a platelet count 97,000.  MCV was 115.  White blood cell differential count showed that he had an absolute neutrophil count of 1.36.     4/10/2023.  CBC showed a white count of 2.5 with a hemoglobin 9.0 hematocrit of 27.7 with a platelet count of 92,000.  MCV was 114.     4/17/2023.  Follow-up with NATALY Gray.  Even though his hemoglobin had dropped to 9 the patient did not want to start his erythropoietin injections.     5/11/2023.  CBC showed white count of 2.5 with a hemoglobin of 7.6 hematocrit 23.8 and a platelet count of 101,000.  Absolute neutrophil count was 1.17.     5/15/2023 through 5/19/2023.  Admitted to the hospital with anemia and generalized weakness black stools dizziness with a hemoglobin of 6.7.  His BUN was 51 creatinine was 2.08.  CT scan of the abdomen showed no acute process.  There were findings consistent with atypical healing of the previous rib fracture with question Paget's disease although it was nonspecific.  He was transfused 2 units packed red blood cells.  EGD found 3 erosions in the cardia.  He was prescribed Carafate.     5/24/2023.  Follow-up with Dr. Dr. Dietz after discharge.  CBC showed white  "count 1.9 with hemoglobin 8.6 hematocrit 28.3 and a platelet count of 140,000.  Absolute neutrophil count was 1.06.  Patient did not tolerate Carafate and stopped it.  He was prescribed oral iron twice a day.  He had not been prescribed a PPI which was then started.     6/1/2023.  CBC showed white count of 1.9 with hemoglobin 7.7 hematocrit 24.8 and a platelet count of 87,000.     6/13/2023.  CBC showed a white count of 1.9 with hemoglobin of 8.7 hematocrit 28.1 and platelet count of 89,000.     6/23/2023.  Followed up with Dr. Dr. Dietz who reported that he was hospitalized again in [place] for GI bleed.  At that time his Eliquis was discontinued and he was changed to Protonix and taken off his baby aspirin.  Once again he was started on Carafate but did not tolerate it.     7/15/2023.  CBC showed white count of 2.4 with hemoglobin 9.2 hematocrit 27.3 and a platelet count of 110,000.  Absolute neutrophil count was 1.36.     7/17/2023.  Follow-up with Malorie Shaffer.  The patient had recently had a colonoscopy and had \"cauterizations\" he noted that he had dyspnea with exertion but no resting shortness of breath.  He was on prednisone at that time.  His sugars were in the 300s.  He was on insulin.  His neuropathy has not changed and he continues to have tremors.  Current level was found to be saturated.  Once again the patient was reluctant to start NING.     8/14/2023.  Follow-up with Malorie Shaffer.  CBC showed white count 1.8 with a hemoglobin 8.9 hematocrit 26.9 and a platelet count of 100,000.  Absolute neutrophil count was 0.96.     9/6/2023.  CBC showed a white count of 1.8 with hemoglobin 9.5 hematocrit 28.8 and a platelet count of 85,000 with an absolute neutrophil count of 0.99.  9/13/2023.  Follow-up with Dr. Howell.  PSA has gone down to 1.87.     10/9/2023.  Follow-up with Malorie Shaffer who noted the patient remained on oxygen and CPAP.  He says that he is awakening in the middle of the night around 3 to 4:00 " in the morning.  Sugars have been in the 300s.  Neuropathy was unchanged.  He had been started on erythropoietin every 3 weeks     11/21/2023.  Follow-up with Malorie Shaffer.  No improvement in his hemoglobin.  White count had improved to 2.1 platelets were baseline.  He had been placed on 40 mg of prednisone and continued on erythropoietin 300 mcg every 3 weeks.     1/2/2024.  Follow-up with Malorie Shaffer.  No improvement in hemoglobin.  Symptomatically he was doing well.  Plan was to increase frequency of his darbepoetin to every 2 weeks.     2/13/2024.  Follow-up with Malorie Shaffer.  Now on erythropoietin injections every 2 weeks.  There was a discussion concerning repeat bone marrow biopsy with counts stayed low.     3/12/2024.  Follow-up with Malorie Shaffer.  No improvement in his hemoglobin.  Patient was agreeable to repeat bone marrow biopsy.  She also ordered a CAT scan of the chest abdomen and pelvis without contrast.     3/22/2024.  CAT scan showed severe coronary artery calcifications.  There is evidence of bronchiectasis in the lower lobes increased compared to previous CT scan from 2020.     Predominantly groundglass opacities with bibasilar prominence.  There is some subpleural sparing of the left upper lobe.  There is a 3 mm nodule in the right upper lobe which was unchanged for several years.  Multilevel anterior bridging osteophytes were noted consistent with DISH.  There is a small fat-containing periumbilical hernia and bilateral inguinal hernias.  Spleen size is at the upper limits of normal being 12.7 cm in length slightly increased when compared to study from 2019.  Cysts were seen in the kidneys with diffuse cortical atrophy and a 4 mm hyperdense focus in the interpolar region of the right kidney possibly a stone.  Prostatism was noted for gland measuring 6.2 cm.     3/28/2024.  Seen by Dr. Graham for shortness of breath.  He felt the patient had possible chronic interstitial lung disease versus  periodic aspiration, hypoxia dyspnea on exertion.  Further tests were ordered including high-resolution CT scan, complete pulmonary function testing, simple pulmonary stress test and follow-up.     4/9/2024.  Follow-up with Malorie Shaffer.  Erythropoietin injections continued every 2 weeks which the patient was tolerating well.     4/16/2024.  Bone marrow biopsy was performed showing a hypercellular bone marrow at 50% with dyserythropoiesis.  There are 1% blasts consistent with persistent myeloid neoplasm.  Flow cytometry showed a small clonal CD5 negative, CD10 negative B-cell population and a polytypic background.  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     There was a very small abnormal T-cell population that could be incidental.  Next generation sequencing showed 2 U2AF 1 variants, 1 of which was negative.  The other had been previously seen.     The marrow also showed dyserythropoiesis with bilobate forms and forms with irregular nuclear contours as well as blebbing.     4/23/2024.  CBC showed white count 1.5 with a hemoglobin of 8.3 hematocrit 25.9 and platelet count of 71,000 with 37% neutrophils, 53 lymphocytes, 4 monos, 4 eosinophils with an absolute neutrophil count of 0.57.     4/24/2024.  Return visit with Dr. Graham.  He noted that the high-resolution CT scan showed no significant change from the previous CT scan the month before.  Pulmonary function testing showed no response to bronchodilator and there was mild restrictive change with some suggestion of airway inflammation.  The patient had difficulty doing the test because of persistent coughing.  6-minute walk test showed that the patient was able to maintain saturations at 93% with 2 L of oxygen per minute continuously.  The test was terminated because of dyspnea.  With his hypoxia on room air it was  recommended that he be on oxygen continuously to keep his saturation greater than or equal to 92%.  He continued on oxygen with his CPAP at 3 L at nighttime.     5/10/2024.  This is my first visit with Mr. Medrano and his family.  We went over some of the results of the bone marrow biopsy.  I told him that I would have to wait until the chromosomal analysis returned.  I also note the possibility of low-grade lymphoma.  He may need to have an opinion with malignant hematology at [facility].  Stepdaughter who is here with him and his wife today is his advocate for maintaining his quality of life.  She is an employee at a local nursing facility and is concerned about many clients she has who seem to be getting treated aggressively.  We talked about myelodysplasia and the fact that he is not responding to erythropoietin.  He will continue on this treatment for now.  He is low counts were discussed and he knows that he is at higher risk for infection and bleeding.  He continues on oxygen supplementation continuously.  They will return in 2 weeks at which time laboratory tests will be performed.     He just had a lesion removed from the dorsum of his right hand at the base of his first and second fingers.  Stitches are still in place.  It seems to be healing well without any significant bleeding or infection.  He was told that it looked benign.     5/13/2024.  Follow-up with Dr. Aggie Danielle of cardiology who saw him in follow-up of his coronary artery disease with multiple stents having been placed, hypertension, dyslipidemia and previous pulmonary embolus related to COVID-19.  She had stopped his Eliquis.  She noted his myelodysplasia.  She noted that at some point his aspirin had been stopped but given his history of stents this was restarted and he tolerated it well.  She said that his blood pressure was well-controlled.  She noted blood sugars were between 150-2 50.  She noted the patient was supposed to be using oxygen with  exertion but the patient was not sure what rate it was supposed to be.  She says that she would not stop his aspirin unless his platelets were less than 50,000 with complications of bleeding.  She said it was very important to continue aspirin given a 15% chance of in-stent thrombosis with aspirin cessation.  She increased her furosemide to 80 mg twice a day because of bilateral lower extremity edema and spironolactone to 25 mg twice daily until symptoms improve and encouraged him to cut back on his salt intake.  She was see him again in about 9 months.     5/14/2024.  Follow-up with Dr. Santamaria of ophthalmology who started him on Naphcon-A drops 1 drop 3 times a day in both eyes for excessive tearing     5/15/2024.  Followed up with Dr. Dimas Renteria of podiatry who noted decreased pedal pulses 2 out of 4.  He had footcare.     5/24/2024.  Follow-up with Dr. Graham.  Review of his x-rays showed no change.  Physical examination showed decreased breath sounds but no inspiratory crackles that were previously heard.  He felt that the interstitial lung disease was stable and that his hypoxemia was improved on oxygen.  He plan to repeat CT scan in September.     5/24/2024.  He is here to follow-up on his bone marrow results.  I told him that the chromosomal analysis would not be forthcoming before because the specimen failed to grow in culture.  He continues to display a low-grade MDS with blast count of only 2%.  We reviewed the fact that he is recommended to have at least 6 months of his erythropoietin before we decide whether or not it is a failure.  He is agreeable to this.  We will increase his dose of darbepoetin.  He knows to be on the look out for any signs of infection or bleeding and to report this if it happens.  We reviewed his most recent CBC.  We will see him again in 2 weeks with his next injection.     5/30/2024.  Follow-up with Dr. Dr. Dietz for his Medicare wellness visit.  His magnesium dose was  changed to twice daily indefinitely.     6/7/2024.  He is here today for his darbepoetin injection.  We have increased his dose to 500 mcg every 2 weeks.  Will see if this has any effect.  Laboratory dated today shows that his sugar is 290 and his sodium is 134.  BUN is 46 and creatinine is 2.31.  CBC shows white count 1.5 with a hemoglobin 8.1 hematocrit 24.7 and a platelet count of 71,000 which is very much like his most recent CBC.  His absolute neutrophil count is 0.57.     I talked to him about his current status which he feels is fairly stable.  He is off oxygen.  He has had no bleeding and no signs of infection.  He understands that we have increased his dose of darbepoetin and we will watch his counts.  He will report any side effects.     He has continued to receive his darbepoetin injections.     7/15/2024.  Emergency department visit [facility].  He developed a rash on both lower extremities which started a week ago which is an erythematous papular rash.  He denied any pain or itching.  He did not have any vesicle formation.  It is bilateral.  He has had a shingles injection.  He was told that he had disseminated zoster and was started on valacyclovir a gram twice daily for 7 days.     7/19/2024.  Seen by Dr. Dr. Dietz who said that the rash has not gotten any worse and might be a little bit better.  She noted that there were no vesicles no open areas no areas of secondary infection.     7/19/2024.  He is here to receive his erythropoietin injection and to have a blood count.  I told him it is not likely that this is disseminated zoster because it is nowhere else on his body but only on his lower extremities.  This could be stasis dermatitis.  I do not see any signs of vesicle formation and there never were any.  It is mostly on the anterior shins.  It is all below the knee.     CBC today shows white count 1.4 with hemoglobin 8 hematocrit 25.4 platelet count 67,000 with 37 neutrophils 53  lymphocytes 5 monos 2 eosinophils.  He is serum chemistry showed a sugar of 285 BUN of 49 creatinine of 2.67.  We told him he needed to keep his hydration status high.  He will continue with his injections today.  Blood pressure today is 153/64.     He has had no bleeding and no infection.  He has had no chest pain or pressure.  He is able to participate in his usual activities including mowing the yard.     He has continued to receive his darbepoetin and has followed up with podiatry for nail and callus care.     8/30/2024.  He is here in routine follow-up.  He says that he is doing well.  He has had no infections or bleeding.  He has had no mouth sores.  He has had continued swelling in his legs a little bit worse on the left than on the right.  Blood sugar was greater than 300 but he admits to dietary indiscretion.  Has had no chest pain.  His blood pressure was 149/63.  He says that his breathing is stable.  He is using his oxygen at home at night and when he travels that he does not have it on today.     His lab data has shown no sign of improvement so far with white count 1.3, hemoglobin 8.6 hematocrit 27.2 and a platelet count of 69,000.  White blood cell differential count shows 38% polys, 50 lymphs, 5 monos, 2 eosinophils.  We plan a 6-month trial of this dose of darbepoetin which will extend until November.  We will see him again in about a month.     Interval Note  9/4/2024.  CT scan of the chest showed mild streaky scarring in the lung bases bilaterally.  The previously seen groundglass opacities probably be minimally improved.  Calcified pleural plaques are seen without consolidation effusion or pneumothorax.  There is no adenopathy seen by CT size criteria.  Minimal hepatic steatosis was seen as well as calcified granuloma in the spleen and a somewhat atrophic pancreas.  DJD was seen in the spine.     9/5/2024.  Seen by Dr. Zambrano his nephrologist who ordered lab tests and felt that his fluctuating  chemistries were related to diuretic therapy.  He noted myelodysplasia and that he was being followed for his anemia receiving NING.  He did not change any of his medications  And said that he would see him again in about 6 months     9/10/2024.  Follow-up by Dr. Graham at which time he told the patient he could decrease his oxygen to 2 and he would repeat a CT scan in 6 months and see him after that.     9/27/2024.  He is here today in routine follow-up.  He will get his shot today.  Laboratory data shows no improvement.  We reviewed that we will follow him for 6 months on this treatment to see if there is any improvement.     Mr. Roth says that he has had no significant bleeding.  I see some red spots on the soft palate.  His appetite has been good and he has gained weight of 1 pound since last time I saw him about a month ago.  He has had no other signs of infection or deterioration in his status.  He knows he is at risk for serious infection or bleeding.  We will continue on this treatment pathway.  He will see him again in a month.     10/25/24: Seen in office today by OSCAR. Appetite is decent denies any early satiety weight is stable. Energy varies day to day. 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 Isolated episode of a nose bleed. He reported waking up last night sweaty and hot, unaware if he was running a fever, encouraged him to check temperature if this should re-occur. Denies any further B symptoms. No serious abnormal bleeding, remains bruising easily.      Reviewed labs very minimal improvement in his labs, will continue with administering the shot today as ordered. Will continue to monitor patient closely, with a follow-up in a month.     11/22/24: Patient was recently in the ED for tremors. He reports he didn't feel well/didn't feel right. Head/Brain CT, EKG,- were normal in findings. Patient is here today and feels better he reports increasing numbness/tingling  in bilateral hands, chronic back pain. Denies headaches, vision changes. Denies any fever, nausea, vomiting. Reports sweating at night on his bilateral legs, denies any restless leg symptoms. Denies any abnormal bruising or bleeding. Appetite is good, unchanged. Energy remains poor. 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 Wears continuous oxygen at home, CPAP w/oxygen at night. Chronic productive cough in the am.     12/23/24: Patient is tolerating darbopoetin well. He reports that his energy could be better, someday's are better than others. Remains to have the tremors, right hand is worse than his left, he has yet to schedule with Neurology. Currently treating left foot/ankle for cellulitis, remains taking Keflex daily. Patient report that it is healing well. However has has a rash on his left leg. Continues wearing compression stockings. Feeling better today.     Objective    BSA: There is no height or weight on file to calculate BSA.  There were no vitals taken for this visit.     Physical Exam  Vitals and nursing note reviewed.   Constitutional:       Appearance: Normal appearance. He is obese.   HENT:      Head: Normocephalic and atraumatic.      Nose: Nose normal.      Mouth/Throat:      Mouth: Mucous membranes are moist.      Pharynx: Oropharynx is clear.   Eyes:      General: No scleral icterus.     Extraocular Movements: Extraocular movements intact.      Conjunctiva/sclera: Conjunctivae normal.   Cardiovascular:      Rate and Rhythm: Normal rate and regular rhythm.      Pulses: Normal pulses.      Heart sounds: Normal heart sounds.   Pulmonary:      Effort: Pulmonary effort is normal.      Breath sounds: Normal breath sounds.   Musculoskeletal:         General: Normal range of motion.      Cervical back: Normal range of motion.   Skin:     General: Skin is warm and dry.      Coloration: Skin is pale.   Neurological:  "     General: No focal deficit present.      Mental Status: He is alert and oriented to person, place, and time.         Performance Status:  Asymptomatic      Assessment/Plan        1.  Pancytopenia.  The patient has unusual bone marrow findings.  Chromosomal analysis will not be done due to failure of the specimen to grow.  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  He also has components of myelodysplasia.  He is not responding to erythropoietin.  We will increase his dose to 500 mcg every other week.  He continues to be at high risk for bleeding and infection with a low ANC and low platelet count.  He has had previous GI bleeds.  He is currently back on his  aspirin per follow-up with Dr. Danielle for fear of clotting off his stents.  He is off Eliquis.  He will get his treatment today with darbepoetin and continue every 14 days.  We plan for 6-month trial which will take us to November.     12/23/24: Patient remains anemic secondary to MDS and worsening CKD.  Will plan to continue treatment with darbopoetin, tolerating well with no adverse effects. His counts continue to remain around his baseline, with no significant improvement. We will continue with this treatment plan for the time being. He denies any abnormal bleeding, no signs of infections or \"B\" symptoms.        2.  Multiple comorbidities.  These include but are not limited to:  GERD  Arthritis, DJD  BPH  Coronary artery disease with history of myocardial, status post stent infarction  History of dumping syndrome  Hyperlipidemia  History of skin cancer  Obesity  Obstructive sleep apnea on CPAP  Peripheral arterial disease  CKD 4 (with Anemia)  Diabetes with nephropathy  History of cholecystectomy and hernia repair  Essential tremor  Hypertension   Numbness in his fingers felt to be related to cervical disc disease.     3.  Rash on his legs.  I think this looks more like stasis dermatitis than disseminated zoster.  If it were " disseminated zoster he would have it on other areas of his body.     Plan:  Discussed and reviewed recent medical history.  He will continue his Darbopoetin alpha every 2 weeks .  He will call us if he has any questions or concerns.  Return to see provider in 6 weeks      He knows that he should call in the interim should he have any change in his          NATALY Villa-CNP

## 2024-12-23 NOTE — PROGRESS NOTES
Stat labs drawn at clinic for his injection today  1/6 1130 stat lab with Darbepoetin  1/20 9am stat lab with darbepoetin  2/3 9am CNP visit- [facility] draw labs        930 Darbepoetin to follow  Reviewed AVS with patient- patient verbalizes understanding

## 2024-12-24 LAB — PATH REVIEW-CBC DIFFERENTIAL: NORMAL

## 2024-12-27 ENCOUNTER — TELEPHONE (OUTPATIENT)
Age: 89
End: 2024-12-27
Payer: MEDICARE

## 2024-12-27 ENCOUNTER — HOSPITAL ENCOUNTER (EMERGENCY)
Age: 89
Discharge: HOME | End: 2024-12-27
Payer: MEDICARE

## 2024-12-27 VITALS
OXYGEN SATURATION: 95 % | TEMPERATURE: 97.88 F | DIASTOLIC BLOOD PRESSURE: 63 MMHG | SYSTOLIC BLOOD PRESSURE: 135 MMHG | RESPIRATION RATE: 18 BRPM | HEART RATE: 72 BPM

## 2024-12-27 VITALS
DIASTOLIC BLOOD PRESSURE: 63 MMHG | HEART RATE: 72 BPM | SYSTOLIC BLOOD PRESSURE: 135 MMHG | OXYGEN SATURATION: 95 % | RESPIRATION RATE: 18 BRPM | TEMPERATURE: 97.88 F

## 2024-12-27 VITALS — BODY MASS INDEX: 35.4 KG/M2

## 2024-12-27 DIAGNOSIS — C91.10: Primary | ICD-10-CM

## 2024-12-27 DIAGNOSIS — Z79.4: ICD-10-CM

## 2024-12-27 DIAGNOSIS — D61.818: ICD-10-CM

## 2024-12-27 DIAGNOSIS — N18.4: ICD-10-CM

## 2024-12-27 DIAGNOSIS — Z79.899: ICD-10-CM

## 2024-12-27 DIAGNOSIS — M79.89: ICD-10-CM

## 2024-12-27 DIAGNOSIS — I25.10: ICD-10-CM

## 2024-12-27 DIAGNOSIS — I12.9: ICD-10-CM

## 2024-12-27 DIAGNOSIS — E11.22: ICD-10-CM

## 2024-12-27 DIAGNOSIS — Z95.5: ICD-10-CM

## 2024-12-27 DIAGNOSIS — E78.00: ICD-10-CM

## 2024-12-27 LAB
ANION GAP: 6 (ref 5–15)
ANISOCYTOSIS BLD QL SMEAR: (no result)
ANISOCYTOSIS: (no result)
BUN SERPL-MCNC: 54 MG/DL (ref 7–18)
BUN/CREAT RATIO: 20.5 RATIO (ref 10–20)
CALCIUM SERPL-MCNC: 9.1 MG/DL (ref 8.5–10.1)
CARBON DIOXIDE: 28 MMOL/L (ref 21–32)
CHLORIDE: 105 MMOL/L (ref 98–107)
DEPRECATED RDW RBC: 77.6 FL (ref 35.1–43.9)
DIFFERENTIAL INDICATED: (no result)
ERYTHROCYTE [DISTWIDTH] IN BLOOD: 20.4 % (ref 11.6–14.6)
EST GLOM FILT RATE - AFR AMER: 30 ML/MIN (ref 60–?)
ESTIMATED CREATININE CLEARANCE: 22.38 ML/MIN
GLUCOSE: 142 MG/DL (ref 74–106)
HCT VFR BLD AUTO: 25.1 % (ref 40–54)
HEMOGLOBIN: 8.2 G/DL (ref 13–16.5)
HGB BLD-MCNC: 8.2 G/DL (ref 13–16.5)
IMMATURE GRANULOCYTES COUNT: 0.01 X10^3/UL (ref 0–0)
MCV RBC: 103.7 FL (ref 80–94)
MEAN CORP HGB CONC: 32.7 G/DL (ref 32–36)
MEAN PLATELET VOL.: 10.4 FL (ref 6.2–12)
NRBC FLAGGED BY ANALYZER: 0 % (ref 0–5)
PLATELET # BLD: 59 K/MM3 (ref 150–450)
PLATELET COUNT: 59 K/MM3 (ref 150–450)
POSITIVE COUNT: YES
POSITIVE DIFFERENTIAL: YES
POSITIVE MORPHOLOGY: YES
POTASSIUM: 4.2 MMOL/L (ref 3.5–5.1)
RBC # BLD AUTO: 2.42 M/MM3 (ref 4.6–6.2)
RBC DISTRIBUTION WIDTH CV: 20.4 % (ref 11.6–14.6)
RBC DISTRIBUTION WIDTH SD: 77.6 FL (ref 35.1–43.9)
SCAN SMEAR PER REVIEW CRITERIA: (no result)
WBC # BLD AUTO: 1.1 K/MM3 (ref 4.4–11)
WHITE BLOOD COUNT: 1.1 K/MM3 (ref 4.4–11)

## 2024-12-27 PROCEDURE — 99283 EMERGENCY DEPT VISIT LOW MDM: CPT

## 2024-12-27 PROCEDURE — 80048 BASIC METABOLIC PNL TOTAL CA: CPT

## 2024-12-27 PROCEDURE — 85025 COMPLETE CBC W/AUTO DIFF WBC: CPT

## 2024-12-27 PROCEDURE — 93971 EXTREMITY STUDY: CPT

## 2024-12-27 NOTE — TELEPHONE ENCOUNTER
Pt left message stating he has spots going up his leg.     Pt is in ED [facility] currently.

## 2024-12-28 ENCOUNTER — LAB (OUTPATIENT)
Dept: LAB | Facility: LAB | Age: 89
End: 2024-12-28
Payer: MEDICARE

## 2024-12-28 DIAGNOSIS — N18.4 STAGE 4 CHRONIC KIDNEY DISEASE (MULTI): ICD-10-CM

## 2024-12-28 LAB
ANION GAP SERPL CALC-SCNC: 14 MMOL/L (ref 10–20)
APPEARANCE UR: CLEAR
BILIRUB UR STRIP.AUTO-MCNC: NEGATIVE MG/DL
BUN SERPL-MCNC: 52 MG/DL (ref 6–23)
CALCIUM SERPL-MCNC: 9.1 MG/DL (ref 8.6–10.3)
CHLORIDE SERPL-SCNC: 103 MMOL/L (ref 98–107)
CO2 SERPL-SCNC: 28 MMOL/L (ref 21–32)
COLOR UR: ABNORMAL
CREAT SERPL-MCNC: 2.62 MG/DL (ref 0.5–1.3)
CREAT UR-MCNC: 83.8 MG/DL (ref 20–370)
EGFRCR SERPLBLD CKD-EPI 2021: 23 ML/MIN/1.73M*2
GLUCOSE SERPL-MCNC: 151 MG/DL (ref 74–99)
GLUCOSE UR STRIP.AUTO-MCNC: NORMAL MG/DL
HYALINE CASTS #/AREA URNS AUTO: ABNORMAL /LPF
KETONES UR STRIP.AUTO-MCNC: NEGATIVE MG/DL
LEUKOCYTE ESTERASE UR QL STRIP.AUTO: ABNORMAL
MICROALBUMIN UR-MCNC: 85.5 MG/L
MICROALBUMIN/CREAT UR: 102 UG/MG CREAT
NITRITE UR QL STRIP.AUTO: NEGATIVE
PH UR STRIP.AUTO: 6 [PH]
POTASSIUM SERPL-SCNC: 4.5 MMOL/L (ref 3.5–5.3)
PROT UR STRIP.AUTO-MCNC: ABNORMAL MG/DL
RBC # UR STRIP.AUTO: NEGATIVE /UL
RBC #/AREA URNS AUTO: ABNORMAL /HPF
SODIUM SERPL-SCNC: 140 MMOL/L (ref 136–145)
SP GR UR STRIP.AUTO: 1.01
UROBILINOGEN UR STRIP.AUTO-MCNC: NORMAL MG/DL
WBC #/AREA URNS AUTO: ABNORMAL /HPF

## 2024-12-28 PROCEDURE — 81001 URINALYSIS AUTO W/SCOPE: CPT

## 2024-12-28 PROCEDURE — 80048 BASIC METABOLIC PNL TOTAL CA: CPT

## 2024-12-28 PROCEDURE — 82570 ASSAY OF URINE CREATININE: CPT

## 2024-12-28 PROCEDURE — 82043 UR ALBUMIN QUANTITATIVE: CPT

## 2024-12-30 ENCOUNTER — APPOINTMENT (OUTPATIENT)
Age: 89
End: 2024-12-30
Payer: MEDICARE

## 2024-12-30 VITALS
HEIGHT: 67 IN | DIASTOLIC BLOOD PRESSURE: 74 MMHG | BODY MASS INDEX: 35.6 KG/M2 | SYSTOLIC BLOOD PRESSURE: 126 MMHG | HEART RATE: 93 BPM | WEIGHT: 226.8 LBS | OXYGEN SATURATION: 94 %

## 2024-12-30 DIAGNOSIS — J06.9 UPPER RESPIRATORY TRACT INFECTION, UNSPECIFIED TYPE: Primary | ICD-10-CM

## 2024-12-30 DIAGNOSIS — R21 RASH: ICD-10-CM

## 2024-12-30 PROCEDURE — 3078F DIAST BP <80 MM HG: CPT | Performed by: FAMILY MEDICINE

## 2024-12-30 PROCEDURE — 1036F TOBACCO NON-USER: CPT | Performed by: FAMILY MEDICINE

## 2024-12-30 PROCEDURE — 1159F MED LIST DOCD IN RCRD: CPT | Performed by: FAMILY MEDICINE

## 2024-12-30 PROCEDURE — G2211 COMPLEX E/M VISIT ADD ON: HCPCS | Performed by: FAMILY MEDICINE

## 2024-12-30 PROCEDURE — 1157F ADVNC CARE PLAN IN RCRD: CPT | Performed by: FAMILY MEDICINE

## 2024-12-30 PROCEDURE — 3074F SYST BP LT 130 MM HG: CPT | Performed by: FAMILY MEDICINE

## 2024-12-30 PROCEDURE — 99214 OFFICE O/P EST MOD 30 MIN: CPT | Performed by: FAMILY MEDICINE

## 2024-12-30 PROCEDURE — 1160F RVW MEDS BY RX/DR IN RCRD: CPT | Performed by: FAMILY MEDICINE

## 2024-12-30 NOTE — PATIENT INSTRUCTIONS
Discussed getting a flu test but he declines at this time.  Will treat symptomatically for now.  Will continue to monitor the leg as it is slowly improving.

## 2024-12-30 NOTE — PROGRESS NOTES
Subjective   Zack Medrano is a 89 y.o. male who presents for Follow-up (ER follow up from [facility]- Left lower leg swollen/red. Notes from [facility] faxed to our office for review. ).  Here for follow up recent ER visit for leg redness.  He has been on some antibiotics for cellulitis, but the rash hasn't changed significantly.  The ER note mentions that the rash may be due to his thrombocytopenia.      He states that he thinks he has the flu now.  He had some shakes pretty bad yesterday.  No fevers/chills.  He has had some mild cough, runny nose.  Symptoms started yesterday.  No know sick contacts.              Objective   Visit Vitals  /74   Pulse 93      Physical Exam  Vitals reviewed.   Constitutional:       General: He is not in acute distress.  Cardiovascular:      Rate and Rhythm: Normal rate and regular rhythm.      Heart sounds: No murmur heard.  Pulmonary:      Effort: Pulmonary effort is normal. No respiratory distress.      Breath sounds: Normal breath sounds.   Skin:     General: Skin is warm and dry.      Comments: There is mild erythema and mild swelling of the left lower leg.     Neurological:      General: No focal deficit present.      Mental Status: He is alert. Mental status is at baseline.         Assessment/Plan   Problem List Items Addressed This Visit    None  Visit Diagnoses       Upper respiratory tract infection, unspecified type    -  Primary    Rash                   Fabiola Dietz MD

## 2025-01-01 DIAGNOSIS — G25.0 TREMOR, ESSENTIAL: ICD-10-CM

## 2025-01-02 ENCOUNTER — APPOINTMENT (OUTPATIENT)
Dept: NEPHROLOGY | Facility: CLINIC | Age: OVER 89
End: 2025-01-02
Payer: MEDICARE

## 2025-01-02 VITALS
DIASTOLIC BLOOD PRESSURE: 56 MMHG | SYSTOLIC BLOOD PRESSURE: 122 MMHG | BODY MASS INDEX: 35.53 KG/M2 | HEART RATE: 74 BPM | HEIGHT: 67 IN | WEIGHT: 226.4 LBS

## 2025-01-02 DIAGNOSIS — I10 PRIMARY HYPERTENSION: ICD-10-CM

## 2025-01-02 DIAGNOSIS — E11.22 TYPE 2 DIABETES MELLITUS WITH STAGE 4 CHRONIC KIDNEY DISEASE, WITH LONG-TERM CURRENT USE OF INSULIN (MULTI): ICD-10-CM

## 2025-01-02 DIAGNOSIS — N18.4 TYPE 2 DIABETES MELLITUS WITH STAGE 4 CHRONIC KIDNEY DISEASE, WITH LONG-TERM CURRENT USE OF INSULIN (MULTI): ICD-10-CM

## 2025-01-02 DIAGNOSIS — N18.4 STAGE 4 CHRONIC KIDNEY DISEASE (MULTI): Primary | ICD-10-CM

## 2025-01-02 DIAGNOSIS — Z79.4 TYPE 2 DIABETES MELLITUS WITH STAGE 4 CHRONIC KIDNEY DISEASE, WITH LONG-TERM CURRENT USE OF INSULIN (MULTI): ICD-10-CM

## 2025-01-02 PROCEDURE — 1036F TOBACCO NON-USER: CPT | Performed by: CLINICAL NURSE SPECIALIST

## 2025-01-02 PROCEDURE — 3078F DIAST BP <80 MM HG: CPT | Performed by: CLINICAL NURSE SPECIALIST

## 2025-01-02 PROCEDURE — 1159F MED LIST DOCD IN RCRD: CPT | Performed by: CLINICAL NURSE SPECIALIST

## 2025-01-02 PROCEDURE — 99213 OFFICE O/P EST LOW 20 MIN: CPT | Performed by: CLINICAL NURSE SPECIALIST

## 2025-01-02 PROCEDURE — 3074F SYST BP LT 130 MM HG: CPT | Performed by: CLINICAL NURSE SPECIALIST

## 2025-01-02 PROCEDURE — 1160F RVW MEDS BY RX/DR IN RCRD: CPT | Performed by: CLINICAL NURSE SPECIALIST

## 2025-01-02 PROCEDURE — 1157F ADVNC CARE PLAN IN RCRD: CPT | Performed by: CLINICAL NURSE SPECIALIST

## 2025-01-02 RX ORDER — PRIMIDONE 50 MG/1
50 TABLET ORAL NIGHTLY
Qty: 90 TABLET | Refills: 1 | Status: SHIPPED | OUTPATIENT
Start: 2025-01-02

## 2025-01-02 ASSESSMENT — ENCOUNTER SYMPTOMS
COUGH: 1
CARDIOVASCULAR NEGATIVE: 1
PSYCHIATRIC NEGATIVE: 1
MUSCULOSKELETAL NEGATIVE: 1
CONSTITUTIONAL NEGATIVE: 1
NEUROLOGICAL NEGATIVE: 1
ENDOCRINE NEGATIVE: 1
GASTROINTESTINAL NEGATIVE: 1

## 2025-01-02 NOTE — PROGRESS NOTES
Subjective   Patient ID: Zack Medrano is a 89 y.o. male who presents for Follow-up (3 month ck/Review labs ).  Patient being seen in follow-up for chronic kidney disease stage IIIb/IV with history of hypertension and diabetes mellitus    Labs reviewed  Albumin creatinine ratio 102.0  Urine positive for leukocyte esterase 75 with microscopic showing 11-20 WBCs and occasional hyaline cast  Glucose 151  Sodium 140, potassium 4.5, chloride 103, bicarb 28  Renal function the BUN of 52 and creatinine of 2.62, GFR is 23  Calcium 9.1    He states that he got the flu bug on Sunday and has not been feeling well  Predominantly has been respiratory with a cough  Has not had any diarrhea or vomiting however he did have a decrease in his appetite  He has had improvement in the swelling of his left lower leg and his wound is healing well  He has had 2 falls recently and denies any lightheadedness or dizziness with these, once he states [place] and it was dark and he lost his balance, the other time he was in the bathroom and turned around too quickly        Review of Systems   Constitutional: Negative.    Respiratory:  Positive for cough.    Cardiovascular: Negative.    Gastrointestinal: Negative.    Endocrine: Negative.    Genitourinary: Negative.    Musculoskeletal: Negative.    Skin: Negative.    Neurological: Negative.    Psychiatric/Behavioral: Negative.         Objective   Physical Exam  Vitals reviewed.   Constitutional:       Appearance: Normal appearance.   HENT:      Head: Normocephalic.   Cardiovascular:      Rate and Rhythm: Normal rate and regular rhythm.   Pulmonary:      Effort: Pulmonary effort is normal.      Breath sounds: Normal breath sounds.   Abdominal:      Palpations: Abdomen is soft.   Musculoskeletal:         General: Normal range of motion.      Right lower leg: Edema present.      Left lower leg: Edema present.   Skin:     General: Skin is warm and dry.   Neurological:      Mental Status:  He is alert and oriented to person, place, and time.   Psychiatric:         Mood and Affect: Mood normal.         Behavior: Behavior normal.         Assessment/Plan   Problem List Items Addressed This Visit             ICD-10-CM    Type 2 diabetes mellitus with stage 4 chronic kidney disease, with long-term current use of insulin (Multi) E11.22, N18.4, Z79.4     Diabetes is difficult to control, last hemoglobin A1c 10.0         Hypertension I10     Blood pressure is well-controlled on amlodipine, metoprolol, spironolactone         Stage 4 chronic kidney disease (Multi) - Primary N18.4     Renal function stable with creatinine of 2.62, he is staying within his baseline, blood pressure is well-controlled, diabetes not well-controlled with hemoglobin A1c of 10, not using nephrotoxic medications, minimal benefit of adding SGLT2 for kidney disease, could be considered for better diabetes control         Relevant Orders    Follow Up In Nephrology     - CKD IV with baseline creatinine 2.3-2.8  - Hypertension on amlodipine, Metoprolol, Furosemide  - Diabetic Nephropathy:   - Diabetes mellitus type 2 on insulin  - Bilateral lower extremity edema: Stable.   - Pulmonary embolism on Eliquis  - Obesity  - Obstructive sleep apnea on CPAP machine and compliant  - Hyperlipidemia on a statin  - History of coronary artery disease on Plavix and aspirin.   - B/L Renal Cyst  - Hyperuricemia        NATALY Estrada-CNP, DNP 01/02/25 10:46 AM

## 2025-01-02 NOTE — ASSESSMENT & PLAN NOTE
Renal function stable with creatinine of 2.62, he is staying within his baseline, blood pressure is well-controlled, diabetes not well-controlled with hemoglobin A1c of 10, not using nephrotoxic medications, minimal benefit of adding SGLT2 for kidney disease, could be considered for better diabetes control

## 2025-01-06 ENCOUNTER — INFUSION (OUTPATIENT)
Dept: HEMATOLOGY/ONCOLOGY | Facility: CLINIC | Age: OVER 89
End: 2025-01-06
Payer: MEDICARE

## 2025-01-06 VITALS
HEART RATE: 88 BPM | SYSTOLIC BLOOD PRESSURE: 137 MMHG | BODY MASS INDEX: 35.46 KG/M2 | DIASTOLIC BLOOD PRESSURE: 67 MMHG | RESPIRATION RATE: 18 BRPM | WEIGHT: 226.41 LBS | TEMPERATURE: 98.2 F | OXYGEN SATURATION: 98 %

## 2025-01-06 DIAGNOSIS — N18.4 ANEMIA DUE TO STAGE 4 CHRONIC KIDNEY DISEASE (MULTI): ICD-10-CM

## 2025-01-06 DIAGNOSIS — D46.9 MYELODYSPLASTIC SYNDROME, UNSPECIFIED (MULTI): ICD-10-CM

## 2025-01-06 DIAGNOSIS — D61.818 OTHER PANCYTOPENIA (MULTI): ICD-10-CM

## 2025-01-06 DIAGNOSIS — D63.1 ANEMIA DUE TO STAGE 3B CHRONIC KIDNEY DISEASE (MULTI): ICD-10-CM

## 2025-01-06 DIAGNOSIS — D70.9 NEUTROPENIA, UNSPECIFIED TYPE (CMS-HCC): ICD-10-CM

## 2025-01-06 DIAGNOSIS — N18.32 ANEMIA DUE TO STAGE 3B CHRONIC KIDNEY DISEASE (MULTI): ICD-10-CM

## 2025-01-06 DIAGNOSIS — D63.1 ANEMIA DUE TO STAGE 4 CHRONIC KIDNEY DISEASE (MULTI): ICD-10-CM

## 2025-01-06 DIAGNOSIS — D72.819 LEUKOPENIA, UNSPECIFIED TYPE: ICD-10-CM

## 2025-01-06 DIAGNOSIS — D69.6 THROMBOCYTOPENIA (CMS-HCC): ICD-10-CM

## 2025-01-06 DIAGNOSIS — N18.4 STAGE 4 CHRONIC KIDNEY DISEASE (MULTI): ICD-10-CM

## 2025-01-06 LAB
ALBUMIN SERPL BCP-MCNC: 3.7 G/DL (ref 3.4–5)
ALP SERPL-CCNC: 119 U/L (ref 33–136)
ALT SERPL W P-5'-P-CCNC: 24 U/L (ref 10–52)
ANION GAP SERPL CALC-SCNC: 13 MMOL/L (ref 10–20)
AST SERPL W P-5'-P-CCNC: 31 U/L (ref 9–39)
BASOPHILS # BLD AUTO: 0 X10*3/UL (ref 0–0.1)
BASOPHILS NFR BLD AUTO: 0 %
BILIRUB SERPL-MCNC: 1.1 MG/DL (ref 0–1.2)
BUN SERPL-MCNC: 41 MG/DL (ref 6–23)
CALCIUM SERPL-MCNC: 8.3 MG/DL (ref 8.6–10.3)
CHLORIDE SERPL-SCNC: 101 MMOL/L (ref 98–107)
CO2 SERPL-SCNC: 26 MMOL/L (ref 21–32)
CREAT SERPL-MCNC: 2.56 MG/DL (ref 0.5–1.3)
DACRYOCYTES BLD QL SMEAR: NORMAL
EGFRCR SERPLBLD CKD-EPI 2021: 23 ML/MIN/1.73M*2
EOSINOPHIL # BLD AUTO: 0.05 X10*3/UL (ref 0–0.4)
EOSINOPHIL NFR BLD AUTO: 2.5 %
ERYTHROCYTE [DISTWIDTH] IN BLOOD BY AUTOMATED COUNT: 20.7 % (ref 11.5–14.5)
GLUCOSE SERPL-MCNC: 313 MG/DL (ref 74–99)
HCT VFR BLD AUTO: 24.4 % (ref 41–52)
HGB BLD-MCNC: 7.8 G/DL (ref 13.5–17.5)
IMM GRANULOCYTES # BLD AUTO: 0.01 X10*3/UL (ref 0–0.5)
IMM GRANULOCYTES NFR BLD AUTO: 0.5 % (ref 0–0.9)
LYMPHOCYTES # BLD AUTO: 0.69 X10*3/UL (ref 0.8–3)
LYMPHOCYTES NFR BLD AUTO: 34.2 %
MCH RBC QN AUTO: 34.2 PG (ref 26–34)
MCHC RBC AUTO-ENTMCNC: 32 G/DL (ref 32–36)
MCV RBC AUTO: 107 FL (ref 80–100)
MONOCYTES # BLD AUTO: 0.12 X10*3/UL (ref 0.05–0.8)
MONOCYTES NFR BLD AUTO: 5.9 %
NEUTROPHILS # BLD AUTO: 1.15 X10*3/UL (ref 1.6–5.5)
NEUTROPHILS NFR BLD AUTO: 56.9 %
NRBC BLD-RTO: 0 /100 WBCS (ref 0–0)
OVALOCYTES BLD QL SMEAR: NORMAL
PLATELET # BLD AUTO: 63 X10*3/UL (ref 150–450)
POTASSIUM SERPL-SCNC: 4.3 MMOL/L (ref 3.5–5.3)
PROT SERPL-MCNC: 6.4 G/DL (ref 6.4–8.2)
RBC # BLD AUTO: 2.28 X10*6/UL (ref 4.5–5.9)
RBC MORPH BLD: NORMAL
SCHISTOCYTES BLD QL SMEAR: NORMAL
SODIUM SERPL-SCNC: 136 MMOL/L (ref 136–145)
WBC # BLD AUTO: 2 X10*3/UL (ref 4.4–11.3)

## 2025-01-06 PROCEDURE — 84075 ASSAY ALKALINE PHOSPHATASE: CPT

## 2025-01-06 PROCEDURE — 2500000004 HC RX 250 GENERAL PHARMACY W/ HCPCS (ALT 636 FOR OP/ED): Mod: JZ,EC

## 2025-01-06 PROCEDURE — 85025 COMPLETE CBC W/AUTO DIFF WBC: CPT

## 2025-01-06 PROCEDURE — 36415 COLL VENOUS BLD VENIPUNCTURE: CPT

## 2025-01-06 PROCEDURE — 96372 THER/PROPH/DIAG INJ SC/IM: CPT

## 2025-01-06 RX ORDER — ALBUTEROL SULFATE 0.83 MG/ML
3 SOLUTION RESPIRATORY (INHALATION) AS NEEDED
OUTPATIENT
Start: 2025-01-20

## 2025-01-06 RX ORDER — FAMOTIDINE 10 MG/ML
20 INJECTION INTRAVENOUS ONCE AS NEEDED
OUTPATIENT
Start: 2025-01-20

## 2025-01-06 RX ORDER — EPINEPHRINE 0.3 MG/.3ML
0.3 INJECTION SUBCUTANEOUS EVERY 5 MIN PRN
OUTPATIENT
Start: 2025-01-20

## 2025-01-06 RX ORDER — DIPHENHYDRAMINE HYDROCHLORIDE 50 MG/ML
50 INJECTION INTRAMUSCULAR; INTRAVENOUS AS NEEDED
OUTPATIENT
Start: 2025-01-20

## 2025-01-06 RX ADMIN — DARBEPOETIN ALFA 500 MCG: 500 INJECTION, SOLUTION INTRAVENOUS; SUBCUTANEOUS at 11:47

## 2025-01-06 ASSESSMENT — PAIN SCALES - GENERAL: PAINLEVEL_OUTOF10: 0-NO PAIN

## 2025-01-13 DIAGNOSIS — I10 ESSENTIAL (PRIMARY) HYPERTENSION: ICD-10-CM

## 2025-01-13 DIAGNOSIS — N18.4 TYPE 2 DIABETES MELLITUS WITH STAGE 4 CHRONIC KIDNEY DISEASE, WITH LONG-TERM CURRENT USE OF INSULIN (MULTI): Primary | ICD-10-CM

## 2025-01-13 DIAGNOSIS — Z79.4 TYPE 2 DIABETES MELLITUS WITH STAGE 4 CHRONIC KIDNEY DISEASE, WITH LONG-TERM CURRENT USE OF INSULIN (MULTI): Primary | ICD-10-CM

## 2025-01-13 DIAGNOSIS — E11.22 TYPE 2 DIABETES MELLITUS WITH STAGE 4 CHRONIC KIDNEY DISEASE, WITH LONG-TERM CURRENT USE OF INSULIN (MULTI): Primary | ICD-10-CM

## 2025-01-13 RX ORDER — AMLODIPINE BESYLATE 5 MG/1
5 TABLET ORAL
Qty: 90 TABLET | Refills: 1 | Status: SHIPPED | OUTPATIENT
Start: 2025-01-13 | End: 2025-07-12

## 2025-01-13 RX ORDER — FLASH GLUCOSE SENSOR
KIT MISCELLANEOUS
Qty: 2 EACH | Refills: 2 | Status: SHIPPED | OUTPATIENT
Start: 2025-01-13

## 2025-01-13 NOTE — TELEPHONE ENCOUNTER
Patient called stating he [facility] did not have his glucose monitoring supplies prescription.  I called [facility] for clarification. They stated the Sindi III is on backorder. They need a new Rx for the Sindi II as they are in stock.

## 2025-01-15 DIAGNOSIS — I25.10 CORONARY ARTERY DISEASE INVOLVING NATIVE HEART, UNSPECIFIED VESSEL OR LESION TYPE, UNSPECIFIED WHETHER ANGINA PRESENT: ICD-10-CM

## 2025-01-15 RX ORDER — FUROSEMIDE 40 MG/1
TABLET ORAL
Qty: 135 TABLET | Refills: 1 | Status: SHIPPED | OUTPATIENT
Start: 2025-01-15

## 2025-01-20 ENCOUNTER — INFUSION (OUTPATIENT)
Dept: HEMATOLOGY/ONCOLOGY | Facility: CLINIC | Age: OVER 89
End: 2025-01-20
Payer: MEDICARE

## 2025-01-20 VITALS
SYSTOLIC BLOOD PRESSURE: 156 MMHG | OXYGEN SATURATION: 95 % | TEMPERATURE: 97.5 F | RESPIRATION RATE: 18 BRPM | HEART RATE: 93 BPM | WEIGHT: 224.43 LBS | BODY MASS INDEX: 35.15 KG/M2 | DIASTOLIC BLOOD PRESSURE: 56 MMHG

## 2025-01-20 DIAGNOSIS — D63.1 ANEMIA DUE TO STAGE 4 CHRONIC KIDNEY DISEASE (MULTI): ICD-10-CM

## 2025-01-20 DIAGNOSIS — N18.4 STAGE 4 CHRONIC KIDNEY DISEASE (MULTI): ICD-10-CM

## 2025-01-20 DIAGNOSIS — D69.6 THROMBOCYTOPENIA (CMS-HCC): ICD-10-CM

## 2025-01-20 DIAGNOSIS — D70.9 NEUTROPENIA, UNSPECIFIED TYPE (CMS-HCC): ICD-10-CM

## 2025-01-20 DIAGNOSIS — D61.818 OTHER PANCYTOPENIA (MULTI): ICD-10-CM

## 2025-01-20 DIAGNOSIS — D46.9 MYELODYSPLASTIC SYNDROME, UNSPECIFIED (MULTI): ICD-10-CM

## 2025-01-20 DIAGNOSIS — N18.32 ANEMIA DUE TO STAGE 3B CHRONIC KIDNEY DISEASE (MULTI): ICD-10-CM

## 2025-01-20 DIAGNOSIS — D72.819 LEUKOPENIA, UNSPECIFIED TYPE: ICD-10-CM

## 2025-01-20 DIAGNOSIS — N18.4 ANEMIA DUE TO STAGE 4 CHRONIC KIDNEY DISEASE (MULTI): ICD-10-CM

## 2025-01-20 DIAGNOSIS — D63.1 ANEMIA DUE TO STAGE 3B CHRONIC KIDNEY DISEASE (MULTI): ICD-10-CM

## 2025-01-20 LAB
ALBUMIN SERPL BCP-MCNC: 4 G/DL (ref 3.4–5)
ALP SERPL-CCNC: 113 U/L (ref 33–136)
ALT SERPL W P-5'-P-CCNC: 22 U/L (ref 10–52)
ANION GAP SERPL CALC-SCNC: 13 MMOL/L (ref 10–20)
AST SERPL W P-5'-P-CCNC: 31 U/L (ref 9–39)
BASO STIPL BLD QL SMEAR: PRESENT
BASOPHILS # BLD AUTO: 0.01 X10*3/UL (ref 0–0.1)
BASOPHILS NFR BLD AUTO: 0.6 %
BILIRUB SERPL-MCNC: 0.9 MG/DL (ref 0–1.2)
BUN SERPL-MCNC: 47 MG/DL (ref 6–23)
CALCIUM SERPL-MCNC: 9.2 MG/DL (ref 8.6–10.3)
CHLORIDE SERPL-SCNC: 102 MMOL/L (ref 98–107)
CO2 SERPL-SCNC: 28 MMOL/L (ref 21–32)
CREAT SERPL-MCNC: 2.7 MG/DL (ref 0.5–1.3)
DACRYOCYTES BLD QL SMEAR: NORMAL
EGFRCR SERPLBLD CKD-EPI 2021: 22 ML/MIN/1.73M*2
EOSINOPHIL # BLD AUTO: 0.12 X10*3/UL (ref 0–0.4)
EOSINOPHIL NFR BLD AUTO: 7 %
ERYTHROCYTE [DISTWIDTH] IN BLOOD BY AUTOMATED COUNT: 20.9 % (ref 11.5–14.5)
GLUCOSE SERPL-MCNC: 142 MG/DL (ref 74–99)
HCT VFR BLD AUTO: 24.7 % (ref 41–52)
HGB BLD-MCNC: 7.8 G/DL (ref 13.5–17.5)
IMM GRANULOCYTES # BLD AUTO: 0.01 X10*3/UL (ref 0–0.5)
IMM GRANULOCYTES NFR BLD AUTO: 0.6 % (ref 0–0.9)
LYMPHOCYTES # BLD AUTO: 0.82 X10*3/UL (ref 0.8–3)
LYMPHOCYTES NFR BLD AUTO: 47.7 %
MCH RBC QN AUTO: 34.1 PG (ref 26–34)
MCHC RBC AUTO-ENTMCNC: 31.6 G/DL (ref 32–36)
MCV RBC AUTO: 108 FL (ref 80–100)
MONOCYTES # BLD AUTO: 0.11 X10*3/UL (ref 0.05–0.8)
MONOCYTES NFR BLD AUTO: 6.4 %
NEUTROPHILS # BLD AUTO: 0.65 X10*3/UL (ref 1.6–5.5)
NEUTROPHILS NFR BLD AUTO: 37.7 %
NRBC BLD-RTO: 0 /100 WBCS (ref 0–0)
OVALOCYTES BLD QL SMEAR: NORMAL
PLATELET # BLD AUTO: 78 X10*3/UL (ref 150–450)
POTASSIUM SERPL-SCNC: 4.3 MMOL/L (ref 3.5–5.3)
PROT SERPL-MCNC: 6.7 G/DL (ref 6.4–8.2)
RBC # BLD AUTO: 2.29 X10*6/UL (ref 4.5–5.9)
RBC MORPH BLD: NORMAL
SCHISTOCYTES BLD QL SMEAR: NORMAL
SODIUM SERPL-SCNC: 139 MMOL/L (ref 136–145)
WBC # BLD AUTO: 1.7 X10*3/UL (ref 4.4–11.3)

## 2025-01-20 PROCEDURE — 85025 COMPLETE CBC W/AUTO DIFF WBC: CPT

## 2025-01-20 PROCEDURE — 80053 COMPREHEN METABOLIC PANEL: CPT

## 2025-01-20 PROCEDURE — 96372 THER/PROPH/DIAG INJ SC/IM: CPT

## 2025-01-20 PROCEDURE — 36415 COLL VENOUS BLD VENIPUNCTURE: CPT

## 2025-01-20 RX ORDER — FAMOTIDINE 10 MG/ML
20 INJECTION INTRAVENOUS ONCE AS NEEDED
OUTPATIENT
Start: 2025-02-03

## 2025-01-20 RX ORDER — ALBUTEROL SULFATE 0.83 MG/ML
3 SOLUTION RESPIRATORY (INHALATION) AS NEEDED
OUTPATIENT
Start: 2025-02-03

## 2025-01-20 RX ORDER — EPINEPHRINE 0.3 MG/.3ML
0.3 INJECTION SUBCUTANEOUS EVERY 5 MIN PRN
OUTPATIENT
Start: 2025-02-03

## 2025-01-20 RX ORDER — DIPHENHYDRAMINE HYDROCHLORIDE 50 MG/ML
50 INJECTION INTRAMUSCULAR; INTRAVENOUS AS NEEDED
OUTPATIENT
Start: 2025-02-03

## 2025-01-20 ASSESSMENT — PAIN SCALES - GENERAL: PAINLEVEL_OUTOF10: 0-NO PAIN

## 2025-01-27 DIAGNOSIS — K25.9 GASTRIC ULCER, UNSPECIFIED CHRONICITY, UNSPECIFIED WHETHER GASTRIC ULCER HEMORRHAGE OR PERFORATION PRESENT: ICD-10-CM

## 2025-01-27 DIAGNOSIS — D64.9 ANEMIA, UNSPECIFIED TYPE: ICD-10-CM

## 2025-01-27 RX ORDER — PANTOPRAZOLE SODIUM 40 MG/1
40 TABLET, DELAYED RELEASE ORAL 2 TIMES DAILY
Qty: 90 TABLET | Refills: 1 | Status: SHIPPED | OUTPATIENT
Start: 2025-01-27

## 2025-02-03 ENCOUNTER — OFFICE VISIT (OUTPATIENT)
Dept: HEMATOLOGY/ONCOLOGY | Facility: CLINIC | Age: OVER 89
End: 2025-02-03
Payer: MEDICARE

## 2025-02-03 ENCOUNTER — INFUSION (OUTPATIENT)
Dept: HEMATOLOGY/ONCOLOGY | Facility: CLINIC | Age: OVER 89
End: 2025-02-03
Payer: MEDICARE

## 2025-02-03 VITALS
OXYGEN SATURATION: 94 % | SYSTOLIC BLOOD PRESSURE: 127 MMHG | WEIGHT: 227 LBS | BODY MASS INDEX: 35.55 KG/M2 | TEMPERATURE: 96.8 F | RESPIRATION RATE: 18 BRPM | HEART RATE: 70 BPM | DIASTOLIC BLOOD PRESSURE: 68 MMHG

## 2025-02-03 DIAGNOSIS — N18.4 ANEMIA DUE TO STAGE 4 CHRONIC KIDNEY DISEASE (MULTI): ICD-10-CM

## 2025-02-03 DIAGNOSIS — N18.32 ANEMIA DUE TO STAGE 3B CHRONIC KIDNEY DISEASE (MULTI): ICD-10-CM

## 2025-02-03 DIAGNOSIS — D46.9 MYELODYSPLASTIC SYNDROME, UNSPECIFIED (MULTI): ICD-10-CM

## 2025-02-03 DIAGNOSIS — D63.1 ANEMIA DUE TO STAGE 4 CHRONIC KIDNEY DISEASE (MULTI): ICD-10-CM

## 2025-02-03 DIAGNOSIS — I25.10 CORONARY ARTERY DISEASE INVOLVING NATIVE HEART, UNSPECIFIED VESSEL OR LESION TYPE, UNSPECIFIED WHETHER ANGINA PRESENT: ICD-10-CM

## 2025-02-03 DIAGNOSIS — E78.2 MIXED HYPERLIPIDEMIA: ICD-10-CM

## 2025-02-03 DIAGNOSIS — G25.0 TREMOR, ESSENTIAL: ICD-10-CM

## 2025-02-03 DIAGNOSIS — D69.6 THROMBOCYTOPENIA (CMS-HCC): ICD-10-CM

## 2025-02-03 DIAGNOSIS — D72.819 LEUKOPENIA, UNSPECIFIED TYPE: ICD-10-CM

## 2025-02-03 DIAGNOSIS — N18.4 TYPE 2 DIABETES MELLITUS WITH STAGE 4 CHRONIC KIDNEY DISEASE, WITH LONG-TERM CURRENT USE OF INSULIN (MULTI): ICD-10-CM

## 2025-02-03 DIAGNOSIS — Z79.4 TYPE 2 DIABETES MELLITUS WITH STAGE 4 CHRONIC KIDNEY DISEASE, WITH LONG-TERM CURRENT USE OF INSULIN (MULTI): ICD-10-CM

## 2025-02-03 DIAGNOSIS — D70.9 NEUTROPENIA, UNSPECIFIED TYPE (CMS-HCC): ICD-10-CM

## 2025-02-03 DIAGNOSIS — E11.22 TYPE 2 DIABETES MELLITUS WITH STAGE 4 CHRONIC KIDNEY DISEASE, WITH LONG-TERM CURRENT USE OF INSULIN (MULTI): ICD-10-CM

## 2025-02-03 DIAGNOSIS — D63.1 ANEMIA DUE TO STAGE 3B CHRONIC KIDNEY DISEASE (MULTI): ICD-10-CM

## 2025-02-03 DIAGNOSIS — N18.4 STAGE 4 CHRONIC KIDNEY DISEASE (MULTI): ICD-10-CM

## 2025-02-03 DIAGNOSIS — I10 PRIMARY HYPERTENSION: ICD-10-CM

## 2025-02-03 DIAGNOSIS — E11.22 TYPE 2 DIABETES MELLITUS WITH DIABETIC CHRONIC KIDNEY DISEASE: ICD-10-CM

## 2025-02-03 DIAGNOSIS — D61.818 OTHER PANCYTOPENIA (MULTI): ICD-10-CM

## 2025-02-03 LAB
25(OH)D3 SERPL-MCNC: 55 NG/ML (ref 30–100)
ALBUMIN SERPL BCP-MCNC: 3.8 G/DL (ref 3.4–5)
ALP SERPL-CCNC: 104 U/L (ref 33–136)
ALT SERPL W P-5'-P-CCNC: 21 U/L (ref 10–52)
ANION GAP SERPL CALC-SCNC: 15 MMOL/L (ref 10–20)
AST SERPL W P-5'-P-CCNC: 31 U/L (ref 9–39)
BASOPHILS # BLD AUTO: 0.01 X10*3/UL (ref 0–0.1)
BASOPHILS NFR BLD AUTO: 0.6 %
BILIRUB SERPL-MCNC: 1.1 MG/DL (ref 0–1.2)
BUN SERPL-MCNC: 46 MG/DL (ref 6–23)
CALCIUM SERPL-MCNC: 8.7 MG/DL (ref 8.6–10.3)
CHLORIDE SERPL-SCNC: 105 MMOL/L (ref 98–107)
CO2 SERPL-SCNC: 25 MMOL/L (ref 21–32)
CREAT SERPL-MCNC: 2.42 MG/DL (ref 0.5–1.3)
EGFRCR SERPLBLD CKD-EPI 2021: 25 ML/MIN/1.73M*2
EOSINOPHIL # BLD AUTO: 0.15 X10*3/UL (ref 0–0.4)
EOSINOPHIL NFR BLD AUTO: 9.6 %
ERYTHROCYTE [DISTWIDTH] IN BLOOD BY AUTOMATED COUNT: 21.2 % (ref 11.5–14.5)
FERRITIN SERPL-MCNC: 510 NG/ML (ref 20–300)
FOLATE SERPL-MCNC: 20.2 NG/ML
GLUCOSE SERPL-MCNC: 218 MG/DL (ref 74–99)
HCT VFR BLD AUTO: 25.3 % (ref 41–52)
HGB BLD-MCNC: 8 G/DL (ref 13.5–17.5)
HGB RETIC QN: 34 PG (ref 28–38)
IMM GRANULOCYTES # BLD AUTO: 0.01 X10*3/UL (ref 0–0.5)
IMM GRANULOCYTES NFR BLD AUTO: 0.6 % (ref 0–0.9)
IMMATURE RETIC FRACTION: 14.4 %
IRON SATN MFR SERPL: 51 % (ref 25–45)
IRON SERPL-MCNC: 144 UG/DL (ref 35–150)
LYMPHOCYTES # BLD AUTO: 0.57 X10*3/UL (ref 0.8–3)
LYMPHOCYTES NFR BLD AUTO: 36.3 %
MCH RBC QN AUTO: 34.8 PG (ref 26–34)
MCHC RBC AUTO-ENTMCNC: 31.6 G/DL (ref 32–36)
MCV RBC AUTO: 110 FL (ref 80–100)
MONOCYTES # BLD AUTO: 0.11 X10*3/UL (ref 0.05–0.8)
MONOCYTES NFR BLD AUTO: 7 %
NEUTROPHILS # BLD AUTO: 0.72 X10*3/UL (ref 1.6–5.5)
NEUTROPHILS NFR BLD AUTO: 45.9 %
NRBC BLD-RTO: 0 /100 WBCS (ref 0–0)
PLATELET # BLD AUTO: 70 X10*3/UL (ref 150–450)
POTASSIUM SERPL-SCNC: 4.4 MMOL/L (ref 3.5–5.3)
PROT SERPL-MCNC: 6.3 G/DL (ref 6.4–8.2)
RBC # BLD AUTO: 2.3 X10*6/UL (ref 4.5–5.9)
RETICS #: 0.06 X10*6/UL (ref 0.02–0.11)
RETICS/RBC NFR AUTO: 2.6 % (ref 0.5–2)
SODIUM SERPL-SCNC: 141 MMOL/L (ref 136–145)
TIBC SERPL-MCNC: 284 UG/DL (ref 240–445)
UIBC SERPL-MCNC: 140 UG/DL (ref 110–370)
VIT B12 SERPL-MCNC: 761 PG/ML (ref 211–911)
WBC # BLD AUTO: 1.6 X10*3/UL (ref 4.4–11.3)

## 2025-02-03 PROCEDURE — 82306 VITAMIN D 25 HYDROXY: CPT | Mod: SAMLAB

## 2025-02-03 PROCEDURE — 82746 ASSAY OF FOLIC ACID SERUM: CPT | Mod: SAMLAB

## 2025-02-03 PROCEDURE — 99213 OFFICE O/P EST LOW 20 MIN: CPT

## 2025-02-03 PROCEDURE — 85025 COMPLETE CBC W/AUTO DIFF WBC: CPT

## 2025-02-03 PROCEDURE — 3078F DIAST BP <80 MM HG: CPT

## 2025-02-03 PROCEDURE — 1126F AMNT PAIN NOTED NONE PRSNT: CPT

## 2025-02-03 PROCEDURE — 80053 COMPREHEN METABOLIC PANEL: CPT

## 2025-02-03 PROCEDURE — 1159F MED LIST DOCD IN RCRD: CPT

## 2025-02-03 PROCEDURE — 83540 ASSAY OF IRON: CPT

## 2025-02-03 PROCEDURE — 82607 VITAMIN B-12: CPT | Mod: SAMLAB

## 2025-02-03 PROCEDURE — 85045 AUTOMATED RETICULOCYTE COUNT: CPT

## 2025-02-03 PROCEDURE — 3074F SYST BP LT 130 MM HG: CPT

## 2025-02-03 PROCEDURE — 99213 OFFICE O/P EST LOW 20 MIN: CPT | Mod: 25

## 2025-02-03 PROCEDURE — 36415 COLL VENOUS BLD VENIPUNCTURE: CPT

## 2025-02-03 PROCEDURE — 1157F ADVNC CARE PLAN IN RCRD: CPT

## 2025-02-03 PROCEDURE — 96372 THER/PROPH/DIAG INJ SC/IM: CPT

## 2025-02-03 PROCEDURE — 82728 ASSAY OF FERRITIN: CPT

## 2025-02-03 PROCEDURE — 2500000004 HC RX 250 GENERAL PHARMACY W/ HCPCS (ALT 636 FOR OP/ED): Mod: JZ,EC

## 2025-02-03 RX ORDER — ALBUTEROL SULFATE 0.83 MG/ML
3 SOLUTION RESPIRATORY (INHALATION) AS NEEDED
OUTPATIENT
Start: 2025-02-17

## 2025-02-03 RX ORDER — FAMOTIDINE 10 MG/ML
20 INJECTION INTRAVENOUS ONCE AS NEEDED
OUTPATIENT
Start: 2025-02-17

## 2025-02-03 RX ORDER — DIPHENHYDRAMINE HYDROCHLORIDE 50 MG/ML
50 INJECTION INTRAMUSCULAR; INTRAVENOUS AS NEEDED
OUTPATIENT
Start: 2025-02-17

## 2025-02-03 RX ORDER — EPINEPHRINE 0.3 MG/.3ML
0.3 INJECTION SUBCUTANEOUS EVERY 5 MIN PRN
OUTPATIENT
Start: 2025-02-17

## 2025-02-03 RX ADMIN — DARBEPOETIN ALFA 500 MCG: 500 INJECTION, SOLUTION INTRAVENOUS; SUBCUTANEOUS at 11:05

## 2025-02-03 ASSESSMENT — PAIN SCALES - GENERAL: PAINLEVEL_OUTOF10: 0-NO PAIN

## 2025-02-03 NOTE — PROGRESS NOTES
Patient ID: Zack Medrano is a 89 y.o. male.    Subjective    HPI    HPI     Patient ID: Zack Medrano is a 89 y.o. male.        Subjective     Patient ID:Zack Medrano is a 88 y.o. year old male patient with myelodysplasia         Referring Physician: Ayah Carmichael MD  [address] [place] [address] [address]  [place] [address]  Primary Care Provider: Fabiola Dietz MD     Chief Complaint        Chief Complaint   Patient presents with    Anemia          History of the Present Illness  10/9/2019.  Visit with Dr. Mickey Nguyen for chronic kidney disease and hypertension.  Chief complaint was fatigue and need to lose weight     11/12/2019.  The patient has had long-term history of microcytic anemia going back to at least 2019 at which time his white count was 5.2 hemoglobin was 13.8 hematocrit 40.5 and a platelet count of 157,000.  His MCV was 105 MCHC was 34.  White blood cell differential count showed 66% polys, 17 lymphs, 11 monos and 4 eosinophils.     12/11/2019.  Seen by primary care for probable shingles treated with Valtrex with chief complaint of waxing and waning left flank pain radiating up under his ribs, somewhat responsive to exercise..  He had a history of pulmonary embolus and was on Eliquis long-term.     2/24/2020.  Seen by Dr. Howell for elevated PSA, BPH and erectile dysfunction.  PSA in 2017 was 2.94, in 2018 it was 2.74 and in 2020 it was 4.03.  We discussed biopsy and the patient wanted to have this followed and not biopsy.     3/9/2020.  Follow-up with NATALY Pulido primary care.  Referred to Dr. Bahena for colonoscopy and CT scan of the chest was ordered for follow-up of questionable lingular nodule.     3/11/2020.  CT scan of the chest showed a stable 6 mm nodular lesion near the fissure in the lingula.     3/13/2020.  Seen by Dr. Bahena who noted that the patient had had diarrhea off-and-on since cholecystectomy and had intermittent left-sided pain.  He had a history of colon polyps.      4/22/2015.  Colonoscopy showed that he had tubular adenoma     5/13/2020.  Seen by Dr. Migdalia New in follow-up.  She noted that he had an abnormal CBC with macrocytic indices with a normal B12 level.  The patient denies alcohol use.  Hemoglobin A1c was 6.2.  BUN was 33 creatinine was 1.74.     8/31/2020.  Follow-up with Dr. Howell.  PSA had come down from 4.03-3.77     1/4/2021 through 1/12/2021.  Admitted to the hospital having been brought to the ED by squad after he fell forward on his toilet and was unable to get up.  He denied hitting his head or losing consciousness.  He blamed it on having back pain and shoulder pain for several weeks.  He was febrile with a temperature of 100.9 and hypoxic with pulse ox of 86% on room air.  Respirations were 24.  He was positive for COVID and was treated with intravenous antibiotics and remdesivir, increased supplemental oxygen.  He was able to be discharged to rehabilitation.     Repeat CBC showed white count 4.3 with a hemoglobin 12.9 hematocrit 38.8 with an MCV of 102 and a platelet count of 173,000.  White blood cell differential count showed minimal absolute lymphocytopenia at 0.6 with the lower limits of normal being 0.8.     3/1/2021.  Follow-up with Dr. Howell.  PSA was 9.9 in February.  Decided to have prostate biopsy done on 3/22/2021.  Results showed BPH.     10/6/2021.  Follow-up with Dr. Howell.  PSA in September was 2.77     4/6/2022.  CBC showed white count 4.3 with hemoglobin 11.7 hematocrit 34.4 with an MCV of 107.  Platelet count was 153,000.  White blood cell differential count was essentially normal.     5/31/2022.  CBC showed white count 3.6 with hemoglobin 11.6 hematocrit 34.4 and platelet count 117,000 with a normal white blood cell differential.     6/27/2022.  Referred to Dr. Dash because of pancytopenia by NATALY Gray.  CBC showed a white count of 3.9 with hemoglobin 12.3 hematocrit 36.1 and platelet count 129,000 with a normal differential.   PSA was 3.26.     The patient said that he felt well except for having increased fatigue.  He continued on long-term anticoagulation.  He was able to carry out his ADLs.  He did complain of easy bruising.  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  Dr. Dash felt that there was a large component secondary to his chronic kidney disease.  There was no evidence of nutritional deficiency.  He checked him for hemolysis and monoclonal gammopathy and ordered an ultrasound for hepatosplenomegaly and testing for hepatitis, HIV and CHRISTINA.  He said he would rather observe the patient rather than pursuing a bone marrow biopsy.     7/27/2022.  Repeat lab data showed white count 3.9 with hemoglobin 12.3 hematocrit 36.1 and platelet count 129,000.  BUN was 36 creatinine was 1.78.  He had mild elevation of AST.  Retake count was 2.3%.  Flow cytometry showed a small clonal B-cell population.  Hepatitis B was nonreactive.  Hep C testing was negative.  Serum protein electrophoresis was normal.  Haptoglobin was normal.  LDH was normal.  He said that the possibility of MDS could not be ruled out.  Ultrasound showed fatty liver but no splenomegaly.  He plan for EPO level and NGS for myeloid mutation.     9/1/2022.  Started on allopurinol for hyperuricemia.     10/12/2022.  Seen by Dr. Dr. Dietz.  Patient was referred to dermatology for several skin lesions.  He was referred to neurology because of his tremor that was not improving.  She increased his glimepiride.     10/17/2022.  CBC showed white count 3.0 with hemoglobin 10.5 hematocrit 31.3 and a platelet count of 106,000 MCV was 111.     10/26/2022.  Follow-up with Dr. 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  He was suspicious for MDS.  He referred him for a bone marrow biopsy.     12/1/2022.  Follow-up with Dr. Dash.  Bone marrow biopsy showed low-grade MDS.  R IPSS score was low or very  low depending on a karyotype which was still pending.  We discussed initiating erythropoietin with Aranesp but the patient deferred and the decision was made to monitor him.     1/11/2023.  CBC showed white count 3.4 with hemoglobin 10.3 hematocrit 32.2 and a platelet count of 137,000.  MCV was 115.  White blood cell differential count was normal.     1/13/2023.  Follow-up with Dr. Dash.  The patient did say that he had following getting out of the shower but did not sustain any injuries.  He had been started by Dr. Nguyen on insulin for his diabetes which was helpful.  Patient continued to be active inside his house.  He remains on Eliquis.  BR-IPSS score is very low.  Counts remain stable.     3/31/2023.  CBC showed a white count of 2.6 with hemoglobin 8.7 hematocrit 27.1 and a platelet count 97,000.  MCV was 115.  White blood cell differential count showed that he had an absolute neutrophil count of 1.36.     4/10/2023.  CBC showed a white count of 2.5 with a hemoglobin 9.0 hematocrit of 27.7 with a platelet count of 92,000.  MCV was 114.     4/17/2023.  Follow-up with NATALY Gray.  Even though his hemoglobin had dropped to 9 the patient did not want to start his erythropoietin injections.     5/11/2023.  CBC showed white count of 2.5 with a hemoglobin of 7.6 hematocrit 23.8 and a platelet count of 101,000.  Absolute neutrophil count was 1.17.     5/15/2023 through 5/19/2023.  Admitted to the hospital with anemia and generalized weakness black stools dizziness with a hemoglobin of 6.7.  His BUN was 51 creatinine was 2.08.  CT scan of the abdomen showed no acute process.  There were findings consistent with atypical healing of the previous rib fracture with question Paget's disease although it was nonspecific.  He was transfused 2 units packed red blood cells.  EGD found 3 erosions in the cardia.  He was prescribed Carafate.     5/24/2023.  Follow-up with Dr. Dr. Dietz after discharge.  CBC showed white  "count 1.9 with hemoglobin 8.6 hematocrit 28.3 and a platelet count of 140,000.  Absolute neutrophil count was 1.06.  Patient did not tolerate Carafate and stopped it.  He was prescribed oral iron twice a day.  He had not been prescribed a PPI which was then started.     6/1/2023.  CBC showed white count of 1.9 with hemoglobin 7.7 hematocrit 24.8 and a platelet count of 87,000.     6/13/2023.  CBC showed a white count of 1.9 with hemoglobin of 8.7 hematocrit 28.1 and platelet count of 89,000.     6/23/2023.  Followed up with Dr. Dr. Dietz who reported that he was hospitalized again in [place] for GI bleed.  At that time his Eliquis was discontinued and he was changed to Protonix and taken off his baby aspirin.  Once again he was started on Carafate but did not tolerate it.     7/15/2023.  CBC showed white count of 2.4 with hemoglobin 9.2 hematocrit 27.3 and a platelet count of 110,000.  Absolute neutrophil count was 1.36.     7/17/2023.  Follow-up with Malorie Shaffer.  The patient had recently had a colonoscopy and had \"cauterizations\" he noted that he had dyspnea with exertion but no resting shortness of breath.  He was on prednisone at that time.  His sugars were in the 300s.  He was on insulin.  His neuropathy has not changed and he continues to have tremors.  Current level was found to be saturated.  Once again the patient was reluctant to start NING.     8/14/2023.  Follow-up with Malorie Shaffer.  CBC showed white count 1.8 with a hemoglobin 8.9 hematocrit 26.9 and a platelet count of 100,000.  Absolute neutrophil count was 0.96.     9/6/2023.  CBC showed a white count of 1.8 with hemoglobin 9.5 hematocrit 28.8 and a platelet count of 85,000 with an absolute neutrophil count of 0.99.  9/13/2023.  Follow-up with Dr. Howell.  PSA has gone down to 1.87.     10/9/2023.  Follow-up with Malorie Shaffer who noted the patient remained on oxygen and CPAP.  He says that he is awakening in the middle of the night around 3 to 4:00 " in the morning.  Sugars have been in the 300s.  Neuropathy was unchanged.  He had been started on erythropoietin every 3 weeks     11/21/2023.  Follow-up with Malorie Shaffer.  No improvement in his hemoglobin.  White count had improved to 2.1 platelets were baseline.  He had been placed on 40 mg of prednisone and continued on erythropoietin 300 mcg every 3 weeks.     1/2/2024.  Follow-up with Malorie Shaffer.  No improvement in hemoglobin.  Symptomatically he was doing well.  Plan was to increase frequency of his darbepoetin to every 2 weeks.     2/13/2024.  Follow-up with Malorie Shaffer.  Now on erythropoietin injections every 2 weeks.  There was a discussion concerning repeat bone marrow biopsy with counts stayed low.     3/12/2024.  Follow-up with Malorie Shaffer.  No improvement in his hemoglobin.  Patient was agreeable to repeat bone marrow biopsy.  She also ordered a CAT scan of the chest abdomen and pelvis without contrast.     3/22/2024.  CAT scan showed severe coronary artery calcifications.  There is evidence of bronchiectasis in the lower lobes increased compared to previous CT scan from 2020.     Predominantly groundglass opacities with bibasilar prominence.  There is some subpleural sparing of the left upper lobe.  There is a 3 mm nodule in the right upper lobe which was unchanged for several years.  Multilevel anterior bridging osteophytes were noted consistent with DISH.  There is a small fat-containing periumbilical hernia and bilateral inguinal hernias.  Spleen size is at the upper limits of normal being 12.7 cm in length slightly increased when compared to study from 2019.  Cysts were seen in the kidneys with diffuse cortical atrophy and a 4 mm hyperdense focus in the interpolar region of the right kidney possibly a stone.  Prostatism was noted for gland measuring 6.2 cm.     3/28/2024.  Seen by Dr. Graham for shortness of breath.  He felt the patient had possible chronic interstitial lung disease versus  periodic aspiration, hypoxia dyspnea on exertion.  Further tests were ordered including high-resolution CT scan, complete pulmonary function testing, simple pulmonary stress test and follow-up.     4/9/2024.  Follow-up with Malorie Shaffer.  Erythropoietin injections continued every 2 weeks which the patient was tolerating well.     4/16/2024.  Bone marrow biopsy was performed showing a hypercellular bone marrow at 50% with dyserythropoiesis.  There are 1% blasts consistent with persistent myeloid neoplasm.  Flow cytometry showed a small clonal CD5 negative, CD10 negative B-cell population and a polytypic background.  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     There was a very small abnormal T-cell population that could be incidental.  Next generation sequencing showed 2 U2AF 1 variants, 1 of which was negative.  The other had been previously seen.     The marrow also showed dyserythropoiesis with bilobate forms and forms with irregular nuclear contours as well as blebbing.     4/23/2024.  CBC showed white count 1.5 with a hemoglobin of 8.3 hematocrit 25.9 and platelet count of 71,000 with 37% neutrophils, 53 lymphocytes, 4 monos, 4 eosinophils with an absolute neutrophil count of 0.57.     4/24/2024.  Return visit with Dr. Graham.  He noted that the high-resolution CT scan showed no significant change from the previous CT scan the month before.  Pulmonary function testing showed no response to bronchodilator and there was mild restrictive change with some suggestion of airway inflammation.  The patient had difficulty doing the test because of persistent coughing.  6-minute walk test showed that the patient was able to maintain saturations at 93% with 2 L of oxygen per minute continuously.  The test was terminated because of dyspnea.  With his hypoxia on room air it was  recommended that he be on oxygen continuously to keep his saturation greater than or equal to 92%.  He continued on oxygen with his CPAP at 3 L at nighttime.     5/10/2024.  This is my first visit with Mr. Medrano and his family.  We went over some of the results of the bone marrow biopsy.  I told him that I would have to wait until the chromosomal analysis returned.  I also note the possibility of low-grade lymphoma.  He may need to have an opinion with malignant hematology at [facility] [address].  Stepdaughter who is here with him and his wife today is his advocate for maintaining his quality of life.  She is an employee at a local nursing facility and is concerned about many clients she has who seem to be getting treated aggressively.  We talked about myelodysplasia and the fact that he is not responding to erythropoietin.  He will continue on this treatment for now.  He is low counts were discussed and he knows that he is at higher risk for infection and bleeding.  He continues on oxygen supplementation continuously.  They will return in 2 weeks at which time laboratory tests will be performed.     He just had a lesion removed from the dorsum of his right hand at the base of his first and second fingers.  Stitches are still in place.  It seems to be healing well without any significant bleeding or infection.  He was told that it looked benign.     5/13/2024.  Follow-up with Dr. Aggie Danielle of cardiology who saw him in follow-up of his coronary artery disease with multiple stents having been placed, hypertension, dyslipidemia and previous pulmonary embolus related to COVID-19.  She had stopped his Eliquis.  She noted his myelodysplasia.  She noted that at some point his aspirin had been stopped but given his history of stents this was restarted and he tolerated it well.  She said that his blood pressure was well-controlled.  She noted blood sugars were between 150-2 50.  She noted the patient was supposed to be using oxygen with  exertion but the patient was not sure what rate it was supposed to be.  She says that she would not stop his aspirin unless his platelets were less than 50,000 with complications of bleeding.  She said it was very important to continue aspirin given a 15% chance of in-stent thrombosis with aspirin cessation.  She increased her furosemide to 80 mg twice a day because of bilateral lower extremity edema and spironolactone to 25 mg twice daily until symptoms improve and encouraged him to cut back on his salt intake.  She was see him again in about 9 months.     5/14/2024.  Follow-up with Dr. Santamaria of ophthalmology who started him on Naphcon-A drops 1 drop 3 times a day in both eyes for excessive tearing     5/15/2024.  Followed up with Dr. Dimas Renteria of podiatry who noted decreased pedal pulses 2 out of 4.  He had footcare.     5/24/2024.  Follow-up with Dr. Graham.  Review of his x-rays showed no change.  Physical examination showed decreased breath sounds but no inspiratory crackles that were previously heard.  He felt that the interstitial lung disease was stable and that his hypoxemia was improved on oxygen.  He plan to repeat CT scan in September.     5/24/2024.  He is here to follow-up on his bone marrow results.  I told him that the chromosomal analysis would not be forthcoming before because the specimen failed to grow in culture.  He continues to display a low-grade MDS with blast count of only 2%.  We reviewed the fact that he is recommended to have at least 6 months of his erythropoietin before we decide whether or not it is a failure.  He is agreeable to this.  We will increase his dose of darbepoetin.  He knows to be on the look out for any signs of infection or bleeding and to report this if it happens.  We reviewed his most recent CBC.  We will see him again in 2 weeks with his next injection.     5/30/2024.  Follow-up with Dr. Dr. Dietz for his Medicare wellness visit.  His magnesium dose was  changed to twice daily indefinitely.     6/7/2024.  He is here today for his darbepoetin injection.  We have increased his dose to 500 mcg every 2 weeks.  Will see if this has any effect.  Laboratory dated today shows that his sugar is 290 and his sodium is 134.  BUN is 46 and creatinine is 2.31.  CBC shows white count 1.5 with a hemoglobin 8.1 hematocrit 24.7 and a platelet count of 71,000 which is very much like his most recent CBC.  His absolute neutrophil count is 0.57.     I talked to him about his current status which he feels is fairly stable.  He is off oxygen.  He has had no bleeding and no signs of infection.  He understands that we have increased his dose of darbepoetin and we will watch his counts.  He will report any side effects.     He has continued to receive his darbepoetin injections.     7/15/2024.  Emergency department visit [facility].  He developed a rash on both lower extremities which started a week ago which is an erythematous papular rash.  He denied any pain or itching.  He did not have any vesicle formation.  It is bilateral.  He has had a shingles injection.  He was told that he had disseminated zoster and was started on valacyclovir a gram twice daily for 7 days.     7/19/2024.  Seen by Dr. Dr. Dietz who said that the rash has not gotten any worse and might be a little bit better.  She noted that there were no vesicles no open areas no areas of secondary infection.     7/19/2024.  He is here to receive his erythropoietin injection and to have a blood count.  I told him it is not likely that this is disseminated zoster because it is nowhere else on his body but only on his lower extremities.  This could be stasis dermatitis.  I do not see any signs of vesicle formation and there never were any.  It is mostly on the anterior shins.  It is all below the knee.     CBC today shows white count 1.4 with hemoglobin 8 hematocrit 25.4 platelet count 67,000 with 37 neutrophils 53  lymphocytes 5 monos 2 eosinophils.  He is serum chemistry showed a sugar of 285 BUN of 49 creatinine of 2.67.  We told him he needed to keep his hydration status high.  He will continue with his injections today.  Blood pressure today is 153/64.     He has had no bleeding and no infection.  He has had no chest pain or pressure.  He is able to participate in his usual activities including mowing the yard.     He has continued to receive his darbepoetin and has followed up with podiatry for nail and callus care.     8/30/2024.  He is here in routine follow-up.  He says that he is doing well.  He has had no infections or bleeding.  He has had no mouth sores.  He has had continued swelling in his legs a little bit worse on the left than on the right.  Blood sugar was greater than 300 but he admits to dietary indiscretion.  Has had no chest pain.  His blood pressure was 149/63.  He says that his breathing is stable.  He is using his oxygen at home at night and when he travels that he does not have it on today.     His lab data has shown no sign of improvement so far with white count 1.3, hemoglobin 8.6 hematocrit 27.2 and a platelet count of 69,000.  White blood cell differential count shows 38% polys, 50 lymphs, 5 monos, 2 eosinophils.  We plan a 6-month trial of this dose of darbepoetin which will extend until November.  We will see him again in about a month.     Interval Note  9/4/2024.  CT scan of the chest showed mild streaky scarring in the lung bases bilaterally.  The previously seen groundglass opacities probably be minimally improved.  Calcified pleural plaques are seen without consolidation effusion or pneumothorax.  There is no adenopathy seen by CT size criteria.  Minimal hepatic steatosis was seen as well as calcified granuloma in the spleen and a somewhat atrophic pancreas.  DJD was seen in the spine.     9/5/2024.  Seen by Dr. Zambrano his nephrologist who ordered lab tests and felt that his fluctuating  chemistries were related to diuretic therapy.  He noted myelodysplasia and that he was being followed for his anemia receiving NING.  He did not change any of his medications  And said that he would see him again in about 6 months     9/10/2024.  Follow-up by Dr. Graham at which time he told the patient he could decrease his oxygen to 2 and he would repeat a CT scan in 6 months and see him after that.     9/27/2024.  He is here today in routine follow-up.  He will get his shot today.  Laboratory data shows no improvement.  We reviewed that we will follow him for 6 months on this treatment to see if there is any improvement.     Mr. Rtoh says that he has had no significant bleeding.  I see some red spots on the soft palate.  His appetite has been good and he has gained weight of 1 pound since last time I saw him about a month ago.  He has had no other signs of infection or deterioration in his status.  He knows he is at risk for serious infection or bleeding.  We will continue on this treatment pathway.  He will see him again in a month.     10/25/24: Seen in office today by OSCAR. Appetite is decent denies any early satiety weight is stable. Energy varies day to day. 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 Isolated episode of a nose bleed. He reported waking up last night sweaty and hot, unaware if he was running a fever, encouraged him to check temperature if this should re-occur. Denies any further B symptoms. No serious abnormal bleeding, remains bruising easily.      Reviewed labs very minimal improvement in his labs, will continue with administering the shot today as ordered. Will continue to monitor patient closely, with a follow-up in a month.     11/22/24: Patient was recently in the ED for tremors. He reports he didn't feel well/didn't feel right. Head/Brain CT, EKG,- were normal in findings. Patient is here today and feels better he reports increasing numbness/tingling  in bilateral hands, chronic back pain. Denies headaches, vision changes. Denies any fever, nausea, vomiting. Reports sweating at night on his bilateral legs, denies any restless leg symptoms. Denies any abnormal bruising or bleeding. Appetite is good, unchanged. Energy remains poor. 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 Wears continuous oxygen at home, CPAP w/oxygen at night. Chronic productive cough in the am.      12/23/24: Patient is tolerating darbopoetin well. He reports that his energy could be better, someday's are better than others. Remains to have the tremors, right hand is worse than his left, he has yet to schedule with Neurology. Currently treating left foot/ankle for cellulitis, remains taking Keflex daily. Patient report that it is healing well. However has has a rash on his left leg. Continues wearing compression stockings. Feeling better today.     2/3/25: Patient is in office for routine follow-up. He remains to have unchanged chronic fatigue. He denies any fevers, drenching night sweats, abnormal weight loss. LLE edema, he reports the left leg remains more swollen and discoloration no open wounds of signs of cellulitis.     His labs essentially show no improvement. Discussed consideration for repeat Bone Marrow and second opinion for a more aggressive treatment options vs continuing current NING Treatment. He wishes to continue on NING at this time, he is very reluctant on further testing/ treatment. Discussed continuing on NING and support treatment as needed.           Objective    BSA: 2.21 meters squared  /68   Pulse 70   Temp 36 °C (96.8 °F)   Resp 18   Wt 103 kg (227 lb)   SpO2 94%   BMI 35.55 kg/m²      Physical Exam  Vitals and nursing note reviewed.   Constitutional:       Appearance: Normal appearance.   HENT:      Head: Normocephalic and atraumatic.      Nose: Nose normal.       Mouth/Throat:      Mouth: Mucous membranes are moist.      Pharynx: Oropharynx is clear.   Eyes:      General: No scleral icterus.     Extraocular Movements: Extraocular movements intact.      Conjunctiva/sclera: Conjunctivae normal.   Cardiovascular:      Rate and Rhythm: Normal rate and regular rhythm.      Pulses: Normal pulses.      Heart sounds: Normal heart sounds.   Pulmonary:      Effort: Pulmonary effort is normal.      Breath sounds: Decreased breath sounds present.   Abdominal:      Palpations: Abdomen is soft.   Musculoskeletal:         General: Swelling present. Normal range of motion.      Cervical back: Normal range of motion.   Skin:     General: Skin is warm and dry.      Coloration: Skin is pale.      Findings: Bruising present.   Neurological:      General: No focal deficit present.      Mental Status: He is alert and oriented to person, place, and time.      Motor: Weakness present.   Psychiatric:         Mood and Affect: Mood normal.         Behavior: Behavior normal.         Thought Content: Thought content normal.         Judgment: Judgment normal.         Performance Status:  Symptomatic; fully ambulatory      Assessment/Plan        1.  Pancytopenia.  The patient has unusual bone marrow findings.  Chromosomal analysis will not be done due to failure of the specimen to grow.  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  He also has components of myelodysplasia.  He is not responding to erythropoietin.  We will increase his dose to 500 mcg every other week.  He continues to be at high risk for bleeding and infection with a low ANC and low platelet count.  He has had previous GI bleeds.  He is currently back on his  aspirin per follow-up with Dr. Danielle for fear of clotting off his stents.  He is off Eliquis.  He will get his treatment today with darbepoetin and continue every 14 days.  We plan for 6-month trial which will take us to November.     2/3/25: Patient wishes to  "remain on NING at this time. Discussed repeating bone marrow and referral to Dr. Curtis which he was reluctant to agree to such. Discussed continue on NING is reasonable given his age and comorbidities. We will continue with this treatment plan for the time being and support as needed. He denies any abnormal bleeding, no signs of infections or \"B\" symptoms.         2.  Multiple comorbidities.  These include but are not limited to:  GERD  Arthritis, DJD  BPH  Coronary artery disease with history of myocardial, status post stent infarction  History of dumping syndrome  Hyperlipidemia  History of skin cancer  Obesity  Obstructive sleep apnea on CPAP  Peripheral arterial disease  CKD 3  Diabetes with nephropathy  History of cholecystectomy and hernia repair  Essential tremor  Hypertension   Numbness in his fingers felt to be related to cervical disc disease.     3.  Rash on his legs.  I think this looks more like stasis dermatitis than disseminated zoster.  If it were disseminated zoster he would have it on other areas of his body.     Plan:  Discussed and reviewed medical history  Stat Labs today, waiting for results  Discussed repeat bone marrow biopsy, patient deferred at this time  Discussed checking nutritional labs (Iron, B12, Folate and Vitamin D)  At this time we will continue on Darbopoetin, discussed medication is keeping him from requiring a transfusion, however his counts continue to drop and not improve as we had hoped  Will repeat labs in 2 weeks, with consideration for treatment   Return to see MD in 4 weeks       He knows that he should call in the interim should he have any change in his     NATALY Villa-MULUGETA           "

## 2025-02-03 NOTE — PATIENT INSTRUCTIONS
Discussed and reviewed medical history  Stat Labs today, waiting for results  Discussed repeat bone marrow biopsy, patient deferred at this time  Discussed checking nutritional labs (Iron, B12, Folate and Vitamin D)  At this time we will continue on Darbopoetin, discussed medication is keeping him from requiring a transfusion, however his counts continue to drop and not improve as we had hoped  Will repeat labs in 2 weeks, with consideration for treatment   Return to see MD in 4 weeks

## 2025-02-03 NOTE — PROGRESS NOTES
Stat labs drawn on arrival- iron labs added per CNP  Injection today  2/17 labs and injection  3/3 9am CNP- david draw labs on arrival  930 Darbepoetin to follow  Reviewed AVS with patient- patient verbalizes understanding

## 2025-02-07 ENCOUNTER — HOSPITAL ENCOUNTER (OUTPATIENT)
Dept: HOSPITAL 100 - LAB | Age: OVER 89
Discharge: HOME | End: 2025-02-07
Payer: MEDICARE

## 2025-02-07 DIAGNOSIS — K74.60: Primary | ICD-10-CM

## 2025-02-07 DIAGNOSIS — E11.22: ICD-10-CM

## 2025-02-07 DIAGNOSIS — R18.8: ICD-10-CM

## 2025-02-07 DIAGNOSIS — K25.9: ICD-10-CM

## 2025-02-07 DIAGNOSIS — N18.4: ICD-10-CM

## 2025-02-07 DIAGNOSIS — D63.8: ICD-10-CM

## 2025-02-07 LAB
ALANINE AMINOTRANSFER ALT/SGPT: 31 U/L (ref 16–61)
ALBUMIN SERPL-MCNC: 3.3 G/DL (ref 3.2–5)
ALKALINE PHOSPHATASE: 116 U/L (ref 45–117)
AMMONIA: 28 UMOL/L (ref 11–32)
ANION GAP: 8 (ref 5–15)
ANISOCYTOSIS BLD QL SMEAR: (no result)
ANISOCYTOSIS: (no result)
AST(SGOT): 34 U/L (ref 15–37)
BUN SERPL-MCNC: 40 MG/DL (ref 7–18)
BUN/CREAT RATIO: 15.8 RATIO (ref 10–20)
CALCIUM SERPL-MCNC: 8.8 MG/DL (ref 8.5–10.1)
CARBON DIOXIDE: 26 MMOL/L (ref 21–32)
CHLORIDE: 105 MMOL/L (ref 98–107)
CHOLEST SERPL-MCNC: 94 MG/DL
CRP SERPL-MCNC: 3.64 MG/L (ref 0–3)
DEPRECATED RDW RBC: 85 FL (ref 35.1–43.9)
DIFFERENTIAL COMMENT: (no result)
DIFFERENTIAL INDICATED: (no result)
ERYTHROCYTE [DISTWIDTH] IN BLOOD: 21.1 % (ref 11.6–14.6)
EST GLOM FILT RATE - AFR AMER: 31 ML/MIN (ref 60–?)
GLOBULIN: 3.9 G/DL (ref 2.2–4.2)
GLUCOSE: 252 MG/DL (ref 74–106)
HCT VFR BLD AUTO: 25.7 % (ref 40–54)
HEMOGLOBIN: 8.3 G/DL (ref 13–16.5)
HGB BLD-MCNC: 8.3 G/DL (ref 13–16.5)
IMMATURE GRANULOCYTES COUNT: 0.02 X10^3/UL (ref 0–0)
MAGNESIUM: 1.7 MG/DL (ref 1.6–2.6)
MANUAL DIF COMMENT BLD-IMP: (no result)
MCV RBC: 110.8 FL (ref 80–94)
MEAN CORP HGB CONC: 32.3 G/DL (ref 32–36)
MEAN PLATELET VOL.: 11.2 FL (ref 6.2–12)
NRBC FLAGGED BY ANALYZER: 0 % (ref 0–5)
PLATELET # BLD: 76 K/MM3 (ref 150–450)
PLATELET COUNT: 76 K/MM3 (ref 150–450)
POSITIVE COUNT: YES
POSITIVE DIFFERENTIAL: YES
POSITIVE MORPHOLOGY: YES
POTASSIUM: 4.5 MMOL/L (ref 3.5–5.1)
PROTHROMBIN TIME (PROTIME)PT.: 13.9 SECONDS (ref 11.7–14.9)
RBC # BLD AUTO: 2.32 M/MM3 (ref 4.6–6.2)
RBC DISTRIBUTION WIDTH CV: 21.1 % (ref 11.6–14.6)
RBC DISTRIBUTION WIDTH SD: 85 FL (ref 35.1–43.9)
SCAN SMEAR PER REVIEW CRITERIA: (no result)
TRIGLYCERIDES: 205 MG/DL
VLDLC SERPL-MCNC: 41 MG/DL (ref 5–40)
WBC # BLD AUTO: 1.9 K/MM3 (ref 4.4–11)
WHITE BLOOD COUNT: 1.9 K/MM3 (ref 4.4–11)

## 2025-02-07 PROCEDURE — 85610 PROTHROMBIN TIME: CPT

## 2025-02-07 PROCEDURE — 80061 LIPID PANEL: CPT

## 2025-02-07 PROCEDURE — 84439 ASSAY OF FREE THYROXINE: CPT

## 2025-02-07 PROCEDURE — 36415 COLL VENOUS BLD VENIPUNCTURE: CPT

## 2025-02-07 PROCEDURE — 80053 COMPREHEN METABOLIC PANEL: CPT

## 2025-02-07 PROCEDURE — 83735 ASSAY OF MAGNESIUM: CPT

## 2025-02-07 PROCEDURE — 84443 ASSAY THYROID STIM HORMONE: CPT

## 2025-02-07 PROCEDURE — 84100 ASSAY OF PHOSPHORUS: CPT

## 2025-02-07 PROCEDURE — 85025 COMPLETE CBC W/AUTO DIFF WBC: CPT

## 2025-02-07 PROCEDURE — 86140 C-REACTIVE PROTEIN: CPT

## 2025-02-07 PROCEDURE — 82140 ASSAY OF AMMONIA: CPT

## 2025-02-10 DIAGNOSIS — D63.1 ANEMIA DUE TO STAGE 3B CHRONIC KIDNEY DISEASE (MULTI): ICD-10-CM

## 2025-02-10 DIAGNOSIS — N18.32 ANEMIA DUE TO STAGE 3B CHRONIC KIDNEY DISEASE (MULTI): ICD-10-CM

## 2025-02-10 DIAGNOSIS — N18.4 STAGE 4 CHRONIC KIDNEY DISEASE (MULTI): Primary | ICD-10-CM

## 2025-02-17 ENCOUNTER — INFUSION (OUTPATIENT)
Dept: HEMATOLOGY/ONCOLOGY | Facility: CLINIC | Age: OVER 89
End: 2025-02-17
Payer: MEDICARE

## 2025-02-17 VITALS
RESPIRATION RATE: 20 BRPM | SYSTOLIC BLOOD PRESSURE: 108 MMHG | TEMPERATURE: 95.7 F | HEART RATE: 84 BPM | BODY MASS INDEX: 35.74 KG/M2 | OXYGEN SATURATION: 94 % | DIASTOLIC BLOOD PRESSURE: 59 MMHG | WEIGHT: 228.18 LBS

## 2025-02-17 DIAGNOSIS — D70.9 NEUTROPENIA, UNSPECIFIED TYPE (CMS-HCC): ICD-10-CM

## 2025-02-17 DIAGNOSIS — D61.818 OTHER PANCYTOPENIA (MULTI): ICD-10-CM

## 2025-02-17 DIAGNOSIS — D63.1 ANEMIA DUE TO STAGE 3B CHRONIC KIDNEY DISEASE (MULTI): ICD-10-CM

## 2025-02-17 DIAGNOSIS — N18.32 ANEMIA DUE TO STAGE 3B CHRONIC KIDNEY DISEASE (MULTI): ICD-10-CM

## 2025-02-17 DIAGNOSIS — D46.9 MYELODYSPLASTIC SYNDROME, UNSPECIFIED (MULTI): ICD-10-CM

## 2025-02-17 DIAGNOSIS — D69.6 THROMBOCYTOPENIA (CMS-HCC): ICD-10-CM

## 2025-02-17 DIAGNOSIS — N18.4 STAGE 4 CHRONIC KIDNEY DISEASE (MULTI): ICD-10-CM

## 2025-02-17 LAB
ALBUMIN SERPL BCP-MCNC: 4 G/DL (ref 3.4–5)
ALP SERPL-CCNC: 105 U/L (ref 33–136)
ALT SERPL W P-5'-P-CCNC: 21 U/L (ref 10–52)
ANION GAP SERPL CALC-SCNC: 16 MMOL/L (ref 10–20)
AST SERPL W P-5'-P-CCNC: 31 U/L (ref 9–39)
BASOPHILS # BLD MANUAL: 0 X10*3/UL (ref 0–0.1)
BASOPHILS NFR BLD MANUAL: 0 %
BILIRUB SERPL-MCNC: 1.2 MG/DL (ref 0–1.2)
BUN SERPL-MCNC: 49 MG/DL (ref 6–23)
BURR CELLS BLD QL SMEAR: ABNORMAL
CALCIUM SERPL-MCNC: 8.6 MG/DL (ref 8.6–10.3)
CHLORIDE SERPL-SCNC: 103 MMOL/L (ref 98–107)
CO2 SERPL-SCNC: 25 MMOL/L (ref 21–32)
CREAT SERPL-MCNC: 2.53 MG/DL (ref 0.5–1.3)
DACRYOCYTES BLD QL SMEAR: ABNORMAL
EGFRCR SERPLBLD CKD-EPI 2021: 24 ML/MIN/1.73M*2
EOSINOPHIL # BLD MANUAL: 0.09 X10*3/UL (ref 0–0.4)
EOSINOPHIL NFR BLD MANUAL: 7 %
ERYTHROCYTE [DISTWIDTH] IN BLOOD BY AUTOMATED COUNT: 20.1 % (ref 11.5–14.5)
GLUCOSE SERPL-MCNC: 300 MG/DL (ref 74–99)
HCT VFR BLD AUTO: 26.6 % (ref 41–52)
HGB BLD-MCNC: 8.3 G/DL (ref 13.5–17.5)
HYPOCHROMIA BLD QL SMEAR: ABNORMAL
IMM GRANULOCYTES # BLD AUTO: 0.01 X10*3/UL (ref 0–0.5)
IMM GRANULOCYTES NFR BLD AUTO: 0.7 % (ref 0–0.9)
LYMPHOCYTES # BLD MANUAL: 0.27 X10*3/UL (ref 0.8–3)
LYMPHOCYTES NFR BLD MANUAL: 21 %
MCH RBC QN AUTO: 34.3 PG (ref 26–34)
MCHC RBC AUTO-ENTMCNC: 31.2 G/DL (ref 32–36)
MCV RBC AUTO: 110 FL (ref 80–100)
MONOCYTES # BLD MANUAL: 0.1 X10*3/UL (ref 0.05–0.8)
MONOCYTES NFR BLD MANUAL: 8 %
NEUTROPHILS # BLD MANUAL: 0.62 X10*3/UL (ref 1.6–5.5)
NEUTS BAND # BLD MANUAL: 0.01 X10*3/UL (ref 0–0.5)
NEUTS BAND NFR BLD MANUAL: 1 %
NEUTS SEG # BLD MANUAL: 0.61 X10*3/UL (ref 1.6–5)
NEUTS SEG NFR BLD MANUAL: 47 %
NRBC BLD-RTO: 0 /100 WBCS (ref 0–0)
OVALOCYTES BLD QL SMEAR: ABNORMAL
PLATELET # BLD AUTO: 51 X10*3/UL (ref 150–450)
POTASSIUM SERPL-SCNC: 4.5 MMOL/L (ref 3.5–5.3)
PROT SERPL-MCNC: 6.6 G/DL (ref 6.4–8.2)
RBC # BLD AUTO: 2.42 X10*6/UL (ref 4.5–5.9)
RBC MORPH BLD: ABNORMAL
SCHISTOCYTES BLD QL SMEAR: ABNORMAL
SODIUM SERPL-SCNC: 139 MMOL/L (ref 136–145)
TOTAL CELLS COUNTED BLD: 100
VARIANT LYMPHS # BLD MANUAL: 0.21 X10*3/UL (ref 0–0.3)
VARIANT LYMPHS NFR BLD: 16 %
WBC # BLD AUTO: 1.3 X10*3/UL (ref 4.4–11.3)

## 2025-02-17 PROCEDURE — 84075 ASSAY ALKALINE PHOSPHATASE: CPT

## 2025-02-17 PROCEDURE — 85027 COMPLETE CBC AUTOMATED: CPT

## 2025-02-17 PROCEDURE — 85007 BL SMEAR W/DIFF WBC COUNT: CPT

## 2025-02-17 PROCEDURE — 2500000004 HC RX 250 GENERAL PHARMACY W/ HCPCS (ALT 636 FOR OP/ED): Mod: JZ,EC

## 2025-02-17 PROCEDURE — 96372 THER/PROPH/DIAG INJ SC/IM: CPT

## 2025-02-17 RX ORDER — EPINEPHRINE 0.3 MG/.3ML
0.3 INJECTION SUBCUTANEOUS EVERY 5 MIN PRN
OUTPATIENT
Start: 2025-03-03

## 2025-02-17 RX ORDER — CARVEDILOL 6.25 MG/1
6.25 TABLET ORAL 2 TIMES DAILY
COMMUNITY
Start: 2025-02-13

## 2025-02-17 RX ORDER — DIPHENHYDRAMINE HYDROCHLORIDE 50 MG/ML
50 INJECTION INTRAMUSCULAR; INTRAVENOUS AS NEEDED
OUTPATIENT
Start: 2025-03-03

## 2025-02-17 RX ORDER — ALBUTEROL SULFATE 0.83 MG/ML
3 SOLUTION RESPIRATORY (INHALATION) AS NEEDED
OUTPATIENT
Start: 2025-03-03

## 2025-02-17 RX ORDER — FAMOTIDINE 10 MG/ML
20 INJECTION, SOLUTION INTRAVENOUS ONCE AS NEEDED
OUTPATIENT
Start: 2025-03-03

## 2025-02-17 RX ADMIN — DARBEPOETIN ALFA 500 MCG: 500 INJECTION, SOLUTION INTRAVENOUS; SUBCUTANEOUS at 10:36

## 2025-02-17 ASSESSMENT — PAIN SCALES - GENERAL: PAINLEVEL_OUTOF10: 0-NO PAIN

## 2025-02-23 DIAGNOSIS — I10 PRIMARY HYPERTENSION: ICD-10-CM

## 2025-02-24 RX ORDER — ALLOPURINOL 100 MG/1
100 TABLET ORAL DAILY
Qty: 90 TABLET | Refills: 1 | Status: SHIPPED | OUTPATIENT
Start: 2025-02-24

## 2025-03-03 ENCOUNTER — INFUSION (OUTPATIENT)
Dept: HEMATOLOGY/ONCOLOGY | Facility: CLINIC | Age: OVER 89
End: 2025-03-03
Payer: MEDICARE

## 2025-03-03 ENCOUNTER — OFFICE VISIT (OUTPATIENT)
Dept: HEMATOLOGY/ONCOLOGY | Facility: CLINIC | Age: OVER 89
End: 2025-03-03
Payer: MEDICARE

## 2025-03-03 ENCOUNTER — APPOINTMENT (OUTPATIENT)
Dept: HEMATOLOGY/ONCOLOGY | Facility: CLINIC | Age: OVER 89
End: 2025-03-03
Payer: MEDICARE

## 2025-03-03 VITALS
OXYGEN SATURATION: 94 % | TEMPERATURE: 96.4 F | DIASTOLIC BLOOD PRESSURE: 55 MMHG | WEIGHT: 227.8 LBS | HEART RATE: 68 BPM | RESPIRATION RATE: 20 BRPM | BODY MASS INDEX: 35.68 KG/M2 | SYSTOLIC BLOOD PRESSURE: 126 MMHG

## 2025-03-03 DIAGNOSIS — E78.2 MIXED HYPERLIPIDEMIA: ICD-10-CM

## 2025-03-03 DIAGNOSIS — N18.4 STAGE 4 CHRONIC KIDNEY DISEASE (MULTI): ICD-10-CM

## 2025-03-03 DIAGNOSIS — N18.4 ANEMIA DUE TO STAGE 4 CHRONIC KIDNEY DISEASE (MULTI): ICD-10-CM

## 2025-03-03 DIAGNOSIS — D63.1 ANEMIA DUE TO STAGE 4 CHRONIC KIDNEY DISEASE (MULTI): ICD-10-CM

## 2025-03-03 DIAGNOSIS — D61.818 OTHER PANCYTOPENIA (MULTI): ICD-10-CM

## 2025-03-03 DIAGNOSIS — D70.9 NEUTROPENIA, UNSPECIFIED TYPE (CMS-HCC): ICD-10-CM

## 2025-03-03 DIAGNOSIS — I25.10 CORONARY ARTERY DISEASE INVOLVING NATIVE HEART, UNSPECIFIED VESSEL OR LESION TYPE, UNSPECIFIED WHETHER ANGINA PRESENT: ICD-10-CM

## 2025-03-03 DIAGNOSIS — I10 PRIMARY HYPERTENSION: ICD-10-CM

## 2025-03-03 DIAGNOSIS — N18.4 TYPE 2 DIABETES MELLITUS WITH STAGE 4 CHRONIC KIDNEY DISEASE, WITH LONG-TERM CURRENT USE OF INSULIN (MULTI): ICD-10-CM

## 2025-03-03 DIAGNOSIS — D46.9 MYELODYSPLASTIC SYNDROME, UNSPECIFIED (MULTI): ICD-10-CM

## 2025-03-03 DIAGNOSIS — N18.32 ANEMIA DUE TO STAGE 3B CHRONIC KIDNEY DISEASE (MULTI): ICD-10-CM

## 2025-03-03 DIAGNOSIS — D63.1 ANEMIA DUE TO STAGE 3B CHRONIC KIDNEY DISEASE (MULTI): ICD-10-CM

## 2025-03-03 DIAGNOSIS — E11.22 TYPE 2 DIABETES MELLITUS WITH STAGE 4 CHRONIC KIDNEY DISEASE, WITH LONG-TERM CURRENT USE OF INSULIN (MULTI): ICD-10-CM

## 2025-03-03 DIAGNOSIS — G25.0 TREMOR, ESSENTIAL: ICD-10-CM

## 2025-03-03 DIAGNOSIS — E11.22 TYPE 2 DIABETES MELLITUS WITH DIABETIC CHRONIC KIDNEY DISEASE: ICD-10-CM

## 2025-03-03 DIAGNOSIS — D69.6 THROMBOCYTOPENIA (CMS-HCC): ICD-10-CM

## 2025-03-03 DIAGNOSIS — Z79.4 TYPE 2 DIABETES MELLITUS WITH STAGE 4 CHRONIC KIDNEY DISEASE, WITH LONG-TERM CURRENT USE OF INSULIN (MULTI): ICD-10-CM

## 2025-03-03 DIAGNOSIS — D72.819 LEUKOPENIA, UNSPECIFIED TYPE: ICD-10-CM

## 2025-03-03 LAB
ALBUMIN SERPL BCP-MCNC: 4.1 G/DL (ref 3.4–5)
ALP SERPL-CCNC: 81 U/L (ref 33–136)
ALT SERPL W P-5'-P-CCNC: 20 U/L (ref 10–52)
ANION GAP SERPL CALC-SCNC: 16 MMOL/L (ref 10–20)
AST SERPL W P-5'-P-CCNC: 29 U/L (ref 9–39)
BASO STIPL BLD QL SMEAR: PRESENT
BASOPHILS # BLD MANUAL: 0 X10*3/UL (ref 0–0.1)
BASOPHILS NFR BLD MANUAL: 0 %
BILIRUB SERPL-MCNC: 1.2 MG/DL (ref 0–1.2)
BLASTS # BLD MANUAL: 0.02 X10*3/UL
BLASTS NFR BLD MANUAL: 1 %
BUN SERPL-MCNC: 57 MG/DL (ref 6–23)
CALCIUM SERPL-MCNC: 8.5 MG/DL (ref 8.6–10.3)
CHLORIDE SERPL-SCNC: 100 MMOL/L (ref 98–107)
CO2 SERPL-SCNC: 25 MMOL/L (ref 21–32)
CREAT SERPL-MCNC: 2.86 MG/DL (ref 0.5–1.3)
EGFRCR SERPLBLD CKD-EPI 2021: 20 ML/MIN/1.73M*2
EOSINOPHIL # BLD MANUAL: 0.11 X10*3/UL (ref 0–0.4)
EOSINOPHIL NFR BLD MANUAL: 7 %
ERYTHROCYTE [DISTWIDTH] IN BLOOD BY AUTOMATED COUNT: 19.4 % (ref 11.5–14.5)
GLUCOSE SERPL-MCNC: 284 MG/DL (ref 74–99)
HCT VFR BLD AUTO: 26.1 % (ref 41–52)
HGB BLD-MCNC: 8.6 G/DL (ref 13.5–17.5)
IMM GRANULOCYTES # BLD AUTO: 0 X10*3/UL (ref 0–0.5)
IMM GRANULOCYTES NFR BLD AUTO: 0 % (ref 0–0.9)
LYMPHOCYTES # BLD MANUAL: 0.75 X10*3/UL (ref 0.8–3)
LYMPHOCYTES NFR BLD MANUAL: 50 %
MCH RBC QN AUTO: 35.8 PG (ref 26–34)
MCHC RBC AUTO-ENTMCNC: 33 G/DL (ref 32–36)
MCV RBC AUTO: 109 FL (ref 80–100)
MONOCYTES # BLD MANUAL: 0.02 X10*3/UL (ref 0.05–0.8)
MONOCYTES NFR BLD MANUAL: 1 %
NEUTS SEG # BLD MANUAL: 0.6 X10*3/UL (ref 1.6–5)
NEUTS SEG NFR BLD MANUAL: 40 %
NRBC BLD-RTO: 0 /100 WBCS (ref 0–0)
OVALOCYTES BLD QL SMEAR: ABNORMAL
PLATELET # BLD AUTO: 66 X10*3/UL (ref 150–450)
POTASSIUM SERPL-SCNC: 5.1 MMOL/L (ref 3.5–5.3)
PROT SERPL-MCNC: 6.4 G/DL (ref 6.4–8.2)
RBC # BLD AUTO: 2.4 X10*6/UL (ref 4.5–5.9)
RBC MORPH BLD: ABNORMAL
SODIUM SERPL-SCNC: 136 MMOL/L (ref 136–145)
TOTAL CELLS COUNTED BLD: 100
VARIANT LYMPHS # BLD MANUAL: 0.02 X10*3/UL (ref 0–0.3)
VARIANT LYMPHS NFR BLD: 1 %
WBC # BLD AUTO: 1.5 X10*3/UL (ref 4.4–11.3)

## 2025-03-03 PROCEDURE — 2500000004 HC RX 250 GENERAL PHARMACY W/ HCPCS (ALT 636 FOR OP/ED): Mod: JZ,EC

## 2025-03-03 PROCEDURE — 3074F SYST BP LT 130 MM HG: CPT

## 2025-03-03 PROCEDURE — 1125F AMNT PAIN NOTED PAIN PRSNT: CPT

## 2025-03-03 PROCEDURE — 99213 OFFICE O/P EST LOW 20 MIN: CPT

## 2025-03-03 PROCEDURE — 96372 THER/PROPH/DIAG INJ SC/IM: CPT

## 2025-03-03 PROCEDURE — 36415 COLL VENOUS BLD VENIPUNCTURE: CPT

## 2025-03-03 PROCEDURE — 3078F DIAST BP <80 MM HG: CPT

## 2025-03-03 PROCEDURE — 85007 BL SMEAR W/DIFF WBC COUNT: CPT

## 2025-03-03 PROCEDURE — 1157F ADVNC CARE PLAN IN RCRD: CPT

## 2025-03-03 PROCEDURE — 1159F MED LIST DOCD IN RCRD: CPT

## 2025-03-03 PROCEDURE — 84075 ASSAY ALKALINE PHOSPHATASE: CPT

## 2025-03-03 PROCEDURE — 85027 COMPLETE CBC AUTOMATED: CPT

## 2025-03-03 RX ORDER — EPINEPHRINE 0.3 MG/.3ML
0.3 INJECTION SUBCUTANEOUS EVERY 5 MIN PRN
OUTPATIENT
Start: 2025-03-17

## 2025-03-03 RX ORDER — ALBUTEROL SULFATE 0.83 MG/ML
3 SOLUTION RESPIRATORY (INHALATION) AS NEEDED
OUTPATIENT
Start: 2025-03-17

## 2025-03-03 RX ORDER — FAMOTIDINE 10 MG/ML
20 INJECTION, SOLUTION INTRAVENOUS ONCE AS NEEDED
OUTPATIENT
Start: 2025-03-17

## 2025-03-03 RX ORDER — SPIRONOLACTONE 50 MG/1
TABLET, FILM COATED ORAL
COMMUNITY
Start: 2025-02-13

## 2025-03-03 RX ORDER — DIPHENHYDRAMINE HYDROCHLORIDE 50 MG/ML
50 INJECTION INTRAMUSCULAR; INTRAVENOUS AS NEEDED
OUTPATIENT
Start: 2025-03-17

## 2025-03-03 RX ADMIN — DARBEPOETIN ALFA 500 MCG: 500 INJECTION, SOLUTION INTRAVENOUS; SUBCUTANEOUS at 10:37

## 2025-03-03 ASSESSMENT — PAIN SCALES - GENERAL: PAINLEVEL_OUTOF10: 5

## 2025-03-03 NOTE — PROGRESS NOTES
Labs drawn- sent to lab stat- pt got his Darbepoetin injection today after his OV  3/17 9am lab and darbepoetin  3/31 2pm lab and darbepoetin  4/14 9am MULUGETA - david to draw labs on arrival          930 Darbepoetin to follow  Reviewed AVS with patient- patient verbalizes understanding

## 2025-03-03 NOTE — PROGRESS NOTES
Patient ID: Zack Medrano is a 89 y.o. male.    Subjective    HPI    HPI     Patient ID: Zack Medrano is a 89 y.o. male.        Subjective     Patient ID:Zack Medrano is a 88 y.o. year old male patient with myelodysplasia         Referring Physician: Ayah Carmichael MD  [address] [place] [address] [address]  [place] [address]  Primary Care Provider: Fabiola Dietz MD     Chief Complaint        Chief Complaint   Patient presents with    Anemia          History of the Present Illness  10/9/2019.  Visit with Dr. Mickey Nguyen for chronic kidney disease and hypertension.  Chief complaint was fatigue and need to lose weight     11/12/2019.  The patient has had long-term history of microcytic anemia going back to at least 2019 at which time his white count was 5.2 hemoglobin was 13.8 hematocrit 40.5 and a platelet count of 157,000.  His MCV was 105 MCHC was 34.  White blood cell differential count showed 66% polys, 17 lymphs, 11 monos and 4 eosinophils.     12/11/2019.  Seen by primary care for probable shingles treated with Valtrex with chief complaint of waxing and waning left flank pain radiating up under his ribs, somewhat responsive to exercise..  He had a history of pulmonary embolus and was on Eliquis long-term.     2/24/2020.  Seen by Dr. Howell for elevated PSA, BPH and erectile dysfunction.  PSA in 2017 was 2.94, in 2018 it was 2.74 and in 2020 it was 4.03.  We discussed biopsy and the patient wanted to have this followed and not biopsy.     3/9/2020.  Follow-up with NATALY Pulido primary care.  Referred to Dr. Bahena for colonoscopy and CT scan of the chest was ordered for follow-up of questionable lingular nodule.     3/11/2020.  CT scan of the chest showed a stable 6 mm nodular lesion near the fissure in the lingula.     3/13/2020.  Seen by Dr. Bahena who noted that the patient had had diarrhea off-and-on since cholecystectomy and had intermittent left-sided pain.  He had a history of colon polyps.      4/22/2015.  Colonoscopy showed that he had tubular adenoma     5/13/2020.  Seen by Dr. Migdalia New in follow-up.  She noted that he had an abnormal CBC with macrocytic indices with a normal B12 level.  The patient denies alcohol use.  Hemoglobin A1c was 6.2.  BUN was 33 creatinine was 1.74.     8/31/2020.  Follow-up with Dr. Howell.  PSA had come down from 4.03-3.77     1/4/2021 through 1/12/2021.  Admitted to the hospital having been brought to the ED by squad after he fell forward on his toilet and was unable to get up.  He denied hitting his head or losing consciousness.  He blamed it on having back pain and shoulder pain for several weeks.  He was febrile with a temperature of 100.9 and hypoxic with pulse ox of 86% on room air.  Respirations were 24.  He was positive for COVID and was treated with intravenous antibiotics and remdesivir, increased supplemental oxygen.  He was able to be discharged to rehabilitation.     Repeat CBC showed white count 4.3 with a hemoglobin 12.9 hematocrit 38.8 with an MCV of 102 and a platelet count of 173,000.  White blood cell differential count showed minimal absolute lymphocytopenia at 0.6 with the lower limits of normal being 0.8.     3/1/2021.  Follow-up with Dr. Howell.  PSA was 9.9 in February.  Decided to have prostate biopsy done on 3/22/2021.  Results showed BPH.     10/6/2021.  Follow-up with Dr. Howell.  PSA in September was 2.77     4/6/2022.  CBC showed white count 4.3 with hemoglobin 11.7 hematocrit 34.4 with an MCV of 107.  Platelet count was 153,000.  White blood cell differential count was essentially normal.     5/31/2022.  CBC showed white count 3.6 with hemoglobin 11.6 hematocrit 34.4 and platelet count 117,000 with a normal white blood cell differential.     6/27/2022.  Referred to Dr. Dash because of pancytopenia by NATALY Gray.  CBC showed a white count of 3.9 with hemoglobin 12.3 hematocrit 36.1 and platelet count 129,000 with a normal differential.   PSA was 3.26.     The patient said that he felt well except for having increased fatigue.  He continued on long-term anticoagulation.  He was able to carry out his ADLs.  He did complain of easy bruising.  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  Dr. Dash felt that there was a large component secondary to his chronic kidney disease.  There was no evidence of nutritional deficiency.  He checked him for hemolysis and monoclonal gammopathy and ordered an ultrasound for hepatosplenomegaly and testing for hepatitis, HIV and CHRISTINA.  He said he would rather observe the patient rather than pursuing a bone marrow biopsy.     7/27/2022.  Repeat lab data showed white count 3.9 with hemoglobin 12.3 hematocrit 36.1 and platelet count 129,000.  BUN was 36 creatinine was 1.78.  He had mild elevation of AST.  Retake count was 2.3%.  Flow cytometry showed a small clonal B-cell population.  Hepatitis B was nonreactive.  Hep C testing was negative.  Serum protein electrophoresis was normal.  Haptoglobin was normal.  LDH was normal.  He said that the possibility of MDS could not be ruled out.  Ultrasound showed fatty liver but no splenomegaly.  He plan for EPO level and NGS for myeloid mutation.     9/1/2022.  Started on allopurinol for hyperuricemia.     10/12/2022.  Seen by Dr. Dr. Dietz.  Patient was referred to dermatology for several skin lesions.  He was referred to neurology because of his tremor that was not improving.  She increased his glimepiride.     10/17/2022.  CBC showed white count 3.0 with hemoglobin 10.5 hematocrit 31.3 and a platelet count of 106,000 MCV was 111.     10/26/2022.  Follow-up with Dr. 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  He was suspicious for MDS.  He referred him for a bone marrow biopsy.     12/1/2022.  Follow-up with Dr. Dash.  Bone marrow biopsy showed low-grade MDS.  R IPSS score was low or very  low depending on a karyotype which was still pending.  We discussed initiating erythropoietin with Aranesp but the patient deferred and the decision was made to monitor him.     1/11/2023.  CBC showed white count 3.4 with hemoglobin 10.3 hematocrit 32.2 and a platelet count of 137,000.  MCV was 115.  White blood cell differential count was normal.     1/13/2023.  Follow-up with Dr. Dash.  The patient did say that he had following getting out of the shower but did not sustain any injuries.  He had been started by Dr. Nguyen on insulin for his diabetes which was helpful.  Patient continued to be active inside his house.  He remains on Eliquis.  BR-IPSS score is very low.  Counts remain stable.     3/31/2023.  CBC showed a white count of 2.6 with hemoglobin 8.7 hematocrit 27.1 and a platelet count 97,000.  MCV was 115.  White blood cell differential count showed that he had an absolute neutrophil count of 1.36.     4/10/2023.  CBC showed a white count of 2.5 with a hemoglobin 9.0 hematocrit of 27.7 with a platelet count of 92,000.  MCV was 114.     4/17/2023.  Follow-up with NATALY Gray.  Even though his hemoglobin had dropped to 9 the patient did not want to start his erythropoietin injections.     5/11/2023.  CBC showed white count of 2.5 with a hemoglobin of 7.6 hematocrit 23.8 and a platelet count of 101,000.  Absolute neutrophil count was 1.17.     5/15/2023 through 5/19/2023.  Admitted to the hospital with anemia and generalized weakness black stools dizziness with a hemoglobin of 6.7.  His BUN was 51 creatinine was 2.08.  CT scan of the abdomen showed no acute process.  There were findings consistent with atypical healing of the previous rib fracture with question Paget's disease although it was nonspecific.  He was transfused 2 units packed red blood cells.  EGD found 3 erosions in the cardia.  He was prescribed Carafate.     5/24/2023.  Follow-up with Dr. Dr. Dietz after discharge.  CBC showed white  "count 1.9 with hemoglobin 8.6 hematocrit 28.3 and a platelet count of 140,000.  Absolute neutrophil count was 1.06.  Patient did not tolerate Carafate and stopped it.  He was prescribed oral iron twice a day.  He had not been prescribed a PPI which was then started.     6/1/2023.  CBC showed white count of 1.9 with hemoglobin 7.7 hematocrit 24.8 and a platelet count of 87,000.     6/13/2023.  CBC showed a white count of 1.9 with hemoglobin of 8.7 hematocrit 28.1 and platelet count of 89,000.     6/23/2023.  Followed up with Dr. Dr. Dietz who reported that he was hospitalized again in [place] for GI bleed.  At that time his Eliquis was discontinued and he was changed to Protonix and taken off his baby aspirin.  Once again he was started on Carafate but did not tolerate it.     7/15/2023.  CBC showed white count of 2.4 with hemoglobin 9.2 hematocrit 27.3 and a platelet count of 110,000.  Absolute neutrophil count was 1.36.     7/17/2023.  Follow-up with Malorie Shaffer.  The patient had recently had a colonoscopy and had \"cauterizations\" he noted that he had dyspnea with exertion but no resting shortness of breath.  He was on prednisone at that time.  His sugars were in the 300s.  He was on insulin.  His neuropathy has not changed and he continues to have tremors.  Current level was found to be saturated.  Once again the patient was reluctant to start NING.     8/14/2023.  Follow-up with Malorie Shaffer.  CBC showed white count 1.8 with a hemoglobin 8.9 hematocrit 26.9 and a platelet count of 100,000.  Absolute neutrophil count was 0.96.     9/6/2023.  CBC showed a white count of 1.8 with hemoglobin 9.5 hematocrit 28.8 and a platelet count of 85,000 with an absolute neutrophil count of 0.99.  9/13/2023.  Follow-up with Dr. Howell.  PSA has gone down to 1.87.     10/9/2023.  Follow-up with Malorie Shaffer who noted the patient remained on oxygen and CPAP.  He says that he is awakening in the middle of the night around 3 to 4:00 " in the morning.  Sugars have been in the 300s.  Neuropathy was unchanged.  He had been started on erythropoietin every 3 weeks     11/21/2023.  Follow-up with Malorie Shaffer.  No improvement in his hemoglobin.  White count had improved to 2.1 platelets were baseline.  He had been placed on 40 mg of prednisone and continued on erythropoietin 300 mcg every 3 weeks.     1/2/2024.  Follow-up with Malorie Shaffer.  No improvement in hemoglobin.  Symptomatically he was doing well.  Plan was to increase frequency of his darbepoetin to every 2 weeks.     2/13/2024.  Follow-up with Malorie Shaffer.  Now on erythropoietin injections every 2 weeks.  There was a discussion concerning repeat bone marrow biopsy with counts stayed low.     3/12/2024.  Follow-up with Malorie Shaffer.  No improvement in his hemoglobin.  Patient was agreeable to repeat bone marrow biopsy.  She also ordered a CAT scan of the chest abdomen and pelvis without contrast.     3/22/2024.  CAT scan showed severe coronary artery calcifications.  There is evidence of bronchiectasis in the lower lobes increased compared to previous CT scan from 2020.     Predominantly groundglass opacities with bibasilar prominence.  There is some subpleural sparing of the left upper lobe.  There is a 3 mm nodule in the right upper lobe which was unchanged for several years.  Multilevel anterior bridging osteophytes were noted consistent with DISH.  There is a small fat-containing periumbilical hernia and bilateral inguinal hernias.  Spleen size is at the upper limits of normal being 12.7 cm in length slightly increased when compared to study from 2019.  Cysts were seen in the kidneys with diffuse cortical atrophy and a 4 mm hyperdense focus in the interpolar region of the right kidney possibly a stone.  Prostatism was noted for gland measuring 6.2 cm.     3/28/2024.  Seen by Dr. Graham for shortness of breath.  He felt the patient had possible chronic interstitial lung disease versus  periodic aspiration, hypoxia dyspnea on exertion.  Further tests were ordered including high-resolution CT scan, complete pulmonary function testing, simple pulmonary stress test and follow-up.     4/9/2024.  Follow-up with Malorie Shaffer.  Erythropoietin injections continued every 2 weeks which the patient was tolerating well.     4/16/2024.  Bone marrow biopsy was performed showing a hypercellular bone marrow at 50% with dyserythropoiesis.  There are 1% blasts consistent with persistent myeloid neoplasm.  Flow cytometry showed a small clonal CD5 negative, CD10 negative B-cell population and a polytypic background.  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     There was a very small abnormal T-cell population that could be incidental.  Next generation sequencing showed 2 U2AF 1 variants, 1 of which was negative.  The other had been previously seen.     The marrow also showed dyserythropoiesis with bilobate forms and forms with irregular nuclear contours as well as blebbing.     4/23/2024.  CBC showed white count 1.5 with a hemoglobin of 8.3 hematocrit 25.9 and platelet count of 71,000 with 37% neutrophils, 53 lymphocytes, 4 monos, 4 eosinophils with an absolute neutrophil count of 0.57.     4/24/2024.  Return visit with Dr. Graham.  He noted that the high-resolution CT scan showed no significant change from the previous CT scan the month before.  Pulmonary function testing showed no response to bronchodilator and there was mild restrictive change with some suggestion of airway inflammation.  The patient had difficulty doing the test because of persistent coughing.  6-minute walk test showed that the patient was able to maintain saturations at 93% with 2 L of oxygen per minute continuously.  The test was terminated because of dyspnea.  With his hypoxia on room air it was  recommended that he be on oxygen continuously to keep his saturation greater than or equal to 92%.  He continued on oxygen with his CPAP at 3 L at nighttime.     5/10/2024.  This is my first visit with Mr. Medrano and his family.  We went over some of the results of the bone marrow biopsy.  I told him that I would have to wait until the chromosomal analysis returned.  I also note the possibility of low-grade lymphoma.  He may need to have an opinion with malignant hematology at [facility].  Stepdaughter who is here with him and his wife today is his advocate for maintaining his quality of life.  She is an employee at a local nursing facility and is concerned about many clients she has who seem to be getting treated aggressively.  We talked about myelodysplasia and the fact that he is not responding to erythropoietin.  He will continue on this treatment for now.  He is low counts were discussed and he knows that he is at higher risk for infection and bleeding.  He continues on oxygen supplementation continuously.  They will return in 2 weeks at which time laboratory tests will be performed.     He just had a lesion removed from the dorsum of his right hand at the base of his first and second fingers.  Stitches are still in place.  It seems to be healing well without any significant bleeding or infection.  He was told that it looked benign.     5/13/2024.  Follow-up with Dr. Aggie Danielle of cardiology who saw him in follow-up of his coronary artery disease with multiple stents having been placed, hypertension, dyslipidemia and previous pulmonary embolus related to COVID-19.  She had stopped his Eliquis.  She noted his myelodysplasia.  She noted that at some point his aspirin had been stopped but given his history of stents this was restarted and he tolerated it well.  She said that his blood pressure was well-controlled.  She noted blood sugars were between 150-2 50.  She noted the patient was supposed to be using oxygen with  exertion but the patient was not sure what rate it was supposed to be.  She says that she would not stop his aspirin unless his platelets were less than 50,000 with complications of bleeding.  She said it was very important to continue aspirin given a 15% chance of in-stent thrombosis with aspirin cessation.  She increased her furosemide to 80 mg twice a day because of bilateral lower extremity edema and spironolactone to 25 mg twice daily until symptoms improve and encouraged him to cut back on his salt intake.  She was see him again in about 9 months.     5/14/2024.  Follow-up with Dr. Santamaria of ophthalmology who started him on Naphcon-A drops 1 drop 3 times a day in both eyes for excessive tearing     5/15/2024.  Followed up with Dr. Dimas Renteria of podiatry who noted decreased pedal pulses 2 out of 4.  He had footcare.     5/24/2024.  Follow-up with Dr. Graham.  Review of his x-rays showed no change.  Physical examination showed decreased breath sounds but no inspiratory crackles that were previously heard.  He felt that the interstitial lung disease was stable and that his hypoxemia was improved on oxygen.  He plan to repeat CT scan in September.     5/24/2024.  He is here to follow-up on his bone marrow results.  I told him that the chromosomal analysis would not be forthcoming before because the specimen failed to grow in culture.  He continues to display a low-grade MDS with blast count of only 2%.  We reviewed the fact that he is recommended to have at least 6 months of his erythropoietin before we decide whether or not it is a failure.  He is agreeable to this.  We will increase his dose of darbepoetin.  He knows to be on the look out for any signs of infection or bleeding and to report this if it happens.  We reviewed his most recent CBC.  We will see him again in 2 weeks with his next injection.     5/30/2024.  Follow-up with Dr. Dr. Dietz for his Medicare wellness visit.  His magnesium dose was  changed to twice daily indefinitely.     6/7/2024.  He is here today for his darbepoetin injection.  We have increased his dose to 500 mcg every 2 weeks.  Will see if this has any effect.  Laboratory dated today shows that his sugar is 290 and his sodium is 134.  BUN is 46 and creatinine is 2.31.  CBC shows white count 1.5 with a hemoglobin 8.1 hematocrit 24.7 and a platelet count of 71,000 which is very much like his most recent CBC.  His absolute neutrophil count is 0.57.     I talked to him about his current status which he feels is fairly stable.  He is off oxygen.  He has had no bleeding and no signs of infection.  He understands that we have increased his dose of darbepoetin and we will watch his counts.  He will report any side effects.     He has continued to receive his darbepoetin injections.     7/15/2024.  Emergency department visit [facility].  He developed a rash on both lower extremities which started a week ago which is an erythematous papular rash.  He denied any pain or itching.  He did not have any vesicle formation.  It is bilateral.  He has had a shingles injection.  He was told that he had disseminated zoster and was started on valacyclovir a gram twice daily for 7 days.     7/19/2024.  Seen by Dr. Dr. Dietz who said that the rash has not gotten any worse and might be a little bit better.  She noted that there were no vesicles no open areas no areas of secondary infection.     7/19/2024.  He is here to receive his erythropoietin injection and to have a blood count.  I told him it is not likely that this is disseminated zoster because it is nowhere else on his body but only on his lower extremities.  This could be stasis dermatitis.  I do not see any signs of vesicle formation and there never were any.  It is mostly on the anterior shins.  It is all below the knee.     CBC today shows white count 1.4 with hemoglobin 8 hematocrit 25.4 platelet count 67,000 with 37 neutrophils 53  lymphocytes 5 monos 2 eosinophils.  He is serum chemistry showed a sugar of 285 BUN of 49 creatinine of 2.67.  We told him he needed to keep his hydration status high.  He will continue with his injections today.  Blood pressure today is 153/64.     He has had no bleeding and no infection.  He has had no chest pain or pressure.  He is able to participate in his usual activities including mowing the yard.     He has continued to receive his darbepoetin and has followed up with podiatry for nail and callus care.     8/30/2024.  He is here in routine follow-up.  He says that he is doing well.  He has had no infections or bleeding.  He has had no mouth sores.  He has had continued swelling in his legs a little bit worse on the left than on the right.  Blood sugar was greater than 300 but he admits to dietary indiscretion.  Has had no chest pain.  His blood pressure was 149/63.  He says that his breathing is stable.  He is using his oxygen at home at night and when he travels that he does not have it on today.     His lab data has shown no sign of improvement so far with white count 1.3, hemoglobin 8.6 hematocrit 27.2 and a platelet count of 69,000.  White blood cell differential count shows 38% polys, 50 lymphs, 5 monos, 2 eosinophils.  We plan a 6-month trial of this dose of darbepoetin which will extend until November.  We will see him again in about a month.     Interval Note  9/4/2024.  CT scan of the chest showed mild streaky scarring in the lung bases bilaterally.  The previously seen groundglass opacities probably be minimally improved.  Calcified pleural plaques are seen without consolidation effusion or pneumothorax.  There is no adenopathy seen by CT size criteria.  Minimal hepatic steatosis was seen as well as calcified granuloma in the spleen and a somewhat atrophic pancreas.  DJD was seen in the spine.     9/5/2024.  Seen by Dr. Zambrano his nephrologist who ordered lab tests and felt that his fluctuating  chemistries were related to diuretic therapy.  He noted myelodysplasia and that he was being followed for his anemia receiving NING.  He did not change any of his medications  And said that he would see him again in about 6 months     9/10/2024.  Follow-up by Dr. Graham at which time he told the patient he could decrease his oxygen to 2 and he would repeat a CT scan in 6 months and see him after that.     9/27/2024.  He is here today in routine follow-up.  He will get his shot today.  Laboratory data shows no improvement.  We reviewed that we will follow him for 6 months on this treatment to see if there is any improvement.     Mr. Rtoh says that he has had no significant bleeding.  I see some red spots on the soft palate.  His appetite has been good and he has gained weight of 1 pound since last time I saw him about a month ago.  He has had no other signs of infection or deterioration in his status.  He knows he is at risk for serious infection or bleeding.  We will continue on this treatment pathway.  He will see him again in a month.     10/25/24: Seen in office today by OSCAR. Appetite is decent denies any early satiety weight is stable. Energy varies day to day. 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 Isolated episode of a nose bleed. He reported waking up last night sweaty and hot, unaware if he was running a fever, encouraged him to check temperature if this should re-occur. Denies any further B symptoms. No serious abnormal bleeding, remains bruising easily.      Reviewed labs very minimal improvement in his labs, will continue with administering the shot today as ordered. Will continue to monitor patient closely, with a follow-up in a month.     11/22/24: Patient was recently in the ED for tremors. He reports he didn't feel well/didn't feel right. Head/Brain CT, EKG,- were normal in findings. Patient is here today and feels better he reports increasing numbness/tingling  in bilateral hands, chronic back pain. Denies headaches, vision changes. Denies any fever, nausea, vomiting. Reports sweating at night on his bilateral legs, denies any restless leg symptoms. Denies any abnormal bruising or bleeding. Appetite is good, unchanged. Energy remains poor. 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 Wears continuous oxygen at home, CPAP w/oxygen at night. Chronic productive cough in the am.      12/23/24: Patient is tolerating darbopoetin well. He reports that his energy could be better, someday's are better than others. Remains to have the tremors, right hand is worse than his left, he has yet to schedule with Neurology. Currently treating left foot/ankle for cellulitis, remains taking Keflex daily. Patient report that it is healing well. However has has a rash on his left leg. Continues wearing compression stockings. Feeling better today.      2/3/25: Patient is in office for routine follow-up. He remains to have unchanged chronic fatigue. He denies any fevers, drenching night sweats, abnormal weight loss. LLE edema, he reports the left leg remains more swollen and discoloration no open wounds of signs of cellulitis.      His labs essentially show no improvement. Discussed consideration for repeat Bone Marrow and second opinion for a more aggressive treatment options vs continuing current NING Treatment. He wishes to continue on NING at this time, he is very reluctant on further testing/ treatment. Discussed continuing on NING and support treatment as needed.      3/3/25: Patient is in office today for consideration of darbopoetin. Patient is wearing his oxygen at home more frequently, denies using when he is not at home, unless he will be doing a lot of walking. Episodes of lightheadedness a few times weekly. Edema is better but reports increasing weakness in bilateral legs. Bilateral upper extremity  numbness/ weakness. No fevers, legs sweat during the night. Chilled all the time. Eating and drinking well. No GI or Urinary issues.Chronic cough in the am, white/clear phlegm. Denies any abnormal bleeding or bruising. Overall reports he is feeling pretty good today.     Objective    BSA: 2.21 meters squared  /55   Pulse 68   Temp 35.8 °C (96.4 °F) (Skin)   Resp 20   Wt 103 kg (227 lb 12.8 oz)   SpO2 94%   BMI 35.68 kg/m²      Physical Exam  Vitals and nursing note reviewed.   Constitutional:       Appearance: Normal appearance. He is obese.   HENT:      Head: Normocephalic and atraumatic.      Nose: Nose normal.      Mouth/Throat:      Mouth: Mucous membranes are moist.      Pharynx: Oropharynx is clear.   Eyes:      General: No scleral icterus.     Extraocular Movements: Extraocular movements intact.      Conjunctiva/sclera: Conjunctivae normal.   Cardiovascular:      Rate and Rhythm: Normal rate and regular rhythm.      Pulses: Normal pulses.      Heart sounds: Normal heart sounds.   Pulmonary:      Effort: Pulmonary effort is normal.      Breath sounds: Decreased breath sounds present.   Abdominal:      General: There is distension.      Palpations: Abdomen is soft.      Tenderness: There is no abdominal tenderness.   Musculoskeletal:         General: Swelling present. Normal range of motion.      Cervical back: Normal range of motion.   Skin:     General: Skin is warm and dry.      Coloration: Skin is pale.      Findings: Bruising present.   Neurological:      General: No focal deficit present.      Mental Status: He is alert and oriented to person, place, and time.      Motor: Weakness present.   Psychiatric:         Mood and Affect: Mood normal.         Behavior: Behavior normal.         Thought Content: Thought content normal.         Judgment: Judgment normal.         Performance Status:  Symptomatic; fully ambulatory      Assessment/Plan        1.  Pancytopenia.  The patient has unusual bone  "marrow findings.  Chromosomal analysis will not be done due to failure of the specimen to grow.  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  He also has components of myelodysplasia.  He is not responding to erythropoietin.  We will increase his dose to 500 mcg every other week.  He continues to be at high risk for bleeding and infection with a low ANC and low platelet count.  He has had previous GI bleeds.  He is currently back on his  aspirin per follow-up with Dr. Danielle for fear of clotting off his stents.  He is off Eliquis.  He will get his treatment today with darbepoetin and continue every 14 days.  We plan for 6-month trial which will take us to November.     3/5/25: Patient wishes to remain on NING at this time. Discussed repeating bone marrow and referral to Dr. Curtis which he was reluctant to agree to such. Discussed continue on NING is reasonable given his age and comorbidities. We will continue with this treatment plan for the time being and support as needed. He denies any abnormal bleeding, no signs of infections or \"B\" symptoms. Overall feeling well today.     Labs reviewed:  Hemoglobin 8.6, WBC 1.5, ANC 0.60, Lymphocytes 0.75 Platelets 66K  BUN 57, Creat 2.86, GFR 20  Calcium 8.5     Will plan to proceed with NING today. Continue consideration for NING every 2 weeks, return to see provider in 6 weeks.       2.  Multiple comorbidities.  These include but are not limited to:  GERD  Arthritis, DJD  BPH  Coronary artery disease with history of myocardial, status post stent infarction  History of dumping syndrome  Hyperlipidemia  History of skin cancer  Obesity  Obstructive sleep apnea on CPAP  Peripheral arterial disease  CKD 3  Diabetes with nephropathy  History of cholecystectomy and hernia repair  Essential tremor  Hypertension   Numbness in his fingers felt to be related to cervical disc disease.     3.  Rash on his legs.  I think this looks more like stasis dermatitis than " disseminated zoster.  If it were disseminated zoster he would have it on other areas of his body.     Plan:  Discussed and reviewed medical history  Stat Labs today, waiting for results  Discussed repeat bone marrow biopsy, patient deferred at this time  Discussed checking nutritional labs (Iron, B12, Folate and Vitamin D)  At this time we will continue on Darbopoetin, discussed medication is keeping him from requiring a transfusion, however his counts continue to drop and not improve as we had hoped  Will repeat labs in 2 weeks, with consideration for treatment   Return to see provider in 4-6 weeks       He knows that he should call in the interim should he have any change in his             NATALY Villa-CNP

## 2025-03-03 NOTE — PATIENT INSTRUCTIONS
Discussed and reviewed medical history  Stat Labs today, waiting for results  Discussed repeat bone marrow biopsy, patient deferred at this time  Discussed checking nutritional labs (Iron, B12, Folate and Vitamin D)  At this time we will continue on Darbopoetin, discussed medication is keeping him from requiring a transfusion, however his counts continue to drop and not improve as we had hoped  Will repeat labs in 2 weeks, with consideration for treatment   Return to see provider in 4-6 weeks ks

## 2025-03-04 ENCOUNTER — APPOINTMENT (OUTPATIENT)
Age: OVER 89
End: 2025-03-04
Payer: MEDICARE

## 2025-03-04 VITALS
OXYGEN SATURATION: 93 % | HEIGHT: 67 IN | WEIGHT: 226.2 LBS | DIASTOLIC BLOOD PRESSURE: 76 MMHG | HEART RATE: 80 BPM | SYSTOLIC BLOOD PRESSURE: 124 MMHG | BODY MASS INDEX: 35.5 KG/M2

## 2025-03-04 DIAGNOSIS — N18.4 TYPE 2 DIABETES MELLITUS WITH STAGE 4 CHRONIC KIDNEY DISEASE, WITH LONG-TERM CURRENT USE OF INSULIN (MULTI): ICD-10-CM

## 2025-03-04 DIAGNOSIS — I73.9 PERIPHERAL VASCULAR DISEASE, UNSPECIFIED (CMS-HCC): ICD-10-CM

## 2025-03-04 DIAGNOSIS — Z79.4 TYPE 2 DIABETES MELLITUS WITH STAGE 4 CHRONIC KIDNEY DISEASE, WITH LONG-TERM CURRENT USE OF INSULIN (MULTI): ICD-10-CM

## 2025-03-04 DIAGNOSIS — N18.4 STAGE 4 CHRONIC KIDNEY DISEASE (MULTI): ICD-10-CM

## 2025-03-04 DIAGNOSIS — D69.6 THROMBOCYTOPENIA (CMS-HCC): ICD-10-CM

## 2025-03-04 DIAGNOSIS — E78.2 MIXED HYPERLIPIDEMIA: ICD-10-CM

## 2025-03-04 DIAGNOSIS — N18.32 ANEMIA DUE TO STAGE 3B CHRONIC KIDNEY DISEASE (MULTI): ICD-10-CM

## 2025-03-04 DIAGNOSIS — J84.9 INTERSTITIAL LUNG DISEASE (MULTI): ICD-10-CM

## 2025-03-04 DIAGNOSIS — I25.10 CORONARY ARTERY DISEASE INVOLVING NATIVE HEART, UNSPECIFIED VESSEL OR LESION TYPE, UNSPECIFIED WHETHER ANGINA PRESENT: ICD-10-CM

## 2025-03-04 DIAGNOSIS — E11.22 TYPE 2 DIABETES MELLITUS WITH STAGE 4 CHRONIC KIDNEY DISEASE, WITH LONG-TERM CURRENT USE OF INSULIN (MULTI): ICD-10-CM

## 2025-03-04 DIAGNOSIS — E66.812 CLASS 2 SEVERE OBESITY WITH SERIOUS COMORBIDITY AND BODY MASS INDEX (BMI) OF 35.0 TO 35.9 IN ADULT, UNSPECIFIED OBESITY TYPE: ICD-10-CM

## 2025-03-04 DIAGNOSIS — D61.818 OTHER PANCYTOPENIA (MULTI): ICD-10-CM

## 2025-03-04 DIAGNOSIS — K21.9 GASTROESOPHAGEAL REFLUX DISEASE, UNSPECIFIED WHETHER ESOPHAGITIS PRESENT: ICD-10-CM

## 2025-03-04 DIAGNOSIS — D70.9 NEUTROPENIA, UNSPECIFIED TYPE (CMS-HCC): ICD-10-CM

## 2025-03-04 DIAGNOSIS — E66.01 CLASS 2 SEVERE OBESITY WITH SERIOUS COMORBIDITY AND BODY MASS INDEX (BMI) OF 35.0 TO 35.9 IN ADULT, UNSPECIFIED OBESITY TYPE: ICD-10-CM

## 2025-03-04 DIAGNOSIS — I10 PRIMARY HYPERTENSION: Primary | ICD-10-CM

## 2025-03-04 DIAGNOSIS — D63.1 ANEMIA DUE TO STAGE 3B CHRONIC KIDNEY DISEASE (MULTI): ICD-10-CM

## 2025-03-04 PROBLEM — D46.9 MYELODYSPLASTIC SYNDROME, UNSPECIFIED (MULTI): Status: RESOLVED | Noted: 2024-02-21 | Resolved: 2025-03-04

## 2025-03-04 PROCEDURE — 3074F SYST BP LT 130 MM HG: CPT | Performed by: FAMILY MEDICINE

## 2025-03-04 PROCEDURE — 1157F ADVNC CARE PLAN IN RCRD: CPT | Performed by: FAMILY MEDICINE

## 2025-03-04 PROCEDURE — G2211 COMPLEX E/M VISIT ADD ON: HCPCS | Performed by: FAMILY MEDICINE

## 2025-03-04 PROCEDURE — 3078F DIAST BP <80 MM HG: CPT | Performed by: FAMILY MEDICINE

## 2025-03-04 PROCEDURE — 1036F TOBACCO NON-USER: CPT | Performed by: FAMILY MEDICINE

## 2025-03-04 PROCEDURE — 1159F MED LIST DOCD IN RCRD: CPT | Performed by: FAMILY MEDICINE

## 2025-03-04 PROCEDURE — 1160F RVW MEDS BY RX/DR IN RCRD: CPT | Performed by: FAMILY MEDICINE

## 2025-03-04 PROCEDURE — 99214 OFFICE O/P EST MOD 30 MIN: CPT | Performed by: FAMILY MEDICINE

## 2025-03-04 NOTE — PROGRESS NOTES
Subjective   Zack Medrano is a 89 y.o. male who presents for Follow-up (3 month follow up).  Here for routine follow up HTN, DM, CKD (Dr Nguyen), CAD ([facility]), high chol, thrombocytopenia/anemia/pancytopenia/myelodysplastic syndrome (Dr Carmichael), tremor (Dr Haque), LINDA, h/o PE, GI bleed, LINDA on CPAP - tolerating well ([facility], [place]).  He states that he is doing pretty well.             Objective   Visit Vitals  /76   Pulse 80      Physical Exam  Vitals reviewed.   Constitutional:       General: He is not in acute distress.  Cardiovascular:      Rate and Rhythm: Normal rate and regular rhythm.      Heart sounds: No murmur heard.  Pulmonary:      Effort: Pulmonary effort is normal. No respiratory distress.      Breath sounds: Normal breath sounds.   Skin:     General: Skin is warm and dry.   Neurological:      General: No focal deficit present.      Mental Status: He is alert. Mental status is at baseline.        Latest Reference Range & Units 03/03/25 08:56   GLUCOSE 74 - 99 mg/dL 284 (H)   SODIUM 136 - 145 mmol/L 136   POTASSIUM 3.5 - 5.3 mmol/L 5.1   CHLORIDE 98 - 107 mmol/L 100   Bicarbonate 21 - 32 mmol/L 25   Anion Gap 10 - 20 mmol/L 16   Blood Urea Nitrogen 6 - 23 mg/dL 57 (H)   Creatinine 0.50 - 1.30 mg/dL 2.86 (H)   EGFR >60 mL/min/1.73m*2 20 (L)   Calcium 8.6 - 10.3 mg/dL 8.5 (L)   Albumin 3.4 - 5.0 g/dL 4.1   Alkaline Phosphatase 33 - 136 U/L 81   ALT 10 - 52 U/L 20   AST 9 - 39 U/L 29   Bilirubin Total 0.0 - 1.2 mg/dL 1.2   Total Protein 6.4 - 8.2 g/dL 6.4   WBC 4.4 - 11.3 x10*3/uL 1.5 (L)   nRBC 0.0 - 0.0 /100 WBCs 0.0   RBC 4.50 - 5.90 x10*6/uL 2.40 (L)   HEMOGLOBIN 13.5 - 17.5 g/dL 8.6 (L)   HEMATOCRIT 41.0 - 52.0 % 26.1 (L)   MCV 80 - 100 fL 109 (H)   MCH 26.0 - 34.0 pg 35.8 (H)   MCHC 32.0 - 36.0 g/dL 33.0   RED CELL DISTRIBUTION WIDTH 11.5 - 14.5 % 19.4 (H)   Platelets 150 - 450 x10*3/uL 66 (L)   Immature Granulocytes %, Automated 0.0 - 0.9 % 0.0   Immature Granulocytes Absolute, Automated 0.00 -  0.50 x10*3/uL 0.00   Neutrophils %, Manual 40.0 - 80.0 % 40.0   Lymphocytes %, Manual 13.0 - 44.0 % 50.0   Monocytes %, Manual 2.0 - 10.0 % 1.0   Eosinophils %, Manual 0.0 - 6.0 % 7.0   Basophils %, Manual 0.0 - 2.0 % 0.0   Atypical Lymphocytes % 0.0 - 2.0 % 1.0   Blasts %, Manual 0.0 - 0.0 % 1.0   Seg Neutrophils Absolute, Manual 1.60 - 5.00 x10*3/uL 0.60 (L)   Lymphocytes Absolute, Manual 0.80 - 3.00 x10*3/uL 0.75 (L)   Monocytes Absolute, Manual 0.05 - 0.80 x10*3/uL 0.02 (L)   Eosinophils Absolute, Manual 0.00 - 0.40 x10*3/uL 0.11   Basophils Absolute, Manual 0.00 - 0.10 x10*3/uL 0.00   Atypical Lymphs Absolute 0.00 - 0.30 x10*3/uL 0.02   Blasts Absolute, Manual 0.00 - 0.00 x10*3/uL 0.02   Total Cells Counted  100   RBC Morphology  See Below   Ovalocytes  Few   Basophilic Stippling  Present   (H): Data is abnormally high  (L): Data is abnormally low    Assessment/Plan   Problem List Items Addressed This Visit       Acid reflux    Relevant Orders    Follow Up In Primary Care - Medicare Annual    Type 2 diabetes mellitus with stage 4 chronic kidney disease, with long-term current use of insulin (Multi)    Relevant Orders    Follow Up In Primary Care - Medicare Annual    Hypertension - Primary    Relevant Orders    Follow Up In Primary Care - Medicare Annual    Mixed hyperlipidemia    Relevant Orders    Follow Up In Primary Care - Medicare Annual    Neutropenia    Relevant Orders    Follow Up In Primary Care - Medicare Annual    Stage 4 chronic kidney disease (Multi)    Relevant Orders    Follow Up In Primary Care - Medicare Annual    Thrombocytopenia (CMS-HCC)    Relevant Orders    Follow Up In Primary Care - Medicare Annual    Peripheral vascular disease, unspecified (CMS-HCC)    Relevant Orders    Follow Up In Primary Care - Medicare Annual    Interstitial lung disease (Multi)    Relevant Orders    Follow Up In Primary Care - Medicare Annual     Other Visit Diagnoses       Class 2 severe obesity with serious  comorbidity and body mass index (BMI) of 35.0 to 35.9 in adult, unspecified obesity type        Relevant Orders    Follow Up In Primary Care - Medicare Annual               Fabiola Dietz MD

## 2025-03-07 ENCOUNTER — HOSPITAL ENCOUNTER (OUTPATIENT)
Dept: RADIOLOGY | Facility: HOSPITAL | Age: OVER 89
Discharge: HOME | End: 2025-03-07
Payer: MEDICARE

## 2025-03-07 DIAGNOSIS — J84.9 INTERSTITIAL LUNG DISEASE (MULTI): ICD-10-CM

## 2025-03-07 PROCEDURE — 71250 CT THORAX DX C-: CPT

## 2025-03-14 ENCOUNTER — APPOINTMENT (OUTPATIENT)
Dept: PULMONOLOGY | Facility: CLINIC | Age: OVER 89
End: 2025-03-14
Payer: MEDICARE

## 2025-03-14 VITALS
OXYGEN SATURATION: 96 % | HEART RATE: 66 BPM | BODY MASS INDEX: 35.19 KG/M2 | HEIGHT: 67 IN | SYSTOLIC BLOOD PRESSURE: 93 MMHG | TEMPERATURE: 97.7 F | DIASTOLIC BLOOD PRESSURE: 55 MMHG | WEIGHT: 224.2 LBS

## 2025-03-14 DIAGNOSIS — R93.89 ABNORMAL CT SCAN: Primary | ICD-10-CM

## 2025-03-14 DIAGNOSIS — Z99.89 HISTORY OF CONTINUOUS POSITIVE AIRWAY PRESSURE (CPAP) THERAPY: ICD-10-CM

## 2025-03-14 PROCEDURE — 3074F SYST BP LT 130 MM HG: CPT | Performed by: INTERNAL MEDICINE

## 2025-03-14 PROCEDURE — 99214 OFFICE O/P EST MOD 30 MIN: CPT | Performed by: INTERNAL MEDICINE

## 2025-03-14 PROCEDURE — 1159F MED LIST DOCD IN RCRD: CPT | Performed by: INTERNAL MEDICINE

## 2025-03-14 PROCEDURE — 3078F DIAST BP <80 MM HG: CPT | Performed by: INTERNAL MEDICINE

## 2025-03-14 PROCEDURE — 1157F ADVNC CARE PLAN IN RCRD: CPT | Performed by: INTERNAL MEDICINE

## 2025-03-15 NOTE — PROGRESS NOTES
Subjective   Patient ID: Zack Medrano is a 89 y.o. male who presents for No chief complaint on file..  HPI  Patient was seen today in the office on a 6-month follow-up visit.  Patient's oxygen saturation on room air today was 90% on POC #2 it is 96%.  Review of the patient's chest x-rays from 9/4/2024 versus 3/7/2025 does show significantly better findings.  Patient has mild cough with clear sputum production and no hemoptysis or wheezing.  Patient does have some shortness of breath with walking.  Review of Systems  Patient denies any fever, chills, rhinitis or sore throat.  Objective   Physical Exam  Pulmonary, lungs are clear to auscultation.  Cardio, heart sounds are regular rate and rhythm.  Extremities, no cyanosis, clubbing or pretibial edema.  Psych, the patient was alert and oriented x 3  Assessment/Plan        Impressions:  1.  Patient does still have some mild groundglass infiltrates.  2.  Patient is currently on CPAP at 20/6 cm water pressure and has an AHI of 1.1.  Recommendations:  1.  Continue with oxygen at OCD at #2.  2.  Follow-up with the patient in 4 months.      This note was transcribed using the Dragon Dictation system.  There may be grammatical, punctuation, or verbiage errors that occur with voice recognition programs.    Rui Graham,  03/15/25 10:30 AM

## 2025-03-17 ENCOUNTER — TELEPHONE (OUTPATIENT)
Dept: HEMATOLOGY/ONCOLOGY | Facility: CLINIC | Age: OVER 89
End: 2025-03-17

## 2025-03-17 ENCOUNTER — APPOINTMENT (OUTPATIENT)
Dept: HEMATOLOGY/ONCOLOGY | Facility: CLINIC | Age: OVER 89
End: 2025-03-17
Payer: MEDICARE

## 2025-03-17 ENCOUNTER — INFUSION (OUTPATIENT)
Dept: HEMATOLOGY/ONCOLOGY | Facility: CLINIC | Age: OVER 89
End: 2025-03-17
Payer: MEDICARE

## 2025-03-17 VITALS
SYSTOLIC BLOOD PRESSURE: 118 MMHG | RESPIRATION RATE: 18 BRPM | TEMPERATURE: 95.9 F | OXYGEN SATURATION: 91 % | DIASTOLIC BLOOD PRESSURE: 63 MMHG | BODY MASS INDEX: 35.43 KG/M2 | HEART RATE: 67 BPM | WEIGHT: 226.19 LBS

## 2025-03-17 DIAGNOSIS — D69.6 THROMBOCYTOPENIA (CMS-HCC): ICD-10-CM

## 2025-03-17 DIAGNOSIS — D70.8 OTHER NEUTROPENIA (CMS-HCC): Primary | ICD-10-CM

## 2025-03-17 DIAGNOSIS — N18.32 ANEMIA DUE TO STAGE 3B CHRONIC KIDNEY DISEASE (MULTI): ICD-10-CM

## 2025-03-17 DIAGNOSIS — N18.4 ANEMIA DUE TO STAGE 4 CHRONIC KIDNEY DISEASE (MULTI): ICD-10-CM

## 2025-03-17 DIAGNOSIS — D63.1 ANEMIA DUE TO STAGE 3B CHRONIC KIDNEY DISEASE: ICD-10-CM

## 2025-03-17 DIAGNOSIS — N18.4 STAGE 4 CHRONIC KIDNEY DISEASE (MULTI): Primary | ICD-10-CM

## 2025-03-17 DIAGNOSIS — D46.9 MYELODYSPLASTIC SYNDROME, UNSPECIFIED (MULTI): ICD-10-CM

## 2025-03-17 DIAGNOSIS — D63.1 ANEMIA DUE TO STAGE 4 CHRONIC KIDNEY DISEASE (MULTI): ICD-10-CM

## 2025-03-17 DIAGNOSIS — D63.1 ANEMIA DUE TO STAGE 3B CHRONIC KIDNEY DISEASE (MULTI): ICD-10-CM

## 2025-03-17 DIAGNOSIS — N18.4 STAGE 4 CHRONIC KIDNEY DISEASE (MULTI): ICD-10-CM

## 2025-03-17 DIAGNOSIS — N18.32 ANEMIA DUE TO STAGE 3B CHRONIC KIDNEY DISEASE: ICD-10-CM

## 2025-03-17 DIAGNOSIS — D70.9 NEUTROPENIA, UNSPECIFIED TYPE: ICD-10-CM

## 2025-03-17 DIAGNOSIS — N18.4 ANEMIA DUE TO STAGE 4 CHRONIC KIDNEY DISEASE: ICD-10-CM

## 2025-03-17 DIAGNOSIS — D70.9 NEUTROPENIA, UNSPECIFIED TYPE (CMS-HCC): ICD-10-CM

## 2025-03-17 DIAGNOSIS — D61.818 OTHER PANCYTOPENIA (MULTI): ICD-10-CM

## 2025-03-17 DIAGNOSIS — D63.1 ANEMIA DUE TO STAGE 4 CHRONIC KIDNEY DISEASE: ICD-10-CM

## 2025-03-17 LAB
BASOPHILS # BLD MANUAL: 0 X10*3/UL (ref 0–0.1)
BASOPHILS NFR BLD MANUAL: 0 %
EOSINOPHIL # BLD MANUAL: 0.04 X10*3/UL (ref 0–0.4)
EOSINOPHIL NFR BLD MANUAL: 4 %
ERYTHROCYTE [DISTWIDTH] IN BLOOD BY AUTOMATED COUNT: 19.6 % (ref 11.5–14.5)
HCT VFR BLD AUTO: 25.4 % (ref 41–52)
HGB BLD-MCNC: 8.3 G/DL (ref 13.5–17.5)
IMM GRANULOCYTES # BLD AUTO: 0.01 X10*3/UL (ref 0–0.5)
IMM GRANULOCYTES NFR BLD AUTO: 1 % (ref 0–0.9)
LYMPHOCYTES # BLD MANUAL: 0.53 X10*3/UL (ref 0.8–3)
LYMPHOCYTES NFR BLD MANUAL: 53 %
MCH RBC QN AUTO: 34.4 PG (ref 26–34)
MCHC RBC AUTO-ENTMCNC: 32.7 G/DL (ref 32–36)
MCV RBC AUTO: 105 FL (ref 80–100)
MONOCYTES # BLD MANUAL: 0.04 X10*3/UL (ref 0.05–0.8)
MONOCYTES NFR BLD MANUAL: 4 %
NEUTS SEG # BLD MANUAL: 0.37 X10*3/UL (ref 1.6–5)
NEUTS SEG NFR BLD MANUAL: 37 %
NRBC BLD-RTO: 0 /100 WBCS (ref 0–0)
OVALOCYTES BLD QL SMEAR: ABNORMAL
PLATELET # BLD AUTO: 43 X10*3/UL (ref 150–450)
RBC # BLD AUTO: 2.41 X10*6/UL (ref 4.5–5.9)
RBC MORPH BLD: ABNORMAL
TOTAL CELLS COUNTED BLD: 100
VARIANT LYMPHS # BLD MANUAL: 0.02 X10*3/UL (ref 0–0.3)
VARIANT LYMPHS NFR BLD: 2 %
WBC # BLD AUTO: 1 X10*3/UL (ref 4.4–11.3)

## 2025-03-17 PROCEDURE — 85027 COMPLETE CBC AUTOMATED: CPT

## 2025-03-17 PROCEDURE — 85007 BL SMEAR W/DIFF WBC COUNT: CPT

## 2025-03-17 PROCEDURE — 2500000004 HC RX 250 GENERAL PHARMACY W/ HCPCS (ALT 636 FOR OP/ED): Mod: JZ,EC

## 2025-03-17 PROCEDURE — 96372 THER/PROPH/DIAG INJ SC/IM: CPT

## 2025-03-17 PROCEDURE — 36415 COLL VENOUS BLD VENIPUNCTURE: CPT

## 2025-03-17 RX ORDER — FAMOTIDINE 10 MG/ML
20 INJECTION, SOLUTION INTRAVENOUS ONCE AS NEEDED
OUTPATIENT
Start: 2025-03-31

## 2025-03-17 RX ORDER — EPINEPHRINE 0.3 MG/.3ML
0.3 INJECTION SUBCUTANEOUS EVERY 5 MIN PRN
OUTPATIENT
Start: 2025-03-31

## 2025-03-17 RX ORDER — CIPROFLOXACIN 500 MG/1
500 TABLET ORAL DAILY
Qty: 14 TABLET | Refills: 0 | Status: SHIPPED | OUTPATIENT
Start: 2025-03-17 | End: 2025-03-31

## 2025-03-17 RX ORDER — ALBUTEROL SULFATE 0.83 MG/ML
3 SOLUTION RESPIRATORY (INHALATION) AS NEEDED
OUTPATIENT
Start: 2025-03-31

## 2025-03-17 RX ORDER — DIPHENHYDRAMINE HYDROCHLORIDE 50 MG/ML
50 INJECTION, SOLUTION INTRAMUSCULAR; INTRAVENOUS AS NEEDED
OUTPATIENT
Start: 2025-03-31

## 2025-03-17 RX ADMIN — DARBEPOETIN ALFA 500 MCG: 500 INJECTION, SOLUTION INTRAVENOUS; SUBCUTANEOUS at 10:28

## 2025-03-17 ASSESSMENT — PAIN SCALES - GENERAL: PAINLEVEL_OUTOF10: 0-NO PAIN

## 2025-03-17 NOTE — PROGRESS NOTES
I educated Mr. Medrano on infection prevention due to his low white count.  I also discussed with him signs and symptoms of bleeding and bruising due to his low platelet count.  Patient verbalized understanding.

## 2025-03-18 PROBLEM — N18.4 ANEMIA DUE TO STAGE 4 CHRONIC KIDNEY DISEASE: Status: ACTIVE | Noted: 2024-04-03

## 2025-03-19 ENCOUNTER — OFFICE VISIT (OUTPATIENT)
Dept: HEMATOLOGY/ONCOLOGY | Facility: CLINIC | Age: OVER 89
End: 2025-03-19
Payer: MEDICARE

## 2025-03-19 VITALS
RESPIRATION RATE: 16 BRPM | SYSTOLIC BLOOD PRESSURE: 137 MMHG | HEART RATE: 67 BPM | WEIGHT: 227.2 LBS | DIASTOLIC BLOOD PRESSURE: 69 MMHG | OXYGEN SATURATION: 93 % | BODY MASS INDEX: 35.58 KG/M2 | TEMPERATURE: 97 F

## 2025-03-19 DIAGNOSIS — D63.1 ANEMIA DUE TO STAGE 3B CHRONIC KIDNEY DISEASE (MULTI): ICD-10-CM

## 2025-03-19 DIAGNOSIS — D46.9 MYELODYSPLASTIC SYNDROME, UNSPECIFIED (MULTI): ICD-10-CM

## 2025-03-19 DIAGNOSIS — D69.6 THROMBOCYTOPENIA (CMS-HCC): ICD-10-CM

## 2025-03-19 DIAGNOSIS — N18.32 ANEMIA DUE TO STAGE 3B CHRONIC KIDNEY DISEASE (MULTI): ICD-10-CM

## 2025-03-19 PROCEDURE — 99214 OFFICE O/P EST MOD 30 MIN: CPT | Performed by: INTERNAL MEDICINE

## 2025-03-19 PROCEDURE — 1126F AMNT PAIN NOTED NONE PRSNT: CPT | Performed by: INTERNAL MEDICINE

## 2025-03-19 PROCEDURE — 1159F MED LIST DOCD IN RCRD: CPT | Performed by: INTERNAL MEDICINE

## 2025-03-19 PROCEDURE — 3078F DIAST BP <80 MM HG: CPT | Performed by: INTERNAL MEDICINE

## 2025-03-19 PROCEDURE — 1157F ADVNC CARE PLAN IN RCRD: CPT | Performed by: INTERNAL MEDICINE

## 2025-03-19 PROCEDURE — 3075F SYST BP GE 130 - 139MM HG: CPT | Performed by: INTERNAL MEDICINE

## 2025-03-19 ASSESSMENT — ENCOUNTER SYMPTOMS
DIARRHEA: 0
CONSTIPATION: 0
BRUISES/BLEEDS EASILY: 1
FREQUENCY: 0
MYALGIAS: 1
COUGH: 0
FATIGUE: 1
LIGHT-HEADEDNESS: 0
ARTHRALGIAS: 1
VOMITING: 0
DYSURIA: 0
TROUBLE SWALLOWING: 0
APPETITE CHANGE: 0
NERVOUS/ANXIOUS: 0
UNEXPECTED WEIGHT CHANGE: 0
HEADACHES: 0
WEAKNESS: 0
NUMBNESS: 0
SLEEP DISTURBANCE: 0
ABDOMINAL PAIN: 0
SHORTNESS OF BREATH: 1
NAUSEA: 0

## 2025-03-19 ASSESSMENT — PAIN SCALES - GENERAL: PAINLEVEL_OUTOF10: 0-NO PAIN

## 2025-03-19 NOTE — PROGRESS NOTES
Patient ID:Zack Medrano is a 89 y.o. year old male patient with myelodysplasia         Referring Physician: No referring provider defined for this encounter.  Primary Care Provider: Fabiola Dietz MD    Chief Complaint  Chief Complaint   Patient presents with    Anemia     Stage 4 chronic kidney disease            History of the Present Illness  10/9/2019.  Visit with Dr. Mickey Nguyen for chronic kidney disease and hypertension.  Chief complaint was fatigue and need to lose weight     11/12/2019.  The patient has had long-term history of microcytic anemia going back to at least 2019 at which time his white count was 5.2 hemoglobin was 13.8 hematocrit 40.5 and a platelet count of 157,000.  His MCV was 105 MCHC was 34.  White blood cell differential count showed 66% polys, 17 lymphs, 11 monos and 4 eosinophils.     12/11/2019.  Seen by primary care for probable shingles treated with Valtrex with chief complaint of waxing and waning left flank pain radiating up under his ribs, somewhat responsive to exercise..  He had a history of pulmonary embolus and was on Eliquis long-term.     2/24/2020.  Seen by Dr. Howell for elevated PSA, BPH and erectile dysfunction.  PSA in 2017 was 2.94, in 2018 it was 2.74 and in 2020 it was 4.03.  We discussed biopsy and the patient wanted to have this followed and not biopsy.     3/9/2020.  Follow-up with NATALY Pulido primary care.  Referred to Dr. Bahena for colonoscopy and CT scan of the chest was ordered for follow-up of questionable lingular nodule.     3/11/2020.  CT scan of the chest showed a stable 6 mm nodular lesion near the fissure in the lingula.     3/13/2020.  Seen by Dr. Bahena who noted that the patient had had diarrhea off-and-on since cholecystectomy and had intermittent left-sided pain.  He had a history of colon polyps.     4/22/2015.  Colonoscopy showed that he had tubular adenoma     5/13/2020.  Seen by Dr. Migdalia New in follow-up.  She noted  that he had an abnormal CBC with macrocytic indices with a normal B12 level.  The patient denies alcohol use.  Hemoglobin A1c was 6.2.  BUN was 33 creatinine was 1.74.     8/31/2020.  Follow-up with Dr. Howell.  PSA had come down from 4.03-3.77     1/4/2021 through 1/12/2021.  Admitted to the hospital having been brought to the ED by squad after he fell forward on his toilet and was unable to get up.  He denied hitting his head or losing consciousness.  He blamed it on having back pain and shoulder pain for several weeks.  He was febrile with a temperature of 100.9 and hypoxic with pulse ox of 86% on room air.  Respirations were 24.  He was positive for COVID and was treated with intravenous antibiotics and remdesivir, increased supplemental oxygen.  He was able to be discharged to rehabilitation.     Repeat CBC showed white count 4.3 with a hemoglobin 12.9 hematocrit 38.8 with an MCV of 102 and a platelet count of 173,000.  White blood cell differential count showed minimal absolute lymphocytopenia at 0.6 with the lower limits of normal being 0.8.     3/1/2021.  Follow-up with Dr. Howell.  PSA was 9.9 in February.  Decided to have prostate biopsy done on 3/22/2021.  Results showed BPH.     10/6/2021.  Follow-up with Dr. Howell.  PSA in September was 2.77     4/6/2022.  CBC showed white count 4.3 with hemoglobin 11.7 hematocrit 34.4 with an MCV of 107.  Platelet count was 153,000.  White blood cell differential count was essentially normal.     5/31/2022.  CBC showed white count 3.6 with hemoglobin 11.6 hematocrit 34.4 and platelet count 117,000 with a normal white blood cell differential.     6/27/2022.  Referred to Dr. Dash because of pancytopenia by NATALY Gray.  CBC showed a white count of 3.9 with hemoglobin 12.3 hematocrit 36.1 and platelet count 129,000 with a normal differential.  PSA was 3.26.     The patient said that he felt well except for having increased fatigue.  He continued on long-term  anticoagulation.  He was able to carry out his ADLs.  He did complain of easy bruising.  He said that he had worked at [employer] for 12 years and also worked in making [employer].  Dr. Dash felt that there was a large component secondary to his chronic kidney disease.  There was no evidence of nutritional deficiency.  He checked him for hemolysis and monoclonal gammopathy and ordered an ultrasound for hepatosplenomegaly and testing for hepatitis, HIV and CHRISTINA.  He said he would rather observe the patient rather than pursuing a bone marrow biopsy.     7/27/2022.  Repeat lab data showed white count 3.9 with hemoglobin 12.3 hematocrit 36.1 and platelet count 129,000.  BUN was 36 creatinine was 1.78.  He had mild elevation of AST.  Retake count was 2.3%.  Flow cytometry showed a small clonal B-cell population.  Hepatitis B was nonreactive.  Hep C testing was negative.  Serum protein electrophoresis was normal.  Haptoglobin was normal.  LDH was normal.  He said that the possibility of MDS could not be ruled out.  Ultrasound showed fatty liver but no splenomegaly.  He plan for EPO level and NGS for myeloid mutation.     9/1/2022.  Started on allopurinol for hyperuricemia.     10/12/2022.  Seen by Dr. Dr. Dietz.  Patient was referred to dermatology for several skin lesions.  He was referred to neurology because of his tremor that was not improving.  She increased his glimepiride.     10/17/2022.  CBC showed white count 3.0 with hemoglobin 10.5 hematocrit 31.3 and a platelet count of 106,000 MCV was 111.     10/26/2022.  Follow-up with Dr. 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  He was suspicious for MDS.  He referred him for a bone marrow biopsy.     12/1/2022.  Follow-up with Dr. Dash.  Bone marrow biopsy showed low-grade MDS.  R IPSS score was low or very low depending on a karyotype which was still pending.  We discussed initiating erythropoietin with Aranesp but the  patient deferred and the decision was made to monitor him.     1/11/2023.  CBC showed white count 3.4 with hemoglobin 10.3 hematocrit 32.2 and a platelet count of 137,000.  MCV was 115.  White blood cell differential count was normal.     1/13/2023.  Follow-up with Dr. Dash.  The patient did say that he had following getting out of the shower but did not sustain any injuries.  He had been started by Dr. Nguyen on insulin for his diabetes which was helpful.  Patient continued to be active inside his house.  He remains on Eliquis.  BR-IPSS score is very low.  Counts remain stable.     3/31/2023.  CBC showed a white count of 2.6 with hemoglobin 8.7 hematocrit 27.1 and a platelet count 97,000.  MCV was 115.  White blood cell differential count showed that he had an absolute neutrophil count of 1.36.     4/10/2023.  CBC showed a white count of 2.5 with a hemoglobin 9.0 hematocrit of 27.7 with a platelet count of 92,000.  MCV was 114.     4/17/2023.  Follow-up with NATALY Gray.  Even though his hemoglobin had dropped to 9 the patient did not want to start his erythropoietin injections.     5/11/2023.  CBC showed white count of 2.5 with a hemoglobin of 7.6 hematocrit 23.8 and a platelet count of 101,000.  Absolute neutrophil count was 1.17.     5/15/2023 through 5/19/2023.  Admitted to the hospital with anemia and generalized weakness black stools dizziness with a hemoglobin of 6.7.  His BUN was 51 creatinine was 2.08.  CT scan of the abdomen showed no acute process.  There were findings consistent with atypical healing of the previous rib fracture with question Paget's disease although it was nonspecific.  He was transfused 2 units packed red blood cells.  EGD found 3 erosions in the cardia.  He was prescribed Carafate.     5/24/2023.  Follow-up with Dr. Dr. Dietz after discharge.  CBC showed white count 1.9 with hemoglobin 8.6 hematocrit 28.3 and a platelet count of 140,000.  Absolute neutrophil count was 1.06.  " Patient did not tolerate Carafate and stopped it.  He was prescribed oral iron twice a day.  He had not been prescribed a PPI which was then started.     6/1/2023.  CBC showed white count of 1.9 with hemoglobin 7.7 hematocrit 24.8 and a platelet count of 87,000.     6/13/2023.  CBC showed a white count of 1.9 with hemoglobin of 8.7 hematocrit 28.1 and platelet count of 89,000.     6/23/2023.  Followed up with Dr. Dr. Dietz who reported that he was hospitalized again in [place] for GI bleed.  At that time his Eliquis was discontinued and he was changed to Protonix and taken off his baby aspirin.  Once again he was started on Carafate but did not tolerate it.     7/15/2023.  CBC showed white count of 2.4 with hemoglobin 9.2 hematocrit 27.3 and a platelet count of 110,000.  Absolute neutrophil count was 1.36.     7/17/2023.  Follow-up with Malorie Shaffer.  The patient had recently had a colonoscopy and had \"cauterizations\" he noted that he had dyspnea with exertion but no resting shortness of breath.  He was on prednisone at that time.  His sugars were in the 300s.  He was on insulin.  His neuropathy has not changed and he continues to have tremors.  Current level was found to be saturated.  Once again the patient was reluctant to start NING.     8/14/2023.  Follow-up with Malorie Shaffer.  CBC showed white count 1.8 with a hemoglobin 8.9 hematocrit 26.9 and a platelet count of 100,000.  Absolute neutrophil count was 0.96.     9/6/2023.  CBC showed a white count of 1.8 with hemoglobin 9.5 hematocrit 28.8 and a platelet count of 85,000 with an absolute neutrophil count of 0.99.  9/13/2023.  Follow-up with Dr. Howell.  PSA has gone down to 1.87.     10/9/2023.  Follow-up with Malorie Shaffer who noted the patient remained on oxygen and CPAP.  He says that he is awakening in the middle of the night around 3 to 4:00 in the morning.  Sugars have been in the 300s.  Neuropathy was unchanged.  He had been started on erythropoietin " every 3 weeks     11/21/2023.  Follow-up with Malorie Shaffer.  No improvement in his hemoglobin.  White count had improved to 2.1 platelets were baseline.  He had been placed on 40 mg of prednisone and continued on erythropoietin 300 mcg every 3 weeks.     1/2/2024.  Follow-up with Malorie Shaffer.  No improvement in hemoglobin.  Symptomatically he was doing well.  Plan was to increase frequency of his darbepoetin to every 2 weeks.     2/13/2024.  Follow-up with Malorie Shaffer.  Now on erythropoietin injections every 2 weeks.  There was a discussion concerning repeat bone marrow biopsy with counts stayed low.     3/12/2024.  Follow-up with Malorie Shaffer.  No improvement in his hemoglobin.  Patient was agreeable to repeat bone marrow biopsy.  She also ordered a CAT scan of the chest abdomen and pelvis without contrast.     3/22/2024.  CAT scan showed severe coronary artery calcifications.  There is evidence of bronchiectasis in the lower lobes increased compared to previous CT scan from 2020.     Predominantly groundglass opacities with bibasilar prominence.  There is some subpleural sparing of the left upper lobe.  There is a 3 mm nodule in the right upper lobe which was unchanged for several years.  Multilevel anterior bridging osteophytes were noted consistent with DISH.  There is a small fat-containing periumbilical hernia and bilateral inguinal hernias.  Spleen size is at the upper limits of normal being 12.7 cm in length slightly increased when compared to study from 2019.  Cysts were seen in the kidneys with diffuse cortical atrophy and a 4 mm hyperdense focus in the interpolar region of the right kidney possibly a stone.  Prostatism was noted for gland measuring 6.2 cm.     3/28/2024.  Seen by Dr. Graham for shortness of breath.  He felt the patient had possible chronic interstitial lung disease versus periodic aspiration, hypoxia dyspnea on exertion.  Further tests were ordered including high-resolution CT scan,  complete pulmonary function testing, simple pulmonary stress test and follow-up.     4/9/2024.  Follow-up with Malorie Shaffer.  Erythropoietin injections continued every 2 weeks which the patient was tolerating well.     4/16/2024.  Bone marrow biopsy was performed showing a hypercellular bone marrow at 50% with dyserythropoiesis.  There are 1% blasts consistent with persistent myeloid neoplasm.  Flow cytometry showed a small clonal CD5 negative, CD10 negative B-cell population and a polytypic background.  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     There was a very small abnormal T-cell population that could be incidental.  Next generation sequencing showed 2 U2AF 1 variants, 1 of which was negative.  The other had been previously seen.     The marrow also showed dyserythropoiesis with bilobate forms and forms with irregular nuclear contours as well as blebbing.     4/23/2024.  CBC showed white count 1.5 with a hemoglobin of 8.3 hematocrit 25.9 and platelet count of 71,000 with 37% neutrophils, 53 lymphocytes, 4 monos, 4 eosinophils with an absolute neutrophil count of 0.57.     4/24/2024.  Return visit with Dr. Graham.  He noted that the high-resolution CT scan showed no significant change from the previous CT scan the month before.  Pulmonary function testing showed no response to bronchodilator and there was mild restrictive change with some suggestion of airway inflammation.  The patient had difficulty doing the test because of persistent coughing.  6-minute walk test showed that the patient was able to maintain saturations at 93% with 2 L of oxygen per minute continuously.  The test was terminated because of dyspnea.  With his hypoxia on room air it was recommended that he be on oxygen continuously to keep his saturation greater than or equal to 92%.  He continued  on oxygen with his CPAP at 3 L at nighttime.     5/10/2024.  This is my first visit with Mr. Medrano and his family.  We went over some of the results of the bone marrow biopsy.  I told him that I would have to wait until the chromosomal analysis returned.  I also note the possibility of low-grade lymphoma.  He may need to have an opinion with malignant hematology at [facility].  Stepdaughter who is here with him and his wife today is his advocate for maintaining his quality of life.  She is an employee at a local nursing facility and is concerned about many clients she has who seem to be getting treated aggressively.  We talked about myelodysplasia and the fact that he is not responding to erythropoietin.  He will continue on this treatment for now.  He is low counts were discussed and he knows that he is at higher risk for infection and bleeding.  He continues on oxygen supplementation continuously.  They will return in 2 weeks at which time laboratory tests will be performed.     He just had a lesion removed from the dorsum of his right hand at the base of his first and second fingers.  Stitches are still in place.  It seems to be healing well without any significant bleeding or infection.  He was told that it looked benign.     5/13/2024.  Follow-up with Dr. Aggie Danielle of cardiology who saw him in follow-up of his coronary artery disease with multiple stents having been placed, hypertension, dyslipidemia and previous pulmonary embolus related to COVID-19.  She had stopped his Eliquis.  She noted his myelodysplasia.  She noted that at some point his aspirin had been stopped but given his history of stents this was restarted and he tolerated it well.  She said that his blood pressure was well-controlled.  She noted blood sugars were between 150-2 50.  She noted the patient was supposed to be using oxygen with exertion but the patient was not sure what rate it was supposed to be.  She says that she would not stop his  aspirin unless his platelets were less than 50,000 with complications of bleeding.  She said it was very important to continue aspirin given a 15% chance of in-stent thrombosis with aspirin cessation.  She increased her furosemide to 80 mg twice a day because of bilateral lower extremity edema and spironolactone to 25 mg twice daily until symptoms improve and encouraged him to cut back on his salt intake.  She was see him again in about 9 months.     5/14/2024.  Follow-up with Dr. Santamaria of ophthalmology who started him on Naphcon-A drops 1 drop 3 times a day in both eyes for excessive tearing     5/15/2024.  Followed up with Dr. Dimas Renteria of podiatry who noted decreased pedal pulses 2 out of 4.  He had footcare.     5/24/2024.  Follow-up with Dr. Graham.  Review of his x-rays showed no change.  Physical examination showed decreased breath sounds but no inspiratory crackles that were previously heard.  He felt that the interstitial lung disease was stable and that his hypoxemia was improved on oxygen.  He plan to repeat CT scan in September.     5/24/2024.  He is here to follow-up on his bone marrow results.  I told him that the chromosomal analysis would not be forthcoming before because the specimen failed to grow in culture.  He continues to display a low-grade MDS with blast count of only 2%.  We reviewed the fact that he is recommended to have at least 6 months of his erythropoietin before we decide whether or not it is a failure.  He is agreeable to this.  We will increase his dose of darbepoetin.  He knows to be on the look out for any signs of infection or bleeding and to report this if it happens.  We reviewed his most recent CBC.  We will see him again in 2 weeks with his next injection.     5/30/2024.  Follow-up with Dr. Dr. Dietz for his Medicare wellness visit.  His magnesium dose was changed to twice daily indefinitely.     6/7/2024.  He is here today for his darbepoetin injection.  We have  increased his dose to 500 mcg every 2 weeks.  Will see if this has any effect.  Laboratory dated today shows that his sugar is 290 and his sodium is 134.  BUN is 46 and creatinine is 2.31.  CBC shows white count 1.5 with a hemoglobin 8.1 hematocrit 24.7 and a platelet count of 71,000 which is very much like his most recent CBC.  His absolute neutrophil count is 0.57.     I talked to him about his current status which he feels is fairly stable.  He is off oxygen.  He has had no bleeding and no signs of infection.  He understands that we have increased his dose of darbepoetin and we will watch his counts.  He will report any side effects.     He has continued to receive his darbepoetin injections.     7/15/2024.  Emergency department visit [facility].  He developed a rash on both lower extremities which started a week ago which is an erythematous papular rash.  He denied any pain or itching.  He did not have any vesicle formation.  It is bilateral.  He has had a shingles injection.  He was told that he had disseminated zoster and was started on valacyclovir a gram twice daily for 7 days.     7/19/2024.  Seen by Dr. Dr. Dietz who said that the rash has not gotten any worse and might be a little bit better.  She noted that there were no vesicles no open areas no areas of secondary infection.     7/19/2024.  He is here to receive his erythropoietin injection and to have a blood count.  I told him it is not likely that this is disseminated zoster because it is nowhere else on his body but only on his lower extremities.  This could be stasis dermatitis.  I do not see any signs of vesicle formation and there never were any.  It is mostly on the anterior shins.  It is all below the knee.     CBC today shows white count 1.4 with hemoglobin 8 hematocrit 25.4 platelet count 67,000 with 37 neutrophils 53 lymphocytes 5 monos 2 eosinophils.  He is serum chemistry showed a sugar of 285 BUN of 49 creatinine of 2.67.  We  told him he needed to keep his hydration status high.  He will continue with his injections today.  Blood pressure today is 153/64.     He has had no bleeding and no infection.  He has had no chest pain or pressure.  He is able to participate in his usual activities including mowing the yard.     He has continued to receive his darbepoetin and has followed up with podiatry for nail and callus care.     8/30/2024.  He is here in routine follow-up.  He says that he is doing well.  He has had no infections or bleeding.  He has had no mouth sores.  He has had continued swelling in his legs a little bit worse on the left than on the right.  Blood sugar was greater than 300 but he admits to dietary indiscretion.  Has had no chest pain.  His blood pressure was 149/63.  He says that his breathing is stable.  He is using his oxygen at home at night and when he travels that he does not have it on today.     His lab data has shown no sign of improvement so far with white count 1.3, hemoglobin 8.6 hematocrit 27.2 and a platelet count of 69,000.  White blood cell differential count shows 38% polys, 50 lymphs, 5 monos, 2 eosinophils.  We plan a 6-month trial of this dose of darbepoetin which will extend until November.  We will see him again in about a month.    9/4/2024.  CT scan of the chest showed mild streaky scarring in the lung bases bilaterally.  The previously seen groundglass opacities probably be minimally improved.  Calcified pleural plaques are seen without consolidation effusion or pneumothorax.  There is no adenopathy seen by CT size criteria.  Minimal hepatic steatosis was seen as well as calcified granuloma in the spleen and a somewhat atrophic pancreas.  DJD was seen in the spine.    9/5/2024.  Seen by Dr. Zambrano his nephrologist who ordered lab tests and felt that his fluctuating chemistries were related to diuretic therapy.  He noted myelodysplasia and that he was being followed for his anemia receiving NING.   He did not change any of his medications  And said that he would see him again in about 6 months    9/10/2024.  Follow-up by Dr. Graham at which time he told the patient he could decrease his oxygen to 2 and he would repeat a CT scan in 6 months and see him after that.    9/27/2024.  He is here today in routine follow-up.  He will get his shot today.  Laboratory data shows no improvement.  We reviewed that we will follow him for 6 months on this treatment to see if there is any improvement.    Mr. Medrano says that he has had no significant bleeding.  I see some red spots on the soft palate.  His appetite has been good and he has gained weight of 1 pound since last time I saw him about a month ago.  He has had no other signs of infection or deterioration in his status.  He knows he is at risk for serious infection or bleeding.  We will continue on this treatment pathway.  He will see him again in a month.    Interval Note  10/3/2024.  Seen by Dr. 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  BUN was 47 and creatinine was 2.33 with an EGFR of 26.  She noted that he did have some mild edema of the left lower leg and was in the habit of wearing compression stockings.  He denied any increase in shortness of breath.  She said that she wanted to stop his spironolactone which was 25 mg and appeared that it was started for edema.  She said that he would check his blood pressure and weights for the next 2 weeks and then have repeat laboratory data.  Spironolactone was discontinued.    10/11/2024.  Laboratory data showed a BUN of 50 and a creatinine of 2.6, worse than before EGFR was 22.  Sugar was 389.  On 10/21/2024 she told him to stay off spironolactone and call if he had increasing swelling or shortness of breath.  He was to continue weighing himself.    10/25/2024.  Repeat laboratory data showed a BUN of 45 and a creatinine of 2.41 with a sugar of 374.  He was continued on erythropoietin  injections.    11/8/2024.  BUN was 44 creatinine was 2.33 and sugar was 430.    11/19/2024.  [facility] emergency department visit for tremor.  The patient reported that his tremor was worse at night.  He denied any history of restless leg syndrome.  He said that his upper extremity tremor was relieved by movement.  He has a history of previous movement disorder or any other neurologic symptoms.  At that time he said that he was taking 20 mg of Lasix twice a day.  Aldactone was still on his medication list.  BUN is 43 creatinine was 2.45 and sugar was 373.  CT of the head showed no evidence of bleeding there was mild to moderate brain parenchymal atrophy and white matter changes.  He was referred to neurology.    11/22/2024.  BUN was 15 creatinine was 2.60.  Sugar was 353.  At that time it was documented that he was taking 60 mg of Lasix daily and continuing Aldactone.  He did have bilateral pedal edema.  He was continued on  protein injections.  He weighed 229 pounds.    11/25/2024.  BUN was 51 and creatinine was 2.64 with a sugar of 285.    12/9/2024.  BUN was 44 creatinine was 2.47 with a sugar of 313.    12/10/2024.  [facility] ED visit for a rash on his lower extremities.  He was taking Lasix 40 mg twice a day and Aldactone daily.  Blood pressure was 121/52 and he weighed 230 pounds.  There was some mild erythema in his left lower extremity but was not weeping.  He was slightly warm to the touch.  It was said that this could be early cellulitis and a prescription for Keflex was written.    12/16/2024.  He followed up with Dr. Dr. Dietz who said that they could switch antibiotics to doxycycline and recommended a follow-up appointment.  He was seen on the following day and continued on doxycycline for mild to moderate erythema of the inner ankle lower leg he was also treated with gabapentin for his essential tremor.    12/23/2024.  BUN was 43 creatinine was 2.46 with a sugar of 313.   Seen in follow-up by Malorie Shaffer was continued on Depakote with stable counts.    12/30/2024.  Followed up with Dr. Dr. Dietz with leg redness.  The patient said that he thought he had the flu.  His weight was 226 pounds.  Blood pressure was 126/74.  Laboratory data 2 days prior to the visit showed that his BUN was 52 and his creatinine was 2.62.  Sugar was 151.    1/2/2025.  Followed up with Dr. Supriya Nguyen with a decrease in his swelling and improvement in his legs lesions.  She documented that he had had 2 falls without lightheadedness or dizziness.      1/6/2025.  Laboratory data showed a sugar of 313 BUN of 41 and creatinine of 2.56 with an EGFR of 23.    1/15/2025.  Seen by Dr. Patrick Haque of [facility] neurology because of his tremors.  He mentioned that gabapentin can cause swelling.  He was recently discontinued.  Symptoms appear to be improved.  He noted the patient had already been on primidone.  He had complained of chronic numbness of the left thumb and right thumb.  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  He was reluctant to see orthopedic reduction and risks were recommended as well as exercises to maintain strength.  He said that he could continue primidone 50 mg p.o. nightly for essential tremor and increase the dose if needed and to avoid caffeine.    1/20/2025.  BUN was 47 creatinine was 2.70 with an EGFR of 22.  Sugar was 142.    2/3/2025.  Laboratory data showed a BUN of 46 creatinine 2.42 with a sugar of 218.  Follow-up with Malorie Shaffer at which time the patient said that he wanted to stay on erythropoietin.  Repeat bone marrow was discussed with the patient was reluctant to do that or to see Dr. Hess for second opinion.    2/17/2025.  Laboratory data showed a BUN of 49 creatinine 2.53 with a sugar of 300.    3/3/2025.  BUN was 57 creatinine was 2.86  with a sugar of 284.  Followed up with Malorie Shaffer still not wanting to get another opinion.  Discussion was around supportive care and making sure that he understood the dangers of cytopenias.    3/4/2025.  Follow-up with Dr. Dr. Dietz.  No changes made in his medications.    3/7/2025.  CT scan of the chest without contrast showed moderate basilar and subpleural predominant fibrosing interstitial lung disease with architectural distortion in the absence of honeycombing which have been waxing and waning the degree of inflammatory change had decreased over the past year.  Densely calcified coronary arteries were seen and a new right retroareolar nodular appearing soft tissue measuring 1.6 cm and could represent asymmetric clinically mass via but this needed to have follow-up.    3/14/2025.  Followed up with Dr. Graham.  He noted that pulse ox on room air was 90% and on repeat was 96.    3/19/2025.  He is here today with his wife.  We wanted to make sure that he understood that his counts were not doing well and that he had potential problems with infections, bleeding and anemia that might cause him to have chest pains or increasing shortness of breath.  We talked about the possibility of transfusions if he became more symptomatic.  He verbalized understanding.  He is not interested in another opinion at this time.  He is not interested in being treated more aggressively at this time.    He is more interested in going through his medicines and making sure that he needed all of them.  We did identify that he is on carvedilol and metoprolol.  He is also on sucralfate 3 times a day and pantoprazole 40 mg twice a day.  He says that he is not experiencing any heartburn and has not for a while.  We went through his medication list and noted what it was for.  I encouraged him to talk to primary care about these issues.    He has an appointment to see his cardiologist in the upcoming weeks.  His blood pressure today  137/67.  His most recent BUN and creatinine were 57 and 2.86 which I am sure have something to do with his state of hydration.  He currently says that he is taking 1-1/2 of a 40 mg Lasix tablet daily and continues to take Aldactone 25 mg daily.  He has prescriptions for both 25 and 50 mg of Aldactone.  He has an upcoming appointment to see Malorie Shaffer in about 2 weeks.    Comorbidities  GERD  Arthritis, DJD  BPH  Coronary artery disease with history of myocardial, status post stent infarction  History of dumping syndrome  Hyperlipidemia  History of skin cancer  Obesity  Obstructive sleep apnea on CPAP  Peripheral arterial disease  CKD 3  Diabetes with nephropathy  History of cholecystectomy and hernia repair  Essential tremor  Hypertension   Numbness in his fingers felt to be related to cervical disc disease.    Monitoring Parameters  Histories, physical examinations and CBCs with occasional bone marrow biopsies.     Review of Systems - Oncology   Review of Systems   Constitutional:  Positive for fatigue. Negative for appetite change and unexpected weight change.   HENT:  Negative for dental problem, mouth sores, nosebleeds and trouble swallowing.    Eyes:  Negative for visual disturbance.   Respiratory:  Positive for shortness of breath (with activity). Negative for cough.    Cardiovascular:  Positive for leg swelling.   Gastrointestinal:  Negative for abdominal pain, constipation, diarrhea, nausea and vomiting.   Endocrine: Negative for polyuria.   Genitourinary:  Negative for dysuria, frequency and urgency.   Musculoskeletal:  Positive for arthralgias and myalgias.   Skin:  Positive for pallor. Negative for rash.   Neurological:  Negative for weakness, light-headedness, numbness and headaches.   Hematological:  Bruises/bleeds easily.   Psychiatric/Behavioral:  Negative for self-injury, sleep disturbance and suicidal ideas. The patient is not nervous/anxious.         Past Medical History  Past Medical History:    Diagnosis Date    Abnormal levels of other serum enzymes     Abnormal liver enzymes    Dependence on other enabling machines and devices     CPAP (continuous positive airway pressure) dependence    Encounter for examination of eyes and vision without abnormal findings     Diabetic eye exam    Localized edema     Bilateral leg edema    Old myocardial infarction     History of myocardial infarction    Other conditions influencing health status 01/08/2020    Uncontrolled diabetes mellitus type 2 without complications    Pain in unspecified ankle and joints of unspecified foot     Arthralgia of ankle    Personal history of other diseases of the digestive system     History of gastroesophageal reflux (GERD)    Personal history of other diseases of the digestive system     History of dumping syndrome    Personal history of other endocrine, nutritional and metabolic disease 11/12/2020    History of type 2 diabetes mellitus    Presence of coronary angioplasty implant and graft     History of coronary artery stent placement    Rash and other nonspecific skin eruption 12/11/2019    Rash    Unspecified abdominal hernia without obstruction or gangrene     Hernia    Unspecified abdominal pain 05/19/2021    Abdominal pain, left lateral        Surgical History  Past Surgical History:   Procedure Laterality Date    OTHER SURGICAL HISTORY  10/09/2019    Arterial stent placement    OTHER SURGICAL HISTORY  10/09/2019    Cholecystectomy    OTHER SURGICAL HISTORY  10/09/2019    Hernia repair       Social History  Social History     Tobacco Use    Smoking status: Never    Smokeless tobacco: Never   Vaping Use    Vaping status: Never Used   Substance Use Topics    Alcohol use: Never    Drug use: Never        Family History  family history includes Colon cancer in his mother and another family member.       Current Medications  Current Outpatient Medications   Medication Instructions    allopurinol (ZYLOPRIM) 100 mg, oral, Daily     "amLODIPine (NORVASC) 5 mg, oral, Daily before breakfast    ascorbic acid (VITAMIN C) 250 mg    aspirin 81 mg, Daily    atorvastatin (LIPITOR) 40 mg, oral, Daily    blood-glucose sensor (FreeStyle Sindi 3 Sensor) device Use as directed    carvedilol (COREG) 6.25 mg, 2 times daily    ciprofloxacin (CIPRO) 500 mg, oral, Daily    cyanocobalamin, vitamin B-12, (Vitamin B-12) 1,000 mcg tablet extended release 1 tablet    FeroSuL tablet 1 tablet, Every other day    flash glucose scanning reader (FreeStyle Sindi 2 [place]) misc Use as instructed    flash glucose sensor kit (FreeStyle Sindi 2 Sensor) kit Use as directed    FreeStyle Sindi 2 Sensor kit 1 each, subcutaneous, As needed, Use as instructed    furosemide (Lasix) 40 mg tablet TAKE 1.5 TABLET BY MOUTH EVERY DAY    gabapentin (NEURONTIN) 300 mg, oral, 2 times daily    lancets 33 gauge misc 2 times daily    Lantus U-100 Insulin 60 Units, subcutaneous, Nightly    metoprolol tartrate (LOPRESSOR) 50 mg, oral, 2 times daily    multivit-minerals/folic acid (CENTRUM ADULTS ORAL) Take by mouth.    nitroglycerin (NITROSTAT) 0.4 mg, sublingual, Every 5 min PRN    NON FORMULARY Truetrack test In vitro strip; Use as directed to check blood sugar  2 times daily    pantoprazole (PROTONIX) 40 mg, oral, 2 times daily    pen needle, diabetic (Pen Needle) 32 gauge x 5/32\" needle 1 each, miscellaneous, Daily    primidone (MYSOLINE) 50 mg, oral, Nightly    spironolactone (Aldactone) 50 mg tablet TAKE 1 TABLET ( 50 MG) BY MOUTH DAILY FOR 30 DAYS. Hold if serum potassium is more than 5.0    spironolactone (ALDACTONE) 25 mg, oral, Daily    sucralfate (CARAFATE) 1 g, oral, 3 times daily (morning, midday, late afternoon)    vitamins A,C,E-zinc-copper 2,148 mcg-113 mg-45 mg-17.4mg tablet Take by mouth. SUPER VIEW           OARRS Review  I have personally reviewed the OARRS report for Zack Medrano. I have considered the risks of abuse, dependence, addiction and diversion.  He continues on " gabapentin through primary care.    Vital Signs  /69   Pulse 67   Temp 36.1 °C (97 °F) (Skin)   Resp 16   Wt 103 kg (227 lb 3.2 oz)   SpO2 93%   BMI 35.58 kg/m²      Physical Exam  Vitals and nursing note reviewed. Exam conducted with a chaperone present.   Constitutional:       General: He is not in acute distress.     Appearance: He is obese. He is ill-appearing. He is not toxic-appearing.      Comments: He is a well-developed well-nourished overweight elderly white male who is here today with his wife.  He does not have his oxygen on.  He appears to be minimally chronically ill and pale.  He is in good spirits.     HENT:      Head: Normocephalic and atraumatic.      Nose: Nose normal.      Mouth/Throat:      Comments: He does have several areas of erythema on the soft palate on the right side.  He has no evidence of thrush.  Eyes:      General: No scleral icterus.     Extraocular Movements: Extraocular movements intact.      Conjunctiva/sclera: Conjunctivae normal.      Pupils: Pupils are equal, round, and reactive to light.   Neck:      Comments: No significant lymphadenopathy is palpated in the head neck region, supraclavicular or axillary areas bilaterally.  Cardiovascular:      Rate and Rhythm: Normal rate and regular rhythm.      Heart sounds: Murmur: flow murmur.   Pulmonary:      Effort: Pulmonary effort is normal.      Comments: He is not on oxygen today.  Abdominal:      Palpations: Abdomen is soft.   Musculoskeletal:         General: Normal range of motion.      Cervical back: Normal range of motion. No rigidity or tenderness.      Right lower leg: Edema present.      Left lower leg: Edema present.   Lymphadenopathy:      Cervical: No cervical adenopathy.   Skin:     General: Skin is warm and dry.      Coloration: Skin is pale. Skin is not jaundiced.      Comments: Scattered bruises mostly on his arms    Neurological:      General: No focal deficit present.      Mental Status: He is alert  and oriented to person, place, and time. Mental status is at baseline.      Cranial Nerves: Cranial nerve deficit present.      Motor: No weakness.      Gait: Gait normal.      Comments: He is hard of hearing   Psychiatric:         Mood and Affect: Mood normal.         Behavior: Behavior normal.         Thought Content: Thought content normal.         Judgment: Judgment normal.      Comments: He is pleasant and interactive.          Current Laboratory Data  Recent Results (from the past week)   CBC and Auto Differential    Collection Time: 03/17/25  9:18 AM   Result Value Ref Range    WBC 1.0 (LL) 4.4 - 11.3 x10*3/uL    nRBC 0.0 0.0 - 0.0 /100 WBCs    RBC 2.41 (L) 4.50 - 5.90 x10*6/uL    Hemoglobin 8.3 (L) 13.5 - 17.5 g/dL    Hematocrit 25.4 (L) 41.0 - 52.0 %     (H) 80 - 100 fL    MCH 34.4 (H) 26.0 - 34.0 pg    MCHC 32.7 32.0 - 36.0 g/dL    RDW 19.6 (H) 11.5 - 14.5 %    Platelets 43 (L) 150 - 450 x10*3/uL    Immature Granulocytes %, Automated 1.0 (H) 0.0 - 0.9 %    Immature Granulocytes Absolute, Automated 0.01 0.00 - 0.50 x10*3/uL   Manual Differential    Collection Time: 03/17/25  9:18 AM   Result Value Ref Range    Neutrophils %, Manual 37.0 40.0 - 80.0 %    Lymphocytes %, Manual 53.0 13.0 - 44.0 %    Monocytes %, Manual 4.0 2.0 - 10.0 %    Eosinophils %, Manual 4.0 0.0 - 6.0 %    Basophils %, Manual 0.0 0.0 - 2.0 %    Atypical Lymphocytes %, Manual 2.0 0.0 - 2.0 %    Seg Neutrophils Absolute, Manual 0.37 (L) 1.60 - 5.00 x10*3/uL    Lymphocytes Absolute, Manual 0.53 (L) 0.80 - 3.00 x10*3/uL    Monocytes Absolute, Manual 0.04 (L) 0.05 - 0.80 x10*3/uL    Eosinophils Absolute, Manual 0.04 0.00 - 0.40 x10*3/uL    Basophils Absolute, Manual 0.00 0.00 - 0.10 x10*3/uL    Atypical Lymphs Absolute, Manual 0.02 0.00 - 0.30 x10*3/uL    Total Cells Counted 100     RBC Morphology See Below     Ovalocytes Few           Recent Imaging  CT chest wo IV contrast    Result Date: 9/6/2024  Interpreted By:  Mick Figueroa,  STUDY: CT CHEST WO IV CONTRAST;  9/4/2024 1:47 pm   INDICATION: Signs/Symptoms:interstitual lung disease.   COMPARISON: 04/05/2024   ACCESSION NUMBER(S): [id number]   ORDERING CLINICIAN: DI CHA   TECHNIQUE: Helical data acquisition of the chest was obtained  without IV contrast material.  Images were reformatted in axial, coronal, and sagittal planes.   FINDINGS: Lungs and Pleura: Mild streaky scarring in the lung bases bilaterally. The previously seen ground-glass opacities predominantly lower for low are only minimally improved. Calcified pleural plaques. No pulmonary consolidation. No pleural effusion. No pneumothorax.   Mediastinum and axilla: No adenopathy by CT size criteria. Cardiomegaly. No pericardial effusion. Coronary artery calcifications. Mild descending thoracic aortic aneurysm measuring 3 cm.   Visualized Upper Abdomen: Status post cholecystectomy. Minimal hepatic steatosis. Calcified granulomas in the spleen. Somewhat atrophic pancreas.   MSK/Chest Wall: Multilevel degenerative change in the spine.         Stable streaky scarring in the lung bases. Only minimal improvement of previously seen ground-glass opacities.   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  Pancytopenia.  The patient has unusual bone marrow findings.  Chromosomal analysis will not be done due to failure of the specimen to grow.  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  He also has components of myelodysplasia.  He is not responding to erythropoietin.  We will increase his dose to 500 mcg every other week.  He continues to be at high risk for bleeding and infection with a low ANC and low platelet count.  He has had previous GI bleeds.  He is currently back on his  aspirin per follow-up with   Day for fear of clotting off his stents.  He is off Eliquis.  He will get his treatment today with darbepoetin and continue every 14 days.    He is not interested in the second marina at this time and does not want to be treated more aggressively.  We will continue to be supportive.  He will follow-up with Malorie Shaffer.    [place] the visit from Dr. Danielle2.  Multiple comorbidities.  These include but are not limited to:  GERD  Arthritis, DJD  BPH  Coronary artery disease with history of myocardial, status post stent infarction  History of dumping syndrome  Hyperlipidemia  History of skin cancer  Obesity  Obstructive sleep apnea on CPAP  Peripheral arterial disease  CKD 3  Diabetes with nephropathy  History of cholecystectomy and hernia repair  Essential tremor  Hypertension   Numbness in his fingers felt to be related to cervical disc disease.     3.  Rash on his legs.  I think this looks more like stasis dermatitis than disseminated zoster.  If it were disseminated zoster he would have it on other areas of his body.     He will follow-up with Malorie Shaffer.  We would be glad to see him again as needed.  He will continue his shots every 2 weeks.  He knows that he should call in the interim should he have any change in his status, questions or concerns.         Ayah Carmichael MD

## 2025-03-19 NOTE — PATIENT INSTRUCTIONS
Reviewed labs and recent medical history.  Discussed his low blood counts and again discussed the possibility of chemotherapy and other treatment options.  Reviewed that it is okay to stay on the course that we are on at this time. He will consider this.  Continue the antibiotic for now while your white blood cell count is low.  Reviewed his medications and asked the question about why he is on 2 beta blockers and also why is he still on the carafate? He will ask his primary provider about these questions.  Also reviewed with him that when his blood counts get low that he can get a blood transfusion if needed.  Keep next appointment with NP and we are available for further questions.

## 2025-03-19 NOTE — PROGRESS NOTES
No change in tx plan- will continue labs and epoetin every 2 weeks- already has follow up with CNP 4/14  Pt has a couple med issues to talk to his PCP about  Instructed pt to all  me for any questions or if he changes his mind for tx  Reviewed AVS with patient- patient verbalizes understanding

## 2025-03-31 ENCOUNTER — HOSPITAL ENCOUNTER (INPATIENT)
Facility: HOSPITAL | Age: OVER 89
LOS: 3 days | Discharge: NURSING, REHAB OR LONG-TERM CARE | End: 2025-04-04
Attending: EMERGENCY MEDICINE | Admitting: INTERNAL MEDICINE
Payer: MEDICARE

## 2025-03-31 ENCOUNTER — INFUSION (OUTPATIENT)
Dept: HEMATOLOGY/ONCOLOGY | Facility: CLINIC | Age: OVER 89
End: 2025-03-31
Payer: MEDICARE

## 2025-03-31 VITALS
WEIGHT: 225.31 LBS | DIASTOLIC BLOOD PRESSURE: 51 MMHG | TEMPERATURE: 97.3 F | SYSTOLIC BLOOD PRESSURE: 116 MMHG | RESPIRATION RATE: 18 BRPM | BODY MASS INDEX: 35.29 KG/M2 | HEART RATE: 82 BPM | OXYGEN SATURATION: 95 %

## 2025-03-31 DIAGNOSIS — R60.0 LOCALIZED EDEMA: ICD-10-CM

## 2025-03-31 DIAGNOSIS — N18.9 ACUTE KIDNEY INJURY SUPERIMPOSED ON CHRONIC KIDNEY DISEASE: Primary | ICD-10-CM

## 2025-03-31 DIAGNOSIS — D69.6 THROMBOCYTOPENIA (CMS-HCC): ICD-10-CM

## 2025-03-31 DIAGNOSIS — D61.818 OTHER PANCYTOPENIA (MULTI): ICD-10-CM

## 2025-03-31 DIAGNOSIS — D63.1 ANEMIA DUE TO STAGE 4 CHRONIC KIDNEY DISEASE: ICD-10-CM

## 2025-03-31 DIAGNOSIS — D70.9 NEUTROPENIA, UNSPECIFIED TYPE: ICD-10-CM

## 2025-03-31 DIAGNOSIS — G25.0 TREMOR, ESSENTIAL: ICD-10-CM

## 2025-03-31 DIAGNOSIS — N18.4 ANEMIA DUE TO STAGE 4 CHRONIC KIDNEY DISEASE: ICD-10-CM

## 2025-03-31 DIAGNOSIS — I73.9 PERIPHERAL VASCULAR DISEASE, UNSPECIFIED (CMS-HCC): ICD-10-CM

## 2025-03-31 DIAGNOSIS — D63.1 ANEMIA DUE TO STAGE 3B CHRONIC KIDNEY DISEASE: ICD-10-CM

## 2025-03-31 DIAGNOSIS — N17.9 ACUTE KIDNEY INJURY SUPERIMPOSED ON CHRONIC KIDNEY DISEASE: Primary | ICD-10-CM

## 2025-03-31 DIAGNOSIS — D64.9 ANEMIA, UNSPECIFIED TYPE: ICD-10-CM

## 2025-03-31 DIAGNOSIS — D46.9 MYELODYSPLASTIC SYNDROME, UNSPECIFIED (MULTI): ICD-10-CM

## 2025-03-31 DIAGNOSIS — N18.32 ANEMIA DUE TO STAGE 3B CHRONIC KIDNEY DISEASE: ICD-10-CM

## 2025-03-31 DIAGNOSIS — M79.89 SWELLING OF BOTH LOWER EXTREMITIES: ICD-10-CM

## 2025-03-31 DIAGNOSIS — N18.4 STAGE 4 CHRONIC KIDNEY DISEASE (MULTI): ICD-10-CM

## 2025-03-31 LAB
ABO GROUP (TYPE) IN BLOOD: NORMAL
ANION GAP SERPL CALC-SCNC: 16 MMOL/L (ref 10–20)
ANTIBODY SCREEN: NORMAL
APPEARANCE UR: CLEAR
BASOPHILS # BLD MANUAL: 0 X10*3/UL (ref 0–0.1)
BASOPHILS NFR BLD MANUAL: 0 %
BILIRUB UR STRIP.AUTO-MCNC: NEGATIVE MG/DL
BUN SERPL-MCNC: 92 MG/DL (ref 6–23)
CALCIUM SERPL-MCNC: 9.3 MG/DL (ref 8.6–10.3)
CARDIAC TROPONIN I PNL SERPL HS: 10 NG/L (ref 0–20)
CARDIAC TROPONIN I PNL SERPL HS: 11 NG/L (ref 0–20)
CHLORIDE SERPL-SCNC: 100 MMOL/L (ref 98–107)
CO2 SERPL-SCNC: 24 MMOL/L (ref 21–32)
COLOR UR: NORMAL
CREAT SERPL-MCNC: 3.5 MG/DL (ref 0.5–1.3)
DACRYOCYTES BLD QL SMEAR: ABNORMAL
EGFRCR SERPLBLD CKD-EPI 2021: 16 ML/MIN/1.73M*2
EOSINOPHIL # BLD MANUAL: 0.03 X10*3/UL (ref 0–0.4)
EOSINOPHIL NFR BLD MANUAL: 3 %
ERYTHROCYTE [DISTWIDTH] IN BLOOD BY AUTOMATED COUNT: 19.9 % (ref 11.5–14.5)
GLUCOSE BLD MANUAL STRIP-MCNC: 156 MG/DL (ref 74–99)
GLUCOSE SERPL-MCNC: 263 MG/DL (ref 74–99)
GLUCOSE UR STRIP.AUTO-MCNC: NORMAL MG/DL
HCT VFR BLD AUTO: 21.3 % (ref 41–52)
HGB BLD-MCNC: 7 G/DL (ref 13.5–17.5)
IMM GRANULOCYTES # BLD AUTO: 0 X10*3/UL (ref 0–0.5)
IMM GRANULOCYTES NFR BLD AUTO: 0 % (ref 0–0.9)
KETONES UR STRIP.AUTO-MCNC: NEGATIVE MG/DL
LEUKOCYTE ESTERASE UR QL STRIP.AUTO: NEGATIVE
LYMPHOCYTES # BLD MANUAL: 0.55 X10*3/UL (ref 0.8–3)
LYMPHOCYTES NFR BLD MANUAL: 50 %
MAGNESIUM SERPL-MCNC: 1.94 MG/DL (ref 1.6–2.4)
MCH RBC QN AUTO: 34.3 PG (ref 26–34)
MCHC RBC AUTO-ENTMCNC: 32.9 G/DL (ref 32–36)
MCV RBC AUTO: 104 FL (ref 80–100)
MONOCYTES # BLD MANUAL: 0.08 X10*3/UL (ref 0.05–0.8)
MONOCYTES NFR BLD MANUAL: 7 %
NEUTS SEG # BLD MANUAL: 0.44 X10*3/UL (ref 1.6–5)
NEUTS SEG NFR BLD MANUAL: 40 %
NITRITE UR QL STRIP.AUTO: NEGATIVE
NRBC BLD MANUAL-RTO: 1 % (ref 0–0)
NRBC BLD-RTO: 0 /100 WBCS (ref 0–0)
OVALOCYTES BLD QL SMEAR: ABNORMAL
PH UR STRIP.AUTO: 6 [PH]
PLATELET # BLD AUTO: 62 X10*3/UL (ref 150–450)
POLYCHROMASIA BLD QL SMEAR: ABNORMAL
POTASSIUM SERPL-SCNC: 4.8 MMOL/L (ref 3.5–5.3)
PROT UR STRIP.AUTO-MCNC: NEGATIVE MG/DL
RBC # BLD AUTO: 2.04 X10*6/UL (ref 4.5–5.9)
RBC # UR STRIP.AUTO: NEGATIVE MG/DL
RBC MORPH BLD: ABNORMAL
RH FACTOR (ANTIGEN D): NORMAL
SODIUM SERPL-SCNC: 135 MMOL/L (ref 136–145)
SP GR UR STRIP.AUTO: 1.01
TOTAL CELLS COUNTED BLD: 100
UROBILINOGEN UR STRIP.AUTO-MCNC: NORMAL MG/DL
WBC # BLD AUTO: 1.1 X10*3/UL (ref 4.4–11.3)

## 2025-03-31 PROCEDURE — 2500000001 HC RX 250 WO HCPCS SELF ADMINISTERED DRUGS (ALT 637 FOR MEDICARE OP)

## 2025-03-31 PROCEDURE — 36415 COLL VENOUS BLD VENIPUNCTURE: CPT

## 2025-03-31 PROCEDURE — 96372 THER/PROPH/DIAG INJ SC/IM: CPT

## 2025-03-31 PROCEDURE — 96361 HYDRATE IV INFUSION ADD-ON: CPT

## 2025-03-31 PROCEDURE — 99223 1ST HOSP IP/OBS HIGH 75: CPT

## 2025-03-31 PROCEDURE — 36415 COLL VENOUS BLD VENIPUNCTURE: CPT | Performed by: EMERGENCY MEDICINE

## 2025-03-31 PROCEDURE — 2500000004 HC RX 250 GENERAL PHARMACY W/ HCPCS (ALT 636 FOR OP/ED): Performed by: EMERGENCY MEDICINE

## 2025-03-31 PROCEDURE — 2500000002 HC RX 250 W HCPCS SELF ADMINISTERED DRUGS (ALT 637 FOR MEDICARE OP, ALT 636 FOR OP/ED)

## 2025-03-31 PROCEDURE — 81003 URINALYSIS AUTO W/O SCOPE: CPT | Performed by: EMERGENCY MEDICINE

## 2025-03-31 PROCEDURE — 84484 ASSAY OF TROPONIN QUANT: CPT | Performed by: EMERGENCY MEDICINE

## 2025-03-31 PROCEDURE — 86923 COMPATIBILITY TEST ELECTRIC: CPT

## 2025-03-31 PROCEDURE — 94664 DEMO&/EVAL PT USE INHALER: CPT

## 2025-03-31 PROCEDURE — 2500000005 HC RX 250 GENERAL PHARMACY W/O HCPCS

## 2025-03-31 PROCEDURE — P9016 RBC LEUKOCYTES REDUCED: HCPCS

## 2025-03-31 PROCEDURE — 2500000005 HC RX 250 GENERAL PHARMACY W/O HCPCS: Performed by: EMERGENCY MEDICINE

## 2025-03-31 PROCEDURE — G0378 HOSPITAL OBSERVATION PER HR: HCPCS

## 2025-03-31 PROCEDURE — 86850 RBC ANTIBODY SCREEN: CPT | Performed by: EMERGENCY MEDICINE

## 2025-03-31 PROCEDURE — 9420000001 HC RT PATIENT EDUCATION 5 MIN

## 2025-03-31 PROCEDURE — 82947 ASSAY GLUCOSE BLOOD QUANT: CPT

## 2025-03-31 PROCEDURE — 36430 TRANSFUSION BLD/BLD COMPNT: CPT

## 2025-03-31 PROCEDURE — 86901 BLOOD TYPING SEROLOGIC RH(D): CPT | Performed by: EMERGENCY MEDICINE

## 2025-03-31 PROCEDURE — 80048 BASIC METABOLIC PNL TOTAL CA: CPT | Performed by: EMERGENCY MEDICINE

## 2025-03-31 PROCEDURE — 99285 EMERGENCY DEPT VISIT HI MDM: CPT | Mod: 25 | Performed by: EMERGENCY MEDICINE

## 2025-03-31 PROCEDURE — 82270 OCCULT BLOOD FECES: CPT | Mod: SAMLAB | Performed by: EMERGENCY MEDICINE

## 2025-03-31 PROCEDURE — 94760 N-INVAS EAR/PLS OXIMETRY 1: CPT

## 2025-03-31 PROCEDURE — 85027 COMPLETE CBC AUTOMATED: CPT

## 2025-03-31 PROCEDURE — 85007 BL SMEAR W/DIFF WBC COUNT: CPT

## 2025-03-31 PROCEDURE — 83735 ASSAY OF MAGNESIUM: CPT | Performed by: EMERGENCY MEDICINE

## 2025-03-31 PROCEDURE — 2500000004 HC RX 250 GENERAL PHARMACY W/ HCPCS (ALT 636 FOR OP/ED): Mod: JZ,EC

## 2025-03-31 RX ORDER — ATORVASTATIN CALCIUM 40 MG/1
40 TABLET, FILM COATED ORAL DAILY
Status: DISCONTINUED | OUTPATIENT
Start: 2025-04-01 | End: 2025-04-04 | Stop reason: HOSPADM

## 2025-03-31 RX ORDER — ACETAMINOPHEN 650 MG/1
650 SUPPOSITORY RECTAL EVERY 4 HOURS PRN
Status: DISCONTINUED | OUTPATIENT
Start: 2025-03-31 | End: 2025-04-04 | Stop reason: HOSPADM

## 2025-03-31 RX ORDER — ALBUTEROL SULFATE 0.83 MG/ML
3 SOLUTION RESPIRATORY (INHALATION) AS NEEDED
OUTPATIENT
Start: 2025-04-14

## 2025-03-31 RX ORDER — GABAPENTIN 100 MG/1
100 CAPSULE ORAL 2 TIMES DAILY
Status: DISCONTINUED | OUTPATIENT
Start: 2025-03-31 | End: 2025-04-01

## 2025-03-31 RX ORDER — ACETAMINOPHEN 325 MG/1
650 TABLET ORAL EVERY 4 HOURS PRN
Status: DISCONTINUED | OUTPATIENT
Start: 2025-03-31 | End: 2025-04-04 | Stop reason: HOSPADM

## 2025-03-31 RX ORDER — ONDANSETRON 4 MG/1
4 TABLET, ORALLY DISINTEGRATING ORAL EVERY 8 HOURS PRN
Status: DISCONTINUED | OUTPATIENT
Start: 2025-03-31 | End: 2025-04-04 | Stop reason: HOSPADM

## 2025-03-31 RX ORDER — ASCORBIC ACID 250 MG
250 TABLET ORAL DAILY
Status: DISCONTINUED | OUTPATIENT
Start: 2025-04-01 | End: 2025-04-04 | Stop reason: HOSPADM

## 2025-03-31 RX ORDER — AMLODIPINE BESYLATE 5 MG/1
5 TABLET ORAL
Status: DISCONTINUED | OUTPATIENT
Start: 2025-04-01 | End: 2025-04-02

## 2025-03-31 RX ORDER — DEXTROSE 50 % IN WATER (D50W) INTRAVENOUS SYRINGE
12.5
Status: DISCONTINUED | OUTPATIENT
Start: 2025-03-31 | End: 2025-04-04 | Stop reason: HOSPADM

## 2025-03-31 RX ORDER — DIPHENHYDRAMINE HYDROCHLORIDE 50 MG/ML
50 INJECTION, SOLUTION INTRAMUSCULAR; INTRAVENOUS AS NEEDED
OUTPATIENT
Start: 2025-04-14

## 2025-03-31 RX ORDER — INSULIN LISPRO 100 [IU]/ML
0-10 INJECTION, SOLUTION INTRAVENOUS; SUBCUTANEOUS
Status: DISCONTINUED | OUTPATIENT
Start: 2025-04-01 | End: 2025-04-04 | Stop reason: HOSPADM

## 2025-03-31 RX ORDER — ONDANSETRON HYDROCHLORIDE 2 MG/ML
4 INJECTION, SOLUTION INTRAVENOUS EVERY 8 HOURS PRN
Status: DISCONTINUED | OUTPATIENT
Start: 2025-03-31 | End: 2025-04-04 | Stop reason: HOSPADM

## 2025-03-31 RX ORDER — FERROUS SULFATE 325(65) MG
1 TABLET ORAL EVERY OTHER DAY
Status: DISCONTINUED | OUTPATIENT
Start: 2025-04-02 | End: 2025-04-04 | Stop reason: HOSPADM

## 2025-03-31 RX ORDER — EPINEPHRINE 0.3 MG/.3ML
0.3 INJECTION SUBCUTANEOUS EVERY 5 MIN PRN
OUTPATIENT
Start: 2025-04-14

## 2025-03-31 RX ORDER — FAMOTIDINE 10 MG/ML
20 INJECTION, SOLUTION INTRAVENOUS ONCE AS NEEDED
OUTPATIENT
Start: 2025-04-14

## 2025-03-31 RX ORDER — PRIMIDONE 50 MG/1
50 TABLET ORAL NIGHTLY
Status: DISCONTINUED | OUTPATIENT
Start: 2025-04-01 | End: 2025-04-04 | Stop reason: HOSPADM

## 2025-03-31 RX ORDER — DEXTROSE 50 % IN WATER (D50W) INTRAVENOUS SYRINGE
25
Status: DISCONTINUED | OUTPATIENT
Start: 2025-03-31 | End: 2025-04-04 | Stop reason: HOSPADM

## 2025-03-31 RX ORDER — INSULIN GLARGINE 100 [IU]/ML
60 INJECTION, SOLUTION SUBCUTANEOUS EVERY 24 HOURS
Status: DISCONTINUED | OUTPATIENT
Start: 2025-03-31 | End: 2025-04-04 | Stop reason: HOSPADM

## 2025-03-31 RX ORDER — CARVEDILOL 6.25 MG/1
6.25 TABLET ORAL 2 TIMES DAILY
Status: DISCONTINUED | OUTPATIENT
Start: 2025-03-31 | End: 2025-04-04 | Stop reason: HOSPADM

## 2025-03-31 RX ORDER — METOPROLOL TARTRATE 50 MG/1
50 TABLET ORAL 2 TIMES DAILY
Status: DISCONTINUED | OUTPATIENT
Start: 2025-03-31 | End: 2025-04-02

## 2025-03-31 RX ORDER — ALLOPURINOL 100 MG/1
100 TABLET ORAL DAILY
Status: DISCONTINUED | OUTPATIENT
Start: 2025-04-01 | End: 2025-04-04 | Stop reason: HOSPADM

## 2025-03-31 RX ORDER — HEPARIN SODIUM 5000 [USP'U]/ML
5000 INJECTION, SOLUTION INTRAVENOUS; SUBCUTANEOUS EVERY 8 HOURS
Status: CANCELLED | OUTPATIENT
Start: 2025-03-31

## 2025-03-31 RX ORDER — TALC
3 POWDER (GRAM) TOPICAL NIGHTLY PRN
Status: DISCONTINUED | OUTPATIENT
Start: 2025-03-31 | End: 2025-04-04 | Stop reason: HOSPADM

## 2025-03-31 RX ORDER — ACETAMINOPHEN 160 MG/5ML
650 SOLUTION ORAL EVERY 4 HOURS PRN
Status: DISCONTINUED | OUTPATIENT
Start: 2025-03-31 | End: 2025-04-04 | Stop reason: HOSPADM

## 2025-03-31 RX ORDER — SUCRALFATE 1 G/1
1 TABLET ORAL
Status: DISCONTINUED | OUTPATIENT
Start: 2025-04-01 | End: 2025-04-04 | Stop reason: HOSPADM

## 2025-03-31 RX ORDER — LANOLIN ALCOHOL/MO/W.PET/CERES
1000 CREAM (GRAM) TOPICAL DAILY
Status: DISCONTINUED | OUTPATIENT
Start: 2025-04-01 | End: 2025-04-04 | Stop reason: HOSPADM

## 2025-03-31 RX ORDER — ASPIRIN 81 MG/1
81 TABLET ORAL DAILY
Status: DISCONTINUED | OUTPATIENT
Start: 2025-04-01 | End: 2025-04-04 | Stop reason: HOSPADM

## 2025-03-31 RX ORDER — PANTOPRAZOLE SODIUM 40 MG/10ML
40 INJECTION, POWDER, LYOPHILIZED, FOR SOLUTION INTRAVENOUS 2 TIMES DAILY
Status: DISCONTINUED | OUTPATIENT
Start: 2025-03-31 | End: 2025-04-04 | Stop reason: HOSPADM

## 2025-03-31 RX ORDER — SPIRONOLACTONE 25 MG/1
25 TABLET ORAL DAILY
Status: DISCONTINUED | OUTPATIENT
Start: 2025-04-01 | End: 2025-04-01

## 2025-03-31 RX ORDER — POLYETHYLENE GLYCOL 3350 17 G/17G
17 POWDER, FOR SOLUTION ORAL DAILY
Status: DISCONTINUED | OUTPATIENT
Start: 2025-04-01 | End: 2025-04-04 | Stop reason: HOSPADM

## 2025-03-31 RX ADMIN — METOPROLOL TARTRATE 50 MG: 50 TABLET, FILM COATED ORAL at 21:47

## 2025-03-31 RX ADMIN — INSULIN GLARGINE 60 UNITS: 100 INJECTION, SOLUTION SUBCUTANEOUS at 21:43

## 2025-03-31 RX ADMIN — Medication 2 L/MIN: at 22:51

## 2025-03-31 RX ADMIN — GABAPENTIN 100 MG: 100 CAPSULE ORAL at 21:43

## 2025-03-31 RX ADMIN — CARVEDILOL 6.25 MG: 6.25 TABLET, FILM COATED ORAL at 21:43

## 2025-03-31 RX ADMIN — Medication 2 L/MIN: at 16:06

## 2025-03-31 RX ADMIN — MELATONIN 3 MG: 3 TAB ORAL at 21:43

## 2025-03-31 RX ADMIN — SODIUM CHLORIDE 500 ML: 0.9 INJECTION, SOLUTION INTRAVENOUS at 17:21

## 2025-03-31 RX ADMIN — DARBEPOETIN ALFA 500 MCG: 500 INJECTION, SOLUTION INTRAVENOUS; SUBCUTANEOUS at 14:28

## 2025-03-31 SDOH — ECONOMIC STABILITY: HOUSING INSECURITY: IN THE PAST 12 MONTHS, HOW MANY TIMES HAVE YOU MOVED WHERE YOU WERE LIVING?: 0

## 2025-03-31 SDOH — SOCIAL STABILITY: SOCIAL INSECURITY: HAS ANYONE EVER THREATENED TO HURT YOUR FAMILY OR YOUR PETS?: NO

## 2025-03-31 SDOH — SOCIAL STABILITY: SOCIAL INSECURITY: DO YOU FEEL ANYONE HAS EXPLOITED OR TAKEN ADVANTAGE OF YOU FINANCIALLY OR OF YOUR PERSONAL PROPERTY?: NO

## 2025-03-31 SDOH — ECONOMIC STABILITY: FOOD INSECURITY: WITHIN THE PAST 12 MONTHS, YOU WORRIED THAT YOUR FOOD WOULD RUN OUT BEFORE YOU GOT THE MONEY TO BUY MORE.: NEVER TRUE

## 2025-03-31 SDOH — ECONOMIC STABILITY: HOUSING INSECURITY: IN THE LAST 12 MONTHS, WAS THERE A TIME WHEN YOU WERE NOT ABLE TO PAY THE MORTGAGE OR RENT ON TIME?: NO

## 2025-03-31 SDOH — SOCIAL STABILITY: SOCIAL INSECURITY: ARE YOU OR HAVE YOU BEEN THREATENED OR ABUSED PHYSICALLY, EMOTIONALLY, OR SEXUALLY BY ANYONE?: NO

## 2025-03-31 SDOH — HEALTH STABILITY: PHYSICAL HEALTH
HOW OFTEN DO YOU NEED TO HAVE SOMEONE HELP YOU WHEN YOU READ INSTRUCTIONS, PAMPHLETS, OR OTHER WRITTEN MATERIAL FROM YOUR DOCTOR OR PHARMACY?: RARELY

## 2025-03-31 SDOH — SOCIAL STABILITY: SOCIAL INSECURITY: WITHIN THE LAST YEAR, HAVE YOU BEEN AFRAID OF YOUR PARTNER OR EX-PARTNER?: NO

## 2025-03-31 SDOH — SOCIAL STABILITY: SOCIAL INSECURITY: DOES ANYONE TRY TO KEEP YOU FROM HAVING/CONTACTING OTHER FRIENDS OR DOING THINGS OUTSIDE YOUR HOME?: NO

## 2025-03-31 SDOH — ECONOMIC STABILITY: FOOD INSECURITY: WITHIN THE PAST 12 MONTHS, THE FOOD YOU BOUGHT JUST DIDN'T LAST AND YOU DIDN'T HAVE MONEY TO GET MORE.: NEVER TRUE

## 2025-03-31 SDOH — SOCIAL STABILITY: SOCIAL INSECURITY: WITHIN THE LAST YEAR, HAVE YOU BEEN HUMILIATED OR EMOTIONALLY ABUSED IN OTHER WAYS BY YOUR PARTNER OR EX-PARTNER?: NO

## 2025-03-31 SDOH — SOCIAL STABILITY: SOCIAL INSECURITY
WITHIN THE LAST YEAR, HAVE YOU BEEN RAPED OR FORCED TO HAVE ANY KIND OF SEXUAL ACTIVITY BY YOUR PARTNER OR EX-PARTNER?: NO

## 2025-03-31 SDOH — ECONOMIC STABILITY: TRANSPORTATION INSECURITY: IN THE PAST 12 MONTHS, HAS LACK OF TRANSPORTATION KEPT YOU FROM MEDICAL APPOINTMENTS OR FROM GETTING MEDICATIONS?: NO

## 2025-03-31 SDOH — ECONOMIC STABILITY: HOUSING INSECURITY: AT ANY TIME IN THE PAST 12 MONTHS, WERE YOU HOMELESS OR LIVING IN A SHELTER (INCLUDING NOW)?: NO

## 2025-03-31 SDOH — SOCIAL STABILITY: SOCIAL INSECURITY: WERE YOU ABLE TO COMPLETE ALL THE BEHAVIORAL HEALTH SCREENINGS?: YES

## 2025-03-31 SDOH — ECONOMIC STABILITY: INCOME INSECURITY: IN THE PAST 12 MONTHS HAS THE ELECTRIC, GAS, OIL, OR WATER COMPANY THREATENED TO SHUT OFF SERVICES IN YOUR HOME?: NO

## 2025-03-31 SDOH — SOCIAL STABILITY: SOCIAL INSECURITY
WITHIN THE LAST YEAR, HAVE YOU BEEN KICKED, HIT, SLAPPED, OR OTHERWISE PHYSICALLY HURT BY YOUR PARTNER OR EX-PARTNER?: NO

## 2025-03-31 SDOH — ECONOMIC STABILITY: FOOD INSECURITY: HOW HARD IS IT FOR YOU TO PAY FOR THE VERY BASICS LIKE FOOD, HOUSING, MEDICAL CARE, AND HEATING?: NOT HARD AT ALL

## 2025-03-31 SDOH — SOCIAL STABILITY: SOCIAL INSECURITY: HAVE YOU HAD ANY THOUGHTS OF HARMING ANYONE ELSE?: NO

## 2025-03-31 SDOH — SOCIAL STABILITY: SOCIAL INSECURITY: ARE THERE ANY APPARENT SIGNS OF INJURIES/BEHAVIORS THAT COULD BE RELATED TO ABUSE/NEGLECT?: NO

## 2025-03-31 SDOH — SOCIAL STABILITY: SOCIAL INSECURITY: HAVE YOU HAD THOUGHTS OF HARMING ANYONE ELSE?: NO

## 2025-03-31 SDOH — SOCIAL STABILITY: SOCIAL INSECURITY: DO YOU FEEL UNSAFE GOING BACK TO THE PLACE WHERE YOU ARE LIVING?: NO

## 2025-03-31 SDOH — SOCIAL STABILITY: SOCIAL INSECURITY: ABUSE: ADULT

## 2025-03-31 ASSESSMENT — ACTIVITIES OF DAILY LIVING (ADL)
ASSISTIVE_DEVICE: WALKER
DRESSING YOURSELF: INDEPENDENT
BATHING: INDEPENDENT
HEARING - LEFT EAR: FUNCTIONAL
JUDGMENT_ADEQUATE_SAFELY_COMPLETE_DAILY_ACTIVITIES: YES
ADEQUATE_TO_COMPLETE_ADL: YES
TOILETING: NEEDS ASSISTANCE
LACK_OF_TRANSPORTATION: NO
GROOMING: INDEPENDENT
FEEDING YOURSELF: INDEPENDENT
WALKS IN HOME: INDEPENDENT
HEARING - RIGHT EAR: FUNCTIONAL
LACK_OF_TRANSPORTATION: NO
PATIENT'S MEMORY ADEQUATE TO SAFELY COMPLETE DAILY ACTIVITIES?: YES

## 2025-03-31 ASSESSMENT — COGNITIVE AND FUNCTIONAL STATUS - GENERAL
STANDING UP FROM CHAIR USING ARMS: A LITTLE
MOBILITY SCORE: 20
WALKING IN HOSPITAL ROOM: A LITTLE
MOVING TO AND FROM BED TO CHAIR: A LITTLE
DAILY ACTIVITIY SCORE: 24
CLIMB 3 TO 5 STEPS WITH RAILING: A LITTLE
PATIENT BASELINE BEDBOUND: NO

## 2025-03-31 ASSESSMENT — ENCOUNTER SYMPTOMS
ARTHRALGIAS: 0
ABDOMINAL PAIN: 0
FEVER: 0
JOINT SWELLING: 0
SORE THROAT: 0
COUGH: 0
HEADACHES: 0
DIARRHEA: 1
CHILLS: 0
PALPITATIONS: 0
DYSURIA: 0
BLOOD IN STOOL: 0
DIZZINESS: 0
DIFFICULTY URINATING: 0
LIGHT-HEADEDNESS: 0
NUMBNESS: 0
WEAKNESS: 1
SHORTNESS OF BREATH: 0
CONSTIPATION: 0
EYE REDNESS: 0
FREQUENCY: 0
HEMATURIA: 0
RHINORRHEA: 0
EYE DISCHARGE: 0
NAUSEA: 0

## 2025-03-31 ASSESSMENT — PAIN SCALES - GENERAL
PAINLEVEL_OUTOF10: 0-NO PAIN
PAINLEVEL_OUTOF10: 0 - NO PAIN
PAINLEVEL_OUTOF10: 0 - NO PAIN

## 2025-03-31 ASSESSMENT — LIFESTYLE VARIABLES
SKIP TO QUESTIONS 9-10: 1
AUDIT-C TOTAL SCORE: 0
HOW OFTEN DO YOU HAVE A DRINK CONTAINING ALCOHOL: NEVER
AUDIT-C TOTAL SCORE: 0
PRESCIPTION_ABUSE_PAST_12_MONTHS: NO
HOW MANY STANDARD DRINKS CONTAINING ALCOHOL DO YOU HAVE ON A TYPICAL DAY: PATIENT DOES NOT DRINK
HOW OFTEN DO YOU HAVE 6 OR MORE DRINKS ON ONE OCCASION: NEVER
SUBSTANCE_ABUSE_PAST_12_MONTHS: NO

## 2025-03-31 ASSESSMENT — PAIN - FUNCTIONAL ASSESSMENT
PAIN_FUNCTIONAL_ASSESSMENT: 0-10
PAIN_FUNCTIONAL_ASSESSMENT: 0-10

## 2025-03-31 ASSESSMENT — PATIENT HEALTH QUESTIONNAIRE - PHQ9
1. LITTLE INTEREST OR PLEASURE IN DOING THINGS: NOT AT ALL
2. FEELING DOWN, DEPRESSED OR HOPELESS: NOT AT ALL
SUM OF ALL RESPONSES TO PHQ9 QUESTIONS 1 & 2: 0

## 2025-03-31 NOTE — ASSESSMENT & PLAN NOTE
Follows with nephrology outpatient; baseline creatinine approximately 2.5 mg/dL, with baseline EGFR 20-26.

## 2025-03-31 NOTE — ASSESSMENT & PLAN NOTE
Prescribed amlodipine 5 mg daily, spironolactone 25 mg daily, and carvedilol 6.25 mg twice daily.  Continue home antihypertensive regimen.  Would recommend discussing prescription for dual beta-blocker therapy, as this is atypical.

## 2025-03-31 NOTE — ASSESSMENT & PLAN NOTE
Experienced 2 recent falls precipitated by shakiness and generalized weakness; initial evaluation remarkable for creatinine 3.5 mg/dL (baseline appears to be 2.5 mg/dL).  Follows with nephrology outpatient.  Consult nephrology for further evaluation/recommendations.  Avoid nephrotoxic medications.  Reduce gabapentin dose; hold furosemide; SCDs for DVT Ppx.   Continue ferrous sulfate as above.

## 2025-03-31 NOTE — ASSESSMENT & PLAN NOTE
Pitting edema with asymmetric swelling to bilateral lower extremities (left greater than right).  History of DVT requiring anticoagulation, which has since been discontinued.  Will obtain Doppler of bilateral lower extremities to rule out DVT in light of high risk given history of malignancy

## 2025-03-31 NOTE — H&P
"History Of Present Illness  Zack Medrano is a 89 y.o. male presenting with recurrent episodes of tremors and weakness with subsequent fall in the setting of acute on chronic anemia.    He was in usual health until this past weekend when he developed 2 episodes of shakiness and weakness, leading to falls once on Saturday and once on Sunday.  He denied sustaining injury from the falls apart from a small bruise to his left wrist.  He stated, \"I got shaky. My legs just went out from under me.\"  He denied associated dizziness, lightheadedness, chest pain, dyspnea, palpitations, nausea, or diaphoresis at the time of the falls.  He had a follow-up appointment at his oncologist office earlier today, at which time laboratory studies revealed a hemoglobin of 7, for which she was advised to present to the ER for further evaluation.    He is currently followed by oncology for pancytopenia; per oncology note review, there are possible components of myelodysplasia and low-grade lymphoma.  He has had poor response to erythropoietin in the past; he is currently receiving darbepoetin every 2 weeks for the next 5 months.  He had discussions with his oncologist last week regarding potential chemotherapy, which he declined.  He denies history of prior known gastrointestinal bleeding; he notes his stools have been darker than usual.  He otherwise denies epistaxis, bleeding gums/mouth sores, hematuria, hematemesis, or marianna red blood in stool.  He currently takes aspirin; he is not on other blood thinners.  He has a remote history of DVT for which he was previously prescribed Eliquis; he has not taken this in years.    In the ED, vital signs were largely within normal limits.  Laboratory studies were remarkable for elevated creatinine (3.5 mg/dL, baseline approximately 2.5 mg/dL), pancytopenia with anemia beyond baseline (WBC 1.1 k/uL, Hgb 7 g/dL, Plt 62 k/uL; baseline Hgb 8-8.5 g/dL), and hyperglycemia (263 mg/dL).  Otherwise, BMP, " magnesium, troponin largely within normal limits.  Stool for occult blood testing pending.  He was provided NS 500mL x1, Aranesp 500 mcg SQ x1, and 2u pRBC; he was then admitted to general medicine service for further care.     Past Medical History  T2DM (A1c 10% [12/2024])  HTN  HLD  LINDA on CPAP  CKD 4 (eGFR 16-24 [02/2025])  CAD s/p PCI   PVD  Interstitial lung disease  Allergic rhinitis  Pancytopenia, possible components of myelodysplasia and low-grade lympoma: Chromosomal analysis not performed. Poor erythropoietin response. Currently on darbepoetin trial.     Surgical History  He has a past surgical history that includes Other surgical history (10/09/2019); Other surgical history (10/09/2019); and Other surgical history (10/09/2019).     Social History  He reports that he has never smoked. He has never used smokeless tobacco. He reports that he does not drink alcohol and does not use drugs.    Family History  Family History   Problem Relation Name Age of Onset    Colon cancer Mother      Colon cancer Other       Allergies  Metformin    Review of Systems   Constitutional:  Negative for chills and fever.   HENT:  Negative for congestion, ear discharge, ear pain, rhinorrhea, sneezing and sore throat.    Eyes:  Negative for discharge and redness.   Respiratory:  Negative for cough and shortness of breath.    Cardiovascular:  Negative for chest pain and palpitations.   Gastrointestinal:  Positive for diarrhea (non-bloody). Negative for abdominal pain, blood in stool, constipation and nausea.   Genitourinary:  Negative for difficulty urinating, dysuria, frequency, hematuria and urgency.   Musculoskeletal:  Negative for arthralgias and joint swelling.   Skin:  Positive for rash (petechial to bilateral lower extremities).   Neurological:  Positive for weakness (generalized). Negative for dizziness, light-headedness, numbness and headaches.     Physical Exam  Vitals reviewed.   Constitutional:       General: He is not  in acute distress.     Appearance: He is not ill-appearing or toxic-appearing.   Eyes:      Conjunctiva/sclera: Conjunctivae normal.   Cardiovascular:      Rate and Rhythm: Normal rate and regular rhythm.      Pulses: Normal pulses.      Heart sounds: Murmur (systolic ejection murmur best appreciated at left parasternal border, 4th ICS) heard.   Pulmonary:      Effort: Pulmonary effort is normal.      Breath sounds: Normal breath sounds.   Abdominal:      General: Bowel sounds are normal. There is distension.      Palpations: Abdomen is soft.      Tenderness: There is no abdominal tenderness. There is no guarding or rebound.   Musculoskeletal:      Cervical back: Normal range of motion and neck supple. No tenderness.   Lymphadenopathy:      Cervical: No cervical adenopathy.   Skin:     General: Skin is warm and dry.      Findings: Rash (petechial rash to bilateral lower extremities) present.   Neurological:      General: No focal deficit present.      Mental Status: He is alert. Mental status is at baseline.     Last Recorded Vitals  /90 (BP Location: Left arm, Patient Position: Lying)   Pulse 71   Temp 36.7 °C (98.1 °F) (Oral)   Resp 18   Wt 102 kg (225 lb)   SpO2 95%     Relevant Results  Results for orders placed or performed during the hospital encounter of 03/31/25 (from the past 24 hours)   Prepare RBC: 2 Units   Result Value Ref Range    PRODUCT CODE B2603N25     Unit Number [id number]     Unit ABO O     Unit RH NEG     XM INTEP COMP     Dispense Status XM     Blood Expiration Date 4/9/2025 11:59:00 PM EDT     PRODUCT BLOOD TYPE      UNIT VOLUME 279     PRODUCT CODE H3425J26     Unit Number [id number]     Unit ABO O     Unit RH NEG     XM INTEP COMP     Dispense Status XM     Blood Expiration Date 4/11/2025 11:59:00 PM EDT     PRODUCT BLOOD TYPE 9500     UNIT VOLUME 350    Basic metabolic panel   Result Value Ref Range    Glucose 263 (H) 74 - 99 mg/dL    Sodium 135 (L) 136 - 145 mmol/L     Potassium 4.8 3.5 - 5.3 mmol/L    Chloride 100 98 - 107 mmol/L    Bicarbonate 24 21 - 32 mmol/L    Anion Gap 16 10 - 20 mmol/L    Urea Nitrogen 92 (HH) 6 - 23 mg/dL    Creatinine 3.50 (H) 0.50 - 1.30 mg/dL    eGFR 16 (L) >60 mL/min/1.73m*2    Calcium 9.3 8.6 - 10.3 mg/dL   Magnesium   Result Value Ref Range    Magnesium 1.94 1.60 - 2.40 mg/dL   Type And Screen   Result Value Ref Range    ABO TYPE O     Rh TYPE NEG     ANTIBODY SCREEN NEG    Troponin I, High Sensitivity, Initial   Result Value Ref Range    Troponin I, High Sensitivity 10 0 - 20 ng/L   Troponin, High Sensitivity, 1 Hour   Result Value Ref Range    Troponin I, High Sensitivity 11 0 - 20 ng/L     *Note: Due to a large number of results and/or encounters for the requested time period, some results have not been displayed. A complete set of results can be found in Results Review.     No results found.     Assessment/Plan   Assessment & Plan  Acute on chronic anemia  Presents following 2 falls at home precipitated by shakiness and generalized weakness; initial evaluation remarkable for hemodynamic stability with acute on chronic anemia (current hemoglobin 7 g/dL, baseline 8-8.5 g/dL).  Darker than usual stools, but otherwise no evident signs/symptoms of bleeding.  Possible etiologies include progression of myelodysplastic disease, anemia of chronic disease in the setting of known kidney disease, and occult GI bleed; concomitant OSBALDO does pose concern for possible low volume status secondary to occult bleed.  Currently receiving 2 units packed red blood cells in ER.  Follow-up fecal occult blood test.  Repeat CBC in AM.  Consider gastroenterology evaluation in the event of positive FOBT above.  Change prescribed pantoprazole to IV twice daily.  Continue ferrous sulfate.  Hold ciprofloxacin in light of ongoing diarrhea and OSBALDO.  Monitor for signs/symptoms of bleeding.  Other pancytopenia (Multi)  As noted above, history of myelodysplastic disease versus  component of low-grade lymphoma.  Follows with Dr. Carmichael outpatient.  Currently receiving darbepoetin every 2 weeks for total of 6 months; declined initiation of chemotherapy last week.  Currently pancytopenic, with Hgb below baseline as above.   Currently receiving 2 units packed red blood cells as above.  Continue ferrous sulfate, vitamin C, vitamin B12.  Consider oncology consult if no improvement in AM.  Acute kidney injury superimposed on chronic kidney disease  Experienced 2 recent falls precipitated by shakiness and generalized weakness; initial evaluation remarkable for creatinine 3.5 mg/dL (baseline appears to be 2.5 mg/dL).  Follows with nephrology outpatient.  Consult nephrology for further evaluation/recommendations.  Avoid nephrotoxic medications.  Reduce gabapentin dose; hold furosemide; SCDs for DVT Ppx.   Continue ferrous sulfate as above.  Stage 4 chronic kidney disease (Multi)  Follows with nephrology outpatient; baseline creatinine approximately 2.5 mg/dL, with baseline EGFR 20-26.  Type 2 diabetes mellitus with stage 4 chronic kidney disease, with long-term current use of insulin (Multi)  Last A1c 10% (12/2024).  On Lantus 60u nightly.   Continue home Lantus.  Add moderate dose sliding scale with meals.  Insulin orders per protocol.  Change gabapentin dose as above.  CAD (coronary artery disease)  Status-post PCI x 4 (2009).  Prescribed aspirin 81 mg, atorvastatin 40 mg, metoprolol 50 mg twice daily.  Continue ASA, atorvastatin, and metoprolol.  Admit to telemetry.  EKG as needed for chest pain.  Hypertension  Prescribed amlodipine 5 mg daily, spironolactone 25 mg daily, and carvedilol 6.25 mg twice daily.  Continue home antihypertensive regimen.  Would recommend discussing prescription for dual beta-blocker therapy, as this is atypical.  Mixed hyperlipidemia  Prescribed aspirin and atorvastatin as above.  Management as above.  Interstitial lung disease (Multi)  Not currently taking any inhalers,  per chart review.  Swelling of both lower extremities  Pitting edema with asymmetric swelling to bilateral lower extremities (left greater than right).  History of DVT requiring anticoagulation, which has since been discontinued.  Will obtain Doppler of bilateral lower extremities to rule out DVT in light of high risk given history of malignancy     DVT prophylaxis: SCDs  Diet: Cardiac  Bowel regimen: MiraLAX  CODE STATUS: DNR/DNI    I independently reviewed vital signs, laboratory studies including CBC, BMP, magnesium, troponin x 2, and urinalysis; I additionally reviewed prior notes from outpatient specialist including oncology, as well as prior labs to establish baseline for known chronic medical conditions.    Malorie Castellanos MD

## 2025-03-31 NOTE — ASSESSMENT & PLAN NOTE
Last A1c 10% (12/2024).  On Lantus 60u nightly.   Continue home Lantus.  Add moderate dose sliding scale with meals.  Insulin orders per protocol.  Change gabapentin dose as above.

## 2025-03-31 NOTE — ASSESSMENT & PLAN NOTE
Presents following 2 falls at home precipitated by shakiness and generalized weakness; initial evaluation remarkable for hemodynamic stability with acute on chronic anemia (current hemoglobin 7 g/dL, baseline 8-8.5 g/dL).  Darker than usual stools, but otherwise no evident signs/symptoms of bleeding.  Possible etiologies include progression of myelodysplastic disease, anemia of chronic disease in the setting of known kidney disease, and occult GI bleed; concomitant OSBALDO does pose concern for possible low volume status secondary to occult bleed.  Currently receiving 2 units packed red blood cells in ER.  Follow-up fecal occult blood test.  Repeat CBC in AM.  Consider gastroenterology evaluation in the event of positive FOBT above.  Change prescribed pantoprazole to IV twice daily.  Continue ferrous sulfate.  Hold ciprofloxacin in light of ongoing diarrhea and OSBALDO.  Monitor for signs/symptoms of bleeding.

## 2025-03-31 NOTE — ED PROVIDER NOTES
HPI   Chief Complaint   Patient presents with    Weakness, Gen     Patient sent over from the infusion clinic. Patient has history of MDS, was there receiving his injection. He complains of weakness, shortness of breath, and dark stools since Saturday. States he fell on Saturday.       Presents to the emergency department as directed from the infusion clinic.  Patient has a history of myelodysplastic syndrome and was receiving his treatment at the Center when he was reporting weakness and shortness of breath with exertion.  Also had some dark stools for the over the past couple days.  Patient reports no pain.  States that he feels well while at rest.  Patient also has a known history of chronic kidney disease for which she follows with nephrology.      History provided by:  Patient   used: No            Patient History   Past Medical History:   Diagnosis Date    Abnormal levels of other serum enzymes     Abnormal liver enzymes    Dependence on other enabling machines and devices     CPAP (continuous positive airway pressure) dependence    Encounter for examination of eyes and vision without abnormal findings     Diabetic eye exam    Localized edema     Bilateral leg edema    Old myocardial infarction     History of myocardial infarction    Other conditions influencing health status 01/08/2020    Uncontrolled diabetes mellitus type 2 without complications    Pain in unspecified ankle and joints of unspecified foot     Arthralgia of ankle    Personal history of other diseases of the digestive system     History of gastroesophageal reflux (GERD)    Personal history of other diseases of the digestive system     History of dumping syndrome    Personal history of other endocrine, nutritional and metabolic disease 11/12/2020    History of type 2 diabetes mellitus    Presence of coronary angioplasty implant and graft     History of coronary artery stent placement    Rash and other nonspecific skin  eruption 12/11/2019    Rash    Unspecified abdominal hernia without obstruction or gangrene     Hernia    Unspecified abdominal pain 05/19/2021    Abdominal pain, left lateral     Past Surgical History:   Procedure Laterality Date    OTHER SURGICAL HISTORY  10/09/2019    Arterial stent placement    OTHER SURGICAL HISTORY  10/09/2019    Cholecystectomy    OTHER SURGICAL HISTORY  10/09/2019    Hernia repair     Family History   Problem Relation Name Age of Onset    Colon cancer Mother      Colon cancer Other       Social History     Tobacco Use    Smoking status: Never    Smokeless tobacco: Never   Vaping Use    Vaping status: Never Used   Substance Use Topics    Alcohol use: Never    Drug use: Never       Physical Exam   ED Triage Vitals   Temperature Heart Rate Respirations BP   03/31/25 1504 03/31/25 1504 03/31/25 1504 03/31/25 1504   36.7 °C (98.1 °F) 76 17 126/63      Pulse Ox Temp Source Heart Rate Source Patient Position   03/31/25 1504 03/31/25 1504 03/31/25 1504 03/31/25 1639   (!) 93 % Oral Monitor Lying      BP Location FiO2 (%)     03/31/25 1639 --     Left arm        Physical Exam  Vitals and nursing note reviewed.   Constitutional:       General: He is not in acute distress.     Appearance: Normal appearance. He is normal weight. He is not ill-appearing, toxic-appearing or diaphoretic.   HENT:      Head: Normocephalic and atraumatic.      Nose: Nose normal. No rhinorrhea.   Neck:      Comments: Trachea is midline  Cardiovascular:      Rate and Rhythm: Normal rate and regular rhythm.      Heart sounds: No murmur heard.  Pulmonary:      Effort: Pulmonary effort is normal.      Breath sounds: Normal breath sounds. No wheezing.   Abdominal:      General: Abdomen is flat. Bowel sounds are normal. There is no distension.      Palpations: Abdomen is soft.      Tenderness: There is no abdominal tenderness.   Musculoskeletal:         General: Normal range of motion.      Cervical back: Normal range of motion.    Skin:     General: Skin is warm and dry.      Findings: No rash.   Neurological:      General: No focal deficit present.      Mental Status: He is alert and oriented to person, place, and time. Mental status is at baseline.   Psychiatric:         Mood and Affect: Mood normal.         Behavior: Behavior normal.         Thought Content: Thought content normal.         Judgment: Judgment normal.           ED Course & MDM   Diagnoses as of 03/31/25 1819   Acute kidney injury superimposed on chronic kidney disease   Anemia, unspecified type                 No data recorded     Kaitlin Coma Scale Score: 15 (03/31/25 1505 : Fern Holland, JERMAN)                           Medical Decision Making  Twelve-lead EKG was interpreted by myself and this was noted to contribute directly to patient care.  Study reveals a normal sinus rhythm at 75 bpm, leftward axis, delayed R wave progression, no acute ischemic changes    Patient gave written informed consent for blood products after I explained the risks and benefits of administration.  Patient was also started on gentle IV hydration.  Given his symptomatology, advanced age, and lab results, I feel discharge would be an appropriate.  Patient is at risk for endorgan damage and fall/injury.  Patient case was discussed with the hospitalist who agrees and accepted the patient to their service.        Procedure  Procedures     Hernan Garcia, DO  03/31/25 1823

## 2025-03-31 NOTE — ASSESSMENT & PLAN NOTE
As noted above, history of myelodysplastic disease versus component of low-grade lymphoma.  Follows with Dr. Carmichael outpatient.  Currently receiving darbepoetin every 2 weeks for total of 6 months; declined initiation of chemotherapy last week.  Currently pancytopenic, with Hgb below baseline as above.   Currently receiving 2 units packed red blood cells as above.  Continue ferrous sulfate, vitamin C, vitamin B12.  Consider oncology consult if no improvement in AM.

## 2025-03-31 NOTE — ASSESSMENT & PLAN NOTE
Status-post PCI x 4 (2009).  Prescribed aspirin 81 mg, atorvastatin 40 mg, metoprolol 50 mg twice daily.  Continue ASA, atorvastatin, and metoprolol.  Admit to telemetry.  EKG as needed for chest pain.

## 2025-04-01 ENCOUNTER — HOSPITAL ENCOUNTER (INPATIENT)
Dept: NEUROLOGY | Facility: HOSPITAL | Age: OVER 89
Discharge: HOME | End: 2025-04-01
Payer: MEDICARE

## 2025-04-01 ENCOUNTER — APPOINTMENT (OUTPATIENT)
Dept: VASCULAR MEDICINE | Facility: HOSPITAL | Age: OVER 89
End: 2025-04-01
Payer: MEDICARE

## 2025-04-01 LAB
ALBUMIN SERPL BCP-MCNC: 3.7 G/DL (ref 3.4–5)
ALP SERPL-CCNC: 58 U/L (ref 33–136)
ALT SERPL W P-5'-P-CCNC: 17 U/L (ref 10–52)
ANION GAP SERPL CALC-SCNC: 13 MMOL/L (ref 10–20)
AST SERPL W P-5'-P-CCNC: 30 U/L (ref 9–39)
BILIRUB SERPL-MCNC: 1.7 MG/DL (ref 0–1.2)
BUN SERPL-MCNC: 84 MG/DL (ref 6–23)
CALCIUM SERPL-MCNC: 8.9 MG/DL (ref 8.6–10.3)
CHLORIDE SERPL-SCNC: 105 MMOL/L (ref 98–107)
CO2 SERPL-SCNC: 24 MMOL/L (ref 21–32)
CREAT SERPL-MCNC: 3.12 MG/DL (ref 0.5–1.3)
EGFRCR SERPLBLD CKD-EPI 2021: 18 ML/MIN/1.73M*2
ERYTHROCYTE [DISTWIDTH] IN BLOOD BY AUTOMATED COUNT: 23.1 % (ref 11.5–14.5)
EST. AVERAGE GLUCOSE BLD GHB EST-MCNC: 183 MG/DL
GLUCOSE BLD MANUAL STRIP-MCNC: 141 MG/DL (ref 74–99)
GLUCOSE BLD MANUAL STRIP-MCNC: 222 MG/DL (ref 74–99)
GLUCOSE BLD MANUAL STRIP-MCNC: 258 MG/DL (ref 74–99)
GLUCOSE BLD MANUAL STRIP-MCNC: 329 MG/DL (ref 74–99)
GLUCOSE SERPL-MCNC: 139 MG/DL (ref 74–99)
HBA1C MFR BLD: 8 %
HCT VFR BLD AUTO: 24.9 % (ref 41–52)
HEMOCCULT SP1 STL QL: POSITIVE
HGB BLD-MCNC: 8.2 G/DL (ref 13.5–17.5)
MCH RBC QN AUTO: 32.5 PG (ref 26–34)
MCHC RBC AUTO-ENTMCNC: 32.9 G/DL (ref 32–36)
MCV RBC AUTO: 99 FL (ref 80–100)
NRBC BLD-RTO: 0 /100 WBCS (ref 0–0)
PLATELET # BLD AUTO: 52 X10*3/UL (ref 150–450)
POTASSIUM SERPL-SCNC: 4.4 MMOL/L (ref 3.5–5.3)
PROT SERPL-MCNC: 6 G/DL (ref 6.4–8.2)
RBC # BLD AUTO: 2.52 X10*6/UL (ref 4.5–5.9)
SODIUM SERPL-SCNC: 138 MMOL/L (ref 136–145)
WBC # BLD AUTO: 1.2 X10*3/UL (ref 4.4–11.3)

## 2025-04-01 PROCEDURE — P9016 RBC LEUKOCYTES REDUCED: HCPCS

## 2025-04-01 PROCEDURE — 93970 EXTREMITY STUDY: CPT | Performed by: SURGERY

## 2025-04-01 PROCEDURE — 83036 HEMOGLOBIN GLYCOSYLATED A1C: CPT | Mod: SAMLAB

## 2025-04-01 PROCEDURE — 2500000001 HC RX 250 WO HCPCS SELF ADMINISTERED DRUGS (ALT 637 FOR MEDICARE OP)

## 2025-04-01 PROCEDURE — 95819 EEG AWAKE AND ASLEEP: CPT | Performed by: PSYCHIATRY & NEUROLOGY

## 2025-04-01 PROCEDURE — 85027 COMPLETE CBC AUTOMATED: CPT | Performed by: RADIOLOGY

## 2025-04-01 PROCEDURE — 82947 ASSAY GLUCOSE BLOOD QUANT: CPT

## 2025-04-01 PROCEDURE — 2500000002 HC RX 250 W HCPCS SELF ADMINISTERED DRUGS (ALT 637 FOR MEDICARE OP, ALT 636 FOR OP/ED)

## 2025-04-01 PROCEDURE — 2500000004 HC RX 250 GENERAL PHARMACY W/ HCPCS (ALT 636 FOR OP/ED)

## 2025-04-01 PROCEDURE — 36415 COLL VENOUS BLD VENIPUNCTURE: CPT | Performed by: RADIOLOGY

## 2025-04-01 PROCEDURE — 97165 OT EVAL LOW COMPLEX 30 MIN: CPT | Mod: GO

## 2025-04-01 PROCEDURE — 95819 EEG AWAKE AND ASLEEP: CPT

## 2025-04-01 PROCEDURE — 2500000005 HC RX 250 GENERAL PHARMACY W/O HCPCS

## 2025-04-01 PROCEDURE — 99232 SBSQ HOSP IP/OBS MODERATE 35: CPT | Performed by: INTERNAL MEDICINE

## 2025-04-01 PROCEDURE — 80053 COMPREHEN METABOLIC PANEL: CPT

## 2025-04-01 PROCEDURE — 93970 EXTREMITY STUDY: CPT

## 2025-04-01 PROCEDURE — 36430 TRANSFUSION BLD/BLD COMPNT: CPT

## 2025-04-01 PROCEDURE — 1200000002 HC GENERAL ROOM WITH TELEMETRY DAILY

## 2025-04-01 PROCEDURE — 99222 1ST HOSP IP/OBS MODERATE 55: CPT | Performed by: INTERNAL MEDICINE

## 2025-04-01 PROCEDURE — 94761 N-INVAS EAR/PLS OXIMETRY MLT: CPT

## 2025-04-01 PROCEDURE — 97535 SELF CARE MNGMENT TRAINING: CPT | Mod: GO

## 2025-04-01 RX ADMIN — CARVEDILOL 6.25 MG: 6.25 TABLET, FILM COATED ORAL at 20:39

## 2025-04-01 RX ADMIN — METOPROLOL TARTRATE 50 MG: 50 TABLET, FILM COATED ORAL at 08:25

## 2025-04-01 RX ADMIN — ASPIRIN 81 MG: 81 TABLET, COATED ORAL at 08:25

## 2025-04-01 RX ADMIN — Medication 21 PERCENT: at 12:02

## 2025-04-01 RX ADMIN — Medication 2 L/MIN: at 05:52

## 2025-04-01 RX ADMIN — INSULIN LISPRO 6 UNITS: 100 INJECTION, SOLUTION INTRAVENOUS; SUBCUTANEOUS at 17:26

## 2025-04-01 RX ADMIN — Medication 1000 MCG: at 08:26

## 2025-04-01 RX ADMIN — SUCRALFATE 1 G: 1 TABLET ORAL at 08:23

## 2025-04-01 RX ADMIN — Medication 2 L/MIN: at 18:00

## 2025-04-01 RX ADMIN — ALLOPURINOL 100 MG: 100 TABLET ORAL at 08:25

## 2025-04-01 RX ADMIN — Medication 250 MG: at 08:25

## 2025-04-01 RX ADMIN — SPIRONOLACTONE 25 MG: 25 TABLET ORAL at 08:25

## 2025-04-01 RX ADMIN — SUCRALFATE 1 G: 1 TABLET ORAL at 12:00

## 2025-04-01 RX ADMIN — PANTOPRAZOLE SODIUM 40 MG: 40 INJECTION, POWDER, FOR SOLUTION INTRAVENOUS at 08:26

## 2025-04-01 RX ADMIN — CARVEDILOL 6.25 MG: 6.25 TABLET, FILM COATED ORAL at 08:26

## 2025-04-01 RX ADMIN — AMLODIPINE BESYLATE 5 MG: 5 TABLET ORAL at 08:25

## 2025-04-01 RX ADMIN — GABAPENTIN 100 MG: 100 CAPSULE ORAL at 08:26

## 2025-04-01 RX ADMIN — METOPROLOL TARTRATE 50 MG: 50 TABLET, FILM COATED ORAL at 20:39

## 2025-04-01 RX ADMIN — INSULIN LISPRO 8 UNITS: 100 INJECTION, SOLUTION INTRAVENOUS; SUBCUTANEOUS at 12:01

## 2025-04-01 RX ADMIN — SUCRALFATE 1 G: 1 TABLET ORAL at 17:25

## 2025-04-01 RX ADMIN — ATORVASTATIN CALCIUM 40 MG: 40 TABLET, FILM COATED ORAL at 08:26

## 2025-04-01 RX ADMIN — PRIMIDONE 50 MG: 50 TABLET ORAL at 20:39

## 2025-04-01 RX ADMIN — INSULIN GLARGINE 60 UNITS: 100 INJECTION, SOLUTION SUBCUTANEOUS at 20:40

## 2025-04-01 ASSESSMENT — PAIN SCALES - GENERAL
PAINLEVEL_OUTOF10: 0 - NO PAIN
PAINLEVEL_OUTOF10: 0 - NO PAIN

## 2025-04-01 ASSESSMENT — COGNITIVE AND FUNCTIONAL STATUS - GENERAL
TURNING FROM BACK TO SIDE WHILE IN FLAT BAD: A LITTLE
DRESSING REGULAR LOWER BODY CLOTHING: A LOT
TOILETING: A LOT
PERSONAL GROOMING: A LITTLE
DRESSING REGULAR UPPER BODY CLOTHING: A LITTLE
DRESSING REGULAR UPPER BODY CLOTHING: A LOT
EATING MEALS: A LITTLE
DAILY ACTIVITIY SCORE: 15
HELP NEEDED FOR BATHING: A LOT
WALKING IN HOSPITAL ROOM: A LITTLE
TOILETING: A LITTLE
MOBILITY SCORE: 18
MOVING TO AND FROM BED TO CHAIR: A LITTLE
STANDING UP FROM CHAIR USING ARMS: A LITTLE
PERSONAL GROOMING: A LITTLE
DAILY ACTIVITIY SCORE: 16
CLIMB 3 TO 5 STEPS WITH RAILING: A LOT
DRESSING REGULAR LOWER BODY CLOTHING: A LOT
HELP NEEDED FOR BATHING: A LOT

## 2025-04-01 ASSESSMENT — PAIN - FUNCTIONAL ASSESSMENT
PAIN_FUNCTIONAL_ASSESSMENT: 0-10
PAIN_FUNCTIONAL_ASSESSMENT: 0-10

## 2025-04-01 ASSESSMENT — ACTIVITIES OF DAILY LIVING (ADL)
ADL_ASSISTANCE: INDEPENDENT
LACK_OF_TRANSPORTATION: NO
BATHING_ASSISTANCE: MAXIMAL
HOME_MANAGEMENT_TIME_ENTRY: 18

## 2025-04-01 NOTE — PROGRESS NOTES
04/01/25 1234   Discharge Planning   Living Arrangements Spouse/significant other   Support Systems Spouse/significant other;Children   Assistance Needed walker   Type of Residence Private residence   Number of Stairs to Enter Residence 2   Number of Stairs Within Residence 0  (Bedroom/bath on the first floor)   Do you have animals or pets at home? Yes   Type of Animals or Pets 1 cat   Who is requesting discharge planning? Provider   Home or Post Acute Services Post acute facilities (Rehab/SNF/etc)   Type of Post Acute Facility Services Rehab   Expected Discharge Disposition SNF   Does the patient need discharge transport arranged? Yes   RoundTrip coordination needed? Yes   Has discharge transport been arranged? No   Financial Resource Strain   How hard is it for you to pay for the very basics like food, housing, medical care, and heating? Not hard   Housing Stability   In the last 12 months, was there a time when you were not able to pay the mortgage or rent on time? N   In the past 12 months, how many times have you moved where you were living? 0   At any time in the past 12 months, were you homeless or living in a shelter (including now)? N   Transportation Needs   In the past 12 months, has lack of transportation kept you from medical appointments or from getting medications? no   In the past 12 months, has lack of transportation kept you from meetings, work, or from getting things needed for daily living? No   Patient Choice   Provider Choice list and CMS website (https://medicare.gov/care-compare#search) for post-acute Quality and Resource Measure Data were provided and reviewed with: Family;Patient   Patient / Family choosing to utilize agency / facility established prior to hospitalization No   Stroke Family Assessment   Stroke Family Assessment Needed No   Intensity of Service   Intensity of Service 0-30 min     Care Transitions: Patient reviewed in care round meeting this AM, and is medically  appropriate for inpatient status. Spoke to PT/OT, chiquita completed with recommendation to SNF for rehab. Met with patient and spouse at bedside for initial assessment. Role of TCC explained. Demographics and contacts verified. PCP is Dr. Dietz. Preferred pharmacy is [facility] in [place]. Denies any difficulty obtaining/affording medications, takes as ordered. He has been independent at home with [employer] and was able to drive elf. He has a cane and walker at home that he recently started using. He started to feel weak at home and fell on Saturday without serious injury. Patient is also agreeable to SNF referrals for rehab. Discussed facilities of choice. Will request [facility] auth team send referrals to [facility], [facility], and [facility] [place] via [facility]. FOC is either [facility] or [facility]. <[facility] IMM reviewed, patient signed, copy provided, original placed in chart. Voiced understanding. Care team to follow. Yakelin Motta RN/TCC    [phone] SNF referral status checked via Careport. [facility] and [facility] are able to accept. Spoke to patient to notify. States his FOC is [facility] because his step-daughter works there. Will update facility choice in careport. Yakelin Motta RN/TCC

## 2025-04-01 NOTE — CARE PLAN
The patient's goals for the shift include      The clinical goals for the shift include ambulate to chair      Problem: Safety - Adult  Goal: Free from fall injury  Outcome: Progressing     Problem: Chronic Conditions and Co-morbidities  Goal: Patient's chronic conditions and co-morbidity symptoms are monitored and maintained or improved  Outcome: Progressing     Problem: Discharge Planning  Goal: Discharge to home or other facility with appropriate resources  Outcome: Progressing   .

## 2025-04-01 NOTE — PROGRESS NOTES
Occupational Therapy    Evaluation and Treatment    Patient Name: Zack Medrano  MRN: [id number]  Today's Date: 4/1/2025  Time Calculation  Start Time: 0735  Stop Time: 0808  Time Calculation (min): 33 min  312/312-A    Assessment  IP OT Assessment  OT Assessment: pt needing increased assistance for all aspects of ADL and mobility.  Pt is having troubles with involuntary body/BLE jerking movements during functional tasks placing him at a very high risk for further falls. Recommend OT services at moderate intensity in order to ensure safe return home  Prognosis: Good  Barriers to Discharge Home: Caregiver assistance, Physical needs  Caregiver Assistance: Caregiver assistance needed per identified barriers - however, level of patient's required assistance exceeds assistance available at home  Physical Needs: 24hr mobility assistance needed, Intermittent ADL assistance needed, High falls risk due to function or environment  Evaluation/Treatment Tolerance: Patient tolerated treatment well  Medical Staff Made Aware: Yes  End of Session Communication: Bedside nurse, Physician, PCT/NA/CTA (notified team of body jerking movements)  End of Session Patient Position: Alarm on, Up in chair (call light in reach)    Plan:  Treatment Interventions: ADL retraining, Functional transfer training, Endurance training, Equipment evaluation/education, Neuromuscular reeducation, Compensatory technique education  OT Frequency: 3 times per week  OT Discharge Recommendations: Moderate intensity level of continued care  OT Recommended Transfer Status: Assist of 1, Assist of 2  OT - OK to Discharge: Yes (once medically stable)    Subjective     Current Problem:  1. Acute kidney injury superimposed on chronic kidney disease        2. Anemia, unspecified type        3. Swelling of both lower extremities  Vascular US lower extremity venous duplex bilateral    Vascular US lower extremity venous duplex bilateral      4. Localized edema  Vascular US  "lower extremity venous duplex bilateral          General:  General  Reason for Referral: 89 year old admitted for weakness, falls, anemia needing blood transfusion.  Referred By: Emanuel  Past Medical History Relevant to Rehab: pancytopenia, possible myelodysplaisa: currently being tx by oncology  Family/Caregiver Present: Yes (wife)  Prior to Session Communication: Bedside nurse  Patient Position Received: Bed, 3 rail up, Alarm off, not on at start of session  General Comment: pt agreeable to session and eager to get up    Precautions:  Medical Precautions: Fall precautions, Oxygen therapy device and L/min (3 lpm)    Vital Signs:  SpO2: 95 %    Pain:  Pain Assessment  Pain Assessment: 0-10  0-10 (Numeric) Pain Score: 0 - No pain    Objective     Cognition:  Overall Cognitive Status: Within Functional Limits  Orientation Level: Oriented X4             Home Living:  Type of Home: House  Lives With: Spouse  Home Adaptive Equipment: Walker rolling or standard  Home Layout: Two level, Able to live on main level with bedroom/bathroom  Home Access: Stairs to enter with rails  Entrance Stairs-Number of Steps: 2  Bathroom Shower/Tub: Walk-in shower  Bathroom Equipment: Grab bars in shower  Home Living Comments: sleeps in regular bed     Prior Function:  ADL Assistance: Independent  Homemaking Assistance: Independent  Ambulatory Assistance: Independent (typically no device but has been using FWW since Saturday due to B legs \"jerking\")  ADL:  Eating Assistance: Independent  Grooming Assistance: Stand by  Bathing Assistance: Maximal  UE Dressing Assistance: Stand by  LE Dressing Assistance: Maximal  Toileting Assistance with Device: Maximal  ADL Comments: pt has several episodes of B legs jerking and almost buckling during session    Activity Tolerance:  Endurance: Endurance does not limit participation in activity    Bed Mobility/Transfers:   Bed Mobility  Bed Mobility: Yes  Bed Mobility 1  Bed Mobility 1: Supine to " sitting  Level of Assistance 1: Close supervision  Bed Mobility Comments 1: bed flat, heavy use of bed rail  Transfers  Transfer: Yes  Transfer 1  Technique 1: Sit to stand, Stand to sit  Transfer Device 1: Walker, Gait belt  Transfer Level of Assistance 1: Contact guard, Minimum assistance  Trials/Comments 1: education on safety and technique- FWW is new learning for pt.    Ambulation/Gait Training:  Functional Mobility  Functional Mobility Performed: Yes  Functional Mobility 1  Surface 1: Level tile  Device 1: Rolling walker  Functional Mobility Support Devices: Gait belt  Assistance 1: Minimum assistance  Comments 1: pt has several episodes of B legs/body jerking with almost buckling.  pt is educated on how to use FWW appropriately to decrease risk for falls    Sitting Balance:  Static Sitting Balance  Static Sitting-Level of Assistance: Independent    Standing Balance:  Static Standing Balance  Static Standing-Level of Assistance: Contact guard, Minimum assistance (body jerking movements)    Vision: Vision - Basic Assessment  Current Vision: No visual deficits    Sensation:  Light Touch: No apparent deficits    Strength:  Strength Comments: BUE WFL  Coordination:  Movements are Fluid and Coordinated: Yes       Outcome Measures: [facility] Daily Activity  Putting on and taking off regular lower body clothing: A lot  Bathing (including washing, rinsing, drying): A lot  Putting on and taking off regular upper body clothing: A little  Toileting, which includes using toilet, bedpan or urinal: A lot  Taking care of personal grooming such as brushing teeth: A little  Eating Meals: A little  Daily Activity - Total Score: 15                       EDUCATION:     Education Documentation  Body Mechanics, taught by Su Valdes OT at 4/1/2025 12:44 PM.  Learner: Patient  Readiness: Acceptance  Method: Explanation, Demonstration  Response: Demonstrated Understanding, Needs Reinforcement  Comment: safety and correct use of FWW  during ADL    Precautions, taught by Su Valdes OT at 4/1/2025 12:44 PM.  Learner: Patient  Readiness: Acceptance  Method: Explanation, Demonstration  Response: Demonstrated Understanding, Needs Reinforcement  Comment: safety and correct use of FWW during ADL    ADL Training, taught by Su Valdes OT at 4/1/2025 12:44 PM.  Learner: Patient  Readiness: Acceptance  Method: Explanation, Demonstration  Response: Demonstrated Understanding, Needs Reinforcement  Comment: safety and correct use of FWW during ADL    Education Comments  No comments found.        Goals:   Encounter Problems       Encounter Problems (Active)       ADLs       Patient will perform UB and LB bathing  with contact guard assist level of assistance. (Progressing)       Start:  04/01/25    Expected End:  04/15/25            Patient with complete lower body dressing with contact guard assist level of assistance  (Progressing)       Start:  04/01/25    Expected End:  04/15/25            Patient will complete toileting including hygiene clothing management/hygiene with contact guard assist level of assistance. (Progressing)       Start:  04/01/25    Expected End:  04/15/25               BALANCE       Pt will maintain dynamic standing balance during ADL task with contact guard assist level of assistance in order to demonstrate decreased risk of falling and improved postural control. (Progressing)       Start:  04/01/25    Expected End:  04/15/25

## 2025-04-01 NOTE — CONSULTS
Reason For Consult  Acute renal failure    History Of Present Illness  Zack Medrano is a 89 y.o. male presenting with weakness.   Can [place] to severe weakness and also having falls.  He states that he had some shakiness and some major weakness.  He states that his legs pretty much gave out on him and he did fall over the weekend.  When he presented to the hospital he was found to have acute kidney injury  He does have a longstanding history of chronic kidney disease  He states he is urinating normally for him and has no issues with that  He just went to the restroom not long before I saw him  He otherwise has been pretty active in the recent past  He and his wife go on trips together to see different places on bus trips  He does have a little bit of swelling of his lower extremities although he states that this is feeling pretty good for him    Past Medical History  He has a past medical history of Abnormal levels of other serum enzymes, Dependence on other enabling machines and devices, Encounter for examination of eyes and vision without abnormal findings, Localized edema, Old myocardial infarction, Other conditions influencing health status (01/08/2020), Pain in unspecified ankle and joints of unspecified foot, Personal history of other diseases of the digestive system, Personal history of other diseases of the digestive system, Personal history of other endocrine, nutritional and metabolic disease (11/12/2020), Presence of coronary angioplasty implant and graft, Rash and other nonspecific skin eruption (12/11/2019), Unspecified abdominal hernia without obstruction or gangrene, and Unspecified abdominal pain (05/19/2021).    Surgical History  He has a past surgical history that includes Other surgical history (10/09/2019); Other surgical history (10/09/2019); and Other surgical history (10/09/2019).     Social History  He reports that he has never smoked. He has never used smokeless tobacco. He reports that he  "does not drink alcohol and does not use drugs.    Family History  Family History   Problem Relation Name Age of Onset    Colon cancer Mother      Colon cancer Other          Allergies  Metformin    Review of Systems  A full 10 point review of systems was obtained and is negative except for the HPI as above     Physical Exam  Physical Exam  Constitutional:       Appearance: Normal appearance. He is obese.   HENT:      Head: Normocephalic and atraumatic.      Right Ear: External ear normal.      Left Ear: External ear normal.      Nose: Nose normal.      Mouth/Throat:      Mouth: Mucous membranes are moist.      Pharynx: Oropharynx is clear.   Eyes:      Extraocular Movements: Extraocular movements intact.      Conjunctiva/sclera: Conjunctivae normal.      Pupils: Pupils are equal, round, and reactive to light.   Cardiovascular:      Rate and Rhythm: Normal rate and regular rhythm.   Pulmonary:      Effort: Pulmonary effort is normal.      Breath sounds: Normal breath sounds.   Abdominal:      General: Abdomen is flat.      Palpations: Abdomen is soft.   Musculoskeletal:         General: Swelling present.      Right lower leg: Edema present.      Left lower leg: Edema present.   Skin:     General: Skin is warm and dry.      Findings: Erythema and rash present.   Neurological:      General: No focal deficit present.      Mental Status: He is alert and oriented to person, place, and time.   Psychiatric:         Mood and Affect: Mood normal.         Behavior: Behavior normal.                 I&O 24HR    Intake/Output Summary (Last 24 hours) at 4/1/2025 1353  Last data filed at 4/1/2025 0700  Gross per 24 hour   Intake 672.92 ml   Output 925 ml   Net -252.08 ml       Vitals 24HR  Heart Rate:  [58-82]   Temp:  [35.6 °C (96 °F)-36.8 °C (98.2 °F)]   Resp:  [16-20]   BP: (102-132)/(51-96)   Height:  [172.7 cm (5' 8\")]   Weight:  [101 kg (223 lb 8.7 oz)-102 kg (225 lb 5 oz)]   SpO2:  [93 %-100 %]     Scheduled " medications  allopurinol, 100 mg, oral, Daily  amLODIPine, 5 mg, oral, Daily before breakfast  ascorbic acid, 250 mg, oral, Daily  aspirin, 81 mg, oral, Daily  atorvastatin, 40 mg, oral, Daily  carvedilol, 6.25 mg, oral, BID  cyanocobalamin, 1,000 mcg, oral, Daily  [START ON 4/2/2025] ferrous sulfate, 1 tablet, oral, Every other day  [Held by provider] furosemide, 60 mg, oral, Daily  insulin glargine, 60 Units, subcutaneous, q24h  insulin lispro, 0-10 Units, subcutaneous, TID AC  metoprolol tartrate, 50 mg, oral, BID  oxygen, , inhalation, Continuous - Inhalation  pantoprazole, 40 mg, intravenous, BID  polyethylene glycol, 17 g, oral, Daily  primidone, 50 mg, oral, Nightly  spironolactone, 25 mg, oral, Daily  sucralfate, 1 g, oral, TID      Continuous medications     PRN medications  PRN medications: acetaminophen **OR** acetaminophen **OR** acetaminophen, dextrose, dextrose, glucagon, glucagon, melatonin, ondansetron ODT **OR** ondansetron      Relevant Results  Results reviewed     Assessment/Plan       Acute renal failure: Likely prerenal  CKD IV with baseline creatinine 2.3-2.8  Pancytopenia  Hypertension on amlodipine, Metoprolol, Furosemide, spironolactone  Diabetic Nephropathy:   Diabetes mellitus type 2 on insulin  Bilateral lower extremity edema: Stable.   History of pulmonary embolism  Obesity  Obstructive sleep apnea on CPAP machine and compliant  Hyperlipidemia  History of coronary artery  B/L Renal Cyst  Hyperuricemia    Plan:  Renal function is improving  Discontinue spironolactone  At last check iron levels were fine  He has declined treatment for his pancytopenia and myelodysplastic syndrome versus lymphoma  I will discuss with Dr. Carmichael.  I will consult palliative care  Physical therapy at this time  Lasix was already on hold which I agree with  Volume status looks pretty good  Thanks for the consult    Assessment & Plan  Acute kidney injury superimposed on chronic kidney disease    Other  pancytopenia (Multi)    Benign prostatic hyperplasia with urinary obstruction and other lower urinary tract symptoms    CAD (coronary artery disease)    Type 2 diabetes mellitus with stage 4 chronic kidney disease, with long-term current use of insulin (Multi)    Hypertension    Mixed hyperlipidemia    Stage 4 chronic kidney disease (Multi)    Peripheral vascular disease, unspecified (CMS-HCC)    Interstitial lung disease (Multi)    Acute on chronic anemia    Swelling of both lower extremities            Mickey Nguyen, DO

## 2025-04-01 NOTE — PROGRESS NOTES
Zack Medrano is a 89 y.o. male on day 0 of admission presenting with Acute kidney injury superimposed on chronic kidney disease.      Subjective     No events over night.   Reports no abdominal pain, no nausea. ( Wife is at his bedside).   No rectal bleeding, no melena.     Objective     Last Recorded Vitals  /56   Pulse 67   Temp 35.8 °C (96.4 °F) (Temporal)   Resp 20   Wt 101 kg (223 lb 8.7 oz)   SpO2 98%   Intake/Output last 3 Shifts:    Intake/Output Summary (Last 24 hours) at 4/1/2025 1630  Last data filed at 4/1/2025 0700  Gross per 24 hour   Intake 672.92 ml   Output 925 ml   Net -252.08 ml       Admission Weight  Weight: 102 kg (225 lb) (03/31/25 1504)    Daily Weight  03/31/25 : 101 kg (223 lb 8.7 oz)    Image Results    Vitals:    04/01/25 1500   BP: 105/56   Pulse: 67   Resp:    Temp: 35.8 °C (96.4 °F)   SpO2: 98%       Physical Exam  Constitutional:       Comments: Elderly gentleman, looks comfortable at rest on supplemental oxygen,    HENT:      Head: Normocephalic.      Nose: Nose normal.      Mouth/Throat:      Mouth: Mucous membranes are moist.   Eyes:      Pupils: Pupils are equal, round, and reactive to light.   Cardiovascular:      Rate and Rhythm: Normal rate and regular rhythm.      Heart sounds: Normal heart sounds.   Pulmonary:      Effort: No respiratory distress.      Breath sounds: Normal breath sounds. No wheezing.   Abdominal:      General: There is no distension.      Palpations: Abdomen is soft.      Tenderness: There is no abdominal tenderness.   Musculoskeletal:         General: Normal range of motion.      Cervical back: Normal range of motion.   Skin:     General: Skin is warm.   Neurological:      General: No focal deficit present.      Mental Status: He is alert and oriented to person, place, and time.   Psychiatric:         Mood and Affect: Mood normal.         Relevant Results  Scheduled medications  allopurinol, 100 mg, oral, Daily  amLODIPine, 5 mg, oral, Daily  before breakfast  ascorbic acid, 250 mg, oral, Daily  aspirin, 81 mg, oral, Daily  atorvastatin, 40 mg, oral, Daily  carvedilol, 6.25 mg, oral, BID  cyanocobalamin, 1,000 mcg, oral, Daily  [START ON 4/2/2025] ferrous sulfate, 1 tablet, oral, Every other day  insulin glargine, 60 Units, subcutaneous, q24h  insulin lispro, 0-10 Units, subcutaneous, TID AC  metoprolol tartrate, 50 mg, oral, BID  oxygen, , inhalation, Continuous - Inhalation  pantoprazole, 40 mg, intravenous, BID  polyethylene glycol, 17 g, oral, Daily  primidone, 50 mg, oral, Nightly  sucralfate, 1 g, oral, TID      Continuous medications     PRN medications  PRN medications: acetaminophen **OR** acetaminophen **OR** acetaminophen, dextrose, dextrose, glucagon, glucagon, melatonin, ondansetron ODT **OR** ondansetron    Results for orders placed or performed during the hospital encounter of 03/31/25 (from the past 24 hours)   Troponin, High Sensitivity, 1 Hour   Result Value Ref Range    Troponin I, High Sensitivity 11 0 - 20 ng/L   Urinalysis with Reflex Culture and Microscopic   Result Value Ref Range    Color, Urine Light-Yellow Light-Yellow, Yellow, Dark-Yellow    Appearance, Urine Clear Clear    Specific Gravity, Urine 1.010 1.005 - 1.035    pH, Urine 6.0 5.0, 5.5, 6.0, 6.5, 7.0, 7.5, 8.0    Protein, Urine NEGATIVE NEGATIVE, 10 (TRACE), 20 (TRACE) mg/dL    Glucose, Urine Normal Normal mg/dL    Blood, Urine NEGATIVE NEGATIVE mg/dL    Ketones, Urine NEGATIVE NEGATIVE mg/dL    Bilirubin, Urine NEGATIVE NEGATIVE mg/dL    Urobilinogen, Urine Normal Normal mg/dL    Nitrite, Urine NEGATIVE NEGATIVE    Leukocyte Esterase, Urine NEGATIVE NEGATIVE   POCT GLUCOSE   Result Value Ref Range    POCT Glucose 156 (H) 74 - 99 mg/dL   Comprehensive metabolic panel   Result Value Ref Range    Glucose 139 (H) 74 - 99 mg/dL    Sodium 138 136 - 145 mmol/L    Potassium 4.4 3.5 - 5.3 mmol/L    Chloride 105 98 - 107 mmol/L    Bicarbonate 24 21 - 32 mmol/L    Anion Gap 13 10  - 20 mmol/L    Urea Nitrogen 84 (H) 6 - 23 mg/dL    Creatinine 3.12 (H) 0.50 - 1.30 mg/dL    eGFR 18 (L) >60 mL/min/1.73m*2    Calcium 8.9 8.6 - 10.3 mg/dL    Albumin 3.7 3.4 - 5.0 g/dL    Alkaline Phosphatase 58 33 - 136 U/L    Total Protein 6.0 (L) 6.4 - 8.2 g/dL    AST 30 9 - 39 U/L    Bilirubin, Total 1.7 (H) 0.0 - 1.2 mg/dL    ALT 17 10 - 52 U/L   Hemoglobin A1C   Result Value Ref Range    Hemoglobin A1C 8.0 (H) See comment %    Estimated Average Glucose 183 Not Established mg/dL   CBC   Result Value Ref Range    WBC 1.2 (L) 4.4 - 11.3 x10*3/uL    nRBC 0.0 0.0 - 0.0 /100 WBCs    RBC 2.52 (L) 4.50 - 5.90 x10*6/uL    Hemoglobin 8.2 (L) 13.5 - 17.5 g/dL    Hematocrit 24.9 (L) 41.0 - 52.0 %    MCV 99 80 - 100 fL    MCH 32.5 26.0 - 34.0 pg    MCHC 32.9 32.0 - 36.0 g/dL    RDW 23.1 (H) 11.5 - 14.5 %    Platelets 52 (L) 150 - 450 x10*3/uL   POCT GLUCOSE   Result Value Ref Range    POCT Glucose 141 (H) 74 - 99 mg/dL   POCT GLUCOSE   Result Value Ref Range    POCT Glucose 329 (H) 74 - 99 mg/dL     *Note: Due to a large number of results and/or encounters for the requested time period, some results have not been displayed. A complete set of results can be found in Results Review.       Assessment/Plan      89 y.o. male with PMH of myelodysplasia, CKD 4, hypertension, Type 2 Dm, CAD who is presenting to [facility] ED with recurrent episodes of tremors and weakness with subsequent fall in the setting of acute on chronic anemia.     Recurrent falls:  Presents following 2 falls at home  Reports shakiness and generalized weakness;   initial evaluation remarkable for hemodynamic stability with acute on chronic anemia (admission hemoglobin 7 g/dL, baseline 8-8.5 g/dL).  Darker than usual stools, but otherwise no evident signs/symptoms of bleeding.    Follow-up fecal occult blood test.  S/p 2 units of PRBC transfusion,   HB improved to 8.2,   No h/o external bleeding,    pantoprazole  IV twice daily.  Continue ferrous  sulfate.  Monitor CBC,     Myelodysplasia/ pancytopenia (Multi)  H/o myelodysplastic disease versus component of low-grade lymphoma.    Follows with Dr. Carmichael outpatient.    Currently receiving darbepoetin every 2 weeks for total of 6 months; declined initiation of chemotherapy last week.    Currently pancytopenic, with Hgb below baseline as above.   Continue ferrous sulfate, vitamin C, vitamin B12.  Follow up with Hematology as out patient,    Acute kidney injury superimposed on chronic kidney disease  Admission creatinine 3.5 mg/dL (baseline appears to be 2.5 mg/dL).  Follows with nephrology outpatient.  Nephrology consulted, for further evaluation/recommendations.  Avoid nephrotoxic medications.    Reduce gabapentin dose; hold furosemide; SCDs for DVT Ppx.   Continue ferrous sulfate as above.    Shakiness:  PT reported jerks while walking,  Suspect due to gabapentin ( on 300 mg TID at home)  Check EEG,   Hold Gabapentin,   Start gabapentin Low dose 200 mg at bedtime, after improvement in jerks,     H/o Type 2 diabetes mellitus:   with stage 4 chronic kidney disease, with long-term current use of insulin (Multi)  Last A1c 10% (12/2024).  On Lantus 60u nightly.   Continue home Lantus.    Fasting sugar 141 mg,   Add moderate dose sliding scale with meals.    H/o CAD (coronary artery disease)  Status-post PCI x 4 (2009).    On aspirin 81 mg, atorvastatin 40 mg, metoprolol 50 mg twice daily.  Continue ASA, atorvastatin, and metoprolol.    H/o Hypertension  On amlodipine 5 mg daily, spironolactone 25 mg daily, and carvedilol 6.25 mg twice daily.  Continue home antihypertensive regimen.    Mixed hyperlipidemia  Prescribed aspirin and atorvastatin as above.      Interstitial lung disease (Multi)  Not currently taking any inhalers, per chart review.    Swelling of both lower extremities  Pitting edema with asymmetric swelling to bilateral lower extremities (left greater than right).    History of DVT requiring  anticoagulation, which has since been discontinued.      DVT prophylaxis: SCDs  Diet: Cardiac  Bowel regimen: MiraLAX  CODE STATUS: DNR/DNI    Dispo:  PT, OT evaluation. Stable HB and creatinine. Continue to follow recs from Nephrology Dr Nguyen.           Ramón Armstrong MD

## 2025-04-01 NOTE — CARE PLAN
Patient said he wears a CPAP at night at home. Patient was offered a hospital CPAP but he refused CPAP for tonight. RT told him if he changes his mind to call. Patient is on a 2L nasal cannula.

## 2025-04-01 NOTE — CARE PLAN
The clinical goals for the shift include Get stroner and no falls    Over the shift, the patient remained free from falls and injury.  Used call light appropriately.

## 2025-04-02 ENCOUNTER — APPOINTMENT (OUTPATIENT)
Dept: NEPHROLOGY | Facility: CLINIC | Age: OVER 89
End: 2025-04-02
Payer: MEDICARE

## 2025-04-02 LAB
ALBUMIN SERPL BCP-MCNC: 3.7 G/DL (ref 3.4–5)
ALP SERPL-CCNC: 65 U/L (ref 33–136)
ALT SERPL W P-5'-P-CCNC: 18 U/L (ref 10–52)
ANION GAP SERPL CALC-SCNC: 14 MMOL/L (ref 10–20)
AST SERPL W P-5'-P-CCNC: 27 U/L (ref 9–39)
ATRIAL RATE: 75 BPM
BILIRUB SERPL-MCNC: 1.1 MG/DL (ref 0–1.2)
BLOOD EXPIRATION DATE: NORMAL
BLOOD EXPIRATION DATE: NORMAL
BUN SERPL-MCNC: 85 MG/DL (ref 6–23)
CALCIUM SERPL-MCNC: 8.8 MG/DL (ref 8.6–10.3)
CHLORIDE SERPL-SCNC: 106 MMOL/L (ref 98–107)
CO2 SERPL-SCNC: 24 MMOL/L (ref 21–32)
CREAT SERPL-MCNC: 2.95 MG/DL (ref 0.5–1.3)
DISPENSE STATUS: NORMAL
DISPENSE STATUS: NORMAL
EGFRCR SERPLBLD CKD-EPI 2021: 20 ML/MIN/1.73M*2
ERYTHROCYTE [DISTWIDTH] IN BLOOD BY AUTOMATED COUNT: 22.6 % (ref 11.5–14.5)
GLUCOSE BLD MANUAL STRIP-MCNC: 148 MG/DL (ref 74–99)
GLUCOSE BLD MANUAL STRIP-MCNC: 227 MG/DL (ref 74–99)
GLUCOSE BLD MANUAL STRIP-MCNC: 297 MG/DL (ref 74–99)
GLUCOSE BLD MANUAL STRIP-MCNC: 298 MG/DL (ref 74–99)
GLUCOSE SERPL-MCNC: 162 MG/DL (ref 74–99)
HCT VFR BLD AUTO: 23.5 % (ref 41–52)
HGB BLD-MCNC: 7.7 G/DL (ref 13.5–17.5)
MCH RBC QN AUTO: 32.2 PG (ref 26–34)
MCHC RBC AUTO-ENTMCNC: 32.8 G/DL (ref 32–36)
MCV RBC AUTO: 98 FL (ref 80–100)
NRBC BLD-RTO: 0 /100 WBCS (ref 0–0)
P AXIS: 72 DEGREES
P OFFSET: 179 MS
P ONSET: 134 MS
PLATELET # BLD AUTO: 36 X10*3/UL (ref 150–450)
POTASSIUM SERPL-SCNC: 4.6 MMOL/L (ref 3.5–5.3)
PR INTERVAL: 180 MS
PRODUCT BLOOD TYPE: 9500
PRODUCT BLOOD TYPE: NORMAL
PRODUCT CODE: NORMAL
PRODUCT CODE: NORMAL
PROT SERPL-MCNC: 5.9 G/DL (ref 6.4–8.2)
Q ONSET: 224 MS
QRS COUNT: 12 BEATS
QRS DURATION: 90 MS
QT INTERVAL: 392 MS
QTC CALCULATION(BAZETT): 437 MS
QTC FREDERICIA: 422 MS
R AXIS: -34 DEGREES
RBC # BLD AUTO: 2.39 X10*6/UL (ref 4.5–5.9)
SODIUM SERPL-SCNC: 139 MMOL/L (ref 136–145)
T AXIS: 51 DEGREES
T OFFSET: 420 MS
UNIT ABO: NORMAL
UNIT ABO: NORMAL
UNIT NUMBER: NORMAL
UNIT NUMBER: NORMAL
UNIT RH: NORMAL
UNIT RH: NORMAL
UNIT VOLUME: 279
UNIT VOLUME: 350
VENTRICULAR RATE: 75 BPM
WBC # BLD AUTO: 1.1 X10*3/UL (ref 4.4–11.3)
XM INTEP: NORMAL
XM INTEP: NORMAL

## 2025-04-02 PROCEDURE — 94761 N-INVAS EAR/PLS OXIMETRY MLT: CPT

## 2025-04-02 PROCEDURE — 80053 COMPREHEN METABOLIC PANEL: CPT | Performed by: INTERNAL MEDICINE

## 2025-04-02 PROCEDURE — 99233 SBSQ HOSP IP/OBS HIGH 50: CPT | Performed by: INTERNAL MEDICINE

## 2025-04-02 PROCEDURE — 2500000002 HC RX 250 W HCPCS SELF ADMINISTERED DRUGS (ALT 637 FOR MEDICARE OP, ALT 636 FOR OP/ED)

## 2025-04-02 PROCEDURE — 2500000005 HC RX 250 GENERAL PHARMACY W/O HCPCS

## 2025-04-02 PROCEDURE — 97535 SELF CARE MNGMENT TRAINING: CPT | Mod: GO

## 2025-04-02 PROCEDURE — 85027 COMPLETE CBC AUTOMATED: CPT | Performed by: INTERNAL MEDICINE

## 2025-04-02 PROCEDURE — 2500000001 HC RX 250 WO HCPCS SELF ADMINISTERED DRUGS (ALT 637 FOR MEDICARE OP): Performed by: STUDENT IN AN ORGANIZED HEALTH CARE EDUCATION/TRAINING PROGRAM

## 2025-04-02 PROCEDURE — 36415 COLL VENOUS BLD VENIPUNCTURE: CPT | Performed by: INTERNAL MEDICINE

## 2025-04-02 PROCEDURE — 82947 ASSAY GLUCOSE BLOOD QUANT: CPT

## 2025-04-02 PROCEDURE — 99232 SBSQ HOSP IP/OBS MODERATE 35: CPT | Performed by: STUDENT IN AN ORGANIZED HEALTH CARE EDUCATION/TRAINING PROGRAM

## 2025-04-02 PROCEDURE — 2500000001 HC RX 250 WO HCPCS SELF ADMINISTERED DRUGS (ALT 637 FOR MEDICARE OP)

## 2025-04-02 PROCEDURE — 2500000004 HC RX 250 GENERAL PHARMACY W/ HCPCS (ALT 636 FOR OP/ED)

## 2025-04-02 PROCEDURE — 1200000002 HC GENERAL ROOM WITH TELEMETRY DAILY

## 2025-04-02 PROCEDURE — 97161 PT EVAL LOW COMPLEX 20 MIN: CPT | Mod: GP | Performed by: PHYSICAL THERAPIST

## 2025-04-02 RX ORDER — DOCUSATE SODIUM 100 MG/1
100 CAPSULE, LIQUID FILLED ORAL 2 TIMES DAILY
Status: DISCONTINUED | OUTPATIENT
Start: 2025-04-02 | End: 2025-04-04 | Stop reason: HOSPADM

## 2025-04-02 RX ORDER — LACTULOSE 10 G/15ML
30 SOLUTION ORAL DAILY
Status: DISCONTINUED | OUTPATIENT
Start: 2025-04-02 | End: 2025-04-04 | Stop reason: HOSPADM

## 2025-04-02 RX ORDER — METOPROLOL TARTRATE 25 MG/1
25 TABLET, FILM COATED ORAL 2 TIMES DAILY
Status: DISCONTINUED | OUTPATIENT
Start: 2025-04-02 | End: 2025-04-02

## 2025-04-02 RX ADMIN — ATORVASTATIN CALCIUM 40 MG: 40 TABLET, FILM COATED ORAL at 08:46

## 2025-04-02 RX ADMIN — Medication 2 L/MIN: at 08:53

## 2025-04-02 RX ADMIN — PRIMIDONE 50 MG: 50 TABLET ORAL at 20:30

## 2025-04-02 RX ADMIN — INSULIN LISPRO 6 UNITS: 100 INJECTION, SOLUTION INTRAVENOUS; SUBCUTANEOUS at 16:52

## 2025-04-02 RX ADMIN — INSULIN GLARGINE 60 UNITS: 100 INJECTION, SOLUTION SUBCUTANEOUS at 21:18

## 2025-04-02 RX ADMIN — Medication 250 MG: at 08:46

## 2025-04-02 RX ADMIN — DOCUSATE SODIUM 100 MG: 100 CAPSULE, LIQUID FILLED ORAL at 20:30

## 2025-04-02 RX ADMIN — PANTOPRAZOLE SODIUM 40 MG: 40 INJECTION, POWDER, FOR SOLUTION INTRAVENOUS at 08:46

## 2025-04-02 RX ADMIN — SUCRALFATE 1 G: 1 TABLET ORAL at 08:46

## 2025-04-02 RX ADMIN — PANTOPRAZOLE SODIUM 40 MG: 40 INJECTION, POWDER, FOR SOLUTION INTRAVENOUS at 20:30

## 2025-04-02 RX ADMIN — SUCRALFATE 1 G: 1 TABLET ORAL at 11:49

## 2025-04-02 RX ADMIN — CARVEDILOL 6.25 MG: 6.25 TABLET, FILM COATED ORAL at 08:46

## 2025-04-02 RX ADMIN — DOCUSATE SODIUM 100 MG: 100 CAPSULE, LIQUID FILLED ORAL at 11:49

## 2025-04-02 RX ADMIN — Medication 2 L/MIN: at 18:00

## 2025-04-02 RX ADMIN — SUCRALFATE 1 G: 1 TABLET ORAL at 16:52

## 2025-04-02 RX ADMIN — LACTULOSE 30 G: 20 SOLUTION ORAL at 11:49

## 2025-04-02 RX ADMIN — CARVEDILOL 6.25 MG: 6.25 TABLET, FILM COATED ORAL at 20:30

## 2025-04-02 RX ADMIN — Medication 1000 MCG: at 08:46

## 2025-04-02 RX ADMIN — INSULIN LISPRO 6 UNITS: 100 INJECTION, SOLUTION INTRAVENOUS; SUBCUTANEOUS at 11:49

## 2025-04-02 RX ADMIN — ALLOPURINOL 100 MG: 100 TABLET ORAL at 08:46

## 2025-04-02 RX ADMIN — FERROUS SULFATE TAB 325 MG (65 MG ELEMENTAL FE) 325 MG: 325 (65 FE) TAB at 08:46

## 2025-04-02 RX ADMIN — Medication 2 L/MIN: at 00:00

## 2025-04-02 RX ADMIN — ASPIRIN 81 MG: 81 TABLET, COATED ORAL at 08:46

## 2025-04-02 ASSESSMENT — COGNITIVE AND FUNCTIONAL STATUS - GENERAL
CLIMB 3 TO 5 STEPS WITH RAILING: A LOT
STANDING UP FROM CHAIR USING ARMS: A LITTLE
MOVING FROM LYING ON BACK TO SITTING ON SIDE OF FLAT BED WITH BEDRAILS: A LOT
MOBILITY SCORE: 14
TOILETING: A LITTLE
MOVING TO AND FROM BED TO CHAIR: A LITTLE
TOILETING: A LOT
STANDING UP FROM CHAIR USING ARMS: A LITTLE
DRESSING REGULAR UPPER BODY CLOTHING: A LITTLE
HELP NEEDED FOR BATHING: A LOT
TURNING FROM BACK TO SIDE WHILE IN FLAT BAD: A LOT
CLIMB 3 TO 5 STEPS WITH RAILING: A LOT
MOVING TO AND FROM BED TO CHAIR: A LITTLE
WALKING IN HOSPITAL ROOM: A LITTLE
CLIMB 3 TO 5 STEPS WITH RAILING: TOTAL
DAILY ACTIVITIY SCORE: 18
DRESSING REGULAR UPPER BODY CLOTHING: A LITTLE
DRESSING REGULAR LOWER BODY CLOTHING: A LOT
DRESSING REGULAR LOWER BODY CLOTHING: A LOT
DAILY ACTIVITIY SCORE: 18
DAILY ACTIVITIY SCORE: 16
STANDING UP FROM CHAIR USING ARMS: A LITTLE
MOBILITY SCORE: 18
MOVING TO AND FROM BED TO CHAIR: A LITTLE
TURNING FROM BACK TO SIDE WHILE IN FLAT BAD: A LITTLE
HELP NEEDED FOR BATHING: A LOT
DRESSING REGULAR LOWER BODY CLOTHING: A LOT
TURNING FROM BACK TO SIDE WHILE IN FLAT BAD: A LITTLE
WALKING IN HOSPITAL ROOM: A LITTLE
PERSONAL GROOMING: A LITTLE
TOILETING: A LITTLE
DRESSING REGULAR UPPER BODY CLOTHING: A LITTLE
MOBILITY SCORE: 18
HELP NEEDED FOR BATHING: A LOT
WALKING IN HOSPITAL ROOM: A LITTLE

## 2025-04-02 ASSESSMENT — PAIN - FUNCTIONAL ASSESSMENT
PAIN_FUNCTIONAL_ASSESSMENT: 0-10

## 2025-04-02 ASSESSMENT — PAIN SCALES - GENERAL
PAINLEVEL_OUTOF10: 0 - NO PAIN

## 2025-04-02 ASSESSMENT — ACTIVITIES OF DAILY LIVING (ADL)
HOME_MANAGEMENT_TIME_ENTRY: 15
ADL_ASSISTANCE: INDEPENDENT

## 2025-04-02 NOTE — PROGRESS NOTES
Zack Medrano is a 89 y.o. male on day 1 of admission presenting with Acute kidney injury superimposed on chronic kidney disease.      Subjective   Hemodynamically stable, no acute distress, sitting out of bed to chair, on oxygen via nasal cannula, complaining of having no bowel movement, no chest pain or abdominal pain, no nausea or vomiting, no fever or chills  Objective     Last Recorded Vitals  /53 (BP Location: Left arm, Patient Position: Lying)   Pulse 65   Temp 36.1 °C (97 °F) (Temporal)   Resp 18   Wt 101 kg (223 lb 8.7 oz)   SpO2 94%   Intake/Output last 3 Shifts:    Intake/Output Summary (Last 24 hours) at 4/2/2025 1248  Last data filed at 4/2/2025 0848  Gross per 24 hour   Intake 240 ml   Output --   Net 240 ml       Admission Weight  Weight: 102 kg (225 lb) (03/31/25 1504)    Daily Weight  03/31/25 : 101 kg (223 lb 8.7 oz)    Image Results  ECG 12 Lead  Normal sinus rhythm  Left axis deviation  Abnormal ECG  When compared with ECG of 15-MAY-2023 14:14,  Previous ECG has undetermined rhythm, needs review  See ED provider note for full interpretation and clinical correlation  Confirmed by Fabiola Cloud ([id number]) on 4/2/2025 12:19:09 PM      Physical Exam      General: Well-developed elderly male, in no acute distress  HEENT: AT, NC, no JVD, no lymphadenopathy, neck supple  Lungs: Clear, no wheezing, no crackles  Cardiac: Normal S1-S2, no murmur, no gallop  Abdomen: Soft, nontender, no distention, positive bowel sound  Extremities: No deformity, bilateral lower extremity pitting edema noted, pulses intact, ROM intact  Neurological: Alert awake oriented x3, sensation intact, clear speech        Assessment & Plan  Acute on chronic anemia    Other pancytopenia (Multi)    Acute kidney injury superimposed on chronic kidney disease    Stage 4 chronic kidney disease (Multi)    Type 2 diabetes mellitus with stage 4 chronic kidney disease, with long-term current use of insulin (Multi)    CAD  (coronary artery disease)    Hypertension    Mixed hyperlipidemia    Interstitial lung disease (Multi)    Swelling of both lower extremities        An 89 y.o. male with PMH of myelodysplasia, CKD 4, hypertension, Type 2 Dm, CAD who presented with tremors, weakness, recurrent falls.  Patient was admitted for management of following issues:      Recurrent falls: Reports tremors and generalized weakness, can be in the setting of severe anemia, status post 2 units of PRBC transfusion, on fall precaution, was evaluated by PT/OT recommended moderate intensity level on discharge  Blood loss anemia secondary to GI bleed: FOBT positive, status post 2 units of PRBC transfusion, monitor CBC, transfuse to keep Hgb >7, GI consulted, continue with Protonix 40 mg twice daily, IV fluid hydration as needed  OSBALDO/CKD: Nephrology was consulted, improving, meds were adjusted, signed off, monitor renal function, avoid nephrotoxic agent  Mild dysplasia/pancytopenia: Patient has outpatient follow-up with heme-onc, currently on darbepoetin every 2 weeks for total of 6 months, continue ferrous sulfate, vitamin C, vitamin B12  Tremors: Pending EEG, echo pending was resumed with a lower dose due to OSBALDO, fall precaution  HTN, HLD, ILD, CAD: Continue home meds  Bilateral lower extremity edema: CHRISTY hose, leg elevation  Constipation: Lactulose, Colace, MiraLAX  VTE prophylaxis: SCDs, no anticoagulation  Disposition: PT/OT recommended moderate intensity level on discharge, TCC following for rehab placement  CODE STATUS: DNR/DNI          Dorian Gustafson MD

## 2025-04-02 NOTE — PROGRESS NOTES
Physical Therapy    Physical Therapy Evaluation    Patient Name: Zack Medrano  MRN: [id number]  Today's Date: 4/2/2025   Time Calculation  Start Time: 0738  Stop Time: 0752  Time Calculation (min): 14 min    Assessment/Plan   Skilled PT intervention is indicated due to patient presents with deficits in bed mobility, transfers, gait, stair negotiation, strength, activity tolerance, and safety awareness.   Patient globally weak and shaking with mobility.  Patient is indep with mobility and no device at baseline and is currently needing to use FWW for ambulation.  Recommend continued physical therapy intervention at a moderate intensity to improve strength, balance, mobility and reduce fall risk.  Continue ambulation with FWW.    PT Assessment  PT Assessment Results: Decreased strength, Decreased endurance, Impaired balance, Decreased mobility, Decreased safety awareness, Obesity  Rehab Prognosis: Good  Barriers to Discharge Home: Caregiver assistance, Physical needs  Caregiver Assistance: Caregiver assistance needed per identified barriers - however, level of patient's required assistance exceeds assistance available at home  Physical Needs: Stair navigation into home limited by function/safety, Ambulating household distances limited by function/safety, 24hr mobility assistance needed, 24hr ADL assistance needed, High falls risk due to function or environment  Evaluation/Treatment Tolerance: Patient tolerated treatment well  End of Session Communication: Bedside nurse (communication on white board)  End of Session Patient Position: Alarm on, Up in chair (call light in reach)  IP OR SWING BED PT PLAN  Inpatient or Swing Bed: Inpatient  PT Plan  Treatment/Interventions: Bed mobility, Transfer training, Gait training, Stair training, Balance training, Strengthening, Endurance training, Therapeutic exercise, Therapeutic activity, Home exercise program  PT Plan: Ongoing PT  PT Frequency: 4 times per week  PT Discharge  Recommendations: Moderate intensity level of continued care  Equipment Recommended upon Discharge: Wheeled walker  PT Recommended Transfer Status: Assist x1  PT - OK to Discharge: Yes (once medically appropriate and safe DC plan in place)    Subjective     Current Problem:  Patient Active Problem List   Diagnosis    Acid reflux    Allergic rhinitis, seasonal    Other pancytopenia (Multi)    Arthritis    Degenerative joint disease    Benign prostatic hyperplasia with urinary obstruction and other lower urinary tract symptoms    CAD (coronary artery disease)    Degenerative cervical spinal stenosis    ED (erectile dysfunction) of organic origin    Type 2 diabetes mellitus with stage 4 chronic kidney disease, with long-term current use of insulin (Multi)    Elevated PSA    History of skin cancer    Hypertension    Mixed hyperlipidemia    Neutropenia    LINDA on CPAP    Pulmonary nodule    S/P coronary artery stent placement    Stage 4 chronic kidney disease (Multi)    Thrombocytopenia (CMS-[facility])    Tremor, essential    Class 2 severe obesity with serious comorbidity and body mass index (BMI) of 36.0 to 36.9 in adult    Peripheral vascular disease, unspecified (CMS-HCC)    Anemia due to stage 4 chronic kidney disease    Interstitial lung disease (Multi)    Acute kidney injury superimposed on chronic kidney disease    Acute on chronic anemia    Swelling of both lower extremities       General Visit Information:  General  Reason for Referral: 89 year old admitted for weakness, falls, anemia needing blood transfusion  Referred By: Emanuel  Past Medical History Relevant to Rehab: pancytopenia, possible myelodysplaisa: currently being tx by oncology  Family/Caregiver Present: No  Co-Treatment: OT  Co-Treatment Reason: to maximize patient safety with mobility while addressing discipline-specific goals  Prior to Session Communication: Bedside nurse  Patient Position Received: Up in bathroom (sitting on toilet)  General Comment:  "patient agreeable to assessment; states he feels better today and is jerking less; states he is going to go to [facility] for therapy    Home Living:  Home Living  Type of Home: House  Lives With: Spouse  Home Adaptive Equipment: Walker rolling or standard  Home Layout: Two level, Able to live on main level with bedroom/bathroom  Home Access: Stairs to enter with rails  Entrance Stairs-Number of Steps: 2  Bathroom Shower/Tub: Walk-in shower  Bathroom Equipment: Grab bars in shower  Home Living Comments: sleeps in regular bed    Prior Level of Function:  Prior Function Per Pt/Caregiver Report  ADL Assistance: Independent  Homemaking Assistance: Independent  Ambulatory Assistance: Independent (typically no device but has been using FWW since Saturday due to B legs \"jerking\")    Precautions:  Precautions  Medical Precautions: Fall precautions, Oxygen therapy device and L/min (3L)    Vital Signs:  Vital Signs  Vital Signs Comment: no concerns  Objective     Pain:  Pain Assessment  Pain Assessment: 0-10  0-10 (Numeric) Pain Score: 0 - No pain    Cognition:  Cognition  Orientation Level: Oriented X4  Safety/Judgement: Exceptions to WFL    General Assessments:      Activity Tolerance  Endurance: Endurance does not limit participation in activity     Strength  Strength Comments: LEs grossly >/=4-/5        Postural Control  Postural Control: Within Functional Limits  Static Sitting Balance  Static Sitting-Balance Support: Feet supported, Bilateral upper extremity supported  Static Sitting-Level of Assistance: Independent  Dynamic Sitting Balance  Dynamic Sitting-Balance Support: Feet supported  Dynamic Sitting-Level of Assistance: Close supervision  Dynamic Sitting-Balance: Forward lean  Static Standing Balance  Static Standing-Balance Support: Bilateral upper extremity supported  Static Standing-Level of Assistance: Contact guard  Static Standing-Comment/Number of Minutes: FWW  Dynamic Standing Balance  Dynamic Standing-Balance " Support: Bilateral upper extremity supported  Dynamic Standing-Level of Assistance: Contact guard  Dynamic Standing-Balance: Turning  Dynamic Standing-Comments: FWW    Functional Assessments:     Bed Mobility  Bed Mobility: No  Transfers  Transfer: Yes  Transfer 1  Technique 1: Sit to stand, Stand to sit  Transfer Device 1: Gait belt (FWW)  Transfer Level of Assistance 1: Contact guard, Close supervision, Moderate verbal cues  Trials/Comments 1: cues for technique, hand placement, safety with transfers  Ambulation/Gait Training  Ambulation/Gait Training Performed: Yes  Ambulation/Gait Training 1  Surface 1: Level tile  Device 1: Rolling walker  Gait Support Devices: Gait belt  Assistance 1: Contact guard, Minimal verbal cues  Quality of Gait 1: Decreased step length, Diminished heel strike  Comments/Distance (ft) 1: cues to keep walker ahead of him correct distance, 105', mild shaking in global body during ambulation       Outcome Measures:  [facility] Basic Mobility  Turning from your back to your side while in a flat bed without using bedrails: A lot  Moving from lying on your back to sitting on the side of a flat bed without using bedrails: A lot  Moving to and from bed to chair (including a wheelchair): A little  Standing up from a chair using your arms (e.g. wheelchair or bedside chair): A little  To walk in hospital room: A little  Climbing 3-5 steps with railing: Total  Basic Mobility - Total Score: 14                            Goals:  Encounter Problems       Encounter Problems (Active)       PT Problem       PT Goal 1       Start:  04/02/25    Expected End:  04/16/25       Zack Medrano will be independent with bed mobility for supine to and from sitting EOB without use of rail and bed flat           PT Goal 2       Start:  04/02/25    Expected End:  04/16/25       Zack Medrano will transfer sit to and from stand using least restrictive assistive device mod indep           PT Goal 3       Start:  04/02/25     Expected End:  04/16/25       Zack Medrano will demonstrate good safety awareness with transfers and mobility and with use of assistive device (proper hand placement).            PT Goal 4       Start:  04/02/25    Expected End:  04/16/25       Zack Medrano will ambulate 150 ft with assistive device , level surface, good balance, steady mod Indep              Pain - Adult            Education Documentation  Mobility Training, taught by Miesha Reina, PT at 4/2/2025  8:49 AM.  Learner: Patient  Readiness: Acceptance  Method: Explanation  Response: Verbalizes Understanding, Demonstrated Understanding, Needs Reinforcement  Comment: safety with transfers, hand placement, use of FWW    Education Comments  No comments found.

## 2025-04-02 NOTE — PROGRESS NOTES
Occupational Therapy    OT Treatment    Patient Name: Zack Medrano  MRN: [id number]  Department: [facility]  Room: [address] [place], [place] [address]  Today's Date: 4/2/2025  Time Calculation  Start Time: 0735  Stop Time: 0750  Time Calculation (min): 15 min        Assessment:  OT Assessment: pt with improvements in body jerking during functional tasks today.  noted small tremors.  pt able to stand unsupported during ADL with improvement in progress all goals.  Cont with current OT POC  Prognosis: Good  Barriers to Discharge Home: Caregiver assistance, Physical needs  Caregiver Assistance: Caregiver assistance needed per identified barriers - however, level of patient's required assistance exceeds assistance available at home  Physical Needs: 24hr mobility assistance needed, Intermittent ADL assistance needed, High falls risk due to function or environment  Evaluation/Treatment Tolerance: Patient tolerated treatment well  Medical Staff Made Aware: Yes  End of Session Communication: Bedside nurse  End of Session Patient Position: Alarm on, Up in chair (call light in reach)  Prognosis: Good  Evaluation/Treatment Tolerance: Patient tolerated treatment well  Medical Staff Made Aware: Yes  Plan:  Treatment Interventions: ADL retraining, Functional transfer training, Endurance training, Equipment evaluation/education, Neuromuscular reeducation, Compensatory technique education  OT Frequency: 3 times per week  OT Discharge Recommendations: Moderate intensity level of continued care  OT Recommended Transfer Status: Assist of 1  OT - OK to Discharge: Yes (once medically stable)  Treatment Interventions: ADL retraining, Functional transfer training, Endurance training, Equipment evaluation/education, Neuromuscular reeducation, Compensatory technique education    Subjective   Previous Visit Info:  OT Last Visit  OT Received On: 04/02/25  General:  General  Reason for Referral: 89 year old admitted for weakness, falls, anemia needing blood  transfusion.  Referred By: Emanuel  Past Medical History Relevant to Rehab: pancytopenia, possible myelodysplaisa: currently being tx by oncology  Family/Caregiver Present: No  Prior to Session Communication: Bedside nurse  Patient Position Received: Up in bathroom  General Comment: patient agreeable to assessment; states he feels better today and is jerking less; states he is going to go to [facility] for therapy  Precautions:  Medical Precautions: Fall precautions, Oxygen therapy device and L/min (3 lpm)     Date/Time Vitals Session Patient Position Pulse Resp SpO2 BP MAP (mmHg)    04/02/25 0730 --  --  65  --  97 %  106/53  71     04/02/25 0853 --  --  --  --  94 %  --  --           Vital Signs Comment: no concerns     Pain:  Pain Assessment  Pain Assessment: 0-10  0-10 (Numeric) Pain Score: 0 - No pain    Objective    Cognition:  Cognition  Orientation Level: Oriented X4  Safety/Judgement: Exceptions to WFL  Novel Situations: Moderate    Activities of Daily Living: Toileting  Toileting Level of Assistance: Minimum assistance  Where Assessed: Toilet  Toileting Comments: pt able to tolerate standing to wash hands at sink today.  unsupported stand much improved compared to yesterday.  pt noted with some small tremors but no large jerking movements.  pt wants to rest prior to his breakfast arriving.       Bed Mobility/Transfers: Transfer 1  Technique 1: Sit to stand, Stand to sit  Transfer Device 1: Walker, Gait belt  Transfer Level of Assistance 1: Contact guard, Moderate verbal cues  Trials/Comments 1: hand placement      Functional Mobility:  Functional Mobility  Functional Mobility Performed: Yes  Functional Mobility 1  Surface 1: Level tile  Device 1: Rolling walker  Functional Mobility Support Devices: Gait belt  Assistance 1: Contact guard, Moderate verbal cues  Comments 1: reminders on safety with FWW    Outcome Measures:[facility] Daily Activity  Putting on and taking off regular lower body clothing: A lot  Bathing  (including washing, rinsing, drying): A lot  Putting on and taking off regular upper body clothing: A little  Toileting, which includes using toilet, bedpan or urinal: A lot  Taking care of personal grooming such as brushing teeth: A little  Eating Meals: None  Daily Activity - Total Score: 16        Education Documentation  Body Mechanics, taught by Su Valdes OT at 4/2/2025  9:08 AM.  Learner: Patient  Readiness: Acceptance  Method: Explanation, Demonstration  Response: Needs Reinforcement, Demonstrated Understanding, Verbalizes Understanding  Comment: safety with FWW during ADLs    Precautions, taught by Su Valdes OT at 4/2/2025  9:08 AM.  Learner: Patient  Readiness: Acceptance  Method: Explanation, Demonstration  Response: Needs Reinforcement, Demonstrated Understanding, Verbalizes Understanding  Comment: safety with FWW during ADLs    ADL Training, taught by Su Valdes OT at 4/2/2025  9:08 AM.  Learner: Patient  Readiness: Acceptance  Method: Explanation, Demonstration  Response: Needs Reinforcement, Demonstrated Understanding, Verbalizes Understanding  Comment: safety with FWW during ADLs    Body Mechanics, taught by Su Valdes OT at 4/1/2025 12:44 PM.  Learner: Patient  Readiness: Acceptance  Method: Explanation, Demonstration  Response: Demonstrated Understanding, Needs Reinforcement  Comment: safety and correct use of FWW during ADL    Precautions, taught by Su Valdes OT at 4/1/2025 12:44 PM.  Learner: Patient  Readiness: Acceptance  Method: Explanation, Demonstration  Response: Demonstrated Understanding, Needs Reinforcement  Comment: safety and correct use of FWW during ADL    ADL Training, taught by Su Valdes OT at 4/1/2025 12:44 PM.  Learner: Patient  Readiness: Acceptance  Method: Explanation, Demonstration  Response: Demonstrated Understanding, Needs Reinforcement  Comment: safety and correct use of FWW during ADL    Education Comments  No comments found.        OP EDUCATION:        Goals:  Encounter Problems       Encounter Problems (Active)       ADLs       Patient will perform UB and LB bathing  with contact guard assist level of assistance. (Progressing)       Start:  04/01/25    Expected End:  04/15/25            Patient with complete lower body dressing with contact guard assist level of assistance  (Progressing)       Start:  04/01/25    Expected End:  04/15/25            Patient will complete toileting including hygiene clothing management/hygiene with contact guard assist level of assistance. (Progressing)       Start:  04/01/25    Expected End:  04/15/25               BALANCE       Pt will maintain dynamic standing balance during ADL task with contact guard assist level of assistance in order to demonstrate decreased risk of falling and improved postural control. (Progressing)       Start:  04/01/25    Expected End:  04/15/25

## 2025-04-02 NOTE — PROGRESS NOTES
Per medical team, patient is not yet medically appropriate for discharge; Patient awaits GI consult and will likely require another 48 hours in the hospital. /DSC to continue to send updated notes to [facility] via [facility] as notes available. Plan for patient is to discharge to [facility] when medically ready, pending qualifying 3-midnight inpatient stay for Medicare approval for SNF, by 4/4. Care Transitions to follow and assist. EMERSON Schaffer

## 2025-04-02 NOTE — CARE PLAN
The patient's goals for the shift include      The clinical goals for the shift include no falls      Problem: Pain - Adult  Goal: Verbalizes/displays adequate comfort level or baseline comfort level  Outcome: Progressing     Problem: Safety - Adult  Goal: Free from fall injury  Outcome: Progressing     Problem: Discharge Planning  Goal: Discharge to home or other facility with appropriate resources  Outcome: Progressing     Problem: Chronic Conditions and Co-morbidities  Goal: Patient's chronic conditions and co-morbidity symptoms are monitored and maintained or improved  Outcome: Progressing     Problem: Nutrition  Goal: Nutrient intake appropriate for maintaining nutritional needs  Outcome: Progressing     Problem: Fall/Injury  Goal: Not fall by end of shift  Outcome: Progressing  Goal: Be free from injury by end of the shift  Outcome: Progressing  Goal: Verbalize understanding of personal risk factors for fall in the hospital  Outcome: Progressing  Goal: Verbalize understanding of risk factor reduction measures to prevent injury from fall in the home  Outcome: Progressing  Goal: Use assistive devices by end of the shift  Outcome: Progressing  Goal: Pace activities to prevent fatigue by end of the shift  Outcome: Progressing     Problem: Diabetes  Goal: Achieve decreasing blood glucose levels by end of shift  Outcome: Progressing  Goal: Increase stability of blood glucose readings by end of shift  Outcome: Progressing  Goal: Maintain electrolyte levels within acceptable range throughout shift  Outcome: Progressing  Goal: Maintain glucose levels >70mg/dl to <250mg/dl throughout shift  Outcome: Progressing  Goal: No changes in neurological exam by end of shift  Outcome: Progressing  Goal: Learn about and adhere to nutrition recommendations by end of shift  Outcome: Progressing  Goal: Vital signs within normal range for age by end of shift  Outcome: Progressing  Goal: Increase self care and/or family involovement by  end of shift  Outcome: Progressing

## 2025-04-02 NOTE — PROGRESS NOTES
Zack Medrano is a 89 y.o. male on day 1 of admission presenting with Acute kidney injury superimposed on chronic kidney disease.      Subjective   Patient seen and examined at the bedside  He is sitting comfortably in the bedside chair  He states he is feeling well and has no major complaints at this time  Plan is to go to [facility] for rehab       Objective          Vitals 24HR  Heart Rate:  [65-67]   Temp:  [35.8 °C (96.4 °F)-36.1 °C (97 °F)]   Resp:  [18]   BP: (103-106)/(53-56)   SpO2:  [94 %-98 %]         Intake/Output last 3 Shifts:    Intake/Output Summary (Last 24 hours) at 4/2/2025 1000  Last data filed at 4/2/2025 0848  Gross per 24 hour   Intake 240 ml   Output --   Net 240 ml       Physical Exam  Constitutional:       Appearance: Normal appearance. He is obese.   HENT:      Head: Normocephalic and atraumatic.      Right Ear: External ear normal.      Left Ear: External ear normal.      Nose: Nose normal.      Mouth/Throat:      Mouth: Mucous membranes are moist.      Pharynx: Oropharynx is clear.   Eyes:      Extraocular Movements: Extraocular movements intact.      Conjunctiva/sclera: Conjunctivae normal.      Pupils: Pupils are equal, round, and reactive to light.   Cardiovascular:      Rate and Rhythm: Normal rate and regular rhythm.   Pulmonary:      Effort: Pulmonary effort is normal.      Breath sounds: Normal breath sounds.   Abdominal:      General: Abdomen is flat.      Palpations: Abdomen is soft.   Musculoskeletal:         General: Swelling present.      Right lower leg: Edema present.      Left lower leg: Edema present.   Skin:     General: Skin is warm and dry.   Neurological:      General: No focal deficit present.      Mental Status: He is alert and oriented to person, place, and time.   Psychiatric:         Mood and Affect: Mood normal.         Behavior: Behavior normal.     Scheduled medications  allopurinol, 100 mg, oral, Daily  ascorbic acid, 250 mg, oral, Daily  aspirin, 81 mg,  oral, Daily  atorvastatin, 40 mg, oral, Daily  carvedilol, 6.25 mg, oral, BID  cyanocobalamin, 1,000 mcg, oral, Daily  ferrous sulfate, 1 tablet, oral, Every other day  insulin glargine, 60 Units, subcutaneous, q24h  insulin lispro, 0-10 Units, subcutaneous, TID AC  oxygen, , inhalation, Continuous - Inhalation  pantoprazole, 40 mg, intravenous, BID  polyethylene glycol, 17 g, oral, Daily  primidone, 50 mg, oral, Nightly  sucralfate, 1 g, oral, TID      Continuous medications     PRN medications  PRN medications: acetaminophen **OR** acetaminophen **OR** acetaminophen, dextrose, dextrose, glucagon, glucagon, melatonin, ondansetron ODT **OR** ondansetron      Relevant Results               Assessment/Plan              Assessment & Plan  Acute kidney injury superimposed on chronic kidney disease    Other pancytopenia (Multi)    Benign prostatic hyperplasia with urinary obstruction and other lower urinary tract symptoms    CAD (coronary artery disease)    Type 2 diabetes mellitus with stage 4 chronic kidney disease, with long-term current use of insulin (Multi)    Hypertension    Mixed hyperlipidemia    Stage 4 chronic kidney disease (Multi)    Peripheral vascular disease, unspecified (CMS-HCC)    Interstitial lung disease (Multi)    Acute on chronic anemia    Swelling of both lower extremities    Acute renal failure: Likely prerenal: Improving and stable  CKD IV with baseline creatinine 2.3-2.8  Pancytopenia  Hypertension with blood pressure on low side at this time  Diabetic Nephropathy:   Diabetes mellitus type 2 on insulin  Bilateral lower extremity edema: Stable.   History of pulmonary embolism  Obesity  Obstructive sleep apnea on CPAP machine and compliant  Hyperlipidemia  History of coronary artery  B/L Renal Cyst  Hyperuricemia    Plan:  At this time he is stable from the renal standpoint renal function is actually improving a little bit  His blood pressure is actually running a little on the low side still off  of antihypertensive medications  I would continue him off of these medications at this time  His volume status is stable at this time so no need currently for diuretics  I would continue his supportive measures as he is on  I did discuss with him that he has multiple medical comorbidities that at this time do not have a cure  I have consulted palliative care  I also discussed this briefly with him  When rehab has been set up can be discharged at any time from my standpoint  Please call with any further issues or needs             Mickey Nguyen, DO

## 2025-04-03 LAB
ANION GAP SERPL CALC-SCNC: 13 MMOL/L (ref 10–20)
BUN SERPL-MCNC: 80 MG/DL (ref 6–23)
CALCIUM SERPL-MCNC: 8.5 MG/DL (ref 8.6–10.3)
CHLORIDE SERPL-SCNC: 108 MMOL/L (ref 98–107)
CO2 SERPL-SCNC: 23 MMOL/L (ref 21–32)
CREAT SERPL-MCNC: 2.82 MG/DL (ref 0.5–1.3)
EGFRCR SERPLBLD CKD-EPI 2021: 21 ML/MIN/1.73M*2
ERYTHROCYTE [DISTWIDTH] IN BLOOD BY AUTOMATED COUNT: 21.8 % (ref 11.5–14.5)
GLUCOSE BLD MANUAL STRIP-MCNC: 133 MG/DL (ref 74–99)
GLUCOSE BLD MANUAL STRIP-MCNC: 242 MG/DL (ref 74–99)
GLUCOSE BLD MANUAL STRIP-MCNC: 273 MG/DL (ref 74–99)
GLUCOSE BLD MANUAL STRIP-MCNC: 290 MG/DL (ref 74–99)
GLUCOSE SERPL-MCNC: 140 MG/DL (ref 74–99)
HCT VFR BLD AUTO: 22.8 % (ref 41–52)
HGB BLD-MCNC: 7.4 G/DL (ref 13.5–17.5)
MCH RBC QN AUTO: 32.3 PG (ref 26–34)
MCHC RBC AUTO-ENTMCNC: 32.5 G/DL (ref 32–36)
MCV RBC AUTO: 100 FL (ref 80–100)
NRBC BLD-RTO: 0 /100 WBCS (ref 0–0)
PLATELET # BLD AUTO: 32 X10*3/UL (ref 150–450)
POTASSIUM SERPL-SCNC: 4.5 MMOL/L (ref 3.5–5.3)
RBC # BLD AUTO: 2.29 X10*6/UL (ref 4.5–5.9)
SODIUM SERPL-SCNC: 139 MMOL/L (ref 136–145)
WBC # BLD AUTO: 1.2 X10*3/UL (ref 4.4–11.3)

## 2025-04-03 PROCEDURE — 82374 ASSAY BLOOD CARBON DIOXIDE: CPT | Performed by: STUDENT IN AN ORGANIZED HEALTH CARE EDUCATION/TRAINING PROGRAM

## 2025-04-03 PROCEDURE — 36415 COLL VENOUS BLD VENIPUNCTURE: CPT | Performed by: STUDENT IN AN ORGANIZED HEALTH CARE EDUCATION/TRAINING PROGRAM

## 2025-04-03 PROCEDURE — 82947 ASSAY GLUCOSE BLOOD QUANT: CPT

## 2025-04-03 PROCEDURE — 2500000001 HC RX 250 WO HCPCS SELF ADMINISTERED DRUGS (ALT 637 FOR MEDICARE OP): Performed by: STUDENT IN AN ORGANIZED HEALTH CARE EDUCATION/TRAINING PROGRAM

## 2025-04-03 PROCEDURE — 94761 N-INVAS EAR/PLS OXIMETRY MLT: CPT

## 2025-04-03 PROCEDURE — 2500000002 HC RX 250 W HCPCS SELF ADMINISTERED DRUGS (ALT 637 FOR MEDICARE OP, ALT 636 FOR OP/ED)

## 2025-04-03 PROCEDURE — 2500000004 HC RX 250 GENERAL PHARMACY W/ HCPCS (ALT 636 FOR OP/ED)

## 2025-04-03 PROCEDURE — 2500000005 HC RX 250 GENERAL PHARMACY W/O HCPCS

## 2025-04-03 PROCEDURE — 1200000002 HC GENERAL ROOM WITH TELEMETRY DAILY

## 2025-04-03 PROCEDURE — 97110 THERAPEUTIC EXERCISES: CPT | Mod: GP,CQ

## 2025-04-03 PROCEDURE — 2500000001 HC RX 250 WO HCPCS SELF ADMINISTERED DRUGS (ALT 637 FOR MEDICARE OP)

## 2025-04-03 PROCEDURE — 85027 COMPLETE CBC AUTOMATED: CPT | Performed by: STUDENT IN AN ORGANIZED HEALTH CARE EDUCATION/TRAINING PROGRAM

## 2025-04-03 PROCEDURE — 99232 SBSQ HOSP IP/OBS MODERATE 35: CPT | Performed by: STUDENT IN AN ORGANIZED HEALTH CARE EDUCATION/TRAINING PROGRAM

## 2025-04-03 RX ADMIN — PANTOPRAZOLE SODIUM 40 MG: 40 INJECTION, POWDER, FOR SOLUTION INTRAVENOUS at 09:18

## 2025-04-03 RX ADMIN — Medication 2 L/MIN: at 00:00

## 2025-04-03 RX ADMIN — SUCRALFATE 1 G: 1 TABLET ORAL at 09:19

## 2025-04-03 RX ADMIN — INSULIN LISPRO 6 UNITS: 100 INJECTION, SOLUTION INTRAVENOUS; SUBCUTANEOUS at 17:01

## 2025-04-03 RX ADMIN — INSULIN LISPRO 6 UNITS: 100 INJECTION, SOLUTION INTRAVENOUS; SUBCUTANEOUS at 11:43

## 2025-04-03 RX ADMIN — ASPIRIN 81 MG: 81 TABLET, COATED ORAL at 09:18

## 2025-04-03 RX ADMIN — DOCUSATE SODIUM 100 MG: 100 CAPSULE, LIQUID FILLED ORAL at 20:43

## 2025-04-03 RX ADMIN — POLYETHYLENE GLYCOL 3350 17 G: 17 POWDER, FOR SOLUTION ORAL at 09:19

## 2025-04-03 RX ADMIN — Medication 3 L/MIN: at 07:11

## 2025-04-03 RX ADMIN — DOCUSATE SODIUM 100 MG: 100 CAPSULE, LIQUID FILLED ORAL at 09:19

## 2025-04-03 RX ADMIN — CARVEDILOL 6.25 MG: 6.25 TABLET, FILM COATED ORAL at 20:43

## 2025-04-03 RX ADMIN — Medication 3 L/MIN: at 13:16

## 2025-04-03 RX ADMIN — Medication 250 MG: at 09:18

## 2025-04-03 RX ADMIN — CARVEDILOL 6.25 MG: 6.25 TABLET, FILM COATED ORAL at 09:18

## 2025-04-03 RX ADMIN — LACTULOSE 30 G: 20 SOLUTION ORAL at 09:19

## 2025-04-03 RX ADMIN — ATORVASTATIN CALCIUM 40 MG: 40 TABLET, FILM COATED ORAL at 09:18

## 2025-04-03 RX ADMIN — SUCRALFATE 1 G: 1 TABLET ORAL at 11:43

## 2025-04-03 RX ADMIN — PRIMIDONE 50 MG: 50 TABLET ORAL at 20:43

## 2025-04-03 RX ADMIN — PANTOPRAZOLE SODIUM 40 MG: 40 INJECTION, POWDER, FOR SOLUTION INTRAVENOUS at 20:43

## 2025-04-03 RX ADMIN — Medication 1000 MCG: at 09:19

## 2025-04-03 RX ADMIN — ALLOPURINOL 100 MG: 100 TABLET ORAL at 09:18

## 2025-04-03 RX ADMIN — INSULIN GLARGINE 60 UNITS: 100 INJECTION, SOLUTION SUBCUTANEOUS at 20:44

## 2025-04-03 RX ADMIN — SUCRALFATE 1 G: 1 TABLET ORAL at 17:02

## 2025-04-03 ASSESSMENT — COGNITIVE AND FUNCTIONAL STATUS - GENERAL
CLIMB 3 TO 5 STEPS WITH RAILING: TOTAL
WALKING IN HOSPITAL ROOM: A LITTLE
MOBILITY SCORE: 20
CLIMB 3 TO 5 STEPS WITH RAILING: A LITTLE
TURNING FROM BACK TO SIDE WHILE IN FLAT BAD: A LITTLE
DAILY ACTIVITIY SCORE: 20
TOILETING: A LITTLE
MOVING FROM LYING ON BACK TO SITTING ON SIDE OF FLAT BED WITH BEDRAILS: A LOT
DRESSING REGULAR UPPER BODY CLOTHING: A LITTLE
HELP NEEDED FOR BATHING: A LITTLE
STANDING UP FROM CHAIR USING ARMS: A LITTLE
WALKING IN HOSPITAL ROOM: A LITTLE
DRESSING REGULAR LOWER BODY CLOTHING: A LITTLE
MOBILITY SCORE: 14
TURNING FROM BACK TO SIDE WHILE IN FLAT BAD: A LOT
MOVING TO AND FROM BED TO CHAIR: A LITTLE
MOVING TO AND FROM BED TO CHAIR: A LITTLE

## 2025-04-03 ASSESSMENT — PAIN - FUNCTIONAL ASSESSMENT
PAIN_FUNCTIONAL_ASSESSMENT: 0-10

## 2025-04-03 ASSESSMENT — PAIN SCALES - GENERAL
PAINLEVEL_OUTOF10: 0 - NO PAIN

## 2025-04-03 NOTE — PROGRESS NOTES
Physical Therapy    Physical Therapy Treatment    Patient Name: Zack Medrano  MRN: [id number]  Today's Date: 4/3/2025  Time Calculation  Start Time: 1301  Stop Time: 1318  Time Calculation (min): 17 min     312/312-A    Assessment/Plan   PT Assessment  PT Assessment Results: Decreased strength, Decreased endurance, Impaired balance, Decreased mobility, Decreased safety awareness, Obesity  Rehab Prognosis: Good  Barriers to Discharge Home: Caregiver assistance, Physical needs  Caregiver Assistance: Caregiver assistance needed per identified barriers - however, level of patient's required assistance exceeds assistance available at home  Physical Needs: Stair navigation into home limited by function/safety, Ambulating household distances limited by function/safety, 24hr mobility assistance needed, 24hr ADL assistance needed, High falls risk due to function or environment  Evaluation/Treatment Tolerance: Patient tolerated treatment well  End of Session Communication: Bedside nurse (communication on white board)  Assessment Comment: Pt. agreeable to therapy.  States he just went for a walk [place].  Focused on TE and balance this session.  Verbal cues needed with form and STS transfers  Pt. stood at walker with chair behind him.  Able to complete standing TE with one seated rest break d/t SOB.  Pt. states legs feel like they could give out.  Pt. returned to chair.  End of Session Patient Position: Up in chair, Alarm on (call light within reach)  PT Plan  Inpatient/Swing Bed or Outpatient: Inpatient  PT Plan  Treatment/Interventions: Bed mobility, Transfer training, Gait training, Stair training, Balance training, Strengthening, Endurance training, Therapeutic exercise, Therapeutic activity, Home exercise program  PT Plan: Ongoing PT  PT Frequency: 4 times per week  PT Discharge Recommendations: Moderate intensity level of continued care  Equipment Recommended upon Discharge: Wheeled walker  PT Recommended Transfer  Status: Assist x1  PT - OK to Discharge: Yes (once medically appropriate and safe DC plan in place)    Current Problem:  Patient Active Problem List   Diagnosis    Acid reflux    Allergic rhinitis, seasonal    Other pancytopenia (Multi)    Arthritis    Degenerative joint disease    Benign prostatic hyperplasia with urinary obstruction and other lower urinary tract symptoms    CAD (coronary artery disease)    Degenerative cervical spinal stenosis    ED (erectile dysfunction) of organic origin    Type 2 diabetes mellitus with stage 4 chronic kidney disease, with long-term current use of insulin (Multi)    Elevated PSA    History of skin cancer    Hypertension    Mixed hyperlipidemia    Neutropenia    LINDA on CPAP    Pulmonary nodule    S/P coronary artery stent placement    Stage 4 chronic kidney disease (Multi)    Thrombocytopenia (CMS-HCC)    Tremor, essential    Class 2 severe obesity with serious comorbidity and body mass index (BMI) of 36.0 to 36.9 in adult    Peripheral vascular disease, unspecified (CMS-[facility])    Anemia due to stage 4 chronic kidney disease    Interstitial lung disease (Multi)    Acute kidney injury superimposed on chronic kidney disease    Acute on chronic anemia    Swelling of both lower extremities       General Visit Information:   PT  Visit  PT Received On: 04/03/25  General  Family/Caregiver Present: No  Prior to Session Communication: Bedside nurse  Patient Position Received: Up in chair, Alarm on  General Comment: agreeable to therapy  Subjective     Precautions:  Precautions  Medical Precautions: Fall precautions, Oxygen therapy device and L/min    Vital Signs:  Vital Signs  SpO2: 96 %  Objective     Pain:  Pain Assessment  Pain Assessment: 0-10  0-10 (Numeric) Pain Score: 0 - No pain    Cognition:  Cognition  Orientation Level: Oriented X4    Postural Control:       Extremity/Trunk Assessments:                Activity Tolerance:  Activity Tolerance  Endurance: Tolerates 10 - 20 min  exercise with multiple rests    Treatments:  Therapeutic Exercise  Therapeutic Exercise Performed: Yes  Therapeutic Exercise Activity 1: B/l LAQ's x15  Therapeutic Exercise Activity 2: B/l seated marches, alt x15  Therapeutic Exercise Activity 3: B/l seated HR/TR's x15 ea  Therapeutic Exercise Activity 4: standing toe taps fwds x10  Therapeutic Exercise Activity 5: standing marches, lat x10           Ambulation/Gait Training  Ambulation/Gait Training Performed: No  Transfers  Transfer: Yes  Transfer 1  Technique 1: Sit to stand, Stand to sit  Transfer Device 1: Gait belt  Transfer Level of Assistance 1: Contact guard, Close supervision, Minimal verbal cues          Outcome Measures:     [facility] Basic Mobility  Turning from your back to your side while in a flat bed without using bedrails: A lot  Moving from lying on your back to sitting on the side of a flat bed without using bedrails: A lot  Moving to and from bed to chair (including a wheelchair): A little  Standing up from a chair using your arms (e.g. wheelchair or bedside chair): A little  To walk in hospital room: A little  Climbing 3-5 steps with railing: Total  Basic Mobility - Total Score: 14                                      Education Documentation  No documentation found.  Education Comments  No comments found.           EDUCATION:     Encounter Problems       Encounter Problems (Active)       PT Problem       PT Goal 1       Start:  04/02/25    Expected End:  04/16/25       Zack Medarno will be independent with bed mobility for supine to and from sitting EOB without use of rail and bed flat           PT Goal 2       Start:  04/02/25    Expected End:  04/16/25       Zack Medrano will transfer sit to and from stand using least restrictive assistive device mod indep           PT Goal 3       Start:  04/02/25    Expected End:  04/16/25       Zack Medrano will demonstrate good safety awareness with transfers and mobility and with use of assistive  device (proper hand placement).            PT Goal 4       Start:  04/02/25    Expected End:  04/16/25       Zack Medrano will ambulate 150 ft with assistive device , level surface, good balance, steady mod Indep              Pain - Adult

## 2025-04-03 NOTE — PROGRESS NOTES
Zack Medrano is a 89 y.o. male on day 2 of admission presenting with Acute kidney injury superimposed on chronic kidney disease.      Subjective   Hemodynamically stable, no acute distress, sitting out of bed to chair, on oxygen via nasal cannula, denies any active complaints.  Objective     Last Recorded Vitals  /69 (BP Location: Left arm, Patient Position: Sitting)   Pulse 65   Temp 36.4 °C (97.6 °F) (Temporal)   Resp 18   Wt 101 kg (223 lb 8.7 oz)   SpO2 98%   Intake/Output last 3 Shifts:    Intake/Output Summary (Last 24 hours) at 4/3/2025 1255  Last data filed at 4/3/2025 0815  Gross per 24 hour   Intake 840 ml   Output 475 ml   Net 365 ml       Admission Weight  Weight: 102 kg (225 lb) (03/31/25 1504)    Daily Weight  03/31/25 : 101 kg (223 lb 8.7 oz)    Image Results  EEG  IMPRESSION    Impression  This awake and drowsy routine EEG is indicative of a mild diffuse encephalopathy. No epileptiform discharges or lateralizing are seen.    A full report will be scanned into the patient's chart at a later time.    This report has been interpreted and electronically signed by  ECG 12 Lead  Normal sinus rhythm  Left axis deviation  Abnormal ECG  When compared with ECG of 15-MAY-2023 14:14,  Previous ECG has undetermined rhythm, needs review  See ED provider note for full interpretation and clinical correlation  Confirmed by Fabiola Cloud ([id number]) on 4/2/2025 12:19:09 PM      Physical Exam      General: Well-developed elderly male, in no acute distress  HEENT: AT, NC, no JVD, no lymphadenopathy, neck supple  Lungs: Clear, no wheezing, no crackles  Cardiac: Normal S1-S2, no murmur, no gallop  Abdomen: Soft, nontender, no distention, positive bowel sound  Extremities: No deformity, bilateral lower extremity pitting edema noted, pulses intact, ROM intact  Neurological: Alert awake oriented x3, sensation intact, clear speech        Assessment & Plan  Acute on chronic anemia    Other pancytopenia  (Multi)    Acute kidney injury superimposed on chronic kidney disease    Stage 4 chronic kidney disease (Multi)    Type 2 diabetes mellitus with stage 4 chronic kidney disease, with long-term current use of insulin (Multi)    CAD (coronary artery disease)    Hypertension    Mixed hyperlipidemia  Prescribed aspirin and atorvastatin as above.  Management as above.  Interstitial lung disease (Multi)  Not currently taking any inhalers, per chart review.  Swelling of both lower extremities        An 89 y.o. male with PMH of myelodysplasia, CKD 4, hypertension, Type 2 Dm, CAD who presented with tremors, weakness, recurrent falls.  Patient was admitted for management of following issues:      Recurrent falls: Reports tremors and generalized weakness, can be in the setting of severe anemia, status post 2 units of PRBC transfusion, on fall precaution, was evaluated by PT/OT recommended moderate intensity level on discharge  Blood loss anemia secondary to GI bleed: FOBT positive, status post 2 units of PRBC transfusion, monitor CBC, transfuse to keep Hgb >7, GI consulted, continue with Protonix 40 mg twice daily, IV fluid hydration as needed  OSBALDO/CKD: Nephrology was consulted, improving, meds were adjusted, signed off, monitor renal function, avoid nephrotoxic agent  Mild dysplasia/pancytopenia: Patient has outpatient follow-up with heme-onc, currently on darbepoetin every 2 weeks for total of 6 months, continue ferrous sulfate, vitamin C, vitamin B12  Tremors: Pending EEG, there is an echo from 6 months ago, I do not see a repeat 1  HTN, HLD, ILD, CAD: Continue home meds  Bilateral lower extremity edema: CHRISTY hose, leg elevation  Constipation: Lactulose, Colace, MiraLAX  VTE prophylaxis: SCDs, no anticoagulation  Disposition: PT/OT recommended moderate intensity level on discharge, TCC following for rehab placement  CODE STATUS: DNR/DNI    I discussed the case with nephrology, from what we understand GI is not planning to do  any scope at this moment, we will continue to trend hemoglobin if stable can be discharged to a SNF, working on a safe discharge plan      Junior Patton MD

## 2025-04-03 NOTE — CARE PLAN
The patient's goals for the shift include      The clinical goals for the shift include no falls    Over the shift, the patient did not make progress toward the following goals. Barriers to progression include . Recommendations to address these barriers include   Problem: Pain - Adult  Goal: Verbalizes/displays adequate comfort level or baseline comfort level  Outcome: Progressing     Problem: Safety - Adult  Goal: Free from fall injury  Outcome: Progressing     Problem: Discharge Planning  Goal: Discharge to home or other facility with appropriate resources  Outcome: Progressing     Problem: Chronic Conditions and Co-morbidities  Goal: Patient's chronic conditions and co-morbidity symptoms are monitored and maintained or improved  Outcome: Progressing     Problem: Nutrition  Goal: Nutrient intake appropriate for maintaining nutritional needs  Outcome: Progressing     Problem: Fall/Injury  Goal: Not fall by end of shift  Outcome: Progressing  Goal: Be free from injury by end of the shift  Outcome: Progressing  Goal: Verbalize understanding of personal risk factors for fall in the hospital  Outcome: Progressing  Goal: Verbalize understanding of risk factor reduction measures to prevent injury from fall in the home  Outcome: Progressing  Goal: Use assistive devices by end of the shift  Outcome: Progressing  Goal: Pace activities to prevent fatigue by end of the shift  Outcome: Progressing     Problem: Diabetes  Goal: Achieve decreasing blood glucose levels by end of shift  Outcome: Progressing  Goal: Increase stability of blood glucose readings by end of shift  Outcome: Progressing  Goal: Maintain electrolyte levels within acceptable range throughout shift  Outcome: Progressing  Goal: Maintain glucose levels >70mg/dl to <250mg/dl throughout shift  Outcome: Progressing  Goal: No changes in neurological exam by end of shift  Outcome: Progressing  Goal: Learn about and adhere to nutrition recommendations by end of  shift  Outcome: Progressing  Goal: Vital signs within normal range for age by end of shift  Outcome: Progressing  Goal: Increase self care and/or family involovement by end of shift  Outcome: Progressing   .

## 2025-04-03 NOTE — PROGRESS NOTES
Per medical team, patient is not yet medically appropriate for discharge; will likely require another 24 hours in the hospital. Per bedside nurse, patient is very frustrated at not being discharge ready today.  met with patient and patient's wife at bedside to review discharge plan. SW explained that, if patient chose to leave AMA, patient would forfeit Medicare-covered SNF stay at [facility], which will require third-midnight inpatient stay. Patient continued to express frustration, and phoned patient's step-dtr who works at [facility]. Patient allowed SW to speak with patient's step-daughter on speaker phone. Step-dtr expressed understanding of situation, and reiterated to patient that if patient stays one more night, patient can discharge to [facility] for rehab. Patient did not seem pleased, but acknowledged understanding. SW/DSC to continue to send updated [facility] to [facility] as [facility].  Plan for patient is to discharge to [facility] when medically ready, pending qualifying 3-midnight inpatient stay for Medicare approval for SNF, by 4/4. Care Transitions to follow and assist. EMERSON Schaffer

## 2025-04-04 VITALS
HEART RATE: 71 BPM | OXYGEN SATURATION: 98 % | WEIGHT: 223.55 LBS | TEMPERATURE: 96.8 F | HEIGHT: 68 IN | RESPIRATION RATE: 18 BRPM | SYSTOLIC BLOOD PRESSURE: 133 MMHG | DIASTOLIC BLOOD PRESSURE: 74 MMHG | BODY MASS INDEX: 33.88 KG/M2

## 2025-04-04 LAB
ANION GAP SERPL CALC-SCNC: 12 MMOL/L (ref 10–20)
BUN SERPL-MCNC: 69 MG/DL (ref 6–23)
CALCIUM SERPL-MCNC: 8.8 MG/DL (ref 8.6–10.3)
CHLORIDE SERPL-SCNC: 108 MMOL/L (ref 98–107)
CO2 SERPL-SCNC: 24 MMOL/L (ref 21–32)
CREAT SERPL-MCNC: 2.66 MG/DL (ref 0.5–1.3)
EGFRCR SERPLBLD CKD-EPI 2021: 22 ML/MIN/1.73M*2
ERYTHROCYTE [DISTWIDTH] IN BLOOD BY AUTOMATED COUNT: 21.2 % (ref 11.5–14.5)
GLUCOSE BLD MANUAL STRIP-MCNC: 125 MG/DL (ref 74–99)
GLUCOSE BLD MANUAL STRIP-MCNC: 226 MG/DL (ref 74–99)
GLUCOSE SERPL-MCNC: 137 MG/DL (ref 74–99)
HCT VFR BLD AUTO: 23.6 % (ref 41–52)
HGB BLD-MCNC: 7.6 G/DL (ref 13.5–17.5)
MCH RBC QN AUTO: 32.3 PG (ref 26–34)
MCHC RBC AUTO-ENTMCNC: 32.2 G/DL (ref 32–36)
MCV RBC AUTO: 100 FL (ref 80–100)
NRBC BLD-RTO: 0 /100 WBCS (ref 0–0)
PLATELET # BLD AUTO: 35 X10*3/UL (ref 150–450)
POTASSIUM SERPL-SCNC: 4.8 MMOL/L (ref 3.5–5.3)
RBC # BLD AUTO: 2.35 X10*6/UL (ref 4.5–5.9)
SARS-COV-2 RNA RESP QL NAA+PROBE: NOT DETECTED
SODIUM SERPL-SCNC: 139 MMOL/L (ref 136–145)
WBC # BLD AUTO: 1.1 X10*3/UL (ref 4.4–11.3)

## 2025-04-04 PROCEDURE — 80048 BASIC METABOLIC PNL TOTAL CA: CPT | Performed by: STUDENT IN AN ORGANIZED HEALTH CARE EDUCATION/TRAINING PROGRAM

## 2025-04-04 PROCEDURE — 82947 ASSAY GLUCOSE BLOOD QUANT: CPT

## 2025-04-04 PROCEDURE — 2500000001 HC RX 250 WO HCPCS SELF ADMINISTERED DRUGS (ALT 637 FOR MEDICARE OP)

## 2025-04-04 PROCEDURE — 99239 HOSP IP/OBS DSCHRG MGMT >30: CPT | Performed by: STUDENT IN AN ORGANIZED HEALTH CARE EDUCATION/TRAINING PROGRAM

## 2025-04-04 PROCEDURE — 2500000002 HC RX 250 W HCPCS SELF ADMINISTERED DRUGS (ALT 637 FOR MEDICARE OP, ALT 636 FOR OP/ED)

## 2025-04-04 PROCEDURE — 2500000005 HC RX 250 GENERAL PHARMACY W/O HCPCS

## 2025-04-04 PROCEDURE — 97116 GAIT TRAINING THERAPY: CPT | Mod: GP,CQ | Performed by: PHYSICAL THERAPIST

## 2025-04-04 PROCEDURE — 36415 COLL VENOUS BLD VENIPUNCTURE: CPT | Performed by: STUDENT IN AN ORGANIZED HEALTH CARE EDUCATION/TRAINING PROGRAM

## 2025-04-04 PROCEDURE — 2500000004 HC RX 250 GENERAL PHARMACY W/ HCPCS (ALT 636 FOR OP/ED)

## 2025-04-04 PROCEDURE — 87635 SARS-COV-2 COVID-19 AMP PRB: CPT | Performed by: STUDENT IN AN ORGANIZED HEALTH CARE EDUCATION/TRAINING PROGRAM

## 2025-04-04 PROCEDURE — 85027 COMPLETE CBC AUTOMATED: CPT | Performed by: STUDENT IN AN ORGANIZED HEALTH CARE EDUCATION/TRAINING PROGRAM

## 2025-04-04 PROCEDURE — 2500000001 HC RX 250 WO HCPCS SELF ADMINISTERED DRUGS (ALT 637 FOR MEDICARE OP): Performed by: STUDENT IN AN ORGANIZED HEALTH CARE EDUCATION/TRAINING PROGRAM

## 2025-04-04 PROCEDURE — 97110 THERAPEUTIC EXERCISES: CPT | Mod: GP,CQ | Performed by: PHYSICAL THERAPIST

## 2025-04-04 PROCEDURE — 97530 THERAPEUTIC ACTIVITIES: CPT | Mod: GP,CQ | Performed by: PHYSICAL THERAPIST

## 2025-04-04 PROCEDURE — 94761 N-INVAS EAR/PLS OXIMETRY MLT: CPT

## 2025-04-04 RX ORDER — GABAPENTIN 100 MG/1
100 CAPSULE ORAL 2 TIMES DAILY
Qty: 60 CAPSULE | Refills: 2 | Status: SHIPPED | OUTPATIENT
Start: 2025-04-04 | End: 2025-05-04

## 2025-04-04 RX ADMIN — FERROUS SULFATE TAB 325 MG (65 MG ELEMENTAL FE) 325 MG: 325 (65 FE) TAB at 09:10

## 2025-04-04 RX ADMIN — Medication 250 MG: at 09:10

## 2025-04-04 RX ADMIN — Medication 3 L/MIN: at 11:40

## 2025-04-04 RX ADMIN — LACTULOSE 30 G: 20 SOLUTION ORAL at 09:09

## 2025-04-04 RX ADMIN — POLYETHYLENE GLYCOL 3350 17 G: 17 POWDER, FOR SOLUTION ORAL at 09:10

## 2025-04-04 RX ADMIN — PANTOPRAZOLE SODIUM 40 MG: 40 INJECTION, POWDER, FOR SOLUTION INTRAVENOUS at 09:09

## 2025-04-04 RX ADMIN — ALLOPURINOL 100 MG: 100 TABLET ORAL at 09:10

## 2025-04-04 RX ADMIN — DOCUSATE SODIUM 100 MG: 100 CAPSULE, LIQUID FILLED ORAL at 09:10

## 2025-04-04 RX ADMIN — Medication 1000 MCG: at 09:10

## 2025-04-04 RX ADMIN — ATORVASTATIN CALCIUM 40 MG: 40 TABLET, FILM COATED ORAL at 09:09

## 2025-04-04 RX ADMIN — Medication 3 L/MIN: at 07:08

## 2025-04-04 RX ADMIN — INSULIN LISPRO 4 UNITS: 100 INJECTION, SOLUTION INTRAVENOUS; SUBCUTANEOUS at 11:23

## 2025-04-04 RX ADMIN — ASPIRIN 81 MG: 81 TABLET, COATED ORAL at 09:10

## 2025-04-04 RX ADMIN — SUCRALFATE 1 G: 1 TABLET ORAL at 09:10

## 2025-04-04 RX ADMIN — SUCRALFATE 1 G: 1 TABLET ORAL at 11:23

## 2025-04-04 RX ADMIN — CARVEDILOL 6.25 MG: 6.25 TABLET, FILM COATED ORAL at 09:10

## 2025-04-04 ASSESSMENT — PAIN - FUNCTIONAL ASSESSMENT
PAIN_FUNCTIONAL_ASSESSMENT: 0-10

## 2025-04-04 ASSESSMENT — COGNITIVE AND FUNCTIONAL STATUS - GENERAL
HELP NEEDED FOR BATHING: A LITTLE
TURNING FROM BACK TO SIDE WHILE IN FLAT BAD: A LOT
DAILY ACTIVITIY SCORE: 22
MOBILITY SCORE: 22
TURNING FROM BACK TO SIDE WHILE IN FLAT BAD: A LITTLE
STANDING UP FROM CHAIR USING ARMS: A LITTLE
CLIMB 3 TO 5 STEPS WITH RAILING: A LITTLE
MOBILITY SCORE: 14
MOVING TO AND FROM BED TO CHAIR: A LITTLE
DRESSING REGULAR LOWER BODY CLOTHING: A LITTLE
WALKING IN HOSPITAL ROOM: A LITTLE
CLIMB 3 TO 5 STEPS WITH RAILING: TOTAL
MOVING FROM LYING ON BACK TO SITTING ON SIDE OF FLAT BED WITH BEDRAILS: A LOT

## 2025-04-04 ASSESSMENT — PAIN SCALES - GENERAL
PAINLEVEL_OUTOF10: 0 - NO PAIN

## 2025-04-04 NOTE — DISCHARGE SUMMARY
"Discharge Diagnosis  Acute kidney injury superimposed on chronic kidney disease    Issues Requiring Follow-Up      Discharge Meds     Medication List      CONTINUE taking these medications     FreeStyle Sindi 2 Froid misc; Generic drug: flash glucose scanning   reader; Use as instructed   * FreeStyle Sindi 2 Sensor kit; Generic drug: flash glucose sensor kit;   Inject 1 each under the skin if needed (CHANGE EVERY 14 DAYS). Use as   instructed   * FreeStyle Sindi 2 Sensor kit; Generic drug: flash glucose sensor kit;   Use as directed   FreeStyle Sindi 3 Sensor device; Generic drug: blood-glucose sensor; Use   as directed   lancets 33 gauge misc   pen needle, diabetic 32 gauge x 5/32\" needle; Commonly known as: Pen   Needle; 1 each once daily.  * This list has 2 medication(s) that are the same as other medications   prescribed for you. Read the directions carefully, and ask your doctor or   other care provider to review them with you.     ASK your doctor about these medications     allopurinol 100 mg tablet; Commonly known as: Zyloprim; TAKE 1 TABLET BY   MOUTH ONCE DAILY   amLODIPine 5 mg tablet; Commonly known as: Norvasc; Take 1 tablet (5 mg)   by mouth once daily in the morning. Take before meals.   ascorbic acid 250 MG chewable tablet; Commonly known as: Vitamin C   aspirin 81 mg EC tablet   atorvastatin 40 mg tablet; Commonly known as: Lipitor; Take 1 tablet (40   mg) by mouth once daily.   carvedilol 6.25 mg tablet; Commonly known as: Coreg   CENTRUM ADULTS ORAL   ciprofloxacin 500 mg tablet; Commonly known as: Cipro; Take 1 tablet   (500 mg) by mouth once daily for 14 days.; Ask about: Should I take this   medication?   cyanocobalamin (vitamin B-12) 1,000 mcg tablet extended release;   Commonly known as: Vitamin B-12   FeroSuL 325 mg (65 mg iron) tablet; Generic drug: ferrous sulfate   furosemide 40 mg tablet; Commonly known as: Lasix; TAKE 1.5 TABLET BY   MOUTH EVERY DAY   gabapentin 300 mg capsule; Commonly " known as: Neurontin; Take 1 capsule   (300 mg) by mouth 2 times a day.   Lantus U-100 Insulin 100 unit/mL injection; Generic drug: insulin   glargine; Inject 60 Units under the skin once daily at bedtime.   metoprolol tartrate 50 mg tablet; Commonly known as: Lopressor; Take 1   tablet by mouth 2 times a day.   nitroglycerin 0.4 mg SL tablet; Commonly known as: Nitrostat; Place 1   tablet (0.4 mg) under the tongue every 5 minutes if needed for chest pain.   NON FORMULARY   pantoprazole 40 mg EC tablet; Commonly known as: ProtoNix; Take 1 tablet   (40 mg) by mouth 2 times a day.   primidone 50 mg tablet; Commonly known as: Mysoline; TAKE 1 TABLET BY   MOUTH AT BEDTIME   * spironolactone 25 mg tablet; Commonly known as: Aldactone; Take 1   tablet (25 mg) by mouth once daily.   * spironolactone 50 mg tablet; Commonly known as: Aldactone   sucralfate 1 gram tablet; Commonly known as: Carafate; Take 1 tablet (1   g) by mouth 3 times daily (morning, midday, late afternoon).   vitamins A,C,E-zinc-copper 2,148 mcg-113 mg-45 mg-17.4mg tablet  * This list has 2 medication(s) that are the same as other medications   prescribed for you. Read the directions carefully, and ask your doctor or   other care provider to review them with you.       Test Results Pending At Discharge  Pending Labs       Order Current Status    Extra Urine Grady Tube Collected (03/31/25 4658)    Urinalysis with Reflex Culture and Microscopic In process            Hospital Course   89-year-old male with past medical history of mild dysplasia, CKD stage IV, hypertension, type 2 diabetes, coronary artery disease who presents with tremors, weakness, recurrent falls he was admitted for management of these issues.  Patient does have chronic anemia, I did discuss his case with the nephrologist patient has history of lymphoma and was recommended chemotherapy but he refused.  He did receive blood transfusion, hemoglobin has remained stable it is not acutely  dropping so anemia is most likely secondary to marrow failure.  Palliative care and hospice was discussed patient is not ready for hospice yet he is okay with palliative care.  He is following up with hematology as an outpatient.  Tremors have improved.  He is off gabapentin which I will restart at a much smaller dose at discharge.  Patient will see palliative care at the facility.  On baseline oxygen.    Pertinent Physical Exam At Time of Discharge  Physical Exam  General: Well-developed elderly male, in no acute distress  HEENT: AT, NC, no JVD, no lymphadenopathy, neck supple  Lungs: Clear, no wheezing, no crackles  Cardiac: Normal S1-S2, no murmur, no gallop  Abdomen: Soft, nontender, no distention, positive bowel sound  Extremities: No deformity, bilateral lower extremity pitting edema noted, pulses intact, ROM intact  Neurological: Alert awake oriented x3, sensation intact, clear speech  Outpatient Follow-Up  Future Appointments   Date Time Provider Department Center   4/14/2025  9:00 AM NATALY Villa-CNP [id number]   4/14/2025  9:30 AM [place] [facility] [place] [facility]   4/28/2025 10:00 AM [place]F 03 [place] [facility]   6/4/2025  9:40 AM Fabiola Dietz MD [id number] None   7/14/2025 10:00 AM Rui Graham DO [id number] Calvin Patton MD

## 2025-04-04 NOTE — NURSING NOTE
Discharge Note: 4/4/2025 1527 Discharged via stretcher by [facility] to SNF, paperwork packet sent with transporter, personal belongings taken by transporter, no distress noted, no complaints voiced. Maryann STOLL

## 2025-04-04 NOTE — DISCHARGE INSTR - ACTIVITY
Activity Level: As tolerates. Out of Bed with Assistance     Knee high alyssa hose, may remove for care.

## 2025-04-04 NOTE — DISCHARGE INSTR - DIET
Type: Regular    Cardiac   Fat restriction: 70 gm fat   Sodium restriction: 2 - 3 grams Sodium

## 2025-04-04 NOTE — PROGRESS NOTES
"Physical Therapy    Physical Therapy Treatment    Patient Name: Zack Medrano  MRN: [id number]  Department: [facility]  Room: [address] [place], [place] [address]A  Today's Date: 4/4/2025  Time Calculation  Start Time: 1000  Stop Time: 1040  Time Calculation (min): 40 min         Assessment/Plan   PT Assessment  PT Assessment Results: Decreased strength, Decreased endurance, Decreased mobility  Medical Staff Made Aware: Yes  End of Session Communication: Bedside nurse  Assessment Comment: Pt willing to participate, commode transfers completed CGA and he completed his slef care supervision.  End of Session Patient Position: Up in chair, Alarm on  PT Plan  Inpatient/Swing Bed or Outpatient: Inpatient  PT Plan  Treatment/Interventions: Bed mobility, Transfer training, Gait training, Stair training, Balance training, Strengthening, Endurance training, Therapeutic exercise, Therapeutic activity, Home exercise program  PT Plan: Ongoing PT  PT Frequency: 4 times per week  PT Discharge Recommendations: Moderate intensity level of continued care  Equipment Recommended upon Discharge: Wheeled walker  PT Recommended Transfer Status: Assist x1  PT - OK to Discharge: Yes (once medically appropriate and safe DC plan in place)      General Visit Information:   PT  Visit  PT Received On: 04/04/25  General  Reason for Referral: 89 year old admitted for weakness, falls, anemia needing blood transfusion  Referred By: Emanuel  Past Medical History Relevant to Rehab: pancytopenia, possible myelodysplaisa: currently being tx by oncology  Family/Caregiver Present: Yes (Daughter)  Prior to Session Communication: Bedside nurse  Patient Position Received: Up in chair, Alarm on    Subjective   \"I think I'm doing way better.\"    Precautions:    Objective   Pain:  Pain Assessment  Pain Assessment: 0-10  0-10 (Numeric) Pain Score: 0 - No pain    Activity Tolerance:  Activity Tolerance  Endurance: Tolerates less than 10 min exercise with changes in vital signs  Activity Tolerance " Comments: increased peroids requierd per fatigue  Treatments:  Therapeutic Exercise  Therapeutic Exercise Performed: Yes  Therapeutic Exercise Activity 1: standing hip flexion  Therapeutic Exercise Activity 2: standing hip abduction  Therapeutic Exercise Activity 3: standing hip adduction  Therapeutic Exercise Activity 4: standing HS curls    Ambulation/Gait Training 1  Surface 1: Level tile  Device 1: Rolling walker  Gait Support Devices: Gait belt  Assistance 1: Contact guard  Quality of Gait 1: Narrow base of support, Decreased step length  Comments/Distance (ft) 1: 200' x1 reps  Transfers  Transfer: Yes  Transfer 1  Transfer From 1: Sit to  Transfer to 1: Stand  Technique 1: Sit to stand  Transfer Device 1: Walker  Transfer Level of Assistance 1: Contact guard  Trials/Comments 1:  (multipe attempts required per poor body mechanics)    Outcome Measures:    [facility] Basic Mobility  Turning from your back to your side while in a flat bed without using bedrails: A lot  Moving from lying on your back to sitting on the side of a flat bed without using bedrails: A lot  Moving to and from bed to chair (including a wheelchair): A little  Standing up from a chair using your arms (e.g. wheelchair or bedside chair): A little  To walk in hospital room: A little  Climbing 3-5 steps with railing: Total  Basic Mobility - Total Score: 14    Education Documentation  Mobility Training, taught by Ricardo Valdes PTA at 4/4/2025  1:49 PM.  Learner: Patient  Readiness: Acceptance  Method: Explanation  Response: Verbalizes Understanding    Education Comments  No comments found.        OP EDUCATION:       Encounter Problems       Encounter Problems (Active)       PT Problem       PT Goal 1 (Progressing)       Start:  04/02/25    Expected End:  04/16/25       Zack Medrano will be independent with bed mobility for supine to and from sitting EOB without use of rail and bed flat           PT Goal 2 (Progressing)       Start:  04/02/25     Expected End:  04/16/25       Zack Medrano will transfer sit to and from stand using least restrictive assistive device mod indep           PT Goal 3 (Progressing)       Start:  04/02/25    Expected End:  04/16/25       Zack Medrano will demonstrate good safety awareness with transfers and mobility and with use of assistive device (proper hand placement).            PT Goal 4 (Progressing)       Start:  04/02/25    Expected End:  04/16/25       Zack Medrano will ambulate 150 ft with assistive device , level surface, good balance, steady mod Indep              Pain - Adult

## 2025-04-04 NOTE — PROGRESS NOTES
Per medical team, patient is medically appropriate for discharge today.  to met with patient's daughter to review discharge plan and Medicare IM; Patient in restroom. SW/DSC to send updated notes and final orders to [facility] via [facility] when complete. JENARO/Merle to complete HENS document. Nursing to confirm transport.  - 1500: Transport confirmed for 1515. SNF updated. Plan for patient is to discharge to [facility] at 1515 today. No further Care Transitions needs foreseen. Care Transitions available upon request. EMERSON Schaffer

## 2025-04-07 ENCOUNTER — NURSING HOME VISIT (OUTPATIENT)
Dept: POST ACUTE CARE | Facility: EXTERNAL LOCATION | Age: OVER 89
End: 2025-04-07
Payer: MEDICARE

## 2025-04-07 DIAGNOSIS — K59.00 CONSTIPATION, UNSPECIFIED CONSTIPATION TYPE: ICD-10-CM

## 2025-04-07 DIAGNOSIS — E11.22 TYPE 2 DIABETES MELLITUS WITH STAGE 4 CHRONIC KIDNEY DISEASE, WITH LONG-TERM CURRENT USE OF INSULIN (MULTI): Primary | ICD-10-CM

## 2025-04-07 DIAGNOSIS — Z79.4 TYPE 2 DIABETES MELLITUS WITH STAGE 4 CHRONIC KIDNEY DISEASE, WITH LONG-TERM CURRENT USE OF INSULIN (MULTI): Primary | ICD-10-CM

## 2025-04-07 DIAGNOSIS — J84.9 INTERSTITIAL LUNG DISEASE (MULTI): ICD-10-CM

## 2025-04-07 DIAGNOSIS — N18.4 TYPE 2 DIABETES MELLITUS WITH STAGE 4 CHRONIC KIDNEY DISEASE, WITH LONG-TERM CURRENT USE OF INSULIN (MULTI): Primary | ICD-10-CM

## 2025-04-07 DIAGNOSIS — R60.0 PERIPHERAL EDEMA: ICD-10-CM

## 2025-04-07 PROCEDURE — 99309 SBSQ NF CARE MODERATE MDM 30: CPT | Performed by: NURSE PRACTITIONER

## 2025-04-07 NOTE — LETTER
Patient: Zack Medrano  : 1935    Encounter Date: 2025    Subjective  Patient ID: Zack Medrano is a 89 y.o. male who presents for No chief complaint on file..  89 yo new resident at [facility] on rehab with recent hospitalization 3-31-25 with CKD4, anemia with 2 units of pk RBC while in hospital, pantocytopenia, DM, HTN, CAD, PVD, LINDA, and frequent falls.  He is on 3 L 02 for chronic interstitial lung disease.  He is a DNR/DNI.  Resident in room, denies any pain at this time.  BS in the 200's with Lantus 60 units at .  He is followed by oncology with an appt next wk.          Review of Systems   Constitutional:  Negative for appetite change, chills, fatigue and fever.        Denies any pain.    HENT:  Negative for congestion.    Respiratory:  Positive for shortness of breath. Negative for cough and wheezing.         Sob on moderate activity.   Cardiovascular:  Positive for leg swelling. Negative for chest pain and palpitations.   Gastrointestinal:  Positive for constipation. Negative for abdominal distention, abdominal pain, diarrhea, nausea and vomiting.   Genitourinary: Negative.    Neurological:  Positive for weakness.        Walks with a walker.        Objective  Physical Exam  Vitals and nursing note reviewed.   Constitutional:       General: He is not in acute distress.  HENT:      Head: Normocephalic.   Cardiovascular:      Rate and Rhythm: Normal rate and regular rhythm.      Pulses: Normal pulses.      Heart sounds: Normal heart sounds. No murmur heard.     No friction rub. No gallop.   Pulmonary:      Effort: Pulmonary effort is normal.      Breath sounds: Normal breath sounds. No wheezing, rhonchi or rales.   Abdominal:      General: Bowel sounds are normal.   Musculoskeletal:      Right lower leg: No edema.      Left lower leg: No edema.   Skin:     General: Skin is warm and dry.      Capillary Refill: Capillary refill takes 2 to 3 seconds.   Neurological:      Mental Status: He is alert.    Psychiatric:         Mood and Affect: Mood normal.       There were no vitals taken for this visit.      Current Outpatient Medications:   •  allopurinol (Zyloprim) 100 mg tablet, TAKE 1 TABLET BY MOUTH ONCE DAILY, Disp: 90 tablet, Rfl: 1  •  amLODIPine (Norvasc) 5 mg tablet, Take 1 tablet (5 mg) by mouth once daily in the morning. Take before meals., Disp: 90 tablet, Rfl: 1  •  ascorbic acid (Vitamin C) 250 MG chewable tablet, Chew 1 tablet (250 mg)., Disp: , Rfl:   •  aspirin 81 mg EC tablet, Take 1 tablet (81 mg) by mouth once daily., Disp: , Rfl:   •  atorvastatin (Lipitor) 40 mg tablet, Take 1 tablet (40 mg) by mouth once daily., Disp: 90 tablet, Rfl: 1  •  blood-glucose sensor (FreeStyle Sindi 3 Sensor) device, Use as directed, Disp: 10 each, Rfl: 3  •  carvedilol (Coreg) 6.25 mg tablet, Take 1 tablet (6.25 mg) by mouth 2 times a day., Disp: , Rfl:   •  cyanocobalamin, vitamin B-12, (Vitamin B-12) 1,000 mcg tablet extended release, Take 1 tablet (1,000 mcg) by mouth., Disp: , Rfl:   •  FeroSuL tablet, Take 1 tablet (325 mg) by mouth every other day., Disp: , Rfl:   •  flash glucose scanning reader (FreeStyle Sindi 2 [place]) misc, Use as instructed, Disp: 1 each, Rfl: 1  •  flash glucose sensor kit (FreeStyle Sindi 2 Sensor) kit, Use as directed, Disp: 2 each, Rfl: 2  •  FreeStyle Sindi 2 Sensor kit, Inject 1 each under the skin if needed (CHANGE EVERY 14 DAYS). Use as instructed, Disp: 1 each, Rfl: 2  •  furosemide (Lasix) 40 mg tablet, TAKE 1.5 TABLET BY MOUTH EVERY DAY, Disp: 135 tablet, Rfl: 1  •  gabapentin (Neurontin) 100 mg capsule, Take 1 capsule (100 mg) by mouth 2 times a day., Disp: 60 capsule, Rfl: 2  •  insulin glargine (Lantus U-100 Insulin) 100 unit/mL injection, Inject 60 Units under the skin once daily at bedtime., Disp: 18 mL, Rfl: 11  •  lancets 33 gauge misc, 2 times a day., Disp: , Rfl:   •  metoprolol tartrate (Lopressor) 50 mg tablet, Take 1 tablet by mouth 2 times a day., Disp: 180  "tablet, Rfl: 2  •  multivit-minerals/folic acid (CENTRUM ADULTS ORAL), Take by mouth., Disp: , Rfl:   •  nitroglycerin (Nitrostat) 0.4 mg SL tablet, Place 1 tablet (0.4 mg) under the tongue every 5 minutes if needed for chest pain., Disp: 30 tablet, Rfl: 1  •  NON FORMULARY, Truetrack test In vitro strip; Use as directed to check blood sugar  2 times daily, Disp: , Rfl:   •  pantoprazole (ProtoNix) 40 mg EC tablet, Take 1 tablet (40 mg) by mouth 2 times a day., Disp: 90 tablet, Rfl: 1  •  pen needle, diabetic (Pen Needle) 32 gauge x 5/32\" needle, 1 each once daily., Disp: 90 each, Rfl: 3  •  primidone (Mysoline) 50 mg tablet, TAKE 1 TABLET BY MOUTH AT BEDTIME, Disp: 90 tablet, Rfl: 1  •  spironolactone (Aldactone) 25 mg tablet, Take 1 tablet (25 mg) by mouth once daily., Disp: 90 tablet, Rfl: 1  •  spironolactone (Aldactone) 50 mg tablet, TAKE 1 TABLET ( 50 MG) BY MOUTH DAILY FOR 30 DAYS. Hold if serum potassium is more than 5.0, Disp: , Rfl:   •  sucralfate (Carafate) 1 gram tablet, Take 1 tablet (1 g) by mouth 3 times daily (morning, midday, late afternoon)., Disp: 270 tablet, Rfl: 1  •  vitamins A,C,E-zinc-copper 2,148 mcg-113 mg-45 mg-17.4mg tablet, Take by mouth. SUPER VIEW, Disp: , Rfl:    Past Medical History:   Diagnosis Date   • Abnormal levels of other serum enzymes     Abnormal liver enzymes   • Dependence on other enabling machines and devices     CPAP (continuous positive airway pressure) dependence   • Encounter for examination of eyes and vision without abnormal findings     Diabetic eye exam   • Localized edema     Bilateral leg edema   • Old myocardial infarction     History of myocardial infarction   • Other conditions influencing health status 01/08/2020    Uncontrolled diabetes mellitus type 2 without complications   • Pain in unspecified ankle and joints of unspecified foot     Arthralgia of ankle   • Personal history of other diseases of the digestive system     History of gastroesophageal " reflux (GERD)   • Personal history of other diseases of the digestive system     History of dumping syndrome   • Personal history of other endocrine, nutritional and metabolic disease 11/12/2020    History of type 2 diabetes mellitus   • Presence of coronary angioplasty implant and graft     History of coronary artery stent placement   • Rash and other nonspecific skin eruption 12/11/2019    Rash   • Unspecified abdominal hernia without obstruction or gangrene     Hernia   • Unspecified abdominal pain 05/19/2021    Abdominal pain, left lateral      Past Surgical History:   Procedure Laterality Date   • OTHER SURGICAL HISTORY  10/09/2019    Arterial stent placement   • OTHER SURGICAL HISTORY  10/09/2019    Cholecystectomy   • OTHER SURGICAL HISTORY  10/09/2019    Hernia repair        Family History   Problem Relation Name Age of Onset   • Colon cancer Mother     • Colon cancer Other          Assessment/Plan  Problem List Items Addressed This Visit       Type 2 diabetes mellitus with stage 4 chronic kidney disease, with long-term current use of insulin (Multi) - Primary    Interstitial lung disease (Multi)    Peripheral edema    Constipation   DM;  Will continue Lantus 60 units at hs.  Get BS bid and start sliding scale with regular insulin.  Will await lab results of Hgba1c.    Interstitial lung disease chronic;  Will continue on 02 3L NC.   Peripheral edema;  He is currently on spirolactone 50mg daily.  Review lab results tomorrow.  Constipation;  Miralax 17gm daily.   Continue supportive care and keep appt with oncology next wk.        Electronically Signed By: NANY Young   4/9/25  9:25 AM

## 2025-04-09 PROBLEM — K59.00 CONSTIPATION: Status: ACTIVE | Noted: 2025-04-09

## 2025-04-09 PROBLEM — R60.0 PERIPHERAL EDEMA: Status: ACTIVE | Noted: 2025-04-09

## 2025-04-09 ASSESSMENT — ENCOUNTER SYMPTOMS
WHEEZING: 0
COUGH: 0
CHILLS: 0
FATIGUE: 0
VOMITING: 0
SHORTNESS OF BREATH: 1
NAUSEA: 0
CONSTIPATION: 1
ABDOMINAL DISTENTION: 0
ABDOMINAL PAIN: 0
PALPITATIONS: 0
WEAKNESS: 1
FEVER: 0
DIARRHEA: 0
APPETITE CHANGE: 0

## 2025-04-09 NOTE — PROGRESS NOTES
Subjective   Patient ID: Zack Medrano is a 89 y.o. male who presents for No chief complaint on file..  89 [facility] new resident at [facility] on re[facility] with recent hospitalization 3-31-25 with CKD4, anemia with 2 units of pk RBC while in hospital, pantocytopenia, DM, HTN, CAD, PVD, LINDA, and frequent falls.  He is on 3 L 02 for chronic interstitial lung disease.  He is a DNR/DNI.  Resident in room, denies any pain at this time.  BS in the 200's with Lantus 60 units at .  He is followed by oncology with an appt next wk.          Review of Systems   Constitutional:  Negative for appetite change, chills, fatigue and fever.        Denies any pain.    HENT:  Negative for congestion.    Respiratory:  Positive for shortness of breath. Negative for cough and wheezing.         Sob on moderate activity.   Cardiovascular:  Positive for leg swelling. Negative for chest pain and palpitations.   Gastrointestinal:  Positive for constipation. Negative for abdominal distention, abdominal pain, diarrhea, nausea and vomiting.   Genitourinary: Negative.    Neurological:  Positive for weakness.        Walks with a walker.        Objective   Physical Exam  Vitals and nursing note reviewed.   Constitutional:       General: He is not in acute distress.  HENT:      Head: Normocephalic.   Cardiovascular:      Rate and Rhythm: Normal rate and regular rhythm.      Pulses: Normal pulses.      Heart sounds: Normal heart sounds. No murmur heard.     No friction rub. No gallop.   Pulmonary:      Effort: Pulmonary effort is normal.      Breath sounds: Normal breath sounds. No wheezing, rhonchi or rales.   Abdominal:      General: Bowel sounds are normal.   Musculoskeletal:      Right lower leg: No edema.      Left lower leg: No edema.   Skin:     General: Skin is warm and dry.      Capillary Refill: Capillary refill takes 2 to 3 seconds.   Neurological:      Mental Status: He is alert.   Psychiatric:         Mood and Affect: Mood normal.       There were no  vitals taken for this visit.      Current Outpatient Medications:     allopurinol (Zyloprim) 100 mg tablet, TAKE 1 TABLET BY MOUTH ONCE DAILY, Disp: 90 tablet, Rfl: 1    amLODIPine (Norvasc) 5 mg tablet, Take 1 tablet (5 mg) by mouth once daily in the morning. Take before meals., Disp: 90 tablet, Rfl: 1    ascorbic acid (Vitamin C) 250 MG chewable tablet, Chew 1 tablet (250 mg)., Disp: , Rfl:     aspirin 81 mg EC tablet, Take 1 tablet (81 mg) by mouth once daily., Disp: , Rfl:     atorvastatin (Lipitor) 40 mg tablet, Take 1 tablet (40 mg) by mouth once daily., Disp: 90 tablet, Rfl: 1    blood-glucose sensor (FreeStyle Sindi 3 Sensor) device, Use as directed, Disp: 10 each, Rfl: 3    carvedilol (Coreg) 6.25 mg tablet, Take 1 tablet (6.25 mg) by mouth 2 times a day., Disp: , Rfl:     cyanocobalamin, vitamin B-12, (Vitamin B-12) 1,000 mcg tablet extended release, Take 1 tablet (1,000 mcg) by mouth., Disp: , Rfl:     FeroSuL tablet, Take 1 tablet (325 mg) by mouth every other day., Disp: , Rfl:     flash glucose scanning reader (FreeStyle Sindi 2 [place]) misc, Use as instructed, Disp: 1 each, Rfl: 1    flash glucose sensor kit (FreeStyle Sindi 2 Sensor) kit, Use as directed, Disp: 2 each, Rfl: 2    FreeStyle Sindi 2 Sensor kit, Inject 1 each under the skin if needed (CHANGE EVERY 14 DAYS). Use as instructed, Disp: 1 each, Rfl: 2    furosemide (Lasix) 40 mg tablet, TAKE 1.5 TABLET BY MOUTH EVERY DAY, Disp: 135 tablet, Rfl: 1    gabapentin (Neurontin) 100 mg capsule, Take 1 capsule (100 mg) by mouth 2 times a day., Disp: 60 capsule, Rfl: 2    insulin glargine (Lantus U-100 Insulin) 100 unit/mL injection, Inject 60 Units under the skin once daily at bedtime., Disp: 18 mL, Rfl: 11    lancets 33 gauge misc, 2 times a day., Disp: , Rfl:     metoprolol tartrate (Lopressor) 50 mg tablet, Take 1 tablet by mouth 2 times a day., Disp: 180 tablet, Rfl: 2    multivit-minerals/folic acid (CENTRUM ADULTS ORAL), Take by mouth., Disp: ,  "Rfl:     nitroglycerin (Nitrostat) 0.4 mg SL tablet, Place 1 tablet (0.4 mg) under the tongue every 5 minutes if needed for chest pain., Disp: 30 tablet, Rfl: 1    NON FORMULARY, Truetrack test In vitro strip; Use as directed to check blood sugar  2 times daily, Disp: , Rfl:     pantoprazole (ProtoNix) 40 mg EC tablet, Take 1 tablet (40 mg) by mouth 2 times a day., Disp: 90 tablet, Rfl: 1    pen needle, diabetic (Pen Needle) 32 gauge x 5/32\" needle, 1 each once daily., Disp: 90 each, Rfl: 3    primidone (Mysoline) 50 mg tablet, TAKE 1 TABLET BY MOUTH AT BEDTIME, Disp: 90 tablet, Rfl: 1    spironolactone (Aldactone) 25 mg tablet, Take 1 tablet (25 mg) by mouth once daily., Disp: 90 tablet, Rfl: 1    spironolactone (Aldactone) 50 mg tablet, TAKE 1 TABLET ( 50 MG) BY MOUTH DAILY FOR 30 DAYS. Hold if serum potassium is more than 5.0, Disp: , Rfl:     sucralfate (Carafate) 1 gram tablet, Take 1 tablet (1 g) by mouth 3 times daily (morning, midday, late afternoon)., Disp: 270 tablet, Rfl: 1    vitamins A,C,E-zinc-copper 2,148 mcg-113 mg-45 mg-17.4mg tablet, Take by mouth. SUPER VIEW, Disp: , Rfl:    Past Medical History:   Diagnosis Date    Abnormal levels of other serum enzymes     Abnormal liver enzymes    Dependence on other enabling machines and devices     CPAP (continuous positive airway pressure) dependence    Encounter for examination of eyes and vision without abnormal findings     Diabetic eye exam    Localized edema     Bilateral leg edema    Old myocardial infarction     History of myocardial infarction    Other conditions influencing health status 01/08/2020    Uncontrolled diabetes mellitus type 2 without complications    Pain in unspecified ankle and joints of unspecified foot     Arthralgia of ankle    Personal history of other diseases of the digestive system     History of gastroesophageal reflux (GERD)    Personal history of other diseases of the digestive system     History of dumping syndrome    " Personal history of other endocrine, nutritional and metabolic disease 11/12/2020    History of type 2 diabetes mellitus    Presence of coronary angioplasty implant and graft     History of coronary artery stent placement    Rash and other nonspecific skin eruption 12/11/2019    Rash    Unspecified abdominal hernia without obstruction or gangrene     Hernia    Unspecified abdominal pain 05/19/2021    Abdominal pain, left lateral      Past Surgical History:   Procedure Laterality Date    OTHER SURGICAL HISTORY  10/09/2019    Arterial stent placement    OTHER SURGICAL HISTORY  10/09/2019    Cholecystectomy    OTHER SURGICAL HISTORY  10/09/2019    Hernia repair        Family History   Problem Relation Name Age of Onset    Colon cancer Mother      Colon cancer Other          Assessment/Plan   Problem List Items Addressed This Visit       Type 2 diabetes mellitus with stage 4 chronic kidney disease, with long-term current use of insulin (Multi) - Primary    Interstitial lung disease (Multi)    Peripheral edema    Constipation   DM;  Will continue Lantus 60 units at hs.  Get BS bid and start sliding scale with regular insulin.  Will await lab results of Hgba1c.    Interstitial lung disease chronic;  Will continue on 02 3L NC.   Peripheral edema;  He is currently on spirolactone 50mg daily.  Review lab results tomorrow.  Constipation;  Miralax 17gm daily.   Continue supportive care and keep appt with oncology next wk.

## 2025-04-11 ENCOUNTER — TELEPHONE (OUTPATIENT)
Age: OVER 89
End: 2025-04-11
Payer: MEDICARE

## 2025-04-11 DIAGNOSIS — E11.22 TYPE 2 DIABETES MELLITUS WITH STAGE 4 CHRONIC KIDNEY DISEASE, WITH LONG-TERM CURRENT USE OF INSULIN (MULTI): Primary | ICD-10-CM

## 2025-04-11 DIAGNOSIS — N18.4 TYPE 2 DIABETES MELLITUS WITH STAGE 4 CHRONIC KIDNEY DISEASE, WITH LONG-TERM CURRENT USE OF INSULIN (MULTI): Primary | ICD-10-CM

## 2025-04-11 DIAGNOSIS — Z79.4 TYPE 2 DIABETES MELLITUS WITH STAGE 4 CHRONIC KIDNEY DISEASE, WITH LONG-TERM CURRENT USE OF INSULIN (MULTI): Primary | ICD-10-CM

## 2025-04-11 RX ORDER — INSULIN LISPRO 100 [IU]/ML
INJECTION, SOLUTION INTRAVENOUS; SUBCUTANEOUS
Qty: 15 ML | Refills: 11 | Status: SHIPPED | OUTPATIENT
Start: 2025-04-11

## 2025-04-11 NOTE — TELEPHONE ENCOUNTER
eMg from [facility] called today stating they did Zack's admission for skilled nursing. PT and OT. She states NH sent patient home with a sliding scale of Humulin insulin vials and syringes  instead of pens. She states patient is having a very difficult time drawing up syringes and cannot see the lines on the syringes.  Please advise. Thank you.

## 2025-04-12 DIAGNOSIS — Z79.4 TYPE 2 DIABETES MELLITUS WITH STAGE 4 CHRONIC KIDNEY DISEASE, WITH LONG-TERM CURRENT USE OF INSULIN (MULTI): ICD-10-CM

## 2025-04-12 DIAGNOSIS — N18.4 TYPE 2 DIABETES MELLITUS WITH STAGE 4 CHRONIC KIDNEY DISEASE, WITH LONG-TERM CURRENT USE OF INSULIN (MULTI): ICD-10-CM

## 2025-04-12 DIAGNOSIS — E11.22 TYPE 2 DIABETES MELLITUS WITH STAGE 4 CHRONIC KIDNEY DISEASE, WITH LONG-TERM CURRENT USE OF INSULIN (MULTI): ICD-10-CM

## 2025-04-14 ENCOUNTER — INFUSION (OUTPATIENT)
Dept: HEMATOLOGY/ONCOLOGY | Facility: CLINIC | Age: OVER 89
End: 2025-04-14
Payer: MEDICARE

## 2025-04-14 ENCOUNTER — OFFICE VISIT (OUTPATIENT)
Dept: HEMATOLOGY/ONCOLOGY | Facility: CLINIC | Age: OVER 89
End: 2025-04-14
Payer: MEDICARE

## 2025-04-14 VITALS
HEART RATE: 79 BPM | OXYGEN SATURATION: 95 % | RESPIRATION RATE: 18 BRPM | BODY MASS INDEX: 34.26 KG/M2 | DIASTOLIC BLOOD PRESSURE: 65 MMHG | TEMPERATURE: 97.7 F | WEIGHT: 225.31 LBS | SYSTOLIC BLOOD PRESSURE: 129 MMHG

## 2025-04-14 DIAGNOSIS — D63.1 ANEMIA DUE TO STAGE 4 CHRONIC KIDNEY DISEASE: ICD-10-CM

## 2025-04-14 DIAGNOSIS — D72.819 LEUKOPENIA, UNSPECIFIED TYPE: ICD-10-CM

## 2025-04-14 DIAGNOSIS — N18.4 STAGE 4 CHRONIC KIDNEY DISEASE (MULTI): ICD-10-CM

## 2025-04-14 DIAGNOSIS — E78.2 MIXED HYPERLIPIDEMIA: ICD-10-CM

## 2025-04-14 DIAGNOSIS — N18.32 ANEMIA DUE TO STAGE 3B CHRONIC KIDNEY DISEASE: ICD-10-CM

## 2025-04-14 DIAGNOSIS — D63.1 ANEMIA DUE TO STAGE 3B CHRONIC KIDNEY DISEASE: ICD-10-CM

## 2025-04-14 DIAGNOSIS — D70.9 NEUTROPENIA, UNSPECIFIED TYPE: ICD-10-CM

## 2025-04-14 DIAGNOSIS — I25.10 CORONARY ARTERY DISEASE INVOLVING NATIVE HEART, UNSPECIFIED VESSEL OR LESION TYPE, UNSPECIFIED WHETHER ANGINA PRESENT: ICD-10-CM

## 2025-04-14 DIAGNOSIS — Z79.4 TYPE 2 DIABETES MELLITUS WITH STAGE 4 CHRONIC KIDNEY DISEASE, WITH LONG-TERM CURRENT USE OF INSULIN (MULTI): ICD-10-CM

## 2025-04-14 DIAGNOSIS — G25.0 TREMOR, ESSENTIAL: ICD-10-CM

## 2025-04-14 DIAGNOSIS — I10 PRIMARY HYPERTENSION: ICD-10-CM

## 2025-04-14 DIAGNOSIS — E11.22 TYPE 2 DIABETES MELLITUS WITH STAGE 4 CHRONIC KIDNEY DISEASE, WITH LONG-TERM CURRENT USE OF INSULIN (MULTI): ICD-10-CM

## 2025-04-14 DIAGNOSIS — E11.22 TYPE 2 DIABETES MELLITUS WITH DIABETIC CHRONIC KIDNEY DISEASE: ICD-10-CM

## 2025-04-14 DIAGNOSIS — N18.4 TYPE 2 DIABETES MELLITUS WITH STAGE 4 CHRONIC KIDNEY DISEASE, WITH LONG-TERM CURRENT USE OF INSULIN (MULTI): ICD-10-CM

## 2025-04-14 DIAGNOSIS — D69.6 THROMBOCYTOPENIA (CMS-HCC): ICD-10-CM

## 2025-04-14 DIAGNOSIS — N18.4 ANEMIA DUE TO STAGE 4 CHRONIC KIDNEY DISEASE: ICD-10-CM

## 2025-04-14 DIAGNOSIS — D46.9 MYELODYSPLASTIC SYNDROME, UNSPECIFIED (MULTI): ICD-10-CM

## 2025-04-14 DIAGNOSIS — D61.818 OTHER PANCYTOPENIA (MULTI): ICD-10-CM

## 2025-04-14 LAB
BASOPHILS # BLD AUTO: 0 X10*3/UL (ref 0–0.1)
BASOPHILS NFR BLD AUTO: 0 %
BITE CELLS BLD QL SMEAR: PRESENT
DACRYOCYTES BLD QL SMEAR: NORMAL
EOSINOPHIL # BLD AUTO: 0.04 X10*3/UL (ref 0–0.4)
EOSINOPHIL NFR BLD AUTO: 3.5 %
ERYTHROCYTE [DISTWIDTH] IN BLOOD BY AUTOMATED COUNT: 21.3 % (ref 11.5–14.5)
FERRITIN SERPL-MCNC: 678 NG/ML (ref 20–300)
FOLATE SERPL-MCNC: 20.1 NG/ML
HCT VFR BLD AUTO: 26.1 % (ref 41–52)
HGB BLD-MCNC: 8.5 G/DL (ref 13.5–17.5)
IMM GRANULOCYTES # BLD AUTO: 0.01 X10*3/UL (ref 0–0.5)
IMM GRANULOCYTES NFR BLD AUTO: 0.9 % (ref 0–0.9)
IRON SATN MFR SERPL: 78 % (ref 25–45)
IRON SERPL-MCNC: 215 UG/DL (ref 35–150)
LYMPHOCYTES # BLD AUTO: 0.7 X10*3/UL (ref 0.8–3)
LYMPHOCYTES NFR BLD AUTO: 61.9 %
MCH RBC QN AUTO: 33.1 PG (ref 26–34)
MCHC RBC AUTO-ENTMCNC: 32.6 G/DL (ref 32–36)
MCV RBC AUTO: 102 FL (ref 80–100)
MONOCYTES # BLD AUTO: 0.06 X10*3/UL (ref 0.05–0.8)
MONOCYTES NFR BLD AUTO: 5.3 %
NEUTROPHILS # BLD AUTO: 0.32 X10*3/UL (ref 1.6–5.5)
NEUTROPHILS NFR BLD AUTO: 28.4 %
NRBC BLD-RTO: 0 /100 WBCS (ref 0–0)
OVALOCYTES BLD QL SMEAR: NORMAL
PLATELET # BLD AUTO: 62 X10*3/UL (ref 150–450)
RBC # BLD AUTO: 2.57 X10*6/UL (ref 4.5–5.9)
RBC MORPH BLD: NORMAL
TIBC SERPL-MCNC: 275 UG/DL (ref 240–445)
UIBC SERPL-MCNC: 60 UG/DL (ref 110–370)
URATE SERPL-MCNC: 8 MG/DL (ref 4–7.5)
VIT B12 SERPL-MCNC: 477 PG/ML (ref 211–911)
WBC # BLD AUTO: 1.1 X10*3/UL (ref 4.4–11.3)

## 2025-04-14 PROCEDURE — 36415 COLL VENOUS BLD VENIPUNCTURE: CPT

## 2025-04-14 PROCEDURE — 1111F DSCHRG MED/CURRENT MED MERGE: CPT

## 2025-04-14 PROCEDURE — 96372 THER/PROPH/DIAG INJ SC/IM: CPT

## 2025-04-14 PROCEDURE — 84550 ASSAY OF BLOOD/URIC ACID: CPT

## 2025-04-14 PROCEDURE — 1157F ADVNC CARE PLAN IN RCRD: CPT

## 2025-04-14 PROCEDURE — 82728 ASSAY OF FERRITIN: CPT

## 2025-04-14 PROCEDURE — 99214 OFFICE O/P EST MOD 30 MIN: CPT

## 2025-04-14 PROCEDURE — 82607 VITAMIN B-12: CPT | Mod: SAMLAB

## 2025-04-14 PROCEDURE — 85025 COMPLETE CBC W/AUTO DIFF WBC: CPT

## 2025-04-14 PROCEDURE — 2500000004 HC RX 250 GENERAL PHARMACY W/ HCPCS (ALT 636 FOR OP/ED): Mod: JZ,EC

## 2025-04-14 PROCEDURE — 82746 ASSAY OF FOLIC ACID SERUM: CPT | Mod: SAMLAB

## 2025-04-14 PROCEDURE — 83540 ASSAY OF IRON: CPT

## 2025-04-14 RX ORDER — DIPHENHYDRAMINE HYDROCHLORIDE 50 MG/ML
50 INJECTION, SOLUTION INTRAMUSCULAR; INTRAVENOUS AS NEEDED
OUTPATIENT
Start: 2025-04-28

## 2025-04-14 RX ORDER — ALBUTEROL SULFATE 0.83 MG/ML
3 SOLUTION RESPIRATORY (INHALATION) AS NEEDED
OUTPATIENT
Start: 2025-04-28

## 2025-04-14 RX ORDER — EPINEPHRINE 0.3 MG/.3ML
0.3 INJECTION SUBCUTANEOUS EVERY 5 MIN PRN
OUTPATIENT
Start: 2025-04-28

## 2025-04-14 RX ORDER — FLASH GLUCOSE SENSOR
KIT MISCELLANEOUS
Qty: 6 EACH | Refills: 11 | Status: SHIPPED | OUTPATIENT
Start: 2025-04-14

## 2025-04-14 RX ORDER — FAMOTIDINE 10 MG/ML
20 INJECTION, SOLUTION INTRAVENOUS ONCE AS NEEDED
OUTPATIENT
Start: 2025-04-28

## 2025-04-14 RX ADMIN — DARBEPOETIN ALFA 500 MCG: 500 INJECTION, SOLUTION INTRAVENOUS; SUBCUTANEOUS at 10:23

## 2025-04-14 ASSESSMENT — PAIN SCALES - GENERAL: PAINLEVEL_OUTOF10: 0-NO PAIN

## 2025-04-14 NOTE — PROGRESS NOTES
Patient ID: Zack Medrano is a 89 y.o. male.    Subjective    HPI    Chief Complaint       Chief Complaint   Patient presents with    Anemia       Stage 4 chronic kidney disease             History of the Present Illness  10/9/2019.  Visit with Dr. Mickey Nguyen for chronic kidney disease and hypertension.  Chief complaint was fatigue and need to lose weight     11/12/2019.  The patient has had long-term history of microcytic anemia going back to at least 2019 at which time his white count was 5.2 hemoglobin was 13.8 hematocrit 40.5 and a platelet count of 157,000.  His MCV was 105 MCHC was 34.  White blood cell differential count showed 66% polys, 17 lymphs, 11 monos and 4 eosinophils.     12/11/2019.  Seen by primary care for probable shingles treated with Valtrex with chief complaint of waxing and waning left flank pain radiating up under his ribs, somewhat responsive to exercise..  He had a history of pulmonary embolus and was on Eliquis long-term.     2/24/2020.  Seen by Dr. Howell for elevated PSA, BPH and erectile dysfunction.  PSA in 2017 was 2.94, in 2018 it was 2.74 and in 2020 it was 4.03.  We discussed biopsy and the patient wanted to have this followed and not biopsy.     3/9/2020.  Follow-up with NATALY Pulido primary care.  Referred to Dr. Bahena for colonoscopy and CT scan of the chest was ordered for follow-up of questionable lingular nodule.     3/11/2020.  CT scan of the chest showed a stable 6 mm nodular lesion near the fissure in the lingula.     3/13/2020.  Seen by Dr. Bahena who noted that the patient had had diarrhea off-and-on since cholecystectomy and had intermittent left-sided pain.  He had a history of colon polyps.     4/22/2015.  Colonoscopy showed that he had tubular adenoma     5/13/2020.  Seen by Dr. Migdalia New in follow-up.  She noted that he had an abnormal CBC with macrocytic indices with a normal B12 level.  The patient denies alcohol use.  Hemoglobin A1c was 6.2.   BUN was 33 creatinine was 1.74.     8/31/2020.  Follow-up with Dr. Howell.  PSA had come down from 4.03-3.77     1/4/2021 through 1/12/2021.  Admitted to the hospital having been brought to the ED by squad after he fell forward on his toilet and was unable to get up.  He denied hitting his head or losing consciousness.  He blamed it on having back pain and shoulder pain for several weeks.  He was febrile with a temperature of 100.9 and hypoxic with pulse ox of 86% on room air.  Respirations were 24.  He was positive for COVID and was treated with intravenous antibiotics and remdesivir, increased supplemental oxygen.  He was able to be discharged to rehabilitation.     Repeat CBC showed white count 4.3 with a hemoglobin 12.9 hematocrit 38.8 with an MCV of 102 and a platelet count of 173,000.  White blood cell differential count showed minimal absolute lymphocytopenia at 0.6 with the lower limits of normal being 0.8.     3/1/2021.  Follow-up with Dr. Howell.  PSA was 9.9 in February.  Decided to have prostate biopsy done on 3/22/2021.  Results showed BPH.     10/6/2021.  Follow-up with Dr. Howell.  PSA in September was 2.77     4/6/2022.  CBC showed white count 4.3 with hemoglobin 11.7 hematocrit 34.4 with an MCV of 107.  Platelet count was 153,000.  White blood cell differential count was essentially normal.     5/31/2022.  CBC showed white count 3.6 with hemoglobin 11.6 hematocrit 34.4 and platelet count 117,000 with a normal white blood cell differential.     6/27/2022.  Referred to Dr. Dash because of pancytopenia by NATALY Gray.  CBC showed a white count of 3.9 with hemoglobin 12.3 hematocrit 36.1 and platelet count 129,000 with a normal differential.  PSA was 3.26.     The patient said that he felt well except for having increased fatigue.  He continued on long-term anticoagulation.  He was able to carry out his ADLs.  He did complain of easy bruising.  He said that he had worked at [employer] for 12 years and  also worked in making balloons.  Dr. Dash felt that there was a large component secondary to his chronic kidney disease.  There was no evidence of nutritional deficiency.  He checked him for hemolysis and monoclonal gammopathy and ordered an ultrasound for hepatosplenomegaly and testing for hepatitis, HIV and CHRISTINA.  He said he would rather observe the patient rather than pursuing a bone marrow biopsy.     7/27/2022.  Repeat lab data showed white count 3.9 with hemoglobin 12.3 hematocrit 36.1 and platelet count 129,000.  BUN was 36 creatinine was 1.78.  He had mild elevation of AST.  Retake count was 2.3%.  Flow cytometry showed a small clonal B-cell population.  Hepatitis B was nonreactive.  Hep C testing was negative.  Serum protein electrophoresis was normal.  Haptoglobin was normal.  LDH was normal.  He said that the possibility of MDS could not be ruled out.  Ultrasound showed fatty liver but no splenomegaly.  He plan for EPO level and NGS for myeloid mutation.     9/1/2022.  Started on allopurinol for hyperuricemia.     10/12/2022.  Seen by Dr. Dr. Dietz.  Patient was referred to dermatology for several skin lesions.  He was referred to neurology because of his tremor that was not improving.  She increased his glimepiride.     10/17/2022.  CBC showed white count 3.0 with hemoglobin 10.5 hematocrit 31.3 and a platelet count of 106,000 MCV was 111.     10/26/2022.  Follow-up with Dr. 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  He was suspicious for MDS.  He referred him for a bone marrow biopsy.     12/1/2022.  Follow-up with Dr. Dash.  Bone marrow biopsy showed low-grade MDS.  R IPSS score was low or very low depending on a karyotype which was still pending.  We discussed initiating erythropoietin with Aranesp but the patient deferred and the decision was made to monitor him.     1/11/2023.  CBC showed white count 3.4 with hemoglobin 10.3 hematocrit 32.2 and a  platelet count of 137,000.  MCV was 115.  White blood cell differential count was normal.     1/13/2023.  Follow-up with Dr. Dash.  The patient did say that he had following getting out of the shower but did not sustain any injuries.  He had been started by Dr. Nguyen on insulin for his diabetes which was helpful.  Patient continued to be active inside his house.  He remains on Eliquis.  BR-IPSS score is very low.  Counts remain stable.     3/31/2023.  CBC showed a white count of 2.6 with hemoglobin 8.7 hematocrit 27.1 and a platelet count 97,000.  MCV was 115.  White blood cell differential count showed that he had an absolute neutrophil count of 1.36.     4/10/2023.  CBC showed a white count of 2.5 with a hemoglobin 9.0 hematocrit of 27.7 with a platelet count of 92,000.  MCV was 114.     4/17/2023.  Follow-up with NATALY Gray.  Even though his hemoglobin had dropped to 9 the patient did not want to start his erythropoietin injections.     5/11/2023.  CBC showed white count of 2.5 with a hemoglobin of 7.6 hematocrit 23.8 and a platelet count of 101,000.  Absolute neutrophil count was 1.17.     5/15/2023 through 5/19/2023.  Admitted to the hospital with anemia and generalized weakness black stools dizziness with a hemoglobin of 6.7.  His BUN was 51 creatinine was 2.08.  CT scan of the abdomen showed no acute process.  There were findings consistent with atypical healing of the previous rib fracture with question Paget's disease although it was nonspecific.  He was transfused 2 units packed red blood cells.  EGD found 3 erosions in the cardia.  He was prescribed Carafate.     5/24/2023.  Follow-up with Dr. Dr. Dietz after discharge.  CBC showed white count 1.9 with hemoglobin 8.6 hematocrit 28.3 and a platelet count of 140,000.  Absolute neutrophil count was 1.06.  Patient did not tolerate Carafate and stopped it.  He was prescribed oral iron twice a day.  He had not been prescribed a PPI which was then  "started.     6/1/2023.  CBC showed white count of 1.9 with hemoglobin 7.7 hematocrit 24.8 and a platelet count of 87,000.     6/13/2023.  CBC showed a white count of 1.9 with hemoglobin of 8.7 hematocrit 28.1 and platelet count of 89,000.     6/23/2023.  Followed up with Dr. Dr. Dietz who reported that he was hospitalized again in [place] for GI bleed.  At that time his Eliquis was discontinued and he was changed to Protonix and taken off his baby aspirin.  Once again he was started on Carafate but did not tolerate it.     7/15/2023.  CBC showed white count of 2.4 with hemoglobin 9.2 hematocrit 27.3 and a platelet count of 110,000.  Absolute neutrophil count was 1.36.     7/17/2023.  Follow-up with Malorie Shaffer.  The patient had recently had a colonoscopy and had \"cauterizations\" he noted that he had dyspnea with exertion but no resting shortness of breath.  He was on prednisone at that time.  His sugars were in the 300s.  He was on insulin.  His neuropathy has not changed and he continues to have tremors.  Current level was found to be saturated.  Once again the patient was reluctant to start NING.     8/14/2023.  Follow-up with Malorie Shaffer.  CBC showed white count 1.8 with a hemoglobin 8.9 hematocrit 26.9 and a platelet count of 100,000.  Absolute neutrophil count was 0.96.     9/6/2023.  CBC showed a white count of 1.8 with hemoglobin 9.5 hematocrit 28.8 and a platelet count of 85,000 with an absolute neutrophil count of 0.99.  9/13/2023.  Follow-up with Dr. Howell.  PSA has gone down to 1.87.     10/9/2023.  Follow-up with Malorie Shaffer who noted the patient remained on oxygen and CPAP.  He says that he is awakening in the middle of the night around 3 to 4:00 in the morning.  Sugars have been in the 300s.  Neuropathy was unchanged.  He had been started on erythropoietin every 3 weeks     11/21/2023.  Follow-up with Malorie Shaffer.  No improvement in his hemoglobin.  White count had improved to 2.1 platelets were " baseline.  He had been placed on 40 mg of prednisone and continued on erythropoietin 300 mcg every 3 weeks.     1/2/2024.  Follow-up with Malorie Shaffer.  No improvement in hemoglobin.  Symptomatically he was doing well.  Plan was to increase frequency of his darbepoetin to every 2 weeks.     2/13/2024.  Follow-up with Malorie Shaffer.  Now on erythropoietin injections every 2 weeks.  There was a discussion concerning repeat bone marrow biopsy with counts stayed low.     3/12/2024.  Follow-up with Malorie Shaffer.  No improvement in his hemoglobin.  Patient was agreeable to repeat bone marrow biopsy.  She also ordered a CAT scan of the chest abdomen and pelvis without contrast.     3/22/2024.  CAT scan showed severe coronary artery calcifications.  There is evidence of bronchiectasis in the lower lobes increased compared to previous CT scan from 2020.     Predominantly groundglass opacities with bibasilar prominence.  There is some subpleural sparing of the left upper lobe.  There is a 3 mm nodule in the right upper lobe which was unchanged for several years.  Multilevel anterior bridging osteophytes were noted consistent with DISH.  There is a small fat-containing periumbilical hernia and bilateral inguinal hernias.  Spleen size is at the upper limits of normal being 12.7 cm in length slightly increased when compared to study from 2019.  Cysts were seen in the kidneys with diffuse cortical atrophy and a 4 mm hyperdense focus in the interpolar region of the right kidney possibly a stone.  Prostatism was noted for gland measuring 6.2 cm.     3/28/2024.  Seen by Dr. Graham for shortness of breath.  He felt the patient had possible chronic interstitial lung disease versus periodic aspiration, hypoxia dyspnea on exertion.  Further tests were ordered including high-resolution CT scan, complete pulmonary function testing, simple pulmonary stress test and follow-up.     4/9/2024.  Follow-up with Malorie Shaffer.  Erythropoietin  injections continued every 2 weeks which the patient was tolerating well.     4/16/2024.  Bone marrow biopsy was performed showing a hypercellular bone marrow at 50% with dyserythropoiesis.  There are 1% blasts consistent with persistent myeloid neoplasm.  Flow cytometry showed a small clonal CD5 negative, CD10 negative B-cell population and a polytypic background.  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     There was a very small abnormal T-cell population that could be incidental.  Next generation sequencing showed 2 U2AF 1 variants, 1 of which was negative.  The other had been previously seen.     The marrow also showed dyserythropoiesis with bilobate forms and forms with irregular nuclear contours as well as blebbing.     4/23/2024.  CBC showed white count 1.5 with a hemoglobin of 8.3 hematocrit 25.9 and platelet count of 71,000 with 37% neutrophils, 53 lymphocytes, 4 monos, 4 eosinophils with an absolute neutrophil count of 0.57.     4/24/2024.  Return visit with Dr. Graham.  He noted that the high-resolution CT scan showed no significant change from the previous CT scan the month before.  Pulmonary function testing showed no response to bronchodilator and there was mild restrictive change with some suggestion of airway inflammation.  The patient had difficulty doing the test because of persistent coughing.  6-minute walk test showed that the patient was able to maintain saturations at 93% with 2 L of oxygen per minute continuously.  The test was terminated because of dyspnea.  With his hypoxia on room air it was recommended that he be on oxygen continuously to keep his saturation greater than or equal to 92%.  He continued on oxygen with his CPAP at 3 L at nighttime.     5/10/2024.  This is my first visit with Mr. Medrano and his family.  We went over some of the  results of the bone marrow biopsy.  I told him that I would have to wait until the chromosomal analysis returned.  I also note the possibility of low-grade lymphoma.  He may need to have an opinion with malignant hematology at [facility].  Stepdaughter who is here with him and his wife today is his advocate for maintaining his quality of life.  She is an employee at a local nursing facility and is concerned about many clients she has who seem to be getting treated aggressively.  We talked about myelodysplasia and the fact that he is not responding to erythropoietin.  He will continue on this treatment for now.  He is low counts were discussed and he knows that he is at higher risk for infection and bleeding.  He continues on oxygen supplementation continuously.  They will return in 2 weeks at which time laboratory tests will be performed.     He just had a lesion removed from the dorsum of his right hand at the base of his first and second fingers.  Stitches are still in place.  It seems to be healing well without any significant bleeding or infection.  He was told that it looked benign.     5/13/2024.  Follow-up with Dr. Aggie Danielle of cardiology who saw him in follow-up of his coronary artery disease with multiple stents having been placed, hypertension, dyslipidemia and previous pulmonary embolus related to COVID-19.  She had stopped his Eliquis.  She noted his myelodysplasia.  She noted that at some point his aspirin had been stopped but given his history of stents this was restarted and he tolerated it well.  She said that his blood pressure was well-controlled.  She noted blood sugars were between 150-2 50.  She noted the patient was supposed to be using oxygen with exertion but the patient was not sure what rate it was supposed to be.  She says that she would not stop his aspirin unless his platelets were less than 50,000 with complications of bleeding.  She said it was very important to continue aspirin given a  15% chance of in-stent thrombosis with aspirin cessation.  She increased her furosemide to 80 mg twice a day because of bilateral lower extremity edema and spironolactone to 25 mg twice daily until symptoms improve and encouraged him to cut back on his salt intake.  She was see him again in about 9 months.     5/14/2024.  Follow-up with Dr. Santamaria of ophthalmology who started him on Naphcon-A drops 1 drop 3 times a day in both eyes for excessive tearing     5/15/2024.  Followed up with Dr. Dimas Renteria of podiatry who noted decreased pedal pulses 2 out of 4.  He had footcare.     5/24/2024.  Follow-up with Dr. Graham.  Review of his x-rays showed no change.  Physical examination showed decreased breath sounds but no inspiratory crackles that were previously heard.  He felt that the interstitial lung disease was stable and that his hypoxemia was improved on oxygen.  He plan to repeat CT scan in September.     5/24/2024.  He is here to follow-up on his bone marrow results.  I told him that the chromosomal analysis would not be forthcoming before because the specimen failed to grow in culture.  He continues to display a low-grade MDS with blast count of only 2%.  We reviewed the fact that he is recommended to have at least 6 months of his erythropoietin before we decide whether or not it is a failure.  He is agreeable to this.  We will increase his dose of darbepoetin.  He knows to be on the look out for any signs of infection or bleeding and to report this if it happens.  We reviewed his most recent CBC.  We will see him again in 2 weeks with his next injection.     5/30/2024.  Follow-up with Dr. Dr. Dietz for his Medicare wellness visit.  His magnesium dose was changed to twice daily indefinitely.     6/7/2024.  He is here today for his darbepoetin injection.  We have increased his dose to 500 mcg every 2 weeks.  Will see if this has any effect.  Laboratory dated today shows that his sugar is 290 and his  sodium is 134.  BUN is 46 and creatinine is 2.31.  CBC shows white count 1.5 with a hemoglobin 8.1 hematocrit 24.7 and a platelet count of 71,000 which is very much like his most recent CBC.  His absolute neutrophil count is 0.57.     I talked to him about his current status which he feels is fairly stable.  He is off oxygen.  He has had no bleeding and no signs of infection.  He understands that we have increased his dose of darbepoetin and we will watch his counts.  He will report any side effects.     He has continued to receive his darbepoetin injections.     7/15/2024.  Emergency department visit [facility].  He developed a rash on both lower extremities which started a week ago which is an erythematous papular rash.  He denied any pain or itching.  He did not have any vesicle formation.  It is bilateral.  He has had a shingles injection.  He was told that he had disseminated zoster and was started on valacyclovir a gram twice daily for 7 days.     7/19/2024.  Seen by DrMayo Dietz who said that the rash has not gotten any worse and might be a little bit better.  She noted that there were no vesicles no open areas no areas of secondary infection.     7/19/2024.  He is here to receive his erythropoietin injection and to have a blood count.  I told him it is not likely that this is disseminated zoster because it is nowhere else on his body but only on his lower extremities.  This could be stasis dermatitis.  I do not see any signs of vesicle formation and there never were any.  It is mostly on the anterior shins.  It is all below the knee.     CBC today shows white count 1.4 with hemoglobin 8 hematocrit 25.4 platelet count 67,000 with 37 neutrophils 53 lymphocytes 5 monos 2 eosinophils.  He is serum chemistry showed a sugar of 285 BUN of 49 creatinine of 2.67.  We told him he needed to keep his hydration status high.  He will continue with his injections today.  Blood pressure today is 153/64.     He  has had no bleeding and no infection.  He has had no chest pain or pressure.  He is able to participate in his usual activities including mowing the yard.     He has continued to receive his darbepoetin and has followed up with podiatry for nail and callus care.     8/30/2024.  He is here in routine follow-up.  He says that he is doing well.  He has had no infections or bleeding.  He has had no mouth sores.  He has had continued swelling in his legs a little bit worse on the left than on the right.  Blood sugar was greater than 300 but he admits to dietary indiscretion.  Has had no chest pain.  His blood pressure was 149/63.  He says that his breathing is stable.  He is using his oxygen at home at night and when he travels that he does not have it on today.     His lab data has shown no sign of improvement so far with white count 1.3, hemoglobin 8.6 hematocrit 27.2 and a platelet count of 69,000.  White blood cell differential count shows 38% polys, 50 lymphs, 5 monos, 2 eosinophils.  We plan a 6-month trial of this dose of darbepoetin which will extend until November.  We will see him again in about a month.     9/4/2024.  CT scan of the chest showed mild streaky scarring in the lung bases bilaterally.  The previously seen groundglass opacities probably be minimally improved.  Calcified pleural plaques are seen without consolidation effusion or pneumothorax.  There is no adenopathy seen by CT size criteria.  Minimal hepatic steatosis was seen as well as calcified granuloma in the spleen and a somewhat atrophic pancreas.  DJD was seen in the spine.     9/5/2024.  Seen by Dr. Zambrano his nephrologist who ordered lab tests and felt that his fluctuating chemistries were related to diuretic therapy.  He noted myelodysplasia and that he was being followed for his anemia receiving NING.  He did not change any of his medications  And said that he would see him again in about 6 months     9/10/2024.  Follow-up by Dr. Graham  at which time he told the patient he could decrease his oxygen to 2 and he would repeat a CT scan in 6 months and see him after that.     9/27/2024.  He is here today in routine follow-up.  He will get his shot today.  Laboratory data shows no improvement.  We reviewed that we will follow him for 6 months on this treatment to see if there is any improvement.     Mr. Medrano says that he has had no significant bleeding.  I see some red spots on the soft palate.  His appetite has been good and he has gained weight of 1 pound since last time I saw him about a month ago.  He has had no other signs of infection or deterioration in his status.  He knows he is at risk for serious infection or bleeding.  We will continue on this treatment pathway.  He will see him again in a month.     Interval Note  10/3/2024.  Seen by Dr. 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  BUN was 47 and creatinine was 2.33 with an EGFR of 26.  She noted that he did have some mild edema of the left lower leg and was in the habit of wearing compression stockings.  He denied any increase in shortness of breath.  She said that she wanted to stop his spironolactone which was 25 mg and appeared that it was started for edema.  She said that he would check his blood pressure and weights for the next 2 weeks and then have repeat laboratory data.  Spironolactone was discontinued.     10/11/2024.  Laboratory data showed a BUN of 50 and a creatinine of 2.6, worse than before EGFR was 22.  Sugar was 389.  On 10/21/2024 she told him to stay off spironolactone and call if he had increasing swelling or shortness of breath.  He was to continue weighing himself.     10/25/2024.  Repeat laboratory data showed a BUN of 45 and a creatinine of 2.41 with a sugar of 374.  He was continued on erythropoietin injections.     11/8/2024.  BUN was 44 creatinine was 2.33 and sugar was 430.     11/19/2024.  [facility] emergency department  visit for tremor.  The patient reported that his tremor was worse at night.  He denied any history of restless leg syndrome.  He said that his upper extremity tremor was relieved by movement.  He has a history of previous movement disorder or any other neurologic symptoms.  At that time he said that he was taking 20 mg of Lasix twice a day.  Aldactone was still on his medication list.  BUN is 43 creatinine was 2.45 and sugar was 373.  CT of the head showed no evidence of bleeding there was mild to moderate brain parenchymal atrophy and white matter changes.  He was referred to neurology.     11/22/2024.  BUN was 15 creatinine was 2.60.  Sugar was 353.  At that time it was documented that he was taking 60 mg of Lasix daily and continuing Aldactone.  He did have bilateral pedal edema.  He was continued on  protein injections.  He weighed 229 pounds.     11/25/2024.  BUN was 51 and creatinine was 2.64 with a sugar of 285.     12/9/2024.  BUN was 44 creatinine was 2.47 with a sugar of 313.     12/10/2024.  [facility] ED visit for a rash on his lower extremities.  He was taking Lasix 40 mg twice a day and Aldactone daily.  Blood pressure was 121/52 and he weighed 230 pounds.  There was some mild erythema in his left lower extremity but was not weeping.  He was slightly warm to the touch.  It was said that this could be early cellulitis and a prescription for Keflex was written.     12/16/2024.  He followed up with Dr. Dr. Dietz who said that they could switch antibiotics to doxycycline and recommended a follow-up appointment.  He was seen on the following day and continued on doxycycline for mild to moderate erythema of the inner ankle lower leg he was also treated with gabapentin for his essential tremor.     12/23/2024.  BUN was 43 creatinine was 2.46 with a sugar of 313.  Seen in follow-up by Malorie Shaffer was continued on Depakote with stable counts.     12/30/2024.  Followed up with Dr. Yu  [facility] with leg redness.  The patient said that he thought he had the flu.  His weight was 226 pounds.  Blood pressure was 126/74.  Laboratory data 2 days prior to the visit showed that his BUN was 52 and his creatinine was 2.62.  Sugar was 151.     1/2/2025.  Followed up with Dr. Supriya Nguyen with a decrease in his swelling and improvement in his legs lesions.  She documented that he had had 2 falls without lightheadedness or dizziness.        1/6/2025.  Laboratory data showed a sugar of 313 BUN of 41 and creatinine of 2.56 with an EGFR of 23.     1/15/2025.  Seen by Dr. Patrick Haque of [facility] neurology because of his tremors.  He mentioned that gabapentin can cause swelling.  He was recently discontinued.  Symptoms appear to be improved.  He noted the patient had already been on primidone.  He had complained of chronic numbness of the left thumb and right thumb.  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  He was reluctant to see orthopedic reduction and risks were recommended as well as exercises to maintain strength.  He said that he could continue primidone 50 mg p.o. nightly for essential tremor and increase the dose if needed and to avoid caffeine.     1/20/2025.  BUN was 47 creatinine was 2.70 with an EGFR of 22.  Sugar was 142.     2/3/2025.  Laboratory data showed a BUN of 46 creatinine 2.42 with a sugar of 218.  Follow-up with Malorie Shaffer at which time the patient said that he wanted to stay on erythropoietin.  Repeat bone marrow was discussed with the patient was reluctant to do that or to see Dr. Hess for second opinion.     2/17/2025.  Laboratory data showed a BUN of 49 creatinine 2.53 with a sugar of 300.     3/3/2025.  BUN was 57 creatinine was 2.86 with a sugar of 284.  Followed up with Malorie Shaffer still not wanting to get another opinion.  Discussion was  around supportive care and making sure that he understood the dangers of cytopenias.     3/4/2025.  Follow-up with Dr. Dr. Dietz.  No changes made in his medications.     3/7/2025.  CT scan of the chest without contrast showed moderate basilar and subpleural predominant fibrosing interstitial lung disease with architectural distortion in the absence of honeycombing which have been waxing and waning the degree of inflammatory change had decreased over the past year.  Densely calcified coronary arteries were seen and a new right retroareolar nodular appearing soft tissue measuring 1.6 cm and could represent asymmetric clinically mass via but this needed to have follow-up.     3/14/2025.  Followed up with Dr. Graham.  He noted that pulse ox on room air was 90% and on repeat was 96.     3/19/2025.  He is here today with his wife.  We wanted to make sure that he understood that his counts were not doing well and that he had potential problems with infections, bleeding and anemia that might cause him to have chest pains or increasing shortness of breath.  We talked about the possibility of transfusions if he became more symptomatic.  He verbalized understanding.  He is not interested in another opinion at this time.  He is not interested in being treated more aggressively at this time.     He is more interested in going through his medicines and making sure that he needed all of them.  We did identify that he is on carvedilol and metoprolol.  He is also on sucralfate 3 times a day and pantoprazole 40 mg twice a day.  He says that he is not experiencing any heartburn and has not for a while.  We went through his medication list and noted what it was for.  I encouraged him to talk to primary care about these issues.     He has an appointment to see his cardiologist in the upcoming weeks.  His blood pressure today 137/67.  His most recent BUN and creatinine were 57 and 2.86 which I am sure have something to do with his  state of hydration.  He currently says that he is taking 1-1/2 of a 40 mg Lasix tablet daily and continues to take Aldactone 25 mg daily.  He has prescriptions for both 25 and 50 mg of Aldactone.  He has an upcoming appointment to see Malorie Shaffer in about 2 weeks.     4/14/25:    Patient is in office for routine follow-up. Patient was directed to be seen in the emergency room 3/31/25 after reporting weakness/shakiness that lead to him falling. His hemoglobin at the time was  7 g/dL he received 2 units PRBC's. Upon discharge he was sent to SNF for a few days of occupational therapy.     He is ambulating with a walker to his appointment. He reports feeling steady on his feet, denies ay weakness today. He has continuous oxygen, however he is not using while in the office, denies any shortness of breath. Continuous oxygen 2-3L nc. today.     Denies any bleeding or bruising. Bilateral edema has improved since he started drinking more water. He remains to take lasix 40mg and spirolactone 50mg.  He denies any GI or Urinary issues.  Occasional productive cough, clear phlegm. He states he feel to bad today.     Home -240's- PCP recently sent prescribed insulin pens, which is much easier for him to administer. He has home health coming twice weekly.       Comorbidities  GERD  Arthritis, DJD  BPH  Coronary artery disease with history of myocardial, status post stent infarction  History of dumping syndrome  Hyperlipidemia  History of skin cancer  Obesity  Obstructive sleep apnea on CPAP  Peripheral arterial disease  CKD 3  Diabetes with nephropathy  History of cholecystectomy and hernia repair  Essential tremor  Hypertension   Numbness in his fingers felt to be related to cervical disc disease.     Monitoring Parameters  Histories, physical examinations and CBCs with occasional bone marrow biopsies.      Review of Systems - Oncology   Review of Systems   Constitutional:  Positive for fatigue. Negative for appetite change  and unexpected weight change.   HENT:  Negative for dental problem, mouth sores, nosebleeds and trouble swallowing.    Eyes:  Negative for visual disturbance.   Respiratory:  Positive for shortness of breath (with activity). Negative for cough.    Cardiovascular:  Positive for leg swelling.   Gastrointestinal:  Negative for abdominal pain, constipation, diarrhea, nausea and vomiting.   Endocrine: Negative for polyuria.   Genitourinary:  Negative for dysuria, frequency and urgency.   Musculoskeletal:  Positive for arthralgias and myalgias.   Skin:  Positive for pallor. Negative for rash.   Neurological:  Negative for weakness, light-headedness, numbness and headaches.   Hematological:  Bruises/bleeds easily.   Psychiatric/Behavioral:  Negative for self-injury, sleep disturbance and suicidal ideas. The patient is not nervous/anxious.          Past Medical History  Medical History        Past Medical History:   Diagnosis Date    Abnormal levels of other serum enzymes       Abnormal liver enzymes    Dependence on other enabling machines and devices       CPAP (continuous positive airway pressure) dependence    Encounter for examination of eyes and vision without abnormal findings       Diabetic eye exam    Localized edema       Bilateral leg edema    Old myocardial infarction       History of myocardial infarction    Other conditions influencing health status 01/08/2020     Uncontrolled diabetes mellitus type 2 without complications    Pain in unspecified ankle and joints of unspecified foot       Arthralgia of ankle    Personal history of other diseases of the digestive system       History of gastroesophageal reflux (GERD)    Personal history of other diseases of the digestive system       History of dumping syndrome    Personal history of other endocrine, nutritional and metabolic disease 11/12/2020     History of type 2 diabetes mellitus    Presence of coronary angioplasty implant and graft       History of coronary  artery stent placement    Rash and other nonspecific skin eruption 12/11/2019     Rash    Unspecified abdominal hernia without obstruction or gangrene       Hernia    Unspecified abdominal pain 05/19/2021     Abdominal pain, left lateral            Surgical History  Surgical History         Past Surgical History:   Procedure Laterality Date    OTHER SURGICAL HISTORY   10/09/2019     Arterial stent placement    OTHER SURGICAL HISTORY   10/09/2019     Cholecystectomy    OTHER SURGICAL HISTORY   10/09/2019     Hernia repair            Social History  Social History   Social History           Tobacco Use    Smoking status: Never    Smokeless tobacco: Never   Vaping Use    Vaping status: Never Used   Substance Use Topics    Alcohol use: Never    Drug use: Never            Family History  family history includes Colon cancer in his mother and another family member.         Current Medications       Current Outpatient Medications   Medication Instructions    allopurinol (ZYLOPRIM) 100 mg, oral, Daily    amLODIPine (NORVASC) 5 mg, oral, Daily before breakfast    ascorbic acid (VITAMIN C) 250 mg    aspirin 81 mg, Daily    atorvastatin (LIPITOR) 40 mg, oral, Daily    blood-glucose sensor (FreeStyle Sindi 3 Sensor) device Use as directed    carvedilol (COREG) 6.25 mg, 2 times daily    ciprofloxacin (CIPRO) 500 mg, oral, Daily    cyanocobalamin, vitamin B-12, (Vitamin B-12) 1,000 mcg tablet extended release 1 tablet    FeroSuL tablet 1 tablet, Every other day    flash glucose scanning reader (FreeStyle Sindi 2 Jelm) misc Use as instructed    flash glucose sensor kit (FreeStyle Sindi 2 Sensor) kit Use as directed    FreeStyle Sindi 2 Sensor kit 1 each, subcutaneous, As needed, Use as instructed    furosemide (Lasix) 40 mg tablet TAKE 1.5 TABLET BY MOUTH EVERY DAY    gabapentin (NEURONTIN) 300 mg, oral, 2 times daily    lancets 33 gauge misc 2 times daily    Lantus U-100 Insulin 60 Units, subcutaneous, Nightly    metoprolol  "tartrate (LOPRESSOR) 50 mg, oral, 2 times daily    multivit-minerals/folic acid (CENTRUM ADULTS ORAL) Take by mouth.    nitroglycerin (NITROSTAT) 0.4 mg, sublingual, Every 5 min PRN    NON FORMULARY Truetrack test In vitro strip; Use as directed to check blood sugar  2 times daily    pantoprazole (PROTONIX) 40 mg, oral, 2 times daily    pen needle, diabetic (Pen Needle) 32 gauge x 5/32\" needle 1 each, miscellaneous, Daily    primidone (MYSOLINE) 50 mg, oral, Nightly    spironolactone (Aldactone) 50 mg tablet TAKE 1 TABLET ( 50 MG) BY MOUTH DAILY FOR 30 DAYS. Hold if serum potassium is more than 5.0    spironolactone (ALDACTONE) 25 mg, oral, Daily    sucralfate (CARAFATE) 1 g, oral, 3 times daily (morning, midday, late afternoon)    vitamins A,C,E-zinc-copper 2,148 mcg-113 mg-45 mg-17.4mg tablet Take by mouth. SUPER VIEW      Scheduled Medications            OARRS Review  I have personally reviewed the OARRS report for Zack JOSEPH Marcela. I have considered the risks of abuse, dependence, addiction and diversion.  He continues on gabapentin through primary care.     Objective    BSA: There is no height or weight on file to calculate BSA.  There were no vitals taken for this visit.     Physical Exam  Vitals and nursing note reviewed.   Constitutional:       Appearance: Normal appearance.      Comments: Ambulating with a walker today    HENT:      Head: Normocephalic and atraumatic.      Nose: Nose normal.      Mouth/Throat:      Mouth: Mucous membranes are moist.      Pharynx: Oropharynx is clear.   Eyes:      General: No scleral icterus.     Extraocular Movements: Extraocular movements intact.      Conjunctiva/sclera: Conjunctivae normal.   Cardiovascular:      Rate and Rhythm: Normal rate and regular rhythm.      Pulses: Normal pulses.   Pulmonary:      Effort: Pulmonary effort is normal.      Breath sounds: Decreased breath sounds present.      Comments: Continous home oxygen 2-3 L NC   Abdominal:      General: There is " distension.      Palpations: Abdomen is soft.      Tenderness: There is no abdominal tenderness.   Musculoskeletal:         General: Normal range of motion.      Cervical back: Normal range of motion.   Skin:     General: Skin is warm and dry.      Findings: Bruising present.   Neurological:      General: No focal deficit present.      Mental Status: He is alert and oriented to person, place, and time.      Motor: No weakness.      Gait: Gait normal.   Psychiatric:         Mood and Affect: Mood normal.         Behavior: Behavior normal.         Thought Content: Thought content normal.         Judgment: Judgment normal.         Performance Status:  Asymptomatic      Assessment/Plan      1.  Pancytopenia.  The patient has unusual bone marrow findings.  Chromosomal analysis will not be done due to failure of the specimen to grow.  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  He also has components of myelodysplasia.  He is not responding to erythropoietin.  We will increase his dose to 500 mcg every other week.  He continues to be at high risk for bleeding and infection with a low ANC and low platelet count.  He has had previous GI bleeds.  He is currently back on his  aspirin per follow-up with Dr. Danielle for fear of clotting off his stents.  He is off Eliquis.  He will get his treatment today with darbepoetin and continue every 14 days.     He is not interested in the second marina at this time and does not want to be treated more aggressively.  We will continue to be supportive.      4/14/25: Reviewed recent labs- WBC 1.1, ANC 0.32, Hemoglobin 8.5, Platelets 62K  - Educated was provided regarding infection precautions and bleeding risks, patient verbalized understanding.   We will continue with darbopoetin every 14 days, and support as needed with consideration of transfusions.        These include but are not limited to:  GERD  Arthritis, DJD  BPH  Coronary artery disease with history of  myocardial, status post stent infarction  History of dumping syndrome  Hyperlipidemia  History of skin cancer  Obesity  Obstructive sleep apnea on CPAP  Peripheral arterial disease  CKD 3  Diabetes with nephropathy  History of cholecystectomy and hernia repair  Essential tremor  Hypertension   Numbness in his fingers felt to be related to cervical disc disease.     3.  Rash on his legs.  I think this looks more like stasis dermatitis than disseminated zoster.  If it were disseminated zoster he would have it on other areas of his body.    Plan:   Discussed and reviewed medical history  Highly encouraged wearing a mask in public, if able would suggest avoid public places at this time, due to his ANC being extremely low at 0.32  Encourage good hand hygiene at this time also  Reviewed CBC- hemoglobin is 8.5, Plts 62k-  Proceed with Darbopoetin Injection today  Continue Darbopoetin injection every 14 days with labs prior   Ordered fecal occult to be dropped off at time of next injection in 14 days  Return to see APRN in 1 month       He knows that he should call in the interim should he have any change in his status, questions or concerns.                  Malorie Shaffer, APRN-CNP

## 2025-04-14 NOTE — PROGRESS NOTES
Pt had injection today  Supplies and instructions given to patient for stool collection at home. Pt instructed to drop sample off at hospital lab. Paper order included in lab bag for lab reference  4/28 labs and epoetin injection  5/12 1030 CNP- lab draw on arrival           1100 epoetin  injection after his OV  Call out to Nephrology for appt with them - on a day when ot is here for injection- I will call pt with this scott  Reviewed AVS with patient- patient verbalizes understanding

## 2025-04-14 NOTE — PATIENT INSTRUCTIONS
Discussed and reviewed medical history  Highly encouraged wearing a mask in public, if able would suggest avoid public places at this time, due to his ANC being extremely low at 0.32  Encourage good hand hygiene at this time also  Reviewed CBC- hemoglobin is 8.5, Plts 62k-  Proceed with Darbopoetin Injection today  Continue Darbopoetin injection every 14 days with labs prior   Ordered fecal occult to be dropped off at time of next injection in 14 days  Return to see APRN in 1 month

## 2025-04-16 ENCOUNTER — TELEPHONE (OUTPATIENT)
Age: OVER 89
End: 2025-04-16
Payer: MEDICARE

## 2025-04-16 NOTE — TELEPHONE ENCOUNTER
Cesar PT with [facility] stating he did pts eval today and will be seeing the pt once this week and then twice weekly for 2 weeks; then once weekly for 2 weeks. DANI

## 2025-04-25 ENCOUNTER — TELEPHONE (OUTPATIENT)
Age: OVER 89
End: 2025-04-25
Payer: MEDICARE

## 2025-04-25 NOTE — TELEPHONE ENCOUNTER
YOU HAVE  PATIENT ON   LASIX  40 MG 1.5 TABLETS DAILY.   DR BRITT  HAS PATIENT ON LASIX  40 MG BID FROM FEB 2025, B UT NEVER FILLED.     [facility]

## 2025-04-28 ENCOUNTER — APPOINTMENT (OUTPATIENT)
Dept: NEPHROLOGY | Facility: CLINIC | Age: OVER 89
End: 2025-04-28
Payer: MEDICARE

## 2025-04-28 ENCOUNTER — TELEPHONE (OUTPATIENT)
Age: OVER 89
End: 2025-04-28

## 2025-04-28 ENCOUNTER — LAB REQUISITION (OUTPATIENT)
Dept: LAB | Facility: HOSPITAL | Age: OVER 89
End: 2025-04-28

## 2025-04-28 ENCOUNTER — LAB (OUTPATIENT)
Dept: LAB | Facility: HOSPITAL | Age: OVER 89
End: 2025-04-28
Payer: MEDICARE

## 2025-04-28 ENCOUNTER — INFUSION (OUTPATIENT)
Dept: HEMATOLOGY/ONCOLOGY | Facility: CLINIC | Age: OVER 89
End: 2025-04-28
Payer: MEDICARE

## 2025-04-28 VITALS
OXYGEN SATURATION: 91 % | DIASTOLIC BLOOD PRESSURE: 54 MMHG | HEART RATE: 60 BPM | RESPIRATION RATE: 18 BRPM | SYSTOLIC BLOOD PRESSURE: 116 MMHG | TEMPERATURE: 96.8 F | WEIGHT: 223.55 LBS | BODY MASS INDEX: 33.99 KG/M2

## 2025-04-28 VITALS
SYSTOLIC BLOOD PRESSURE: 118 MMHG | DIASTOLIC BLOOD PRESSURE: 52 MMHG | WEIGHT: 223.6 LBS | HEART RATE: 65 BPM | HEIGHT: 68 IN | BODY MASS INDEX: 33.89 KG/M2

## 2025-04-28 DIAGNOSIS — N18.4 STAGE 4 CHRONIC KIDNEY DISEASE (MULTI): ICD-10-CM

## 2025-04-28 DIAGNOSIS — D61.818 OTHER PANCYTOPENIA (MULTI): ICD-10-CM

## 2025-04-28 DIAGNOSIS — D63.1 ANEMIA IN CHRONIC KIDNEY DISEASE (CODE): ICD-10-CM

## 2025-04-28 DIAGNOSIS — N18.4 ANEMIA DUE TO STAGE 4 CHRONIC KIDNEY DISEASE: ICD-10-CM

## 2025-04-28 DIAGNOSIS — N18.32 ANEMIA DUE TO STAGE 3B CHRONIC KIDNEY DISEASE: ICD-10-CM

## 2025-04-28 DIAGNOSIS — D63.1 ANEMIA DUE TO STAGE 3B CHRONIC KIDNEY DISEASE: ICD-10-CM

## 2025-04-28 DIAGNOSIS — D70.9 NEUTROPENIA, UNSPECIFIED TYPE: ICD-10-CM

## 2025-04-28 DIAGNOSIS — N18.4 CHRONIC KIDNEY DISEASE, STAGE 4 (SEVERE) (MULTI): ICD-10-CM

## 2025-04-28 DIAGNOSIS — D63.1 ANEMIA DUE TO STAGE 4 CHRONIC KIDNEY DISEASE: ICD-10-CM

## 2025-04-28 DIAGNOSIS — I10 PRIMARY HYPERTENSION: Primary | ICD-10-CM

## 2025-04-28 DIAGNOSIS — D69.6 THROMBOCYTOPENIA (CMS-HCC): ICD-10-CM

## 2025-04-28 DIAGNOSIS — D46.9 MYELODYSPLASTIC SYNDROME, UNSPECIFIED (MULTI): ICD-10-CM

## 2025-04-28 LAB
ALBUMIN SERPL BCP-MCNC: 4.3 G/DL (ref 3.4–5)
ALP SERPL-CCNC: 90 U/L (ref 33–136)
ALT SERPL W P-5'-P-CCNC: 22 U/L (ref 10–52)
ANION GAP SERPL CALC-SCNC: 14 MMOL/L (ref 10–20)
AST SERPL W P-5'-P-CCNC: 30 U/L (ref 9–39)
BASOPHILS # BLD MANUAL: 0 X10*3/UL (ref 0–0.1)
BASOPHILS NFR BLD MANUAL: 0 %
BILIRUB SERPL-MCNC: 1.4 MG/DL (ref 0–1.2)
BUN SERPL-MCNC: 52 MG/DL (ref 6–23)
CALCIUM SERPL-MCNC: 8.7 MG/DL (ref 8.6–10.3)
CHLORIDE SERPL-SCNC: 103 MMOL/L (ref 98–107)
CO2 SERPL-SCNC: 25 MMOL/L (ref 21–32)
CREAT SERPL-MCNC: 2.67 MG/DL (ref 0.5–1.3)
DACRYOCYTES BLD QL SMEAR: ABNORMAL
EGFRCR SERPLBLD CKD-EPI 2021: 22 ML/MIN/1.73M*2
EOSINOPHIL # BLD MANUAL: 0.05 X10*3/UL (ref 0–0.4)
EOSINOPHIL NFR BLD MANUAL: 4 %
ERYTHROCYTE [DISTWIDTH] IN BLOOD BY AUTOMATED COUNT: 21.9 % (ref 11.5–14.5)
GLUCOSE SERPL-MCNC: 250 MG/DL (ref 74–99)
HCT VFR BLD AUTO: 26.9 % (ref 41–52)
HGB BLD-MCNC: 8.5 G/DL (ref 13.5–17.5)
IMM GRANULOCYTES # BLD AUTO: 0.01 X10*3/UL (ref 0–0.5)
IMM GRANULOCYTES NFR BLD AUTO: 0.8 % (ref 0–0.9)
LYMPHOCYTES # BLD MANUAL: 0.47 X10*3/UL (ref 0.8–3)
LYMPHOCYTES NFR BLD MANUAL: 39 %
MCH RBC QN AUTO: 32.3 PG (ref 26–34)
MCHC RBC AUTO-ENTMCNC: 31.6 G/DL (ref 32–36)
MCV RBC AUTO: 102 FL (ref 80–100)
MONOCYTES # BLD MANUAL: 0.02 X10*3/UL (ref 0.05–0.8)
MONOCYTES NFR BLD MANUAL: 2 %
NEUTS SEG # BLD MANUAL: 0.47 X10*3/UL (ref 1.6–5)
NEUTS SEG NFR BLD MANUAL: 39 %
NRBC BLD MANUAL-RTO: 2 % (ref 0–0)
NRBC BLD-RTO: 0 /100 WBCS (ref 0–0)
OVALOCYTES BLD QL SMEAR: ABNORMAL
PLATELET # BLD AUTO: 53 X10*3/UL (ref 150–450)
POTASSIUM SERPL-SCNC: 4.7 MMOL/L (ref 3.5–5.3)
PROT SERPL-MCNC: 6.5 G/DL (ref 6.4–8.2)
RBC # BLD AUTO: 2.63 X10*6/UL (ref 4.5–5.9)
RBC MORPH BLD: ABNORMAL
SCHISTOCYTES BLD QL SMEAR: ABNORMAL
SODIUM SERPL-SCNC: 137 MMOL/L (ref 136–145)
TOTAL CELLS COUNTED BLD: 50
VARIANT LYMPHS # BLD MANUAL: 0.17 X10*3/UL (ref 0–0.3)
VARIANT LYMPHS NFR BLD: 14 %
WBC # BLD AUTO: 1.2 X10*3/UL (ref 4.4–11.3)

## 2025-04-28 PROCEDURE — 96372 THER/PROPH/DIAG INJ SC/IM: CPT

## 2025-04-28 PROCEDURE — 1036F TOBACCO NON-USER: CPT | Performed by: CLINICAL NURSE SPECIALIST

## 2025-04-28 PROCEDURE — 3074F SYST BP LT 130 MM HG: CPT | Performed by: CLINICAL NURSE SPECIALIST

## 2025-04-28 PROCEDURE — 1159F MED LIST DOCD IN RCRD: CPT | Performed by: CLINICAL NURSE SPECIALIST

## 2025-04-28 PROCEDURE — 1160F RVW MEDS BY RX/DR IN RCRD: CPT | Performed by: CLINICAL NURSE SPECIALIST

## 2025-04-28 PROCEDURE — 84075 ASSAY ALKALINE PHOSPHATASE: CPT

## 2025-04-28 PROCEDURE — 36415 COLL VENOUS BLD VENIPUNCTURE: CPT

## 2025-04-28 PROCEDURE — 1111F DSCHRG MED/CURRENT MED MERGE: CPT | Performed by: CLINICAL NURSE SPECIALIST

## 2025-04-28 PROCEDURE — 85007 BL SMEAR W/DIFF WBC COUNT: CPT

## 2025-04-28 PROCEDURE — 3078F DIAST BP <80 MM HG: CPT | Performed by: CLINICAL NURSE SPECIALIST

## 2025-04-28 PROCEDURE — 85027 COMPLETE CBC AUTOMATED: CPT

## 2025-04-28 PROCEDURE — 2500000004 HC RX 250 GENERAL PHARMACY W/ HCPCS (ALT 636 FOR OP/ED): Mod: JZ,EC

## 2025-04-28 PROCEDURE — 1157F ADVNC CARE PLAN IN RCRD: CPT | Performed by: CLINICAL NURSE SPECIALIST

## 2025-04-28 PROCEDURE — 82270 OCCULT BLOOD FECES: CPT

## 2025-04-28 PROCEDURE — 99213 OFFICE O/P EST LOW 20 MIN: CPT | Performed by: CLINICAL NURSE SPECIALIST

## 2025-04-28 RX ORDER — FAMOTIDINE 10 MG/ML
20 INJECTION, SOLUTION INTRAVENOUS ONCE AS NEEDED
OUTPATIENT
Start: 2025-05-12

## 2025-04-28 RX ORDER — EPINEPHRINE 0.3 MG/.3ML
0.3 INJECTION SUBCUTANEOUS EVERY 5 MIN PRN
OUTPATIENT
Start: 2025-05-12

## 2025-04-28 RX ORDER — DIPHENHYDRAMINE HYDROCHLORIDE 50 MG/ML
50 INJECTION, SOLUTION INTRAMUSCULAR; INTRAVENOUS AS NEEDED
OUTPATIENT
Start: 2025-05-12

## 2025-04-28 RX ORDER — ALBUTEROL SULFATE 0.83 MG/ML
3 SOLUTION RESPIRATORY (INHALATION) AS NEEDED
OUTPATIENT
Start: 2025-05-12

## 2025-04-28 RX ADMIN — DARBEPOETIN ALFA 500 MCG: 500 INJECTION, SOLUTION INTRAVENOUS; SUBCUTANEOUS at 11:27

## 2025-04-28 ASSESSMENT — ENCOUNTER SYMPTOMS
GASTROINTESTINAL NEGATIVE: 1
NEUROLOGICAL NEGATIVE: 1
MUSCULOSKELETAL NEGATIVE: 1
PSYCHIATRIC NEGATIVE: 1
RESPIRATORY NEGATIVE: 1
ENDOCRINE NEGATIVE: 1
CONSTITUTIONAL NEGATIVE: 1

## 2025-04-28 ASSESSMENT — PAIN SCALES - GENERAL: PAINLEVEL_OUTOF10: 0-NO PAIN

## 2025-04-28 NOTE — PROGRESS NOTES
Subjective   Patient ID: Zack Medrano is a 89 y.o. male who presents for Follow-up.  Patient is being seen in follow-up for chronic kidney disease stage IV with recent hospitalization and acute kidney injury    Labs reviewed  H&H 8.5 and 26.9  Uric acid 8.0  Labs on the day of discharge included  Glucose 137, sodium 139, potassium 4.8, chloride 108, bicarb 24  Renal function with a BUN of 69 and creatinine of 2.66  No chemistries repeated since his hospitalization    He is returning back to the infusion center and will get his lab work drawn while he is down there  He does have some mild lower extremity edema which is unchanged from his baseline  His blood pressure is well-controlled  He does have frequency of urination and has difficulty controlling his bladder with some incontinence          Review of Systems   Constitutional: Negative.    Respiratory: Negative.     Cardiovascular:  Positive for leg swelling.   Gastrointestinal: Negative.    Endocrine: Negative.    Genitourinary:  Positive for urgency.   Musculoskeletal: Negative.    Skin: Negative.    Neurological: Negative.    Psychiatric/Behavioral: Negative.         Objective   Physical Exam  Constitutional:       Appearance: Normal appearance. He is obese.   HENT:      Head: Normocephalic and atraumatic.      Mouth/Throat:      Mouth: Mucous membranes are moist.   Eyes:      Extraocular Movements: Extraocular movements intact.   Cardiovascular:      Rate and Rhythm: Normal rate and regular rhythm.      Heart sounds: Normal heart sounds.   Pulmonary:      Effort: Pulmonary effort is normal.      Breath sounds: Normal breath sounds.   Abdominal:      General: Bowel sounds are normal.      Palpations: Abdomen is soft.   Musculoskeletal:         General: Normal range of motion.      Right lower leg: Edema present.      Left lower leg: Edema present.   Skin:     General: Skin is warm and dry.   Neurological:      Mental Status: He is alert.   Psychiatric:          Behavior: Behavior normal.         Thought Content: Thought content normal.         Assessment/Plan   Problem List Items Addressed This Visit           ICD-10-CM    Hypertension - Primary I10    Blood pressure is well-controlled on amlodipine, carvedilol, spironolactone         Stage 4 chronic kidney disease (Multi) N18.4    He will get his lab repeated this afternoon when he goes down to the infusion center I will call him with these results, if his kidney function continues to be stable we will continue with his current regimen I will plan on follow-up in 3 months         Relevant Orders    Basic metabolic panel    Follow Up In Nephrology    Comprehensive metabolic panel    Anemia due to stage 4 chronic kidney disease N18.4, D63.1     Acute renal failure: Likely prerenal: Improving and stable  CKD IV with baseline creatinine 2.3-2.8  Pancytopenia  Hypertension with blood pressure on low side at this time  Diabetic Nephropathy:   Diabetes mellitus type 2 on insulin  Bilateral lower extremity edema: Stable.   History of pulmonary embolism  Obesity  Obstructive sleep apnea on CPAP machine and compliant  Hyperlipidemia  History of coronary artery  B/L Renal Cyst  Hyperuricemia       NATALY Estrada-MULUGETA, DNP 04/28/25 11:00 AM

## 2025-04-28 NOTE — ASSESSMENT & PLAN NOTE
He will get his lab repeated this afternoon when he goes down to the infusion center I will call him with these results, if his kidney function continues to be stable we will continue with his current regimen I will plan on follow-up in 3 months

## 2025-04-28 NOTE — TELEPHONE ENCOUNTER
[facility] CALLED, THEY HAVE A PRESCRIPTION FROM [facility] FOR METOPROLOL  TART 50 MG.   THEY ALSO HAVE A SCRIPT FOR DR RICHARDSON IN [place] FOR CARVEDIOL 6.25 MG  BID.   PATIENT PICKED METOPROLOL  ON THE 10 TH AND CARVEDIOL  YESTERDAY.     PLEASE ADVISE    0

## 2025-04-29 ENCOUNTER — APPOINTMENT (OUTPATIENT)
Age: OVER 89
End: 2025-04-29
Payer: MEDICARE

## 2025-04-29 VITALS
WEIGHT: 223 LBS | BODY MASS INDEX: 33.91 KG/M2 | DIASTOLIC BLOOD PRESSURE: 56 MMHG | HEART RATE: 77 BPM | SYSTOLIC BLOOD PRESSURE: 128 MMHG | OXYGEN SATURATION: 94 %

## 2025-04-29 DIAGNOSIS — D61.818 OTHER PANCYTOPENIA (MULTI): ICD-10-CM

## 2025-04-29 DIAGNOSIS — N18.4 TYPE 2 DIABETES MELLITUS WITH STAGE 4 CHRONIC KIDNEY DISEASE, WITH LONG-TERM CURRENT USE OF INSULIN (MULTI): ICD-10-CM

## 2025-04-29 DIAGNOSIS — D69.6 THROMBOCYTOPENIA (CMS-HCC): ICD-10-CM

## 2025-04-29 DIAGNOSIS — N18.4 STAGE 4 CHRONIC KIDNEY DISEASE (MULTI): Primary | ICD-10-CM

## 2025-04-29 DIAGNOSIS — E11.22 TYPE 2 DIABETES MELLITUS WITH STAGE 4 CHRONIC KIDNEY DISEASE, WITH LONG-TERM CURRENT USE OF INSULIN (MULTI): ICD-10-CM

## 2025-04-29 DIAGNOSIS — Z79.4 TYPE 2 DIABETES MELLITUS WITH STAGE 4 CHRONIC KIDNEY DISEASE, WITH LONG-TERM CURRENT USE OF INSULIN (MULTI): ICD-10-CM

## 2025-04-29 DIAGNOSIS — I10 PRIMARY HYPERTENSION: ICD-10-CM

## 2025-04-29 DIAGNOSIS — D64.9 ACUTE ON CHRONIC ANEMIA: ICD-10-CM

## 2025-04-29 PROBLEM — E66.812 CLASS 2 SEVERE OBESITY WITH SERIOUS COMORBIDITY AND BODY MASS INDEX (BMI) OF 36.0 TO 36.9 IN ADULT: Status: RESOLVED | Noted: 2023-04-12 | Resolved: 2025-04-29

## 2025-04-29 PROBLEM — E66.01 CLASS 2 SEVERE OBESITY WITH SERIOUS COMORBIDITY AND BODY MASS INDEX (BMI) OF 36.0 TO 36.9 IN ADULT: Status: RESOLVED | Noted: 2023-04-12 | Resolved: 2025-04-29

## 2025-04-29 LAB — HEMOCCULT SP1 STL QL: NEGATIVE

## 2025-04-29 PROCEDURE — 1160F RVW MEDS BY RX/DR IN RCRD: CPT | Performed by: FAMILY MEDICINE

## 2025-04-29 PROCEDURE — 1036F TOBACCO NON-USER: CPT | Performed by: FAMILY MEDICINE

## 2025-04-29 PROCEDURE — 1111F DSCHRG MED/CURRENT MED MERGE: CPT | Performed by: FAMILY MEDICINE

## 2025-04-29 PROCEDURE — G2211 COMPLEX E/M VISIT ADD ON: HCPCS | Performed by: FAMILY MEDICINE

## 2025-04-29 PROCEDURE — 1157F ADVNC CARE PLAN IN RCRD: CPT | Performed by: FAMILY MEDICINE

## 2025-04-29 PROCEDURE — 3074F SYST BP LT 130 MM HG: CPT | Performed by: FAMILY MEDICINE

## 2025-04-29 PROCEDURE — 99214 OFFICE O/P EST MOD 30 MIN: CPT | Performed by: FAMILY MEDICINE

## 2025-04-29 PROCEDURE — 1159F MED LIST DOCD IN RCRD: CPT | Performed by: FAMILY MEDICINE

## 2025-04-29 PROCEDURE — 3078F DIAST BP <80 MM HG: CPT | Performed by: FAMILY MEDICINE

## 2025-04-29 NOTE — PATIENT INSTRUCTIONS
Continue current medications, follow up with specialists as scheduled.  Follow up here as scheduled.

## 2025-04-29 NOTE — PROGRESS NOTES
Subjective   Patient ID: Zack Medrano is a 89 y.o. male who presents for Follow-up (Recently discharged home from Nursing Home).  HPI    Review of Systems    Objective   Physical Exam    Assessment/Plan            Serenity Barreto MA 04/29/25 11:09 AM

## 2025-04-29 NOTE — PROGRESS NOTES
Subjective   Zack Medrano is a 89 y.o. male who presents for Follow-up (Recently discharged home from Nursing Home).  Here for follow up recent hospitalization and ECF stay.  He was admitted at  from 3/31 -4/4 for acute kidney injury.  He then went to [facility] for rehab,  he has been home since Thursday, has home health coming in now.  He is now wearing oxygen when he is out and walking.  He had some labs yesterday - they appear stable.  Overall he is doing ok, slowly getting some strength back.              Objective   Visit Vitals  /56   Pulse 77      Physical Exam  Vitals reviewed.   Constitutional:       General: He is not in acute distress.  Cardiovascular:      Rate and Rhythm: Normal rate and regular rhythm.      Heart sounds: No murmur heard.  Pulmonary:      Effort: Pulmonary effort is normal. No respiratory distress.      Breath sounds: Normal breath sounds.   Skin:     General: Skin is warm and dry.   Neurological:      General: No focal deficit present.      Mental Status: He is alert. Mental status is at baseline.         Assessment/Plan   Problem List Items Addressed This Visit       Other pancytopenia (Multi)    Type 2 diabetes mellitus with stage 4 chronic kidney disease, with long-term current use of insulin (Multi)    Hypertension    Stage 4 chronic kidney disease (Multi) - Primary    Thrombocytopenia (CMS-HCC)    Acute on chronic anemia          Fabiola Dietz MD

## 2025-04-30 DIAGNOSIS — Z95.5 S/P CORONARY ARTERY STENT PLACEMENT: ICD-10-CM

## 2025-04-30 RX ORDER — NITROGLYCERIN 0.4 MG/1
0.4 TABLET SUBLINGUAL EVERY 5 MIN PRN
Qty: 30 TABLET | Refills: 1 | Status: SHIPPED | OUTPATIENT
Start: 2025-04-30

## 2025-05-12 ENCOUNTER — INFUSION (OUTPATIENT)
Dept: HEMATOLOGY/ONCOLOGY | Facility: CLINIC | Age: OVER 89
End: 2025-05-12
Payer: MEDICARE

## 2025-05-12 ENCOUNTER — OFFICE VISIT (OUTPATIENT)
Dept: HEMATOLOGY/ONCOLOGY | Facility: CLINIC | Age: OVER 89
End: 2025-05-12
Payer: MEDICARE

## 2025-05-12 VITALS
DIASTOLIC BLOOD PRESSURE: 62 MMHG | HEART RATE: 77 BPM | TEMPERATURE: 97.3 F | OXYGEN SATURATION: 92 % | RESPIRATION RATE: 18 BRPM | BODY MASS INDEX: 33.96 KG/M2 | WEIGHT: 223.33 LBS | SYSTOLIC BLOOD PRESSURE: 119 MMHG

## 2025-05-12 DIAGNOSIS — N18.4 ANEMIA DUE TO STAGE 4 CHRONIC KIDNEY DISEASE: ICD-10-CM

## 2025-05-12 DIAGNOSIS — N18.32 ANEMIA DUE TO STAGE 3B CHRONIC KIDNEY DISEASE: ICD-10-CM

## 2025-05-12 DIAGNOSIS — D46.9 MYELODYSPLASTIC SYNDROME, UNSPECIFIED (MULTI): ICD-10-CM

## 2025-05-12 DIAGNOSIS — D63.1 ANEMIA DUE TO STAGE 4 CHRONIC KIDNEY DISEASE: ICD-10-CM

## 2025-05-12 DIAGNOSIS — E78.2 MIXED HYPERLIPIDEMIA: ICD-10-CM

## 2025-05-12 DIAGNOSIS — D63.1 ANEMIA DUE TO STAGE 3B CHRONIC KIDNEY DISEASE: ICD-10-CM

## 2025-05-12 DIAGNOSIS — D70.9 NEUTROPENIA, UNSPECIFIED TYPE: ICD-10-CM

## 2025-05-12 DIAGNOSIS — D61.818 OTHER PANCYTOPENIA (MULTI): ICD-10-CM

## 2025-05-12 DIAGNOSIS — N18.4 STAGE 4 CHRONIC KIDNEY DISEASE (MULTI): ICD-10-CM

## 2025-05-12 DIAGNOSIS — D69.6 THROMBOCYTOPENIA: ICD-10-CM

## 2025-05-12 DIAGNOSIS — I10 PRIMARY HYPERTENSION: ICD-10-CM

## 2025-05-12 DIAGNOSIS — I25.10 CORONARY ARTERY DISEASE INVOLVING NATIVE HEART, UNSPECIFIED VESSEL OR LESION TYPE, UNSPECIFIED WHETHER ANGINA PRESENT: ICD-10-CM

## 2025-05-12 DIAGNOSIS — E11.22 TYPE 2 DIABETES MELLITUS WITH STAGE 4 CHRONIC KIDNEY DISEASE, WITH LONG-TERM CURRENT USE OF INSULIN (MULTI): ICD-10-CM

## 2025-05-12 DIAGNOSIS — Z79.4 TYPE 2 DIABETES MELLITUS WITH STAGE 4 CHRONIC KIDNEY DISEASE, WITH LONG-TERM CURRENT USE OF INSULIN (MULTI): ICD-10-CM

## 2025-05-12 DIAGNOSIS — E11.22 TYPE 2 DIABETES MELLITUS WITH DIABETIC CHRONIC KIDNEY DISEASE: ICD-10-CM

## 2025-05-12 DIAGNOSIS — G25.0 TREMOR, ESSENTIAL: ICD-10-CM

## 2025-05-12 DIAGNOSIS — N18.4 TYPE 2 DIABETES MELLITUS WITH STAGE 4 CHRONIC KIDNEY DISEASE, WITH LONG-TERM CURRENT USE OF INSULIN (MULTI): ICD-10-CM

## 2025-05-12 DIAGNOSIS — D72.819 LEUKOPENIA, UNSPECIFIED TYPE: ICD-10-CM

## 2025-05-12 LAB
ABO GROUP (TYPE) IN BLOOD: NORMAL
ANTIBODY SCREEN: NORMAL
BASOPHILS # BLD AUTO: 0 X10*3/UL (ref 0–0.1)
BASOPHILS NFR BLD AUTO: 0 %
EOSINOPHIL # BLD AUTO: 0.03 X10*3/UL (ref 0–0.4)
EOSINOPHIL NFR BLD AUTO: 3.1 %
ERYTHROCYTE [DISTWIDTH] IN BLOOD BY AUTOMATED COUNT: 21.8 % (ref 11.5–14.5)
HCT VFR BLD AUTO: 23.1 % (ref 41–52)
HGB BLD-MCNC: 7.5 G/DL (ref 13.5–17.5)
IMM GRANULOCYTES # BLD AUTO: 0 X10*3/UL (ref 0–0.5)
IMM GRANULOCYTES NFR BLD AUTO: 0 % (ref 0–0.9)
LYMPHOCYTES # BLD AUTO: 0.57 X10*3/UL (ref 0.8–3)
LYMPHOCYTES NFR BLD AUTO: 58.2 %
MCH RBC QN AUTO: 33 PG (ref 26–34)
MCHC RBC AUTO-ENTMCNC: 32.5 G/DL (ref 32–36)
MCV RBC AUTO: 102 FL (ref 80–100)
MONOCYTES # BLD AUTO: 0.06 X10*3/UL (ref 0.05–0.8)
MONOCYTES NFR BLD AUTO: 6.1 %
NEUTROPHILS # BLD AUTO: 0.32 X10*3/UL (ref 1.6–5.5)
NEUTROPHILS NFR BLD AUTO: 32.6 %
NRBC BLD-RTO: 0 /100 WBCS (ref 0–0)
PLATELET # BLD AUTO: 56 X10*3/UL (ref 150–450)
RBC # BLD AUTO: 2.27 X10*6/UL (ref 4.5–5.9)
RH FACTOR (ANTIGEN D): NORMAL
WBC # BLD AUTO: 1 X10*3/UL (ref 4.4–11.3)

## 2025-05-12 PROCEDURE — 2500000004 HC RX 250 GENERAL PHARMACY W/ HCPCS (ALT 636 FOR OP/ED): Mod: JZ,EC

## 2025-05-12 PROCEDURE — 86923 COMPATIBILITY TEST ELECTRIC: CPT

## 2025-05-12 PROCEDURE — 86900 BLOOD TYPING SEROLOGIC ABO: CPT

## 2025-05-12 PROCEDURE — 99214 OFFICE O/P EST MOD 30 MIN: CPT | Mod: 25

## 2025-05-12 PROCEDURE — 96372 THER/PROPH/DIAG INJ SC/IM: CPT

## 2025-05-12 PROCEDURE — 36415 COLL VENOUS BLD VENIPUNCTURE: CPT

## 2025-05-12 PROCEDURE — 85025 COMPLETE CBC W/AUTO DIFF WBC: CPT

## 2025-05-12 PROCEDURE — 99214 OFFICE O/P EST MOD 30 MIN: CPT

## 2025-05-12 RX ORDER — ALBUTEROL SULFATE 0.83 MG/ML
3 SOLUTION RESPIRATORY (INHALATION) AS NEEDED
OUTPATIENT
Start: 2025-05-26

## 2025-05-12 RX ORDER — FAMOTIDINE 10 MG/ML
20 INJECTION, SOLUTION INTRAVENOUS ONCE AS NEEDED
OUTPATIENT
Start: 2025-05-12

## 2025-05-12 RX ORDER — DIPHENHYDRAMINE HYDROCHLORIDE 50 MG/ML
50 INJECTION, SOLUTION INTRAMUSCULAR; INTRAVENOUS AS NEEDED
OUTPATIENT
Start: 2025-05-26

## 2025-05-12 RX ORDER — EPINEPHRINE 0.3 MG/.3ML
0.3 INJECTION SUBCUTANEOUS EVERY 5 MIN PRN
Status: CANCELLED | OUTPATIENT
Start: 2025-05-12

## 2025-05-12 RX ORDER — ALBUTEROL SULFATE 0.83 MG/ML
3 SOLUTION RESPIRATORY (INHALATION) AS NEEDED
Status: CANCELLED | OUTPATIENT
Start: 2025-05-12

## 2025-05-12 RX ORDER — ALBUTEROL SULFATE 0.83 MG/ML
3 SOLUTION RESPIRATORY (INHALATION) AS NEEDED
OUTPATIENT
Start: 2025-05-12

## 2025-05-12 RX ORDER — DIPHENHYDRAMINE HYDROCHLORIDE 50 MG/ML
50 INJECTION, SOLUTION INTRAMUSCULAR; INTRAVENOUS AS NEEDED
Status: CANCELLED | OUTPATIENT
Start: 2025-05-12

## 2025-05-12 RX ORDER — EPINEPHRINE 0.3 MG/.3ML
0.3 INJECTION SUBCUTANEOUS EVERY 5 MIN PRN
OUTPATIENT
Start: 2025-05-12

## 2025-05-12 RX ORDER — FAMOTIDINE 10 MG/ML
20 INJECTION, SOLUTION INTRAVENOUS ONCE AS NEEDED
Status: CANCELLED | OUTPATIENT
Start: 2025-05-12

## 2025-05-12 RX ORDER — EPINEPHRINE 0.3 MG/.3ML
0.3 INJECTION SUBCUTANEOUS EVERY 5 MIN PRN
OUTPATIENT
Start: 2025-05-26

## 2025-05-12 RX ORDER — FAMOTIDINE 10 MG/ML
20 INJECTION, SOLUTION INTRAVENOUS ONCE AS NEEDED
OUTPATIENT
Start: 2025-05-26

## 2025-05-12 RX ORDER — DIPHENHYDRAMINE HYDROCHLORIDE 50 MG/ML
50 INJECTION, SOLUTION INTRAMUSCULAR; INTRAVENOUS AS NEEDED
OUTPATIENT
Start: 2025-05-12

## 2025-05-12 RX ADMIN — DARBEPOETIN ALFA 500 MCG: 500 INJECTION, SOLUTION INTRAVENOUS; SUBCUTANEOUS at 11:44

## 2025-05-12 ASSESSMENT — PAIN SCALES - GENERAL: PAINLEVEL_OUTOF10: 0-NO PAIN

## 2025-05-12 NOTE — PATIENT INSTRUCTIONS
Discussed and reviewed medical history   Labs today- pending results due for Darbepoetin injection today  Ordered 1 unit PRBC's   Continue on Darbepoetin every 14 days with labs same day  Return to see APRN in 4 weeks

## 2025-05-12 NOTE — PROGRESS NOTES
Patient ID: Zack Medrano is a 89 y.o. male.    Subjective    HPI     Chief Complaint          Chief Complaint   Patient presents with    Anemia       Stage 4 chronic kidney disease             History of the Present Illness  10/9/2019.  Visit with Dr. Mickey Nguyen for chronic kidney disease and hypertension.  Chief complaint was fatigue and need to lose weight     11/12/2019.  The patient has had long-term history of microcytic anemia going back to at least 2019 at which time his white count was 5.2 hemoglobin was 13.8 hematocrit 40.5 and a platelet count of 157,000.  His MCV was 105 MCHC was 34.  White blood cell differential count showed 66% polys, 17 lymphs, 11 monos and 4 eosinophils.     12/11/2019.  Seen by primary care for probable shingles treated with Valtrex with chief complaint of waxing and waning left flank pain radiating up under his ribs, somewhat responsive to exercise..  He had a history of pulmonary embolus and was on Eliquis long-term.     2/24/2020.  Seen by Dr. Howell for elevated PSA, BPH and erectile dysfunction.  PSA in 2017 was 2.94, in 2018 it was 2.74 and in 2020 it was 4.03.  We discussed biopsy and the patient wanted to have this followed and not biopsy.     3/9/2020.  Follow-up with NATALY Pulido primary care.  Referred to Dr. Bahena for colonoscopy and CT scan of the chest was ordered for follow-up of questionable lingular nodule.     3/11/2020.  CT scan of the chest showed a stable 6 mm nodular lesion near the fissure in the lingula.     3/13/2020.  Seen by Dr. Bahena who noted that the patient had had diarrhea off-and-on since cholecystectomy and had intermittent left-sided pain.  He had a history of colon polyps.     4/22/2015.  Colonoscopy showed that he had tubular adenoma     5/13/2020.  Seen by Dr. Migdalia New in follow-up.  She noted that he had an abnormal CBC with macrocytic indices with a normal B12 level.  The patient denies alcohol use.  Hemoglobin A1c was  6.2.  BUN was 33 creatinine was 1.74.     8/31/2020.  Follow-up with Dr. Howell.  PSA had come down from 4.03-3.77     1/4/2021 through 1/12/2021.  Admitted to the hospital having been brought to the ED by squad after he fell forward on his toilet and was unable to get up.  He denied hitting his head or losing consciousness.  He blamed it on having back pain and shoulder pain for several weeks.  He was febrile with a temperature of 100.9 and hypoxic with pulse ox of 86% on room air.  Respirations were 24.  He was positive for COVID and was treated with intravenous antibiotics and remdesivir, increased supplemental oxygen.  He was able to be discharged to rehabilitation.     Repeat CBC showed white count 4.3 with a hemoglobin 12.9 hematocrit 38.8 with an MCV of 102 and a platelet count of 173,000.  White blood cell differential count showed minimal absolute lymphocytopenia at 0.6 with the lower limits of normal being 0.8.     3/1/2021.  Follow-up with Dr. Howell.  PSA was 9.9 in February.  Decided to have prostate biopsy done on 3/22/2021.  Results showed BPH.     10/6/2021.  Follow-up with Dr. Howell.  PSA in September was 2.77     4/6/2022.  CBC showed white count 4.3 with hemoglobin 11.7 hematocrit 34.4 with an MCV of 107.  Platelet count was 153,000.  White blood cell differential count was essentially normal.     5/31/2022.  CBC showed white count 3.6 with hemoglobin 11.6 hematocrit 34.4 and platelet count 117,000 with a normal white blood cell differential.     6/27/2022.  Referred to Dr. Dash because of pancytopenia by NATALY Gray.  CBC showed a white count of 3.9 with hemoglobin 12.3 hematocrit 36.1 and platelet count 129,000 with a normal differential.  PSA was 3.26.     The patient said that he felt well except for having increased fatigue.  He continued on long-term anticoagulation.  He was able to carry out his ADLs.  He did complain of easy bruising.  He said that he had worked at [employer] for 12  years and also worked in [employer].  Dr. Dash felt that there was a large component secondary to his chronic kidney disease.  There was no evidence of nutritional deficiency.  He checked him for hemolysis and monoclonal gammopathy and ordered an ultrasound for hepatosplenomegaly and testing for hepatitis, HIV and CHRISTINA.  He said he would rather observe the patient rather than pursuing a bone marrow biopsy.     7/27/2022.  Repeat lab data showed white count 3.9 with hemoglobin 12.3 hematocrit 36.1 and platelet count 129,000.  BUN was 36 creatinine was 1.78.  He had mild elevation of AST.  Retake count was 2.3%.  Flow cytometry showed a small clonal B-cell population.  Hepatitis B was nonreactive.  Hep C testing was negative.  Serum protein electrophoresis was normal.  Haptoglobin was normal.  LDH was normal.  He said that the possibility of MDS could not be ruled out.  Ultrasound showed fatty liver but no splenomegaly.  He plan for EPO level and NGS for myeloid mutation.     9/1/2022.  Started on allopurinol for hyperuricemia.     10/12/2022.  Seen by Dr. Dr. Dietz.  Patient was referred to dermatology for several skin lesions.  He was referred to neurology because of his tremor that was not improving.  She increased his glimepiride.     10/17/2022.  CBC showed white count 3.0 with hemoglobin 10.5 hematocrit 31.3 and a platelet count of 106,000 MCV was 111.     10/26/2022.  Follow-up with Dr. 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  He was suspicious for MDS.  He referred him for a bone marrow biopsy.     12/1/2022.  Follow-up with Dr. Dash.  Bone marrow biopsy showed low-grade MDS.  R IPSS score was low or very low depending on a karyotype which was still pending.  We discussed initiating erythropoietin with Aranesp but the patient deferred and the decision was made to monitor him.     1/11/2023.  CBC showed white count 3.4 with hemoglobin 10.3 hematocrit 32.2  and a platelet count of 137,000.  MCV was 115.  White blood cell differential count was normal.     1/13/2023.  Follow-up with Dr. Dash.  The patient did say that he had following getting out of the shower but did not sustain any injuries.  He had been started by Dr. Nguyen on insulin for his diabetes which was helpful.  Patient continued to be active inside his house.  He remains on Eliquis.  BR-IPSS score is very low.  Counts remain stable.     3/31/2023.  CBC showed a white count of 2.6 with hemoglobin 8.7 hematocrit 27.1 and a platelet count 97,000.  MCV was 115.  White blood cell differential count showed that he had an absolute neutrophil count of 1.36.     4/10/2023.  CBC showed a white count of 2.5 with a hemoglobin 9.0 hematocrit of 27.7 with a platelet count of 92,000.  MCV was 114.     4/17/2023.  Follow-up with NATALY Gray.  Even though his hemoglobin had dropped to 9 the patient did not want to start his erythropoietin injections.     5/11/2023.  CBC showed white count of 2.5 with a hemoglobin of 7.6 hematocrit 23.8 and a platelet count of 101,000.  Absolute neutrophil count was 1.17.     5/15/2023 through 5/19/2023.  Admitted to the hospital with anemia and generalized weakness black stools dizziness with a hemoglobin of 6.7.  His BUN was 51 creatinine was 2.08.  CT scan of the abdomen showed no acute process.  There were findings consistent with atypical healing of the previous rib fracture with question Paget's disease although it was nonspecific.  He was transfused 2 units packed red blood cells.  EGD found 3 erosions in the cardia.  He was prescribed Carafate.     5/24/2023.  Follow-up with Dr. Dr. Dietz after discharge.  CBC showed white count 1.9 with hemoglobin 8.6 hematocrit 28.3 and a platelet count of 140,000.  Absolute neutrophil count was 1.06.  Patient did not tolerate Carafate and stopped it.  He was prescribed oral iron twice a day.  He had not been prescribed a PPI which was  "then started.     6/1/2023.  CBC showed white count of 1.9 with hemoglobin 7.7 hematocrit 24.8 and a platelet count of 87,000.     6/13/2023.  CBC showed a white count of 1.9 with hemoglobin of 8.7 hematocrit 28.1 and platelet count of 89,000.     6/23/2023.  Followed up with Dr. Dr. Dietz who reported that he was hospitalized again in [place] for GI bleed.  At that time his Eliquis was discontinued and he was changed to Protonix and taken off his baby aspirin.  Once again he was started on Carafate but did not tolerate it.     7/15/2023.  CBC showed white count of 2.4 with hemoglobin 9.2 hematocrit 27.3 and a platelet count of 110,000.  Absolute neutrophil count was 1.36.     7/17/2023.  Follow-up with Malorie Shaffer.  The patient had recently had a colonoscopy and had \"cauterizations\" he noted that he had dyspnea with exertion but no resting shortness of breath.  He was on prednisone at that time.  His sugars were in the 300s.  He was on insulin.  His neuropathy has not changed and he continues to have tremors.  Current level was found to be saturated.  Once again the patient was reluctant to start NING.     8/14/2023.  Follow-up with Malorie Shaffer.  CBC showed white count 1.8 with a hemoglobin 8.9 hematocrit 26.9 and a platelet count of 100,000.  Absolute neutrophil count was 0.96.     9/6/2023.  CBC showed a white count of 1.8 with hemoglobin 9.5 hematocrit 28.8 and a platelet count of 85,000 with an absolute neutrophil count of 0.99.  9/13/2023.  Follow-up with Dr. Howell.  PSA has gone down to 1.87.     10/9/2023.  Follow-up with Malorie Shaffer who noted the patient remained on oxygen and CPAP.  He says that he is awakening in the middle of the night around 3 to 4:00 in the morning.  Sugars have been in the 300s.  Neuropathy was unchanged.  He had been started on erythropoietin every 3 weeks     11/21/2023.  Follow-up with Malorie Shaffer.  No improvement in his hemoglobin.  White count had improved to 2.1 platelets were " baseline.  He had been placed on 40 mg of prednisone and continued on erythropoietin 300 mcg every 3 weeks.     1/2/2024.  Follow-up with Malorie Shaffer.  No improvement in hemoglobin.  Symptomatically he was doing well.  Plan was to increase frequency of his darbepoetin to every 2 weeks.     2/13/2024.  Follow-up with Malorie Shaffer.  Now on erythropoietin injections every 2 weeks.  There was a discussion concerning repeat bone marrow biopsy with counts stayed low.     3/12/2024.  Follow-up with Malorie Shaffer.  No improvement in his hemoglobin.  Patient was agreeable to repeat bone marrow biopsy.  She also ordered a CAT scan of the chest abdomen and pelvis without contrast.     3/22/2024.  CAT scan showed severe coronary artery calcifications.  There is evidence of bronchiectasis in the lower lobes increased compared to previous CT scan from 2020.     Predominantly groundglass opacities with bibasilar prominence.  There is some subpleural sparing of the left upper lobe.  There is a 3 mm nodule in the right upper lobe which was unchanged for several years.  Multilevel anterior bridging osteophytes were noted consistent with DISH.  There is a small fat-containing periumbilical hernia and bilateral inguinal hernias.  Spleen size is at the upper limits of normal being 12.7 cm in length slightly increased when compared to study from 2019.  Cysts were seen in the kidneys with diffuse cortical atrophy and a 4 mm hyperdense focus in the interpolar region of the right kidney possibly a stone.  Prostatism was noted for gland measuring 6.2 cm.     3/28/2024.  Seen by Dr. Graham for shortness of breath.  He felt the patient had possible chronic interstitial lung disease versus periodic aspiration, hypoxia dyspnea on exertion.  Further tests were ordered including high-resolution CT scan, complete pulmonary function testing, simple pulmonary stress test and follow-up.     4/9/2024.  Follow-up with Malorie Shaffer.  Erythropoietin  injections continued every 2 weeks which the patient was tolerating well.     4/16/2024.  Bone marrow biopsy was performed showing a hypercellular bone marrow at 50% with dyserythropoiesis.  There are 1% blasts consistent with persistent myeloid neoplasm.  Flow cytometry showed a small clonal CD5 negative, CD10 negative B-cell population and a polytypic background.  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     There was a very small abnormal T-cell population that could be incidental.  Next generation sequencing showed 2 U2AF 1 variants, 1 of which was negative.  The other had been previously seen.     The marrow also showed dyserythropoiesis with bilobate forms and forms with irregular nuclear contours as well as blebbing.     4/23/2024.  CBC showed white count 1.5 with a hemoglobin of 8.3 hematocrit 25.9 and platelet count of 71,000 with 37% neutrophils, 53 lymphocytes, 4 monos, 4 eosinophils with an absolute neutrophil count of 0.57.     4/24/2024.  Return visit with Dr. Graham.  He noted that the high-resolution CT scan showed no significant change from the previous CT scan the month before.  Pulmonary function testing showed no response to bronchodilator and there was mild restrictive change with some suggestion of airway inflammation.  The patient had difficulty doing the test because of persistent coughing.  6-minute walk test showed that the patient was able to maintain saturations at 93% with 2 L of oxygen per minute continuously.  The test was terminated because of dyspnea.  With his hypoxia on room air it was recommended that he be on oxygen continuously to keep his saturation greater than or equal to 92%.  He continued on oxygen with his CPAP at 3 L at nighttime.     5/10/2024.  This is my first visit with Mr. Medrano and his family.  We went over some of the  results of the bone marrow biopsy.  I told him that I would have to wait until the chromosomal analysis returned.  I also note the possibility of low-grade lymphoma.  [facility]  Stepdaughter who is here with him and his wife today is his advocate for maintaining his quality of life.  She is an employee at a local nursing facility and is concerned about many clients she has who seem to be getting treated aggressively.  We talked about myelodysplasia and the fact that he is not responding to erythropoietin.  He will continue on this treatment for now.  He is low counts were discussed and he knows that he is at higher risk for infection and bleeding.  He continues on oxygen supplementation continuously.  They will return in 2 weeks at which time laboratory tests will be performed.     He just had a lesion removed from the dorsum of his right hand at the base of his first and second fingers.  Stitches are still in place.  It seems to be healing well without any significant bleeding or infection.  He was told that it looked benign.     5/13/2024.  Follow-up with Dr. Aggie Danielle of cardiology who saw him in follow-up of his coronary artery disease with multiple stents having been placed, hypertension, dyslipidemia and previous pulmonary embolus related to COVID-19.  She had stopped his Eliquis.  She noted his myelodysplasia.  She noted that at some point his aspirin had been stopped but given his history of stents this was restarted and he tolerated it well.  She said that his blood pressure was well-controlled.  She noted blood sugars were between 150-2 50.  She noted the patient was supposed to be using oxygen with exertion but the patient was not sure what rate it was supposed to be.  She says that she would not stop his aspirin unless his platelets were less than 50,000 with complications of bleeding.  She said it was very important to continue aspirin given a  15% chance of in-stent thrombosis with aspirin cessation.  She increased her furosemide to 80 mg twice a day because of bilateral lower extremity edema and spironolactone to 25 mg twice daily until symptoms improve and encouraged him to cut back on his salt intake.  She was see him again in about 9 months.     5/14/2024.  Follow-up with Dr. Santamaria of ophthalmology who started him on Naphcon-A drops 1 drop 3 times a day in both eyes for excessive tearing     5/15/2024.  Followed up with Dr. Dimas Renteria of podiatry who noted decreased pedal pulses 2 out of 4.  He had footcare.     5/24/2024.  Follow-up with Dr. Graham.  Review of his x-rays showed no change.  Physical examination showed decreased breath sounds but no inspiratory crackles that were previously heard.  He felt that the interstitial lung disease was stable and that his hypoxemia was improved on oxygen.  He plan to repeat CT scan in September.     5/24/2024.  He is here to follow-up on his bone marrow results.  I told him that the chromosomal analysis would not be forthcoming before because the specimen failed to grow in culture.  He continues to display a low-grade MDS with blast count of only 2%.  We reviewed the fact that he is recommended to have at least 6 months of his erythropoietin before we decide whether or not it is a failure.  He is agreeable to this.  We will increase his dose of darbepoetin.  He knows to be on the look out for any signs of infection or bleeding and to report this if it happens.  We reviewed his most recent CBC.  We will see him again in 2 weeks with his next injection.     5/30/2024.  Follow-up with Dr. Dr. Dietz for his Medicare wellness visit.  His magnesium dose was changed to twice daily indefinitely.     6/7/2024.  He is here today for his darbepoetin injection.  We have increased his dose to 500 mcg every 2 weeks.  Will see if this has any effect.  Laboratory dated today shows that his sugar is 290 and his  sodium is 134.  BUN is 46 and creatinine is 2.31.  CBC shows white count 1.5 with a hemoglobin 8.1 hematocrit 24.7 and a platelet count of 71,000 which is very much like his most recent CBC.  His absolute neutrophil count is 0.57.     I talked to him about his current status which he feels is fairly stable.  He is off oxygen.  He has had no bleeding and no signs of infection.  He understands that we have increased his dose of darbepoetin and we will watch his counts.  He will report any side effects.     He has continued to receive his darbepoetin injections.     7/15/2024.  Emergency department visit [facility].  He developed a rash on both lower extremities which started a week ago which is an erythematous papular rash.  He denied any pain or itching.  He did not have any vesicle formation.  It is bilateral.  He has had a shingles injection.  He was told that he had disseminated zoster and was started on valacyclovir a gram twice daily for 7 days.     7/19/2024.  Seen by DrMayo Dietz who said that the rash has not gotten any worse and might be a little bit better.  She noted that there were no vesicles no open areas no areas of secondary infection.     7/19/2024.  He is here to receive his erythropoietin injection and to have a blood count.  I told him it is not likely that this is disseminated zoster because it is nowhere else on his body but only on his lower extremities.  This could be stasis dermatitis.  I do not see any signs of vesicle formation and there never were any.  It is mostly on the anterior shins.  It is all below the knee.     CBC today shows white count 1.4 with hemoglobin 8 hematocrit 25.4 platelet count 67,000 with 37 neutrophils 53 lymphocytes 5 monos 2 eosinophils.  He is serum chemistry showed a sugar of 285 BUN of 49 creatinine of 2.67.  We told him he needed to keep his hydration status high.  He will continue with his injections today.  Blood pressure today is 153/64.     He  has had no bleeding and no infection.  He has had no chest pain or pressure.  He is able to participate in his usual activities including mowing the yard.     He has continued to receive his darbepoetin and has followed up with podiatry for nail and callus care.     8/30/2024.  He is here in routine follow-up.  He says that he is doing well.  He has had no infections or bleeding.  He has had no mouth sores.  He has had continued swelling in his legs a little bit worse on the left than on the right.  Blood sugar was greater than 300 but he admits to dietary indiscretion.  Has had no chest pain.  His blood pressure was 149/63.  He says that his breathing is stable.  He is using his oxygen at home at night and when he travels that he does not have it on today.     His lab data has shown no sign of improvement so far with white count 1.3, hemoglobin 8.6 hematocrit 27.2 and a platelet count of 69,000.  White blood cell differential count shows 38% polys, 50 lymphs, 5 monos, 2 eosinophils.  We plan a 6-month trial of this dose of darbepoetin which will extend until November.  We will see him again in about a month.     9/4/2024.  CT scan of the chest showed mild streaky scarring in the lung bases bilaterally.  The previously seen groundglass opacities probably be minimally improved.  Calcified pleural plaques are seen without consolidation effusion or pneumothorax.  There is no adenopathy seen by CT size criteria.  Minimal hepatic steatosis was seen as well as calcified granuloma in the spleen and a somewhat atrophic pancreas.  DJD was seen in the spine.     9/5/2024.  Seen by Dr. Zambrano his nephrologist who ordered lab tests and felt that his fluctuating chemistries were related to diuretic therapy.  He noted myelodysplasia and that he was being followed for his anemia receiving NING.  He did not change any of his medications  And said that he would see him again in about 6 months     9/10/2024.  Follow-up by Dr. Graham  at which time he told the patient he could decrease his oxygen to 2 and he would repeat a CT scan in 6 months and see him after that.     9/27/2024.  He is here today in routine follow-up.  He will get his shot today.  Laboratory data shows no improvement.  We reviewed that we will follow him for 6 months on this treatment to see if there is any improvement.     Mr. Medrano says that he has had no significant bleeding.  I see some red spots on the soft palate.  His appetite has been good and he has gained weight of 1 pound since last time I saw him about a month ago.  He has had no other signs of infection or deterioration in his status.  He knows he is at risk for serious infection or bleeding.  We will continue on this treatment pathway.  He will see him again in a month.     Interval Note  10/3/2024.  Seen by Dr. 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  BUN was 47 and creatinine was 2.33 with an EGFR of 26.  She noted that he did have some mild edema of the left lower leg and was in the habit of wearing compression stockings.  He denied any increase in shortness of breath.  She said that she wanted to stop his spironolactone which was 25 mg and appeared that it was started for edema.  She said that he would check his blood pressure and weights for the next 2 weeks and then have repeat laboratory data.  Spironolactone was discontinued.     10/11/2024.  Laboratory data showed a BUN of 50 and a creatinine of 2.6, worse than before EGFR was 22.  Sugar was 389.  On 10/21/2024 she told him to stay off spironolactone and call if he had increasing swelling or shortness of breath.  He was to continue weighing himself.     10/25/2024.  Repeat laboratory data showed a BUN of 45 and a creatinine of 2.41 with a sugar of 374.  He was continued on erythropoietin injections.     11/8/2024.  BUN was 44 creatinine was 2.33 and sugar was 430.     11/19/2024.  [facility] emergency department  visit for tremor.  The patient reported that his tremor was worse at night.  He denied any history of restless leg syndrome.  He said that his upper extremity tremor was relieved by movement.  He has a history of previous movement disorder or any other neurologic symptoms.  At that time he said that he was taking 20 mg of Lasix twice a day.  Aldactone was still on his medication list.  BUN is 43 creatinine was 2.45 and sugar was 373.  CT of the head showed no evidence of bleeding there was mild to moderate brain parenchymal atrophy and white matter changes.  He was referred to neurology.     11/22/2024.  BUN was 15 creatinine was 2.60.  Sugar was 353.  At that time it was documented that he was taking 60 mg of Lasix daily and continuing Aldactone.  He did have bilateral pedal edema.  He was continued on  protein injections.  He weighed 229 pounds.     11/25/2024.  BUN was 51 and creatinine was 2.64 with a sugar of 285.     12/9/2024.  BUN was 44 creatinine was 2.47 with a sugar of 313.     12/10/2024.  [facility] ED visit for a rash on his lower extremities.  He was taking Lasix 40 mg twice a day and Aldactone daily.  Blood pressure was 121/52 and he weighed 230 pounds.  There was some mild erythema in his left lower extremity but was not weeping.  He was slightly warm to the touch.  It was said that this could be early cellulitis and a prescription for Keflex was written.     12/16/2024.  He followed up with Dr. Dr. Dietz who said that they could switch antibiotics to doxycycline and recommended a follow-up appointment.  He was seen on the following day and continued on doxycycline for mild to moderate erythema of the inner ankle lower leg he was also treated with gabapentin for his essential tremor.     12/23/2024.  BUN was 43 creatinine was 2.46 with a sugar of 313.  Seen in follow-up by Malorie Shaffer was continued on Depakote with stable counts.     12/30/2024.  Followed up with Dr. Yu  [facility] with leg redness.  The patient said that he thought he had the flu.  His weight was 226 pounds.  Blood pressure was 126/74.  Laboratory data 2 days prior to the visit showed that his BUN was 52 and his creatinine was 2.62.  Sugar was 151.     1/2/2025.  Followed up with Dr. Supriya Nguyen with a decrease in his swelling and improvement in his legs lesions.  She documented that he had had 2 falls without lightheadedness or dizziness.        1/6/2025.  Laboratory data showed a sugar of 313 BUN of 41 and creatinine of 2.56 with an EGFR of 23.     1/15/2025.  Seen by Dr. Patrick Haque of [facility] neurology because of his tremors.  He mentioned that gabapentin can cause swelling.  He was recently discontinued.  Symptoms appear to be improved.  He noted the patient had already been on primidone.  He had complained of chronic numbness of the left thumb and right thumb.  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  He was reluctant to see orthopedic reduction and risks were recommended as well as exercises to maintain strength.  He said that he could continue primidone 50 mg p.o. nightly for essential tremor and increase the dose if needed and to avoid caffeine.     1/20/2025.  BUN was 47 creatinine was 2.70 with an EGFR of 22.  Sugar was 142.     2/3/2025.  Laboratory data showed a BUN of 46 creatinine 2.42 with a sugar of 218.  Follow-up with Malorie Shaffer at which time the patient said that he wanted to stay on erythropoietin.  Repeat bone marrow was discussed with the patient was reluctant to do that or to see Dr. Hess for second opinion.     2/17/2025.  Laboratory data showed a BUN of 49 creatinine 2.53 with a sugar of 300.     3/3/2025.  BUN was 57 creatinine was 2.86 with a sugar of 284.  Followed up with Malorie Shaffer still not wanting to get another opinion.  Discussion was  around supportive care and making sure that he understood the dangers of cytopenias.     3/4/2025.  Follow-up with Dr. Dr. Dietz.  No changes made in his medications.     3/7/2025.  CT scan of the chest without contrast showed moderate basilar and subpleural predominant fibrosing interstitial lung disease with architectural distortion in the absence of honeycombing which have been waxing and waning the degree of inflammatory change had decreased over the past year.  Densely calcified coronary arteries were seen and a new right retroareolar nodular appearing soft tissue measuring 1.6 cm and could represent asymmetric clinically mass via but this needed to have follow-up.     3/14/2025.  Followed up with Dr. Graham.  He noted that pulse ox on room air was 90% and on repeat was 96.     3/19/2025.  He is here today with his wife.  We wanted to make sure that he understood that his counts were not doing well and that he had potential problems with infections, bleeding and anemia that might cause him to have chest pains or increasing shortness of breath.  We talked about the possibility of transfusions if he became more symptomatic.  He verbalized understanding.  He is not interested in another opinion at this time.  He is not interested in being treated more aggressively at this time.     He is more interested in going through his medicines and making sure that he needed all of them.  We did identify that he is on carvedilol and metoprolol.  He is also on sucralfate 3 times a day and pantoprazole 40 mg twice a day.  He says that he is not experiencing any heartburn and has not for a while.  We went through his medication list and noted what it was for.  I encouraged him to talk to primary care about these issues.     He has an appointment to see his cardiologist in the upcoming weeks.  His blood pressure today 137/67.  His most recent BUN and creatinine were 57 and 2.86 which I am sure have something to do with his  state of hydration.  He currently says that he is taking 1-1/2 of a 40 mg Lasix tablet daily and continues to take Aldactone 25 mg daily.  He has prescriptions for both 25 and 50 mg of Aldactone.  He has an upcoming appointment with NATALY in about 2 weeks.     4/14/25:    Patient is in office for routine follow-up. Patient was directed to be seen in the emergency room 3/31/25 after reporting weakness/shakiness that lead to him falling. His hemoglobin at the time was  7 g/dL he received 2 units PRBC's. Upon discharge he was sent to SNF for a few days of occupational therapy.      He is ambulating with a walker to his appointment. He reports feeling steady on his feet, denies ay weakness today. He has continuous oxygen, however he is not using while in the office, denies any shortness of breath. Continuous oxygen 2-3L nc. today.      Denies any bleeding or bruising. Bilateral edema has improved since he started drinking more water. He remains to take lasix 40mg and spirolactone 50mg.  He denies any GI or Urinary issues.  Occasional productive cough, clear phlegm. He states he feel to bad today.      Home -240's- PCP recently sent prescribed insulin pens, which is much easier for him to administer. He has home health coming twice weekly.     5/12/25: Patient is in office today, he arrives wearing his oxygen, reports increased shortness of breath. Otherwise denies any new clinical concerns. He denies any abnormal bleeding- (gum bleeding, epistaxis, hematuria, hematochezia, or melanic stool). He remains to have edema in lower extremities, no open sores/ulcers, manages with spironolactone and lasix. Continues to work with home PT/OT weekly. Denies any recent falls.      Counts have dropped, Hemoglobin is 7.5, WBC 1.0, ANC 0.32, Lymphs 0.57, Plateletes 56,000.  Patient is planning to take a bus trip on Wednesday. Educated his once again on immunocompromised precautions, he verbalizes understanding.  Discussed  bleeding risks and precautions. Due to his drop in hemoglobin, and given his increased shortness of breath, discussed PRBC transfusion, patient was agreeable at this time, will order/schedule accordingly.         Comorbidities  GERD  Arthritis, DJD  BPH  Coronary artery disease with history of myocardial, status post stent infarction  History of dumping syndrome  Hyperlipidemia  History of skin cancer  Obesity  Obstructive sleep apnea on CPAP  Peripheral arterial disease  CKD 3  Diabetes with nephropathy  History of cholecystectomy and hernia repair  Essential tremor  Hypertension   Numbness in his fingers felt to be related to cervical disc disease.     Monitoring Parameters  Histories, physical examinations and CBCs with occasional bone marrow biopsies.      Review of Systems - Oncology   Review of Systems   Constitutional:  Positive for fatigue. Negative for appetite change and unexpected weight change.   HENT:  Negative for dental problem, mouth sores, nosebleeds and trouble swallowing.    Eyes:  Negative for visual disturbance.   Respiratory:  Positive for shortness of breath (with activity). Negative for cough.    Cardiovascular:  Positive for leg swelling.   Gastrointestinal:  Negative for abdominal pain, constipation, diarrhea, nausea and vomiting.   Endocrine: Negative for polyuria.   Genitourinary:  Negative for dysuria, frequency and urgency.   Musculoskeletal:  Positive for arthralgias and myalgias.   Skin:  Positive for pallor. Negative for rash.   Neurological:  Negative for weakness, light-headedness, numbness and headaches.   Hematological:  Bruises/bleeds easily.   Psychiatric/Behavioral:  Negative for self-injury, sleep disturbance and suicidal ideas. The patient is not nervous/anxious.          Past Medical History  Medical History           Past Medical History:   Diagnosis Date    Abnormal levels of other serum enzymes       Abnormal liver enzymes    Dependence on other enabling machines and  devices       CPAP (continuous positive airway pressure) dependence    Encounter for examination of eyes and vision without abnormal findings       Diabetic eye exam    Localized edema       Bilateral leg edema    Old myocardial infarction       History of myocardial infarction    Other conditions influencing health status 01/08/2020     Uncontrolled diabetes mellitus type 2 without complications    Pain in unspecified ankle and joints of unspecified foot       Arthralgia of ankle    Personal history of other diseases of the digestive system       History of gastroesophageal reflux (GERD)    Personal history of other diseases of the digestive system       History of dumping syndrome    Personal history of other endocrine, nutritional and metabolic disease 11/12/2020     History of type 2 diabetes mellitus    Presence of coronary angioplasty implant and graft       History of coronary artery stent placement    Rash and other nonspecific skin eruption 12/11/2019     Rash    Unspecified abdominal hernia without obstruction or gangrene       Hernia    Unspecified abdominal pain 05/19/2021     Abdominal pain, left lateral            Surgical History  Surgical History             Past Surgical History:   Procedure Laterality Date    OTHER SURGICAL HISTORY   10/09/2019     Arterial stent placement    OTHER SURGICAL HISTORY   10/09/2019     Cholecystectomy    OTHER SURGICAL HISTORY   10/09/2019     Hernia repair            Social History  Social History   Social History              Tobacco Use    Smoking status: Never    Smokeless tobacco: Never   Vaping Use    Vaping status: Never Used   Substance Use Topics    Alcohol use: Never    Drug use: Never            Family History  family history includes Colon cancer in his mother and another family member.         Current Medications          Current Outpatient Medications   Medication Instructions    allopurinol (ZYLOPRIM) 100 mg, oral, Daily    amLODIPine (NORVASC) 5 mg,  "oral, Daily before breakfast    ascorbic acid (VITAMIN C) 250 mg    aspirin 81 mg, Daily    atorvastatin (LIPITOR) 40 mg, oral, Daily    blood-glucose sensor (FreeStyle Sindi 3 Sensor) device Use as directed    carvedilol (COREG) 6.25 mg, 2 times daily    ciprofloxacin (CIPRO) 500 mg, oral, Daily    cyanocobalamin, vitamin B-12, (Vitamin B-12) 1,000 mcg tablet extended release 1 tablet    FeroSuL tablet 1 tablet, Every other day    flash glucose scanning reader (FreeStyle Sindi 2 [employer]) misc Use as instructed    flash glucose sensor kit (FreeStyle Sindi 2 Sensor) kit Use as directed    FreeStyle Sindi 2 Sensor kit 1 each, subcutaneous, As needed, Use as instructed    furosemide (Lasix) 40 mg tablet TAKE 1.5 TABLET BY MOUTH EVERY DAY    gabapentin (NEURONTIN) 300 mg, oral, 2 times daily    lancets 33 gauge misc 2 times daily    Lantus U-100 Insulin 60 Units, subcutaneous, Nightly    metoprolol tartrate (LOPRESSOR) 50 mg, oral, 2 times daily    multivit-minerals/folic acid (CENTRUM ADULTS ORAL) Take by mouth.    nitroglycerin (NITROSTAT) 0.4 mg, sublingual, Every 5 min PRN    NON FORMULARY [employer] test In vitro strip; Use as directed to check blood sugar  2 times daily    pantoprazole (PROTONIX) 40 mg, oral, 2 times daily    pen needle, diabetic (Pen Needle) 32 gauge x 5/32\" needle 1 each, miscellaneous, Daily    primidone (MYSOLINE) 50 mg, oral, Nightly    spironolactone (Aldactone) 50 mg tablet TAKE 1 TABLET ( 50 MG) BY MOUTH DAILY FOR 30 DAYS. Hold if serum potassium is more than 5.0    spironolactone (ALDACTONE) 25 mg, oral, Daily    sucralfate (CARAFATE) 1 g, oral, 3 times daily (morning, midday, late afternoon)    vitamins A,C,E-zinc-copper 2,148 mcg-113 mg-45 mg-17.4mg tablet Take by mouth. SUPER VIEW      Scheduled Medications             OARRS Review  I have personally reviewed the OARRS report for Zack Medrano. I have considered the risks of abuse, dependence, addiction and diversion.  He continues on " gabapentin through primary care.     Objective    BSA: There is no height or weight on file to calculate BSA.  There were no vitals taken for this visit.     Physical Exam  Vitals and nursing note reviewed.   Constitutional:       Appearance: Normal appearance. He is obese.   HENT:      Head: Normocephalic and atraumatic.      Nose: Nose normal.      Mouth/Throat:      Mouth: Mucous membranes are moist.      Pharynx: Oropharynx is clear.   Eyes:      General: No scleral icterus.     Extraocular Movements: Extraocular movements intact.      Conjunctiva/sclera: Conjunctivae normal.   Cardiovascular:      Rate and Rhythm: Normal rate and regular rhythm.      Pulses: Normal pulses.      Heart sounds: Normal heart sounds.   Pulmonary:      Effort: Pulmonary effort is normal.      Breath sounds: Decreased breath sounds present.      Comments: Wearing continuous Oxygen 2 L   Abdominal:      General: There is distension.      Palpations: Abdomen is soft.      Tenderness: There is no abdominal tenderness.   Musculoskeletal:         General: Swelling present. Normal range of motion.      Cervical back: Normal range of motion.      Right lower leg: Edema present.      Left lower leg: Edema present.   Lymphadenopathy:      Cervical: No cervical adenopathy.   Skin:     General: Skin is warm and dry.      Coloration: Skin is pale.      Findings: Bruising present.   Neurological:      General: No focal deficit present.      Mental Status: He is alert and oriented to person, place, and time.      Motor: Weakness present.   Psychiatric:         Mood and Affect: Mood normal.         Behavior: Behavior normal.         Thought Content: Thought content normal.         Judgment: Judgment normal.         Performance Status:  Symptomatic       Assessment/Plan        1.  Pancytopenia.  The patient has unusual bone marrow findings.  Chromosomal analysis will not be done due to failure of the specimen to grow.  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  He also has components of myelodysplasia.  He is not responding to erythropoietin.  We will increase his dose to 500 mcg every other week.  He continues to be at high risk for bleeding and infection with a low ANC and low platelet count.  He has had previous GI bleeds.  He is currently back on his  aspirin per follow-up with Dr. Danielle for fear of clotting off his stents.  He is off Eliquis.  He will get his treatment today with darbepoetin and continue every 14 days.     He is not interested in the second marina at this time and does not want to be treated more aggressively.  We will continue to be supportive.       5/12/25: Reviewed recent labs- WBC 1.0, ANC 0.32, Hemoglobin 7.5, Platelets 56K-   -1 unit PRBC's ordered   - Immunocompromised Precautions discussed  -will proceed with darbepoetin  injection every 14 days, and support as needed         These include but are not limited to:  GERD  Arthritis, DJD  BPH  Coronary artery disease with history of myocardial, status post stent infarction  History of dumping syndrome  Hyperlipidemia  History of skin cancer  Obesity  Obstructive sleep apnea on CPAP  Peripheral arterial disease  CKD 3  Diabetes with nephropathy  History of cholecystectomy and hernia repair  Essential tremor  Hypertension   Numbness in his fingers felt to be related to cervical disc disease.     3.  Rash on his legs.  I think this looks more like stasis dermatitis than disseminated zoster.  If it were disseminated zoster he would have it on other areas of his body.     Plan:   Discussed and reviewed medical history  Highly encouraged wearing a mask in public, if able would suggest avoid public places at this time, due to his ANC being extremely low at 0.32  Encourage good hand hygiene at this time also  Reviewed CBC- hemoglobin is 7.5, Plts 56k-. WBC 1.0, ANC 0.32  Proceed with Darbopoetin Injection today  Continue Darbopoetin injection every 14 days with  labs prior   Ordered fecal occult to be dropped off at time of next injection in 14 days  Return to see APRN in 1 month       He knows that he should call in the interim should he have any change in his status, questions or concerns.            Malorie Shaffer, APRN-CNP

## 2025-05-13 ENCOUNTER — INFUSION (OUTPATIENT)
Dept: HEMATOLOGY/ONCOLOGY | Facility: CLINIC | Age: OVER 89
End: 2025-05-13
Payer: MEDICARE

## 2025-05-13 VITALS
TEMPERATURE: 97.3 F | RESPIRATION RATE: 18 BRPM | HEART RATE: 67 BPM | WEIGHT: 222.44 LBS | OXYGEN SATURATION: 99 % | BODY MASS INDEX: 33.82 KG/M2 | DIASTOLIC BLOOD PRESSURE: 75 MMHG | SYSTOLIC BLOOD PRESSURE: 129 MMHG

## 2025-05-13 DIAGNOSIS — D63.1 ANEMIA DUE TO STAGE 4 CHRONIC KIDNEY DISEASE: ICD-10-CM

## 2025-05-13 DIAGNOSIS — N18.4 ANEMIA DUE TO STAGE 4 CHRONIC KIDNEY DISEASE: ICD-10-CM

## 2025-05-13 DIAGNOSIS — D46.9 MYELODYSPLASTIC SYNDROME, UNSPECIFIED (MULTI): ICD-10-CM

## 2025-05-13 LAB
BLOOD EXPIRATION DATE: NORMAL
DISPENSE STATUS: NORMAL
PRODUCT BLOOD TYPE: 9500
PRODUCT CODE: NORMAL
UNIT ABO: NORMAL
UNIT NUMBER: NORMAL
UNIT RH: NORMAL
UNIT VOLUME: 350
XM INTEP: NORMAL

## 2025-05-13 PROCEDURE — 36430 TRANSFUSION BLD/BLD COMPNT: CPT

## 2025-05-13 PROCEDURE — P9040 RBC LEUKOREDUCED IRRADIATED: HCPCS

## 2025-05-13 RX ORDER — HEPARIN 100 UNIT/ML
500 SYRINGE INTRAVENOUS AS NEEDED
OUTPATIENT
Start: 2025-05-13

## 2025-05-13 RX ORDER — EPINEPHRINE 0.3 MG/.3ML
0.3 INJECTION SUBCUTANEOUS EVERY 5 MIN PRN
OUTPATIENT
Start: 2025-05-13

## 2025-05-13 RX ORDER — ALBUTEROL SULFATE 0.83 MG/ML
3 SOLUTION RESPIRATORY (INHALATION) AS NEEDED
OUTPATIENT
Start: 2025-05-13

## 2025-05-13 RX ORDER — HEPARIN 100 UNIT/ML
500 SYRINGE INTRAVENOUS AS NEEDED
Status: CANCELLED | OUTPATIENT
Start: 2025-05-13

## 2025-05-13 RX ORDER — FAMOTIDINE 10 MG/ML
20 INJECTION, SOLUTION INTRAVENOUS ONCE AS NEEDED
OUTPATIENT
Start: 2025-05-13

## 2025-05-13 RX ORDER — HEPARIN SODIUM,PORCINE/PF 10 UNIT/ML
50 SYRINGE (ML) INTRAVENOUS AS NEEDED
OUTPATIENT
Start: 2025-05-13

## 2025-05-13 RX ORDER — HEPARIN SODIUM,PORCINE/PF 10 UNIT/ML
50 SYRINGE (ML) INTRAVENOUS AS NEEDED
Status: CANCELLED | OUTPATIENT
Start: 2025-05-13

## 2025-05-13 RX ORDER — DIPHENHYDRAMINE HYDROCHLORIDE 50 MG/ML
50 INJECTION, SOLUTION INTRAMUSCULAR; INTRAVENOUS AS NEEDED
OUTPATIENT
Start: 2025-05-13

## 2025-05-13 ASSESSMENT — PAIN SCALES - GENERAL: PAINLEVEL_OUTOF10: 0-NO PAIN

## 2025-05-19 ENCOUNTER — TELEPHONE (OUTPATIENT)
Dept: SURGERY | Facility: CLINIC | Age: OVER 89
End: 2025-05-19
Payer: MEDICARE

## 2025-05-19 NOTE — TELEPHONE ENCOUNTER
Spoke with pt regarding repeat 5 year colonoscopy. Pt declines at this time. If he decides to do it in the future he will need a OV due to his Age. Order is Deferred.

## 2025-05-27 ENCOUNTER — INFUSION (OUTPATIENT)
Dept: HEMATOLOGY/ONCOLOGY | Facility: CLINIC | Age: OVER 89
End: 2025-05-27
Payer: MEDICARE

## 2025-05-27 VITALS
DIASTOLIC BLOOD PRESSURE: 66 MMHG | SYSTOLIC BLOOD PRESSURE: 121 MMHG | OXYGEN SATURATION: 93 % | HEART RATE: 77 BPM | TEMPERATURE: 97.2 F | WEIGHT: 225.09 LBS | RESPIRATION RATE: 18 BRPM | BODY MASS INDEX: 34.22 KG/M2

## 2025-05-27 DIAGNOSIS — D63.1 ANEMIA DUE TO STAGE 3B CHRONIC KIDNEY DISEASE: ICD-10-CM

## 2025-05-27 DIAGNOSIS — D63.1 ANEMIA DUE TO STAGE 4 CHRONIC KIDNEY DISEASE: ICD-10-CM

## 2025-05-27 DIAGNOSIS — N18.32 ANEMIA DUE TO STAGE 3B CHRONIC KIDNEY DISEASE: ICD-10-CM

## 2025-05-27 DIAGNOSIS — D46.9 MYELODYSPLASTIC SYNDROME, UNSPECIFIED (MULTI): ICD-10-CM

## 2025-05-27 DIAGNOSIS — N18.4 ANEMIA DUE TO STAGE 4 CHRONIC KIDNEY DISEASE: ICD-10-CM

## 2025-05-27 DIAGNOSIS — N18.4 STAGE 4 CHRONIC KIDNEY DISEASE (MULTI): ICD-10-CM

## 2025-05-27 DIAGNOSIS — D61.818 OTHER PANCYTOPENIA (MULTI): ICD-10-CM

## 2025-05-27 LAB
BASOPHILS # BLD AUTO: 0 X10*3/UL (ref 0–0.1)
BASOPHILS NFR BLD AUTO: 0 %
DACRYOCYTES BLD QL SMEAR: NORMAL
EOSINOPHIL # BLD AUTO: 0.03 X10*3/UL (ref 0–0.4)
EOSINOPHIL NFR BLD AUTO: 3.1 %
ERYTHROCYTE [DISTWIDTH] IN BLOOD BY AUTOMATED COUNT: 22.5 % (ref 11.5–14.5)
HCT VFR BLD AUTO: 24.5 % (ref 41–52)
HGB BLD-MCNC: 7.9 G/DL (ref 13.5–17.5)
HYPOCHROMIA BLD QL SMEAR: NORMAL
IMM GRANULOCYTES # BLD AUTO: 0.01 X10*3/UL (ref 0–0.5)
IMM GRANULOCYTES NFR BLD AUTO: 1 % (ref 0–0.9)
LYMPHOCYTES # BLD AUTO: 0.62 X10*3/UL (ref 0.8–3)
LYMPHOCYTES NFR BLD AUTO: 64.6 %
MCH RBC QN AUTO: 32.5 PG (ref 26–34)
MCHC RBC AUTO-ENTMCNC: 32.2 G/DL (ref 32–36)
MCV RBC AUTO: 101 FL (ref 80–100)
MONOCYTES # BLD AUTO: 0.04 X10*3/UL (ref 0.05–0.8)
MONOCYTES NFR BLD AUTO: 4.2 %
NEUTROPHILS # BLD AUTO: 0.26 X10*3/UL (ref 1.6–5.5)
NEUTROPHILS NFR BLD AUTO: 27.1 %
NRBC BLD-RTO: 0 /100 WBCS (ref 0–0)
OVALOCYTES BLD QL SMEAR: NORMAL
PLATELET # BLD AUTO: 53 X10*3/UL (ref 150–450)
RBC # BLD AUTO: 2.43 X10*6/UL (ref 4.5–5.9)
RBC MORPH BLD: NORMAL
WBC # BLD AUTO: 1 X10*3/UL (ref 4.4–11.3)

## 2025-05-27 PROCEDURE — 2500000004 HC RX 250 GENERAL PHARMACY W/ HCPCS (ALT 636 FOR OP/ED): Mod: JZ,EC

## 2025-05-27 PROCEDURE — 85025 COMPLETE CBC W/AUTO DIFF WBC: CPT

## 2025-05-27 PROCEDURE — 96372 THER/PROPH/DIAG INJ SC/IM: CPT

## 2025-05-27 PROCEDURE — 36415 COLL VENOUS BLD VENIPUNCTURE: CPT

## 2025-05-27 RX ORDER — EPINEPHRINE 0.3 MG/.3ML
0.3 INJECTION SUBCUTANEOUS EVERY 5 MIN PRN
OUTPATIENT
Start: 2025-06-09

## 2025-05-27 RX ORDER — FAMOTIDINE 10 MG/ML
20 INJECTION, SOLUTION INTRAVENOUS ONCE AS NEEDED
OUTPATIENT
Start: 2025-06-09

## 2025-05-27 RX ORDER — DIPHENHYDRAMINE HYDROCHLORIDE 50 MG/ML
50 INJECTION, SOLUTION INTRAMUSCULAR; INTRAVENOUS AS NEEDED
OUTPATIENT
Start: 2025-06-09

## 2025-05-27 RX ORDER — ALBUTEROL SULFATE 0.83 MG/ML
3 SOLUTION RESPIRATORY (INHALATION) AS NEEDED
OUTPATIENT
Start: 2025-06-09

## 2025-05-27 RX ADMIN — DARBEPOETIN ALFA 500 MCG: 500 INJECTION, SOLUTION INTRAVENOUS; SUBCUTANEOUS at 09:04

## 2025-05-27 ASSESSMENT — PAIN SCALES - GENERAL: PAINLEVEL_OUTOF10: 0-NO PAIN

## 2025-05-28 DIAGNOSIS — E11.22 TYPE 2 DIABETES MELLITUS WITH STAGE 4 CHRONIC KIDNEY DISEASE, WITH LONG-TERM CURRENT USE OF INSULIN (MULTI): ICD-10-CM

## 2025-05-28 DIAGNOSIS — N18.4 TYPE 2 DIABETES MELLITUS WITH STAGE 4 CHRONIC KIDNEY DISEASE, WITH LONG-TERM CURRENT USE OF INSULIN (MULTI): ICD-10-CM

## 2025-05-28 DIAGNOSIS — Z79.4 TYPE 2 DIABETES MELLITUS WITH STAGE 4 CHRONIC KIDNEY DISEASE, WITH LONG-TERM CURRENT USE OF INSULIN (MULTI): ICD-10-CM

## 2025-05-28 RX ORDER — INSULIN LISPRO 100 [IU]/ML
INJECTION, SOLUTION INTRAVENOUS; SUBCUTANEOUS
Qty: 15 ML | Refills: 11 | Status: SHIPPED | OUTPATIENT
Start: 2025-05-28 | End: 2025-05-30 | Stop reason: SDUPTHER

## 2025-05-30 DIAGNOSIS — N18.4 TYPE 2 DIABETES MELLITUS WITH STAGE 4 CHRONIC KIDNEY DISEASE, WITH LONG-TERM CURRENT USE OF INSULIN (MULTI): ICD-10-CM

## 2025-05-30 DIAGNOSIS — Z79.4 TYPE 2 DIABETES MELLITUS WITH STAGE 4 CHRONIC KIDNEY DISEASE, WITH LONG-TERM CURRENT USE OF INSULIN (MULTI): ICD-10-CM

## 2025-05-30 DIAGNOSIS — E11.22 TYPE 2 DIABETES MELLITUS WITH STAGE 4 CHRONIC KIDNEY DISEASE, WITH LONG-TERM CURRENT USE OF INSULIN (MULTI): ICD-10-CM

## 2025-05-30 RX ORDER — INSULIN LISPRO 100 [IU]/ML
INJECTION, SOLUTION INTRAVENOUS; SUBCUTANEOUS
Qty: 15 ML | Refills: 11 | Status: SHIPPED | OUTPATIENT
Start: 2025-05-30

## 2025-05-30 NOTE — TELEPHONE ENCOUNTER
[facility] pharmacy called stating a new prescription needs sent in that states patient is using multiple times per day and is a sliding scale. Thank you.

## 2025-06-04 ENCOUNTER — APPOINTMENT (OUTPATIENT)
Age: OVER 89
End: 2025-06-04
Payer: MEDICARE

## 2025-06-04 VITALS
SYSTOLIC BLOOD PRESSURE: 114 MMHG | WEIGHT: 225 LBS | BODY MASS INDEX: 34.21 KG/M2 | DIASTOLIC BLOOD PRESSURE: 52 MMHG | HEART RATE: 79 BPM | OXYGEN SATURATION: 94 %

## 2025-06-04 DIAGNOSIS — I10 PRIMARY HYPERTENSION: ICD-10-CM

## 2025-06-04 DIAGNOSIS — D70.9 NEUTROPENIA, UNSPECIFIED TYPE: ICD-10-CM

## 2025-06-04 DIAGNOSIS — E66.01 CLASS 2 SEVERE OBESITY WITH SERIOUS COMORBIDITY AND BODY MASS INDEX (BMI) OF 35.0 TO 35.9 IN ADULT, UNSPECIFIED OBESITY TYPE: ICD-10-CM

## 2025-06-04 DIAGNOSIS — E11.22 TYPE 2 DIABETES MELLITUS WITH STAGE 4 CHRONIC KIDNEY DISEASE, WITH LONG-TERM CURRENT USE OF INSULIN (MULTI): ICD-10-CM

## 2025-06-04 DIAGNOSIS — E78.2 MIXED HYPERLIPIDEMIA: ICD-10-CM

## 2025-06-04 DIAGNOSIS — Z79.4 TYPE 2 DIABETES MELLITUS WITH STAGE 4 CHRONIC KIDNEY DISEASE, WITH LONG-TERM CURRENT USE OF INSULIN (MULTI): ICD-10-CM

## 2025-06-04 DIAGNOSIS — J84.9 INTERSTITIAL LUNG DISEASE (MULTI): ICD-10-CM

## 2025-06-04 DIAGNOSIS — N18.4 STAGE 4 CHRONIC KIDNEY DISEASE (MULTI): ICD-10-CM

## 2025-06-04 DIAGNOSIS — E11.22 TYPE 2 DIABETES MELLITUS WITH DIABETIC CHRONIC KIDNEY DISEASE: ICD-10-CM

## 2025-06-04 DIAGNOSIS — K21.9 GASTROESOPHAGEAL REFLUX DISEASE, UNSPECIFIED WHETHER ESOPHAGITIS PRESENT: ICD-10-CM

## 2025-06-04 DIAGNOSIS — Z00.00 ROUTINE GENERAL MEDICAL EXAMINATION AT HEALTH CARE FACILITY: Primary | ICD-10-CM

## 2025-06-04 DIAGNOSIS — N18.4 TYPE 2 DIABETES MELLITUS WITH STAGE 4 CHRONIC KIDNEY DISEASE, WITH LONG-TERM CURRENT USE OF INSULIN (MULTI): ICD-10-CM

## 2025-06-04 DIAGNOSIS — E66.812 CLASS 2 SEVERE OBESITY WITH SERIOUS COMORBIDITY AND BODY MASS INDEX (BMI) OF 35.0 TO 35.9 IN ADULT, UNSPECIFIED OBESITY TYPE: ICD-10-CM

## 2025-06-04 DIAGNOSIS — G56.02 CARPAL TUNNEL SYNDROME OF LEFT WRIST: ICD-10-CM

## 2025-06-04 DIAGNOSIS — D69.6 THROMBOCYTOPENIA: ICD-10-CM

## 2025-06-04 DIAGNOSIS — I73.9 PERIPHERAL VASCULAR DISEASE, UNSPECIFIED: ICD-10-CM

## 2025-06-04 PROCEDURE — 3078F DIAST BP <80 MM HG: CPT | Performed by: FAMILY MEDICINE

## 2025-06-04 PROCEDURE — 1170F FXNL STATUS ASSESSED: CPT | Performed by: FAMILY MEDICINE

## 2025-06-04 PROCEDURE — G0439 PPPS, SUBSEQ VISIT: HCPCS | Performed by: FAMILY MEDICINE

## 2025-06-04 PROCEDURE — 1036F TOBACCO NON-USER: CPT | Performed by: FAMILY MEDICINE

## 2025-06-04 PROCEDURE — 3074F SYST BP LT 130 MM HG: CPT | Performed by: FAMILY MEDICINE

## 2025-06-04 PROCEDURE — 1124F ACP DISCUSS-NO DSCNMKR DOCD: CPT | Performed by: FAMILY MEDICINE

## 2025-06-04 PROCEDURE — 1160F RVW MEDS BY RX/DR IN RCRD: CPT | Performed by: FAMILY MEDICINE

## 2025-06-04 PROCEDURE — 1159F MED LIST DOCD IN RCRD: CPT | Performed by: FAMILY MEDICINE

## 2025-06-04 RX ORDER — INSULIN GLARGINE 100 [IU]/ML
35 INJECTION, SOLUTION SUBCUTANEOUS 2 TIMES DAILY
Qty: 24 ML | Refills: 11 | Status: SHIPPED | OUTPATIENT
Start: 2025-06-04 | End: 2025-06-06 | Stop reason: WASHOUT

## 2025-06-04 RX ORDER — GABAPENTIN 100 MG/1
200 CAPSULE ORAL 2 TIMES DAILY
Qty: 120 CAPSULE | Refills: 2 | Status: SHIPPED | OUTPATIENT
Start: 2025-06-04

## 2025-06-04 ASSESSMENT — ENCOUNTER SYMPTOMS
OCCASIONAL FEELINGS OF UNSTEADINESS: 0
LOSS OF SENSATION IN FEET: 0
DEPRESSION: 0

## 2025-06-04 ASSESSMENT — ACTIVITIES OF DAILY LIVING (ADL)
DRESSING: INDEPENDENT
DOING_HOUSEWORK: NEEDS ASSISTANCE
BATHING: INDEPENDENT
MANAGING_FINANCES: INDEPENDENT
GROCERY_SHOPPING: INDEPENDENT
TAKING_MEDICATION: INDEPENDENT

## 2025-06-04 NOTE — PROGRESS NOTES
Subjective   Patient ID: Zack Medrano is a 89 y.o. male who presents for Medicare Annual Wellness Visit Subsequent.  [facility]    Review of Systems    Objective   Physical Exam    Assessment/Plan            Serenity Barreto MA 06/04/25 9:26 AM

## 2025-06-04 NOTE — PROGRESS NOTES
Subjective   Reason for Visit: Zack Medrano is an 89 y.o. male here for a Medicare Wellness visit.     Past Medical, Surgical, and Family History reviewed and updated in chart.    Reviewed all medications by prescribing practitioner or clinical pharmacist (such as prescriptions, OTCs, herbal therapies and supplements) and documented in the medical record.    Here for routine follow up HTN, DM, CKD (Dr Nguyen), CAD ([facility]), high chol, thrombocytopenia/anemia/pancytopenia/myelodysplastic syndrome (Dr Carmichael), tremor (Dr Haque), LINDA, h/o PE, GI bleed, LINDA on CPAP - tolerating well ([facility]).  He states that he is doing pretty well.      He was recently in the ER for left hand numbness/tingling.  He states that it started a couple of weeks ago, getting worse.  It does not bother him when he is sleeping, he is struggling to  pills, it is somewhat painful as well.      He is also wondering about changing his insulin,  he is struggling with multiple sliding scale shots daily.  We will increase his lantus total dose and switch to BID.  He may still do sliding scale as needed, but hopefully will not need it as much.  He will let us know if he has higher sugars or lows and we will adjust the lantus accordingly.          Patient Care Team:  Fabiola Dietz MD as PCP - General (Family Medicine)  Fabiola Dietz MD as PCP - [facility] Attributed Provider  Patrick Haque MD as Referring Physician (Neurology)  Ayah Carmichael MD as Consulting Physician (Hematology and Oncology)         Objective   Vitals:  /52   Pulse 79   Wt 102 kg (225 lb)   SpO2 94%   BMI 34.21 kg/m²       Physical Exam  Vitals reviewed.   Constitutional:       General: He is not in acute distress.  Cardiovascular:      Rate and Rhythm: Normal rate and regular rhythm.      Heart sounds: No murmur heard.  Pulmonary:      Effort: Pulmonary effort is normal. No respiratory distress.      Breath sounds: Normal breath sounds.   Skin:      General: Skin is warm and dry.   Neurological:      General: No focal deficit present.      Mental Status: He is alert. Mental status is at baseline.         Assessment & Plan  Interstitial lung disease (Multi)    Orders:    Follow Up In Primary Care - Medicare Annual    Class 2 severe obesity with serious comorbidity and body mass index (BMI) of 35.0 to 35.9 in adult, unspecified obesity type    Orders:    Follow Up In Primary Care - Medicare Annual    Gastroesophageal reflux disease, unspecified whether esophagitis present    Orders:    Follow Up In Primary Care - Medicare Annual    Primary hypertension    Orders:    Follow Up In Primary Care - Medicare Annual    Mixed hyperlipidemia    Orders:    Follow Up In Primary Care - Medicare Annual    Neutropenia, unspecified type    Orders:    Follow Up In Primary Care - Medicare Annual    Stage 4 chronic kidney disease (Multi)    Orders:    Follow Up In Primary Care - Medicare Annual    Thrombocytopenia    Orders:    Follow Up In Primary Care - Medicare Annual    Type 2 diabetes mellitus with stage 4 chronic kidney disease, with long-term current use of insulin (Multi)    Orders:    Follow Up In Primary Care - Medicare Annual    Peripheral vascular disease, unspecified    Orders:    Follow Up In Primary Care - Medicare Annual    gabapentin (Neurontin) 100 mg capsule; Take 2 capsules (200 mg) by mouth 2 times a day.    Routine general medical examination at health care facility         Carpal tunnel syndrome of left wrist    Orders:    Miscellaneous DME    Type 2 diabetes mellitus with diabetic chronic kidney disease    Orders:    insulin glargine (Lantus U-100 Insulin) 100 unit/mL injection; Inject 35 Units under the skin 2 times a day.

## 2025-06-04 NOTE — ASSESSMENT & PLAN NOTE
Orders:    Follow Up In Primary Care - Medicare Annual    gabapentin (Neurontin) 100 mg capsule; Take 2 capsules (200 mg) by mouth 2 times a day.

## 2025-06-04 NOTE — PATIENT INSTRUCTIONS
Discussed possible carpal tunnel disease, we will order a wrist splint for him to try, will also increase his gabapentin.  Discussed referral to ortho and/or EMG/NCV but he would like to hold off on that for now.  Will increase lantus and split it to twice a day, he will continue to monitor sugars and we may increase further if still running high so he can avoid the sliding scale insulin.  Follow up with specialists as scheduled.  Follow up here in 3 months, sooner if needed.

## 2025-06-06 ENCOUNTER — TELEPHONE (OUTPATIENT)
Age: OVER 89
End: 2025-06-06
Payer: MEDICARE

## 2025-06-06 DIAGNOSIS — E11.22 TYPE 2 DIABETES MELLITUS WITH DIABETIC CHRONIC KIDNEY DISEASE: ICD-10-CM

## 2025-06-06 RX ORDER — INSULIN GLARGINE 100 [IU]/ML
35 INJECTION, SOLUTION SUBCUTANEOUS 2 TIMES DAILY
Qty: 24 ML | Refills: 11 | Status: SHIPPED | OUTPATIENT
Start: 2025-06-06

## 2025-06-06 NOTE — TELEPHONE ENCOUNTER
[facility] CALLING  FILLED  LANTUS  PEN  USING 60 UNIT every day.  THEN ON 6-4-25  SCRIPT FOR LANTUS  VIAL CAME THROUGH USING 35 UNITS BID .   PATIENT HAS NEVER USED VIAL.    PLEASE ADVISE

## 2025-06-06 NOTE — TELEPHONE ENCOUNTER
OK, that is strange because all I did was change the dose on the previous prescription when I renewed it, it does not say vials on this end but I will try sending a new Rx.

## 2025-06-07 ENCOUNTER — HOSPITAL ENCOUNTER (EMERGENCY)
Age: OVER 89
Discharge: HOME | End: 2025-06-07
Payer: MEDICARE

## 2025-06-07 VITALS
TEMPERATURE: 97.52 F | DIASTOLIC BLOOD PRESSURE: 61 MMHG | SYSTOLIC BLOOD PRESSURE: 119 MMHG | OXYGEN SATURATION: 97 % | RESPIRATION RATE: 20 BRPM | HEART RATE: 76 BPM

## 2025-06-07 VITALS
DIASTOLIC BLOOD PRESSURE: 61 MMHG | RESPIRATION RATE: 20 BRPM | TEMPERATURE: 97.52 F | SYSTOLIC BLOOD PRESSURE: 119 MMHG | OXYGEN SATURATION: 97 % | HEART RATE: 76 BPM

## 2025-06-07 DIAGNOSIS — K74.60: ICD-10-CM

## 2025-06-07 DIAGNOSIS — N18.9: ICD-10-CM

## 2025-06-07 DIAGNOSIS — E78.00: ICD-10-CM

## 2025-06-07 DIAGNOSIS — I25.10: ICD-10-CM

## 2025-06-07 DIAGNOSIS — Z79.4: ICD-10-CM

## 2025-06-07 DIAGNOSIS — Z95.5: ICD-10-CM

## 2025-06-07 DIAGNOSIS — E11.22: ICD-10-CM

## 2025-06-07 DIAGNOSIS — G56.02: Primary | ICD-10-CM

## 2025-06-07 DIAGNOSIS — Z79.899: ICD-10-CM

## 2025-06-07 PROCEDURE — 73110 X-RAY EXAM OF WRIST: CPT

## 2025-06-07 PROCEDURE — 99282 EMERGENCY DEPT VISIT SF MDM: CPT

## 2025-06-09 ENCOUNTER — INFUSION (OUTPATIENT)
Dept: HEMATOLOGY/ONCOLOGY | Facility: CLINIC | Age: OVER 89
End: 2025-06-09
Payer: MEDICARE

## 2025-06-09 ENCOUNTER — OFFICE VISIT (OUTPATIENT)
Dept: HEMATOLOGY/ONCOLOGY | Facility: CLINIC | Age: OVER 89
End: 2025-06-09
Payer: MEDICARE

## 2025-06-09 VITALS
WEIGHT: 224.2 LBS | OXYGEN SATURATION: 97 % | SYSTOLIC BLOOD PRESSURE: 112 MMHG | TEMPERATURE: 96.8 F | HEART RATE: 74 BPM | BODY MASS INDEX: 34.09 KG/M2 | DIASTOLIC BLOOD PRESSURE: 67 MMHG | RESPIRATION RATE: 20 BRPM

## 2025-06-09 DIAGNOSIS — D61.818 OTHER PANCYTOPENIA (MULTI): ICD-10-CM

## 2025-06-09 DIAGNOSIS — N18.32 ANEMIA DUE TO STAGE 3B CHRONIC KIDNEY DISEASE: ICD-10-CM

## 2025-06-09 DIAGNOSIS — D63.1 ANEMIA DUE TO STAGE 4 CHRONIC KIDNEY DISEASE: ICD-10-CM

## 2025-06-09 DIAGNOSIS — D64.9 ANEMIA, UNSPECIFIED TYPE: ICD-10-CM

## 2025-06-09 DIAGNOSIS — D46.9 MYELODYSPLASTIC SYNDROME, UNSPECIFIED (MULTI): ICD-10-CM

## 2025-06-09 DIAGNOSIS — N18.4 ANEMIA DUE TO STAGE 4 CHRONIC KIDNEY DISEASE: ICD-10-CM

## 2025-06-09 DIAGNOSIS — K25.9 GASTRIC ULCER, UNSPECIFIED CHRONICITY, UNSPECIFIED WHETHER GASTRIC ULCER HEMORRHAGE OR PERFORATION PRESENT: ICD-10-CM

## 2025-06-09 DIAGNOSIS — N18.4 STAGE 4 CHRONIC KIDNEY DISEASE (MULTI): ICD-10-CM

## 2025-06-09 DIAGNOSIS — D63.1 ANEMIA DUE TO STAGE 3B CHRONIC KIDNEY DISEASE: ICD-10-CM

## 2025-06-09 LAB
ABO GROUP (TYPE) IN BLOOD: NORMAL
ANTIBODY SCREEN: NORMAL
BASOPHILS # BLD AUTO: 0 X10*3/UL (ref 0–0.1)
BASOPHILS NFR BLD AUTO: 0 %
BURR CELLS BLD QL SMEAR: NORMAL
EOSINOPHIL # BLD AUTO: 0.04 X10*3/UL (ref 0–0.4)
EOSINOPHIL NFR BLD AUTO: 2.8 %
ERYTHROCYTE [DISTWIDTH] IN BLOOD BY AUTOMATED COUNT: 22.9 % (ref 11.5–14.5)
HCT VFR BLD AUTO: 24 % (ref 41–52)
HGB BLD-MCNC: 7.7 G/DL (ref 13.5–17.5)
HYPOCHROMIA BLD QL SMEAR: NORMAL
IMM GRANULOCYTES # BLD AUTO: 0.01 X10*3/UL (ref 0–0.5)
IMM GRANULOCYTES NFR BLD AUTO: 0.7 % (ref 0–0.9)
LYMPHOCYTES # BLD AUTO: 0.86 X10*3/UL (ref 0.8–3)
LYMPHOCYTES NFR BLD AUTO: 60.6 %
MCH RBC QN AUTO: 32.8 PG (ref 26–34)
MCHC RBC AUTO-ENTMCNC: 32.1 G/DL (ref 32–36)
MCV RBC AUTO: 102 FL (ref 80–100)
MONOCYTES # BLD AUTO: 0.1 X10*3/UL (ref 0.05–0.8)
MONOCYTES NFR BLD AUTO: 7 %
NEUTROPHILS # BLD AUTO: 0.41 X10*3/UL (ref 1.6–5.5)
NEUTROPHILS NFR BLD AUTO: 28.9 %
NRBC BLD-RTO: 0 /100 WBCS (ref 0–0)
OVALOCYTES BLD QL SMEAR: NORMAL
PLATELET # BLD AUTO: 57 X10*3/UL (ref 150–450)
RBC # BLD AUTO: 2.35 X10*6/UL (ref 4.5–5.9)
RBC MORPH BLD: NORMAL
RH FACTOR (ANTIGEN D): NORMAL
SCHISTOCYTES BLD QL SMEAR: NORMAL
WBC # BLD AUTO: 1.4 X10*3/UL (ref 4.4–11.3)

## 2025-06-09 PROCEDURE — 86901 BLOOD TYPING SEROLOGIC RH(D): CPT

## 2025-06-09 PROCEDURE — 99214 OFFICE O/P EST MOD 30 MIN: CPT

## 2025-06-09 PROCEDURE — 1126F AMNT PAIN NOTED NONE PRSNT: CPT

## 2025-06-09 PROCEDURE — 96372 THER/PROPH/DIAG INJ SC/IM: CPT

## 2025-06-09 PROCEDURE — 3074F SYST BP LT 130 MM HG: CPT

## 2025-06-09 PROCEDURE — 2500000004 HC RX 250 GENERAL PHARMACY W/ HCPCS (ALT 636 FOR OP/ED): Mod: JZ,EC

## 2025-06-09 PROCEDURE — 3078F DIAST BP <80 MM HG: CPT

## 2025-06-09 PROCEDURE — 85025 COMPLETE CBC W/AUTO DIFF WBC: CPT

## 2025-06-09 PROCEDURE — 1159F MED LIST DOCD IN RCRD: CPT

## 2025-06-09 PROCEDURE — 86923 COMPATIBILITY TEST ELECTRIC: CPT

## 2025-06-09 PROCEDURE — 36415 COLL VENOUS BLD VENIPUNCTURE: CPT

## 2025-06-09 RX ORDER — FAMOTIDINE 10 MG/ML
20 INJECTION, SOLUTION INTRAVENOUS ONCE AS NEEDED
Status: CANCELLED | OUTPATIENT
Start: 2025-06-09

## 2025-06-09 RX ORDER — DIPHENHYDRAMINE HYDROCHLORIDE 50 MG/ML
50 INJECTION, SOLUTION INTRAMUSCULAR; INTRAVENOUS AS NEEDED
OUTPATIENT
Start: 2025-06-10

## 2025-06-09 RX ORDER — PANTOPRAZOLE SODIUM 40 MG/1
40 TABLET, DELAYED RELEASE ORAL 2 TIMES DAILY
Qty: 180 TABLET | Refills: 3 | Status: SHIPPED | OUTPATIENT
Start: 2025-06-09

## 2025-06-09 RX ORDER — ALBUTEROL SULFATE 0.83 MG/ML
3 SOLUTION RESPIRATORY (INHALATION) AS NEEDED
Status: CANCELLED | OUTPATIENT
Start: 2025-06-09

## 2025-06-09 RX ORDER — DIPHENHYDRAMINE HYDROCHLORIDE 50 MG/ML
50 INJECTION, SOLUTION INTRAMUSCULAR; INTRAVENOUS AS NEEDED
Status: CANCELLED | OUTPATIENT
Start: 2025-06-09

## 2025-06-09 RX ORDER — EPINEPHRINE 0.3 MG/.3ML
0.3 INJECTION SUBCUTANEOUS EVERY 5 MIN PRN
OUTPATIENT
Start: 2025-06-10

## 2025-06-09 RX ORDER — FAMOTIDINE 10 MG/ML
20 INJECTION, SOLUTION INTRAVENOUS ONCE AS NEEDED
OUTPATIENT
Start: 2025-06-10

## 2025-06-09 RX ORDER — EPINEPHRINE 0.3 MG/.3ML
0.3 INJECTION SUBCUTANEOUS EVERY 5 MIN PRN
Status: CANCELLED | OUTPATIENT
Start: 2025-06-09

## 2025-06-09 RX ORDER — ALBUTEROL SULFATE 0.83 MG/ML
3 SOLUTION RESPIRATORY (INHALATION) AS NEEDED
OUTPATIENT
Start: 2025-06-10

## 2025-06-09 RX ADMIN — DARBEPOETIN ALFA 500 MCG: 500 INJECTION, SOLUTION INTRAVENOUS; SUBCUTANEOUS at 10:38

## 2025-06-09 ASSESSMENT — PAIN SCALES - GENERAL: PAINLEVEL_OUTOF10: 0-NO PAIN

## 2025-06-09 NOTE — PROGRESS NOTES
Stat CBC drawn on arrival  Pt had epoetin injection today  6/23 1100 stat lab and epoetin  7/7 930 MD visit - Lulu to draw labs  1030 epoetin injection after his OV  Pt set for 1 unit of Blood 6/10 9am  Reviewed AVS with patient- patient verbalizes understanding

## 2025-06-09 NOTE — PROGRESS NOTES
Patient ID: Zack Medrano is a 89 y.o. male.    Subjective    HPI  HPI     Chief Complaint          Chief Complaint   Patient presents with    Anemia       Stage 4 chronic kidney disease             History of the Present Illness  10/9/2019.  Visit with Dr. Mickey Nguyen for chronic kidney disease and hypertension.  Chief complaint was fatigue and need to lose weight     11/12/2019.  The patient has had long-term history of microcytic anemia going back to at least 2019 at which time his white count was 5.2 hemoglobin was 13.8 hematocrit 40.5 and a platelet count of 157,000.  His MCV was 105 MCHC was 34.  White blood cell differential count showed 66% polys, 17 lymphs, 11 monos and 4 eosinophils.     12/11/2019.  Seen by primary care for probable shingles treated with Valtrex with chief complaint of waxing and waning left flank pain radiating up under his ribs, somewhat responsive to exercise..  He had a history of pulmonary embolus and was on Eliquis long-term.     2/24/2020.  Seen by Dr. Howell for elevated PSA, BPH and erectile dysfunction.  PSA in 2017 was 2.94, in 2018 it was 2.74 and in 2020 it was 4.03.  We discussed biopsy and the patient wanted to have this followed and not biopsy.     3/9/2020.  Follow-up with NATALY Pulido primary care.  Referred to Dr. Bahena for colonoscopy and CT scan of the chest was ordered for follow-up of questionable lingular nodule.     3/11/2020.  CT scan of the chest showed a stable 6 mm nodular lesion near the fissure in the lingula.     3/13/2020.  Seen by Dr. Bahena who noted that the patient had had diarrhea off-and-on since cholecystectomy and had intermittent left-sided pain.  He had a history of colon polyps.     4/22/2015.  Colonoscopy showed that he had tubular adenoma     5/13/2020.  Seen by Dr. Migdalia New in follow-up.  She noted that he had an abnormal CBC with macrocytic indices with a normal B12 level.  The patient denies alcohol use.  Hemoglobin A1c  was 6.2.  BUN was 33 creatinine was 1.74.     8/31/2020.  Follow-up with Dr. Howell.  PSA had come down from 4.03-3.77     1/4/2021 through 1/12/2021.  Admitted to the hospital having been brought to the ED by squad after he fell forward on his toilet and was unable to get up.  He denied hitting his head or losing consciousness.  He blamed it on having back pain and shoulder pain for several weeks.  He was febrile with a temperature of 100.9 and hypoxic with pulse ox of 86% on room air.  Respirations were 24.  He was positive for COVID and was treated with intravenous antibiotics and remdesivir, increased supplemental oxygen.  He was able to be discharged to rehabilitation.     Repeat CBC showed white count 4.3 with a hemoglobin 12.9 hematocrit 38.8 with an MCV of 102 and a platelet count of 173,000.  White blood cell differential count showed minimal absolute lymphocytopenia at 0.6 with the lower limits of normal being 0.8.     3/1/2021.  Follow-up with Dr. Howell.  PSA was 9.9 in February.  Decided to have prostate biopsy done on 3/22/2021.  Results showed BPH.     10/6/2021.  Follow-up with Dr. Howell.  PSA in September was 2.77     4/6/2022.  CBC showed white count 4.3 with hemoglobin 11.7 hematocrit 34.4 with an MCV of 107.  Platelet count was 153,000.  White blood cell differential count was essentially normal.     5/31/2022.  CBC showed white count 3.6 with hemoglobin 11.6 hematocrit 34.4 and platelet count 117,000 with a normal white blood cell differential.     6/27/2022.  Referred to Dr. Dash because of pancytopenia by NATALY Gray.  CBC showed a white count of 3.9 with hemoglobin 12.3 hematocrit 36.1 and platelet count 129,000 with a normal differential.  PSA was 3.26.     The patient said that he felt well except for having increased fatigue.  He continued on long-term anticoagulation.  He was able to carry out his ADLs.  He did complain of easy bruising.  He said that he had worked at [employer] for 12  years and also worked in [employer].  Dr. Dash felt that there was a large component secondary to his chronic kidney disease.  There was no evidence of nutritional deficiency.  He checked him for hemolysis and monoclonal gammopathy and ordered an ultrasound for hepatosplenomegaly and testing for hepatitis, HIV and CHRISTINA.  He said he would rather observe the patient rather than pursuing a bone marrow biopsy.     7/27/2022.  Repeat lab data showed white count 3.9 with hemoglobin 12.3 hematocrit 36.1 and platelet count 129,000.  BUN was 36 creatinine was 1.78.  He had mild elevation of AST.  Retake count was 2.3%.  Flow cytometry showed a small clonal B-cell population.  Hepatitis B was nonreactive.  Hep C testing was negative.  Serum protein electrophoresis was normal.  Haptoglobin was normal.  LDH was normal.  He said that the possibility of MDS could not be ruled out.  Ultrasound showed fatty liver but no splenomegaly.  He plan for EPO level and NGS for myeloid mutation.     9/1/2022.  Started on allopurinol for hyperuricemia.     10/12/2022.  Seen by Dr. Dr. Dietz.  Patient was referred to dermatology for several skin lesions.  He was referred to neurology because of his tremor that was not improving.  She increased his glimepiride.     10/17/2022.  CBC showed white count 3.0 with hemoglobin 10.5 hematocrit 31.3 and a platelet count of 106,000 MCV was 111.     10/26/2022.  Follow-up with Dr. 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  He was suspicious for MDS.  He referred him for a bone marrow biopsy.     12/1/2022.  Follow-up with Dr. Dash.  Bone marrow biopsy showed low-grade MDS.  R IPSS score was low or very low depending on a karyotype which was still pending.  We discussed initiating erythropoietin with Aranesp but the patient deferred and the decision was made to monitor him.     1/11/2023.  CBC showed white count 3.4 with hemoglobin 10.3 hematocrit 32.2  and a platelet count of 137,000.  MCV was 115.  White blood cell differential count was normal.     1/13/2023.  Follow-up with Dr. Dash.  The patient did say that he had following getting out of the shower but did not sustain any injuries.  He had been started by Dr. Nguyen on insulin for his diabetes which was helpful.  Patient continued to be active inside his house.  He remains on Eliquis.  BR-IPSS score is very low.  Counts remain stable.     3/31/2023.  CBC showed a white count of 2.6 with hemoglobin 8.7 hematocrit 27.1 and a platelet count 97,000.  MCV was 115.  White blood cell differential count showed that he had an absolute neutrophil count of 1.36.     4/10/2023.  CBC showed a white count of 2.5 with a hemoglobin 9.0 hematocrit of 27.7 with a platelet count of 92,000.  MCV was 114.     4/17/2023.  Follow-up with NATALY Gray.  Even though his hemoglobin had dropped to 9 the patient did not want to start his erythropoietin injections.     5/11/2023.  CBC showed white count of 2.5 with a hemoglobin of 7.6 hematocrit 23.8 and a platelet count of 101,000.  Absolute neutrophil count was 1.17.     5/15/2023 through 5/19/2023.  Admitted to the hospital with anemia and generalized weakness black stools dizziness with a hemoglobin of 6.7.  His BUN was 51 creatinine was 2.08.  CT scan of the abdomen showed no acute process.  There were findings consistent with atypical healing of the previous rib fracture with question Paget's disease although it was nonspecific.  He was transfused 2 units packed red blood cells.  EGD found 3 erosions in the cardia.  He was prescribed Carafate.     5/24/2023.  Follow-up with Dr. Dr. Dietz after discharge.  CBC showed white count 1.9 with hemoglobin 8.6 hematocrit 28.3 and a platelet count of 140,000.  Absolute neutrophil count was 1.06.  Patient did not tolerate Carafate and stopped it.  He was prescribed oral iron twice a day.  He had not been prescribed a PPI which was  "then started.     6/1/2023.  CBC showed white count of 1.9 with hemoglobin 7.7 hematocrit 24.8 and a platelet count of 87,000.     6/13/2023.  CBC showed a white count of 1.9 with hemoglobin of 8.7 hematocrit 28.1 and platelet count of 89,000.     6/23/2023.  Followed up with Dr. Dr. Dietz who reported that he was hospitalized again in [place] for GI bleed.  At that time his Eliquis was discontinued and he was changed to Protonix and taken off his baby aspirin.  Once again he was started on Carafate but did not tolerate it.     7/15/2023.  CBC showed white count of 2.4 with hemoglobin 9.2 hematocrit 27.3 and a platelet count of 110,000.  Absolute neutrophil count was 1.36.     7/17/2023.  Follow-up with Malorie Shaffer.  The patient had recently had a colonoscopy and had \"cauterizations\" he noted that he had dyspnea with exertion but no resting shortness of breath.  He was on prednisone at that time.  His sugars were in the 300s.  He was on insulin.  His neuropathy has not changed and he continues to have tremors.  Current level was found to be saturated.  Once again the patient was reluctant to start NING.     8/14/2023.  Follow-up with Malorie Shaffer.  CBC showed white count 1.8 with a hemoglobin 8.9 hematocrit 26.9 and a platelet count of 100,000.  Absolute neutrophil count was 0.96.     9/6/2023.  CBC showed a white count of 1.8 with hemoglobin 9.5 hematocrit 28.8 and a platelet count of 85,000 with an absolute neutrophil count of 0.99.  9/13/2023.  Follow-up with Dr. Howell.  PSA has gone down to 1.87.     10/9/2023.  Follow-up with Malorie Shaffer who noted the patient remained on oxygen and CPAP.  He says that he is awakening in the middle of the night around 3 to 4:00 in the morning.  Sugars have been in the 300s.  Neuropathy was unchanged.  He had been started on erythropoietin every 3 weeks     11/21/2023.  Follow-up with Malorie Shaffer.  No improvement in his hemoglobin.  White count had improved to 2.1 platelets were " baseline.  He had been placed on 40 mg of prednisone and continued on erythropoietin 300 mcg every 3 weeks.     1/2/2024.  Follow-up with Malorie Shaffer.  No improvement in hemoglobin.  Symptomatically he was doing well.  Plan was to increase frequency of his darbepoetin to every 2 weeks.     2/13/2024.  Follow-up with Malorie Shaffer.  Now on erythropoietin injections every 2 weeks.  There was a discussion concerning repeat bone marrow biopsy with counts stayed low.     3/12/2024.  Follow-up with Malorie Shaffer.  No improvement in his hemoglobin.  Patient was agreeable to repeat bone marrow biopsy.  She also ordered a CAT scan of the chest abdomen and pelvis without contrast.     3/22/2024.  CAT scan showed severe coronary artery calcifications.  There is evidence of bronchiectasis in the lower lobes increased compared to previous CT scan from 2020.     Predominantly groundglass opacities with bibasilar prominence.  There is some subpleural sparing of the left upper lobe.  There is a 3 mm nodule in the right upper lobe which was unchanged for several years.  Multilevel anterior bridging osteophytes were noted consistent with DISH.  There is a small fat-containing periumbilical hernia and bilateral inguinal hernias.  Spleen size is at the upper limits of normal being 12.7 cm in length slightly increased when compared to study from 2019.  Cysts were seen in the kidneys with diffuse cortical atrophy and a 4 mm hyperdense focus in the interpolar region of the right kidney possibly a stone.  Prostatism was noted for gland measuring 6.2 cm.     3/28/2024.  Seen by Dr. Graham for shortness of breath.  He felt the patient had possible chronic interstitial lung disease versus periodic aspiration, hypoxia dyspnea on exertion.  Further tests were ordered including high-resolution CT scan, complete pulmonary function testing, simple pulmonary stress test and follow-up.     4/9/2024.  Follow-up with Malorie Shaffer.  Erythropoietin  injections continued every 2 weeks which the patient was tolerating well.     4/16/2024.  Bone marrow biopsy was performed showing a hypercellular bone marrow at 50% with dyserythropoiesis.  There are 1% blasts consistent with persistent myeloid neoplasm.  Flow cytometry showed a small clonal CD5 negative, CD10 negative B-cell population and a polytypic background.  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     There was a very small abnormal T-cell population that could be incidental.  Next generation sequencing showed 2 U2AF 1 variants, 1 of which was negative.  The other had been previously seen.     The marrow also showed dyserythropoiesis with bilobate forms and forms with irregular nuclear contours as well as blebbing.     4/23/2024.  CBC showed white count 1.5 with a hemoglobin of 8.3 hematocrit 25.9 and platelet count of 71,000 with 37% neutrophils, 53 lymphocytes, 4 monos, 4 eosinophils with an absolute neutrophil count of 0.57.     4/24/2024.  Return visit with Dr. Graham.  He noted that the high-resolution CT scan showed no significant change from the previous CT scan the month before.  Pulmonary function testing showed no response to bronchodilator and there was mild restrictive change with some suggestion of airway inflammation.  The patient had difficulty doing the test because of persistent coughing.  6-minute walk test showed that the patient was able to maintain saturations at 93% with 2 L of oxygen per minute continuously.  The test was terminated because of dyspnea.  With his hypoxia on room air it was recommended that he be on oxygen continuously to keep his saturation greater than or equal to 92%.  He continued on oxygen with his CPAP at 3 L at nighttime.     5/10/2024.  This is my first visit with Mr. Medrano and his family.  We went over some of the  results of the bone marrow biopsy.  I told him that I would have to wait until the chromosomal analysis returned.  I also note the possibility of low-grade lymphoma.  He may need to have an opinion with malignant hematology at [facility].  Stepdaughter who is here with him and his wife today is his advocate for maintaining his quality of life.  She is an employee at a local nursing facility and is concerned about many clients she has who seem to be getting treated aggressively.  We talked about myelodysplasia and the fact that he is not responding to erythropoietin.  He will continue on this treatment for now.  He is low counts were discussed and he knows that he is at higher risk for infection and bleeding.  He continues on oxygen supplementation continuously.  They will return in 2 weeks at which time laboratory tests will be performed.     He just had a lesion removed from the dorsum of his right hand at the base of his first and second fingers.  Stitches are still in place.  It seems to be healing well without any significant bleeding or infection.  He was told that it looked benign.     5/13/2024.  Follow-up with Dr. Aggie Danielle of cardiology who saw him in follow-up of his coronary artery disease with multiple stents having been placed, hypertension, dyslipidemia and previous pulmonary embolus related to COVID-19.  She had stopped his Eliquis.  She noted his myelodysplasia.  She noted that at some point his aspirin had been stopped but given his history of stents this was restarted and he tolerated it well.  She said that his blood pressure was well-controlled.  She noted blood sugars were between 150-2 50.  She noted the patient was supposed to be using oxygen with exertion but the patient was not sure what rate it was supposed to be.  She says that she would not stop his aspirin unless his platelets were less than 50,000 with complications of bleeding.  She said it was very important to continue aspirin given a  15% chance of in-stent thrombosis with aspirin cessation.  She increased her furosemide to 80 mg twice a day because of bilateral lower extremity edema and spironolactone to 25 mg twice daily until symptoms improve and encouraged him to cut back on his salt intake.  She was see him again in about 9 months.     5/14/2024.  Follow-up with Dr. Santamaria of ophthalmology who started him on Naphcon-A drops 1 drop 3 times a day in both eyes for excessive tearing     5/15/2024.  Followed up with Dr. Dimas Renteria of podiatry who noted decreased pedal pulses 2 out of 4.  He had footcare.     5/24/2024.  Follow-up with Dr. Graham.  Review of his x-rays showed no change.  Physical examination showed decreased breath sounds but no inspiratory crackles that were previously heard.  He felt that the interstitial lung disease was stable and that his hypoxemia was improved on oxygen.  He plan to repeat CT scan in September.     5/24/2024.  He is here to follow-up on his bone marrow results.  I told him that the chromosomal analysis would not be forthcoming before because the specimen failed to grow in culture.  He continues to display a low-grade MDS with blast count of only 2%.  We reviewed the fact that he is recommended to have at least 6 months of his erythropoietin before we decide whether or not it is a failure.  He is agreeable to this.  We will increase his dose of darbepoetin.  He knows to be on the look out for any signs of infection or bleeding and to report this if it happens.  We reviewed his most recent CBC.  We will see him again in 2 weeks with his next injection.     5/30/2024.  Follow-up with Dr. Dr. Dietz for his Medicare wellness visit.  His magnesium dose was changed to twice daily indefinitely.     6/7/2024.  He is here today for his darbepoetin injection.  We have increased his dose to 500 mcg every 2 weeks.  Will see if this has any effect.  Laboratory dated today shows that his sugar is 290 and his  sodium is 134.  BUN is 46 and creatinine is 2.31.  CBC shows white count 1.5 with a hemoglobin 8.1 hematocrit 24.7 and a platelet count of 71,000 which is very much like his most recent CBC.  His absolute neutrophil count is 0.57.     I talked to him about his current status which he feels is fairly stable.  He is off oxygen.  He has had no bleeding and no signs of infection.  He understands that we have increased his dose of darbepoetin and we will watch his counts.  He will report any side effects.     He has continued to receive his darbepoetin injections.     7/15/2024.  Emergency department visit [facility].  He developed a rash on both lower extremities which started a week ago which is an erythematous papular rash.  He denied any pain or itching.  He did not have any vesicle formation.  It is bilateral.  He has had a shingles injection.  He was told that he had disseminated zoster and was started on valacyclovir a gram twice daily for 7 days.     7/19/2024.  Seen by DrMayo Dietz who said that the rash has not gotten any worse and might be a little bit better.  She noted that there were no vesicles no open areas no areas of secondary infection.     7/19/2024.  He is here to receive his erythropoietin injection and to have a blood count.  I told him it is not likely that this is disseminated zoster because it is nowhere else on his body but only on his lower extremities.  This could be stasis dermatitis.  I do not see any signs of vesicle formation and there never were any.  It is mostly on the anterior shins.  It is all below the knee.     CBC today shows white count 1.4 with hemoglobin 8 hematocrit 25.4 platelet count 67,000 with 37 neutrophils 53 lymphocytes 5 monos 2 eosinophils.  He is serum chemistry showed a sugar of 285 BUN of 49 creatinine of 2.67.  We told him he needed to keep his hydration status high.  He will continue with his injections today.  Blood pressure today is 153/64.     He  has had no bleeding and no infection.  He has had no chest pain or pressure.  He is able to participate in his usual activities including mowing the yard.     He has continued to receive his darbepoetin and has followed up with podiatry for nail and callus care.     8/30/2024.  He is here in routine follow-up.  He says that he is doing well.  He has had no infections or bleeding.  He has had no mouth sores.  He has had continued swelling in his legs a little bit worse on the left than on the right.  Blood sugar was greater than 300 but he admits to dietary indiscretion.  Has had no chest pain.  His blood pressure was 149/63.  He says that his breathing is stable.  He is using his oxygen at home at night and when he travels that he does not have it on today.     His lab data has shown no sign of improvement so far with white count 1.3, hemoglobin 8.6 hematocrit 27.2 and a platelet count of 69,000.  White blood cell differential count shows 38% polys, 50 lymphs, 5 monos, 2 eosinophils.  We plan a 6-month trial of this dose of darbepoetin which will extend until November.  We will see him again in about a month.     9/4/2024.  CT scan of the chest showed mild streaky scarring in the lung bases bilaterally.  The previously seen groundglass opacities probably be minimally improved.  Calcified pleural plaques are seen without consolidation effusion or pneumothorax.  There is no adenopathy seen by CT size criteria.  Minimal hepatic steatosis was seen as well as calcified granuloma in the spleen and a somewhat atrophic pancreas.  DJD was seen in the spine.     9/5/2024.  Seen by Dr. Zambrano his nephrologist who ordered lab tests and felt that his fluctuating chemistries were related to diuretic therapy.  He noted myelodysplasia and that he was being followed for his anemia receiving NING.  He did not change any of his medications  And said that he would see him again in about 6 months     9/10/2024.  Follow-up by Dr. Graham  at which time he told the patient he could decrease his oxygen to 2 and he would repeat a CT scan in 6 months and see him after that.     9/27/2024.  He is here today in routine follow-up.  He will get his shot today.  Laboratory data shows no improvement.  We reviewed that we will follow him for 6 months on this treatment to see if there is any improvement.     Mr. Medrano says that he has had no significant bleeding.  I see some red spots on the soft palate.  His appetite has been good and he has gained weight of 1 pound since last time I saw him about a month ago.  He has had no other signs of infection or deterioration in his status.  He knows he is at risk for serious infection or bleeding.  We will continue on this treatment pathway.  He will see him again in a month.     Interval Note  10/3/2024.  Seen by Dr. 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  BUN was 47 and creatinine was 2.33 with an EGFR of 26.  She noted that he did have some mild edema of the left lower leg and was in the habit of wearing compression stockings.  He denied any increase in shortness of breath.  She said that she wanted to stop his spironolactone which was 25 mg and appeared that it was started for edema.  She said that he would check his blood pressure and weights for the next 2 weeks and then have repeat laboratory data.  Spironolactone was discontinued.     10/11/2024.  Laboratory data showed a BUN of 50 and a creatinine of 2.6, worse than before EGFR was 22.  Sugar was 389.  On 10/21/2024 she told him to stay off spironolactone and call if he had increasing swelling or shortness of breath.  He was to continue weighing himself.     10/25/2024.  Repeat laboratory data showed a BUN of 45 and a creatinine of 2.41 with a sugar of 374.  He was continued on erythropoietin injections.     11/8/2024.  BUN was 44 creatinine was 2.33 and sugar was 430.     11/19/2024.  [facility] emergency department  visit for tremor.  The patient reported that his tremor was worse at night.  He denied any history of restless leg syndrome.  He said that his upper extremity tremor was relieved by movement.  He has a history of previous movement disorder or any other neurologic symptoms.  At that time he said that he was taking 20 mg of Lasix twice a day.  Aldactone was still on his medication list.  BUN is 43 creatinine was 2.45 and sugar was 373.  CT of the head showed no evidence of bleeding there was mild to moderate brain parenchymal atrophy and white matter changes.  He was referred to neurology.     11/22/2024.  BUN was 15 creatinine was 2.60.  Sugar was 353.  At that time it was documented that he was taking 60 mg of Lasix daily and continuing Aldactone.  He did have bilateral pedal edema.  He was continued on  protein injections.  He weighed 229 pounds.     11/25/2024.  BUN was 51 and creatinine was 2.64 with a sugar of 285.     12/9/2024.  BUN was 44 creatinine was 2.47 with a sugar of 313.     12/10/2024.  [facility] ED visit for a rash on his lower extremities.  He was taking Lasix 40 mg twice a day and Aldactone daily.  Blood pressure was 121/52 and he weighed 230 pounds.  There was some mild erythema in his left lower extremity but was not weeping.  He was slightly warm to the touch.  It was said that this could be early cellulitis and a prescription for Keflex was written.     12/16/2024.  He followed up with Dr. Dr. Dietz who said that they could switch antibiotics to doxycycline and recommended a follow-up appointment.  He was seen on the following day and continued on doxycycline for mild to moderate erythema of the inner ankle lower leg he was also treated with gabapentin for his essential tremor.     12/23/2024.  BUN was 43 creatinine was 2.46 with a sugar of 313.  Seen in follow-up by Malorie Shaffer was continued on Depakote with stable counts.     12/30/2024.  Followed up with Dr. Yu  [facility] with leg redness.  The patient said that he thought he had the flu.  His weight was 226 pounds.  Blood pressure was 126/74.  Laboratory data 2 days prior to the visit showed that his BUN was 52 and his creatinine was 2.62.  Sugar was 151.     1/2/2025.  Followed up with Dr. Supriya Nguyen with a decrease in his swelling and improvement in his legs lesions.  She documented that he had had 2 falls without lightheadedness or dizziness.        1/6/2025.  Laboratory data showed a sugar of 313 BUN of 41 and creatinine of 2.56 with an EGFR of 23.     1/15/2025.  Seen by Dr. Patrick Haque of [facility] neurology because of his tremors.  He mentioned that gabapentin can cause swelling.  He was recently discontinued.  Symptoms appear to be improved.  He noted the patient had already been on primidone.  He had complained of chronic numbness of the left thumb and right thumb.  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  He was reluctant to see orthopedic reduction and risks were recommended as well as exercises to maintain strength.  He said that he could continue primidone 50 mg p.o. nightly for essential tremor and increase the dose if needed and to avoid caffeine.     1/20/2025.  BUN was 47 creatinine was 2.70 with an EGFR of 22.  Sugar was 142.     2/3/2025.  Laboratory data showed a BUN of 46 creatinine 2.42 with a sugar of 218.  Follow-up with Malorie Shaffer at which time the patient said that he wanted to stay on erythropoietin.  Repeat bone marrow was discussed with the patient was reluctant to do that or to see Dr. Hess for second opinion.     2/17/2025.  Laboratory data showed a BUN of 49 creatinine 2.53 with a sugar of 300.     3/3/2025.  BUN was 57 creatinine was 2.86 with a sugar of 284.  Followed up with Malorie Shaffer still not wanting to get another opinion.  Discussion was  around supportive care and making sure that he understood the dangers of cytopenias.     3/4/2025.  Follow-up with Dr. Dr. Dietz.  No changes made in his medications.     3/7/2025.  CT scan of the chest without contrast showed moderate basilar and subpleural predominant fibrosing interstitial lung disease with architectural distortion in the absence of honeycombing which have been waxing and waning the degree of inflammatory change had decreased over the past year.  Densely calcified coronary arteries were seen and a new right retroareolar nodular appearing soft tissue measuring 1.6 cm and could represent asymmetric clinically mass via but this needed to have follow-up.     3/14/2025.  Followed up with Dr. Graham.  He noted that pulse ox on room air was 90% and on repeat was 96.     3/19/2025.  He is here today with his wife.  We wanted to make sure that he understood that his counts were not doing well and that he had potential problems with infections, bleeding and anemia that might cause him to have chest pains or increasing shortness of breath.  We talked about the possibility of transfusions if he became more symptomatic.  He verbalized understanding.  He is not interested in another opinion at this time.  He is not interested in being treated more aggressively at this time.     He is more interested in going through his medicines and making sure that he needed all of them.  We did identify that he is on carvedilol and metoprolol.  He is also on sucralfate 3 times a day and pantoprazole 40 mg twice a day.  He says that he is not experiencing any heartburn and has not for a while.  We went through his medication list and noted what it was for.  I encouraged him to talk to primary care about these issues.     He has an appointment to see his cardiologist in the upcoming weeks.  His blood pressure today 137/67.  His most recent BUN and creatinine were 57 and 2.86 which I am sure have something to do with his  state of hydration.  He currently says that he is taking 1-1/2 of a 40 mg Lasix tablet daily and continues to take Aldactone 25 mg daily.  He has prescriptions for both 25 and 50 mg of Aldactone.  He has an upcoming appointment with NATALY in about 2 weeks.     4/14/25:    Patient is in office for routine follow-up. Patient was directed to be seen in the emergency room 3/31/25 after reporting weakness/shakiness that lead to him falling. His hemoglobin at the time was  7 g/dL he received 2 units PRBC's. Upon discharge he was sent to SNF for a few days of occupational therapy.      He is ambulating with a walker to his appointment. He reports feeling steady on his feet, denies ay weakness today. He has continuous oxygen, however he is not using while in the office, denies any shortness of breath. Continuous oxygen 2-3L nc. today.      Denies any bleeding or bruising. Bilateral edema has improved since he started drinking more water. He remains to take lasix 40mg and spirolactone 50mg.  He denies any GI or Urinary issues.  Occasional productive cough, clear phlegm. He states he feel to bad today.      Home -240's- PCP recently sent prescribed insulin pens, which is much easier for him to administer. He has home health coming twice weekly.      5/12/25: Patient is in office today, he arrives wearing his oxygen, reports increased shortness of breath. Otherwise denies any new clinical concerns. He denies any abnormal bleeding- (gum bleeding, epistaxis, hematuria, hematochezia, or melanic stool). He remains to have edema in lower extremities, no open sores/ulcers, manages with spironolactone and lasix. Continues to work with home PT/OT weekly. Denies any recent falls.       Counts have dropped, Hemoglobin is 7.5, WBC 1.0, ANC 0.32, Lymphs 0.57, Plateletes 56,000.  Patient is planning to take a bus trip on Wednesday. Educated his once again on immunocompromised precautions, he verbalizes understanding.  Discussed  "bleeding risks and precautions. Due to his drop in hemoglobin, and given his increased shortness of breath, discussed PRBC transfusion, patient was agreeable at this time, will order/schedule accordingly.        6/9/25: Patient is in office today, He is wearing his oxygen today. Remains to have ROMAN, mainly with stairs, but at times just walking can make his breathing worse. Extreme weakness and fatigue. States, \"no energy to get up and go\". He is wearing a mask, when in public places, denies any recent illness, fevers or night sweats. Denies any abnormal bleeding, bruises easily. Appetite is decent. No open sores on legs/feet.  Remains to have a bruise on his left leg that is not gong away.       Comorbidities  GERD  Arthritis, DJD  BPH  Coronary artery disease with history of myocardial, status post stent infarction  History of dumping syndrome  Hyperlipidemia  History of skin cancer  Obesity  Obstructive sleep apnea on CPAP  Peripheral arterial disease  CKD 3  Diabetes with nephropathy  History of cholecystectomy and hernia repair  Essential tremor  Hypertension   Numbness in his fingers felt to be related to cervical disc disease.     Monitoring Parameters  Histories, physical examinations and CBCs with occasional bone marrow biopsies.     Review of Systems - Oncology   Review of Systems   Constitutional:  Positive for fatigue. Negative for appetite change and unexpected weight change.   HENT:  Negative for dental problem, mouth sores, nosebleeds and trouble swallowing.    Eyes:  Negative for visual disturbance.   Respiratory:  Positive for shortness of breath (with activity). Negative for cough.    Cardiovascular:  Positive for leg swelling.   Gastrointestinal:  Negative for abdominal pain, constipation, diarrhea, nausea and vomiting.   Endocrine: Negative for polyuria.   Genitourinary:  Negative for dysuria, frequency and urgency.   Musculoskeletal:  Positive for arthralgias and myalgias.   Skin:  Positive for " pallor. Negative for rash.   Neurological:  Negative for weakness, light-headedness, numbness and headaches.   Hematological:  Bruises/bleeds easily.   Psychiatric/Behavioral:  Negative for self-injury, sleep disturbance and suicidal ideas. The patient is not nervous/anxious.          Past Medical History  Medical History           Past Medical History:   Diagnosis Date    Abnormal levels of other serum enzymes       Abnormal liver enzymes    Dependence on other enabling machines and devices       CPAP (continuous positive airway pressure) dependence    Encounter for examination of eyes and vision without abnormal findings       Diabetic eye exam    Localized edema       Bilateral leg edema    Old myocardial infarction       History of myocardial infarction    Other conditions influencing health status 01/08/2020     Uncontrolled diabetes mellitus type 2 without complications    Pain in unspecified ankle and joints of unspecified foot       Arthralgia of ankle    Personal history of other diseases of the digestive system       History of gastroesophageal reflux (GERD)    Personal history of other diseases of the digestive system       History of dumping syndrome    Personal history of other endocrine, nutritional and metabolic disease 11/12/2020     History of type 2 diabetes mellitus    Presence of coronary angioplasty implant and graft       History of coronary artery stent placement    Rash and other nonspecific skin eruption 12/11/2019     Rash    Unspecified abdominal hernia without obstruction or gangrene       Hernia    Unspecified abdominal pain 05/19/2021     Abdominal pain, left lateral            Surgical History  Surgical History             Past Surgical History:   Procedure Laterality Date    OTHER SURGICAL HISTORY   10/09/2019     Arterial stent placement    OTHER SURGICAL HISTORY   10/09/2019     Cholecystectomy    OTHER SURGICAL HISTORY   10/09/2019     Hernia repair            Social  "History  Social History   Social History              Tobacco Use    Smoking status: Never    Smokeless tobacco: Never   Vaping Use    Vaping status: Never Used   Substance Use Topics    Alcohol use: Never    Drug use: Never            Family History  family history includes Colon cancer in his mother and another family member.         Current Medications          Current Outpatient Medications   Medication Instructions    allopurinol (ZYLOPRIM) 100 mg, oral, Daily    amLODIPine (NORVASC) 5 mg, oral, Daily before breakfast    ascorbic acid (VITAMIN C) 250 mg    aspirin 81 mg, Daily    atorvastatin (LIPITOR) 40 mg, oral, Daily    blood-glucose sensor (FreeStyle Sindi 3 Sensor) device Use as directed    carvedilol (COREG) 6.25 mg, 2 times daily    ciprofloxacin (CIPRO) 500 mg, oral, Daily    cyanocobalamin, vitamin B-12, (Vitamin B-12) 1,000 mcg tablet extended release 1 tablet    FeroSuL tablet 1 tablet, Every other day    flash glucose scanning reader (FreeStyle Sindi 2 [place]) misc Use as instructed    flash glucose sensor kit (FreeStyle Sindi 2 Sensor) kit Use as directed    FreeStyle Sindi 2 Sensor kit 1 each, subcutaneous, As needed, Use as instructed    furosemide (Lasix) 40 mg tablet TAKE 1.5 TABLET BY MOUTH EVERY DAY    gabapentin (NEURONTIN) 300 mg, oral, 2 times daily    lancets 33 gauge misc 2 times daily    Lantus U-100 Insulin 60 Units, subcutaneous, Nightly    metoprolol tartrate (LOPRESSOR) 50 mg, oral, 2 times daily    multivit-minerals/folic acid (CENTRUM ADULTS ORAL) Take by mouth.    nitroglycerin (NITROSTAT) 0.4 mg, sublingual, Every 5 min PRN    NON FORMULARY Truetrack test In vitro strip; Use as directed to check blood sugar  2 times daily    pantoprazole (PROTONIX) 40 mg, oral, 2 times daily    pen needle, diabetic (Pen Needle) 32 gauge x 5/32\" needle 1 each, miscellaneous, Daily    primidone (MYSOLINE) 50 mg, oral, Nightly    spironolactone (Aldactone) 50 mg tablet TAKE 1 TABLET ( 50 MG) BY " MOUTH DAILY FOR 30 DAYS. Hold if serum potassium is more than 5.0    spironolactone (ALDACTONE) 25 mg, oral, Daily    sucralfate (CARAFATE) 1 g, oral, 3 times daily (morning, midday, late afternoon)    vitamins A,C,E-zinc-copper 2,148 mcg-113 mg-45 mg-17.4mg tablet Take by mouth. SUPER VIEW      Scheduled Medications             OARRS Review  I have personally reviewed the OARRS report for Zack Medrano. I have considered the risks of abuse, dependence, addiction and diversion.  He continues on gabapentin through primary care.    Objective    BSA: 2.21 meters squared  /67   Pulse 74   Temp 36 °C (96.8 °F)   Resp 20   Wt 102 kg (224 lb 3.2 oz)   SpO2 97% Comment: 2 l o2 per nasal cannula  BMI 34.09 kg/m²      Physical Exam  Vitals and nursing note reviewed.   Constitutional:       Appearance: Normal appearance. He is ill-appearing.   HENT:      Head: Normocephalic and atraumatic.      Nose: Nose normal.      Mouth/Throat:      Mouth: Mucous membranes are moist.      Pharynx: Oropharynx is clear.   Eyes:      General: No scleral icterus.     Extraocular Movements: Extraocular movements intact.      Conjunctiva/sclera: Conjunctivae normal.   Cardiovascular:      Rate and Rhythm: Normal rate and regular rhythm.      Pulses: Normal pulses.      Heart sounds: Normal heart sounds.   Pulmonary:      Effort: Pulmonary effort is normal.      Breath sounds: Decreased breath sounds present.   Abdominal:      Palpations: Abdomen is soft.   Musculoskeletal:         General: Swelling present. Normal range of motion.      Cervical back: Normal range of motion and neck supple.   Lymphadenopathy:      Cervical: No cervical adenopathy.   Skin:     General: Skin is warm and dry.      Coloration: Skin is pale.      Findings: Bruising present.   Neurological:      General: No focal deficit present.      Mental Status: He is alert and oriented to person, place, and time.      Motor: Weakness present.   Psychiatric:          Mood and Affect: Mood normal.         Behavior: Behavior normal.         Thought Content: Thought content normal.         Judgment: Judgment normal.         Performance Status:  Symptomatic; fully ambulatory      Assessment/Plan      1.  Pancytopenia.  The patient has unusual bone marrow findings.  Chromosomal analysis will not be done due to failure of the specimen to grow.  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  He also has components of myelodysplasia.  He is not responding to erythropoietin.  We will increase his dose to 500 mcg every other week.  He continues to be at high risk for bleeding and infection with a low ANC and low platelet count.  He has had previous GI bleeds.  He is currently back on his  aspirin per follow-up with Dr. Danielle for fear of clotting off his stents.  He is off Eliquis.  He will get his treatment today with darbepoetin and continue every 14 days.     He is not interested in the second marina at this time and does not want to be treated more aggressively.  We will continue to be supportive.       6/9/25: Reviewed recent labs- WBC 1.4, ANC 0.32, Hemoglobin 7.7, Platelets 57K-   -1 unit PRBC's ordered   - Immunocompromised Precautions discussed  -will proceed with darbepoetin  injection every 14 days, and support as needed   - Return to in 4 weeks          These include but are not limited to:  GERD  Arthritis, DJD  BPH  Coronary artery disease with history of myocardial, status post stent infarction  History of dumping syndrome  Hyperlipidemia  History of skin cancer  Obesity  Obstructive sleep apnea on CPAP  Peripheral arterial disease  CKD 3  Diabetes with nephropathy  History of cholecystectomy and hernia repair  Essential tremor  Hypertension   Numbness in his fingers felt to be related to cervical disc disease.     3.  Rash on his legs.  I think this looks more like stasis dermatitis than disseminated zoster.  If it were disseminated zoster he would have  it on other areas of his body.     Plan:   Discussed and reviewed medical history  Reviewed recent hemoglobin is 7.7, will proceed with Darbepoetin injection today  Continue with Darbepoetin injection every 14 days with labs prior   Return to see MD in 4 weeks       He knows that he should call in the interim should he have any change in his status, questions or concerns.               Malorie Shaffer, APRN-CNP

## 2025-06-09 NOTE — PATIENT INSTRUCTIONS
Discussed and reviewed medical history  Reviewed recent hemoglobin is 7.7, will proceed with Darbepoetin injection today  Continue with Darbepoetin injection every 14 days with labs prior   Return to see MD in 4 weeks

## 2025-06-10 ENCOUNTER — INFUSION (OUTPATIENT)
Dept: HEMATOLOGY/ONCOLOGY | Facility: CLINIC | Age: OVER 89
End: 2025-06-10
Payer: MEDICARE

## 2025-06-10 VITALS
HEART RATE: 63 BPM | WEIGHT: 224.21 LBS | RESPIRATION RATE: 16 BRPM | BODY MASS INDEX: 34.09 KG/M2 | TEMPERATURE: 97 F | DIASTOLIC BLOOD PRESSURE: 70 MMHG | OXYGEN SATURATION: 98 % | SYSTOLIC BLOOD PRESSURE: 120 MMHG

## 2025-06-10 DIAGNOSIS — N18.4 ANEMIA DUE TO STAGE 4 CHRONIC KIDNEY DISEASE: ICD-10-CM

## 2025-06-10 DIAGNOSIS — D46.9 MYELODYSPLASTIC SYNDROME, UNSPECIFIED (MULTI): ICD-10-CM

## 2025-06-10 DIAGNOSIS — D63.1 ANEMIA DUE TO STAGE 4 CHRONIC KIDNEY DISEASE: ICD-10-CM

## 2025-06-10 PROCEDURE — P9040 RBC LEUKOREDUCED IRRADIATED: HCPCS

## 2025-06-10 PROCEDURE — 36430 TRANSFUSION BLD/BLD COMPNT: CPT

## 2025-06-10 RX ORDER — HEPARIN SODIUM,PORCINE/PF 10 UNIT/ML
50 SYRINGE (ML) INTRAVENOUS AS NEEDED
OUTPATIENT
Start: 2025-06-10

## 2025-06-10 RX ORDER — EPINEPHRINE 0.3 MG/.3ML
0.3 INJECTION SUBCUTANEOUS EVERY 5 MIN PRN
OUTPATIENT
Start: 2025-06-10

## 2025-06-10 RX ORDER — FAMOTIDINE 10 MG/ML
20 INJECTION, SOLUTION INTRAVENOUS ONCE AS NEEDED
OUTPATIENT
Start: 2025-06-10

## 2025-06-10 RX ORDER — HEPARIN 100 UNIT/ML
500 SYRINGE INTRAVENOUS AS NEEDED
OUTPATIENT
Start: 2025-06-10

## 2025-06-10 RX ORDER — ALBUTEROL SULFATE 0.83 MG/ML
3 SOLUTION RESPIRATORY (INHALATION) AS NEEDED
OUTPATIENT
Start: 2025-06-10

## 2025-06-10 RX ORDER — DIPHENHYDRAMINE HYDROCHLORIDE 50 MG/ML
50 INJECTION, SOLUTION INTRAMUSCULAR; INTRAVENOUS AS NEEDED
OUTPATIENT
Start: 2025-06-10

## 2025-06-10 ASSESSMENT — PAIN SCALES - GENERAL: PAINLEVEL_OUTOF10: 0-NO PAIN

## 2025-06-23 ENCOUNTER — INFUSION (OUTPATIENT)
Dept: HEMATOLOGY/ONCOLOGY | Facility: CLINIC | Age: OVER 89
End: 2025-06-23
Payer: MEDICARE

## 2025-06-23 ENCOUNTER — APPOINTMENT (OUTPATIENT)
Dept: HEMATOLOGY/ONCOLOGY | Facility: CLINIC | Age: OVER 89
End: 2025-06-23
Payer: MEDICARE

## 2025-06-23 VITALS
WEIGHT: 223.11 LBS | SYSTOLIC BLOOD PRESSURE: 123 MMHG | RESPIRATION RATE: 18 BRPM | DIASTOLIC BLOOD PRESSURE: 69 MMHG | OXYGEN SATURATION: 92 % | HEART RATE: 81 BPM | TEMPERATURE: 97.7 F | BODY MASS INDEX: 33.92 KG/M2

## 2025-06-23 DIAGNOSIS — Z79.4 TYPE 2 DIABETES MELLITUS WITH STAGE 4 CHRONIC KIDNEY DISEASE, WITH LONG-TERM CURRENT USE OF INSULIN (MULTI): ICD-10-CM

## 2025-06-23 DIAGNOSIS — D63.1 ANEMIA DUE TO STAGE 4 CHRONIC KIDNEY DISEASE: ICD-10-CM

## 2025-06-23 DIAGNOSIS — E78.2 MIXED HYPERLIPIDEMIA: ICD-10-CM

## 2025-06-23 DIAGNOSIS — N18.4 ANEMIA DUE TO STAGE 4 CHRONIC KIDNEY DISEASE: ICD-10-CM

## 2025-06-23 DIAGNOSIS — D70.9 NEUTROPENIA, UNSPECIFIED TYPE: ICD-10-CM

## 2025-06-23 DIAGNOSIS — I10 PRIMARY HYPERTENSION: ICD-10-CM

## 2025-06-23 DIAGNOSIS — G25.0 TREMOR, ESSENTIAL: ICD-10-CM

## 2025-06-23 DIAGNOSIS — N18.32 ANEMIA DUE TO STAGE 3B CHRONIC KIDNEY DISEASE: ICD-10-CM

## 2025-06-23 DIAGNOSIS — D72.819 LEUKOPENIA, UNSPECIFIED TYPE: ICD-10-CM

## 2025-06-23 DIAGNOSIS — N18.4 STAGE 4 CHRONIC KIDNEY DISEASE (MULTI): ICD-10-CM

## 2025-06-23 DIAGNOSIS — D63.1 ANEMIA DUE TO STAGE 3B CHRONIC KIDNEY DISEASE: ICD-10-CM

## 2025-06-23 DIAGNOSIS — N18.4 TYPE 2 DIABETES MELLITUS WITH STAGE 4 CHRONIC KIDNEY DISEASE, WITH LONG-TERM CURRENT USE OF INSULIN (MULTI): ICD-10-CM

## 2025-06-23 DIAGNOSIS — E11.22 TYPE 2 DIABETES MELLITUS WITH DIABETIC CHRONIC KIDNEY DISEASE: ICD-10-CM

## 2025-06-23 DIAGNOSIS — E11.22 TYPE 2 DIABETES MELLITUS WITH STAGE 4 CHRONIC KIDNEY DISEASE, WITH LONG-TERM CURRENT USE OF INSULIN (MULTI): ICD-10-CM

## 2025-06-23 DIAGNOSIS — D61.818 OTHER PANCYTOPENIA (MULTI): ICD-10-CM

## 2025-06-23 DIAGNOSIS — I25.10 CORONARY ARTERY DISEASE INVOLVING NATIVE HEART, UNSPECIFIED VESSEL OR LESION TYPE, UNSPECIFIED WHETHER ANGINA PRESENT: ICD-10-CM

## 2025-06-23 DIAGNOSIS — D46.9 MYELODYSPLASTIC SYNDROME, UNSPECIFIED (MULTI): ICD-10-CM

## 2025-06-23 DIAGNOSIS — D69.6 THROMBOCYTOPENIA: ICD-10-CM

## 2025-06-23 LAB
BASO STIPL BLD QL SMEAR: PRESENT
BASOPHILS # BLD AUTO: 0 X10*3/UL (ref 0–0.1)
BASOPHILS NFR BLD AUTO: 0 %
DACRYOCYTES BLD QL SMEAR: NORMAL
EOSINOPHIL # BLD AUTO: 0.04 X10*3/UL (ref 0–0.4)
EOSINOPHIL NFR BLD AUTO: 3 %
ERYTHROCYTE [DISTWIDTH] IN BLOOD BY AUTOMATED COUNT: 22.3 % (ref 11.5–14.5)
HCT VFR BLD AUTO: 24.7 % (ref 41–52)
HGB BLD-MCNC: 8.1 G/DL (ref 13.5–17.5)
IMM GRANULOCYTES # BLD AUTO: 0.01 X10*3/UL (ref 0–0.5)
IMM GRANULOCYTES NFR BLD AUTO: 0.7 % (ref 0–0.9)
LYMPHOCYTES # BLD AUTO: 0.66 X10*3/UL (ref 0.8–3)
LYMPHOCYTES NFR BLD AUTO: 49.3 %
MCH RBC QN AUTO: 32.9 PG (ref 26–34)
MCHC RBC AUTO-ENTMCNC: 32.8 G/DL (ref 32–36)
MCV RBC AUTO: 100 FL (ref 80–100)
MONOCYTES # BLD AUTO: 0.06 X10*3/UL (ref 0.05–0.8)
MONOCYTES NFR BLD AUTO: 4.5 %
NEUTROPHILS # BLD AUTO: 0.57 X10*3/UL (ref 1.6–5.5)
NEUTROPHILS NFR BLD AUTO: 42.5 %
NRBC BLD-RTO: 0 /100 WBCS (ref 0–0)
OVALOCYTES BLD QL SMEAR: NORMAL
PLATELET # BLD AUTO: 56 X10*3/UL (ref 150–450)
RBC # BLD AUTO: 2.46 X10*6/UL (ref 4.5–5.9)
RBC MORPH BLD: NORMAL
SCHISTOCYTES BLD QL SMEAR: NORMAL
WBC # BLD AUTO: 1.3 X10*3/UL (ref 4.4–11.3)

## 2025-06-23 PROCEDURE — 36415 COLL VENOUS BLD VENIPUNCTURE: CPT

## 2025-06-23 PROCEDURE — 96372 THER/PROPH/DIAG INJ SC/IM: CPT

## 2025-06-23 PROCEDURE — 2500000004 HC RX 250 GENERAL PHARMACY W/ HCPCS (ALT 636 FOR OP/ED): Mod: JZ,EC

## 2025-06-23 PROCEDURE — 85025 COMPLETE CBC W/AUTO DIFF WBC: CPT

## 2025-06-23 RX ORDER — ALBUTEROL SULFATE 0.83 MG/ML
3 SOLUTION RESPIRATORY (INHALATION) AS NEEDED
OUTPATIENT
Start: 2025-06-24

## 2025-06-23 RX ORDER — DIPHENHYDRAMINE HYDROCHLORIDE 50 MG/ML
50 INJECTION, SOLUTION INTRAMUSCULAR; INTRAVENOUS AS NEEDED
OUTPATIENT
Start: 2025-06-24

## 2025-06-23 RX ORDER — FAMOTIDINE 10 MG/ML
20 INJECTION, SOLUTION INTRAVENOUS ONCE AS NEEDED
OUTPATIENT
Start: 2025-06-24

## 2025-06-23 RX ORDER — EPINEPHRINE 0.3 MG/.3ML
0.3 INJECTION SUBCUTANEOUS EVERY 5 MIN PRN
OUTPATIENT
Start: 2025-06-24

## 2025-06-23 RX ADMIN — DARBEPOETIN ALFA 500 MCG: 500 INJECTION, SOLUTION INTRAVENOUS; SUBCUTANEOUS at 11:47

## 2025-06-23 ASSESSMENT — PAIN SCALES - GENERAL: PAINLEVEL_OUTOF10: 0-NO PAIN

## 2025-06-26 DIAGNOSIS — E11.22 TYPE 2 DIABETES MELLITUS WITH STAGE 3B CHRONIC KIDNEY DISEASE, WITH LONG-TERM CURRENT USE OF INSULIN (MULTI): ICD-10-CM

## 2025-06-26 DIAGNOSIS — Z79.4 TYPE 2 DIABETES MELLITUS WITH STAGE 3B CHRONIC KIDNEY DISEASE, WITH LONG-TERM CURRENT USE OF INSULIN (MULTI): ICD-10-CM

## 2025-06-26 DIAGNOSIS — R60.0 PERIPHERAL EDEMA: Primary | ICD-10-CM

## 2025-06-26 DIAGNOSIS — N18.32 TYPE 2 DIABETES MELLITUS WITH STAGE 3B CHRONIC KIDNEY DISEASE, WITH LONG-TERM CURRENT USE OF INSULIN (MULTI): ICD-10-CM

## 2025-06-26 DIAGNOSIS — K25.9 GASTRIC ULCER, UNSPECIFIED CHRONICITY, UNSPECIFIED WHETHER GASTRIC ULCER HEMORRHAGE OR PERFORATION PRESENT: ICD-10-CM

## 2025-06-26 DIAGNOSIS — I25.10 CORONARY ARTERY DISEASE INVOLVING NATIVE HEART, UNSPECIFIED VESSEL OR LESION TYPE, UNSPECIFIED WHETHER ANGINA PRESENT: ICD-10-CM

## 2025-06-26 RX ORDER — CARVEDILOL 6.25 MG/1
6.25 TABLET ORAL 2 TIMES DAILY
Qty: 180 TABLET | Refills: 1 | Status: SHIPPED | OUTPATIENT
Start: 2025-06-26

## 2025-06-26 RX ORDER — PEN NEEDLE, DIABETIC 32GX 5/32"
NEEDLE, DISPOSABLE MISCELLANEOUS
Qty: 100 EACH | Refills: 3 | Status: SHIPPED | OUTPATIENT
Start: 2025-06-26

## 2025-06-26 RX ORDER — PEN NEEDLE, DIABETIC 30 GX3/16"
1 NEEDLE, DISPOSABLE MISCELLANEOUS DAILY
Qty: 90 EACH | Refills: 3 | OUTPATIENT
Start: 2025-06-26 | End: 2026-06-26

## 2025-06-26 RX ORDER — SUCRALFATE 1 G/1
1 TABLET ORAL
Qty: 270 TABLET | Refills: 1 | Status: SHIPPED | OUTPATIENT
Start: 2025-06-26

## 2025-06-26 RX ORDER — SPIRONOLACTONE 50 MG/1
25 TABLET, FILM COATED ORAL DAILY
Qty: 45 TABLET | Refills: 1 | Status: SHIPPED | OUTPATIENT
Start: 2025-06-26

## 2025-07-06 NOTE — PROGRESS NOTES
Patient ID:Zack Medrano is a 89 y.o. year old male patient with myelodysplasia    Referring Physician: NATALY Villa-CNP  [address]  [place] [id number]  Primary Care Provider: Fabiola Dietz MD    Chief Complaint  He is here today in follow-up      History of the Present Illness  10/9/2019.  Visit with Dr. Mickey Nguyen for chronic kidney disease and hypertension.  Chief complaint was fatigue and need to lose weight     11/12/2019.  The patient has had long-term history of microcytic anemia going back to at least 2019 at which time his white count was 5.2 hemoglobin was 13.8 hematocrit 40.5 and a platelet count of 157,000.  His MCV was 105 MCHC was 34.  White blood cell differential count showed 66% polys, 17 lymphs, 11 monos and 4 eosinophils.     12/11/2019.  Seen by primary care for probable shingles treated with Valtrex with chief complaint of waxing and waning left flank pain radiating up under his ribs, somewhat responsive to exercise..  He had a history of pulmonary embolus and was on Eliquis long-term.     2/24/2020.  Seen by Dr. Howell for elevated PSA, BPH and erectile dysfunction.  PSA in 2017 was 2.94, in 2018 it was 2.74 and in 2020 it was 4.03.  We discussed biopsy and the patient wanted to have this followed and not biopsy.     3/9/2020.  Follow-up with NATALY Pulido primary care.  Referred to Dr. Bahena for colonoscopy and CT scan of the chest was ordered for follow-up of questionable lingular nodule.     3/11/2020.  CT scan of the chest showed a stable 6 mm nodular lesion near the fissure in the lingula.     3/13/2020.  Seen by Dr. Bahena who noted that the patient had had diarrhea off-and-on since cholecystectomy and had intermittent left-sided pain.  He had a history of colon polyps.     4/22/2015.  Colonoscopy showed that he had tubular adenoma     5/13/2020.  Seen by Dr. Migdalia New in follow-up.  She noted that he had an abnormal CBC with macrocytic  indices with a normal B12 level.  The patient denies alcohol use.  Hemoglobin A1c was 6.2.  BUN was 33 creatinine was 1.74.     8/31/2020.  Follow-up with Dr. Howell.  PSA had come down from 4.03-3.77     1/4/2021 through 1/12/2021.  Admitted to the hospital having been brought to the ED by squad after he fell forward on his toilet and was unable to get up.  He denied hitting his head or losing consciousness.  He blamed it on having back pain and shoulder pain for several weeks.  He was febrile with a temperature of 100.9 and hypoxic with pulse ox of 86% on room air.  Respirations were 24.  He was positive for COVID and was treated with intravenous antibiotics and remdesivir, increased supplemental oxygen.  He was able to be discharged to rehabilitation.     Repeat CBC showed white count 4.3 with a hemoglobin 12.9 hematocrit 38.8 with an MCV of 102 and a platelet count of 173,000.  White blood cell differential count showed minimal absolute lymphocytopenia at 0.6 with the lower limits of normal being 0.8.     3/1/2021.  Follow-up with Dr. Howell.  PSA was 9.9 in February.  Decided to have prostate biopsy done on 3/22/2021.  Results showed BPH.     10/6/2021.  Follow-up with Dr. Howell.  PSA in September was 2.77     4/6/2022.  CBC showed white count 4.3 with hemoglobin 11.7 hematocrit 34.4 with an MCV of 107.  Platelet count was 153,000.  White blood cell differential count was essentially normal.     5/31/2022.  CBC showed white count 3.6 with hemoglobin 11.6 hematocrit 34.4 and platelet count 117,000 with a normal white blood cell differential.     6/27/2022.  Referred to Dr. Dash because of pancytopenia by NATALY Gray.  CBC showed a white count of 3.9 with hemoglobin 12.3 hematocrit 36.1 and platelet count 129,000 with a normal differential.  PSA was 3.26.     The patient said that he felt well except for having increased fatigue.  He continued on long-term anticoagulation.  He was able to carry out his ADLs.   He did complain of easy bruising.  He said that he had worked at [employer] for 12 years and also worked in making [employer].  Dr. Dash felt that there was a large component secondary to his chronic kidney disease.  There was no evidence of nutritional deficiency.  He checked him for hemolysis and monoclonal gammopathy and ordered an ultrasound for hepatosplenomegaly and testing for hepatitis, HIV and CHRISTINA.  He said he would rather observe the patient rather than pursuing a bone marrow biopsy.     7/27/2022.  Repeat lab data showed white count 3.9 with hemoglobin 12.3 hematocrit 36.1 and platelet count 129,000.  BUN was 36 creatinine was 1.78.  He had mild elevation of AST.  Retake count was 2.3%.  Flow cytometry showed a small clonal B-cell population.  Hepatitis B was nonreactive.  Hep C testing was negative.  Serum protein electrophoresis was normal.  Haptoglobin was normal.  LDH was normal.  He said that the possibility of MDS could not be ruled out.  Ultrasound showed fatty liver but no splenomegaly.  He plan for EPO level and NGS for myeloid mutation.     9/1/2022.  Started on allopurinol for hyperuricemia.     10/12/2022.  Seen by Dr. Dr. Dietz.  Patient was referred to dermatology for several skin lesions.  He was referred to neurology because of his tremor that was not improving.  She increased his glimepiride.     10/17/2022.  CBC showed white count 3.0 with hemoglobin 10.5 hematocrit 31.3 and a platelet count of 106,000 MCV was 111.     10/26/2022.  Follow-up with Dr. 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  He was suspicious for MDS.  He referred him for a bone marrow biopsy.     12/1/2022.  Follow-up with Dr. Dash.  Bone marrow biopsy showed low-grade MDS.  R IPSS score was low or very low depending on a karyotype which was still pending.  We discussed initiating erythropoietin with Aranesp but the patient deferred and the decision was made to monitor  him.     1/11/2023.  CBC showed white count 3.4 with hemoglobin 10.3 hematocrit 32.2 and a platelet count of 137,000.  MCV was 115.  White blood cell differential count was normal.     1/13/2023.  Follow-up with Dr. Dash.  The patient did say that he had following getting out of the shower but did not sustain any injuries.  He had been started by Dr. Nguyen on insulin for his diabetes which was helpful.  Patient continued to be active inside his house.  He remains on Eliquis.  BR-IPSS score is very low.  Counts remain stable.     3/31/2023.  CBC showed a white count of 2.6 with hemoglobin 8.7 hematocrit 27.1 and a platelet count 97,000.  MCV was 115.  White blood cell differential count showed that he had an absolute neutrophil count of 1.36.     4/10/2023.  CBC showed a white count of 2.5 with a hemoglobin 9.0 hematocrit of 27.7 with a platelet count of 92,000.  MCV was 114.     4/17/2023.  Follow-up with NATALY Gray.  Even though his hemoglobin had dropped to 9 the patient did not want to start his erythropoietin injections.     5/11/2023.  CBC showed white count of 2.5 with a hemoglobin of 7.6 hematocrit 23.8 and a platelet count of 101,000.  Absolute neutrophil count was 1.17.     5/15/2023 through 5/19/2023.  Admitted to the hospital with anemia and generalized weakness black stools dizziness with a hemoglobin of 6.7.  His BUN was 51 creatinine was 2.08.  CT scan of the abdomen showed no acute process.  There were findings consistent with atypical healing of the previous rib fracture with question Paget's disease although it was nonspecific.  He was transfused 2 units packed red blood cells.  EGD found 3 erosions in the cardia.  He was prescribed Carafate.     5/24/2023.  Follow-up with Dr. Dr. Dietz after discharge.  CBC showed white count 1.9 with hemoglobin 8.6 hematocrit 28.3 and a platelet count of 140,000.  Absolute neutrophil count was 1.06.  Patient did not tolerate Carafate and stopped it.   "He was prescribed oral iron twice a day.  He had not been prescribed a PPI which was then started.     6/1/2023.  CBC showed white count of 1.9 with hemoglobin 7.7 hematocrit 24.8 and a platelet count of 87,000.     6/13/2023.  CBC showed a white count of 1.9 with hemoglobin of 8.7 hematocrit 28.1 and platelet count of 89,000.     6/23/2023.  Followed up with DrMayo Dietz who reported that he was hospitalized again in [place] for GI bleed.  At that time his Eliquis was discontinued and he was changed to Protonix and taken off his baby aspirin.  Once again he was started on Carafate but did not tolerate it.     7/15/2023.  CBC showed white count of 2.4 with hemoglobin 9.2 hematocrit 27.3 and a platelet count of 110,000.  Absolute neutrophil count was 1.36.     7/17/2023.  Follow-up with Malorie Shaffer.  The patient had recently had a colonoscopy and had \"cauterizations\" he noted that he had dyspnea with exertion but no resting shortness of breath.  He was on prednisone at that time.  His sugars were in the 300s.  He was on insulin.  His neuropathy has not changed and he continues to have tremors.  Current level was found to be saturated.  Once again the patient was reluctant to start NING.     8/14/2023.  Follow-up with Malorie Shaffer.  CBC showed white count 1.8 with a hemoglobin 8.9 hematocrit 26.9 and a platelet count of 100,000.  Absolute neutrophil count was 0.96.     9/6/2023.  CBC showed a white count of 1.8 with hemoglobin 9.5 hematocrit 28.8 and a platelet count of 85,000 with an absolute neutrophil count of 0.99.  9/13/2023.  Follow-up with Dr. Howell.  PSA has gone down to 1.87.     10/9/2023.  Follow-up with Malorie Shaffer who noted the patient remained on oxygen and CPAP.  He says that he is awakening in the middle of the night around 3 to 4:00 in the morning.  Sugars have been in the 300s.  Neuropathy was unchanged.  He had been started on erythropoietin every 3 weeks     11/21/2023.  Follow-up with Malorie" Edmund.  No improvement in his hemoglobin.  White count had improved to 2.1 platelets were baseline.  He had been placed on 40 mg of prednisone and continued on erythropoietin 300 mcg every 3 weeks.     1/2/2024.  Follow-up with Malorie Shaffer.  No improvement in hemoglobin.  Symptomatically he was doing well.  Plan was to increase frequency of his darbepoetin to every 2 weeks.     2/13/2024.  Follow-up with Malorie Shaffer.  Now on erythropoietin injections every 2 weeks.  There was a discussion concerning repeat bone marrow biopsy with counts stayed low.     3/12/2024.  Follow-up with Malorie Shaffer.  No improvement in his hemoglobin.  Patient was agreeable to repeat bone marrow biopsy.  She also ordered a CAT scan of the chest abdomen and pelvis without contrast.     3/22/2024.  CAT scan showed severe coronary artery calcifications.  There is evidence of bronchiectasis in the lower lobes increased compared to previous CT scan from 2020.     Predominantly groundglass opacities with bibasilar prominence.  There is some subpleural sparing of the left upper lobe.  There is a 3 mm nodule in the right upper lobe which was unchanged for several years.  Multilevel anterior bridging osteophytes were noted consistent with DISH.  There is a small fat-containing periumbilical hernia and bilateral inguinal hernias.  Spleen size is at the upper limits of normal being 12.7 cm in length slightly increased when compared to study from 2019.  Cysts were seen in the kidneys with diffuse cortical atrophy and a 4 mm hyperdense focus in the interpolar region of the right kidney possibly a stone.  Prostatism was noted for gland measuring 6.2 cm.     3/28/2024.  Seen by Dr. Graham for shortness of breath.  He felt the patient had possible chronic interstitial lung disease versus periodic aspiration, hypoxia dyspnea on exertion.  Further tests were ordered including high-resolution CT scan, complete pulmonary function testing, simple pulmonary  stress test and follow-up.     4/9/2024.  Follow-up with Malorie Shaffer.  Erythropoietin injections continued every 2 weeks which the patient was tolerating well.     4/16/2024.  Bone marrow biopsy was performed showing a hypercellular bone marrow at 50% with dyserythropoiesis.  There are 1% blasts consistent with persistent myeloid neoplasm.  Flow cytometry showed a small clonal CD5 negative, CD10 negative B-cell population and a polytypic background.  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     There was a very small abnormal T-cell population that could be incidental.  Next generation sequencing showed 2 U2AF 1 variants, 1 of which was negative.  The other had been previously seen.     The marrow also showed dyserythropoiesis with bilobate forms and forms with irregular nuclear contours as well as blebbing.     4/23/2024.  CBC showed white count 1.5 with a hemoglobin of 8.3 hematocrit 25.9 and platelet count of 71,000 with 37% neutrophils, 53 lymphocytes, 4 monos, 4 eosinophils with an absolute neutrophil count of 0.57.     4/24/2024.  Return visit with Dr. Graham.  He noted that the high-resolution CT scan showed no significant change from the previous CT scan the month before.  Pulmonary function testing showed no response to bronchodilator and there was mild restrictive change with some suggestion of airway inflammation.  The patient had difficulty doing the test because of persistent coughing.  6-minute walk test showed that the patient was able to maintain saturations at 93% with 2 L of oxygen per minute continuously.  The test was terminated because of dyspnea.  With his hypoxia on room air it was recommended that he be on oxygen continuously to keep his saturation greater than or equal to 92%.  He continued on oxygen with his CPAP at 3 L at nighttime.      5/10/2024.  This is my first visit with Mr. Medrano and his family.  We went over some of the results of the bone marrow biopsy.  I told him that I would have to wait until the chromosomal analysis returned.  I also note the possibility of low-grade lymphoma.  He may need to have an opinion with malignant hematology at [facility].  Stepdaughter who is here with him and his wife today is his advocate for maintaining his quality of life.  She is an employee at a local nursing facility and is concerned about many clients she has who seem to be getting treated aggressively.  We talked about myelodysplasia and the fact that he is not responding to erythropoietin.  He will continue on this treatment for now.  He is low counts were discussed and he knows that he is at higher risk for infection and bleeding.  He continues on oxygen supplementation continuously.  They will return in 2 weeks at which time laboratory tests will be performed.     He just had a lesion removed from the dorsum of his right hand at the base of his first and second fingers.  Stitches are still in place.  It seems to be healing well without any significant bleeding or infection.  He was told that it looked benign.     5/13/2024.  Follow-up with Dr. Aggie Danielle of cardiology who saw him in follow-up of his coronary artery disease with multiple stents having been placed, hypertension, dyslipidemia and previous pulmonary embolus related to COVID-19.  She had stopped his Eliquis.  She noted his myelodysplasia.  She noted that at some point his aspirin had been stopped but given his history of stents this was restarted and he tolerated it well.  She said that his blood pressure was well-controlled.  She noted blood sugars were between 150-2 50.  She noted the patient was supposed to be using oxygen with exertion but the patient was not sure what rate it was supposed to be.  She says that she would not stop his aspirin unless his platelets were less than 50,000  with complications of bleeding.  She said it was very important to continue aspirin given a 15% chance of in-stent thrombosis with aspirin cessation.  She increased her furosemide to 80 mg twice a day because of bilateral lower extremity edema and spironolactone to 25 mg twice daily until symptoms improve and encouraged him to cut back on his salt intake.  She was see him again in about 9 months.     5/14/2024.  Follow-up with Dr. Santamaria of ophthalmology who started him on Naphcon-A drops 1 drop 3 times a day in both eyes for excessive tearing     5/15/2024.  Followed up with Dr. Dimas Renteria of podiatry who noted decreased pedal pulses 2 out of 4.  He had footcare.     5/24/2024.  Follow-up with Dr. Graham.  Review of his x-rays showed no change.  Physical examination showed decreased breath sounds but no inspiratory crackles that were previously heard.  He felt that the interstitial lung disease was stable and that his hypoxemia was improved on oxygen.  He plan to repeat CT scan in September.     5/24/2024.  He is here to follow-up on his bone marrow results.  I told him that the chromosomal analysis would not be forthcoming before because the specimen failed to grow in culture.  He continues to display a low-grade MDS with blast count of only 2%.  We reviewed the fact that he is recommended to have at least 6 months of his erythropoietin before we decide whether or not it is a failure.  He is agreeable to this.  We will increase his dose of darbepoetin.  He knows to be on the look out for any signs of infection or bleeding and to report this if it happens.  We reviewed his most recent CBC.  We will see him again in 2 weeks with his next injection.     5/30/2024.  Follow-up with Dr. Dr. Dietz for his Medicare wellness visit.  His magnesium dose was changed to twice daily indefinitely.     6/7/2024.  He is here today for his darbepoetin injection.  We have increased his dose to 500 mcg every 2 weeks.  Will  see if this has any effect.  Laboratory dated today shows that his sugar is 290 and his sodium is 134.  BUN is 46 and creatinine is 2.31.  CBC shows white count 1.5 with a hemoglobin 8.1 hematocrit 24.7 and a platelet count of 71,000 which is very much like his most recent CBC.  His absolute neutrophil count is 0.57.     I talked to him about his current status which he feels is fairly stable.  He is off oxygen.  He has had no bleeding and no signs of infection.  He understands that we have increased his dose of darbepoetin and we will watch his counts.  He will report any side effects.     He has continued to receive his darbepoetin injections.     7/15/2024.  Emergency department visit [facility].  He developed a rash on both lower extremities which started a week ago which is an erythematous papular rash.  He denied any pain or itching.  He did not have any vesicle formation.  It is bilateral.  He has had a shingles injection.  He was told that he had disseminated zoster and was started on valacyclovir a gram twice daily for 7 days.     7/19/2024.  Seen by Dr. Dr. Dietz who said that the rash has not gotten any worse and might be a little bit better.  She noted that there were no vesicles no open areas no areas of secondary infection.     7/19/2024.  He is here to receive his erythropoietin injection and to have a blood count.  I told him it is not likely that this is disseminated zoster because it is nowhere else on his body but only on his lower extremities.  This could be stasis dermatitis.  I do not see any signs of vesicle formation and there never were any.  It is mostly on the anterior shins.  It is all below the knee.     CBC today shows white count 1.4 with hemoglobin 8 hematocrit 25.4 platelet count 67,000 with 37 neutrophils 53 lymphocytes 5 monos 2 eosinophils.  He is serum chemistry showed a sugar of 285 BUN of 49 creatinine of 2.67.  We told him he needed to keep his hydration status  high.  He will continue with his injections today.  Blood pressure today is 153/64.     He has had no bleeding and no infection.  He has had no chest pain or pressure.  He is able to participate in his usual activities including mowing the yard.     He has continued to receive his darbepoetin and has followed up with podiatry for nail and callus care.     8/30/2024.  He is here in routine follow-up.  He says that he is doing well.  He has had no infections or bleeding.  He has had no mouth sores.  He has had continued swelling in his legs a little bit worse on the left than on the right.  Blood sugar was greater than 300 but he admits to dietary indiscretion.  Has had no chest pain.  His blood pressure was 149/63.  He says that his breathing is stable.  He is using his oxygen at home at night and when he travels that he does not have it on today.     His lab data has shown no sign of improvement so far with white count 1.3, hemoglobin 8.6 hematocrit 27.2 and a platelet count of 69,000.  White blood cell differential count shows 38% polys, 50 lymphs, 5 monos, 2 eosinophils.  We plan a 6-month trial of this dose of darbepoetin which will extend until November.  We will see him again in about a month.     9/4/2024.  CT scan of the chest showed mild streaky scarring in the lung bases bilaterally.  The previously seen groundglass opacities probably be minimally improved.  Calcified pleural plaques are seen without consolidation effusion or pneumothorax.  There is no adenopathy seen by CT size criteria.  Minimal hepatic steatosis was seen as well as calcified granuloma in the spleen and a somewhat atrophic pancreas.  DJD was seen in the spine.     9/5/2024.  Seen by Dr. Zambrano his nephrologist who ordered lab tests and felt that his fluctuating chemistries were related to diuretic therapy.  He noted myelodysplasia and that he was being followed for his anemia receiving NING.  He did not change any of his medications  And  said that he would see him again in about 6 months     9/10/2024.  Follow-up by Dr. Graham at which time he told the patient he could decrease his oxygen to 2 and he would repeat a CT scan in 6 months and see him after that.     9/27/2024.  He is here today in routine follow-up.  He will get his shot today.  Laboratory data shows no improvement.  We reviewed that we will follow him for 6 months on this treatment to see if there is any improvement.     Mr. Medrano says that he has had no significant bleeding.  I see some red spots on the soft palate.  His appetite has been good and he has gained weight of 1 pound since last time I saw him about a month ago.  He has had no other signs of infection or deterioration in his status.  He knows he is at risk for serious infection or bleeding.  We will continue on this treatment pathway.  He will see him again in a month.     10/3/2024.  Seen by Dr. 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  BUN was 47 and creatinine was 2.33 with an EGFR of 26.  She noted that he did have some mild edema of the left lower leg and was in the habit of wearing compression stockings.  He denied any increase in shortness of breath.  She said that she wanted to stop his spironolactone which was 25 mg and appeared that it was started for edema.  She said that he would check his blood pressure and weights for the next 2 weeks and then have repeat laboratory data.  Spironolactone was discontinued.     10/11/2024.  Laboratory data showed a BUN of 50 and a creatinine of 2.6, worse than before EGFR was 22.  Sugar was 389.  On 10/21/2024 she told him to stay off spironolactone and call if he had increasing swelling or shortness of breath.  He was to continue weighing himself.     10/25/2024.  Repeat laboratory data showed a BUN of 45 and a creatinine of 2.41 with a sugar of 374.  He was continued on erythropoietin injections.     11/8/2024.  BUN was 44 creatinine was 2.33  and sugar was 430.     11/19/2024.  [facility] emergency department visit for tremor.  The patient reported that his tremor was worse at night.  He denied any history of restless leg syndrome.  He said that his upper extremity tremor was relieved by movement.  He has a history of previous movement disorder or any other neurologic symptoms.  At that time he said that he was taking 20 mg of Lasix twice a day.  Aldactone was still on his medication list.  BUN is 43 creatinine was 2.45 and sugar was 373.  CT of the head showed no evidence of bleeding there was mild to moderate brain parenchymal atrophy and white matter changes.  He was referred to neurology.     11/22/2024.  BUN was 15 creatinine was 2.60.  Sugar was 353.  At that time it was documented that he was taking 60 mg of Lasix daily and continuing Aldactone.  He did have bilateral pedal edema.  He was continued on  protein injections.  He weighed 229 pounds.     11/25/2024.  BUN was 51 and creatinine was 2.64 with a sugar of 285.     12/9/2024.  BUN was 44 creatinine was 2.47 with a sugar of 313.     12/10/2024.  [facility] ED visit for a rash on his lower extremities.  He was taking Lasix 40 mg twice a day and Aldactone daily.  Blood pressure was 121/52 and he weighed 230 pounds.  There was some mild erythema in his left lower extremity but was not weeping.  He was slightly warm to the touch.  It was said that this could be early cellulitis and a prescription for Keflex was written.     12/16/2024.  He followed up with . Dr. Dietz who said that they could switch antibiotics to doxycycline and recommended a follow-up appointment.  He was seen on the following day and continued on doxycycline for mild to moderate erythema of the inner ankle lower leg he was also treated with gabapentin for his essential tremor.     12/23/2024.  BUN was 43 creatinine was 2.46 with a sugar of 313.  Seen in follow-up by Malorie Shaffer was continued on  Depakote with stable counts.     12/30/2024.  Followed up with Dr. Dr. Dietz with leg redness.  The patient said that he thought he had the flu.  His weight was 226 pounds.  Blood pressure was 126/74.  Laboratory data 2 days prior to the visit showed that his BUN was 52 and his creatinine was 2.62.  Sugar was 151.     1/2/2025.  Followed up with Dr. Supriya Nguyen with a decrease in his swelling and improvement in his legs lesions.  She documented that he had had 2 falls without lightheadedness or dizziness.        1/6/2025.  Laboratory data showed a sugar of 313 BUN of 41 and creatinine of 2.56 with an EGFR of 23.     1/15/2025.  Seen by Dr. Patrick Haque of [facility] neurology because of his tremors.  He mentioned that gabapentin can cause swelling.  He was recently discontinued.  Symptoms appear to be improved.  He noted the patient had already been on primidone.  He had complained of chronic numbness of the left thumb and right thumb.  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  He was reluctant to see orthopedic reduction and risks were recommended as well as exercises to maintain strength.  He said that he could continue primidone 50 mg p.o. nightly for essential tremor and increase the dose if needed and to avoid caffeine.     1/20/2025.  BUN was 47 creatinine was 2.70 with an EGFR of 22.  Sugar was 142.     2/3/2025.  Laboratory data showed a BUN of 46 creatinine 2.42 with a sugar of 218.  Follow-up with Malorie Shaffer at which time the patient said that he wanted to stay on erythropoietin.  Repeat bone marrow was discussed with the patient was reluctant to do that or to see Dr. Hess for second opinion.     2/17/2025.  Laboratory data showed a BUN of 49 creatinine 2.53 with a sugar of 300.     3/3/2025.  BUN was 57 creatinine was 2.86 with a sugar of 284.  Followed up with  Malorie Shaffer still not wanting to get another opinion.  Discussion was around supportive care and making sure that he understood the dangers of cytopenias.     3/4/2025.  Follow-up with Dr. Dr. Dietz.  No changes made in his medications.     3/7/2025.  CT scan of the chest without contrast showed moderate basilar and subpleural predominant fibrosing interstitial lung disease with architectural distortion in the absence of honeycombing which have been waxing and waning the degree of inflammatory change had decreased over the past year.  Densely calcified coronary arteries were seen and a new right retroareolar nodular appearing soft tissue measuring 1.6 cm and could represent asymmetric clinically mass via but this needed to have follow-up.     3/14/2025.  Followed up with Dr. Graham.  He noted that pulse ox on room air was 90% and on repeat was 96.     3/19/2025.  He is here today with his wife.  We wanted to make sure that he understood that his counts were not doing well and that he had potential problems with infections, bleeding and anemia that might cause him to have chest pains or increasing shortness of breath.  We talked about the possibility of transfusions if he became more symptomatic.  He verbalized understanding.  He is not interested in another opinion at this time.  He is not interested in being treated more aggressively at this time.     He is more interested in going through his medicines and making sure that he needed all of them.  We did identify that he is on carvedilol and metoprolol.  He is also on sucralfate 3 times a day and pantoprazole 40 mg twice a day.  He says that he is not experiencing any heartburn and has not for a while.  We went through his medication list and noted what it was for.  I encouraged him to talk to primary care about these issues.     He has an appointment to see his cardiologist in the upcoming weeks.  His blood pressure today 137/67.  His most recent BUN and  creatinine were 57 and 2.86 which I am sure have something to do with his state of hydration.  He currently says that he is taking 1-1/2 of a 40 mg Lasix tablet daily and continues to take Aldactone 25 mg daily.  He has prescriptions for both 25 and 50 mg of Aldactone.  He has an upcoming appointment to see Malorie Shaffer in about 2 weeks.    Interval Note  3/31/2025.  Received his darbepoetin 500 mcg.  The patient was advised to go to the emergency room.  CBC had shown a white count of 1.1 with a hemoglobin of 7 hematocrit 21.3 and a platelet count of 62,000 with 40% neutrophils 50% lymphocytes 7 monos and 3 eosinophils.    3/31 through 4/4/2025.  Admitted to [facility] for acute kidney injury superimposed on CKD, presenting with tremors, weakness, recurrent falls.  The patient stated that when he got shaky his legs just went out from under him.  He denied dizziness lightheadedness chest pain dyspnea palpitations nausea or diaphoresis.  It was reviewed that the patient had refused chemotherapy but did accept transfusions and darbepoetin injections.  He was transfused with 2 units of packed red blood cells.  He was on 3 L of oxygen.  Palliative care and hospice was discussed and the patient refused hospice but was accepting of palliative care.  He had been on gabapentin and the dose was reduced.  He was discharged to rehab at [facility].    4/7/2025.  Seen by Valeria Sesay at this facility.  He was in the facility from  the patient was ambulating with his walker.  He was continued on Lantus 60 units nightly and his blood pressures were checked twice a day.  He was given sliding scale was continued on his oxygen and spironolactone as well as MiraLAX.    4/14/2025.  Discharged back home.  Followed up with Malorie Shaffer and was not using his oxygen in the office.  He had no bleeding or bruising.  His edema had improved.  He continued on Aldactone and Lasix at 40 mg.  His home sugars were between 180  and 240.  Home health was seeing him at that time.    CBC showed white count of 1.1 with hemoglobin 8.5 hematocrit 26.1 and platelet count of 62,000 with 28% neutrophils, 61 lymphs, 5 monos 3 eosinophils with an absolute neutrophil count of 320.  He was continued on darbepoetin every 2 weeks with increasing need for transfusion.  He was encouraged to use a mask in public and to employ good hand hygiene.  Fecal occult blood was requested.    4/28/2025.  Followed up with Dr. Supriya Nguyen for his CKD 4 his BUN had been 69 and creatinine was 2.66 on the day of discharge.  She said that his lower extremity edema was unchanged from baseline.  Blood pressure was well-controlled.  He had difficulty controlling his bladder with some incontinence.    CMP showed a sugar of 250 BUN was 52 and creatinine was 2.67.  This is an improvement in his BUN but his creatinine was stable with an EGFR 22.  Liver function tests were normal except for a total bilirubin of 1.4 with normal transaminases.  She plan to see him again in 3 months.    4/29/2025.  Followed up with Dr. Dr. Dietz after discharge from the nursing home with no changes in his regimen made.    5/12/2025.  In the office he was wearing his oxygen that day with increasing shortness of breath and persistent pedal edema.  He continued to work with home PT and OT.  His hemoglobin was down to 7.5.  Platelets were 56,000 and his white count was 20,000.  He was planning to take a bus trip and he was educated once again on precautions for immunocompromised people.  Platelet transfusion was ordered.    5/20/2025.  Followed up with Dr. Aggie Danielle cardiology [facility].  She said that he had been on Lasix for his lower extremity edema which was thought to be secondary to increased sodium intake and his elevated blood sugars.  She said that they will usually continue aspirin as long as the platelet count is greater than 50,000.  She did not make any changes to his regimen and said  that she would see him again in 9 months.    5/27/2025.  CBC showed a white count of 1.0, hemoglobin was 7.9 with hematocrit of 24.5 and a platelet count of 53,000.  His absolute neutrophil count is only 260.  Differential showed 27% neutrophils, 54 lymphs, 4 monos and 3 eosinophils.    6/1/2025.  [facility] emergency department visit with numbness in his left hand for 2 weeks persistently.  He had a CT scan of the head without contrast which showed no acute intracranial abnormality but did show age-related atrophy with associated ventricular prominence and periventricular small vessel disease.  His symptoms were felt to be related to median nerve entrapment.    6/4/2025.  Annual Medicare wellness visit with . Dr. Dietz who reviewed all of his chronic medical problems including his diabetes.  He was struggling with multiple injections of short acting insulin through the day.  She increased his total Lantus dose and switched to twice daily for convenience.  Gabapentin 200 mg twice a day was ordered.  His Lantus was changed to 35 units twice a day.    6/9/2025.  Followed up with Malorie Shaffer wearing his oxygen, continuing to have dyspnea on exertion with extreme weakness and fatigue.  He says that he has no energy.  He has been wearing his mask in public places.  He has had no significant fevers or bleeding.  He has had bruising that is persistent.  His appetite is decent.    CBC showed a white count 1.4 with a hemoglobin of 7.7 hematocrit 24 and a platelet count 57,000 with 20% polys, 61 7 monos 2.8% eosinophils with no early white blood cells.    6/12/2025.  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    6/23/2025.  CBC showed white count 1.3 hemoglobin 8.1 hematocrit 24.7 and platelet count 56,000 with 42% neutrophils, 49 lymphs, 4 monos and 3 eosinophils.  His absolute neutrophil count had  improved to 570.    7/7/2025.  He is here today in follow-up.  Serum chemistry showed a sugar of 259, BUN of 46 creatinine of 2.96 with an EGFR of 20.  We encouraged him to continue to maintain hydration.  Total protein was 5.8.  CBC showed white count 1.0 with hemoglobin 7.7 hematocrit 23.5 and a platelet count of 54,000 with 35% neutrophils giving him an absolute neutrophil count of 350.  He will be transfused.    We received a request to clear him for surgery for his carpal tunnel syndrome.  I told Mr. Medrano that this would not be in his best interest because of the high probability that he would have intercurrent infection or bleeding.  I told him that steroid injections could be used for some improvement in his symptoms which we would be able to approve.    He says that he has had no falls.  He is walking with his cane for stability.  He also has brought in his portable oxygen but then took it off because he said he did not need it all the time.  He uses it a lot at home.    Once again I confirmed with him that he does not want any aggressive treatment.  He will go ahead with his erythropoietin injection today.  He will return in 1 month to see Malorie Shaffer and will have his next erythropoietin injection in 2 weeks.  He knows to call in the interim should he have any change in the status.    Comorbidities  GERD  Arthritis, DJD  BPH  Coronary artery disease with history of myocardial, status post stent infarction  History of dumping syndrome  Hyperlipidemia  History of skin cancer  Obesity  Obstructive sleep apnea on CPAP  Peripheral arterial disease  CKD 3  Diabetes with nephropathy  History of cholecystectomy and hernia repair  Essential tremor  Hypertension   Numbness in his fingers felt to be related to cervical disc disease.    Monitoring Parameters  Histories, physical examinations and CBCs with occasional bone marrow biopsies.     Review of Systems - Oncology   Review of Systems   Constitutional:  Positive  for fatigue. Negative for appetite change and unexpected weight change.   HENT:  Negative for dental problem, nosebleeds and trouble swallowing.    Eyes:  Negative for visual disturbance.   Respiratory:  Positive for shortness of breath. Negative for cough.    Cardiovascular:  Positive for leg swelling.   Gastrointestinal:  Negative for abdominal pain, blood in stool, constipation, diarrhea, nausea and vomiting.   Endocrine: Negative for polyuria.   Genitourinary:  Negative for dysuria, genital sores and urgency.   Musculoskeletal:  Positive for arthralgias, joint swelling and myalgias.   Skin:  Positive for pallor.   Neurological:  Positive for weakness. Negative for light-headedness, numbness and headaches.   Hematological:  Bruises/bleeds easily.   Psychiatric/Behavioral:  Negative for self-injury, sleep disturbance and suicidal ideas. The patient is not nervous/anxious.         Past Medical History  Medical History[1]     Surgical History  Surgical History[2]    Social History  Social History[3]     Family History  family history includes Colon cancer in his mother and another family member.       Current Medications  Current Outpatient Medications   Medication Instructions    allopurinol (ZYLOPRIM) 100 mg, oral, Daily    amLODIPine (NORVASC) 5 mg, oral, Daily before breakfast    ascorbic acid (VITAMIN C) 250 mg    aspirin 81 mg, Daily    atorvastatin (LIPITOR) 40 mg, oral, Daily    blood-glucose sensor (FreeStyle Sindi 3 Sensor) device Use as directed    carvedilol (COREG) 6.25 mg, oral, 2 times daily    cyanocobalamin, vitamin B-12, (Vitamin B-12) 1,000 mcg tablet extended release 1 tablet    FeroSuL tablet 1 tablet, Every other day    flash glucose scanning reader (FreeStyle Sindi 2 Lindsay) misc Use as instructed    flash glucose sensor kit (FreeStyle Sindi 2 Sensor) kit USE AS DIRECTED, changing sensor EVERY 14 days    FreeStyle Sindi 2 Sensor kit 1 each, subcutaneous, As needed, Use as instructed     "furosemide (Lasix) 40 mg tablet TAKE 1.5 TABLET BY MOUTH EVERY DAY    gabapentin (NEURONTIN) 200 mg, oral, 2 times daily    insulin lispro (HumaLOG) 100 unit/mL pen Take as directed per insulin instructions up to 3 times a day on a sliding scale - 150-200 give 2 U, 201-250 give 4 U, 251-300 give 6 U, 301-350 give 8 U, 351-400 give 10 U, greater than 400 give 12 U.  Max 36 units daily    lancets 33 gauge misc 2 times daily    Lantus Solostar U-100 Insulin 35 Units, subcutaneous, 2 times daily, Take as directed per insulin instructions.    multivit-minerals/folic acid (CENTRUM ADULTS ORAL) Take by mouth.    nitroglycerin (NITROSTAT) 0.4 mg, sublingual, Every 5 min PRN    NON FORMULARY Truetrack test In vitro strip; Use as directed to check blood sugar  2 times daily    pantoprazole (PROTONIX) 40 mg, oral, 2 times daily    Pentips Pen Needle 32 gauge x 5/32\" needle USE AS DIRECTED ONCE DAILY for insulin injection    primidone (MYSOLINE) 50 mg, oral, Nightly    spironolactone (ALDACTONE) 25 mg, oral, Daily    sucralfate (CARAFATE) 1 g, oral, 3 times daily (morning, midday, late afternoon)    vitamins A,C,E-zinc-copper 2,148 mcg-113 mg-45 mg-17.4mg tablet Take by mouth. SUPER VIEW      Scheduled Medications[4]     OARRS Review  I have personally reviewed the OARRS report for Zack JOSEPH Marcela. I have considered the risks of abuse, dependence, addiction and diversion. He continues on gabapentin through primary care.     Vital Signs  /54   Pulse 88   Temp 35.6 °C (96.1 °F) (Temporal)   Resp 18   Wt 101 kg (223 lb 9.6 oz)   SpO2 96% Comment: 2L  BMI 34.00 kg/m²      Physical Exam  Vitals and nursing note reviewed. Exam conducted with a chaperone present.   Constitutional:       General: He is not in acute distress.     Appearance: He is ill-appearing.      Comments: He is a well-developed well-nourished overweight elderly white male who is here today unaccompanied.  He is wearing his oxygen and is using his cane. " He appears to be chronically ill and pale.  He is in good spirits.   HENT:      Head: Normocephalic and atraumatic.      Nose: Nose normal.      Mouth/Throat:      Mouth: Mucous membranes are moist.      Pharynx: Oropharynx is clear. No oropharyngeal exudate or posterior oropharyngeal erythema.   Eyes:      General: No scleral icterus.     Extraocular Movements: Extraocular movements intact.      Conjunctiva/sclera: Conjunctivae normal.      Pupils: Pupils are equal, round, and reactive to light.   Neck:      Comments: No significant lymphadenopathy is palpated in the head neck region, supraclavicular or axillary areas bilaterally.  Cardiovascular:      Rate and Rhythm: Normal rate and regular rhythm.      Heart sounds: Murmur heard.   Pulmonary:      Effort: Pulmonary effort is normal. No respiratory distress.      Breath sounds: Rales (scattered rales in left upper lobe) present. No wheezing or rhonchi.      Comments: Wears O2 with exertion.  Abdominal:      General: There is no distension.      Palpations: Abdomen is soft. There is no mass.      Tenderness: There is no abdominal tenderness. There is no guarding or rebound.   Musculoskeletal:         General: Normal range of motion.      Cervical back: Normal range of motion. No rigidity or tenderness.      Right lower leg: Edema (worse on this side.) present.      Left lower leg: Edema present.   Lymphadenopathy:      Cervical: No cervical adenopathy.   Skin:     General: Skin is warm and dry.      Coloration: Skin is pale. Skin is not jaundiced.      Findings: Bruising present.   Neurological:      Mental Status: He is alert and oriented to person, place, and time. Mental status is at baseline.      Cranial Nerves: Cranial nerve deficit present.      Sensory: No sensory deficit.      Motor: No weakness.      Gait: Gait normal.      Comments: He is hard of hearing    Psychiatric:         Mood and Affect: Mood normal.         Behavior: Behavior normal.          Thought Content: Thought content normal.         Judgment: Judgment normal.      Comments: He is pleasant and interactive.          Current Laboratory Data  Recent Results (from the past week)   Comprehensive metabolic panel    Collection Time: 07/07/25  9:44 AM   Result Value Ref Range    Glucose 259 (H) 74 - 99 mg/dL    Sodium 140 136 - 145 mmol/L    Potassium 4.7 3.5 - 5.3 mmol/L    Chloride 105 98 - 107 mmol/L    Bicarbonate 25 21 - 32 mmol/L    Anion Gap 15 10 - 20 mmol/L    Urea Nitrogen 46 (H) 6 - 23 mg/dL    Creatinine 2.96 (H) 0.50 - 1.30 mg/dL    eGFR 20 (L) >60 mL/min/1.73m*2    Calcium 8.6 8.6 - 10.3 mg/dL    Albumin 4.0 3.4 - 5.0 g/dL    Alkaline Phosphatase 91 33 - 136 U/L    Total Protein 5.8 (L) 6.4 - 8.2 g/dL    AST 25 9 - 39 U/L    Bilirubin, Total 1.0 0.0 - 1.2 mg/dL    ALT 17 10 - 52 U/L   CBC and Auto Differential    Collection Time: 07/07/25  9:44 AM   Result Value Ref Range    WBC 1.0 (LL) 4.4 - 11.3 x10*3/uL    nRBC 0.0 0.0 - 0.0 /100 WBCs    RBC 2.30 (L) 4.50 - 5.90 x10*6/uL    Hemoglobin 7.7 (L) 13.5 - 17.5 g/dL    Hematocrit 23.5 (L) 41.0 - 52.0 %     (H) 80 - 100 fL    MCH 33.5 26.0 - 34.0 pg    MCHC 32.8 32.0 - 36.0 g/dL    RDW 23.1 (H) 11.5 - 14.5 %    Platelets 54 (L) 150 - 450 x10*3/uL    Neutrophils % 35.7 40.0 - 80.0 %    Immature Granulocytes %, Automated 1.0 (H) 0.0 - 0.9 %    Lymphocytes % 53.1 13.0 - 44.0 %    Monocytes % 6.1 2.0 - 10.0 %    Eosinophils % 4.1 0.0 - 6.0 %    Basophils % 0.0 0.0 - 2.0 %    Neutrophils Absolute 0.35 (L) 1.60 - 5.50 x10*3/uL    Immature Granulocytes Absolute, Automated 0.01 0.00 - 0.50 x10*3/uL    Lymphocytes Absolute 0.52 (L) 0.80 - 3.00 x10*3/uL    Monocytes Absolute 0.06 0.05 - 0.80 x10*3/uL    Eosinophils Absolute 0.04 0.00 - 0.40 x10*3/uL    Basophils Absolute 0.00 0.00 - 0.10 x10*3/uL   Morphology    Collection Time: 07/07/25  9:44 AM   Result Value Ref Range    RBC Morphology See Below     Polychromasia Mild     Spherocytes Few      Ovalocytes Few     Basophilic Stippling Present    Type And Screen Is this order related to pregnancy or an upcoming surgery? No    Collection Time: 07/07/25 10:47 AM   Result Value Ref Range    ABO TYPE O     Rh TYPE NEG     ANTIBODY SCREEN NEG    Prepare RBC: 1 Units, Irradiated    Collection Time: 07/07/25 11:27 AM   Result Value Ref Range    PRODUCT CODE D7049E84     Unit Number [id number]     Unit ABO O     Unit RH NEG     XM INTEP COMP     Dispense Status XM     Blood Expiration Date 7/13/2025 11:59:00 PM EDT     PRODUCT BLOOD TYPE 9500     UNIT VOLUME 350           Recent Imaging  Imaging  No results found.    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  Pancytopenia.  The patient has unusual bone marrow findings.  Chromosomal analysis will not be done due to failure of the specimen to grow.  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  He also has components of myelodysplasia.  He is not responding to erythropoietin.  We will increase his dose to 500 mcg every other week.  He continues to be at high risk for bleeding and infection with a low ANC and low platelet count.  He has had previous GI bleeds.  He is currently back on his  aspirin per follow-up with Dr. Danielle for fear of clotting off his stents.  He is off Eliquis.  He will get his treatment today with darbepoetin and continue every 14 days.    He is still quite anemic today.  Transfusion was ordered.     He is not interested in the second opinion at this time and does not want to be treated more aggressively.  We will continue to be supportive.  He will follow-up with Malorie Shaffer.      2.  Multiple comorbidities.  These include but are not limited to:  GERD  Arthritis, DJD  BPH  Coronary artery disease with history of  myocardial, status post stent infarction  History of dumping syndrome  Hyperlipidemia  History of skin cancer  Obesity  Obstructive sleep apnea on CPAP  Peripheral arterial disease  CKD 3  Diabetes with nephropathy  History of cholecystectomy and hernia repair  Essential tremor  Hypertension   Numbness in his fingers felt to be related to cervical disc disease.     3.  Rash on his legs.  I think this looks more like stasis dermatitis than disseminated zoster.  If it were disseminated zoster he would have it on other areas of his body.    4.  Cardiac problems.  He follows up with Dr. Aggie Danielle.     He will follow-up with Malorie Shaffer.  We would be glad to see him again as needed.  He will continue his shots every 2 weeks.  He knows that he should call in the interim should he have any change in his status, questions or concerns.         Ayah Carmichael MD               [1]   Past Medical History:  Diagnosis Date    Abnormal levels of other serum enzymes     Abnormal liver enzymes    Dependence on other enabling machines and devices     CPAP (continuous positive airway pressure) dependence    Encounter for examination of eyes and vision without abnormal findings     Diabetic eye exam    Localized edema     Bilateral leg edema    Old myocardial infarction     History of myocardial infarction    Other conditions influencing health status 01/08/2020    Uncontrolled diabetes mellitus type 2 without complications    Pain in unspecified ankle and joints of unspecified foot     Arthralgia of ankle    Personal history of other diseases of the digestive system     History of gastroesophageal reflux (GERD)    Personal history of other diseases of the digestive system     History of dumping syndrome    Personal history of other endocrine, nutritional and metabolic disease 11/12/2020    History of type 2 diabetes mellitus    Presence of coronary angioplasty implant and graft     History of coronary artery stent  placement    Rash and other nonspecific skin eruption 12/11/2019    Rash    Unspecified abdominal hernia without obstruction or gangrene     Hernia    Unspecified abdominal pain 05/19/2021    Abdominal pain, left lateral   [2]   Past Surgical History:  Procedure Laterality Date    OTHER SURGICAL HISTORY  10/09/2019    Arterial stent placement    OTHER SURGICAL HISTORY  10/09/2019    Cholecystectomy    OTHER SURGICAL HISTORY  10/09/2019    Hernia repair   [3]   Social History  Tobacco Use    Smoking status: Never    Smokeless tobacco: Never   Vaping Use    Vaping status: Never Used   Substance Use Topics    Alcohol use: Never    Drug use: Never   [4]

## 2025-07-07 ENCOUNTER — TELEPHONE (OUTPATIENT)
Dept: HEMATOLOGY/ONCOLOGY | Facility: CLINIC | Age: OVER 89
End: 2025-07-07

## 2025-07-07 ENCOUNTER — INFUSION (OUTPATIENT)
Dept: HEMATOLOGY/ONCOLOGY | Facility: CLINIC | Age: OVER 89
End: 2025-07-07
Payer: MEDICARE

## 2025-07-07 ENCOUNTER — OFFICE VISIT (OUTPATIENT)
Dept: HEMATOLOGY/ONCOLOGY | Facility: CLINIC | Age: OVER 89
End: 2025-07-07
Payer: MEDICARE

## 2025-07-07 VITALS
BODY MASS INDEX: 34 KG/M2 | SYSTOLIC BLOOD PRESSURE: 115 MMHG | OXYGEN SATURATION: 96 % | TEMPERATURE: 96.1 F | RESPIRATION RATE: 18 BRPM | DIASTOLIC BLOOD PRESSURE: 54 MMHG | HEART RATE: 88 BPM | WEIGHT: 223.6 LBS

## 2025-07-07 DIAGNOSIS — D63.1 ANEMIA DUE TO STAGE 3B CHRONIC KIDNEY DISEASE: ICD-10-CM

## 2025-07-07 DIAGNOSIS — I25.10 CORONARY ARTERY DISEASE INVOLVING NATIVE HEART, UNSPECIFIED VESSEL OR LESION TYPE, UNSPECIFIED WHETHER ANGINA PRESENT: ICD-10-CM

## 2025-07-07 DIAGNOSIS — I10 PRIMARY HYPERTENSION: ICD-10-CM

## 2025-07-07 DIAGNOSIS — D46.9 MYELODYSPLASTIC SYNDROME, UNSPECIFIED (MULTI): ICD-10-CM

## 2025-07-07 DIAGNOSIS — D72.819 LEUKOPENIA, UNSPECIFIED TYPE: ICD-10-CM

## 2025-07-07 DIAGNOSIS — N18.32 ANEMIA DUE TO STAGE 3B CHRONIC KIDNEY DISEASE: ICD-10-CM

## 2025-07-07 DIAGNOSIS — N18.4 ANEMIA DUE TO STAGE 4 CHRONIC KIDNEY DISEASE: ICD-10-CM

## 2025-07-07 DIAGNOSIS — N18.4 TYPE 2 DIABETES MELLITUS WITH STAGE 4 CHRONIC KIDNEY DISEASE, WITH LONG-TERM CURRENT USE OF INSULIN (MULTI): ICD-10-CM

## 2025-07-07 DIAGNOSIS — N18.4 STAGE 4 CHRONIC KIDNEY DISEASE (MULTI): ICD-10-CM

## 2025-07-07 DIAGNOSIS — E11.22 TYPE 2 DIABETES MELLITUS WITH DIABETIC CHRONIC KIDNEY DISEASE: ICD-10-CM

## 2025-07-07 DIAGNOSIS — D69.6 THROMBOCYTOPENIA: ICD-10-CM

## 2025-07-07 DIAGNOSIS — Z79.4 TYPE 2 DIABETES MELLITUS WITH STAGE 4 CHRONIC KIDNEY DISEASE, WITH LONG-TERM CURRENT USE OF INSULIN (MULTI): ICD-10-CM

## 2025-07-07 DIAGNOSIS — E78.2 MIXED HYPERLIPIDEMIA: ICD-10-CM

## 2025-07-07 DIAGNOSIS — D63.1 ANEMIA DUE TO STAGE 4 CHRONIC KIDNEY DISEASE: ICD-10-CM

## 2025-07-07 DIAGNOSIS — D70.9 NEUTROPENIA, UNSPECIFIED TYPE: ICD-10-CM

## 2025-07-07 DIAGNOSIS — D61.818 OTHER PANCYTOPENIA (MULTI): ICD-10-CM

## 2025-07-07 DIAGNOSIS — E11.22 TYPE 2 DIABETES MELLITUS WITH STAGE 4 CHRONIC KIDNEY DISEASE, WITH LONG-TERM CURRENT USE OF INSULIN (MULTI): ICD-10-CM

## 2025-07-07 DIAGNOSIS — G25.0 TREMOR, ESSENTIAL: ICD-10-CM

## 2025-07-07 LAB
ABO GROUP (TYPE) IN BLOOD: NORMAL
ALBUMIN SERPL BCP-MCNC: 4 G/DL (ref 3.4–5)
ALP SERPL-CCNC: 91 U/L (ref 33–136)
ALT SERPL W P-5'-P-CCNC: 17 U/L (ref 10–52)
ANION GAP SERPL CALC-SCNC: 15 MMOL/L (ref 10–20)
ANTIBODY SCREEN: NORMAL
AST SERPL W P-5'-P-CCNC: 25 U/L (ref 9–39)
BASO STIPL BLD QL SMEAR: PRESENT
BASOPHILS # BLD AUTO: 0 X10*3/UL (ref 0–0.1)
BASOPHILS NFR BLD AUTO: 0 %
BILIRUB SERPL-MCNC: 1 MG/DL (ref 0–1.2)
BUN SERPL-MCNC: 46 MG/DL (ref 6–23)
CALCIUM SERPL-MCNC: 8.6 MG/DL (ref 8.6–10.3)
CHLORIDE SERPL-SCNC: 105 MMOL/L (ref 98–107)
CO2 SERPL-SCNC: 25 MMOL/L (ref 21–32)
CREAT SERPL-MCNC: 2.96 MG/DL (ref 0.5–1.3)
EGFRCR SERPLBLD CKD-EPI 2021: 20 ML/MIN/1.73M*2
EOSINOPHIL # BLD AUTO: 0.04 X10*3/UL (ref 0–0.4)
EOSINOPHIL NFR BLD AUTO: 4.1 %
ERYTHROCYTE [DISTWIDTH] IN BLOOD BY AUTOMATED COUNT: 23.1 % (ref 11.5–14.5)
GLUCOSE SERPL-MCNC: 259 MG/DL (ref 74–99)
HCT VFR BLD AUTO: 23.5 % (ref 41–52)
HGB BLD-MCNC: 7.7 G/DL (ref 13.5–17.5)
IMM GRANULOCYTES # BLD AUTO: 0.01 X10*3/UL (ref 0–0.5)
IMM GRANULOCYTES NFR BLD AUTO: 1 % (ref 0–0.9)
LYMPHOCYTES # BLD AUTO: 0.52 X10*3/UL (ref 0.8–3)
LYMPHOCYTES NFR BLD AUTO: 53.1 %
MCH RBC QN AUTO: 33.5 PG (ref 26–34)
MCHC RBC AUTO-ENTMCNC: 32.8 G/DL (ref 32–36)
MCV RBC AUTO: 102 FL (ref 80–100)
MONOCYTES # BLD AUTO: 0.06 X10*3/UL (ref 0.05–0.8)
MONOCYTES NFR BLD AUTO: 6.1 %
NEUTROPHILS # BLD AUTO: 0.35 X10*3/UL (ref 1.6–5.5)
NEUTROPHILS NFR BLD AUTO: 35.7 %
NRBC BLD-RTO: 0 /100 WBCS (ref 0–0)
OVALOCYTES BLD QL SMEAR: NORMAL
PLATELET # BLD AUTO: 54 X10*3/UL (ref 150–450)
POLYCHROMASIA BLD QL SMEAR: NORMAL
POTASSIUM SERPL-SCNC: 4.7 MMOL/L (ref 3.5–5.3)
PROT SERPL-MCNC: 5.8 G/DL (ref 6.4–8.2)
RBC # BLD AUTO: 2.3 X10*6/UL (ref 4.5–5.9)
RBC MORPH BLD: NORMAL
RH FACTOR (ANTIGEN D): NORMAL
SODIUM SERPL-SCNC: 140 MMOL/L (ref 136–145)
SPHEROCYTES BLD QL SMEAR: NORMAL
WBC # BLD AUTO: 1 X10*3/UL (ref 4.4–11.3)

## 2025-07-07 PROCEDURE — 86923 COMPATIBILITY TEST ELECTRIC: CPT

## 2025-07-07 PROCEDURE — 99214 OFFICE O/P EST MOD 30 MIN: CPT | Performed by: INTERNAL MEDICINE

## 2025-07-07 PROCEDURE — 85025 COMPLETE CBC W/AUTO DIFF WBC: CPT | Performed by: INTERNAL MEDICINE

## 2025-07-07 PROCEDURE — 1159F MED LIST DOCD IN RCRD: CPT | Performed by: INTERNAL MEDICINE

## 2025-07-07 PROCEDURE — 1125F AMNT PAIN NOTED PAIN PRSNT: CPT | Performed by: INTERNAL MEDICINE

## 2025-07-07 PROCEDURE — 80053 COMPREHEN METABOLIC PANEL: CPT | Performed by: INTERNAL MEDICINE

## 2025-07-07 PROCEDURE — 2500000004 HC RX 250 GENERAL PHARMACY W/ HCPCS (ALT 636 FOR OP/ED): Mod: JZ,EC

## 2025-07-07 PROCEDURE — 96372 THER/PROPH/DIAG INJ SC/IM: CPT

## 2025-07-07 PROCEDURE — 86901 BLOOD TYPING SEROLOGIC RH(D): CPT

## 2025-07-07 PROCEDURE — 3074F SYST BP LT 130 MM HG: CPT | Performed by: INTERNAL MEDICINE

## 2025-07-07 PROCEDURE — 3078F DIAST BP <80 MM HG: CPT | Performed by: INTERNAL MEDICINE

## 2025-07-07 RX ORDER — DIPHENHYDRAMINE HYDROCHLORIDE 50 MG/ML
50 INJECTION, SOLUTION INTRAMUSCULAR; INTRAVENOUS AS NEEDED
OUTPATIENT
Start: 2025-07-21

## 2025-07-07 RX ORDER — DIPHENHYDRAMINE HYDROCHLORIDE 50 MG/ML
50 INJECTION, SOLUTION INTRAMUSCULAR; INTRAVENOUS AS NEEDED
OUTPATIENT
Start: 2025-07-07

## 2025-07-07 RX ORDER — ALBUTEROL SULFATE 0.83 MG/ML
3 SOLUTION RESPIRATORY (INHALATION) AS NEEDED
OUTPATIENT
Start: 2025-07-07

## 2025-07-07 RX ORDER — EPINEPHRINE 0.3 MG/.3ML
0.3 INJECTION SUBCUTANEOUS EVERY 5 MIN PRN
OUTPATIENT
Start: 2025-07-21

## 2025-07-07 RX ORDER — ALBUTEROL SULFATE 0.83 MG/ML
3 SOLUTION RESPIRATORY (INHALATION) AS NEEDED
OUTPATIENT
Start: 2025-07-21

## 2025-07-07 RX ORDER — EPINEPHRINE 0.3 MG/.3ML
0.3 INJECTION SUBCUTANEOUS EVERY 5 MIN PRN
OUTPATIENT
Start: 2025-07-07

## 2025-07-07 RX ORDER — FAMOTIDINE 10 MG/ML
20 INJECTION, SOLUTION INTRAVENOUS ONCE AS NEEDED
OUTPATIENT
Start: 2025-07-07

## 2025-07-07 RX ORDER — FAMOTIDINE 10 MG/ML
20 INJECTION, SOLUTION INTRAVENOUS ONCE AS NEEDED
OUTPATIENT
Start: 2025-07-21

## 2025-07-07 RX ADMIN — DARBEPOETIN ALFA 500 MCG: 500 INJECTION, SOLUTION INTRAVENOUS; SUBCUTANEOUS at 10:48

## 2025-07-07 ASSESSMENT — ENCOUNTER SYMPTOMS
SLEEP DISTURBANCE: 0
NUMBNESS: 0
NAUSEA: 0
WEAKNESS: 1
DYSURIA: 0
VOMITING: 0
COUGH: 0
UNEXPECTED WEIGHT CHANGE: 0
SHORTNESS OF BREATH: 1
NERVOUS/ANXIOUS: 0
BRUISES/BLEEDS EASILY: 1
MYALGIAS: 1
CONSTIPATION: 0
ABDOMINAL PAIN: 0
JOINT SWELLING: 1
APPETITE CHANGE: 0
HEADACHES: 0
BLOOD IN STOOL: 0
FATIGUE: 1
TROUBLE SWALLOWING: 0
DIARRHEA: 0
ARTHRALGIAS: 1
LIGHT-HEADEDNESS: 0

## 2025-07-07 ASSESSMENT — PAIN SCALES - GENERAL: PAINLEVEL_OUTOF10: 8

## 2025-07-07 NOTE — PATIENT INSTRUCTIONS
Reviewed labs and recent medical history.  Discussed his recent ED visit with his wrist pain. He is not eligible for surgery at this point related to his blood counts.  Dr. Carmichael will call the orthopedic physician and clarify with them whether he may be able to get an injection into his wrist for the pain.  Dr. Carmichael will call you with your blood counts.  Okay for his injection today.7/7/25  He will return to see NP in 1 month for follow up but he will return in 2 weeks for his next injection.

## 2025-07-07 NOTE — PROGRESS NOTES
Stat labs drawn on arrival  Pt received injection today  7/21 1100 stat labs and epoetin to follow  8/4 1100 MULUGETA hernandez draw stat labs         1130 epoetin injection to follow  Reviewed AVS with patient- patient verbalizes understanding

## 2025-07-08 ENCOUNTER — INFUSION (OUTPATIENT)
Dept: HEMATOLOGY/ONCOLOGY | Facility: CLINIC | Age: OVER 89
End: 2025-07-08
Payer: MEDICARE

## 2025-07-08 ENCOUNTER — TELEPHONE (OUTPATIENT)
Dept: HEMATOLOGY/ONCOLOGY | Facility: CLINIC | Age: OVER 89
End: 2025-07-08
Payer: MEDICARE

## 2025-07-08 VITALS
HEART RATE: 62 BPM | WEIGHT: 226.19 LBS | DIASTOLIC BLOOD PRESSURE: 74 MMHG | BODY MASS INDEX: 34.39 KG/M2 | TEMPERATURE: 97.2 F | RESPIRATION RATE: 16 BRPM | SYSTOLIC BLOOD PRESSURE: 131 MMHG | OXYGEN SATURATION: 93 %

## 2025-07-08 DIAGNOSIS — D63.1 ANEMIA DUE TO STAGE 4 CHRONIC KIDNEY DISEASE: ICD-10-CM

## 2025-07-08 DIAGNOSIS — N18.4 ANEMIA DUE TO STAGE 4 CHRONIC KIDNEY DISEASE: ICD-10-CM

## 2025-07-08 DIAGNOSIS — D46.9 MYELODYSPLASTIC SYNDROME, UNSPECIFIED (MULTI): ICD-10-CM

## 2025-07-08 PROCEDURE — P9040 RBC LEUKOREDUCED IRRADIATED: HCPCS

## 2025-07-08 PROCEDURE — 36430 TRANSFUSION BLD/BLD COMPNT: CPT

## 2025-07-08 ASSESSMENT — PAIN SCALES - GENERAL: PAINLEVEL_OUTOF10: 0-NO PAIN

## 2025-07-09 DIAGNOSIS — I10 ESSENTIAL (PRIMARY) HYPERTENSION: ICD-10-CM

## 2025-07-09 RX ORDER — AMLODIPINE BESYLATE 5 MG/1
5 TABLET ORAL
Qty: 90 TABLET | Refills: 1 | Status: SHIPPED | OUTPATIENT
Start: 2025-07-09 | End: 2026-01-05

## 2025-07-14 ENCOUNTER — APPOINTMENT (OUTPATIENT)
Dept: PULMONOLOGY | Facility: CLINIC | Age: OVER 89
End: 2025-07-14
Payer: MEDICARE

## 2025-07-19 DIAGNOSIS — G25.0 TREMOR, ESSENTIAL: ICD-10-CM

## 2025-07-21 ENCOUNTER — INFUSION (OUTPATIENT)
Dept: HEMATOLOGY/ONCOLOGY | Facility: CLINIC | Age: OVER 89
End: 2025-07-21
Payer: MEDICARE

## 2025-07-21 VITALS
OXYGEN SATURATION: 95 % | WEIGHT: 223.77 LBS | HEART RATE: 86 BPM | BODY MASS INDEX: 34.02 KG/M2 | SYSTOLIC BLOOD PRESSURE: 135 MMHG | TEMPERATURE: 97.5 F | RESPIRATION RATE: 18 BRPM | DIASTOLIC BLOOD PRESSURE: 62 MMHG

## 2025-07-21 DIAGNOSIS — G25.0 TREMOR, ESSENTIAL: ICD-10-CM

## 2025-07-21 DIAGNOSIS — D63.1 ANEMIA DUE TO STAGE 3B CHRONIC KIDNEY DISEASE: ICD-10-CM

## 2025-07-21 DIAGNOSIS — D46.9 MYELODYSPLASTIC SYNDROME, UNSPECIFIED (MULTI): ICD-10-CM

## 2025-07-21 DIAGNOSIS — N18.4 STAGE 4 CHRONIC KIDNEY DISEASE (MULTI): ICD-10-CM

## 2025-07-21 DIAGNOSIS — N18.4 ANEMIA DUE TO STAGE 4 CHRONIC KIDNEY DISEASE: ICD-10-CM

## 2025-07-21 DIAGNOSIS — D61.818 OTHER PANCYTOPENIA (MULTI): ICD-10-CM

## 2025-07-21 DIAGNOSIS — D63.1 ANEMIA DUE TO STAGE 4 CHRONIC KIDNEY DISEASE: ICD-10-CM

## 2025-07-21 DIAGNOSIS — N18.32 ANEMIA DUE TO STAGE 3B CHRONIC KIDNEY DISEASE: ICD-10-CM

## 2025-07-21 LAB
ERYTHROCYTE [DISTWIDTH] IN BLOOD BY AUTOMATED COUNT: 22.2 % (ref 11.5–14.5)
HCT VFR BLD AUTO: 28.4 % (ref 41–52)
HGB BLD-MCNC: 9 G/DL (ref 13.5–17.5)
IMM GRANULOCYTES # BLD AUTO: 0.01 X10*3/UL (ref 0–0.5)
IMM GRANULOCYTES NFR BLD AUTO: 0.9 % (ref 0–0.9)
MCH RBC QN AUTO: 32.5 PG (ref 26–34)
MCHC RBC AUTO-ENTMCNC: 31.7 G/DL (ref 32–36)
MCV RBC AUTO: 103 FL (ref 80–100)
NRBC BLD-RTO: 0 /100 WBCS (ref 0–0)
PLATELET # BLD AUTO: 56 X10*3/UL (ref 150–450)
RBC # BLD AUTO: 2.77 X10*6/UL (ref 4.5–5.9)
WBC # BLD AUTO: 1.1 X10*3/UL (ref 4.4–11.3)

## 2025-07-21 PROCEDURE — 36415 COLL VENOUS BLD VENIPUNCTURE: CPT

## 2025-07-21 PROCEDURE — 96372 THER/PROPH/DIAG INJ SC/IM: CPT

## 2025-07-21 PROCEDURE — 85027 COMPLETE CBC AUTOMATED: CPT

## 2025-07-21 PROCEDURE — 2500000004 HC RX 250 GENERAL PHARMACY W/ HCPCS (ALT 636 FOR OP/ED): Mod: JZ,EC

## 2025-07-21 PROCEDURE — 85007 BL SMEAR W/DIFF WBC COUNT: CPT

## 2025-07-21 RX ORDER — FAMOTIDINE 10 MG/ML
20 INJECTION, SOLUTION INTRAVENOUS ONCE AS NEEDED
OUTPATIENT
Start: 2025-08-04

## 2025-07-21 RX ORDER — ALBUTEROL SULFATE 0.83 MG/ML
3 SOLUTION RESPIRATORY (INHALATION) AS NEEDED
OUTPATIENT
Start: 2025-08-04

## 2025-07-21 RX ORDER — PRIMIDONE 50 MG/1
50 TABLET ORAL NIGHTLY
Qty: 90 TABLET | Refills: 1 | OUTPATIENT
Start: 2025-07-21

## 2025-07-21 RX ORDER — EPINEPHRINE 0.3 MG/.3ML
0.3 INJECTION SUBCUTANEOUS EVERY 5 MIN PRN
OUTPATIENT
Start: 2025-08-04

## 2025-07-21 RX ORDER — DIPHENHYDRAMINE HYDROCHLORIDE 50 MG/ML
50 INJECTION, SOLUTION INTRAMUSCULAR; INTRAVENOUS AS NEEDED
OUTPATIENT
Start: 2025-08-04

## 2025-07-21 RX ORDER — PRIMIDONE 50 MG/1
50 TABLET ORAL NIGHTLY
Qty: 90 TABLET | Refills: 1 | Status: SHIPPED | OUTPATIENT
Start: 2025-07-21

## 2025-07-21 RX ADMIN — DARBEPOETIN ALFA 500 MCG: 500 INJECTION, SOLUTION INTRAVENOUS; SUBCUTANEOUS at 12:06

## 2025-07-21 ASSESSMENT — PAIN SCALES - GENERAL: PAINLEVEL_OUTOF10: 0-NO PAIN

## 2025-07-22 LAB
BASOPHILS # BLD MANUAL: 0 X10*3/UL (ref 0–0.1)
BASOPHILS NFR BLD MANUAL: 0 %
BLASTS # BLD MANUAL: 0 X10*3/UL
BLASTS NFR BLD MANUAL: 0 %
BURR CELLS BLD QL SMEAR: ABNORMAL
EOSINOPHIL # BLD MANUAL: 0.02 X10*3/UL (ref 0–0.4)
EOSINOPHIL NFR BLD MANUAL: 2 %
LYMPHOCYTES # BLD MANUAL: 0.45 X10*3/UL (ref 0.8–3)
LYMPHOCYTES NFR BLD MANUAL: 41 %
MONOCYTES # BLD MANUAL: 0.02 X10*3/UL (ref 0.05–0.8)
MONOCYTES NFR BLD MANUAL: 2 %
NEUTROPHILS # BLD MANUAL: 0.59 X10*3/UL (ref 1.6–5.5)
NEUTS BAND # BLD MANUAL: 0.04 X10*3/UL (ref 0–0.5)
NEUTS BAND NFR BLD MANUAL: 4 %
NEUTS SEG # BLD MANUAL: 0.55 X10*3/UL (ref 1.6–5)
NEUTS SEG NFR BLD MANUAL: 50 %
OVALOCYTES BLD QL SMEAR: ABNORMAL
PATH REVIEW-CBC DIFFERENTIAL: NORMAL
RBC MORPH BLD: ABNORMAL
SCHISTOCYTES BLD QL SMEAR: ABNORMAL
TOTAL CELLS COUNTED BLD: 100
VARIANT LYMPHS # BLD MANUAL: 0 X10*3/UL (ref 0–0.3)
VARIANT LYMPHS NFR BLD: 0 %
WBC OTHER # BLD MANUAL: 0 X10*3/UL
WBC OTHER NFR BLD MANUAL: 0 %

## 2025-07-25 ENCOUNTER — HOSPITAL ENCOUNTER (OUTPATIENT)
Dept: HOSPITAL 100 - EN | Age: OVER 89
Discharge: HOME | End: 2025-07-25
Payer: MEDICARE

## 2025-07-25 VITALS
RESPIRATION RATE: 16 BRPM | OXYGEN SATURATION: 98 % | TEMPERATURE: 97.9 F | DIASTOLIC BLOOD PRESSURE: 58 MMHG | HEART RATE: 79 BPM | SYSTOLIC BLOOD PRESSURE: 112 MMHG

## 2025-07-25 VITALS
DIASTOLIC BLOOD PRESSURE: 58 MMHG | SYSTOLIC BLOOD PRESSURE: 112 MMHG | OXYGEN SATURATION: 96 % | TEMPERATURE: 97.88 F | RESPIRATION RATE: 16 BRPM | HEART RATE: 74 BPM

## 2025-07-25 VITALS
TEMPERATURE: 98.7 F | HEART RATE: 78 BPM | RESPIRATION RATE: 18 BRPM | OXYGEN SATURATION: 97 % | DIASTOLIC BLOOD PRESSURE: 52 MMHG | SYSTOLIC BLOOD PRESSURE: 112 MMHG

## 2025-07-25 VITALS
OXYGEN SATURATION: 97 % | HEART RATE: 78 BPM | TEMPERATURE: 97.88 F | DIASTOLIC BLOOD PRESSURE: 58 MMHG | RESPIRATION RATE: 16 BRPM | SYSTOLIC BLOOD PRESSURE: 112 MMHG

## 2025-07-25 VITALS
OXYGEN SATURATION: 96 % | DIASTOLIC BLOOD PRESSURE: 52 MMHG | SYSTOLIC BLOOD PRESSURE: 123 MMHG | RESPIRATION RATE: 16 BRPM | HEART RATE: 73 BPM

## 2025-07-25 VITALS
DIASTOLIC BLOOD PRESSURE: 52 MMHG | OXYGEN SATURATION: 97 % | HEART RATE: 78 BPM | RESPIRATION RATE: 18 BRPM | TEMPERATURE: 98.78 F | SYSTOLIC BLOOD PRESSURE: 112 MMHG

## 2025-07-25 VITALS — SYSTOLIC BLOOD PRESSURE: 112 MMHG | DIASTOLIC BLOOD PRESSURE: 52 MMHG

## 2025-07-25 VITALS — BODY MASS INDEX: 33.2 KG/M2

## 2025-07-25 DIAGNOSIS — R18.8: ICD-10-CM

## 2025-07-25 DIAGNOSIS — D69.6: ICD-10-CM

## 2025-07-25 DIAGNOSIS — N18.4: ICD-10-CM

## 2025-07-25 DIAGNOSIS — Z79.82: ICD-10-CM

## 2025-07-25 DIAGNOSIS — K76.6: ICD-10-CM

## 2025-07-25 DIAGNOSIS — K29.80: ICD-10-CM

## 2025-07-25 DIAGNOSIS — I25.10: ICD-10-CM

## 2025-07-25 DIAGNOSIS — E11.22: ICD-10-CM

## 2025-07-25 DIAGNOSIS — I12.9: ICD-10-CM

## 2025-07-25 DIAGNOSIS — E78.00: ICD-10-CM

## 2025-07-25 DIAGNOSIS — R89.7: Primary | ICD-10-CM

## 2025-07-25 DIAGNOSIS — K74.60: ICD-10-CM

## 2025-07-25 DIAGNOSIS — Z79.4: ICD-10-CM

## 2025-07-25 DIAGNOSIS — Z95.5: ICD-10-CM

## 2025-07-25 DIAGNOSIS — Z79.899: ICD-10-CM

## 2025-07-25 LAB — GLUCOSE BLDC GLUCOMTR-MCNC: 126 MG/DL (ref 74–106)

## 2025-07-25 PROCEDURE — 88342 IMHCHEM/IMCYTCHM 1ST ANTB: CPT

## 2025-07-25 PROCEDURE — 0DJ08ZZ INSPECTION OF UPPER INTESTINAL TRACT, VIA NATURAL OR ARTIFICIAL OPENING ENDOSCOPIC: ICD-10-PCS | Performed by: INTERNAL MEDICINE

## 2025-07-25 PROCEDURE — 88305 TISSUE EXAM BY PATHOLOGIST: CPT

## 2025-07-25 PROCEDURE — 44361 SMALL BOWEL ENDOSCOPY/BIOPSY: CPT

## 2025-07-25 PROCEDURE — 82962 GLUCOSE BLOOD TEST: CPT

## 2025-07-25 RX ADMIN — SODIUM CHLORIDE, POTASSIUM CHLORIDE, SODIUM LACTATE AND CALCIUM CHLORIDE 15 ML: 600; 310; 30; 20 INJECTION, SOLUTION INTRAVENOUS at 12:12

## 2025-07-28 ENCOUNTER — APPOINTMENT (OUTPATIENT)
Dept: NEPHROLOGY | Facility: CLINIC | Age: OVER 89
End: 2025-07-28
Payer: MEDICARE

## 2025-07-28 VITALS
HEART RATE: 68 BPM | HEIGHT: 68 IN | BODY MASS INDEX: 33.59 KG/M2 | OXYGEN SATURATION: 98 % | DIASTOLIC BLOOD PRESSURE: 64 MMHG | WEIGHT: 221.6 LBS | SYSTOLIC BLOOD PRESSURE: 126 MMHG

## 2025-07-28 DIAGNOSIS — N18.4 TYPE 2 DIABETES MELLITUS WITH STAGE 4 CHRONIC KIDNEY DISEASE, WITH LONG-TERM CURRENT USE OF INSULIN (MULTI): ICD-10-CM

## 2025-07-28 DIAGNOSIS — E11.22 TYPE 2 DIABETES MELLITUS WITH STAGE 4 CHRONIC KIDNEY DISEASE, WITH LONG-TERM CURRENT USE OF INSULIN (MULTI): ICD-10-CM

## 2025-07-28 DIAGNOSIS — Z79.4 TYPE 2 DIABETES MELLITUS WITH STAGE 4 CHRONIC KIDNEY DISEASE, WITH LONG-TERM CURRENT USE OF INSULIN (MULTI): ICD-10-CM

## 2025-07-28 DIAGNOSIS — N18.4 STAGE 4 CHRONIC KIDNEY DISEASE (MULTI): Primary | ICD-10-CM

## 2025-07-28 DIAGNOSIS — I10 PRIMARY HYPERTENSION: ICD-10-CM

## 2025-07-28 DIAGNOSIS — E78.2 MIXED HYPERLIPIDEMIA: ICD-10-CM

## 2025-07-28 PROCEDURE — 1159F MED LIST DOCD IN RCRD: CPT | Performed by: CLINICAL NURSE SPECIALIST

## 2025-07-28 PROCEDURE — 99213 OFFICE O/P EST LOW 20 MIN: CPT | Performed by: CLINICAL NURSE SPECIALIST

## 2025-07-28 PROCEDURE — 3074F SYST BP LT 130 MM HG: CPT | Performed by: CLINICAL NURSE SPECIALIST

## 2025-07-28 PROCEDURE — 1160F RVW MEDS BY RX/DR IN RCRD: CPT | Performed by: CLINICAL NURSE SPECIALIST

## 2025-07-28 PROCEDURE — 1036F TOBACCO NON-USER: CPT | Performed by: CLINICAL NURSE SPECIALIST

## 2025-07-28 PROCEDURE — 3078F DIAST BP <80 MM HG: CPT | Performed by: CLINICAL NURSE SPECIALIST

## 2025-07-28 ASSESSMENT — ENCOUNTER SYMPTOMS
CARDIOVASCULAR NEGATIVE: 1
GASTROINTESTINAL NEGATIVE: 1
PSYCHIATRIC NEGATIVE: 1
NEUROLOGICAL NEGATIVE: 1
CONSTITUTIONAL NEGATIVE: 1
MUSCULOSKELETAL NEGATIVE: 1
SHORTNESS OF BREATH: 1
ALLERGIC/IMMUNOLOGIC NEGATIVE: 1
EYES NEGATIVE: 1
ENDOCRINE NEGATIVE: 1
HEMATOLOGIC/LYMPHATIC NEGATIVE: 1

## 2025-07-28 NOTE — ASSESSMENT & PLAN NOTE
Renal function is stable with creatinine of 2.96, he is up slightly from his last check but is staying within his baseline, electrolytes are within normal limits, blood pressure is stable, blood sugars frequently difficult to control, no changes at this time

## 2025-07-28 NOTE — PROGRESS NOTES
Addended by: Cydney Levine on: 2/20/2018 02:05 PM     Modules accepted: Orders Subjective   Patient ID: Zack Medrano is a 89 y.o. male who presents for Follow-up (3 months/Review labs 7/7).  Being seen in follow-up for chronic kidney disease stage IV with history of hypertension and diabetes mellitus type 2    Labs reviewed  Last CBC with WBCs of 1.1, H&H 9.0 and 28.4, platelets 56, is being followed by oncology for myelodysplasia  Glucose 259  Sodium 140, potassium 4.7, chloride 105, bicarb 25  Renal function with a BUN of 46 and creatinine of 2.96, GFR is 20, calcium 8.6  3 months ago his BUN was 52 with creatinine of 2.67    He recently had a blood transfusion and states he is feeling better after that  He is complaining of some constipation but started taking something at night that has helped with this  Mild lower extremity edema  Shortness of breath only with exertion, wears oxygen  Continues to follow with oncology/hematology        Review of Systems   Constitutional: Negative.    HENT: Negative.     Eyes: Negative.    Respiratory:  Positive for shortness of breath (With exertion).    Cardiovascular: Negative.    Gastrointestinal: Negative.    Endocrine: Negative.    Genitourinary: Negative.    Musculoskeletal: Negative.    Skin: Negative.    Allergic/Immunologic: Negative.    Neurological: Negative.    Hematological: Negative.    Psychiatric/Behavioral: Negative.         Objective   Physical Exam  Constitutional:       Appearance: Normal appearance. He is obese.   HENT:      Head: Normocephalic and atraumatic.      Mouth/Throat:      Mouth: Mucous membranes are moist.     Eyes:      Extraocular Movements: Extraocular movements intact.       Cardiovascular:      Rate and Rhythm: Normal rate and regular rhythm.      Heart sounds: Normal heart sounds.   Pulmonary:      Effort: Pulmonary effort is normal.      Breath sounds: Normal breath sounds.   Abdominal:      General: Bowel sounds are normal.      Palpations: Abdomen is soft.     Musculoskeletal:         General: Normal range of  motion.     Skin:     General: Skin is warm and dry.     Neurological:      Mental Status: He is alert.     Psychiatric:         Behavior: Behavior normal.         Thought Content: Thought content normal.         Assessment/Plan   Problem List Items Addressed This Visit           ICD-10-CM    Type 2 diabetes mellitus with stage 4 chronic kidney disease, with long-term current use of insulin (Multi) E11.22, N18.4, Z79.4    Hypertension I10    Mixed hyperlipidemia E78.2    Stage 4 chronic kidney disease (Multi) - Primary N18.4    Renal function is stable with creatinine of 2.96, he is up slightly from his last check but is staying within his baseline, electrolytes are within normal limits, blood pressure is stable, blood sugars frequently difficult to control, no changes at this time         Relevant Orders    Comprehensive metabolic panel    Phosphorus    Magnesium    Follow Up In Nephrology       CKD IV with baseline creatinine 2.3-2.8  Pancytopenia  Hypertension with blood pressure on low side at this time  Diabetic Nephropathy:   Diabetes mellitus type 2 on insulin  Bilateral lower extremity edema: Stable.   History of pulmonary embolism  Obesity  Obstructive sleep apnea on CPAP machine and compliant  Hyperlipidemia  History of coronary artery  B/L Renal Cyst  Hyperuricemia  myelodysplasia        Supriya Nguyen, NATALY-MULUGETA, DNP 07/28/25 11:00 AM

## 2025-08-04 ENCOUNTER — OFFICE VISIT (OUTPATIENT)
Dept: HEMATOLOGY/ONCOLOGY | Facility: CLINIC | Age: OVER 89
End: 2025-08-04
Payer: MEDICARE

## 2025-08-04 ENCOUNTER — INFUSION (OUTPATIENT)
Dept: HEMATOLOGY/ONCOLOGY | Facility: CLINIC | Age: OVER 89
End: 2025-08-04
Payer: MEDICARE

## 2025-08-04 VITALS
TEMPERATURE: 97.2 F | SYSTOLIC BLOOD PRESSURE: 145 MMHG | RESPIRATION RATE: 20 BRPM | WEIGHT: 223 LBS | DIASTOLIC BLOOD PRESSURE: 66 MMHG | HEART RATE: 83 BPM | BODY MASS INDEX: 33.91 KG/M2 | OXYGEN SATURATION: 96 %

## 2025-08-04 DIAGNOSIS — D63.1 ANEMIA DUE TO STAGE 4 CHRONIC KIDNEY DISEASE: ICD-10-CM

## 2025-08-04 DIAGNOSIS — D63.1 ANEMIA DUE TO STAGE 3B CHRONIC KIDNEY DISEASE: ICD-10-CM

## 2025-08-04 DIAGNOSIS — Z79.4 TYPE 2 DIABETES MELLITUS WITH STAGE 4 CHRONIC KIDNEY DISEASE, WITH LONG-TERM CURRENT USE OF INSULIN (MULTI): ICD-10-CM

## 2025-08-04 DIAGNOSIS — N18.4 TYPE 2 DIABETES MELLITUS WITH STAGE 4 CHRONIC KIDNEY DISEASE, WITH LONG-TERM CURRENT USE OF INSULIN (MULTI): ICD-10-CM

## 2025-08-04 DIAGNOSIS — D46.9 MYELODYSPLASTIC SYNDROME, UNSPECIFIED (MULTI): ICD-10-CM

## 2025-08-04 DIAGNOSIS — E11.22 TYPE 2 DIABETES MELLITUS WITH STAGE 4 CHRONIC KIDNEY DISEASE, WITH LONG-TERM CURRENT USE OF INSULIN (MULTI): ICD-10-CM

## 2025-08-04 DIAGNOSIS — N18.32 ANEMIA DUE TO STAGE 3B CHRONIC KIDNEY DISEASE: ICD-10-CM

## 2025-08-04 DIAGNOSIS — I25.10 CORONARY ARTERY DISEASE INVOLVING NATIVE HEART, UNSPECIFIED VESSEL OR LESION TYPE, UNSPECIFIED WHETHER ANGINA PRESENT: ICD-10-CM

## 2025-08-04 DIAGNOSIS — D72.819 LEUKOPENIA, UNSPECIFIED TYPE: ICD-10-CM

## 2025-08-04 DIAGNOSIS — D70.9 NEUTROPENIA, UNSPECIFIED TYPE: ICD-10-CM

## 2025-08-04 DIAGNOSIS — I10 PRIMARY HYPERTENSION: ICD-10-CM

## 2025-08-04 DIAGNOSIS — E11.22 TYPE 2 DIABETES MELLITUS WITH DIABETIC CHRONIC KIDNEY DISEASE: ICD-10-CM

## 2025-08-04 DIAGNOSIS — G25.0 TREMOR, ESSENTIAL: ICD-10-CM

## 2025-08-04 DIAGNOSIS — N18.4 ANEMIA DUE TO STAGE 4 CHRONIC KIDNEY DISEASE: ICD-10-CM

## 2025-08-04 DIAGNOSIS — D69.6 THROMBOCYTOPENIA: ICD-10-CM

## 2025-08-04 DIAGNOSIS — N18.4 STAGE 4 CHRONIC KIDNEY DISEASE (MULTI): ICD-10-CM

## 2025-08-04 DIAGNOSIS — D61.818 OTHER PANCYTOPENIA (MULTI): ICD-10-CM

## 2025-08-04 DIAGNOSIS — E78.2 MIXED HYPERLIPIDEMIA: ICD-10-CM

## 2025-08-04 LAB
BASO STIPL BLD QL SMEAR: PRESENT
BASOPHILS # BLD AUTO: 0 X10*3/UL (ref 0–0.1)
BASOPHILS NFR BLD AUTO: 0 %
BURR CELLS BLD QL SMEAR: NORMAL
EOSINOPHIL # BLD AUTO: 0.02 X10*3/UL (ref 0–0.4)
EOSINOPHIL NFR BLD AUTO: 2 %
ERYTHROCYTE [DISTWIDTH] IN BLOOD BY AUTOMATED COUNT: 22.2 % (ref 11.5–14.5)
HCT VFR BLD AUTO: 25.9 % (ref 41–52)
HGB BLD-MCNC: 8.3 G/DL (ref 13.5–17.5)
IMM GRANULOCYTES # BLD AUTO: 0 X10*3/UL (ref 0–0.5)
IMM GRANULOCYTES NFR BLD AUTO: 0 % (ref 0–0.9)
LYMPHOCYTES # BLD AUTO: 0.47 X10*3/UL (ref 0.8–3)
LYMPHOCYTES NFR BLD AUTO: 46.5 %
MCH RBC QN AUTO: 33.1 PG (ref 26–34)
MCHC RBC AUTO-ENTMCNC: 32 G/DL (ref 32–36)
MCV RBC AUTO: 103 FL (ref 80–100)
MONOCYTES # BLD AUTO: 0.05 X10*3/UL (ref 0.05–0.8)
MONOCYTES NFR BLD AUTO: 5 %
NEUTROPHILS # BLD AUTO: 0.47 X10*3/UL (ref 1.6–5.5)
NEUTROPHILS NFR BLD AUTO: 46.5 %
NRBC BLD-RTO: 0 /100 WBCS (ref 0–0)
OVALOCYTES BLD QL SMEAR: NORMAL
PLATELET # BLD AUTO: 42 X10*3/UL (ref 150–450)
RBC # BLD AUTO: 2.51 X10*6/UL (ref 4.5–5.9)
RBC MORPH BLD: NORMAL
SCHISTOCYTES BLD QL SMEAR: NORMAL
WBC # BLD AUTO: 1 X10*3/UL (ref 4.4–11.3)

## 2025-08-04 PROCEDURE — 99214 OFFICE O/P EST MOD 30 MIN: CPT

## 2025-08-04 PROCEDURE — 36415 COLL VENOUS BLD VENIPUNCTURE: CPT

## 2025-08-04 PROCEDURE — 1126F AMNT PAIN NOTED NONE PRSNT: CPT

## 2025-08-04 PROCEDURE — 3078F DIAST BP <80 MM HG: CPT

## 2025-08-04 PROCEDURE — 3077F SYST BP >= 140 MM HG: CPT

## 2025-08-04 PROCEDURE — 96372 THER/PROPH/DIAG INJ SC/IM: CPT

## 2025-08-04 PROCEDURE — 85025 COMPLETE CBC W/AUTO DIFF WBC: CPT

## 2025-08-04 PROCEDURE — 2500000004 HC RX 250 GENERAL PHARMACY W/ HCPCS (ALT 636 FOR OP/ED): Mod: JZ,EC

## 2025-08-04 PROCEDURE — 1159F MED LIST DOCD IN RCRD: CPT

## 2025-08-04 RX ORDER — FAMOTIDINE 10 MG/ML
20 INJECTION, SOLUTION INTRAVENOUS ONCE AS NEEDED
OUTPATIENT
Start: 2025-08-18

## 2025-08-04 RX ORDER — ALBUTEROL SULFATE 0.83 MG/ML
3 SOLUTION RESPIRATORY (INHALATION) AS NEEDED
OUTPATIENT
Start: 2025-08-18

## 2025-08-04 RX ORDER — EPINEPHRINE 0.3 MG/.3ML
0.3 INJECTION SUBCUTANEOUS EVERY 5 MIN PRN
OUTPATIENT
Start: 2025-08-18

## 2025-08-04 RX ORDER — B.COAGUL,SUBTILIS/INULIN/VIT C 1B CELL-1G
TABLET,CHEWABLE ORAL
COMMUNITY

## 2025-08-04 RX ORDER — DIPHENHYDRAMINE HYDROCHLORIDE 50 MG/ML
50 INJECTION, SOLUTION INTRAMUSCULAR; INTRAVENOUS AS NEEDED
OUTPATIENT
Start: 2025-08-18

## 2025-08-04 RX ADMIN — DARBEPOETIN ALFA 500 MCG: 500 INJECTION, SOLUTION INTRAVENOUS; SUBCUTANEOUS at 12:18

## 2025-08-04 ASSESSMENT — COLUMBIA-SUICIDE SEVERITY RATING SCALE - C-SSRS
2. HAVE YOU ACTUALLY HAD ANY THOUGHTS OF KILLING YOURSELF?: NO
1. IN THE PAST MONTH, HAVE YOU WISHED YOU WERE DEAD OR WISHED YOU COULD GO TO SLEEP AND NOT WAKE UP?: NO

## 2025-08-04 ASSESSMENT — PAIN SCALES - GENERAL: PAINLEVEL_OUTOF10: 0-NO PAIN

## 2025-08-04 NOTE — PROGRESS NOTES
Patient ID: Zack Medrano is a 89 y.o. male.    Subjective    HPI    HPI     Chief Complaint          Chief Complaint   Patient presents with    Anemia       Stage 4 chronic kidney disease             History of the Present Illness  10/9/2019.  Visit with Dr. Mickey Nguyen for chronic kidney disease and hypertension.  Chief complaint was fatigue and need to lose weight     11/12/2019.  The patient has had long-term history of microcytic anemia going back to at least 2019 at which time his white count was 5.2 hemoglobin was 13.8 hematocrit 40.5 and a platelet count of 157,000.  His MCV was 105 MCHC was 34.  White blood cell differential count showed 66% polys, 17 lymphs, 11 monos and 4 eosinophils.     12/11/2019.  Seen by primary care for probable shingles treated with Valtrex with chief complaint of waxing and waning left flank pain radiating up under his ribs, somewhat responsive to exercise..  He had a history of pulmonary embolus and was on Eliquis long-term.     2/24/2020.  Seen by Dr. Howell for elevated PSA, BPH and erectile dysfunction.  PSA in 2017 was 2.94, in 2018 it was 2.74 and in 2020 it was 4.03.  We discussed biopsy and the patient wanted to have this followed and not biopsy.     3/9/2020.  Follow-up with NATALY Pulido primary care.  Referred to Dr. Bahena for colonoscopy and CT scan of the chest was ordered for follow-up of questionable lingular nodule.     3/11/2020.  CT scan of the chest showed a stable 6 mm nodular lesion near the fissure in the lingula.     3/13/2020.  Seen by Dr. Bahena who noted that the patient had had diarrhea off-and-on since cholecystectomy and had intermittent left-sided pain.  He had a history of colon polyps.     4/22/2015.  Colonoscopy showed that he had tubular adenoma     5/13/2020.  Seen by Dr. Migdalia New in follow-up.  She noted that he had an abnormal CBC with macrocytic indices with a normal B12 level.  The patient denies alcohol use.  Hemoglobin  A1c was 6.2.  BUN was 33 creatinine was 1.74.     8/31/2020.  Follow-up with Dr. Howell.  PSA had come down from 4.03-3.77     1/4/2021 through 1/12/2021.  Admitted to the hospital having been brought to the ED by squad after he fell forward on his toilet and was unable to get up.  He denied hitting his head or losing consciousness.  He blamed it on having back pain and shoulder pain for several weeks.  He was febrile with a temperature of 100.9 and hypoxic with pulse ox of 86% on room air.  Respirations were 24.  He was positive for COVID and was treated with intravenous antibiotics and remdesivir, increased supplemental oxygen.  He was able to be discharged to rehabilitation.     Repeat CBC showed white count 4.3 with a hemoglobin 12.9 hematocrit 38.8 with an MCV of 102 and a platelet count of 173,000.  White blood cell differential count showed minimal absolute lymphocytopenia at 0.6 with the lower limits of normal being 0.8.     3/1/2021.  Follow-up with Dr. Howell.  PSA was 9.9 in February.  Decided to have prostate biopsy done on 3/22/2021.  Results showed BPH.     10/6/2021.  Follow-up with Dr. Howell.  PSA in September was 2.77     4/6/2022.  CBC showed white count 4.3 with hemoglobin 11.7 hematocrit 34.4 with an MCV of 107.  Platelet count was 153,000.  White blood cell differential count was essentially normal.     5/31/2022.  CBC showed white count 3.6 with hemoglobin 11.6 hematocrit 34.4 and platelet count 117,000 with a normal white blood cell differential.     6/27/2022.  Referred to Dr. Dash because of pancytopenia by NATALY Gray.  CBC showed a white count of 3.9 with hemoglobin 12.3 hematocrit 36.1 and platelet count 129,000 with a normal differential.  PSA was 3.26.     The patient said that he felt well except for having increased fatigue.  He continued on long-term anticoagulation.  He was able to carry out his ADLs.  He did complain of easy bruising.  He said that he had worked at [employer]  for 12 years and also worked in making [employer].  Dr. Dash felt that there was a large component secondary to his chronic kidney disease.  There was no evidence of nutritional deficiency.  He checked him for hemolysis and monoclonal gammopathy and ordered an ultrasound for hepatosplenomegaly and testing for hepatitis, HIV and CHRISTINA.  He said he would rather observe the patient rather than pursuing a bone marrow biopsy.     7/27/2022.  Repeat lab data showed white count 3.9 with hemoglobin 12.3 hematocrit 36.1 and platelet count 129,000.  BUN was 36 creatinine was 1.78.  He had mild elevation of AST.  Retake count was 2.3%.  Flow cytometry showed a small clonal B-cell population.  Hepatitis B was nonreactive.  Hep C testing was negative.  Serum protein electrophoresis was normal.  Haptoglobin was normal.  LDH was normal.  He said that the possibility of MDS could not be ruled out.  Ultrasound showed fatty liver but no splenomegaly.  He plan for EPO level and NGS for myeloid mutation.     9/1/2022.  Started on allopurinol for hyperuricemia.     10/12/2022.  Seen by Dr. Dr. Dietz.  Patient was referred to dermatology for several skin lesions.  He was referred to neurology because of his tremor that was not improving.  She increased his glimepiride.     10/17/2022.  CBC showed white count 3.0 with hemoglobin 10.5 hematocrit 31.3 and a platelet count of 106,000 MCV was 111.     10/26/2022.  Follow-up with Dr. 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  He was suspicious for MDS.  He referred him for a bone marrow biopsy.     12/1/2022.  Follow-up with Dr. Dash.  Bone marrow biopsy showed low-grade MDS.  R IPSS score was low or very low depending on a karyotype which was still pending.  We discussed initiating erythropoietin with Aranesp but the patient deferred and the decision was made to monitor him.     1/11/2023.  CBC showed white count 3.4 with hemoglobin 10.3  hematocrit 32.2 and a platelet count of 137,000.  MCV was 115.  White blood cell differential count was normal.     1/13/2023.  Follow-up with Dr. Dash.  The patient did say that he had following getting out of the shower but did not sustain any injuries.  He had been started by Dr. Nguyen on insulin for his diabetes which was helpful.  Patient continued to be active inside his house.  He remains on Eliquis.  BR-IPSS score is very low.  Counts remain stable.     3/31/2023.  CBC showed a white count of 2.6 with hemoglobin 8.7 hematocrit 27.1 and a platelet count 97,000.  MCV was 115.  White blood cell differential count showed that he had an absolute neutrophil count of 1.36.     4/10/2023.  CBC showed a white count of 2.5 with a hemoglobin 9.0 hematocrit of 27.7 with a platelet count of 92,000.  MCV was 114.     4/17/2023.  Follow-up with NATALY Gray.  Even though his hemoglobin had dropped to 9 the patient did not want to start his erythropoietin injections.     5/11/2023.  CBC showed white count of 2.5 with a hemoglobin of 7.6 hematocrit 23.8 and a platelet count of 101,000.  Absolute neutrophil count was 1.17.     5/15/2023 through 5/19/2023.  Admitted to the hospital with anemia and generalized weakness black stools dizziness with a hemoglobin of 6.7.  His BUN was 51 creatinine was 2.08.  CT scan of the abdomen showed no acute process.  There were findings consistent with atypical healing of the previous rib fracture with question Paget's disease although it was nonspecific.  He was transfused 2 units packed red blood cells.  EGD found 3 erosions in the cardia.  He was prescribed Carafate.     5/24/2023.  Follow-up with Dr. Dr. Dietz after discharge.  CBC showed white count 1.9 with hemoglobin 8.6 hematocrit 28.3 and a platelet count of 140,000.  Absolute neutrophil count was 1.06.  Patient did not tolerate Carafate and stopped it.  He was prescribed oral iron twice a day.  He had not been prescribed a  "PPI which was then started.     6/1/2023.  CBC showed white count of 1.9 with hemoglobin 7.7 hematocrit 24.8 and a platelet count of 87,000.     6/13/2023.  CBC showed a white count of 1.9 with hemoglobin of 8.7 hematocrit 28.1 and platelet count of 89,000.     6/23/2023.  Followed up with Dr. Dr. Dietz who reported that he was hospitalized again in [place] for GI bleed.  At that time his Eliquis was discontinued and he was changed to Protonix and taken off his baby aspirin.  Once again he was started on Carafate but did not tolerate it.     7/15/2023.  CBC showed white count of 2.4 with hemoglobin 9.2 hematocrit 27.3 and a platelet count of 110,000.  Absolute neutrophil count was 1.36.     7/17/2023.  Follow-up with Malorie Shaffer.  The patient had recently had a colonoscopy and had \"cauterizations\" he noted that he had dyspnea with exertion but no resting shortness of breath.  He was on prednisone at that time.  His sugars were in the 300s.  He was on insulin.  His neuropathy has not changed and he continues to have tremors.  Current level was found to be saturated.  Once again the patient was reluctant to start NING.     8/14/2023.  Follow-up with Malorie Shaffer.  CBC showed white count 1.8 with a hemoglobin 8.9 hematocrit 26.9 and a platelet count of 100,000.  Absolute neutrophil count was 0.96.     9/6/2023.  CBC showed a white count of 1.8 with hemoglobin 9.5 hematocrit 28.8 and a platelet count of 85,000 with an absolute neutrophil count of 0.99.  9/13/2023.  Follow-up with Dr. Howell.  PSA has gone down to 1.87.     10/9/2023.  Follow-up with Malorie Shaffer who noted the patient remained on oxygen and CPAP.  He says that he is awakening in the middle of the night around 3 to 4:00 in the morning.  Sugars have been in the 300s.  Neuropathy was unchanged.  He had been started on erythropoietin every 3 weeks     11/21/2023.  Follow-up with Malorie Shaffer.  No improvement in his hemoglobin.  White count had improved to 2.1 " platelets were baseline.  He had been placed on 40 mg of prednisone and continued on erythropoietin 300 mcg every 3 weeks.     1/2/2024.  Follow-up with Malorie Shaffer.  No improvement in hemoglobin.  Symptomatically he was doing well.  Plan was to increase frequency of his darbepoetin to every 2 weeks.     2/13/2024.  Follow-up with Malorie Shaffer.  Now on erythropoietin injections every 2 weeks.  There was a discussion concerning repeat bone marrow biopsy with counts stayed low.     3/12/2024.  Follow-up with Malorie Shaffer.  No improvement in his hemoglobin.  Patient was agreeable to repeat bone marrow biopsy.  She also ordered a CAT scan of the chest abdomen and pelvis without contrast.     3/22/2024.  CAT scan showed severe coronary artery calcifications.  There is evidence of bronchiectasis in the lower lobes increased compared to previous CT scan from 2020.     Predominantly groundglass opacities with bibasilar prominence.  There is some subpleural sparing of the left upper lobe.  There is a 3 mm nodule in the right upper lobe which was unchanged for several years.  Multilevel anterior bridging osteophytes were noted consistent with DISH.  There is a small fat-containing periumbilical hernia and bilateral inguinal hernias.  Spleen size is at the upper limits of normal being 12.7 cm in length slightly increased when compared to study from 2019.  Cysts were seen in the kidneys with diffuse cortical atrophy and a 4 mm hyperdense focus in the interpolar region of the right kidney possibly a stone.  Prostatism was noted for gland measuring 6.2 cm.     3/28/2024.  Seen by Dr. Graham for shortness of breath.  He felt the patient had possible chronic interstitial lung disease versus periodic aspiration, hypoxia dyspnea on exertion.  Further tests were ordered including high-resolution CT scan, complete pulmonary function testing, simple pulmonary stress test and follow-up.     4/9/2024.  Follow-up with Malorie Shaffer.   Erythropoietin injections continued every 2 weeks which the patient was tolerating well.     4/16/2024.  Bone marrow biopsy was performed showing a hypercellular bone marrow at 50% with dyserythropoiesis.  There are 1% blasts consistent with persistent myeloid neoplasm.  Flow cytometry showed a small clonal CD5 negative, CD10 negative B-cell population and a polytypic background.  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     There was a very small abnormal T-cell population that could be incidental.  Next generation sequencing showed 2 U2AF 1 variants, 1 of which was negative.  The other had been previously seen.     The marrow also showed dyserythropoiesis with bilobate forms and forms with irregular nuclear contours as well as blebbing.     4/23/2024.  CBC showed white count 1.5 with a hemoglobin of 8.3 hematocrit 25.9 and platelet count of 71,000 with 37% neutrophils, 53 lymphocytes, 4 monos, 4 eosinophils with an absolute neutrophil count of 0.57.     4/24/2024.  Return visit with Dr. Graham.  He noted that the high-resolution CT scan showed no significant change from the previous CT scan the month before.  Pulmonary function testing showed no response to bronchodilator and there was mild restrictive change with some suggestion of airway inflammation.  The patient had difficulty doing the test because of persistent coughing.  6-minute walk test showed that the patient was able to maintain saturations at 93% with 2 L of oxygen per minute continuously.  The test was terminated because of dyspnea.  With his hypoxia on room air it was recommended that he be on oxygen continuously to keep his saturation greater than or equal to 92%.  He continued on oxygen with his CPAP at 3 L at nighttime.     5/10/2024.  This is my first visit with Mr. Medrano and his family.  We went  over some of the results of the bone marrow biopsy.  I told him that I would have to wait until the chromosomal analysis returned.  I also note the possibility of low-grade lymphoma.  He may need to have an opinion with malignant hematology at [facility].  Stepdaughter who is here with him and his wife today is his advocate for maintaining his quality of life.  She is an employee at a local nursing facility and is concerned about many clients she has who seem to be getting treated aggressively.  We talked about myelodysplasia and the fact that he is not responding to erythropoietin.  He will continue on this treatment for now.  He is low counts were discussed and he knows that he is at higher risk for infection and bleeding.  He continues on oxygen supplementation continuously.  They will return in 2 weeks at which time laboratory tests will be performed.     He just had a lesion removed from the dorsum of his right hand at the base of his first and second fingers.  Stitches are still in place.  It seems to be healing well without any significant bleeding or infection.  He was told that it looked benign.     5/13/2024.  Follow-up with Dr. Aggie Danielle of cardiology who saw him in follow-up of his coronary artery disease with multiple stents having been placed, hypertension, dyslipidemia and previous pulmonary embolus related to COVID-19.  She had stopped his Eliquis.  She noted his myelodysplasia.  She noted that at some point his aspirin had been stopped but given his history of stents this was restarted and he tolerated it well.  She said that his blood pressure was well-controlled.  She noted blood sugars were between 150-2 50.  She noted the patient was supposed to be using oxygen with exertion but the patient was not sure what rate it was supposed to be.  She says that she would not stop his aspirin unless his platelets were less than 50,000 with complications of bleeding.  She said it was very important to continue  aspirin given a 15% chance of in-stent thrombosis with aspirin cessation.  She increased her furosemide to 80 mg twice a day because of bilateral lower extremity edema and spironolactone to 25 mg twice daily until symptoms improve and encouraged him to cut back on his salt intake.  She was see him again in about 9 months.     5/14/2024.  Follow-up with Dr. Santamaria of ophthalmology who started him on Naphcon-A drops 1 drop 3 times a day in both eyes for excessive tearing     5/15/2024.  Followed up with Dr. Dimas Renteria of podiatry who noted decreased pedal pulses 2 out of 4.  He had footcare.     5/24/2024.  Follow-up with Dr. Graham.  Review of his x-rays showed no change.  Physical examination showed decreased breath sounds but no inspiratory crackles that were previously heard.  He felt that the interstitial lung disease was stable and that his hypoxemia was improved on oxygen.  He plan to repeat CT scan in September.     5/24/2024.  He is here to follow-up on his bone marrow results.  I told him that the chromosomal analysis would not be forthcoming before because the specimen failed to grow in culture.  He continues to display a low-grade MDS with blast count of only 2%.  We reviewed the fact that he is recommended to have at least 6 months of his erythropoietin before we decide whether or not it is a failure.  He is agreeable to this.  We will increase his dose of darbepoetin.  He knows to be on the look out for any signs of infection or bleeding and to report this if it happens.  We reviewed his most recent CBC.  We will see him again in 2 weeks with his next injection.     5/30/2024.  Follow-up with Dr. Dr. Dietz for his Medicare wellness visit.  His magnesium dose was changed to twice daily indefinitely.     6/7/2024.  He is here today for his darbepoetin injection.  We have increased his dose to 500 mcg every 2 weeks.  Will see if this has any effect.  Laboratory dated today shows that his sugar is  290 and his sodium is 134.  BUN is 46 and creatinine is 2.31.  CBC shows white count 1.5 with a hemoglobin 8.1 hematocrit 24.7 and a platelet count of 71,000 which is very much like his most recent CBC.  His absolute neutrophil count is 0.57.     I talked to him about his current status which he feels is fairly stable.  He is off oxygen.  He has had no bleeding and no signs of infection.  He understands that we have increased his dose of darbepoetin and we will watch his counts.  He will report any side effects.     He has continued to receive his darbepoetin injections.     7/15/2024.  Emergency department visit [facility].  He developed a rash on both lower extremities which started a week ago which is an erythematous papular rash.  He denied any pain or itching.  He did not have any vesicle formation.  It is bilateral.  He has had a shingles injection.  He was told that he had disseminated zoster and was started on valacyclovir a gram twice daily for 7 days.     7/19/2024.  Seen by Dr. Dr. Dietz who said that the rash has not gotten any worse and might be a little bit better.  She noted that there were no vesicles no open areas no areas of secondary infection.     7/19/2024.  He is here to receive his erythropoietin injection and to have a blood count.  I told him it is not likely that this is disseminated zoster because it is nowhere else on his body but only on his lower extremities.  This could be stasis dermatitis.  I do not see any signs of vesicle formation and there never were any.  It is mostly on the anterior shins.  It is all below the knee.     CBC today shows white count 1.4 with hemoglobin 8 hematocrit 25.4 platelet count 67,000 with 37 neutrophils 53 lymphocytes 5 monos 2 eosinophils.  He is serum chemistry showed a sugar of 285 BUN of 49 creatinine of 2.67.  We told him he needed to keep his hydration status high.  He will continue with his injections today.  Blood pressure today is  153/64.     He has had no bleeding and no infection.  He has had no chest pain or pressure.  He is able to participate in his usual activities including mowing the yard.     He has continued to receive his darbepoetin and has followed up with podiatry for nail and callus care.     8/30/2024.  He is here in routine follow-up.  He says that he is doing well.  He has had no infections or bleeding.  He has had no mouth sores.  He has had continued swelling in his legs a little bit worse on the left than on the right.  Blood sugar was greater than 300 but he admits to dietary indiscretion.  Has had no chest pain.  His blood pressure was 149/63.  He says that his breathing is stable.  He is using his oxygen at home at night and when he travels that he does not have it on today.     His lab data has shown no sign of improvement so far with white count 1.3, hemoglobin 8.6 hematocrit 27.2 and a platelet count of 69,000.  White blood cell differential count shows 38% polys, 50 lymphs, 5 monos, 2 eosinophils.  We plan a 6-month trial of this dose of darbepoetin which will extend until November.  We will see him again in about a month.     9/4/2024.  CT scan of the chest showed mild streaky scarring in the lung bases bilaterally.  The previously seen groundglass opacities probably be minimally improved.  Calcified pleural plaques are seen without consolidation effusion or pneumothorax.  There is no adenopathy seen by CT size criteria.  Minimal hepatic steatosis was seen as well as calcified granuloma in the spleen and a somewhat atrophic pancreas.  DJD was seen in the spine.     9/5/2024.  Seen by Dr. Zambrano his nephrologist who ordered lab tests and felt that his fluctuating chemistries were related to diuretic therapy.  He noted myelodysplasia and that he was being followed for his anemia receiving NING.  He did not change any of his medications  And said that he would see him again in about 6 months     9/10/2024.  Follow-up  by Dr. Graham at which time he told the patient he could decrease his oxygen to 2 and he would repeat a CT scan in 6 months and see him after that.     9/27/2024.  He is here today in routine follow-up.  He will get his shot today.  Laboratory data shows no improvement.  We reviewed that we will follow him for 6 months on this treatment to see if there is any improvement.     Mr. Medrano says that he has had no significant bleeding.  I see some red spots on the soft palate.  His appetite has been good and he has gained weight of 1 pound since last time I saw him about a month ago.  He has had no other signs of infection or deterioration in his status.  He knows he is at risk for serious infection or bleeding.  We will continue on this treatment pathway.  He will see him again in a month.     Interval Note  10/3/2024.  Seen by Dr. 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  BUN was 47 and creatinine was 2.33 with an EGFR of 26.  She noted that he did have some mild edema of the left lower leg and was in the habit of wearing compression stockings.  He denied any increase in shortness of breath.  She said that she wanted to stop his spironolactone which was 25 mg and appeared that it was started for edema.  She said that he would check his blood pressure and weights for the next 2 weeks and then have repeat laboratory data.  Spironolactone was discontinued.     10/11/2024.  Laboratory data showed a BUN of 50 and a creatinine of 2.6, worse than before EGFR was 22.  Sugar was 389.  On 10/21/2024 she told him to stay off spironolactone and call if he had increasing swelling or shortness of breath.  He was to continue weighing himself.     10/25/2024.  Repeat laboratory data showed a BUN of 45 and a creatinine of 2.41 with a sugar of 374.  He was continued on erythropoietin injections.     11/8/2024.  BUN was 44 creatinine was 2.33 and sugar was 430.     11/19/2024.  [facility]  emergency department visit for tremor.  The patient reported that his tremor was worse at night.  He denied any history of restless leg syndrome.  He said that his upper extremity tremor was relieved by movement.  He has a history of previous movement disorder or any other neurologic symptoms.  At that time he said that he was taking 20 mg of Lasix twice a day.  Aldactone was still on his medication list.  BUN is 43 creatinine was 2.45 and sugar was 373.  CT of the head showed no evidence of bleeding there was mild to moderate brain parenchymal atrophy and white matter changes.  He was referred to neurology.     11/22/2024.  BUN was 15 creatinine was 2.60.  Sugar was 353.  At that time it was documented that he was taking 60 mg of Lasix daily and continuing Aldactone.  He did have bilateral pedal edema.  He was continued on  protein injections.  He weighed 229 pounds.     11/25/2024.  BUN was 51 and creatinine was 2.64 with a sugar of 285.     12/9/2024.  BUN was 44 creatinine was 2.47 with a sugar of 313.     12/10/2024.  [facility] ED visit for a rash on his lower extremities.  He was taking Lasix 40 mg twice a day and Aldactone daily.  Blood pressure was 121/52 and he weighed 230 pounds.  There was some mild erythema in his left lower extremity but was not weeping.  He was slightly warm to the touch.  It was said that this could be early cellulitis and a prescription for Keflex was written.     12/16/2024.  He followed up with . Dr. Dietz who said that they could switch antibiotics to doxycycline and recommended a follow-up appointment.  He was seen on the following day and continued on doxycycline for mild to moderate erythema of the inner ankle lower leg he was also treated with gabapentin for his essential tremor.     12/23/2024.  BUN was 43 creatinine was 2.46 with a sugar of 313.  Seen in follow-up by Malorie Shaffer was continued on Depakote with stable counts.     12/30/2024.  Followed up  with Dr. Dr. Dietz with leg redness.  The patient said that he thought he had the flu.  His weight was 226 pounds.  Blood pressure was 126/74.  Laboratory data 2 days prior to the visit showed that his BUN was 52 and his creatinine was 2.62.  Sugar was 151.     1/2/2025.  Followed up with Dr. Supriya Nguyen with a decrease in his swelling and improvement in his legs lesions.  She documented that he had had 2 falls without lightheadedness or dizziness.     1/6/2025.  Laboratory data showed a sugar of 313 BUN of 41 and creatinine of 2.56 with an EGFR of 23.     1/15/2025.  Seen by Dr. Patrick Haque of [facility] neurology because of his tremors.  He mentioned that gabapentin can cause swelling.  He was recently discontinued.  Symptoms appear to be improved.  He noted the patient had already been on primidone.  He had complained of chronic numbness of the left thumb and right thumb.  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  He was reluctant to see orthopedic reduction and risks were recommended as well as exercises to maintain strength.  He said that he could continue primidone 50 mg p.o. nightly for essential tremor and increase the dose if needed and to avoid caffeine.     1/20/2025.  BUN was 47 creatinine was 2.70 with an EGFR of 22.  Sugar was 142.     2/3/2025.  Laboratory data showed a BUN of 46 creatinine 2.42 with a sugar of 218.  Follow-up with Malorie Shaffer at which time the patient said that he wanted to stay on erythropoietin.  Repeat bone marrow was discussed with the patient was reluctant to do that or to see Dr. Hess for second opinion.     2/17/2025.  Laboratory data showed a BUN of 49 creatinine 2.53 with a sugar of 300.     3/3/2025.  BUN was 57 creatinine was 2.86 with a sugar of 284.  Followed up with Malorie Shaffer still not wanting to get another opinion.   Discussion was around supportive care and making sure that he understood the dangers of cytopenias.     3/4/2025.  Follow-up with Dr. Dr. Dietz.  No changes made in his medications.     3/7/2025.  CT scan of the chest without contrast showed moderate basilar and subpleural predominant fibrosing interstitial lung disease with architectural distortion in the absence of honeycombing which have been waxing and waning the degree of inflammatory change had decreased over the past year.  Densely calcified coronary arteries were seen and a new right retroareolar nodular appearing soft tissue measuring 1.6 cm and could represent asymmetric clinically mass via but this needed to have follow-up.     3/14/2025.  Followed up with Dr. Graham.  He noted that pulse ox on room air was 90% and on repeat was 96.     3/19/2025.  He is here today with his wife.  We wanted to make sure that he understood that his counts were not doing well and that he had potential problems with infections, bleeding and anemia that might cause him to have chest pains or increasing shortness of breath.  We talked about the possibility of transfusions if he became more symptomatic.  He verbalized understanding.  He is not interested in another opinion at this time.  He is not interested in being treated more aggressively at this time.     He is more interested in going through his medicines and making sure that he needed all of them.  We did identify that he is on carvedilol and metoprolol.  He is also on sucralfate 3 times a day and pantoprazole 40 mg twice a day.  He says that he is not experiencing any heartburn and has not for a while.  We went through his medication list and noted what it was for.  I encouraged him to talk to primary care about these issues.     He has an appointment to see his cardiologist in the upcoming weeks.  His blood pressure today 137/67.  His most recent BUN and creatinine were 57 and 2.86 which I am sure have something  to do with his state of hydration.  He currently says that he is taking 1-1/2 of a 40 mg Lasix tablet daily and continues to take Aldactone 25 mg daily.  He has prescriptions for both 25 and 50 mg of Aldactone.  He has an upcoming appointment with NATALY in about 2 weeks.     4/14/25:    Patient is in office for routine follow-up. Patient was directed to be seen in the emergency room 3/31/25 after reporting weakness/shakiness that lead to him falling. His hemoglobin at the time was  7 g/dL he received 2 units PRBC's. Upon discharge he was sent to SNF for a few days of occupational therapy.      He is ambulating with a walker to his appointment. He reports feeling steady on his feet, denies ay weakness today. He has continuous oxygen, however he is not using while in the office, denies any shortness of breath. Continuous oxygen 2-3L nc. today.      Denies any bleeding or bruising. Bilateral edema has improved since he started drinking more water. He remains to take lasix 40mg and spirolactone 50mg.  He denies any GI or Urinary issues.  Occasional productive cough, clear phlegm. He states he feel to bad today.      Home -240's- PCP recently sent prescribed insulin pens, which is much easier for him to administer. He has home health coming twice weekly.      5/12/25: Patient is in office today, he arrives wearing his oxygen, reports increased shortness of breath. Otherwise denies any new clinical concerns. He denies any abnormal bleeding- (gum bleeding, epistaxis, hematuria, hematochezia, or melanic stool). He remains to have edema in lower extremities, no open sores/ulcers, manages with spironolactone and lasix. Continues to work with home PT/OT weekly. Denies any recent falls.       Counts have dropped, Hemoglobin is 7.5, WBC 1.0, ANC 0.32, Lymphs 0.57, Plateletes 56,000.  Patient is planning to take a bus trip on Wednesday. Educated his once again on immunocompromised precautions, he verbalizes understanding.   "Discussed bleeding risks and precautions. Due to his drop in hemoglobin, and given his increased shortness of breath, discussed PRBC transfusion, patient was agreeable at this time, will order/schedule accordingly.      8/4/25: Patient is in office today, He is wearing his continuous oxygen 2L NC. Endorses weakness, and extreme fatigue. States, \"no energy to get up and go\". He continues to bruise easily, but denies any abnormal bleeding. Appetite is decent. No open sores on legs/feet. He remains taking bus tours with his wife, we spent a lot of time discussing immuno comprised precautions, he verbalizes understanding.    Counts remain low-Hemoglobin 8.3, WBC 1.0, Platelets 42,000, ANC 0.47, Lymphs 0.47. Will proceed with Darbepoetin injection today. He will return in 2 weeks for labs and consideration for repeat injection. He knows to call with any questions or concerns in the interm.      Comorbidities  GERD  Arthritis, DJD  BPH  Coronary artery disease with history of myocardial, status post stent infarction  History of dumping syndrome  Hyperlipidemia  History of skin cancer  Obesity  Obstructive sleep apnea on CPAP  Peripheral arterial disease  CKD 3  Diabetes with nephropathy  History of cholecystectomy and hernia repair  Essential tremor  Hypertension   Numbness in his fingers felt to be related to cervical disc disease.     Monitoring Parameters  Histories, physical examinations and CBCs with occasional bone marrow biopsies.   Objective    BSA: There is no height or weight on file to calculate BSA.  There were no vitals taken for this visit.     Review of Systems - Oncology   Review of Systems   Constitutional:  Positive for fatigue. Negative for appetite change and unexpected weight change.   HENT:  Negative for dental problem, mouth sores, nosebleeds and trouble swallowing.    Eyes:  Negative for visual disturbance.   Respiratory:  Positive for shortness of breath (with activity). Negative for cough.  "   Cardiovascular:  Positive for leg swelling.   Gastrointestinal:  Negative for abdominal pain, constipation, diarrhea, nausea and vomiting.   Endocrine: Negative for polyuria.   Genitourinary:  Negative for dysuria, frequency and urgency.   Musculoskeletal:  Positive for arthralgias and myalgias.   Skin:  Positive for pallor. Negative for rash.   Neurological:  Negative for weakness, light-headedness, numbness and headaches.   Hematological:  Bruises/bleeds easily.   Psychiatric/Behavioral:  Negative for self-injury, sleep disturbance and suicidal ideas. The patient is not nervous/anxious.          Past Medical History  Medical History           Past Medical History:   Diagnosis Date    Abnormal levels of other serum enzymes       Abnormal liver enzymes    Dependence on other enabling machines and devices       CPAP (continuous positive airway pressure) dependence    Encounter for examination of eyes and vision without abnormal findings       Diabetic eye exam    Localized edema       Bilateral leg edema    Old myocardial infarction       History of myocardial infarction    Other conditions influencing health status 01/08/2020     Uncontrolled diabetes mellitus type 2 without complications    Pain in unspecified ankle and joints of unspecified foot       Arthralgia of ankle    Personal history of other diseases of the digestive system       History of gastroesophageal reflux (GERD)    Personal history of other diseases of the digestive system       History of dumping syndrome    Personal history of other endocrine, nutritional and metabolic disease 11/12/2020     History of type 2 diabetes mellitus    Presence of coronary angioplasty implant and graft       History of coronary artery stent placement    Rash and other nonspecific skin eruption 12/11/2019     Rash    Unspecified abdominal hernia without obstruction or gangrene       Hernia    Unspecified abdominal pain 05/19/2021     Abdominal pain, left lateral             Surgical History  Surgical History             Past Surgical History:   Procedure Laterality Date    OTHER SURGICAL HISTORY   10/09/2019     Arterial stent placement    OTHER SURGICAL HISTORY   10/09/2019     Cholecystectomy    OTHER SURGICAL HISTORY   10/09/2019     Hernia repair            Social History  Social History   Social History              Tobacco Use    Smoking status: Never    Smokeless tobacco: Never   Vaping Use    Vaping status: Never Used   Substance Use Topics    Alcohol use: Never    Drug use: Never            Family History  family history includes Colon cancer in his mother and another family member.         Current Medications          Current Outpatient Medications   Medication Instructions    allopurinol (ZYLOPRIM) 100 mg, oral, Daily    amLODIPine (NORVASC) 5 mg, oral, Daily before breakfast    ascorbic acid (VITAMIN C) 250 mg    aspirin 81 mg, Daily    atorvastatin (LIPITOR) 40 mg, oral, Daily    blood-glucose sensor (FreeStyle Sindi 3 Sensor) device Use as directed    carvedilol (COREG) 6.25 mg, 2 times daily    ciprofloxacin (CIPRO) 500 mg, oral, Daily    cyanocobalamin, vitamin B-12, (Vitamin B-12) 1,000 mcg tablet extended release 1 tablet    FeroSuL tablet 1 tablet, Every other day    flash glucose scanning reader (FreeStyle Sindi 2 Sperry) misc Use as instructed    flash glucose sensor kit (FreeStyle Sindi 2 Sensor) kit Use as directed    FreeStyle Sindi 2 Sensor kit 1 each, subcutaneous, As needed, Use as instructed    furosemide (Lasix) 40 mg tablet TAKE 1.5 TABLET BY MOUTH EVERY DAY    gabapentin (NEURONTIN) 300 mg, oral, 2 times daily    lancets 33 gauge misc 2 times daily    Lantus U-100 Insulin 60 Units, subcutaneous, Nightly    metoprolol tartrate (LOPRESSOR) 50 mg, oral, 2 times daily    multivit-minerals/folic acid (CENTRUM ADULTS ORAL) Take by mouth.    nitroglycerin (NITROSTAT) 0.4 mg, sublingual, Every 5 min PRN    NON FORMULARY Truetrack test In vitro strip; Use  "as directed to check blood sugar  2 times daily    pantoprazole (PROTONIX) 40 mg, oral, 2 times daily    pen needle, diabetic (Pen Needle) 32 gauge x 5/32\" needle 1 each, miscellaneous, Daily    primidone (MYSOLINE) 50 mg, oral, Nightly    spironolactone (Aldactone) 50 mg tablet TAKE 1 TABLET ( 50 MG) BY MOUTH DAILY FOR 30 DAYS. Hold if serum potassium is more than 5.0    spironolactone (ALDACTONE) 25 mg, oral, Daily    sucralfate (CARAFATE) 1 g, oral, 3 times daily (morning, midday, late afternoon)    vitamins A,C,E-zinc-copper 2,148 mcg-113 mg-45 mg-17.4mg tablet Take by mouth. SUPER VIEW      Scheduled Medications             OARRS Review  I have personally reviewed the OARRS report for Zack Medrano. I have considered the risks of abuse, dependence, addiction and diversion.  He continues on gabapentin through primary care.     Physical Exam  Vitals and nursing note reviewed.   Constitutional:       Appearance: Normal appearance. He is obese.   HENT:      Head: Normocephalic and atraumatic.      Nose: Nose normal.      Mouth/Throat:      Mouth: Mucous membranes are moist.      Pharynx: Oropharynx is clear.     Eyes:      General: No scleral icterus.     Extraocular Movements: Extraocular movements intact.      Conjunctiva/sclera: Conjunctivae normal.       Cardiovascular:      Rate and Rhythm: Normal rate and regular rhythm.      Pulses: Normal pulses.      Heart sounds: Normal heart sounds.   Pulmonary:      Effort: Pulmonary effort is normal.      Breath sounds: Decreased breath sounds present.      Comments: Continuous oxygen 2 L NC   Abdominal:      Palpations: Abdomen is soft.     Musculoskeletal:         General: Swelling present. Normal range of motion.      Cervical back: Normal range of motion and neck supple.     Skin:     General: Skin is warm and dry.      Coloration: Skin is pale.      Findings: Bruising present.     Neurological:      General: No focal deficit present.      Mental Status: He is " alert and oriented to person, place, and time.      Motor: Weakness present.      Gait: Gait abnormal.     Psychiatric:         Mood and Affect: Mood normal.         Behavior: Behavior normal.         Thought Content: Thought content normal.         Judgment: Judgment normal.         Performance Status:  Symptomatic; fully ambulatory      Assessment/Plan        1.  Pancytopenia.  The patient has unusual bone marrow findings.  Chromosomal analysis will not be done due to failure of the specimen to grow.  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  He also has components of myelodysplasia.  He is not responding to erythropoietin.  We will increase his dose to 500 mcg every other week.  He continues to be at high risk for bleeding and infection with a low ANC and low platelet count.  He has had previous GI bleeds.  He is currently back on his  aspirin per follow-up with Dr. Danielle for fear of clotting off his stents.  He is off Eliquis.  He will get his treatment today with darbepoetin and continue every 14 days.     He is not interested in the second marina at this time and does not want to be treated more aggressively.  We will continue to be supportive.       8/4/25: Reviewed recent labs- WBC 1.0, ANC 0.47, Hemoglobin 8.3, Platelets 42K-   - Immunocompromised Precautions discussed  -will proceed with darbepoetin  injection every 14 days, and support as needed   - Return to in 4 weeks          These include but are not limited to:  GERD  Arthritis, DJD  BPH  Coronary artery disease with history of myocardial, status post stent infarction  History of dumping syndrome  Hyperlipidemia  History of skin cancer  Obesity  Obstructive sleep apnea on CPAP  Peripheral arterial disease  CKD 3  Diabetes with nephropathy  History of cholecystectomy and hernia repair  Essential tremor  Hypertension   Numbness in his fingers felt to be related to cervical disc disease.     3.  Rash on his legs.  I think this  looks more like stasis dermatitis than disseminated zoster.  If it were disseminated zoster he would have it on other areas of his body.     Plan:  Discussed and reviewed medical history  Reviewed recent hemoglobin is 8.3, will proceed with Darbepoetin injection today  Continue with Darbepoetin injection every 14 days with labs prior   Return to see MD in 4 weeks       He knows that he should call in the interim should he have any change in his status, questions or concerns.            Malorie Shaffer, NATALY-CNP

## 2025-08-04 NOTE — PATIENT INSTRUCTIONS
Discussed and reviewed medical history   Will draw stat labs today   Proceed with Darbopoetin, depending on labs today  Return to see APRN in 1 month

## 2025-08-04 NOTE — PROGRESS NOTES
Stat labs drawn on arrival  8/18 1000 stat labs and injection  9/2 1000 stat labs and injection  9/15 1100 CNP- david draw labs           1130 injection- Darbepoetin to follow  Reviewed AVS with patient- patient verbalizes understanding

## 2025-08-18 ENCOUNTER — INFUSION (OUTPATIENT)
Dept: HEMATOLOGY/ONCOLOGY | Facility: CLINIC | Age: OVER 89
End: 2025-08-18
Payer: MEDICARE

## 2025-08-18 VITALS
SYSTOLIC BLOOD PRESSURE: 113 MMHG | BODY MASS INDEX: 33.69 KG/M2 | OXYGEN SATURATION: 94 % | HEART RATE: 78 BPM | TEMPERATURE: 97.3 F | DIASTOLIC BLOOD PRESSURE: 63 MMHG | WEIGHT: 221.56 LBS | RESPIRATION RATE: 18 BRPM

## 2025-08-18 DIAGNOSIS — D46.9 MYELODYSPLASTIC SYNDROME, UNSPECIFIED (MULTI): ICD-10-CM

## 2025-08-18 DIAGNOSIS — N18.32 ANEMIA DUE TO STAGE 3B CHRONIC KIDNEY DISEASE: ICD-10-CM

## 2025-08-18 DIAGNOSIS — D61.818 OTHER PANCYTOPENIA (MULTI): ICD-10-CM

## 2025-08-18 DIAGNOSIS — D63.1 ANEMIA DUE TO STAGE 3B CHRONIC KIDNEY DISEASE: ICD-10-CM

## 2025-08-18 DIAGNOSIS — D63.1 ANEMIA DUE TO STAGE 4 CHRONIC KIDNEY DISEASE: ICD-10-CM

## 2025-08-18 DIAGNOSIS — N18.4 STAGE 4 CHRONIC KIDNEY DISEASE (MULTI): ICD-10-CM

## 2025-08-18 DIAGNOSIS — N18.4 ANEMIA DUE TO STAGE 4 CHRONIC KIDNEY DISEASE: ICD-10-CM

## 2025-08-18 LAB
ABO GROUP (TYPE) IN BLOOD: NORMAL
ANTIBODY SCREEN: NORMAL
BASO STIPL BLD QL SMEAR: PRESENT
BASOPHILS # BLD AUTO: 0 X10*3/UL (ref 0–0.1)
BASOPHILS NFR BLD AUTO: 0 %
EOSINOPHIL # BLD AUTO: 0.04 X10*3/UL (ref 0–0.4)
EOSINOPHIL NFR BLD AUTO: 3.9 %
ERYTHROCYTE [DISTWIDTH] IN BLOOD BY AUTOMATED COUNT: 22.5 % (ref 11.5–14.5)
HCT VFR BLD AUTO: 23.4 % (ref 41–52)
HGB BLD-MCNC: 7.4 G/DL (ref 13.5–17.5)
IMM GRANULOCYTES # BLD AUTO: 0.01 X10*3/UL (ref 0–0.5)
IMM GRANULOCYTES NFR BLD AUTO: 1 % (ref 0–0.9)
LYMPHOCYTES # BLD AUTO: 0.5 X10*3/UL (ref 0.8–3)
LYMPHOCYTES NFR BLD AUTO: 49 %
MCH RBC QN AUTO: 33.3 PG (ref 26–34)
MCHC RBC AUTO-ENTMCNC: 31.6 G/DL (ref 32–36)
MCV RBC AUTO: 105 FL (ref 80–100)
MONOCYTES # BLD AUTO: 0.09 X10*3/UL (ref 0.05–0.8)
MONOCYTES NFR BLD AUTO: 8.8 %
NEUTROPHILS # BLD AUTO: 0.38 X10*3/UL (ref 1.6–5.5)
NEUTROPHILS NFR BLD AUTO: 37.3 %
NRBC BLD-RTO: 0 /100 WBCS (ref 0–0)
OVALOCYTES BLD QL SMEAR: NORMAL
PLATELET # BLD AUTO: 49 X10*3/UL (ref 150–450)
RBC # BLD AUTO: 2.22 X10*6/UL (ref 4.5–5.9)
RBC MORPH BLD: NORMAL
RH FACTOR (ANTIGEN D): NORMAL
SCHISTOCYTES BLD QL SMEAR: NORMAL
WBC # BLD AUTO: 1 X10*3/UL (ref 4.4–11.3)

## 2025-08-18 PROCEDURE — 86901 BLOOD TYPING SEROLOGIC RH(D): CPT

## 2025-08-18 PROCEDURE — 96372 THER/PROPH/DIAG INJ SC/IM: CPT

## 2025-08-18 PROCEDURE — 2500000004 HC RX 250 GENERAL PHARMACY W/ HCPCS (ALT 636 FOR OP/ED): Mod: JZ,EC

## 2025-08-18 PROCEDURE — 86923 COMPATIBILITY TEST ELECTRIC: CPT

## 2025-08-18 PROCEDURE — 85025 COMPLETE CBC W/AUTO DIFF WBC: CPT

## 2025-08-18 PROCEDURE — 36415 COLL VENOUS BLD VENIPUNCTURE: CPT

## 2025-08-18 RX ORDER — FAMOTIDINE 10 MG/ML
20 INJECTION, SOLUTION INTRAVENOUS ONCE AS NEEDED
Status: CANCELLED | OUTPATIENT
Start: 2025-08-18

## 2025-08-18 RX ORDER — EPINEPHRINE 0.3 MG/.3ML
0.3 INJECTION SUBCUTANEOUS EVERY 5 MIN PRN
Status: CANCELLED | OUTPATIENT
Start: 2025-08-18

## 2025-08-18 RX ORDER — FAMOTIDINE 10 MG/ML
20 INJECTION, SOLUTION INTRAVENOUS ONCE AS NEEDED
OUTPATIENT
Start: 2025-09-01

## 2025-08-18 RX ORDER — ALBUTEROL SULFATE 0.83 MG/ML
3 SOLUTION RESPIRATORY (INHALATION) AS NEEDED
Status: CANCELLED | OUTPATIENT
Start: 2025-08-18

## 2025-08-18 RX ORDER — ALBUTEROL SULFATE 0.83 MG/ML
3 SOLUTION RESPIRATORY (INHALATION) AS NEEDED
OUTPATIENT
Start: 2025-09-01

## 2025-08-18 RX ORDER — EPINEPHRINE 0.3 MG/.3ML
0.3 INJECTION SUBCUTANEOUS EVERY 5 MIN PRN
OUTPATIENT
Start: 2025-09-01

## 2025-08-18 RX ORDER — DIPHENHYDRAMINE HYDROCHLORIDE 50 MG/ML
50 INJECTION, SOLUTION INTRAMUSCULAR; INTRAVENOUS AS NEEDED
OUTPATIENT
Start: 2025-09-01

## 2025-08-18 RX ORDER — DIPHENHYDRAMINE HYDROCHLORIDE 50 MG/ML
50 INJECTION, SOLUTION INTRAMUSCULAR; INTRAVENOUS AS NEEDED
Status: CANCELLED | OUTPATIENT
Start: 2025-08-18

## 2025-08-18 RX ADMIN — DARBEPOETIN ALFA 500 MCG: 500 INJECTION, SOLUTION INTRAVENOUS; SUBCUTANEOUS at 11:13

## 2025-08-18 ASSESSMENT — PAIN SCALES - GENERAL: PAINLEVEL_OUTOF10: 0-NO PAIN

## 2025-08-19 ENCOUNTER — INFUSION (OUTPATIENT)
Dept: HEMATOLOGY/ONCOLOGY | Facility: CLINIC | Age: OVER 89
End: 2025-08-19
Payer: MEDICARE

## 2025-08-19 VITALS
WEIGHT: 222.88 LBS | OXYGEN SATURATION: 93 % | SYSTOLIC BLOOD PRESSURE: 129 MMHG | TEMPERATURE: 97 F | HEART RATE: 73 BPM | BODY MASS INDEX: 33.89 KG/M2 | DIASTOLIC BLOOD PRESSURE: 74 MMHG | RESPIRATION RATE: 18 BRPM

## 2025-08-19 DIAGNOSIS — D63.1 ANEMIA DUE TO STAGE 4 CHRONIC KIDNEY DISEASE: ICD-10-CM

## 2025-08-19 DIAGNOSIS — N18.4 ANEMIA DUE TO STAGE 4 CHRONIC KIDNEY DISEASE: ICD-10-CM

## 2025-08-19 DIAGNOSIS — D46.9 MYELODYSPLASTIC SYNDROME, UNSPECIFIED (MULTI): ICD-10-CM

## 2025-08-19 PROCEDURE — P9040 RBC LEUKOREDUCED IRRADIATED: HCPCS

## 2025-08-19 PROCEDURE — 36430 TRANSFUSION BLD/BLD COMPNT: CPT

## 2025-08-19 RX ORDER — ALBUTEROL SULFATE 0.83 MG/ML
3 SOLUTION RESPIRATORY (INHALATION) AS NEEDED
OUTPATIENT
Start: 2025-08-19

## 2025-08-19 RX ORDER — FAMOTIDINE 10 MG/ML
20 INJECTION, SOLUTION INTRAVENOUS ONCE AS NEEDED
OUTPATIENT
Start: 2025-08-19

## 2025-08-19 RX ORDER — HEPARIN SODIUM,PORCINE/PF 10 UNIT/ML
50 SYRINGE (ML) INTRAVENOUS AS NEEDED
OUTPATIENT
Start: 2025-08-19

## 2025-08-19 RX ORDER — HEPARIN 100 UNIT/ML
500 SYRINGE INTRAVENOUS AS NEEDED
OUTPATIENT
Start: 2025-08-19

## 2025-08-19 RX ORDER — EPINEPHRINE 0.3 MG/.3ML
0.3 INJECTION SUBCUTANEOUS EVERY 5 MIN PRN
OUTPATIENT
Start: 2025-08-19

## 2025-08-19 RX ORDER — DIPHENHYDRAMINE HYDROCHLORIDE 50 MG/ML
50 INJECTION, SOLUTION INTRAMUSCULAR; INTRAVENOUS AS NEEDED
OUTPATIENT
Start: 2025-08-19

## 2025-08-19 ASSESSMENT — PAIN SCALES - GENERAL: PAINLEVEL_OUTOF10: 0-NO PAIN

## 2025-08-25 ENCOUNTER — APPOINTMENT (OUTPATIENT)
Dept: PULMONOLOGY | Facility: CLINIC | Age: OVER 89
End: 2025-08-25
Payer: MEDICARE

## 2025-08-25 VITALS
HEIGHT: 68 IN | DIASTOLIC BLOOD PRESSURE: 59 MMHG | WEIGHT: 223.8 LBS | SYSTOLIC BLOOD PRESSURE: 114 MMHG | TEMPERATURE: 97.7 F | OXYGEN SATURATION: 94 % | BODY MASS INDEX: 33.92 KG/M2 | HEART RATE: 78 BPM

## 2025-08-25 DIAGNOSIS — J84.9 INTERSTITIAL LUNG DISEASE (MULTI): Primary | ICD-10-CM

## 2025-08-25 RX ORDER — ALBUTEROL SULFATE 90 UG/1
2 INHALANT RESPIRATORY (INHALATION) EVERY 4 HOURS PRN
Qty: 8.5 G | Refills: 5 | Status: SHIPPED | OUTPATIENT
Start: 2025-08-25 | End: 2026-08-25

## 2025-08-25 ASSESSMENT — ENCOUNTER SYMPTOMS
OCCASIONAL FEELINGS OF UNSTEADINESS: 0
LOSS OF SENSATION IN FEET: 0
DEPRESSION: 0

## 2025-09-02 ENCOUNTER — INFUSION (OUTPATIENT)
Dept: HEMATOLOGY/ONCOLOGY | Facility: CLINIC | Age: OVER 89
End: 2025-09-02
Payer: MEDICARE

## 2025-09-02 VITALS
BODY MASS INDEX: 33.79 KG/M2 | TEMPERATURE: 97.5 F | HEART RATE: 76 BPM | SYSTOLIC BLOOD PRESSURE: 140 MMHG | DIASTOLIC BLOOD PRESSURE: 70 MMHG | WEIGHT: 222.22 LBS | OXYGEN SATURATION: 97 % | RESPIRATION RATE: 18 BRPM

## 2025-09-02 DIAGNOSIS — D63.1 ANEMIA DUE TO STAGE 3B CHRONIC KIDNEY DISEASE: ICD-10-CM

## 2025-09-02 DIAGNOSIS — N18.4 STAGE 4 CHRONIC KIDNEY DISEASE (MULTI): ICD-10-CM

## 2025-09-02 DIAGNOSIS — D46.9 MYELODYSPLASTIC SYNDROME, UNSPECIFIED (MULTI): ICD-10-CM

## 2025-09-02 DIAGNOSIS — N18.32 ANEMIA DUE TO STAGE 3B CHRONIC KIDNEY DISEASE: ICD-10-CM

## 2025-09-02 DIAGNOSIS — D61.818 OTHER PANCYTOPENIA (MULTI): ICD-10-CM

## 2025-09-02 DIAGNOSIS — D63.1 ANEMIA DUE TO STAGE 4 CHRONIC KIDNEY DISEASE: ICD-10-CM

## 2025-09-02 DIAGNOSIS — N18.4 ANEMIA DUE TO STAGE 4 CHRONIC KIDNEY DISEASE: ICD-10-CM

## 2025-09-02 LAB
BASOPHILS # BLD MANUAL: 0.02 X10*3/UL (ref 0–0.1)
BASOPHILS NFR BLD MANUAL: 2 %
EOSINOPHIL # BLD MANUAL: 0.04 X10*3/UL (ref 0–0.4)
EOSINOPHIL NFR BLD MANUAL: 4 %
ERYTHROCYTE [DISTWIDTH] IN BLOOD BY AUTOMATED COUNT: 22.3 % (ref 11.5–14.5)
HCT VFR BLD AUTO: 25 % (ref 41–52)
HGB BLD-MCNC: 8.1 G/DL (ref 13.5–17.5)
HYPOCHROMIA BLD QL SMEAR: ABNORMAL
IMM GRANULOCYTES # BLD AUTO: 0 X10*3/UL (ref 0–0.5)
IMM GRANULOCYTES NFR BLD AUTO: 0 % (ref 0–0.9)
LYMPHOCYTES # BLD MANUAL: 0.53 X10*3/UL (ref 0.8–3)
LYMPHOCYTES NFR BLD MANUAL: 48 %
MCH RBC QN AUTO: 33.2 PG (ref 26–34)
MCHC RBC AUTO-ENTMCNC: 32.4 G/DL (ref 32–36)
MCV RBC AUTO: 103 FL (ref 80–100)
MONOCYTES # BLD MANUAL: 0.11 X10*3/UL (ref 0.05–0.8)
MONOCYTES NFR BLD MANUAL: 10 %
NEUTROPHILS # BLD MANUAL: 0.39 X10*3/UL (ref 1.6–5.5)
NEUTS BAND # BLD MANUAL: 0.02 X10*3/UL (ref 0–0.5)
NEUTS BAND NFR BLD MANUAL: 2 %
NEUTS SEG # BLD MANUAL: 0.37 X10*3/UL (ref 1.6–5)
NEUTS SEG NFR BLD MANUAL: 34 %
NRBC BLD-RTO: 0 /100 WBCS (ref 0–0)
OVALOCYTES BLD QL SMEAR: ABNORMAL
PLATELET # BLD AUTO: 47 X10*3/UL (ref 150–450)
RBC # BLD AUTO: 2.44 X10*6/UL (ref 4.5–5.9)
RBC MORPH BLD: ABNORMAL
TOTAL CELLS COUNTED BLD: 50
WBC # BLD AUTO: 1.1 X10*3/UL (ref 4.4–11.3)

## 2025-09-02 PROCEDURE — 85027 COMPLETE CBC AUTOMATED: CPT

## 2025-09-02 PROCEDURE — 36415 COLL VENOUS BLD VENIPUNCTURE: CPT

## 2025-09-02 PROCEDURE — 85007 BL SMEAR W/DIFF WBC COUNT: CPT

## 2025-09-02 PROCEDURE — 96372 THER/PROPH/DIAG INJ SC/IM: CPT

## 2025-09-02 PROCEDURE — 2500000004 HC RX 250 GENERAL PHARMACY W/ HCPCS (ALT 636 FOR OP/ED): Mod: JZ,EC

## 2025-09-02 RX ORDER — EPINEPHRINE 0.3 MG/.3ML
0.3 INJECTION SUBCUTANEOUS EVERY 5 MIN PRN
OUTPATIENT
Start: 2025-09-15

## 2025-09-02 RX ORDER — ALBUTEROL SULFATE 0.83 MG/ML
3 SOLUTION RESPIRATORY (INHALATION) AS NEEDED
OUTPATIENT
Start: 2025-09-15

## 2025-09-02 RX ORDER — FAMOTIDINE 10 MG/ML
20 INJECTION, SOLUTION INTRAVENOUS ONCE AS NEEDED
OUTPATIENT
Start: 2025-09-15

## 2025-09-02 RX ORDER — DIPHENHYDRAMINE HYDROCHLORIDE 50 MG/ML
50 INJECTION, SOLUTION INTRAMUSCULAR; INTRAVENOUS AS NEEDED
OUTPATIENT
Start: 2025-09-15

## 2025-09-02 RX ADMIN — DARBEPOETIN ALFA 500 MCG: 500 INJECTION, SOLUTION INTRAVENOUS; SUBCUTANEOUS at 11:14

## 2025-09-02 ASSESSMENT — PAIN SCALES - GENERAL: PAINLEVEL_OUTOF10: 0-NO PAIN

## 2025-09-03 DIAGNOSIS — I10 PRIMARY HYPERTENSION: ICD-10-CM

## 2025-09-03 RX ORDER — ALLOPURINOL 100 MG/1
100 TABLET ORAL DAILY
Qty: 90 TABLET | Refills: 1 | Status: SHIPPED | OUTPATIENT
Start: 2025-09-03

## 2025-09-04 ENCOUNTER — APPOINTMENT (OUTPATIENT)
Age: OVER 89
End: 2025-09-04
Payer: MEDICARE

## 2025-09-05 ENCOUNTER — APPOINTMENT (OUTPATIENT)
Age: OVER 89
End: 2025-09-05
Payer: MEDICARE

## 2025-11-10 ENCOUNTER — APPOINTMENT (OUTPATIENT)
Dept: NEPHROLOGY | Facility: CLINIC | Age: OVER 89
End: 2025-11-10
Payer: MEDICARE

## 2025-12-05 ENCOUNTER — APPOINTMENT (OUTPATIENT)
Age: OVER 89
End: 2025-12-05
Payer: MEDICARE